# Patient Record
Sex: FEMALE | Race: OTHER | Employment: OTHER | URBAN - METROPOLITAN AREA
[De-identification: names, ages, dates, MRNs, and addresses within clinical notes are randomized per-mention and may not be internally consistent; named-entity substitution may affect disease eponyms.]

---

## 2017-02-17 DIAGNOSIS — I10 ESSENTIAL HYPERTENSION: ICD-10-CM

## 2017-02-17 RX ORDER — METOPROLOL SUCCINATE 25 MG/1
TABLET, EXTENDED RELEASE ORAL
Qty: 90 TABLET | Refills: 0 | Status: SHIPPED | OUTPATIENT
Start: 2017-02-17 | End: 2017-06-06 | Stop reason: SDUPTHER

## 2017-03-20 ENCOUNTER — OFFICE VISIT (OUTPATIENT)
Dept: FAMILY MEDICINE CLINIC | Age: 67
End: 2017-03-20
Payer: COMMERCIAL

## 2017-03-20 VITALS
SYSTOLIC BLOOD PRESSURE: 130 MMHG | HEART RATE: 63 BPM | DIASTOLIC BLOOD PRESSURE: 70 MMHG | HEIGHT: 60 IN | TEMPERATURE: 98.1 F | WEIGHT: 125.2 LBS | BODY MASS INDEX: 24.58 KG/M2

## 2017-03-20 DIAGNOSIS — M77.11 RIGHT LATERAL EPICONDYLITIS: ICD-10-CM

## 2017-03-20 DIAGNOSIS — K21.9 GASTROESOPHAGEAL REFLUX DISEASE WITHOUT ESOPHAGITIS: ICD-10-CM

## 2017-03-20 DIAGNOSIS — G25.81 RLS (RESTLESS LEGS SYNDROME): ICD-10-CM

## 2017-03-20 DIAGNOSIS — I10 ESSENTIAL HYPERTENSION: Primary | ICD-10-CM

## 2017-03-20 PROCEDURE — G8427 DOCREV CUR MEDS BY ELIG CLIN: HCPCS | Performed by: FAMILY MEDICINE

## 2017-03-20 PROCEDURE — 1123F ACP DISCUSS/DSCN MKR DOCD: CPT | Performed by: FAMILY MEDICINE

## 2017-03-20 PROCEDURE — 3014F SCREEN MAMMO DOC REV: CPT | Performed by: FAMILY MEDICINE

## 2017-03-20 PROCEDURE — 3017F COLORECTAL CA SCREEN DOC REV: CPT | Performed by: FAMILY MEDICINE

## 2017-03-20 PROCEDURE — 1090F PRES/ABSN URINE INCON ASSESS: CPT | Performed by: FAMILY MEDICINE

## 2017-03-20 PROCEDURE — G8484 FLU IMMUNIZE NO ADMIN: HCPCS | Performed by: FAMILY MEDICINE

## 2017-03-20 PROCEDURE — 1036F TOBACCO NON-USER: CPT | Performed by: FAMILY MEDICINE

## 2017-03-20 PROCEDURE — 99213 OFFICE O/P EST LOW 20 MIN: CPT | Performed by: FAMILY MEDICINE

## 2017-03-20 PROCEDURE — G8400 PT W/DXA NO RESULTS DOC: HCPCS | Performed by: FAMILY MEDICINE

## 2017-03-20 PROCEDURE — 4040F PNEUMOC VAC/ADMIN/RCVD: CPT | Performed by: FAMILY MEDICINE

## 2017-03-20 PROCEDURE — G8420 CALC BMI NORM PARAMETERS: HCPCS | Performed by: FAMILY MEDICINE

## 2017-03-20 RX ORDER — COVID-19 ANTIGEN TEST
1 KIT MISCELLANEOUS DAILY
Qty: 18 CAPSULE | Refills: 0 | COMMUNITY
Start: 2017-03-20 | End: 2017-11-20 | Stop reason: ALTCHOICE

## 2017-03-20 ASSESSMENT — ENCOUNTER SYMPTOMS
ABDOMINAL PAIN: 0
CONSTIPATION: 0
SHORTNESS OF BREATH: 0
SORE THROAT: 0
BACK PAIN: 0
NAUSEA: 0

## 2017-03-20 ASSESSMENT — PATIENT HEALTH QUESTIONNAIRE - PHQ9
2. FEELING DOWN, DEPRESSED OR HOPELESS: 0
SUM OF ALL RESPONSES TO PHQ QUESTIONS 1-9: 0
SUM OF ALL RESPONSES TO PHQ9 QUESTIONS 1 & 2: 0
1. LITTLE INTEREST OR PLEASURE IN DOING THINGS: 0

## 2017-06-06 DIAGNOSIS — I10 ESSENTIAL HYPERTENSION: ICD-10-CM

## 2017-06-06 RX ORDER — METOPROLOL SUCCINATE 25 MG/1
TABLET, EXTENDED RELEASE ORAL
Qty: 90 TABLET | Refills: 1 | Status: SHIPPED | OUTPATIENT
Start: 2017-06-06 | End: 2017-09-21 | Stop reason: DRUGHIGH

## 2017-07-20 ENCOUNTER — OFFICE VISIT (OUTPATIENT)
Dept: FAMILY MEDICINE CLINIC | Age: 67
End: 2017-07-20
Payer: COMMERCIAL

## 2017-07-20 VITALS
HEIGHT: 64 IN | TEMPERATURE: 98.4 F | BODY MASS INDEX: 21.78 KG/M2 | SYSTOLIC BLOOD PRESSURE: 148 MMHG | DIASTOLIC BLOOD PRESSURE: 86 MMHG | WEIGHT: 127.6 LBS | HEART RATE: 67 BPM

## 2017-07-20 DIAGNOSIS — I10 ESSENTIAL HYPERTENSION: Primary | ICD-10-CM

## 2017-07-20 DIAGNOSIS — G25.81 RLS (RESTLESS LEGS SYNDROME): ICD-10-CM

## 2017-07-20 DIAGNOSIS — I83.93 VARICOSE VEINS OF LEGS: ICD-10-CM

## 2017-07-20 DIAGNOSIS — K21.9 GASTROESOPHAGEAL REFLUX DISEASE WITHOUT ESOPHAGITIS: ICD-10-CM

## 2017-07-20 PROCEDURE — G8420 CALC BMI NORM PARAMETERS: HCPCS | Performed by: FAMILY MEDICINE

## 2017-07-20 PROCEDURE — G8400 PT W/DXA NO RESULTS DOC: HCPCS | Performed by: FAMILY MEDICINE

## 2017-07-20 PROCEDURE — 1123F ACP DISCUSS/DSCN MKR DOCD: CPT | Performed by: FAMILY MEDICINE

## 2017-07-20 PROCEDURE — 1090F PRES/ABSN URINE INCON ASSESS: CPT | Performed by: FAMILY MEDICINE

## 2017-07-20 PROCEDURE — G8427 DOCREV CUR MEDS BY ELIG CLIN: HCPCS | Performed by: FAMILY MEDICINE

## 2017-07-20 PROCEDURE — 4040F PNEUMOC VAC/ADMIN/RCVD: CPT | Performed by: FAMILY MEDICINE

## 2017-07-20 PROCEDURE — 99213 OFFICE O/P EST LOW 20 MIN: CPT | Performed by: FAMILY MEDICINE

## 2017-07-20 PROCEDURE — 3017F COLORECTAL CA SCREEN DOC REV: CPT | Performed by: FAMILY MEDICINE

## 2017-07-20 PROCEDURE — 1036F TOBACCO NON-USER: CPT | Performed by: FAMILY MEDICINE

## 2017-07-20 PROCEDURE — 3014F SCREEN MAMMO DOC REV: CPT | Performed by: FAMILY MEDICINE

## 2017-07-20 ASSESSMENT — ENCOUNTER SYMPTOMS
NAUSEA: 0
SORE THROAT: 0
ABDOMINAL PAIN: 0
CONSTIPATION: 0
SHORTNESS OF BREATH: 0

## 2017-07-21 ENCOUNTER — HOSPITAL ENCOUNTER (OUTPATIENT)
Age: 67
Discharge: HOME | End: 2017-07-21
Payer: COMMERCIAL

## 2017-07-21 DIAGNOSIS — I10 ESSENTIAL HYPERTENSION: ICD-10-CM

## 2017-07-21 LAB
ANION GAP SERPL CALCULATED.3IONS-SCNC: 11 MMOL/L (ref 9–17)
BUN BLDV-MCNC: 17 MG/DL (ref 8–23)
BUN/CREAT BLD: NORMAL (ref 9–20)
CALCIUM SERPL-MCNC: 9.5 MG/DL (ref 8.6–10.4)
CHLORIDE BLD-SCNC: 102 MMOL/L (ref 98–107)
CHOLESTEROL/HDL RATIO: 6
CHOLESTEROL: 197 MG/DL
CO2: 26 MMOL/L (ref 20–31)
CREAT SERPL-MCNC: 0.89 MG/DL (ref 0.5–0.9)
GFR AFRICAN AMERICAN: >60 ML/MIN
GFR NON-AFRICAN AMERICAN: >60 ML/MIN
GFR SERPL CREATININE-BSD FRML MDRD: NORMAL ML/MIN/{1.73_M2}
GFR SERPL CREATININE-BSD FRML MDRD: NORMAL ML/MIN/{1.73_M2}
GLUCOSE BLD-MCNC: 87 MG/DL (ref 70–99)
HDLC SERPL-MCNC: 33 MG/DL
LDL CHOLESTEROL: 131 MG/DL (ref 0–130)
POTASSIUM SERPL-SCNC: 4.9 MMOL/L (ref 3.7–5.3)
SODIUM BLD-SCNC: 139 MMOL/L (ref 135–144)
TRIGL SERPL-MCNC: 163 MG/DL
VITAMIN D 25-HYDROXY: 26.2 NG/ML (ref 30–100)
VLDLC SERPL CALC-MCNC: ABNORMAL MG/DL (ref 1–30)

## 2017-07-21 PROCEDURE — 36415 COLL VENOUS BLD VENIPUNCTURE: CPT

## 2017-07-21 PROCEDURE — 82306 VITAMIN D 25 HYDROXY: CPT

## 2017-07-21 PROCEDURE — 80048 BASIC METABOLIC PNL TOTAL CA: CPT

## 2017-07-21 PROCEDURE — 80061 LIPID PANEL: CPT

## 2017-09-14 ENCOUNTER — OFFICE VISIT (OUTPATIENT)
Dept: FAMILY MEDICINE CLINIC | Age: 67
End: 2017-09-14
Payer: COMMERCIAL

## 2017-09-14 VITALS
DIASTOLIC BLOOD PRESSURE: 96 MMHG | WEIGHT: 128.8 LBS | TEMPERATURE: 98.4 F | HEART RATE: 62 BPM | HEIGHT: 64 IN | SYSTOLIC BLOOD PRESSURE: 168 MMHG | OXYGEN SATURATION: 86 % | BODY MASS INDEX: 21.99 KG/M2

## 2017-09-14 DIAGNOSIS — M79.652 LEFT THIGH PAIN: Primary | ICD-10-CM

## 2017-09-14 DIAGNOSIS — I10 ESSENTIAL HYPERTENSION: ICD-10-CM

## 2017-09-14 PROCEDURE — G8420 CALC BMI NORM PARAMETERS: HCPCS | Performed by: FAMILY MEDICINE

## 2017-09-14 PROCEDURE — 4040F PNEUMOC VAC/ADMIN/RCVD: CPT | Performed by: FAMILY MEDICINE

## 2017-09-14 PROCEDURE — 1123F ACP DISCUSS/DSCN MKR DOCD: CPT | Performed by: FAMILY MEDICINE

## 2017-09-14 PROCEDURE — G8400 PT W/DXA NO RESULTS DOC: HCPCS | Performed by: FAMILY MEDICINE

## 2017-09-14 PROCEDURE — G8427 DOCREV CUR MEDS BY ELIG CLIN: HCPCS | Performed by: FAMILY MEDICINE

## 2017-09-14 PROCEDURE — 1036F TOBACCO NON-USER: CPT | Performed by: FAMILY MEDICINE

## 2017-09-14 PROCEDURE — 3017F COLORECTAL CA SCREEN DOC REV: CPT | Performed by: FAMILY MEDICINE

## 2017-09-14 PROCEDURE — 3014F SCREEN MAMMO DOC REV: CPT | Performed by: FAMILY MEDICINE

## 2017-09-14 PROCEDURE — 1090F PRES/ABSN URINE INCON ASSESS: CPT | Performed by: FAMILY MEDICINE

## 2017-09-14 PROCEDURE — 99213 OFFICE O/P EST LOW 20 MIN: CPT | Performed by: FAMILY MEDICINE

## 2017-09-14 RX ORDER — IBUPROFEN 800 MG/1
800 TABLET ORAL 3 TIMES DAILY PRN
Qty: 21 TABLET | Refills: 0 | Status: SHIPPED | OUTPATIENT
Start: 2017-09-14 | End: 2017-12-11 | Stop reason: ALTCHOICE

## 2017-09-14 RX ORDER — M-VIT,TX,IRON,MINS/CALC/FOLIC 27MG-0.4MG
1 TABLET ORAL DAILY
COMMUNITY

## 2017-09-14 ASSESSMENT — ENCOUNTER SYMPTOMS
SHORTNESS OF BREATH: 0
CONSTIPATION: 0
SORE THROAT: 0
NAUSEA: 0
ABDOMINAL PAIN: 0

## 2017-09-21 ENCOUNTER — NURSE ONLY (OUTPATIENT)
Dept: FAMILY MEDICINE CLINIC | Age: 67
End: 2017-09-21

## 2017-09-21 VITALS — SYSTOLIC BLOOD PRESSURE: 162 MMHG | DIASTOLIC BLOOD PRESSURE: 92 MMHG

## 2017-09-21 DIAGNOSIS — Z01.30 BLOOD PRESSURE CHECK: Primary | ICD-10-CM

## 2017-09-21 RX ORDER — METOPROLOL SUCCINATE 50 MG/1
50 TABLET, EXTENDED RELEASE ORAL DAILY
Qty: 30 TABLET | Refills: 3 | Status: SHIPPED | OUTPATIENT
Start: 2017-09-21 | End: 2017-11-24 | Stop reason: ALTCHOICE

## 2017-09-28 ENCOUNTER — NURSE ONLY (OUTPATIENT)
Dept: FAMILY MEDICINE CLINIC | Age: 67
End: 2017-09-28

## 2017-09-28 VITALS — SYSTOLIC BLOOD PRESSURE: 144 MMHG | DIASTOLIC BLOOD PRESSURE: 80 MMHG

## 2017-09-28 DIAGNOSIS — Z01.30 BLOOD PRESSURE CHECK: Primary | ICD-10-CM

## 2017-10-19 ENCOUNTER — HOSPITAL ENCOUNTER (OUTPATIENT)
Age: 67
Setting detail: SPECIMEN
Discharge: HOME | End: 2017-10-19
Payer: COMMERCIAL

## 2017-10-19 ENCOUNTER — OFFICE VISIT (OUTPATIENT)
Dept: OBGYN CLINIC | Age: 67
End: 2017-10-19
Payer: COMMERCIAL

## 2017-10-19 VITALS
RESPIRATION RATE: 18 BRPM | SYSTOLIC BLOOD PRESSURE: 142 MMHG | HEIGHT: 64 IN | DIASTOLIC BLOOD PRESSURE: 78 MMHG | BODY MASS INDEX: 21.85 KG/M2 | WEIGHT: 128 LBS | HEART RATE: 78 BPM

## 2017-10-19 DIAGNOSIS — Z12.31 SCREENING MAMMOGRAM, ENCOUNTER FOR: ICD-10-CM

## 2017-10-19 DIAGNOSIS — Z13.820 OSTEOPOROSIS SCREENING: ICD-10-CM

## 2017-10-19 DIAGNOSIS — Z01.419 WELL FEMALE EXAM WITH ROUTINE GYNECOLOGICAL EXAM: Primary | ICD-10-CM

## 2017-10-19 DIAGNOSIS — Z11.51 SPECIAL SCREENING EXAMINATION FOR HUMAN PAPILLOMAVIRUS (HPV): ICD-10-CM

## 2017-10-19 PROCEDURE — G0145 SCR C/V CYTO,THINLAYER,RESCR: HCPCS

## 2017-10-19 PROCEDURE — 99397 PER PM REEVAL EST PAT 65+ YR: CPT | Performed by: ADVANCED PRACTICE MIDWIFE

## 2017-10-19 PROCEDURE — 87624 HPV HI-RISK TYP POOLED RSLT: CPT

## 2017-10-19 ASSESSMENT — PATIENT HEALTH QUESTIONNAIRE - PHQ9
SUM OF ALL RESPONSES TO PHQ9 QUESTIONS 1 & 2: 0
2. FEELING DOWN, DEPRESSED OR HOPELESS: 0
1. LITTLE INTEREST OR PLEASURE IN DOING THINGS: 0
SUM OF ALL RESPONSES TO PHQ QUESTIONS 1-9: 0

## 2017-10-19 NOTE — PROGRESS NOTES
History and Physical  [address], [address] [facility] [address], [address] [facility] [phone]   Fax [phone]  Faustina Cheema  10/19/2017              78 y.o. Chief Complaint   Patient presents with    Annual Exam       No LMP recorded. Patient is postmenopausal.             Primary Care Physician: Nahun Bill MD    The patient was seen and examined. She has no chief complaint today and is here for her annual exam.  Her bowels are regular. There are no voiding complaints. She denies any bloating. She denies vaginal discharge and was counseled on STD's and the need for barrier contraception.      HPI : Faustina Cheema is a 78 y.o. female [id number]    Gyn exam, NO complaints  ________________________________________________________________________  Obstetric History       T6      L6     SAB0   TAB0   Ectopic0   Molar0   Multiple0   Live Births6       # Outcome Date GA Lbr Leonardo/2nd Weight Sex Delivery Anes PTL Lv   6 Term     F Vag-Spont   FAHAD   5 Term     F Vag-Spont   FAHAD   4 Term     F Vag-Spont   FAHAD   3 Term     F Vag-Spont   FAHAD   2 Term     F Vag-Spont   FAHAD   1 Term     F Vag-Spont   FAHAD        Past Medical History:   Diagnosis Date    Acid reflux     Arthritis     Cancer ([facility] [address]     cervical     Family history of breast cancer in first degree relative     sister    History of cervical cancer     Hypertension     Wears dentures     TOP    Wears glasses     Wears partial dentures     BOTTOM                                                                   Past Surgical History:   Procedure Laterality Date    ABDOMEN SURGERY      GB    BREAST BIOPSY      LEFT(BENIGN)    CHOLECYSTECTOMY      COLONOSCOPY      Negative    CYST REMOVAL      Left Wrist    FOOT SURGERY Bilateral     FOR CALLOSIS    INNER EAR SURGERY      Right    KNEE ARTHROSCOPY  ,     LEFT    OTHER SURGICAL HISTORY Right 14    BAHA- bone anchored Comments) 11/26/2014       Symptoms of decreased mood absent    **If either question is answered in a  positive fashion then complete the PHQ9 Scoring Evaluation and make the appropriate referral**      Immunization status: up to date and documented, stated as current, but no records available. Gynecologic History:  Menarche: 16 yo  Menopause at  yo     No LMP recorded. Patient is postmenopausal.    Sexually Active: Yes    STD History: No     Permanent Sterilization: No   Reversible Birth Control: No        Hormone Replacement Exposure: No      Genetic Qualified Family History of Breast, Ovarian , Colon or Uterine Cancer: See family history     If YES see scanned worksheet. Preventative Health Testing:    Health Maintenance:  Health Maintenance Due   Topic Date Due    Hepatitis C screen  1950    DTaP/Tdap/Td vaccine (1 - Tdap) 04/08/1970    DEXA (modify frequency per FRAX score)  04/08/2016    Breast cancer screen  06/08/2016    Flu vaccine (1) 09/01/2017       Date of Last Pap Smear: 1/2016 neg/neg  Abnormal Pap Smear History: ? Colposcopy History:   Date of Last Mammogram: 6/2015 negative  Date of Last Colonoscopy: 12/2019 negative  Date of Last Bone Density:      ________________________________________________________________________    REVIEW OF SYSTEMS:    yes   A minimum of an eleven point review of systems was completed. Review Of Systems (11 point):  Constitutional: No fever, chills or malaise;  No weight change or fatigue  Head and Eyes: No vision, Headache, Dizziness or trauma in last 12 months  ENT ROS: No hearing, Tinnitis, sinus or taste problems  Hematological and Lymphatic ROS:No Lymphoma, Von Willebrand's, Hemophillia or Bleeding History  Psych ROS: No Depression, Homicidal thoughts,suicidal thoughts, or anxiety  Breast ROS: No prior breast abnormalities or lumps  Respiratory ROS: No SOB, Pneumoniae,Cough, or Pulmonary Embolism History  Cardiovascular ROS: No Chest Pain with Exertion, Palpitations, Syncope, Edema, Arrhythmia  Gastrointestinal ROS: No Indigestion, Heartburn, Nausea, vomiting, Diarrhea, Constipation,or Bowel Changes; No Bloody Stools or melena  Genito-Urinary ROS: No Dysuria, Hematuria or Nocturia. No Urinary Incontinence or Vaginal Discharge  Musculoskeletal ROS: No Arthralgia, Arthritis,Gout,Osteoporosis or Rheumatism  Neurological ROS: No CVA, Migraines, Epilepsy, Seizure Hx, or Limb Weakness  Dermatological ROS: No Rash, Itching, Hives, Mole Changes or Cancer                                                                                                                                                                                                                                  PHYSICAL Exam:     Constitutional:  Vitals:    10/19/17 1027   BP: (!) 142/78   Site: Left Arm   Position: Sitting   Cuff Size: Medium Adult   Pulse: 78   Resp: 18   Weight: 128 lb (58.1 kg)   Height: 5' 4\" (1.626 m)         General Appearance: This  is a well Developed, well Nourished, well groomed female. Her BMI was reviewed. Nutritional decision making was discussed. Skin:  There was a Normal Inspection of the skin without rashes or lesions. There were no rashes. (Papular, Maculopapular, Hives, Pustular, Macular)     There were no lesions (Ulcers, Erythema, Abn. Appearing Nevi)            Lymphatic:  No Lymph Nodes were Palpable in the neck , axilla or groin. # Of Lymph Nodes; Location ; Character [Normal]  [Shotty] [Tender] [Enlarged]     Neck and EENT:  The neck was supple. There were no masses   The thyroid was not enlarged and had no masses. Perrla, EOMI B/L, TMI B/L No Abnormalities. Throat inspected-No exudates or Masses, Nares Patent No Masses        Respiratory: The lungs were auscultated and found to be clear. There were no rales, rhonchi or wheezes. There was a good respiratory effort. Cardiovascular: The heart was in a regular rate and rhythm.  . No S3 or S4. There was no murmur appreciated. Location, grade, and radiation are not applicable. Extremities: The patients extremities were without calf tenderness, edema, or varicosities. There was full range of motion in all four extremities. Pulses in all four extremities were appreciated and are 2/4. Abdomen: The abdomen was soft and non-tender. There were good bowel sounds in all quadrants and there was no guarding, rebound or rigidity. On evaluation there was no evidence of hepatosplenomegaly and there was no costal vertebral srinivasa tenderness bilaterally. No hernias were appreciated. Abdominal Scars:     Psych: The patient had a normal Orientation to: Time, Place, Person, and Situation  There is no Mood / Affect changes    Breast:  (Chest)  normal appearance, no masses or tenderness, Inspection negative  Self breast exams were reviewed in detail. Literature was given. Pelvic Exam:  Vulva and vagina appear normal. Bimanual exam reveals normal uterus and adnexa. Rectal Exam:  exam declined by patient          Musculosk:  Normal Gait and station was noted. Digits were evaluated without abnormal findings. Range of motion, stability and strength were evaluated and found to be appropriate for the patients age. OMM Structural Component:  The patient did not complain of a Chief complaint requiring OMM. Chief Complaint:none    Structural Exam: No Interest                  ASSESSMENT:      78 y.o. Annual  1. Well female exam with routine gynecological exam  PAP SMEAR    [facility] DIGITAL SCREEN W CAD BILATERAL    DEXA Vertebral Fracture Assessment   2. Special screening examination for human papillomavirus (HPV)  PAP SMEAR   3. Screening mammogram, encounter for  [facility] DIGITAL SCREEN W CAD BILATERAL   4.  Osteoporosis screening  DEXA Vertebral Fracture Assessment          Chief Complaint   Patient presents with    Annual Exam          Past Medical History:   Diagnosis Date    Acid reflux     Arthritis barrier recommendations discussed. STD counseling and prevention reviewed. Gardisil counseling completed for all patients 7-33 yo. Routine health maintenance per patients PCP. Orders Placed This Encounter   Procedures    [facility] DIGITAL SCREEN W CAD BILATERAL     Standing Status:   Future     Standing Expiration Date:   12/19/2018     Order Specific Question:   Reason for exam:     Answer:   SCREENING    DEXA Vertebral Fracture Assessment     Standing Status:   Future     Standing Expiration Date:   10/19/2018     Order Specific Question:   Reason for exam:     Answer:   osteoporosis screening    PAP SMEAR     Patient History:    No LMP recorded. Patient is postmenopausal.  OBGYN Status: Postmenopausal  Past Surgical History:  No date: ABDOMEN SURGERY      Comment: GB  1980: BREAST BIOPSY      Comment: LEFT(BENIGN)  No date: CHOLECYSTECTOMY  12/09: COLONOSCOPY      Comment: Negative  1980: CYST REMOVAL      Comment: Left Wrist  No date: FOOT SURGERY Bilateral      Comment: FOR CALLOSIS  No date: INNER EAR SURGERY      Comment: Right  2000, 2005: KNEE ARTHROSCOPY      Comment: LEFT  9-4-14: OTHER SURGICAL HISTORY Right      Comment: BAHA- bone anchored hearing aid  1956: TONSILLECTOMY AND ADENOIDECTOMY    Problem List       Edg Problems Affecting Cytology    History of cervical cancer     Smoking status: Never Smoker                                                              Smokeless tobacco: Never Used                           Standing Status:   Future     Standing Expiration Date:   10/19/2018     Order Specific Question:   Collection Type     Answer: Thin Prep     Order Specific Question:   Prior Abnormal Pap Test     Answer:   No     Order Specific Question:   Screening or Diagnostic     Answer:   Screening     Order Specific Question:   HPV Requested?      Answer:   Yes     Order Specific Question:   High Risk Patient     Answer:   N/A

## 2017-10-19 NOTE — PATIENT INSTRUCTIONS
before moving on to the next spot. ¨ Check your entire breast, moving up and down as if following a strip from the collarbone to the bra line, and from the armpit to the ribs. Repeat until you have covered the entire breast.  ¨ Repeat this procedure for your left breast, using the pads of the 3 middle fingers of your right hand. · To examine your breasts while in the shower:  ¨ Place one arm over your head and lightly soap your breast on that side. ¨ Using the pads of your fingers, gently move your hand over your breast (in the strip pattern described above), feeling carefully for any lumps or changes. ¨ Repeat for the other breast.  · Have your doctor inspect anything you notice to see if you need further testing. Where can you learn more? Go to [email or web address] org and sign in to your [employer] account. Enter P148 in the [employer] box to learn more about \"Breast Self-Exam: Care Instructions. \"     If you do not have an account, please click on the \"Sign Up Now\" link. Current as of: July 26, 2016  Content Version: 11.3  © 2149-1639 [employer]. Care instructions adapted under license by [facility] ([facility]). If you have questions about a medical condition or this instruction, always ask your healthcare professional. [address]  any warranty or liability for your use of this information.

## 2017-10-23 LAB
HPV SAMPLE: NORMAL
HPV SOURCE: NORMAL
HPV, GENOTYPE 16: NOT DETECTED
HPV, GENOTYPE 18: NOT DETECTED
HPV, HIGH RISK OTHER: NOT DETECTED
HPV, INTERPRETATION: NORMAL

## 2017-10-25 LAB — CYTOLOGY REPORT: NORMAL

## 2017-10-26 ENCOUNTER — OFFICE VISIT (OUTPATIENT)
Dept: ORTHOPEDIC SURGERY | Age: 67
End: 2017-10-26
Payer: COMMERCIAL

## 2017-10-26 VITALS — BODY MASS INDEX: 21.85 KG/M2 | HEIGHT: 64 IN | WEIGHT: 128 LBS

## 2017-10-26 DIAGNOSIS — M70.62 TROCHANTERIC BURSITIS OF LEFT HIP: Primary | ICD-10-CM

## 2017-10-26 PROCEDURE — 4040F PNEUMOC VAC/ADMIN/RCVD: CPT | Performed by: ORTHOPAEDIC SURGERY

## 2017-10-26 PROCEDURE — 1123F ACP DISCUSS/DSCN MKR DOCD: CPT | Performed by: ORTHOPAEDIC SURGERY

## 2017-10-26 PROCEDURE — 1090F PRES/ABSN URINE INCON ASSESS: CPT | Performed by: ORTHOPAEDIC SURGERY

## 2017-10-26 PROCEDURE — G8484 FLU IMMUNIZE NO ADMIN: HCPCS | Performed by: ORTHOPAEDIC SURGERY

## 2017-10-26 PROCEDURE — 20610 DRAIN/INJ JOINT/BURSA W/O US: CPT | Performed by: ORTHOPAEDIC SURGERY

## 2017-10-26 PROCEDURE — 3014F SCREEN MAMMO DOC REV: CPT | Performed by: ORTHOPAEDIC SURGERY

## 2017-10-26 PROCEDURE — 99213 OFFICE O/P EST LOW 20 MIN: CPT | Performed by: ORTHOPAEDIC SURGERY

## 2017-10-26 PROCEDURE — 1036F TOBACCO NON-USER: CPT | Performed by: ORTHOPAEDIC SURGERY

## 2017-10-26 PROCEDURE — 3017F COLORECTAL CA SCREEN DOC REV: CPT | Performed by: ORTHOPAEDIC SURGERY

## 2017-10-26 PROCEDURE — G8428 CUR MEDS NOT DOCUMENT: HCPCS | Performed by: ORTHOPAEDIC SURGERY

## 2017-10-26 PROCEDURE — G8400 PT W/DXA NO RESULTS DOC: HCPCS | Performed by: ORTHOPAEDIC SURGERY

## 2017-10-26 PROCEDURE — G8420 CALC BMI NORM PARAMETERS: HCPCS | Performed by: ORTHOPAEDIC SURGERY

## 2017-10-26 RX ORDER — MELOXICAM 15 MG/1
15 TABLET ORAL DAILY
Qty: 30 TABLET | Refills: 3 | Status: SHIPPED | OUTPATIENT
Start: 2017-10-26 | End: 2018-03-15 | Stop reason: SDUPTHER

## 2017-10-26 RX ORDER — BETAMETHASONE SODIUM PHOSPHATE AND BETAMETHASONE ACETATE 3; 3 MG/ML; MG/ML
12 INJECTION, SUSPENSION INTRA-ARTICULAR; INTRALESIONAL; INTRAMUSCULAR; SOFT TISSUE ONCE
Status: COMPLETED | OUTPATIENT
Start: 2017-10-26 | End: 2017-10-26

## 2017-10-26 RX ORDER — LIDOCAINE HYDROCHLORIDE 10 MG/ML
2 INJECTION, SOLUTION EPIDURAL; INFILTRATION; INTRACAUDAL; PERINEURAL ONCE
Status: COMPLETED | OUTPATIENT
Start: 2017-10-26 | End: 2017-10-26

## 2017-10-26 RX ORDER — BUPIVACAINE HYDROCHLORIDE 5 MG/ML
2 INJECTION, SOLUTION PERINEURAL ONCE
Status: COMPLETED | OUTPATIENT
Start: 2017-10-26 | End: 2017-10-26

## 2017-10-26 RX ADMIN — LIDOCAINE HYDROCHLORIDE 2 ML: 10 INJECTION, SOLUTION EPIDURAL; INFILTRATION; INTRACAUDAL; PERINEURAL at 11:16

## 2017-10-26 RX ADMIN — BETAMETHASONE SODIUM PHOSPHATE AND BETAMETHASONE ACETATE 12 MG: 3; 3 INJECTION, SUSPENSION INTRA-ARTICULAR; INTRALESIONAL; INTRAMUSCULAR; SOFT TISSUE at 11:14

## 2017-10-26 RX ADMIN — BUPIVACAINE HYDROCHLORIDE 10 MG: 5 INJECTION, SOLUTION PERINEURAL at 11:15

## 2017-10-26 ASSESSMENT — ENCOUNTER SYMPTOMS
ABDOMINAL PAIN: 0
BACK PAIN: 0
VOMITING: 0
EYE REDNESS: 0
WHEEZING: 0
BLOOD IN STOOL: 0
NAUSEA: 0
SINUS PRESSURE: 0
EYE ITCHING: 0
SORE THROAT: 0
DIARRHEA: 0
CONSTIPATION: 0
EYE DISCHARGE: 0
COUGH: 0
SHORTNESS OF BREATH: 0
EYE PAIN: 0

## 2017-10-26 NOTE — PROGRESS NOTES
Subjective:      Patient ID: Tierney Langley is a 78 y.o. female. Hip Pain    The incident occurred more than 1 week ago. The incident occurred at home. There was no injury mechanism. The pain is present in the left hip and left thigh. The quality of the pain is described as aching. The pain is moderate. The pain has been constant since onset. Pertinent negatives include no loss of sensation, muscle weakness, numbness or tingling. She reports no foreign bodies present. The symptoms are aggravated by movement and weight bearing. She has tried rest for the symptoms. The treatment provided mild relief. Patient presents with left lateral thigh pain. Pain originates of the trochanteric bursa was some radiation slightly down the knee. This been ongoing since September. Patient reports difficulty laying on her left hip. Review of Systems   Constitutional: Negative for appetite change, chills, diaphoresis, fatigue and fever. HENT: Negative for congestion, dental problem, hearing loss, sinus pressure, sneezing, sore throat and tinnitus. Eyes: Negative for pain, discharge, redness and itching. Respiratory: Negative for cough, shortness of breath and wheezing. Cardiovascular: Negative for chest pain, palpitations and leg swelling. Gastrointestinal: Negative for abdominal pain, blood in stool, constipation, diarrhea, nausea and vomiting. Genitourinary: Negative for difficulty urinating and hematuria. Musculoskeletal: Negative for back pain, gait problem, joint swelling, neck pain and neck stiffness. Skin: Negative for pallor, rash and wound. Neurological: Negative for dizziness, tingling, tremors, seizures, weakness, light-headedness, numbness and headaches. Psychiatric/Behavioral: Negative. Objective:   Physical Exam   Constitutional: She is oriented to person, place, and time. She appears well-developed and well-nourished. HENT:   Head: Normocephalic and atraumatic.    Eyes:

## 2017-11-13 ENCOUNTER — HOSPITAL ENCOUNTER (OUTPATIENT)
Dept: WOMENS IMAGING | Age: 67
Discharge: HOME | End: 2017-11-13
Payer: COMMERCIAL

## 2017-11-13 DIAGNOSIS — Z01.419 WELL FEMALE EXAM WITH ROUTINE GYNECOLOGICAL EXAM: ICD-10-CM

## 2017-11-13 DIAGNOSIS — Z12.31 SCREENING MAMMOGRAM, ENCOUNTER FOR: ICD-10-CM

## 2017-11-13 DIAGNOSIS — Z13.820 OSTEOPOROSIS SCREENING: ICD-10-CM

## 2017-11-13 PROCEDURE — 77080 DXA BONE DENSITY AXIAL: CPT

## 2017-11-13 PROCEDURE — 77063 BREAST TOMOSYNTHESIS BI: CPT

## 2017-11-15 ENCOUNTER — TELEPHONE (OUTPATIENT)
Dept: OBGYN CLINIC | Age: 67
End: 2017-11-15

## 2017-11-20 ENCOUNTER — HOSPITAL ENCOUNTER (OUTPATIENT)
Age: 67
Discharge: HOME | End: 2017-11-20
Payer: COMMERCIAL

## 2017-11-20 ENCOUNTER — OFFICE VISIT (OUTPATIENT)
Dept: FAMILY MEDICINE CLINIC | Age: 67
End: 2017-11-20
Payer: COMMERCIAL

## 2017-11-20 VITALS
SYSTOLIC BLOOD PRESSURE: 182 MMHG | HEIGHT: 64 IN | DIASTOLIC BLOOD PRESSURE: 102 MMHG | WEIGHT: 128.2 LBS | OXYGEN SATURATION: 96 % | BODY MASS INDEX: 21.89 KG/M2 | TEMPERATURE: 98.2 F | HEART RATE: 60 BPM

## 2017-11-20 DIAGNOSIS — I10 ESSENTIAL HYPERTENSION: Primary | ICD-10-CM

## 2017-11-20 DIAGNOSIS — G25.81 RLS (RESTLESS LEGS SYNDROME): ICD-10-CM

## 2017-11-20 DIAGNOSIS — K21.9 GASTROESOPHAGEAL REFLUX DISEASE WITHOUT ESOPHAGITIS: ICD-10-CM

## 2017-11-20 LAB
EKG ATRIAL RATE: 54 BPM
EKG P AXIS: 62 DEGREES
EKG P-R INTERVAL: 134 MS
EKG Q-T INTERVAL: 476 MS
EKG QRS DURATION: 92 MS
EKG QTC CALCULATION (BAZETT): 451 MS
EKG R AXIS: 67 DEGREES
EKG T AXIS: 92 DEGREES
EKG VENTRICULAR RATE: 54 BPM

## 2017-11-20 PROCEDURE — G8484 FLU IMMUNIZE NO ADMIN: HCPCS | Performed by: FAMILY MEDICINE

## 2017-11-20 PROCEDURE — 1036F TOBACCO NON-USER: CPT | Performed by: FAMILY MEDICINE

## 2017-11-20 PROCEDURE — 1090F PRES/ABSN URINE INCON ASSESS: CPT | Performed by: FAMILY MEDICINE

## 2017-11-20 PROCEDURE — 3014F SCREEN MAMMO DOC REV: CPT | Performed by: FAMILY MEDICINE

## 2017-11-20 PROCEDURE — 93005 ELECTROCARDIOGRAM TRACING: CPT

## 2017-11-20 PROCEDURE — 1123F ACP DISCUSS/DSCN MKR DOCD: CPT | Performed by: FAMILY MEDICINE

## 2017-11-20 PROCEDURE — G8420 CALC BMI NORM PARAMETERS: HCPCS | Performed by: FAMILY MEDICINE

## 2017-11-20 PROCEDURE — 4040F PNEUMOC VAC/ADMIN/RCVD: CPT | Performed by: FAMILY MEDICINE

## 2017-11-20 PROCEDURE — G8399 PT W/DXA RESULTS DOCUMENT: HCPCS | Performed by: FAMILY MEDICINE

## 2017-11-20 PROCEDURE — 3017F COLORECTAL CA SCREEN DOC REV: CPT | Performed by: FAMILY MEDICINE

## 2017-11-20 PROCEDURE — G8427 DOCREV CUR MEDS BY ELIG CLIN: HCPCS | Performed by: FAMILY MEDICINE

## 2017-11-20 PROCEDURE — 99213 OFFICE O/P EST LOW 20 MIN: CPT | Performed by: FAMILY MEDICINE

## 2017-11-20 RX ORDER — HYDRALAZINE HYDROCHLORIDE 25 MG/1
25 TABLET, FILM COATED ORAL 2 TIMES DAILY
Qty: 60 TABLET | Refills: 1 | Status: SHIPPED | OUTPATIENT
Start: 2017-11-20 | End: 2017-11-27 | Stop reason: DRUGHIGH

## 2017-11-20 ASSESSMENT — ENCOUNTER SYMPTOMS
ABDOMINAL PAIN: 0
COUGH: 0
SORE THROAT: 0
CONSTIPATION: 0
NAUSEA: 0
SHORTNESS OF BREATH: 0

## 2017-11-20 NOTE — PROGRESS NOTES
Subjective:      Patient ID: Tami Taylor is a 78 y.o. female. Chronic Disease Visit Information    BP Readings from Last 3 Encounters:   11/20/17 (!) 172/92   10/19/17 (!) 142/78   09/28/17 (!) 144/80          LDL Cholesterol (mg/dL)   Date Value   07/21/2017 131 (H)     HDL (mg/dL)   Date Value   07/21/2017 33 (L)     BUN (mg/dL)   Date Value   07/21/2017 17     CREATININE (mg/dL)   Date Value   07/21/2017 0.89     Glucose (mg/dL)   Date Value   07/21/2017 87   12/19/2011 82            Have you changed or started any medications since your last visit including any over-the-counter medicines, vitamins, or herbal medicines? no   Are you having any side effects from any of your medications? -  no  Have you stopped taking any of your medications? Is so, why? -  no    Have you seen any other physician or provider since your last visit? Yes - Records Obtained  Have you had any other diagnostic tests since your last visit? Yes - Records Obtained  Have you been seen in the emergency room and/or had an admission to a hospital since we last saw you? No  Have you had your annual diabetic retinal (eye) exam? Yes - Records Requested  Have you had your routine dental cleaning in the past 6 months? no    Have you activated your [employer] account? If not, what are your barriers?  Yes     Patient Care Team:  Susana Hollis MD as PCP - General  Susana Hollis MD as PCP - S Attributed Provider  Scott Rodriguez DO as Consulting Physician (Obstetrics & Gynecology)         Medical History Review  Past Medical, Family, and Social History reviewed and does contribute to the patient presenting condition    Health Maintenance   Topic Date Due    Hepatitis C screen  1950    DTaP/Tdap/Td vaccine (1 - Tdap) 04/08/1970    Flu vaccine (1) 09/01/2017    Pneumococcal low/med risk (2 of 2 - PCV13) 11/11/2017    Breast cancer screen  11/13/2018    Colon cancer screen colonoscopy  05/05/2019    Lipid screen  07/21/2022

## 2017-11-24 DIAGNOSIS — I10 ESSENTIAL HYPERTENSION: Primary | ICD-10-CM

## 2017-11-27 ENCOUNTER — OFFICE VISIT (OUTPATIENT)
Dept: ORTHOPEDIC SURGERY | Age: 67
End: 2017-11-27
Payer: COMMERCIAL

## 2017-11-27 ENCOUNTER — HOSPITAL ENCOUNTER (OUTPATIENT)
Age: 67
Discharge: HOME | End: 2017-11-27
Payer: COMMERCIAL

## 2017-11-27 ENCOUNTER — OFFICE VISIT (OUTPATIENT)
Dept: FAMILY MEDICINE CLINIC | Age: 67
End: 2017-11-27
Payer: COMMERCIAL

## 2017-11-27 VITALS
HEIGHT: 64 IN | DIASTOLIC BLOOD PRESSURE: 86 MMHG | TEMPERATURE: 98.1 F | SYSTOLIC BLOOD PRESSURE: 172 MMHG | HEART RATE: 86 BPM | WEIGHT: 128.6 LBS | BODY MASS INDEX: 21.95 KG/M2 | OXYGEN SATURATION: 98 %

## 2017-11-27 DIAGNOSIS — M70.62 TROCHANTERIC BURSITIS OF LEFT HIP: ICD-10-CM

## 2017-11-27 DIAGNOSIS — I10 ESSENTIAL HYPERTENSION: Primary | ICD-10-CM

## 2017-11-27 DIAGNOSIS — R00.2 PALPITATION: ICD-10-CM

## 2017-11-27 DIAGNOSIS — R06.02 SOB (SHORTNESS OF BREATH): ICD-10-CM

## 2017-11-27 DIAGNOSIS — M25.552 LEFT HIP PAIN: Primary | ICD-10-CM

## 2017-11-27 LAB
THYROXINE, FREE: 0.95 NG/DL (ref 0.93–1.7)
TSH SERPL DL<=0.05 MIU/L-ACNC: 4.03 MIU/L (ref 0.3–5)

## 2017-11-27 PROCEDURE — 3017F COLORECTAL CA SCREEN DOC REV: CPT | Performed by: ORTHOPAEDIC SURGERY

## 2017-11-27 PROCEDURE — 3014F SCREEN MAMMO DOC REV: CPT | Performed by: ORTHOPAEDIC SURGERY

## 2017-11-27 PROCEDURE — G8484 FLU IMMUNIZE NO ADMIN: HCPCS | Performed by: FAMILY MEDICINE

## 2017-11-27 PROCEDURE — G8399 PT W/DXA RESULTS DOCUMENT: HCPCS | Performed by: FAMILY MEDICINE

## 2017-11-27 PROCEDURE — 99214 OFFICE O/P EST MOD 30 MIN: CPT | Performed by: FAMILY MEDICINE

## 2017-11-27 PROCEDURE — 84439 ASSAY OF FREE THYROXINE: CPT

## 2017-11-27 PROCEDURE — G8420 CALC BMI NORM PARAMETERS: HCPCS | Performed by: FAMILY MEDICINE

## 2017-11-27 PROCEDURE — 3017F COLORECTAL CA SCREEN DOC REV: CPT | Performed by: FAMILY MEDICINE

## 2017-11-27 PROCEDURE — 3014F SCREEN MAMMO DOC REV: CPT | Performed by: FAMILY MEDICINE

## 2017-11-27 PROCEDURE — 1090F PRES/ABSN URINE INCON ASSESS: CPT | Performed by: ORTHOPAEDIC SURGERY

## 2017-11-27 PROCEDURE — 36415 COLL VENOUS BLD VENIPUNCTURE: CPT

## 2017-11-27 PROCEDURE — 1036F TOBACCO NON-USER: CPT | Performed by: ORTHOPAEDIC SURGERY

## 2017-11-27 PROCEDURE — G8484 FLU IMMUNIZE NO ADMIN: HCPCS | Performed by: ORTHOPAEDIC SURGERY

## 2017-11-27 PROCEDURE — G8427 DOCREV CUR MEDS BY ELIG CLIN: HCPCS | Performed by: FAMILY MEDICINE

## 2017-11-27 PROCEDURE — 1090F PRES/ABSN URINE INCON ASSESS: CPT | Performed by: FAMILY MEDICINE

## 2017-11-27 PROCEDURE — G8427 DOCREV CUR MEDS BY ELIG CLIN: HCPCS | Performed by: ORTHOPAEDIC SURGERY

## 2017-11-27 PROCEDURE — G8399 PT W/DXA RESULTS DOCUMENT: HCPCS | Performed by: ORTHOPAEDIC SURGERY

## 2017-11-27 PROCEDURE — 1036F TOBACCO NON-USER: CPT | Performed by: FAMILY MEDICINE

## 2017-11-27 PROCEDURE — 1123F ACP DISCUSS/DSCN MKR DOCD: CPT | Performed by: FAMILY MEDICINE

## 2017-11-27 PROCEDURE — 4040F PNEUMOC VAC/ADMIN/RCVD: CPT | Performed by: ORTHOPAEDIC SURGERY

## 2017-11-27 PROCEDURE — 99213 OFFICE O/P EST LOW 20 MIN: CPT | Performed by: ORTHOPAEDIC SURGERY

## 2017-11-27 PROCEDURE — 1123F ACP DISCUSS/DSCN MKR DOCD: CPT | Performed by: ORTHOPAEDIC SURGERY

## 2017-11-27 PROCEDURE — 84443 ASSAY THYROID STIM HORMONE: CPT

## 2017-11-27 PROCEDURE — G8420 CALC BMI NORM PARAMETERS: HCPCS | Performed by: ORTHOPAEDIC SURGERY

## 2017-11-27 PROCEDURE — 4040F PNEUMOC VAC/ADMIN/RCVD: CPT | Performed by: FAMILY MEDICINE

## 2017-11-27 RX ORDER — HYDRALAZINE HYDROCHLORIDE 25 MG/1
25 TABLET, FILM COATED ORAL 3 TIMES DAILY
COMMUNITY
End: 2017-12-11 | Stop reason: DRUGHIGH

## 2017-11-27 RX ORDER — CARVEDILOL 3.12 MG/1
3.12 TABLET ORAL 2 TIMES DAILY
Qty: 60 TABLET | Refills: 0 | Status: SHIPPED | OUTPATIENT
Start: 2017-11-27 | End: 2017-12-28 | Stop reason: SDUPTHER

## 2017-11-27 ASSESSMENT — ENCOUNTER SYMPTOMS
SHORTNESS OF BREATH: 1
NAUSEA: 0
BACK PAIN: 0
ABDOMINAL PAIN: 0
SORE THROAT: 0

## 2017-11-27 NOTE — PROGRESS NOTES
Subjective:      Patient ID: Ritesh Bowman is a 78 y.o. female. Chronic Disease Visit Information    BP Readings from Last 3 Encounters:   11/27/17 (!) 172/86   11/20/17 (!) 182/102   10/19/17 (!) 142/78          LDL Cholesterol (mg/dL)   Date Value   07/21/2017 131 (H)     HDL (mg/dL)   Date Value   07/21/2017 33 (L)     BUN (mg/dL)   Date Value   07/21/2017 17     CREATININE (mg/dL)   Date Value   07/21/2017 0.89     Glucose (mg/dL)   Date Value   07/21/2017 87   12/19/2011 82            Have you changed or started any medications since your last visit including any over-the-counter medicines, vitamins, or herbal medicines? no   Are you having any side effects from any of your medications? -  no  Have you stopped taking any of your medications? Is so, why? -  no    Have you seen any other physician or provider since your last visit? Yes - Records Obtained  Have you had any other diagnostic tests since your last visit? Yes - Records Obtained  Have you been seen in the emergency room and/or had an admission to a hospital since we last saw you? No  Have you had your annual diabetic retinal (eye) exam? Yes - Records Requested  Have you had your routine dental cleaning in the past 6 months? no    Have you activated your [employer] account? If not, what are your barriers?  Yes     Patient Care Team:  Jeaneen Dubin, MD as PCP - General  Jeaneen Dubin, MD as PCP - S Attributed Provider  Dawna Ferrari DO as Consulting Physician (Obstetrics & Gynecology)         Medical History Review  Past Medical, Family, and Social History reviewed and does contribute to the patient presenting condition    Health Maintenance   Topic Date Due    Hepatitis C screen  1950    DTaP/Tdap/Td vaccine (1 - Tdap) 04/08/1970    Flu vaccine (1) 09/01/2017    Pneumococcal low/med risk (2 of 2 - PCV13) 11/11/2017    Breast cancer screen  11/13/2018    Colon cancer screen colonoscopy  05/05/2019    Lipid screen  07/21/2022  Zostavax vaccine  Addressed    DEXA (modify frequency per FRAX score)  Completed     HPI   71-year-old female is seen in the office today for follow-up for her hypertension her blood pressure is still high was started on hydralazine and she stated that seems to make her  nervous  sometimes her heart beats  fast I told her that I'll talk to the cardiologist was on Toprol and her heart rate was slow denies of any chest pain short of breath at times will get a 24-hour halter and a 2-D echo  Review of Systems   Constitutional: Negative for appetite change. HENT: Negative for ear pain and sore throat. Eyes: Positive for visual disturbance. Wears glasses   Respiratory: Positive for shortness of breath. Cardiovascular: Positive for palpitations. Negative for chest pain. Gastrointestinal: Negative for abdominal pain and nausea. Genitourinary: Negative for frequency and pelvic pain. Musculoskeletal: Negative for arthralgias and back pain. Neurological: Negative for dizziness, syncope and headaches. Objective:   Physical Exam   Constitutional: She is oriented to person, place, and time. She appears well-developed and well-nourished. BP (!) 172/86   Pulse 86   Temp 98.1 °F (36.7 °C) (Oral)   Ht 5' 4\" (1.626 m)   Wt 128 lb 9.6 oz (58.3 kg)   SpO2 98%   BMI 22.07 kg/m²    HENT:   Head: Normocephalic. Mouth/Throat: Oropharynx is clear and moist.        Cardiovascular: Normal rate and regular rhythm. Pulmonary/Chest: Breath sounds normal.   Abdominal: Soft. Bowel sounds are normal. There is no tenderness. Musculoskeletal: She exhibits no edema. Lymphadenopathy:     She has no cervical adenopathy. Neurological: She is alert and oriented to person, place, and time. Nursing note and vitals reviewed. Assessment:      1. Essential hypertension  carvedilol (COREG) 3.125 MG tablet   2. Palpitation  Holter Monitor 24 Hour    TSH without Reflex    T4, Free   3.  SOB (shortness of

## 2017-11-27 NOTE — PROGRESS NOTES
Subjective:      Patient ID: Dami Romero is a 78 y.o. female. HPI  Patient is here follow-up left trochanteric bursitis. Patient is doing much better following trochanteric injection. Patient only followed up because we had scheduled her appointment  Review of Systems    Objective:   Physical Exam   Constitutional: She is oriented to person, place, and time. She appears well-developed and well-nourished. HENT:   Head: Normocephalic and atraumatic. Eyes: Conjunctivae and EOM are normal.   Neck: Normal range of motion. Pulmonary/Chest: Effort normal. No respiratory distress. Neurological: She is alert and oriented to person, place, and time. She has normal strength. No sensory deficit. Normal gait   Skin: Skin is warm and dry. Psychiatric: Her behavior is normal. Thought content normal.   Nursing note and vitals reviewed. Assessment:      Encounter Diagnoses   Name Primary?     Left hip pain Yes    Trochanteric bursitis of left hip    Significant improvement following trochanteric bursal injection        Plan:      When necessary

## 2017-12-11 ENCOUNTER — OFFICE VISIT (OUTPATIENT)
Dept: FAMILY MEDICINE CLINIC | Age: 67
End: 2017-12-11
Payer: COMMERCIAL

## 2017-12-11 VITALS
DIASTOLIC BLOOD PRESSURE: 84 MMHG | HEIGHT: 64 IN | BODY MASS INDEX: 22.09 KG/M2 | TEMPERATURE: 98 F | WEIGHT: 129.4 LBS | HEART RATE: 60 BPM | SYSTOLIC BLOOD PRESSURE: 158 MMHG

## 2017-12-11 DIAGNOSIS — I10 ESSENTIAL HYPERTENSION: Primary | ICD-10-CM

## 2017-12-11 DIAGNOSIS — G25.81 RLS (RESTLESS LEGS SYNDROME): ICD-10-CM

## 2017-12-11 PROBLEM — M25.552 LEFT HIP PAIN: Status: RESOLVED | Noted: 2017-11-27 | Resolved: 2017-12-11

## 2017-12-11 PROCEDURE — 99213 OFFICE O/P EST LOW 20 MIN: CPT | Performed by: FAMILY MEDICINE

## 2017-12-11 PROCEDURE — 3017F COLORECTAL CA SCREEN DOC REV: CPT | Performed by: FAMILY MEDICINE

## 2017-12-11 PROCEDURE — G8420 CALC BMI NORM PARAMETERS: HCPCS | Performed by: FAMILY MEDICINE

## 2017-12-11 PROCEDURE — 1123F ACP DISCUSS/DSCN MKR DOCD: CPT | Performed by: FAMILY MEDICINE

## 2017-12-11 PROCEDURE — G8482 FLU IMMUNIZE ORDER/ADMIN: HCPCS | Performed by: FAMILY MEDICINE

## 2017-12-11 PROCEDURE — G8399 PT W/DXA RESULTS DOCUMENT: HCPCS | Performed by: FAMILY MEDICINE

## 2017-12-11 PROCEDURE — 1036F TOBACCO NON-USER: CPT | Performed by: FAMILY MEDICINE

## 2017-12-11 PROCEDURE — 1090F PRES/ABSN URINE INCON ASSESS: CPT | Performed by: FAMILY MEDICINE

## 2017-12-11 PROCEDURE — 3014F SCREEN MAMMO DOC REV: CPT | Performed by: FAMILY MEDICINE

## 2017-12-11 PROCEDURE — G8427 DOCREV CUR MEDS BY ELIG CLIN: HCPCS | Performed by: FAMILY MEDICINE

## 2017-12-11 PROCEDURE — 4040F PNEUMOC VAC/ADMIN/RCVD: CPT | Performed by: FAMILY MEDICINE

## 2017-12-11 RX ORDER — HYDRALAZINE HYDROCHLORIDE 50 MG/1
50 TABLET, FILM COATED ORAL 2 TIMES DAILY
COMMUNITY
End: 2017-12-28 | Stop reason: DRUGHIGH

## 2017-12-11 ASSESSMENT — ENCOUNTER SYMPTOMS
BACK PAIN: 0
SORE THROAT: 0
COUGH: 0
EYE ITCHING: 0
SHORTNESS OF BREATH: 0
CONSTIPATION: 0
NAUSEA: 0
ABDOMINAL PAIN: 0

## 2017-12-11 NOTE — PROGRESS NOTES
frequency per FRAX score)  Completed    Flu vaccine  Completed     HPI  59-year-old female is seen in the office today for follow-up for her hypertension was started on hydralazine and it has improved but she states  her heart beats fast when she takes it so I told her that we can change it but she wants to continue with hydralazine and was advised to have a 2-D echo and a 24-hour halter  she has not had as yet denies of any chest pain or shortness of breath. Has got restless leg syndrome on Mirapex and seems to be doing  better  Review of Systems   Constitutional: Negative for appetite change. HENT: Negative for ear pain and sore throat. Eyes: Positive for visual disturbance. Negative for itching. Wears glasses   Respiratory: Negative for cough and shortness of breath. Cardiovascular: Negative for chest pain. Gastrointestinal: Negative for abdominal pain, constipation and nausea. Genitourinary: Negative for frequency and pelvic pain. Musculoskeletal: Negative for arthralgias and back pain. Neurological: Negative for dizziness and headaches. Objective:   Physical Exam   Constitutional: She is oriented to person, place, and time. She appears well-developed and well-nourished. BP (!) 158/84   Pulse 60   Temp 98 °F (36.7 °C) (Oral)   Ht 5' 4\" (1.626 m)   Wt 129 lb 6.4 oz (58.7 kg)   BMI 22.21 kg/m²    HENT:   Head: Normocephalic. Mouth/Throat: Oropharynx is clear and moist.        Cardiovascular: Normal rate and regular rhythm. Pulmonary/Chest: Breath sounds normal. She has no rales. Abdominal: Soft. Bowel sounds are normal. There is no tenderness. Musculoskeletal: She exhibits no edema. Lymphadenopathy:     She has no cervical adenopathy. Neurological: She is alert and oriented to person, place, and time. Nursing note and vitals reviewed. Assessment:       1. Essential hypertension  Improving increase hydralazine    2.  RLS (restless legs syndrome)  Controlled Plan:        No orders of the defined types were placed in this encounter. No orders of the defined types were placed in this encounter. Return in about 1 month (around 1/11/2018) for HTN.     Continue current medications reviewed from the chart    Increase hydralazine to 50 mg twice a day   come back in a week to have her blood pressure check

## 2017-12-18 ENCOUNTER — HOSPITAL ENCOUNTER (OUTPATIENT)
Dept: NON INVASIVE DIAGNOSTICS | Age: 67
Discharge: HOME | End: 2017-12-18
Payer: COMMERCIAL

## 2017-12-18 DIAGNOSIS — R06.02 SOB (SHORTNESS OF BREATH): ICD-10-CM

## 2017-12-18 LAB
LV EF: 60 %
LVEF MODALITY: NORMAL

## 2017-12-18 PROCEDURE — 93306 TTE W/DOPPLER COMPLETE: CPT

## 2017-12-28 ENCOUNTER — HOSPITAL ENCOUNTER (OUTPATIENT)
Dept: GENERAL RADIOLOGY | Age: 67
Discharge: HOME | End: 2017-12-28
Payer: COMMERCIAL

## 2017-12-28 ENCOUNTER — OFFICE VISIT (OUTPATIENT)
Dept: FAMILY MEDICINE CLINIC | Age: 67
End: 2017-12-28
Payer: COMMERCIAL

## 2017-12-28 ENCOUNTER — HOSPITAL ENCOUNTER (OUTPATIENT)
Age: 67
Discharge: HOME | End: 2017-12-28
Payer: COMMERCIAL

## 2017-12-28 VITALS
TEMPERATURE: 97.4 F | SYSTOLIC BLOOD PRESSURE: 162 MMHG | BODY MASS INDEX: 20.93 KG/M2 | HEIGHT: 64 IN | WEIGHT: 122.6 LBS | DIASTOLIC BLOOD PRESSURE: 84 MMHG

## 2017-12-28 DIAGNOSIS — R10.84 GENERALIZED ABDOMINAL PAIN: ICD-10-CM

## 2017-12-28 DIAGNOSIS — I10 ESSENTIAL HYPERTENSION: ICD-10-CM

## 2017-12-28 DIAGNOSIS — J40 BRONCHITIS: ICD-10-CM

## 2017-12-28 DIAGNOSIS — J40 BRONCHITIS: Primary | ICD-10-CM

## 2017-12-28 LAB
ABSOLUTE BANDS #: 0.42 K/UL (ref 0–1)
ABSOLUTE EOS #: 0 K/UL (ref 0–0.4)
ABSOLUTE IMMATURE GRANULOCYTE: ABNORMAL K/UL (ref 0–0.3)
ABSOLUTE LYMPH #: 1.04 K/UL (ref 1–4.8)
ABSOLUTE MONO #: 0.78 K/UL (ref 0.1–1.3)
ALBUMIN SERPL-MCNC: 4.1 G/DL (ref 3.5–5.2)
ALBUMIN/GLOBULIN RATIO: ABNORMAL (ref 1–2.5)
ALP BLD-CCNC: 62 U/L (ref 35–104)
ALT SERPL-CCNC: 21 U/L (ref 5–33)
AST SERPL-CCNC: 34 U/L
BANDS: 8 % (ref 0–10)
BASOPHILS # BLD: 0 % (ref 0–2)
BASOPHILS ABSOLUTE: 0 K/UL (ref 0–0.2)
BILIRUB SERPL-MCNC: 0.29 MG/DL (ref 0.3–1.2)
BILIRUBIN DIRECT: 0.08 MG/DL
BILIRUBIN, INDIRECT: 0.21 MG/DL (ref 0–1)
DIFFERENTIAL TYPE: ABNORMAL
EOSINOPHILS RELATIVE PERCENT: 0 % (ref 0–4)
GLOBULIN: ABNORMAL G/DL (ref 1.5–3.8)
HCT VFR BLD CALC: 39.1 % (ref 36–46)
HEMOGLOBIN: 12.8 G/DL (ref 12–16)
IMMATURE GRANULOCYTES: ABNORMAL %
LYMPHOCYTES # BLD: 20 % (ref 24–44)
MCH RBC QN AUTO: 30.3 PG (ref 26–34)
MCHC RBC AUTO-ENTMCNC: 32.7 G/DL (ref 31–37)
MCV RBC AUTO: 92.7 FL (ref 80–100)
MONOCYTES # BLD: 15 % (ref 1–7)
MORPHOLOGY: NORMAL
PDW BLD-RTO: 13.4 % (ref 11.5–14.9)
PLATELET # BLD: 225 K/UL (ref 150–450)
PLATELET ESTIMATE: ABNORMAL
PMV BLD AUTO: 9.5 FL (ref 6–12)
RBC # BLD: 4.22 M/UL (ref 4–5.2)
RBC # BLD: ABNORMAL 10*6/UL
SEG NEUTROPHILS: 57 % (ref 36–66)
SEGMENTED NEUTROPHILS ABSOLUTE COUNT: 2.96 K/UL (ref 1.3–9.1)
TOTAL PROTEIN: 7.9 G/DL (ref 6.4–8.3)
WBC # BLD: 5.2 K/UL (ref 3.5–11)
WBC # BLD: ABNORMAL 10*3/UL

## 2017-12-28 PROCEDURE — 80076 HEPATIC FUNCTION PANEL: CPT

## 2017-12-28 PROCEDURE — 85025 COMPLETE CBC W/AUTO DIFF WBC: CPT

## 2017-12-28 PROCEDURE — 1090F PRES/ABSN URINE INCON ASSESS: CPT | Performed by: FAMILY MEDICINE

## 2017-12-28 PROCEDURE — G8482 FLU IMMUNIZE ORDER/ADMIN: HCPCS | Performed by: FAMILY MEDICINE

## 2017-12-28 PROCEDURE — 99213 OFFICE O/P EST LOW 20 MIN: CPT | Performed by: FAMILY MEDICINE

## 2017-12-28 PROCEDURE — 1123F ACP DISCUSS/DSCN MKR DOCD: CPT | Performed by: FAMILY MEDICINE

## 2017-12-28 PROCEDURE — 4040F PNEUMOC VAC/ADMIN/RCVD: CPT | Performed by: FAMILY MEDICINE

## 2017-12-28 PROCEDURE — 3014F SCREEN MAMMO DOC REV: CPT | Performed by: FAMILY MEDICINE

## 2017-12-28 PROCEDURE — G8427 DOCREV CUR MEDS BY ELIG CLIN: HCPCS | Performed by: FAMILY MEDICINE

## 2017-12-28 PROCEDURE — 3017F COLORECTAL CA SCREEN DOC REV: CPT | Performed by: FAMILY MEDICINE

## 2017-12-28 PROCEDURE — 1036F TOBACCO NON-USER: CPT | Performed by: FAMILY MEDICINE

## 2017-12-28 PROCEDURE — 71020 XR CHEST STANDARD TWO VW: CPT

## 2017-12-28 PROCEDURE — G8420 CALC BMI NORM PARAMETERS: HCPCS | Performed by: FAMILY MEDICINE

## 2017-12-28 PROCEDURE — 36415 COLL VENOUS BLD VENIPUNCTURE: CPT

## 2017-12-28 PROCEDURE — G8399 PT W/DXA RESULTS DOCUMENT: HCPCS | Performed by: FAMILY MEDICINE

## 2017-12-28 RX ORDER — CARVEDILOL 3.12 MG/1
3.12 TABLET ORAL 2 TIMES DAILY
Qty: 60 TABLET | Refills: 1 | Status: SHIPPED | OUTPATIENT
Start: 2017-12-28 | End: 2018-03-13 | Stop reason: SDUPTHER

## 2017-12-28 RX ORDER — AMOXICILLIN AND CLAVULANATE POTASSIUM 875; 125 MG/1; MG/1
1 TABLET, FILM COATED ORAL EVERY 12 HOURS
Qty: 20 TABLET | Refills: 0 | Status: SHIPPED | OUTPATIENT
Start: 2017-12-28 | End: 2018-01-07

## 2017-12-28 RX ORDER — ALBUTEROL SULFATE 90 UG/1
2 AEROSOL, METERED RESPIRATORY (INHALATION) 2 TIMES DAILY
Qty: 1 INHALER | Refills: 1 | Status: SHIPPED | OUTPATIENT
Start: 2017-12-28 | End: 2018-08-13 | Stop reason: ALTCHOICE

## 2017-12-28 RX ORDER — HYDRALAZINE HYDROCHLORIDE 50 MG/1
50 TABLET, FILM COATED ORAL 3 TIMES DAILY
COMMUNITY
End: 2018-01-02 | Stop reason: SDUPTHER

## 2017-12-28 ASSESSMENT — ENCOUNTER SYMPTOMS
SORE THROAT: 0
NAUSEA: 0
WHEEZING: 1
ABDOMINAL PAIN: 0
SHORTNESS OF BREATH: 0
VOMITING: 0
COUGH: 1
CONSTIPATION: 0

## 2017-12-28 NOTE — PROGRESS NOTES
change. HENT: Positive for congestion. Negative for ear pain and sore throat. Eyes: Positive for visual disturbance. Wears glasses   Respiratory: Positive for cough and wheezing. Negative for shortness of breath. Cardiovascular: Negative for chest pain. Gastrointestinal: Negative for abdominal pain, constipation, nausea and vomiting. Genitourinary: Negative for frequency, pelvic pain and vaginal discharge. Musculoskeletal: Positive for myalgias. Neurological: Negative for dizziness, syncope and headaches. Objective:   Physical Exam   Constitutional: She is oriented to person, place, and time. She appears well-developed and well-nourished. BP (!) 162/84   Temp 97.4 °F (36.3 °C) (Oral)   Ht 5' 4\" (1.626 m)   Wt 122 lb 9.6 oz (55.6 kg)   BMI 21.04 kg/m²    HENT:   Head: Normocephalic. Mouth/Throat: Oropharynx is clear and moist.        Cardiovascular: Normal rate and regular rhythm. Pulmonary/Chest: She has no wheezes. She has no rales. Diminished breath sounds posteriorly   Abdominal: Soft. Bowel sounds are normal. There is tenderness. Tenderness in the epigastric region present   Musculoskeletal: She exhibits no edema. Lymphadenopathy:     She has no cervical adenopathy. Neurological: She is alert and oriented to person, place, and time. Nursing note and vitals reviewed. Assessment:      1. Bronchitis  XR CHEST STANDARD (2 VW)   2. Generalized abdominal pain  Hepatic Function Panel    CBC Auto Differential   3.  Essential hypertension  carvedilol (COREG) 3.125 MG tablet           Plan:        Orders Placed This Encounter   Procedures    XR CHEST STANDARD (2 VW)     Standing Status:   Future     Number of Occurrences:   1     Standing Expiration Date:   12/28/2018     Order Specific Question:   Reason for exam:     Answer:   bronchitis    Hepatic Function Panel     Standing Status:   Future     Number of Occurrences:   1     Standing Expiration Date:   12/28/2018

## 2018-01-02 RX ORDER — HYDRALAZINE HYDROCHLORIDE 50 MG/1
50 TABLET, FILM COATED ORAL 3 TIMES DAILY
Qty: 90 TABLET | Refills: 2 | Status: SHIPPED | OUTPATIENT
Start: 2018-01-02 | End: 2018-05-04 | Stop reason: SDUPTHER

## 2018-01-08 ENCOUNTER — OFFICE VISIT (OUTPATIENT)
Dept: FAMILY MEDICINE CLINIC | Age: 68
End: 2018-01-08
Payer: COMMERCIAL

## 2018-01-08 VITALS
WEIGHT: 125 LBS | OXYGEN SATURATION: 97 % | BODY MASS INDEX: 21.46 KG/M2 | TEMPERATURE: 98.3 F | HEART RATE: 87 BPM | SYSTOLIC BLOOD PRESSURE: 122 MMHG | DIASTOLIC BLOOD PRESSURE: 78 MMHG

## 2018-01-08 DIAGNOSIS — I10 ESSENTIAL HYPERTENSION: ICD-10-CM

## 2018-01-08 DIAGNOSIS — J45.21 BRONCHITIS, ASTHMATIC, MILD INTERMITTENT, WITH ACUTE EXACERBATION: Primary | ICD-10-CM

## 2018-01-08 PROCEDURE — 3017F COLORECTAL CA SCREEN DOC REV: CPT | Performed by: FAMILY MEDICINE

## 2018-01-08 PROCEDURE — G8420 CALC BMI NORM PARAMETERS: HCPCS | Performed by: FAMILY MEDICINE

## 2018-01-08 PROCEDURE — 1036F TOBACCO NON-USER: CPT | Performed by: FAMILY MEDICINE

## 2018-01-08 PROCEDURE — G8399 PT W/DXA RESULTS DOCUMENT: HCPCS | Performed by: FAMILY MEDICINE

## 2018-01-08 PROCEDURE — 3014F SCREEN MAMMO DOC REV: CPT | Performed by: FAMILY MEDICINE

## 2018-01-08 PROCEDURE — G8482 FLU IMMUNIZE ORDER/ADMIN: HCPCS | Performed by: FAMILY MEDICINE

## 2018-01-08 PROCEDURE — 1123F ACP DISCUSS/DSCN MKR DOCD: CPT | Performed by: FAMILY MEDICINE

## 2018-01-08 PROCEDURE — G8427 DOCREV CUR MEDS BY ELIG CLIN: HCPCS | Performed by: FAMILY MEDICINE

## 2018-01-08 PROCEDURE — 99213 OFFICE O/P EST LOW 20 MIN: CPT | Performed by: FAMILY MEDICINE

## 2018-01-08 PROCEDURE — 1090F PRES/ABSN URINE INCON ASSESS: CPT | Performed by: FAMILY MEDICINE

## 2018-01-08 PROCEDURE — 4040F PNEUMOC VAC/ADMIN/RCVD: CPT | Performed by: FAMILY MEDICINE

## 2018-01-08 RX ORDER — BENZONATATE 200 MG/1
200 CAPSULE ORAL 3 TIMES DAILY
Qty: 21 CAPSULE | Refills: 0 | Status: SHIPPED | OUTPATIENT
Start: 2018-01-08 | End: 2018-01-15

## 2018-01-08 ASSESSMENT — ENCOUNTER SYMPTOMS
COUGH: 1
SORE THROAT: 0
ABDOMINAL PAIN: 0
CONSTIPATION: 0
WHEEZING: 0
SHORTNESS OF BREATH: 0
NAUSEA: 0

## 2018-01-08 NOTE — PROGRESS NOTES
Subjective:      Patient ID: Ruth Simth is a 78 y.o. female. 2 week follow up from 12/28/17- bronchitis  Visit Information    Have you changed or started any medications since your last visit including any over-the-counter medicines, vitamins, or herbal medicines? no   Have you stopped taking any of your medications? Is so, why? -  no  Are you having any side effects from any of your medications? - no    Have you seen any other physician or provider since your last visit?  no   Have you had any other diagnostic tests since your last visit?  no   Have you been seen in the emergency room and/or had an admission in a hospital since we last saw you?  no   Have you had your routine dental cleaning in the past 6 months?  yes      Do you have an active MyChart account? If no, what is the barrier? Yes    Patient Care Team:  Azam Friedman MD as PCP - General  Azam Friedman MD as PCP - [facility] Attributed Provider  Chaim Hardin DO as Consulting Physician (Obstetrics & Gynecology)    Medical History Review  Past Medical, Family, and Social History reviewed and does contribute to the patient presenting condition    Health Maintenance   Topic Date Due    Hepatitis C screen  1950    DTaP/Tdap/Td vaccine (1 - Tdap) 04/08/1970    Pneumococcal low/med risk (2 of 2 - PCV13) 11/11/2017    Breast cancer screen  11/13/2018    Colon cancer screen colonoscopy  05/05/2019    Lipid screen  07/21/2022    Zostavax vaccine  Addressed    DEXA (modify frequency per FRAX score)  Completed    Flu vaccine  Completed             HPI  This 72-year-old female is seen in the office today for follow-up for her bronchitis she is feeling much better denies of any wheezing  still has got a dry cough so I told her we will start her on Tessalon no chest pain shortness of breath.   Has history of hypertension blood pressure is controlled she is on hydralazine and Coreg, has got GERD on Prilosec was seems to have helped

## 2018-03-13 DIAGNOSIS — M70.62 TROCHANTERIC BURSITIS OF LEFT HIP: Primary | ICD-10-CM

## 2018-03-13 DIAGNOSIS — I10 ESSENTIAL HYPERTENSION: ICD-10-CM

## 2018-03-13 RX ORDER — HYDROCODONE BITARTRATE AND ACETAMINOPHEN 5; 325 MG/1; MG/1
TABLET ORAL
Qty: 40 TABLET | Refills: 0 | OUTPATIENT
Start: 2018-03-13 | End: 2018-03-20

## 2018-03-13 RX ORDER — CARVEDILOL 3.12 MG/1
3.12 TABLET ORAL 2 TIMES DAILY
Qty: 180 TABLET | Refills: 1 | Status: SHIPPED | OUTPATIENT
Start: 2018-03-13 | End: 2018-10-02 | Stop reason: SDUPTHER

## 2018-03-15 DIAGNOSIS — M70.62 TROCHANTERIC BURSITIS OF LEFT HIP: ICD-10-CM

## 2018-03-15 RX ORDER — MELOXICAM 15 MG/1
15 TABLET ORAL DAILY
Qty: 30 TABLET | Refills: 3 | Status: SHIPPED | OUTPATIENT
Start: 2018-03-15 | End: 2018-11-07 | Stop reason: SDUPTHER

## 2018-03-15 NOTE — TELEPHONE ENCOUNTER
Patient is requesting a refill on Mobic, It is working very well for her. Last script was 10/26/17 with 3 refills.

## 2018-04-09 ENCOUNTER — OFFICE VISIT (OUTPATIENT)
Dept: FAMILY MEDICINE CLINIC | Age: 68
End: 2018-04-09
Payer: COMMERCIAL

## 2018-04-09 VITALS
DIASTOLIC BLOOD PRESSURE: 80 MMHG | SYSTOLIC BLOOD PRESSURE: 110 MMHG | TEMPERATURE: 98.1 F | HEART RATE: 60 BPM | BODY MASS INDEX: 21.15 KG/M2 | WEIGHT: 123.2 LBS

## 2018-04-09 DIAGNOSIS — Z23 NEED FOR PNEUMOCOCCAL VACCINATION: ICD-10-CM

## 2018-04-09 DIAGNOSIS — G25.81 RLS (RESTLESS LEGS SYNDROME): ICD-10-CM

## 2018-04-09 DIAGNOSIS — I10 ESSENTIAL HYPERTENSION: Primary | ICD-10-CM

## 2018-04-09 PROCEDURE — 1090F PRES/ABSN URINE INCON ASSESS: CPT | Performed by: FAMILY MEDICINE

## 2018-04-09 PROCEDURE — 1123F ACP DISCUSS/DSCN MKR DOCD: CPT | Performed by: FAMILY MEDICINE

## 2018-04-09 PROCEDURE — G8420 CALC BMI NORM PARAMETERS: HCPCS | Performed by: FAMILY MEDICINE

## 2018-04-09 PROCEDURE — 99213 OFFICE O/P EST LOW 20 MIN: CPT | Performed by: FAMILY MEDICINE

## 2018-04-09 PROCEDURE — 4040F PNEUMOC VAC/ADMIN/RCVD: CPT | Performed by: FAMILY MEDICINE

## 2018-04-09 PROCEDURE — 3017F COLORECTAL CA SCREEN DOC REV: CPT | Performed by: FAMILY MEDICINE

## 2018-04-09 PROCEDURE — 3014F SCREEN MAMMO DOC REV: CPT | Performed by: FAMILY MEDICINE

## 2018-04-09 PROCEDURE — G8427 DOCREV CUR MEDS BY ELIG CLIN: HCPCS | Performed by: FAMILY MEDICINE

## 2018-04-09 PROCEDURE — G8399 PT W/DXA RESULTS DOCUMENT: HCPCS | Performed by: FAMILY MEDICINE

## 2018-04-09 PROCEDURE — 90471 IMMUNIZATION ADMIN: CPT | Performed by: FAMILY MEDICINE

## 2018-04-09 PROCEDURE — 90732 PPSV23 VACC 2 YRS+ SUBQ/IM: CPT | Performed by: FAMILY MEDICINE

## 2018-04-09 PROCEDURE — 1036F TOBACCO NON-USER: CPT | Performed by: FAMILY MEDICINE

## 2018-04-09 ASSESSMENT — ENCOUNTER SYMPTOMS
SORE THROAT: 0
ABDOMINAL PAIN: 0
NAUSEA: 0
CONSTIPATION: 0
SHORTNESS OF BREATH: 0

## 2018-04-11 ENCOUNTER — HOSPITAL ENCOUNTER (OUTPATIENT)
Age: 68
Discharge: HOME | End: 2018-04-11
Payer: COMMERCIAL

## 2018-04-11 DIAGNOSIS — I10 ESSENTIAL HYPERTENSION: ICD-10-CM

## 2018-04-11 LAB
ANION GAP SERPL CALCULATED.3IONS-SCNC: 9 MMOL/L (ref 9–17)
BUN BLDV-MCNC: 16 MG/DL (ref 8–23)
BUN/CREAT BLD: NORMAL (ref 9–20)
CALCIUM SERPL-MCNC: 9.3 MG/DL (ref 8.6–10.4)
CHLORIDE BLD-SCNC: 105 MMOL/L (ref 98–107)
CHOLESTEROL/HDL RATIO: 4.1
CHOLESTEROL: 169 MG/DL
CO2: 25 MMOL/L (ref 20–31)
CREAT SERPL-MCNC: 0.78 MG/DL (ref 0.5–0.9)
GFR AFRICAN AMERICAN: >60 ML/MIN
GFR NON-AFRICAN AMERICAN: >60 ML/MIN
GFR SERPL CREATININE-BSD FRML MDRD: NORMAL ML/MIN/{1.73_M2}
GFR SERPL CREATININE-BSD FRML MDRD: NORMAL ML/MIN/{1.73_M2}
GLUCOSE BLD-MCNC: 93 MG/DL (ref 70–99)
HDLC SERPL-MCNC: 41 MG/DL
LDL CHOLESTEROL: 113 MG/DL (ref 0–130)
POTASSIUM SERPL-SCNC: 4.4 MMOL/L (ref 3.7–5.3)
SODIUM BLD-SCNC: 139 MMOL/L (ref 135–144)
TRIGL SERPL-MCNC: 73 MG/DL
VLDLC SERPL CALC-MCNC: NORMAL MG/DL (ref 1–30)

## 2018-04-11 PROCEDURE — 80061 LIPID PANEL: CPT

## 2018-04-11 PROCEDURE — 36415 COLL VENOUS BLD VENIPUNCTURE: CPT

## 2018-04-11 PROCEDURE — 80048 BASIC METABOLIC PNL TOTAL CA: CPT

## 2018-04-12 ENCOUNTER — TELEPHONE (OUTPATIENT)
Dept: FAMILY MEDICINE CLINIC | Age: 68
End: 2018-04-12

## 2018-05-04 RX ORDER — PRAMIPEXOLE DIHYDROCHLORIDE 0.12 MG/1
0.12 TABLET ORAL NIGHTLY
Qty: 90 TABLET | Refills: 1 | Status: SHIPPED | OUTPATIENT
Start: 2018-05-04 | End: 2019-12-11 | Stop reason: SDUPTHER

## 2018-05-04 RX ORDER — HYDRALAZINE HYDROCHLORIDE 50 MG/1
50 TABLET, FILM COATED ORAL 3 TIMES DAILY
Qty: 270 TABLET | Refills: 1 | Status: SHIPPED | OUTPATIENT
Start: 2018-05-04 | End: 2018-12-13 | Stop reason: DRUGHIGH

## 2018-05-11 ENCOUNTER — EMPLOYEE WELLNESS (OUTPATIENT)
Dept: OTHER | Age: 68
End: 2018-05-11

## 2018-05-11 LAB
CHOLESTEROL/HDL RATIO: 4.5
CHOLESTEROL: 177 MG/DL
GLUCOSE BLD-MCNC: 92 MG/DL (ref 70–99)
HDLC SERPL-MCNC: 39 MG/DL
LDL CHOLESTEROL: 115 MG/DL (ref 0–130)
PATIENT FASTING?: ABNORMAL
TRIGL SERPL-MCNC: 113 MG/DL
VLDLC SERPL CALC-MCNC: ABNORMAL MG/DL (ref 1–30)

## 2018-05-21 VITALS — WEIGHT: 121 LBS | BODY MASS INDEX: 20.77 KG/M2

## 2018-08-13 ENCOUNTER — OFFICE VISIT (OUTPATIENT)
Dept: FAMILY MEDICINE CLINIC | Age: 68
End: 2018-08-13
Payer: COMMERCIAL

## 2018-08-13 VITALS
DIASTOLIC BLOOD PRESSURE: 78 MMHG | HEART RATE: 80 BPM | WEIGHT: 120.6 LBS | HEIGHT: 64 IN | BODY MASS INDEX: 20.59 KG/M2 | SYSTOLIC BLOOD PRESSURE: 142 MMHG | TEMPERATURE: 97.6 F

## 2018-08-13 DIAGNOSIS — K21.9 GASTROESOPHAGEAL REFLUX DISEASE WITHOUT ESOPHAGITIS: ICD-10-CM

## 2018-08-13 DIAGNOSIS — I10 ESSENTIAL HYPERTENSION: Primary | ICD-10-CM

## 2018-08-13 DIAGNOSIS — G25.81 RLS (RESTLESS LEGS SYNDROME): ICD-10-CM

## 2018-08-13 PROCEDURE — G8420 CALC BMI NORM PARAMETERS: HCPCS | Performed by: FAMILY MEDICINE

## 2018-08-13 PROCEDURE — 1123F ACP DISCUSS/DSCN MKR DOCD: CPT | Performed by: FAMILY MEDICINE

## 2018-08-13 PROCEDURE — 1036F TOBACCO NON-USER: CPT | Performed by: FAMILY MEDICINE

## 2018-08-13 PROCEDURE — G8427 DOCREV CUR MEDS BY ELIG CLIN: HCPCS | Performed by: FAMILY MEDICINE

## 2018-08-13 PROCEDURE — 3017F COLORECTAL CA SCREEN DOC REV: CPT | Performed by: FAMILY MEDICINE

## 2018-08-13 PROCEDURE — 4040F PNEUMOC VAC/ADMIN/RCVD: CPT | Performed by: FAMILY MEDICINE

## 2018-08-13 PROCEDURE — G8399 PT W/DXA RESULTS DOCUMENT: HCPCS | Performed by: FAMILY MEDICINE

## 2018-08-13 PROCEDURE — 1101F PT FALLS ASSESS-DOCD LE1/YR: CPT | Performed by: FAMILY MEDICINE

## 2018-08-13 PROCEDURE — 1090F PRES/ABSN URINE INCON ASSESS: CPT | Performed by: FAMILY MEDICINE

## 2018-08-13 PROCEDURE — 99213 OFFICE O/P EST LOW 20 MIN: CPT | Performed by: FAMILY MEDICINE

## 2018-08-13 ASSESSMENT — ENCOUNTER SYMPTOMS
SHORTNESS OF BREATH: 0
DIARRHEA: 0
NAUSEA: 0
SORE THROAT: 0
ABDOMINAL PAIN: 0
CONSTIPATION: 0

## 2018-09-25 ENCOUNTER — OFFICE VISIT (OUTPATIENT)
Dept: ORTHOPEDIC SURGERY | Age: 68
End: 2018-09-25
Payer: COMMERCIAL

## 2018-09-25 DIAGNOSIS — M25.562 ACUTE PAIN OF LEFT KNEE: Primary | ICD-10-CM

## 2018-09-25 PROCEDURE — 1101F PT FALLS ASSESS-DOCD LE1/YR: CPT | Performed by: ORTHOPAEDIC SURGERY

## 2018-09-25 PROCEDURE — 20610 DRAIN/INJ JOINT/BURSA W/O US: CPT | Performed by: ORTHOPAEDIC SURGERY

## 2018-09-25 PROCEDURE — 99213 OFFICE O/P EST LOW 20 MIN: CPT | Performed by: ORTHOPAEDIC SURGERY

## 2018-09-25 PROCEDURE — G8420 CALC BMI NORM PARAMETERS: HCPCS | Performed by: ORTHOPAEDIC SURGERY

## 2018-09-25 PROCEDURE — G8399 PT W/DXA RESULTS DOCUMENT: HCPCS | Performed by: ORTHOPAEDIC SURGERY

## 2018-09-25 PROCEDURE — 1123F ACP DISCUSS/DSCN MKR DOCD: CPT | Performed by: ORTHOPAEDIC SURGERY

## 2018-09-25 PROCEDURE — G8427 DOCREV CUR MEDS BY ELIG CLIN: HCPCS | Performed by: ORTHOPAEDIC SURGERY

## 2018-09-25 PROCEDURE — 3017F COLORECTAL CA SCREEN DOC REV: CPT | Performed by: ORTHOPAEDIC SURGERY

## 2018-09-25 PROCEDURE — 1036F TOBACCO NON-USER: CPT | Performed by: ORTHOPAEDIC SURGERY

## 2018-09-25 PROCEDURE — 1090F PRES/ABSN URINE INCON ASSESS: CPT | Performed by: ORTHOPAEDIC SURGERY

## 2018-09-25 PROCEDURE — 4040F PNEUMOC VAC/ADMIN/RCVD: CPT | Performed by: ORTHOPAEDIC SURGERY

## 2018-09-25 RX ORDER — LIDOCAINE HYDROCHLORIDE 10 MG/ML
2 INJECTION, SOLUTION EPIDURAL; INFILTRATION; INTRACAUDAL; PERINEURAL ONCE
Status: COMPLETED | OUTPATIENT
Start: 2018-09-25 | End: 2018-09-25

## 2018-09-25 RX ORDER — BETAMETHASONE SODIUM PHOSPHATE AND BETAMETHASONE ACETATE 3; 3 MG/ML; MG/ML
12 INJECTION, SUSPENSION INTRA-ARTICULAR; INTRALESIONAL; INTRAMUSCULAR; SOFT TISSUE ONCE
Status: COMPLETED | OUTPATIENT
Start: 2018-09-25 | End: 2018-09-25

## 2018-09-25 RX ORDER — BUPIVACAINE HYDROCHLORIDE 5 MG/ML
2 INJECTION, SOLUTION PERINEURAL ONCE
Status: COMPLETED | OUTPATIENT
Start: 2018-09-25 | End: 2018-09-25

## 2018-09-25 RX ADMIN — BETAMETHASONE SODIUM PHOSPHATE AND BETAMETHASONE ACETATE 12 MG: 3; 3 INJECTION, SUSPENSION INTRA-ARTICULAR; INTRALESIONAL; INTRAMUSCULAR; SOFT TISSUE at 13:48

## 2018-09-25 RX ADMIN — BUPIVACAINE HYDROCHLORIDE 10 MG: 5 INJECTION, SOLUTION PERINEURAL at 13:48

## 2018-09-25 RX ADMIN — LIDOCAINE HYDROCHLORIDE 2 ML: 10 INJECTION, SOLUTION EPIDURAL; INFILTRATION; INTRACAUDAL; PERINEURAL at 13:49

## 2018-10-02 DIAGNOSIS — I10 ESSENTIAL HYPERTENSION: ICD-10-CM

## 2018-10-02 RX ORDER — CARVEDILOL 3.12 MG/1
3.12 TABLET ORAL 2 TIMES DAILY
Qty: 180 TABLET | Refills: 1 | Status: SHIPPED | OUTPATIENT
Start: 2018-10-02 | End: 2019-04-15 | Stop reason: SDUPTHER

## 2018-10-03 ENCOUNTER — HOSPITAL ENCOUNTER (OUTPATIENT)
Dept: PHYSICAL THERAPY | Age: 68
Setting detail: THERAPIES SERIES
Discharge: HOME | End: 2018-10-03
Payer: COMMERCIAL

## 2018-10-03 PROCEDURE — 97161 PT EVAL LOW COMPLEX 20 MIN: CPT

## 2018-10-03 PROCEDURE — G8979 MOBILITY GOAL STATUS: HCPCS

## 2018-10-03 PROCEDURE — G8978 MOBILITY CURRENT STATUS: HCPCS

## 2018-10-03 ASSESSMENT — PAIN DESCRIPTION - ORIENTATION: ORIENTATION: LEFT;ANTERIOR

## 2018-10-03 ASSESSMENT — PAIN DESCRIPTION - FREQUENCY: FREQUENCY: CONTINUOUS

## 2018-10-03 ASSESSMENT — PAIN DESCRIPTION - LOCATION: LOCATION: KNEE

## 2018-10-03 ASSESSMENT — PAIN DESCRIPTION - ONSET: ONSET: UNABLE TO TELL

## 2018-10-03 ASSESSMENT — PAIN DESCRIPTION - DESCRIPTORS: DESCRIPTORS: SHARP;SHOOTING;CONSTANT

## 2018-10-03 ASSESSMENT — PAIN DESCRIPTION - PAIN TYPE: TYPE: ACUTE PAIN

## 2018-10-03 NOTE — PROGRESS NOTES
on this pt's plan of care. Exam: Pain/decreased ROM and strength causing difficulty sleeping - all positions/\"pain\" wakes her at night. Standing/ambulation, stair climbing, bending/stooping, squatting and lifting/carrying   Clinical Presentation: Pt's symptoms are evolving. G-Code  PT G-Codes  Functional Assessment Tool Used: LEFS  Score: 40  Functional Limitation: Mobility: Walking and moving around  Mobility: Walking and Moving Around Current Status (): At least 40 percent but less than 60 percent impaired, limited or restricted  Mobility: Walking and Moving Around Goal Status (): 0 percent impaired, limited or restricted    OutComes Score  LEFS Total Score: 40    Goals  Short term goals  Time Frame for Short term goals: 6 visits   Short term goal 1: Pt will report an average pain level of a 1-2/10 within the (L) knee at rest/ADLs. Short term goal 2: Pt will demonstrate improved ROM within the (L) hip to assist with (I) function. Short term goal 3: Pt will be able stand/ambulate 10-15 minutes without a change in symptoms. Short term goal 4: Pt will be independent with his home exercise program  Long term goals  Time Frame for Long term goals : 12 visits   Long term goal 1: No complaints of pain will be reported within the (L) knee at rest/ADLs. Long term goal 2: Pt will demonstrate full ROM within the (L) hip to assist with (I) function  Long term goal 3: Pt will demonstrate a 5/5 MMT within the (L) LE to assist with (I) function. Long term goal 4: Establish a normal gait pattern. Long term goal 5: Pt will be able to perform all of her desired ADLs without pain/limitation. Patient Goals: Hope to take the pain away    Comments/Assessment: Pt would benefit from physical therapy to assist with pain control, ROM and strength for her (L) knee to allow her to perform her ADLs without pain/limitation.      Rehab Potential:  [x] Good  [] Fair  [] Poor   Suggested Professional Referral:  [x] No

## 2018-10-12 ENCOUNTER — HOSPITAL ENCOUNTER (OUTPATIENT)
Dept: PHYSICAL THERAPY | Age: 68
Setting detail: THERAPIES SERIES
Discharge: HOME | End: 2018-10-12
Payer: COMMERCIAL

## 2018-10-12 PROCEDURE — 97110 THERAPEUTIC EXERCISES: CPT

## 2018-10-12 PROCEDURE — 97016 VASOPNEUMATIC DEVICE THERAPY: CPT

## 2018-10-12 ASSESSMENT — PAIN DESCRIPTION - PAIN TYPE: TYPE: ACUTE PAIN

## 2018-10-12 ASSESSMENT — PAIN DESCRIPTION - LOCATION: LOCATION: KNEE

## 2018-10-12 ASSESSMENT — PAIN DESCRIPTION - FREQUENCY: FREQUENCY: INTERMITTENT

## 2018-10-12 ASSESSMENT — PAIN DESCRIPTION - ORIENTATION: ORIENTATION: LEFT;ANTERIOR

## 2018-10-12 ASSESSMENT — PAIN SCALES - GENERAL: PAINLEVEL_OUTOF10: 4

## 2018-10-12 ASSESSMENT — PAIN DESCRIPTION - DESCRIPTORS: DESCRIPTORS: SHARP;SHOOTING

## 2018-10-12 NOTE — PROGRESS NOTES
Physical Therapy  Daily Treatment Note  Date: 10/12/2018  Patient Name: Santiago Adams  MRN: [id number]     :   1950    General  Chart Reviewed: Yes  Response To Previous Treatment: Not applicable  Family / Caregiver Present: No  Referring Practitioner: Brad Salomon MD  PT Visit Information  Onset Date: 18  PT Insurance Information: [address] and [employer]   Total # of Visits Approved: 12  Total # of Visits to Date: 2  No Show: 1  Canceled Appointment: 0    Subjective  Pt has noticed that the pain is only present now with WB ADLs. Relatively painful at rest/NWB ADLs. Pain Screening  Patient Currently in Pain: Yes  Pain Assessment  Pain Assessment: 0-10  Pain Level: 4  Pain Type: Acute pain  Pain Location: Knee  Pain Orientation: Left; Anterior  Pain Radiating Towards: the anterior shaft of the (L) leg  Pain Descriptors: Joneen Carmela; Shooting  Pain Frequency: Intermittent (With Weight bearing ADLs only )  Multiple Pain Sites: No     Treatment Activities  Exercises  Exercise 1: Seated Hamstring Stretch 30 sec hold x 3 reps   Exercise 2: Standing Gastroc stretch 30 sec hold x 3 reps   Exercise 3: Supine (L) hip ER and IR stretch 30 sec hold x 3 reps with each direction each leg   Exercise 4: Side lying (L) hip abduction 10 reps AAROM   Exercise 5: Total Gym Squats 20 reps (double leg)    Modalities  Cryotherapy (Minutes\Location): Vasopneumatic Knee sleeve 34 degrees Low compression x 15 minutes with wedge. Activity Tolerance  Patient tolerated treatment well    Assessment  Conditions Requiring Skilled Therapeutic Intervention  Body structures, Functions, Activity limitations: Decreased functional mobility ; Decreased ADL status; Decreased ROM; Decreased strength  Assessment: Initiated a therapeutic exercise program for the (L) LE. Attempted the leg press with weight/unable to perform @ 5 lbs with the (L) LE only. In addition, attempted (L) single leg squat on total gym/unsuccessful as well.    Treatment Diagnosis: (L) LE dysfunction  Prognosis: Good     Plan  Times per week: 3  Plan weeks: 4  Current Treatment Recommendations: Strengthening, Manual Therapy - Joint Manipulation, Home Exercise Program, Modalities, ROM, Pain Management, Patient/Caregiver Education & Training    Therapy Time   Individual Concurrent Group Co-treatment   Time In 1600         Time Out 1700         Minutes 60           Treatment Charges: Minutes Units   []  Ultrasound     []  Electrical-Stim     []  Iontophoresis     []  Traction     []  Massage       []  Eval     []  Gait     [x]  Ther Exercise 30  2    []  Manual Therapy       []  Ther Activities       []  Aquatics     [x]  Vasopneumatic Device 15 1   []  Neuro Re-Ed       []  Other       Total Treatment Time: 39 3      Josem Candelario, PT DPT

## 2018-10-15 ENCOUNTER — HOSPITAL ENCOUNTER (OUTPATIENT)
Dept: PHYSICAL THERAPY | Age: 68
Setting detail: THERAPIES SERIES
Discharge: HOME | End: 2018-10-15
Payer: COMMERCIAL

## 2018-10-15 PROCEDURE — 97110 THERAPEUTIC EXERCISES: CPT

## 2018-10-15 NOTE — PROGRESS NOTES
Physical Therapy  Daily Treatment Note  Date: 10/15/2018  Patient Name: Aviva Ruiz  MRN: [id number]     :   1950    General  Chart Reviewed: Yes  Response To Previous Treatment: Not applicable  Family / Caregiver Present: No  Referring Practitioner: Brianna Montgomery MD  PT Visit Information  Onset Date: 18  PT Insurance Information: Medical [facility] and [employer]   Total # of Visits Approved: 12  Total # of Visits to Date: 3  No Show: 1  Canceled Appointment: 0  Subjective  Pt stated that she was relatively pain free upon arrival. Her pain level on average has been a 3/10 within the anterior distal region of the (L) knee since her last treatment session. General Comment  Comments: No tenderness to the touch was noted throughout the (L) knee joint upon arrival. However, tenderness was noted @ the (L) greater trochanter region of the hip. Pain Screening  Patient Currently in Pain: No  Pain Assessment  Multiple Pain Sites: No     Treatment Activities  Exercises  Exercise 1: Seated Hamstring Stretch 30 sec hold x 3 reps   Exercise 2: Standing Gastroc stretch 30 sec hold x 3 reps   Exercise 3: Supine (L) hip ER and IR stretch 30 sec hold x 3 reps with each direction each leg   Exercise 4: Side lying (L) hip abduction 10 reps AAROM   Exercise 5: Total Gym Squats 20 reps (double leg) with posterior pelvic tilt   Exercise 6: Hamstring Curl 15 lbs 20 reps     Activity Tolerance  Patient tolerated treatment well     Assessment  Conditions Requiring Skilled Therapeutic Intervention  Body structures, Functions, Activity limitations: Decreased functional mobility ; Decreased ADL status; Decreased ROM; Decreased strength  Assessment: Pain within the (L) knee may be coming from the (L) hip/trochanter region. No length difference was noted (-) Prone Knee Flexion Test, (-) Supine Long Seat Test  Held the vasopneumatic/going to ice @ home.    Treatment Diagnosis: (L) LE dysfunction  Prognosis: Good  Patient Education: Pt was advised to ice the (L) greater trochanter of the hip x 15 minutes (Daily 2-3 sessions)      Plan  Times per week: 3  Plan weeks: 4  Current Treatment Recommendations: Strengthening, Manual Therapy - Joint Manipulation, Home Exercise Program, Modalities, ROM, Pain Management, Patient/Caregiver Education & Training    Therapy Time   Individual Concurrent Group Co-treatment   Time In 1040         Time Out 1130         Minutes 50           Treatment Charges: Minutes Units   []  Ultrasound     []  Electrical-Stim     []  Iontophoresis     []  Traction     []  Massage       []  Eval     []  Gait     [x]  Ther Exercise 40  3    []  Manual Therapy       []  Ther Activities       []  Aquatics     []  Vasopneumatic Device     []  Neuro Re-Ed       []  Other       Total Treatment Time: 36 3      Aneudy Fountain, PT DPT

## 2018-10-17 ENCOUNTER — HOSPITAL ENCOUNTER (OUTPATIENT)
Dept: PHYSICAL THERAPY | Age: 68
Setting detail: THERAPIES SERIES
Discharge: HOME | End: 2018-10-17
Payer: COMMERCIAL

## 2018-10-17 PROCEDURE — 97110 THERAPEUTIC EXERCISES: CPT

## 2018-10-17 ASSESSMENT — PAIN SCALES - GENERAL: PAINLEVEL_OUTOF10: 3

## 2018-10-17 ASSESSMENT — PAIN DESCRIPTION - LOCATION: LOCATION: KNEE

## 2018-10-17 ASSESSMENT — PAIN DESCRIPTION - FREQUENCY: FREQUENCY: INTERMITTENT

## 2018-10-17 ASSESSMENT — PAIN DESCRIPTION - ORIENTATION: ORIENTATION: LEFT;ANTERIOR;DISTAL

## 2018-10-17 ASSESSMENT — PAIN DESCRIPTION - DESCRIPTORS: DESCRIPTORS: SHARP;SHOOTING

## 2018-10-17 ASSESSMENT — PAIN DESCRIPTION - PAIN TYPE: TYPE: ACUTE PAIN

## 2018-10-19 ENCOUNTER — HOSPITAL ENCOUNTER (OUTPATIENT)
Dept: PHYSICAL THERAPY | Age: 68
Setting detail: THERAPIES SERIES
Discharge: HOME | End: 2018-10-19
Payer: COMMERCIAL

## 2018-10-19 PROCEDURE — 97110 THERAPEUTIC EXERCISES: CPT

## 2018-10-19 NOTE — PROGRESS NOTES
Physical Therapy  Daily Treatment Note  Date: 10/19/2018  Patient Name: Lobo Lombardi  MRN: [id number]     :   1950    General  Chart Reviewed: Yes  Response To Previous Treatment: Not applicable  Family / Caregiver Present: No  Referring Practitioner: Stephanie Treadwell MD  PT Visit Information  Onset Date: 18  PT Insurance Information: [address] [employer]   Total # of Visits Approved: 12  Total # of Visits to Date: 5  No Show: 1  Canceled Appointment: 0    Subjective  Pt stated that she has been relatively pain free throughout the day/worked 8 hours. Pain Screening  Patient Currently in Pain: No     Treatment Activities  Exercises  Exercise 1: Seated Hamstring Stretch 30 sec hold x 3 reps   Exercise 2: Standing Gastroc stretch 30 sec hold x 3 reps   Exercise 3: Supine (L) hip ER 30 sec hold x 3 reps  Exercise 5: Total Gym Squats 20 reps x 3 sets (double leg) with posterior pelvic tilt    Exercise 6: Hamstring Curl 15 lbs 20 reps x 3 sets   Exercise 7: 4 way hip 5 lbs 10 reps with direction each leg    Activity Tolerance  Patient tolerated treatment well     Assessment  Conditions Requiring Skilled Therapeutic Intervention  Body structures, Functions, Activity limitations: Decreased functional mobility ; Decreased ADL status; Decreased ROM; Decreased strength  Assessment: Pt demonstrated improved control with (L) hip abduction/able to perform independently. Demonstrated full PROM with (L) hip IR without pain. Treatment Diagnosis: (L) LE dysfunction  Prognosis: Good  Comment: Did not perform vasopneumatic/no swelling noted within the (L) knee.      Plan  Times per week: 3  Plan weeks: 4  Current Treatment Recommendations: Strengthening, Manual Therapy - Joint Manipulation, Home Exercise Program, Modalities, ROM, Pain Management, Patient/Caregiver Education & Training    Therapy Time   Individual Concurrent Group Co-treatment   Time In 1600         Time Out 1640         Minutes 40

## 2018-10-24 ENCOUNTER — HOSPITAL ENCOUNTER (OUTPATIENT)
Dept: PHYSICAL THERAPY | Age: 68
Setting detail: THERAPIES SERIES
Discharge: HOME | End: 2018-10-24
Payer: COMMERCIAL

## 2018-10-26 ENCOUNTER — HOSPITAL ENCOUNTER (OUTPATIENT)
Dept: PHYSICAL THERAPY | Age: 68
Setting detail: THERAPIES SERIES
Discharge: HOME | End: 2018-10-26
Payer: COMMERCIAL

## 2018-10-26 PROCEDURE — 97110 THERAPEUTIC EXERCISES: CPT

## 2018-10-26 NOTE — PROGRESS NOTES
Physical Therapy  Daily Treatment Note  Date: 10/26/2018  Patient Name: Tyra Welch  MRN: [id number]     :   1950    General  Chart Reviewed: Yes  Response To Previous Treatment: Not applicable  Family / Caregiver Present: No  Referring Practitioner: Douglas Mccann MD  PT Visit Information  Onset Date: 18  PT Insurance Information: Medical [facility] and [employer]   Total # of Visits Approved: 12  Total # of Visits to Date: 6  No Show: 1  Canceled Appointment: 1    Subjective  Pain Screening  Patient Currently in Pain: No  Pain Assessment  Multiple Pain Sites: No     Treatment Activities  Exercises  Exercise 1: Seated Hamstring Stretch 30 sec hold x 3 reps   Exercise 2: Standing Gastroc stretch 30 sec hold x 3 reps   Exercise 3: Supine (L) hip ER 30 sec hold x 3 reps   Exercise 4: Side lying (L) hip abduction 10 reps x 3 sets   Exercise 5: Total Gym Squats 20 reps x 3 sets (double leg) with posterior pelvic tilt    Exercise 6: Hamstring Curl 15 lbs 20 reps x 3 sets   Exercise 7: 4 way hip 5 lbs 10 reps with direction each leg    Assessment  Conditions Requiring Skilled Therapeutic Intervention  Body structures, Functions, Activity limitations: Decreased functional mobility ; Decreased ADL status; Decreased ROM; Decreased strength  Treatment Diagnosis: (L) LE dysfunction  Prognosis: Good  Patient Education: Reviewed home exercise program/appeared to have a good understanding of it.        Plan  Times per week: 3  Plan weeks: 4  Current Treatment Recommendations: Strengthening, Manual Therapy - Joint Manipulation, Home Exercise Program, Modalities, ROM, Pain Management, Patient/Caregiver Education & Training    Therapy Time   Individual Concurrent Group Co-treatment   Time In 9852         Time Out 1330         Minutes 45           Treatment Charges: Minutes Units   []  Ultrasound     []  Electrical-Stim     []  Iontophoresis     []  Traction     []  Massage       []  Eval     []  Gait     [x]  Ther Exercise

## 2018-10-29 ENCOUNTER — HOSPITAL ENCOUNTER (OUTPATIENT)
Dept: PHYSICAL THERAPY | Age: 68
Setting detail: THERAPIES SERIES
Discharge: HOME | End: 2018-10-29
Payer: COMMERCIAL

## 2018-10-29 PROCEDURE — 97110 THERAPEUTIC EXERCISES: CPT

## 2018-10-29 ASSESSMENT — PAIN DESCRIPTION - DURATION: DURATION_2: INTERMITTENT

## 2018-10-29 ASSESSMENT — PAIN DESCRIPTION - DESCRIPTORS
DESCRIPTORS_2: TIGHTNESS
DESCRIPTORS: SHARP;SHOOTING

## 2018-10-29 ASSESSMENT — PAIN DESCRIPTION - PAIN TYPE: TYPE: ACUTE PAIN

## 2018-10-29 ASSESSMENT — PAIN DESCRIPTION - LOCATION
LOCATION_2: HIP
LOCATION: KNEE

## 2018-10-29 ASSESSMENT — PAIN SCALES - GENERAL: PAINLEVEL_OUTOF10: 4

## 2018-10-29 ASSESSMENT — PAIN DESCRIPTION - ORIENTATION
ORIENTATION: LEFT;ANTERIOR;DISTAL
ORIENTATION_2: LEFT;OUTER

## 2018-10-29 ASSESSMENT — PAIN DESCRIPTION - INTENSITY: RATING_2: 4

## 2018-10-29 ASSESSMENT — PAIN DESCRIPTION - FREQUENCY: FREQUENCY: INTERMITTENT

## 2018-11-01 ENCOUNTER — HOSPITAL ENCOUNTER (OUTPATIENT)
Dept: PHYSICAL THERAPY | Age: 68
Setting detail: THERAPIES SERIES
Discharge: HOME | End: 2018-11-01
Payer: COMMERCIAL

## 2018-11-01 PROCEDURE — 97110 THERAPEUTIC EXERCISES: CPT

## 2018-11-01 PROCEDURE — 97033 APP MDLTY 1+IONTPHRSIS EA 15: CPT

## 2018-11-01 ASSESSMENT — PAIN DESCRIPTION - LOCATION: LOCATION: HIP

## 2018-11-01 ASSESSMENT — PAIN SCALES - GENERAL: PAINLEVEL_OUTOF10: 3

## 2018-11-01 ASSESSMENT — PAIN DESCRIPTION - FREQUENCY: FREQUENCY: CONTINUOUS

## 2018-11-01 ASSESSMENT — PAIN DESCRIPTION - PAIN TYPE: TYPE: ACUTE PAIN

## 2018-11-01 ASSESSMENT — PAIN DESCRIPTION - ORIENTATION: ORIENTATION: LEFT;OUTER

## 2018-11-01 ASSESSMENT — PAIN DESCRIPTION - DESCRIPTORS: DESCRIPTORS: SHARP;CONSTANT

## 2018-11-06 ENCOUNTER — OFFICE VISIT (OUTPATIENT)
Dept: ORTHOPEDIC SURGERY | Age: 68
End: 2018-11-06
Payer: COMMERCIAL

## 2018-11-06 DIAGNOSIS — M25.562 ACUTE PAIN OF LEFT KNEE: Primary | ICD-10-CM

## 2018-11-06 PROCEDURE — 1101F PT FALLS ASSESS-DOCD LE1/YR: CPT | Performed by: ORTHOPAEDIC SURGERY

## 2018-11-06 PROCEDURE — G8427 DOCREV CUR MEDS BY ELIG CLIN: HCPCS | Performed by: ORTHOPAEDIC SURGERY

## 2018-11-06 PROCEDURE — G8484 FLU IMMUNIZE NO ADMIN: HCPCS | Performed by: ORTHOPAEDIC SURGERY

## 2018-11-06 PROCEDURE — 3017F COLORECTAL CA SCREEN DOC REV: CPT | Performed by: ORTHOPAEDIC SURGERY

## 2018-11-06 PROCEDURE — 1090F PRES/ABSN URINE INCON ASSESS: CPT | Performed by: ORTHOPAEDIC SURGERY

## 2018-11-06 PROCEDURE — 4040F PNEUMOC VAC/ADMIN/RCVD: CPT | Performed by: ORTHOPAEDIC SURGERY

## 2018-11-06 PROCEDURE — G8420 CALC BMI NORM PARAMETERS: HCPCS | Performed by: ORTHOPAEDIC SURGERY

## 2018-11-06 PROCEDURE — 1123F ACP DISCUSS/DSCN MKR DOCD: CPT | Performed by: ORTHOPAEDIC SURGERY

## 2018-11-06 PROCEDURE — 1036F TOBACCO NON-USER: CPT | Performed by: ORTHOPAEDIC SURGERY

## 2018-11-06 PROCEDURE — 99213 OFFICE O/P EST LOW 20 MIN: CPT | Performed by: ORTHOPAEDIC SURGERY

## 2018-11-06 PROCEDURE — G8399 PT W/DXA RESULTS DOCUMENT: HCPCS | Performed by: ORTHOPAEDIC SURGERY

## 2018-11-07 DIAGNOSIS — M70.62 TROCHANTERIC BURSITIS OF LEFT HIP: ICD-10-CM

## 2018-11-07 RX ORDER — MELOXICAM 15 MG/1
15 TABLET ORAL DAILY
Qty: 30 TABLET | Refills: 3 | Status: SHIPPED | OUTPATIENT
Start: 2018-11-07 | End: 2019-01-21

## 2018-11-08 ENCOUNTER — HOSPITAL ENCOUNTER (OUTPATIENT)
Dept: PHYSICAL THERAPY | Age: 68
Setting detail: THERAPIES SERIES
Discharge: HOME | End: 2018-11-08
Payer: COMMERCIAL

## 2018-11-08 NOTE — PROGRESS NOTES
Physical Therapy  Daily Treatment Note/Cancellation  Date: 2018  Patient Name: Kathi Wang  MRN: [id number]     :   1950    General  Referring Practitioner: Ying Hernandez MD  PT Visit Information  Onset Date: 18  PT Insurance Information: [address] [place] [address]   Total # of Visits Approved: 12  Total # of Visits to Date: 8  No Show: 1  Canceled Appointment: 2  General Comment  Comments: Pt cancelled her appointment today. She stated that she followed up with her physician/told her to discontinue physical therapy at this time.      Eboni Bland, PT DPT

## 2018-11-14 ENCOUNTER — APPOINTMENT (OUTPATIENT)
Dept: PHYSICAL THERAPY | Age: 68
End: 2018-11-14
Payer: COMMERCIAL

## 2018-11-16 ENCOUNTER — APPOINTMENT (OUTPATIENT)
Dept: PHYSICAL THERAPY | Age: 68
End: 2018-11-16
Payer: COMMERCIAL

## 2018-11-19 ENCOUNTER — APPOINTMENT (OUTPATIENT)
Dept: PHYSICAL THERAPY | Age: 68
End: 2018-11-19
Payer: COMMERCIAL

## 2018-12-13 ENCOUNTER — OFFICE VISIT (OUTPATIENT)
Dept: FAMILY MEDICINE CLINIC | Age: 68
End: 2018-12-13
Payer: COMMERCIAL

## 2018-12-13 VITALS
HEIGHT: 64 IN | HEART RATE: 78 BPM | WEIGHT: 119.8 LBS | TEMPERATURE: 98.3 F | SYSTOLIC BLOOD PRESSURE: 176 MMHG | DIASTOLIC BLOOD PRESSURE: 94 MMHG | OXYGEN SATURATION: 98 % | BODY MASS INDEX: 20.45 KG/M2

## 2018-12-13 DIAGNOSIS — I10 ESSENTIAL HYPERTENSION: Primary | ICD-10-CM

## 2018-12-13 DIAGNOSIS — K21.9 GASTROESOPHAGEAL REFLUX DISEASE WITHOUT ESOPHAGITIS: ICD-10-CM

## 2018-12-13 DIAGNOSIS — G25.81 RLS (RESTLESS LEGS SYNDROME): ICD-10-CM

## 2018-12-13 PROBLEM — M25.562 ACUTE PAIN OF LEFT KNEE: Status: RESOLVED | Noted: 2018-09-25 | Resolved: 2018-12-13

## 2018-12-13 PROCEDURE — 1036F TOBACCO NON-USER: CPT | Performed by: FAMILY MEDICINE

## 2018-12-13 PROCEDURE — G8484 FLU IMMUNIZE NO ADMIN: HCPCS | Performed by: FAMILY MEDICINE

## 2018-12-13 PROCEDURE — G8420 CALC BMI NORM PARAMETERS: HCPCS | Performed by: FAMILY MEDICINE

## 2018-12-13 PROCEDURE — G8427 DOCREV CUR MEDS BY ELIG CLIN: HCPCS | Performed by: FAMILY MEDICINE

## 2018-12-13 PROCEDURE — 3017F COLORECTAL CA SCREEN DOC REV: CPT | Performed by: FAMILY MEDICINE

## 2018-12-13 PROCEDURE — G8399 PT W/DXA RESULTS DOCUMENT: HCPCS | Performed by: FAMILY MEDICINE

## 2018-12-13 PROCEDURE — 4040F PNEUMOC VAC/ADMIN/RCVD: CPT | Performed by: FAMILY MEDICINE

## 2018-12-13 PROCEDURE — 1123F ACP DISCUSS/DSCN MKR DOCD: CPT | Performed by: FAMILY MEDICINE

## 2018-12-13 PROCEDURE — 99213 OFFICE O/P EST LOW 20 MIN: CPT | Performed by: FAMILY MEDICINE

## 2018-12-13 PROCEDURE — 1101F PT FALLS ASSESS-DOCD LE1/YR: CPT | Performed by: FAMILY MEDICINE

## 2018-12-13 PROCEDURE — 1090F PRES/ABSN URINE INCON ASSESS: CPT | Performed by: FAMILY MEDICINE

## 2018-12-13 RX ORDER — HYDRALAZINE HYDROCHLORIDE 50 MG/1
50 TABLET, FILM COATED ORAL 4 TIMES DAILY
COMMUNITY
End: 2019-03-15 | Stop reason: SDUPTHER

## 2018-12-13 ASSESSMENT — PATIENT HEALTH QUESTIONNAIRE - PHQ9
SUM OF ALL RESPONSES TO PHQ9 QUESTIONS 1 & 2: 0
SUM OF ALL RESPONSES TO PHQ QUESTIONS 1-9: 0
SUM OF ALL RESPONSES TO PHQ QUESTIONS 1-9: 0
1. LITTLE INTEREST OR PLEASURE IN DOING THINGS: 0
2. FEELING DOWN, DEPRESSED OR HOPELESS: 0

## 2018-12-13 ASSESSMENT — ENCOUNTER SYMPTOMS
NAUSEA: 0
SORE THROAT: 0
SHORTNESS OF BREATH: 0
ABDOMINAL PAIN: 0

## 2018-12-17 ENCOUNTER — HOSPITAL ENCOUNTER (OUTPATIENT)
Dept: MRI IMAGING | Age: 68
Discharge: HOME | End: 2018-12-19
Payer: COMMERCIAL

## 2018-12-17 DIAGNOSIS — M25.562 ACUTE PAIN OF LEFT KNEE: ICD-10-CM

## 2018-12-17 PROCEDURE — 73721 MRI JNT OF LWR EXTRE W/O DYE: CPT

## 2019-01-08 ENCOUNTER — OFFICE VISIT (OUTPATIENT)
Dept: ORTHOPEDIC SURGERY | Age: 69
End: 2019-01-08
Payer: COMMERCIAL

## 2019-01-08 VITALS — WEIGHT: 120 LBS | BODY MASS INDEX: 20.49 KG/M2 | HEIGHT: 64 IN

## 2019-01-08 DIAGNOSIS — S83.272A COMPLEX TEAR OF LATERAL MENISCUS OF LEFT KNEE, UNSPECIFIED WHETHER OLD OR CURRENT TEAR, INITIAL ENCOUNTER: Primary | ICD-10-CM

## 2019-01-08 PROCEDURE — 3017F COLORECTAL CA SCREEN DOC REV: CPT | Performed by: ORTHOPAEDIC SURGERY

## 2019-01-08 PROCEDURE — 1036F TOBACCO NON-USER: CPT | Performed by: ORTHOPAEDIC SURGERY

## 2019-01-08 PROCEDURE — G8399 PT W/DXA RESULTS DOCUMENT: HCPCS | Performed by: ORTHOPAEDIC SURGERY

## 2019-01-08 PROCEDURE — 1090F PRES/ABSN URINE INCON ASSESS: CPT | Performed by: ORTHOPAEDIC SURGERY

## 2019-01-08 PROCEDURE — 1123F ACP DISCUSS/DSCN MKR DOCD: CPT | Performed by: ORTHOPAEDIC SURGERY

## 2019-01-08 PROCEDURE — G8420 CALC BMI NORM PARAMETERS: HCPCS | Performed by: ORTHOPAEDIC SURGERY

## 2019-01-08 PROCEDURE — 99213 OFFICE O/P EST LOW 20 MIN: CPT | Performed by: ORTHOPAEDIC SURGERY

## 2019-01-08 PROCEDURE — 4040F PNEUMOC VAC/ADMIN/RCVD: CPT | Performed by: ORTHOPAEDIC SURGERY

## 2019-01-08 PROCEDURE — G8427 DOCREV CUR MEDS BY ELIG CLIN: HCPCS | Performed by: ORTHOPAEDIC SURGERY

## 2019-01-08 PROCEDURE — G8484 FLU IMMUNIZE NO ADMIN: HCPCS | Performed by: ORTHOPAEDIC SURGERY

## 2019-01-08 PROCEDURE — 1101F PT FALLS ASSESS-DOCD LE1/YR: CPT | Performed by: ORTHOPAEDIC SURGERY

## 2019-01-21 ENCOUNTER — HOSPITAL ENCOUNTER (OUTPATIENT)
Dept: PREADMISSION TESTING | Age: 69
Discharge: HOME | End: 2019-01-25
Payer: MEDICARE

## 2019-01-21 VITALS
HEART RATE: 68 BPM | TEMPERATURE: 98.1 F | HEIGHT: 64 IN | RESPIRATION RATE: 16 BRPM | OXYGEN SATURATION: 99 % | WEIGHT: 114 LBS | SYSTOLIC BLOOD PRESSURE: 137 MMHG | BODY MASS INDEX: 19.46 KG/M2 | DIASTOLIC BLOOD PRESSURE: 60 MMHG

## 2019-01-21 LAB
ABSOLUTE EOS #: 0.3 K/UL (ref 0–0.4)
ABSOLUTE IMMATURE GRANULOCYTE: ABNORMAL K/UL (ref 0–0.3)
ABSOLUTE LYMPH #: 1 K/UL (ref 1–4.8)
ABSOLUTE MONO #: 0.6 K/UL (ref 0.1–1.3)
ANION GAP SERPL CALCULATED.3IONS-SCNC: 10 MMOL/L (ref 9–17)
BASOPHILS # BLD: 1 % (ref 0–2)
BASOPHILS ABSOLUTE: 0 K/UL (ref 0–0.2)
BUN BLDV-MCNC: 14 MG/DL (ref 8–23)
BUN/CREAT BLD: ABNORMAL (ref 9–20)
CALCIUM SERPL-MCNC: 9.7 MG/DL (ref 8.6–10.4)
CHLORIDE BLD-SCNC: 107 MMOL/L (ref 98–107)
CO2: 26 MMOL/L (ref 20–31)
CREAT SERPL-MCNC: 0.75 MG/DL (ref 0.5–0.9)
DIFFERENTIAL TYPE: ABNORMAL
EKG ATRIAL RATE: 64 BPM
EKG P AXIS: 51 DEGREES
EKG P-R INTERVAL: 148 MS
EKG Q-T INTERVAL: 432 MS
EKG QRS DURATION: 86 MS
EKG QTC CALCULATION (BAZETT): 445 MS
EKG R AXIS: 52 DEGREES
EKG T AXIS: 67 DEGREES
EKG VENTRICULAR RATE: 64 BPM
EOSINOPHILS RELATIVE PERCENT: 5 % (ref 0–4)
GFR AFRICAN AMERICAN: >60 ML/MIN
GFR NON-AFRICAN AMERICAN: >60 ML/MIN
GFR SERPL CREATININE-BSD FRML MDRD: ABNORMAL ML/MIN/{1.73_M2}
GFR SERPL CREATININE-BSD FRML MDRD: ABNORMAL ML/MIN/{1.73_M2}
GLUCOSE BLD-MCNC: 126 MG/DL (ref 70–99)
HCT VFR BLD CALC: 36.5 % (ref 36–46)
HEMOGLOBIN: 11.9 G/DL (ref 12–16)
IMMATURE GRANULOCYTES: ABNORMAL %
LYMPHOCYTES # BLD: 19 % (ref 24–44)
MCH RBC QN AUTO: 29.7 PG (ref 26–34)
MCHC RBC AUTO-ENTMCNC: 32.7 G/DL (ref 31–37)
MCV RBC AUTO: 91 FL (ref 80–100)
MONOCYTES # BLD: 13 % (ref 1–7)
NRBC AUTOMATED: ABNORMAL PER 100 WBC
PDW BLD-RTO: 13.3 % (ref 11.5–14.9)
PLATELET # BLD: 250 K/UL (ref 150–450)
PLATELET ESTIMATE: ABNORMAL
PMV BLD AUTO: 8.5 FL (ref 6–12)
POTASSIUM SERPL-SCNC: 4.6 MMOL/L (ref 3.7–5.3)
RBC # BLD: 4.01 M/UL (ref 4–5.2)
RBC # BLD: ABNORMAL 10*6/UL
SEG NEUTROPHILS: 62 % (ref 36–66)
SEGMENTED NEUTROPHILS ABSOLUTE COUNT: 3.1 K/UL (ref 1.3–9.1)
SODIUM BLD-SCNC: 143 MMOL/L (ref 135–144)
WBC # BLD: 5 K/UL (ref 3.5–11)
WBC # BLD: ABNORMAL 10*3/UL

## 2019-01-21 PROCEDURE — 80048 BASIC METABOLIC PNL TOTAL CA: CPT

## 2019-01-21 PROCEDURE — 93005 ELECTROCARDIOGRAM TRACING: CPT

## 2019-01-21 PROCEDURE — 36415 COLL VENOUS BLD VENIPUNCTURE: CPT

## 2019-01-21 PROCEDURE — 85025 COMPLETE CBC W/AUTO DIFF WBC: CPT

## 2019-01-22 RX ORDER — SODIUM CHLORIDE 0.9 % (FLUSH) 0.9 %
10 SYRINGE (ML) INJECTION PRN
Status: CANCELLED | OUTPATIENT
Start: 2019-01-22

## 2019-01-22 RX ORDER — LIDOCAINE HYDROCHLORIDE 10 MG/ML
1 INJECTION, SOLUTION EPIDURAL; INFILTRATION; INTRACAUDAL; PERINEURAL
Status: CANCELLED | OUTPATIENT
Start: 2019-01-22 | End: 2019-01-22

## 2019-01-22 RX ORDER — SODIUM CHLORIDE 0.9 % (FLUSH) 0.9 %
10 SYRINGE (ML) INJECTION EVERY 12 HOURS SCHEDULED
Status: CANCELLED | OUTPATIENT
Start: 2019-01-22

## 2019-01-22 RX ORDER — SODIUM CHLORIDE, SODIUM LACTATE, POTASSIUM CHLORIDE, CALCIUM CHLORIDE 600; 310; 30; 20 MG/100ML; MG/100ML; MG/100ML; MG/100ML
INJECTION, SOLUTION INTRAVENOUS CONTINUOUS
Status: CANCELLED | OUTPATIENT
Start: 2019-01-22

## 2019-01-31 PROBLEM — S83.272A COMPLEX TEAR OF LATERAL MENISCUS OF LEFT KNEE: Status: ACTIVE | Noted: 2019-01-31

## 2019-02-01 ENCOUNTER — OFFICE VISIT (OUTPATIENT)
Dept: ORTHOPEDIC SURGERY | Age: 69
End: 2019-02-01
Payer: COMMERCIAL

## 2019-02-01 VITALS — WEIGHT: 118 LBS | BODY MASS INDEX: 20.14 KG/M2 | HEIGHT: 64 IN

## 2019-02-01 DIAGNOSIS — S83.272A COMPLEX TEAR OF LATERAL MENISCUS OF LEFT KNEE, UNSPECIFIED WHETHER OLD OR CURRENT TEAR, INITIAL ENCOUNTER: Primary | ICD-10-CM

## 2019-02-01 PROCEDURE — 1123F ACP DISCUSS/DSCN MKR DOCD: CPT | Performed by: ORTHOPAEDIC SURGERY

## 2019-02-01 PROCEDURE — 99212 OFFICE O/P EST SF 10 MIN: CPT | Performed by: ORTHOPAEDIC SURGERY

## 2019-02-01 PROCEDURE — G8420 CALC BMI NORM PARAMETERS: HCPCS | Performed by: ORTHOPAEDIC SURGERY

## 2019-02-01 PROCEDURE — 1036F TOBACCO NON-USER: CPT | Performed by: ORTHOPAEDIC SURGERY

## 2019-02-01 PROCEDURE — 4040F PNEUMOC VAC/ADMIN/RCVD: CPT | Performed by: ORTHOPAEDIC SURGERY

## 2019-02-01 PROCEDURE — G8484 FLU IMMUNIZE NO ADMIN: HCPCS | Performed by: ORTHOPAEDIC SURGERY

## 2019-02-01 PROCEDURE — G8399 PT W/DXA RESULTS DOCUMENT: HCPCS | Performed by: ORTHOPAEDIC SURGERY

## 2019-02-01 PROCEDURE — 1101F PT FALLS ASSESS-DOCD LE1/YR: CPT | Performed by: ORTHOPAEDIC SURGERY

## 2019-02-01 PROCEDURE — 3017F COLORECTAL CA SCREEN DOC REV: CPT | Performed by: ORTHOPAEDIC SURGERY

## 2019-02-01 PROCEDURE — 1090F PRES/ABSN URINE INCON ASSESS: CPT | Performed by: ORTHOPAEDIC SURGERY

## 2019-02-01 PROCEDURE — G8427 DOCREV CUR MEDS BY ELIG CLIN: HCPCS | Performed by: ORTHOPAEDIC SURGERY

## 2019-02-01 RX ORDER — HYDROCODONE BITARTRATE AND ACETAMINOPHEN 5; 325 MG/1; MG/1
1 TABLET ORAL EVERY 4 HOURS
Qty: 42 TABLET | Refills: 0 | Status: SHIPPED | OUTPATIENT
Start: 2019-02-01 | End: 2019-02-08

## 2019-02-01 RX ORDER — ONDANSETRON 4 MG/1
4 TABLET, FILM COATED ORAL DAILY PRN
Qty: 20 TABLET | Refills: 0 | Status: SHIPPED | OUTPATIENT
Start: 2019-02-01 | End: 2019-05-07 | Stop reason: ALTCHOICE

## 2019-02-06 ENCOUNTER — OFFICE VISIT (OUTPATIENT)
Dept: FAMILY MEDICINE CLINIC | Age: 69
End: 2019-02-06
Payer: COMMERCIAL

## 2019-02-06 ENCOUNTER — TELEPHONE (OUTPATIENT)
Dept: FAMILY MEDICINE CLINIC | Age: 69
End: 2019-02-06

## 2019-02-06 ENCOUNTER — HOSPITAL ENCOUNTER (OUTPATIENT)
Age: 69
Discharge: HOME | End: 2019-02-06
Payer: COMMERCIAL

## 2019-02-06 VITALS
OXYGEN SATURATION: 98 % | DIASTOLIC BLOOD PRESSURE: 85 MMHG | SYSTOLIC BLOOD PRESSURE: 130 MMHG | HEART RATE: 67 BPM | BODY MASS INDEX: 19.63 KG/M2 | WEIGHT: 115 LBS | HEIGHT: 64 IN

## 2019-02-06 DIAGNOSIS — Z23 NEED FOR PROPHYLACTIC VACCINATION AND INOCULATION AGAINST VARICELLA: ICD-10-CM

## 2019-02-06 DIAGNOSIS — Z12.31 ENCOUNTER FOR SCREENING MAMMOGRAM FOR BREAST CANCER: ICD-10-CM

## 2019-02-06 DIAGNOSIS — B02.9 HERPES ZOSTER WITHOUT COMPLICATION: Primary | ICD-10-CM

## 2019-02-06 LAB — HEPATITIS C ANTIBODY: REACTIVE

## 2019-02-06 PROCEDURE — G8484 FLU IMMUNIZE NO ADMIN: HCPCS | Performed by: FAMILY MEDICINE

## 2019-02-06 PROCEDURE — 36415 COLL VENOUS BLD VENIPUNCTURE: CPT

## 2019-02-06 PROCEDURE — 1123F ACP DISCUSS/DSCN MKR DOCD: CPT | Performed by: FAMILY MEDICINE

## 2019-02-06 PROCEDURE — G8420 CALC BMI NORM PARAMETERS: HCPCS | Performed by: FAMILY MEDICINE

## 2019-02-06 PROCEDURE — 1090F PRES/ABSN URINE INCON ASSESS: CPT | Performed by: FAMILY MEDICINE

## 2019-02-06 PROCEDURE — 86803 HEPATITIS C AB TEST: CPT

## 2019-02-06 PROCEDURE — 1101F PT FALLS ASSESS-DOCD LE1/YR: CPT | Performed by: FAMILY MEDICINE

## 2019-02-06 PROCEDURE — 1036F TOBACCO NON-USER: CPT | Performed by: FAMILY MEDICINE

## 2019-02-06 PROCEDURE — 99213 OFFICE O/P EST LOW 20 MIN: CPT | Performed by: FAMILY MEDICINE

## 2019-02-06 PROCEDURE — G8399 PT W/DXA RESULTS DOCUMENT: HCPCS | Performed by: FAMILY MEDICINE

## 2019-02-06 PROCEDURE — G8427 DOCREV CUR MEDS BY ELIG CLIN: HCPCS | Performed by: FAMILY MEDICINE

## 2019-02-06 PROCEDURE — 4040F PNEUMOC VAC/ADMIN/RCVD: CPT | Performed by: FAMILY MEDICINE

## 2019-02-06 PROCEDURE — 3017F COLORECTAL CA SCREEN DOC REV: CPT | Performed by: FAMILY MEDICINE

## 2019-02-06 RX ORDER — ACYCLOVIR 400 MG/1
400 TABLET ORAL EVERY 8 HOURS
Qty: 30 TABLET | Refills: 0 | Status: SHIPPED | OUTPATIENT
Start: 2019-02-06 | End: 2019-02-16

## 2019-02-06 RX ORDER — ACYCLOVIR 50 MG/G
OINTMENT TOPICAL
Qty: 1 TUBE | Refills: 1 | Status: SHIPPED | OUTPATIENT
Start: 2019-02-06 | End: 2019-02-13

## 2019-02-06 ASSESSMENT — ENCOUNTER SYMPTOMS
ROS SKIN COMMENTS: PAIN AND REDNESS
COLOR CHANGE: 1
SHORTNESS OF BREATH: 0
COUGH: 0

## 2019-02-06 ASSESSMENT — PATIENT HEALTH QUESTIONNAIRE - PHQ9
SUM OF ALL RESPONSES TO PHQ QUESTIONS 1-9: 0
2. FEELING DOWN, DEPRESSED OR HOPELESS: 0
SUM OF ALL RESPONSES TO PHQ QUESTIONS 1-9: 0
1. LITTLE INTEREST OR PLEASURE IN DOING THINGS: 0
SUM OF ALL RESPONSES TO PHQ9 QUESTIONS 1 & 2: 0

## 2019-02-11 ENCOUNTER — OFFICE VISIT (OUTPATIENT)
Dept: FAMILY MEDICINE CLINIC | Age: 69
End: 2019-02-11
Payer: COMMERCIAL

## 2019-02-11 VITALS
SYSTOLIC BLOOD PRESSURE: 170 MMHG | HEART RATE: 78 BPM | BODY MASS INDEX: 19.81 KG/M2 | DIASTOLIC BLOOD PRESSURE: 80 MMHG | WEIGHT: 116 LBS | HEIGHT: 64 IN | OXYGEN SATURATION: 97 % | TEMPERATURE: 98.7 F

## 2019-02-11 DIAGNOSIS — I10 ESSENTIAL HYPERTENSION: Primary | ICD-10-CM

## 2019-02-11 DIAGNOSIS — R63.4 WEIGHT LOSS: ICD-10-CM

## 2019-02-11 DIAGNOSIS — B02.9 HERPES ZOSTER WITHOUT COMPLICATION: ICD-10-CM

## 2019-02-11 PROCEDURE — 1101F PT FALLS ASSESS-DOCD LE1/YR: CPT | Performed by: FAMILY MEDICINE

## 2019-02-11 PROCEDURE — G8427 DOCREV CUR MEDS BY ELIG CLIN: HCPCS | Performed by: FAMILY MEDICINE

## 2019-02-11 PROCEDURE — 1123F ACP DISCUSS/DSCN MKR DOCD: CPT | Performed by: FAMILY MEDICINE

## 2019-02-11 PROCEDURE — G8399 PT W/DXA RESULTS DOCUMENT: HCPCS | Performed by: FAMILY MEDICINE

## 2019-02-11 PROCEDURE — G8420 CALC BMI NORM PARAMETERS: HCPCS | Performed by: FAMILY MEDICINE

## 2019-02-11 PROCEDURE — 3017F COLORECTAL CA SCREEN DOC REV: CPT | Performed by: FAMILY MEDICINE

## 2019-02-11 PROCEDURE — 4040F PNEUMOC VAC/ADMIN/RCVD: CPT | Performed by: FAMILY MEDICINE

## 2019-02-11 PROCEDURE — 1036F TOBACCO NON-USER: CPT | Performed by: FAMILY MEDICINE

## 2019-02-11 PROCEDURE — 99213 OFFICE O/P EST LOW 20 MIN: CPT | Performed by: FAMILY MEDICINE

## 2019-02-11 PROCEDURE — G8484 FLU IMMUNIZE NO ADMIN: HCPCS | Performed by: FAMILY MEDICINE

## 2019-02-11 PROCEDURE — 1090F PRES/ABSN URINE INCON ASSESS: CPT | Performed by: FAMILY MEDICINE

## 2019-02-11 ASSESSMENT — ENCOUNTER SYMPTOMS
NAUSEA: 0
SHORTNESS OF BREATH: 0
SORE THROAT: 0
ABDOMINAL PAIN: 0

## 2019-02-15 ENCOUNTER — HOSPITAL ENCOUNTER (OUTPATIENT)
Age: 69
Discharge: HOME | End: 2019-02-15
Payer: COMMERCIAL

## 2019-02-15 ENCOUNTER — OFFICE VISIT (OUTPATIENT)
Dept: GASTROENTEROLOGY | Age: 69
End: 2019-02-15
Payer: COMMERCIAL

## 2019-02-15 VITALS
HEART RATE: 91 BPM | WEIGHT: 116 LBS | BODY MASS INDEX: 19.81 KG/M2 | HEIGHT: 64 IN | DIASTOLIC BLOOD PRESSURE: 70 MMHG | SYSTOLIC BLOOD PRESSURE: 132 MMHG

## 2019-02-15 DIAGNOSIS — R63.4 WEIGHT LOSS: ICD-10-CM

## 2019-02-15 DIAGNOSIS — K76.9 LIVER DISEASE, CHRONIC: Primary | ICD-10-CM

## 2019-02-15 DIAGNOSIS — K76.9 LIVER DISEASE, CHRONIC: ICD-10-CM

## 2019-02-15 LAB
ABSOLUTE RETIC #: 0.03 M/UL (ref 0.02–0.1)
CERULOPLASMIN: 29 MG/DL (ref 16–45)
FOLATE: 19 NG/ML
HAV AB SERPL IA-ACNC: REACTIVE
HAV IGM SER IA-ACNC: NONREACTIVE
HBV SURFACE AB TITR SER: 16.93 MIU/ML
HEPATITIS B CORE IGM ANTIBODY: NONREACTIVE
HEPATITIS B SURFACE ANTIGEN: NONREACTIVE
HEPATITIS C ANTIBODY: REACTIVE
IMMATURE RETIC FRACT: NORMAL %
IRON SATURATION: 18 % (ref 20–55)
IRON: 64 UG/DL (ref 37–145)
RETIC %: 0.7 % (ref 0.5–2)
RETIC HEMOGLOBIN: NORMAL PG (ref 28.2–35.7)
TOTAL IRON BINDING CAPACITY: 354 UG/DL (ref 250–450)
TRANSFERRIN: 300 MG/DL (ref 200–360)
UNSATURATED IRON BINDING CAPACITY: 290 UG/DL (ref 112–347)
VITAMIN B-12: 594 PG/ML (ref 232–1245)

## 2019-02-15 PROCEDURE — 82746 ASSAY OF FOLIC ACID SERUM: CPT

## 2019-02-15 PROCEDURE — 86256 FLUORESCENT ANTIBODY TITER: CPT

## 2019-02-15 PROCEDURE — G8484 FLU IMMUNIZE NO ADMIN: HCPCS | Performed by: INTERNAL MEDICINE

## 2019-02-15 PROCEDURE — G8420 CALC BMI NORM PARAMETERS: HCPCS | Performed by: INTERNAL MEDICINE

## 2019-02-15 PROCEDURE — 3017F COLORECTAL CA SCREEN DOC REV: CPT | Performed by: INTERNAL MEDICINE

## 2019-02-15 PROCEDURE — 82104 ALPHA-1-ANTITRYPSIN PHENO: CPT

## 2019-02-15 PROCEDURE — 1090F PRES/ABSN URINE INCON ASSESS: CPT | Performed by: INTERNAL MEDICINE

## 2019-02-15 PROCEDURE — 86038 ANTINUCLEAR ANTIBODIES: CPT

## 2019-02-15 PROCEDURE — 83550 IRON BINDING TEST: CPT

## 2019-02-15 PROCEDURE — 84460 ALANINE AMINO (ALT) (SGPT): CPT

## 2019-02-15 PROCEDURE — 1101F PT FALLS ASSESS-DOCD LE1/YR: CPT | Performed by: INTERNAL MEDICINE

## 2019-02-15 PROCEDURE — 82977 ASSAY OF GGT: CPT

## 2019-02-15 PROCEDURE — 99244 OFF/OP CNSLTJ NEW/EST MOD 40: CPT | Performed by: INTERNAL MEDICINE

## 2019-02-15 PROCEDURE — 87902 NFCT AGT GNTYP ALYS HEP C: CPT

## 2019-02-15 PROCEDURE — 84450 TRANSFERASE (AST) (SGOT): CPT

## 2019-02-15 PROCEDURE — 82390 ASSAY OF CERULOPLASMIN: CPT

## 2019-02-15 PROCEDURE — 80074 ACUTE HEPATITIS PANEL: CPT

## 2019-02-15 PROCEDURE — 85045 AUTOMATED RETICULOCYTE COUNT: CPT

## 2019-02-15 PROCEDURE — 86708 HEPATITIS A ANTIBODY: CPT

## 2019-02-15 PROCEDURE — 83516 IMMUNOASSAY NONANTIBODY: CPT

## 2019-02-15 PROCEDURE — 83883 ASSAY NEPHELOMETRY NOT SPEC: CPT

## 2019-02-15 PROCEDURE — 87522 HEPATITIS C REVRS TRNSCRPJ: CPT

## 2019-02-15 PROCEDURE — G8427 DOCREV CUR MEDS BY ELIG CLIN: HCPCS | Performed by: INTERNAL MEDICINE

## 2019-02-15 PROCEDURE — 83540 ASSAY OF IRON: CPT

## 2019-02-15 PROCEDURE — 84466 ASSAY OF TRANSFERRIN: CPT

## 2019-02-15 PROCEDURE — 82103 ALPHA-1-ANTITRYPSIN TOTAL: CPT

## 2019-02-15 PROCEDURE — 82607 VITAMIN B-12: CPT

## 2019-02-15 PROCEDURE — 86317 IMMUNOASSAY INFECTIOUS AGENT: CPT

## 2019-02-15 PROCEDURE — 36415 COLL VENOUS BLD VENIPUNCTURE: CPT

## 2019-02-15 PROCEDURE — 84520 ASSAY OF UREA NITROGEN: CPT

## 2019-02-15 ASSESSMENT — ENCOUNTER SYMPTOMS
BACK PAIN: 1
ABDOMINAL DISTENTION: 0
SHORTNESS OF BREATH: 0
DIARRHEA: 0
SINUS PRESSURE: 0
VOMITING: 0
TROUBLE SWALLOWING: 0
CHEST TIGHTNESS: 0
RECTAL PAIN: 0
WHEEZING: 0
SINUS PAIN: 0
NAUSEA: 0
COLOR CHANGE: 0
CONSTIPATION: 0
EYE PAIN: 0
BLOOD IN STOOL: 0
ANAL BLEEDING: 0
EYE REDNESS: 0
ABDOMINAL PAIN: 0

## 2019-02-18 LAB
ALPHA-1 ANTITRYPSIN PHENOTYPE: NORMAL
ALPHA-1 ANTITRYPSIN: 162 MG/DL (ref 90–200)
ANTI-NUCLEAR ANTIBODY (ANA): POSITIVE
MITOCHONDRIAL ANTIBODY: 6.2 UNITS (ref 0–20)
SMOOTH MUSCLE ANTIBODY: 61 UNITS (ref 0–19)
TISSUE TRANSGLUTAMINASE IGA: 0.9 U/ML

## 2019-02-19 LAB
ALANINE AMINOTRANSFERASE, FIBROMETER: 33 U/L (ref 5–40)
ALPHA-2-MACROGLOBULIN, FIBROMETER: 240 MG/DL (ref 131–293)
ASPARTATE AMINOTRANSFERASE, FIBROMETER: 32 U/L (ref 9–40)
CIRRHOMETER PATIENT SCORE: 0.02
DIRECT EXAM: NORMAL
EER FIBROMETER REPORT: ABNORMAL
FIBROMETER INTERPRETATION: ABNORMAL
FIBROMETER PATIENT SCORE: 0.41
FIBROMETER PLATELET COUNT: 278
FIBROMETER PROTHROMBIN INDEX: 91 % (ref 90–120)
FIBROSIS METAVIR CLASSIFICATION: ABNORMAL
GAMMA GLUTAMYL TRANSFERASE, FIBROMETER: 39 U/L (ref 7–33)
INFLAMETER METAVIR CLASSIFICATION: ABNORMAL
INFLAMETER PATIENT SCORE: 0.3
Lab: NORMAL
SMOOTH MUSCLE AB IGG TITER: NORMAL
SPECIMEN DESCRIPTION: NORMAL
UREA NITROGEN, FIBROMETER: 20 MG/DL (ref 7–20)

## 2019-02-20 LAB
HCV QUANTITATIVE: NORMAL
HEPATITIS C GENOTYPE: NORMAL

## 2019-02-22 ENCOUNTER — HOSPITAL ENCOUNTER (OUTPATIENT)
Dept: ULTRASOUND IMAGING | Age: 69
Discharge: HOME | End: 2019-02-24
Payer: COMMERCIAL

## 2019-02-22 DIAGNOSIS — K76.9 LIVER DISEASE, CHRONIC: ICD-10-CM

## 2019-02-22 PROCEDURE — 76705 ECHO EXAM OF ABDOMEN: CPT

## 2019-02-25 ENCOUNTER — OFFICE VISIT (OUTPATIENT)
Dept: FAMILY MEDICINE CLINIC | Age: 69
End: 2019-02-25
Payer: COMMERCIAL

## 2019-02-25 VITALS
WEIGHT: 114 LBS | HEART RATE: 72 BPM | BODY MASS INDEX: 19.46 KG/M2 | HEIGHT: 64 IN | SYSTOLIC BLOOD PRESSURE: 118 MMHG | DIASTOLIC BLOOD PRESSURE: 84 MMHG | TEMPERATURE: 98.7 F

## 2019-02-25 DIAGNOSIS — B02.9 HERPES ZOSTER WITHOUT COMPLICATION: Primary | ICD-10-CM

## 2019-02-25 DIAGNOSIS — I10 ESSENTIAL HYPERTENSION: ICD-10-CM

## 2019-02-25 DIAGNOSIS — R63.4 WEIGHT LOSS: ICD-10-CM

## 2019-02-25 DIAGNOSIS — G25.81 RLS (RESTLESS LEGS SYNDROME): ICD-10-CM

## 2019-02-25 PROCEDURE — G8420 CALC BMI NORM PARAMETERS: HCPCS | Performed by: FAMILY MEDICINE

## 2019-02-25 PROCEDURE — G8399 PT W/DXA RESULTS DOCUMENT: HCPCS | Performed by: FAMILY MEDICINE

## 2019-02-25 PROCEDURE — G8484 FLU IMMUNIZE NO ADMIN: HCPCS | Performed by: FAMILY MEDICINE

## 2019-02-25 PROCEDURE — 1123F ACP DISCUSS/DSCN MKR DOCD: CPT | Performed by: FAMILY MEDICINE

## 2019-02-25 PROCEDURE — G8427 DOCREV CUR MEDS BY ELIG CLIN: HCPCS | Performed by: FAMILY MEDICINE

## 2019-02-25 PROCEDURE — 1036F TOBACCO NON-USER: CPT | Performed by: FAMILY MEDICINE

## 2019-02-25 PROCEDURE — 1101F PT FALLS ASSESS-DOCD LE1/YR: CPT | Performed by: FAMILY MEDICINE

## 2019-02-25 PROCEDURE — 3017F COLORECTAL CA SCREEN DOC REV: CPT | Performed by: FAMILY MEDICINE

## 2019-02-25 PROCEDURE — 4040F PNEUMOC VAC/ADMIN/RCVD: CPT | Performed by: FAMILY MEDICINE

## 2019-02-25 PROCEDURE — 99213 OFFICE O/P EST LOW 20 MIN: CPT | Performed by: FAMILY MEDICINE

## 2019-02-25 PROCEDURE — 1090F PRES/ABSN URINE INCON ASSESS: CPT | Performed by: FAMILY MEDICINE

## 2019-02-25 ASSESSMENT — ENCOUNTER SYMPTOMS
ABDOMINAL PAIN: 0
NAUSEA: 0
SORE THROAT: 0
SHORTNESS OF BREATH: 0

## 2019-03-05 ENCOUNTER — OFFICE VISIT (OUTPATIENT)
Dept: GASTROENTEROLOGY | Age: 69
End: 2019-03-05
Payer: COMMERCIAL

## 2019-03-05 ENCOUNTER — HOSPITAL ENCOUNTER (OUTPATIENT)
Age: 69
Discharge: HOME | End: 2019-03-05
Payer: COMMERCIAL

## 2019-03-05 VITALS
SYSTOLIC BLOOD PRESSURE: 131 MMHG | WEIGHT: 115.2 LBS | HEART RATE: 73 BPM | DIASTOLIC BLOOD PRESSURE: 69 MMHG | HEIGHT: 62 IN | BODY MASS INDEX: 21.2 KG/M2

## 2019-03-05 DIAGNOSIS — R63.4 WEIGHT LOSS: ICD-10-CM

## 2019-03-05 DIAGNOSIS — R63.4 WEIGHT LOSS: Primary | ICD-10-CM

## 2019-03-05 LAB
ALBUMIN SERPL-MCNC: 3.9 G/DL (ref 3.5–5.2)
ALBUMIN/GLOBULIN RATIO: ABNORMAL (ref 1–2.5)
ALP BLD-CCNC: 67 U/L (ref 35–104)
ALT SERPL-CCNC: 17 U/L (ref 5–33)
AST SERPL-CCNC: 22 U/L
BILIRUB SERPL-MCNC: <0.15 MG/DL (ref 0.3–1.2)
BILIRUBIN DIRECT: <0.08 MG/DL
BILIRUBIN, INDIRECT: ABNORMAL MG/DL (ref 0–1)
GLOBULIN: ABNORMAL G/DL (ref 1.5–3.8)
TOTAL PROTEIN: 7.8 G/DL (ref 6.4–8.3)

## 2019-03-05 PROCEDURE — 1036F TOBACCO NON-USER: CPT | Performed by: INTERNAL MEDICINE

## 2019-03-05 PROCEDURE — G8420 CALC BMI NORM PARAMETERS: HCPCS | Performed by: INTERNAL MEDICINE

## 2019-03-05 PROCEDURE — G8427 DOCREV CUR MEDS BY ELIG CLIN: HCPCS | Performed by: INTERNAL MEDICINE

## 2019-03-05 PROCEDURE — 4040F PNEUMOC VAC/ADMIN/RCVD: CPT | Performed by: INTERNAL MEDICINE

## 2019-03-05 PROCEDURE — G8399 PT W/DXA RESULTS DOCUMENT: HCPCS | Performed by: INTERNAL MEDICINE

## 2019-03-05 PROCEDURE — 99214 OFFICE O/P EST MOD 30 MIN: CPT | Performed by: INTERNAL MEDICINE

## 2019-03-05 PROCEDURE — 86225 DNA ANTIBODY NATIVE: CPT

## 2019-03-05 PROCEDURE — 1123F ACP DISCUSS/DSCN MKR DOCD: CPT | Performed by: INTERNAL MEDICINE

## 2019-03-05 PROCEDURE — G8484 FLU IMMUNIZE NO ADMIN: HCPCS | Performed by: INTERNAL MEDICINE

## 2019-03-05 PROCEDURE — 1101F PT FALLS ASSESS-DOCD LE1/YR: CPT | Performed by: INTERNAL MEDICINE

## 2019-03-05 PROCEDURE — 36415 COLL VENOUS BLD VENIPUNCTURE: CPT

## 2019-03-05 PROCEDURE — 80076 HEPATIC FUNCTION PANEL: CPT

## 2019-03-05 PROCEDURE — 3017F COLORECTAL CA SCREEN DOC REV: CPT | Performed by: INTERNAL MEDICINE

## 2019-03-05 PROCEDURE — 1090F PRES/ABSN URINE INCON ASSESS: CPT | Performed by: INTERNAL MEDICINE

## 2019-03-05 ASSESSMENT — ENCOUNTER SYMPTOMS
NAUSEA: 0
CHEST TIGHTNESS: 1
WHEEZING: 0
COLOR CHANGE: 0
EYE PAIN: 0
TROUBLE SWALLOWING: 0
ANAL BLEEDING: 0
ABDOMINAL PAIN: 0
SHORTNESS OF BREATH: 1
CONSTIPATION: 0
SINUS PRESSURE: 0
DIARRHEA: 0
EYE REDNESS: 0
BACK PAIN: 0
RECTAL PAIN: 0
VOMITING: 0
ABDOMINAL DISTENTION: 0
BLOOD IN STOOL: 0
SINUS PAIN: 0

## 2019-03-06 LAB — ANTI DNA DOUBLE STRANDED: 20 IU/ML

## 2019-03-07 ENCOUNTER — TELEPHONE (OUTPATIENT)
Dept: FAMILY MEDICINE CLINIC | Age: 69
End: 2019-03-07

## 2019-03-15 RX ORDER — HYDRALAZINE HYDROCHLORIDE 50 MG/1
50 TABLET, FILM COATED ORAL 4 TIMES DAILY
Qty: 360 TABLET | Refills: 1 | Status: SHIPPED | OUTPATIENT
Start: 2019-03-15 | End: 2019-12-26

## 2019-03-20 ENCOUNTER — HOSPITAL ENCOUNTER (OUTPATIENT)
Dept: PREADMISSION TESTING | Age: 69
Discharge: HOME | End: 2019-03-24
Payer: COMMERCIAL

## 2019-03-20 VITALS
TEMPERATURE: 97.9 F | WEIGHT: 116 LBS | BODY MASS INDEX: 19.81 KG/M2 | RESPIRATION RATE: 16 BRPM | OXYGEN SATURATION: 100 % | DIASTOLIC BLOOD PRESSURE: 55 MMHG | HEART RATE: 68 BPM | SYSTOLIC BLOOD PRESSURE: 140 MMHG | HEIGHT: 64 IN

## 2019-03-20 LAB
ABSOLUTE EOS #: 0.3 K/UL (ref 0–0.4)
ABSOLUTE IMMATURE GRANULOCYTE: ABNORMAL K/UL (ref 0–0.3)
ABSOLUTE LYMPH #: 1.2 K/UL (ref 1–4.8)
ABSOLUTE MONO #: 0.6 K/UL (ref 0.1–1.3)
ANION GAP SERPL CALCULATED.3IONS-SCNC: 9 MMOL/L (ref 9–17)
BASOPHILS # BLD: 0 % (ref 0–2)
BASOPHILS ABSOLUTE: 0 K/UL (ref 0–0.2)
BUN BLDV-MCNC: 14 MG/DL (ref 8–23)
BUN/CREAT BLD: ABNORMAL (ref 9–20)
CALCIUM SERPL-MCNC: 9.7 MG/DL (ref 8.6–10.4)
CHLORIDE BLD-SCNC: 104 MMOL/L (ref 98–107)
CO2: 28 MMOL/L (ref 20–31)
CREAT SERPL-MCNC: 0.81 MG/DL (ref 0.5–0.9)
DIFFERENTIAL TYPE: ABNORMAL
EOSINOPHILS RELATIVE PERCENT: 5 % (ref 0–4)
GFR AFRICAN AMERICAN: >60 ML/MIN
GFR NON-AFRICAN AMERICAN: >60 ML/MIN
GFR SERPL CREATININE-BSD FRML MDRD: ABNORMAL ML/MIN/{1.73_M2}
GFR SERPL CREATININE-BSD FRML MDRD: ABNORMAL ML/MIN/{1.73_M2}
GLUCOSE BLD-MCNC: 106 MG/DL (ref 70–99)
HCT VFR BLD CALC: 35.2 % (ref 36–46)
HEMOGLOBIN: 11.6 G/DL (ref 12–16)
IMMATURE GRANULOCYTES: ABNORMAL %
LYMPHOCYTES # BLD: 19 % (ref 24–44)
MCH RBC QN AUTO: 29.9 PG (ref 26–34)
MCHC RBC AUTO-ENTMCNC: 32.9 G/DL (ref 31–37)
MCV RBC AUTO: 90.7 FL (ref 80–100)
MONOCYTES # BLD: 10 % (ref 1–7)
NRBC AUTOMATED: ABNORMAL PER 100 WBC
PDW BLD-RTO: 14 % (ref 11.5–14.9)
PLATELET # BLD: 276 K/UL (ref 150–450)
PLATELET ESTIMATE: ABNORMAL
PMV BLD AUTO: 8.7 FL (ref 6–12)
POTASSIUM SERPL-SCNC: 5 MMOL/L (ref 3.7–5.3)
RBC # BLD: 3.88 M/UL (ref 4–5.2)
RBC # BLD: ABNORMAL 10*6/UL
SEG NEUTROPHILS: 66 % (ref 36–66)
SEGMENTED NEUTROPHILS ABSOLUTE COUNT: 3.9 K/UL (ref 1.3–9.1)
SODIUM BLD-SCNC: 141 MMOL/L (ref 135–144)
WBC # BLD: 6 K/UL (ref 3.5–11)
WBC # BLD: ABNORMAL 10*3/UL

## 2019-03-20 PROCEDURE — 80048 BASIC METABOLIC PNL TOTAL CA: CPT

## 2019-03-20 PROCEDURE — 85025 COMPLETE CBC W/AUTO DIFF WBC: CPT

## 2019-03-20 PROCEDURE — 36415 COLL VENOUS BLD VENIPUNCTURE: CPT

## 2019-03-21 ENCOUNTER — ANESTHESIA EVENT (OUTPATIENT)
Dept: OPERATING ROOM | Age: 69
End: 2019-03-21
Payer: COMMERCIAL

## 2019-03-21 RX ORDER — SODIUM CHLORIDE 0.9 % (FLUSH) 0.9 %
10 SYRINGE (ML) INJECTION PRN
Status: CANCELLED | OUTPATIENT
Start: 2019-03-21

## 2019-03-21 RX ORDER — SODIUM CHLORIDE 0.9 % (FLUSH) 0.9 %
10 SYRINGE (ML) INJECTION EVERY 12 HOURS SCHEDULED
Status: CANCELLED | OUTPATIENT
Start: 2019-03-21

## 2019-03-21 RX ORDER — LIDOCAINE HYDROCHLORIDE 10 MG/ML
1 INJECTION, SOLUTION EPIDURAL; INFILTRATION; INTRACAUDAL; PERINEURAL
Status: CANCELLED | OUTPATIENT
Start: 2019-03-21 | End: 2019-03-21

## 2019-03-21 RX ORDER — SODIUM CHLORIDE, SODIUM LACTATE, POTASSIUM CHLORIDE, CALCIUM CHLORIDE 600; 310; 30; 20 MG/100ML; MG/100ML; MG/100ML; MG/100ML
INJECTION, SOLUTION INTRAVENOUS CONTINUOUS
Status: CANCELLED | OUTPATIENT
Start: 2019-03-21

## 2019-03-22 ENCOUNTER — HOSPITAL ENCOUNTER (OUTPATIENT)
Dept: WOMENS IMAGING | Age: 69
Discharge: HOME | End: 2019-03-24
Payer: COMMERCIAL

## 2019-03-22 ENCOUNTER — OFFICE VISIT (OUTPATIENT)
Dept: ORTHOPEDIC SURGERY | Age: 69
End: 2019-03-22
Payer: COMMERCIAL

## 2019-03-22 VITALS — BODY MASS INDEX: 19.81 KG/M2 | HEIGHT: 64 IN | WEIGHT: 116 LBS

## 2019-03-22 DIAGNOSIS — S83.272A COMPLEX TEAR OF LATERAL MENISCUS OF LEFT KNEE, UNSPECIFIED WHETHER OLD OR CURRENT TEAR, INITIAL ENCOUNTER: Primary | ICD-10-CM

## 2019-03-22 DIAGNOSIS — Z12.31 ENCOUNTER FOR SCREENING MAMMOGRAM FOR BREAST CANCER: ICD-10-CM

## 2019-03-22 PROCEDURE — G8427 DOCREV CUR MEDS BY ELIG CLIN: HCPCS | Performed by: ORTHOPAEDIC SURGERY

## 2019-03-22 PROCEDURE — 1123F ACP DISCUSS/DSCN MKR DOCD: CPT | Performed by: ORTHOPAEDIC SURGERY

## 2019-03-22 PROCEDURE — G8399 PT W/DXA RESULTS DOCUMENT: HCPCS | Performed by: ORTHOPAEDIC SURGERY

## 2019-03-22 PROCEDURE — 99213 OFFICE O/P EST LOW 20 MIN: CPT | Performed by: ORTHOPAEDIC SURGERY

## 2019-03-22 PROCEDURE — 77063 BREAST TOMOSYNTHESIS BI: CPT

## 2019-03-22 PROCEDURE — 1036F TOBACCO NON-USER: CPT | Performed by: ORTHOPAEDIC SURGERY

## 2019-03-22 PROCEDURE — 1090F PRES/ABSN URINE INCON ASSESS: CPT | Performed by: ORTHOPAEDIC SURGERY

## 2019-03-22 PROCEDURE — G8420 CALC BMI NORM PARAMETERS: HCPCS | Performed by: ORTHOPAEDIC SURGERY

## 2019-03-22 PROCEDURE — 3017F COLORECTAL CA SCREEN DOC REV: CPT | Performed by: ORTHOPAEDIC SURGERY

## 2019-03-22 PROCEDURE — 4040F PNEUMOC VAC/ADMIN/RCVD: CPT | Performed by: ORTHOPAEDIC SURGERY

## 2019-03-22 PROCEDURE — G8484 FLU IMMUNIZE NO ADMIN: HCPCS | Performed by: ORTHOPAEDIC SURGERY

## 2019-03-22 RX ORDER — ONDANSETRON 4 MG/1
4 TABLET, FILM COATED ORAL DAILY PRN
Qty: 20 TABLET | Refills: 0 | Status: ON HOLD | OUTPATIENT
Start: 2019-03-22 | End: 2019-05-31 | Stop reason: ALTCHOICE

## 2019-03-22 RX ORDER — HYDROCODONE BITARTRATE AND ACETAMINOPHEN 5; 325 MG/1; MG/1
1 TABLET ORAL EVERY 4 HOURS
Qty: 42 TABLET | Refills: 0 | Status: SHIPPED | OUTPATIENT
Start: 2019-03-22 | End: 2019-03-29

## 2019-04-02 ENCOUNTER — PREP FOR PROCEDURE (OUTPATIENT)
Dept: ORTHOPEDIC SURGERY | Age: 69
End: 2019-04-02

## 2019-04-02 RX ORDER — SODIUM CHLORIDE 0.9 % (FLUSH) 0.9 %
10 SYRINGE (ML) INJECTION EVERY 12 HOURS SCHEDULED
Status: CANCELLED | OUTPATIENT
Start: 2019-04-02

## 2019-04-02 RX ORDER — SODIUM CHLORIDE 0.9 % (FLUSH) 0.9 %
10 SYRINGE (ML) INJECTION PRN
Status: CANCELLED | OUTPATIENT
Start: 2019-04-02

## 2019-04-02 RX ORDER — DEXAMETHASONE SODIUM PHOSPHATE 10 MG/ML
10 INJECTION, SOLUTION INTRAMUSCULAR; INTRAVENOUS ONCE
Status: CANCELLED | OUTPATIENT
Start: 2019-04-03

## 2019-04-03 ENCOUNTER — ANESTHESIA (OUTPATIENT)
Dept: OPERATING ROOM | Age: 69
End: 2019-04-03
Payer: COMMERCIAL

## 2019-04-03 ENCOUNTER — HOSPITAL ENCOUNTER (OUTPATIENT)
Age: 69
Setting detail: OUTPATIENT SURGERY
Discharge: HOME | End: 2019-04-03
Attending: ORTHOPAEDIC SURGERY | Admitting: ORTHOPAEDIC SURGERY
Payer: COMMERCIAL

## 2019-04-03 VITALS — SYSTOLIC BLOOD PRESSURE: 114 MMHG | DIASTOLIC BLOOD PRESSURE: 56 MMHG | OXYGEN SATURATION: 97 % | TEMPERATURE: 96.6 F

## 2019-04-03 VITALS
RESPIRATION RATE: 12 BRPM | OXYGEN SATURATION: 97 % | HEIGHT: 64 IN | HEART RATE: 68 BPM | WEIGHT: 116 LBS | TEMPERATURE: 97.1 F | DIASTOLIC BLOOD PRESSURE: 68 MMHG | SYSTOLIC BLOOD PRESSURE: 155 MMHG | BODY MASS INDEX: 19.81 KG/M2

## 2019-04-03 PROCEDURE — 7100000030 HC ASPR PHASE II RECOVERY - FIRST 15 MIN: Performed by: ORTHOPAEDIC SURGERY

## 2019-04-03 PROCEDURE — 7100000010 HC PHASE II RECOVERY - FIRST 15 MIN: Performed by: ORTHOPAEDIC SURGERY

## 2019-04-03 PROCEDURE — 2580000003 HC RX 258: Performed by: ANESTHESIOLOGY

## 2019-04-03 PROCEDURE — 7100000011 HC PHASE II RECOVERY - ADDTL 15 MIN: Performed by: ORTHOPAEDIC SURGERY

## 2019-04-03 PROCEDURE — 7100000031 HC ASPR PHASE II RECOVERY - ADDTL 15 MIN: Performed by: ORTHOPAEDIC SURGERY

## 2019-04-03 PROCEDURE — 7100000001 HC PACU RECOVERY - ADDTL 15 MIN: Performed by: ORTHOPAEDIC SURGERY

## 2019-04-03 PROCEDURE — 2709999900 HC NON-CHARGEABLE SUPPLY: Performed by: ORTHOPAEDIC SURGERY

## 2019-04-03 PROCEDURE — 7100000000 HC PACU RECOVERY - FIRST 15 MIN: Performed by: ORTHOPAEDIC SURGERY

## 2019-04-03 PROCEDURE — 3700000000 HC ANESTHESIA ATTENDED CARE: Performed by: ORTHOPAEDIC SURGERY

## 2019-04-03 PROCEDURE — 6360000002 HC RX W HCPCS: Performed by: ANESTHESIOLOGY

## 2019-04-03 PROCEDURE — 2720000010 HC SURG SUPPLY STERILE: Performed by: ORTHOPAEDIC SURGERY

## 2019-04-03 PROCEDURE — 6360000002 HC RX W HCPCS: Performed by: NURSE ANESTHETIST, CERTIFIED REGISTERED

## 2019-04-03 PROCEDURE — 3600000013 HC SURGERY LEVEL 3 ADDTL 15MIN: Performed by: ORTHOPAEDIC SURGERY

## 2019-04-03 PROCEDURE — 2500000003 HC RX 250 WO HCPCS: Performed by: NURSE ANESTHETIST, CERTIFIED REGISTERED

## 2019-04-03 PROCEDURE — 3600000003 HC SURGERY LEVEL 3 BASE: Performed by: ORTHOPAEDIC SURGERY

## 2019-04-03 PROCEDURE — 6360000002 HC RX W HCPCS: Performed by: ORTHOPAEDIC SURGERY

## 2019-04-03 PROCEDURE — 3700000001 HC ADD 15 MINUTES (ANESTHESIA): Performed by: ORTHOPAEDIC SURGERY

## 2019-04-03 PROCEDURE — 29870 ARTHRS KNEE DX W/WO SYN BX: CPT | Performed by: ORTHOPAEDIC SURGERY

## 2019-04-03 RX ORDER — PROPOFOL 10 MG/ML
INJECTION, EMULSION INTRAVENOUS PRN
Status: DISCONTINUED | OUTPATIENT
Start: 2019-04-03 | End: 2019-04-03 | Stop reason: SDUPTHER

## 2019-04-03 RX ORDER — FENTANYL CITRATE 50 UG/ML
25 INJECTION, SOLUTION INTRAMUSCULAR; INTRAVENOUS EVERY 5 MIN PRN
Status: COMPLETED | OUTPATIENT
Start: 2019-04-03 | End: 2019-04-03

## 2019-04-03 RX ORDER — LIDOCAINE HYDROCHLORIDE 10 MG/ML
1 INJECTION, SOLUTION EPIDURAL; INFILTRATION; INTRACAUDAL; PERINEURAL
Status: DISCONTINUED | OUTPATIENT
Start: 2019-04-03 | End: 2019-04-03 | Stop reason: HOSPADM

## 2019-04-03 RX ORDER — MORPHINE SULFATE 2 MG/ML
2 INJECTION, SOLUTION INTRAMUSCULAR; INTRAVENOUS EVERY 5 MIN PRN
Status: DISCONTINUED | OUTPATIENT
Start: 2019-04-03 | End: 2019-04-03 | Stop reason: HOSPADM

## 2019-04-03 RX ORDER — DEXAMETHASONE SODIUM PHOSPHATE 10 MG/ML
10 INJECTION, SOLUTION INTRAMUSCULAR; INTRAVENOUS ONCE
Status: DISCONTINUED | OUTPATIENT
Start: 2019-04-03 | End: 2019-04-03

## 2019-04-03 RX ORDER — DIPHENHYDRAMINE HYDROCHLORIDE 50 MG/ML
12.5 INJECTION INTRAMUSCULAR; INTRAVENOUS
Status: DISCONTINUED | OUTPATIENT
Start: 2019-04-03 | End: 2019-04-03 | Stop reason: HOSPADM

## 2019-04-03 RX ORDER — 0.9 % SODIUM CHLORIDE 0.9 %
500 INTRAVENOUS SOLUTION INTRAVENOUS
Status: DISCONTINUED | OUTPATIENT
Start: 2019-04-03 | End: 2019-04-03 | Stop reason: HOSPADM

## 2019-04-03 RX ORDER — HYDROCODONE BITARTRATE AND ACETAMINOPHEN 5; 325 MG/1; MG/1
2 TABLET ORAL PRN
Status: DISCONTINUED | OUTPATIENT
Start: 2019-04-03 | End: 2019-04-03 | Stop reason: HOSPADM

## 2019-04-03 RX ORDER — FENTANYL CITRATE 50 UG/ML
INJECTION, SOLUTION INTRAMUSCULAR; INTRAVENOUS PRN
Status: DISCONTINUED | OUTPATIENT
Start: 2019-04-03 | End: 2019-04-03 | Stop reason: SDUPTHER

## 2019-04-03 RX ORDER — EPHEDRINE SULFATE 50 MG/ML
INJECTION, SOLUTION INTRAVENOUS PRN
Status: DISCONTINUED | OUTPATIENT
Start: 2019-04-03 | End: 2019-04-03 | Stop reason: SDUPTHER

## 2019-04-03 RX ORDER — MIDAZOLAM HYDROCHLORIDE 1 MG/ML
INJECTION INTRAMUSCULAR; INTRAVENOUS PRN
Status: DISCONTINUED | OUTPATIENT
Start: 2019-04-03 | End: 2019-04-03 | Stop reason: SDUPTHER

## 2019-04-03 RX ORDER — HYDROCODONE BITARTRATE AND ACETAMINOPHEN 5; 325 MG/1; MG/1
1 TABLET ORAL PRN
Status: DISCONTINUED | OUTPATIENT
Start: 2019-04-03 | End: 2019-04-03 | Stop reason: HOSPADM

## 2019-04-03 RX ORDER — FENTANYL CITRATE 50 UG/ML
25 INJECTION, SOLUTION INTRAMUSCULAR; INTRAVENOUS EVERY 5 MIN PRN
Status: DISCONTINUED | OUTPATIENT
Start: 2019-04-03 | End: 2019-04-03 | Stop reason: HOSPADM

## 2019-04-03 RX ORDER — ONDANSETRON 2 MG/ML
INJECTION INTRAMUSCULAR; INTRAVENOUS PRN
Status: DISCONTINUED | OUTPATIENT
Start: 2019-04-03 | End: 2019-04-03 | Stop reason: SDUPTHER

## 2019-04-03 RX ORDER — SODIUM CHLORIDE 0.9 % (FLUSH) 0.9 %
10 SYRINGE (ML) INJECTION EVERY 12 HOURS SCHEDULED
Status: DISCONTINUED | OUTPATIENT
Start: 2019-04-03 | End: 2019-04-03 | Stop reason: HOSPADM

## 2019-04-03 RX ORDER — SODIUM CHLORIDE 0.9 % (FLUSH) 0.9 %
10 SYRINGE (ML) INJECTION PRN
Status: DISCONTINUED | OUTPATIENT
Start: 2019-04-03 | End: 2019-04-03 | Stop reason: HOSPADM

## 2019-04-03 RX ORDER — DEXAMETHASONE SODIUM PHOSPHATE 10 MG/ML
10 INJECTION INTRAMUSCULAR; INTRAVENOUS ONCE
Status: DISCONTINUED | OUTPATIENT
Start: 2019-04-03 | End: 2019-04-03 | Stop reason: HOSPADM

## 2019-04-03 RX ORDER — PROMETHAZINE HYDROCHLORIDE 25 MG/ML
6.25 INJECTION, SOLUTION INTRAMUSCULAR; INTRAVENOUS
Status: DISCONTINUED | OUTPATIENT
Start: 2019-04-03 | End: 2019-04-03 | Stop reason: HOSPADM

## 2019-04-03 RX ORDER — HYDRALAZINE HYDROCHLORIDE 20 MG/ML
5 INJECTION INTRAMUSCULAR; INTRAVENOUS EVERY 10 MIN PRN
Status: DISCONTINUED | OUTPATIENT
Start: 2019-04-03 | End: 2019-04-03 | Stop reason: HOSPADM

## 2019-04-03 RX ORDER — DEXAMETHASONE SODIUM PHOSPHATE 4 MG/ML
INJECTION, SOLUTION INTRA-ARTICULAR; INTRALESIONAL; INTRAMUSCULAR; INTRAVENOUS; SOFT TISSUE PRN
Status: DISCONTINUED | OUTPATIENT
Start: 2019-04-03 | End: 2019-04-03 | Stop reason: SDUPTHER

## 2019-04-03 RX ORDER — SODIUM CHLORIDE, SODIUM LACTATE, POTASSIUM CHLORIDE, CALCIUM CHLORIDE 600; 310; 30; 20 MG/100ML; MG/100ML; MG/100ML; MG/100ML
INJECTION, SOLUTION INTRAVENOUS CONTINUOUS
Status: DISCONTINUED | OUTPATIENT
Start: 2019-04-03 | End: 2019-04-03 | Stop reason: HOSPADM

## 2019-04-03 RX ORDER — LIDOCAINE HYDROCHLORIDE 10 MG/ML
INJECTION, SOLUTION EPIDURAL; INFILTRATION; INTRACAUDAL; PERINEURAL PRN
Status: DISCONTINUED | OUTPATIENT
Start: 2019-04-03 | End: 2019-04-03 | Stop reason: SDUPTHER

## 2019-04-03 RX ADMIN — MIDAZOLAM 1 MG: 1 INJECTION INTRAMUSCULAR; INTRAVENOUS at 12:57

## 2019-04-03 RX ADMIN — ONDANSETRON 4 MG: 2 INJECTION INTRAMUSCULAR; INTRAVENOUS at 13:41

## 2019-04-03 RX ADMIN — Medication 2 G: at 13:09

## 2019-04-03 RX ADMIN — EPHEDRINE SULFATE 10 MG: 50 INJECTION INTRAMUSCULAR; INTRAVENOUS; SUBCUTANEOUS at 13:22

## 2019-04-03 RX ADMIN — DEXAMETHASONE SODIUM PHOSPHATE 8 MG: 4 INJECTION, SOLUTION INTRAMUSCULAR; INTRAVENOUS at 13:05

## 2019-04-03 RX ADMIN — FENTANYL CITRATE 100 MCG: 50 INJECTION, SOLUTION INTRAMUSCULAR; INTRAVENOUS at 13:05

## 2019-04-03 RX ADMIN — FENTANYL CITRATE 25 MCG: 50 INJECTION INTRAMUSCULAR; INTRAVENOUS at 14:41

## 2019-04-03 RX ADMIN — FENTANYL CITRATE 25 MCG: 50 INJECTION INTRAMUSCULAR; INTRAVENOUS at 15:04

## 2019-04-03 RX ADMIN — MIDAZOLAM 1 MG: 1 INJECTION INTRAMUSCULAR; INTRAVENOUS at 13:00

## 2019-04-03 RX ADMIN — PROPOFOL 150 MG: 10 INJECTION, EMULSION INTRAVENOUS at 13:05

## 2019-04-03 RX ADMIN — LIDOCAINE HYDROCHLORIDE 50 MG: 10 INJECTION, SOLUTION EPIDURAL; INFILTRATION; INTRACAUDAL; PERINEURAL at 13:05

## 2019-04-03 RX ADMIN — SODIUM CHLORIDE, POTASSIUM CHLORIDE, SODIUM LACTATE AND CALCIUM CHLORIDE: 600; 310; 30; 20 INJECTION, SOLUTION INTRAVENOUS at 10:54

## 2019-04-03 RX ADMIN — FENTANYL CITRATE: 50 INJECTION INTRAMUSCULAR; INTRAVENOUS at 14:27

## 2019-04-03 RX ADMIN — FENTANYL CITRATE 25 MCG: 50 INJECTION INTRAMUSCULAR; INTRAVENOUS at 14:50

## 2019-04-03 ASSESSMENT — PULMONARY FUNCTION TESTS
PIF_VALUE: 14
PIF_VALUE: 1
PIF_VALUE: 14
PIF_VALUE: 1
PIF_VALUE: 14
PIF_VALUE: 1
PIF_VALUE: 1
PIF_VALUE: 10
PIF_VALUE: 14
PIF_VALUE: 10
PIF_VALUE: 14
PIF_VALUE: 1
PIF_VALUE: 14
PIF_VALUE: 1
PIF_VALUE: 0
PIF_VALUE: 14
PIF_VALUE: 22
PIF_VALUE: 14
PIF_VALUE: 12
PIF_VALUE: 14
PIF_VALUE: 0
PIF_VALUE: 14
PIF_VALUE: 1
PIF_VALUE: 14
PIF_VALUE: 0

## 2019-04-03 ASSESSMENT — PAIN DESCRIPTION - DESCRIPTORS: DESCRIPTORS: ACHING

## 2019-04-03 ASSESSMENT — PAIN SCALES - GENERAL
PAINLEVEL_OUTOF10: 0
PAINLEVEL_OUTOF10: 5
PAINLEVEL_OUTOF10: 4
PAINLEVEL_OUTOF10: 0
PAINLEVEL_OUTOF10: 4
PAINLEVEL_OUTOF10: 0
PAINLEVEL_OUTOF10: 5

## 2019-04-03 ASSESSMENT — PAIN DESCRIPTION - FREQUENCY: FREQUENCY: CONTINUOUS

## 2019-04-03 ASSESSMENT — PAIN DESCRIPTION - ONSET: ONSET: AWAKENED FROM SLEEP

## 2019-04-03 ASSESSMENT — PAIN - FUNCTIONAL ASSESSMENT: PAIN_FUNCTIONAL_ASSESSMENT: 0-10

## 2019-04-03 ASSESSMENT — PAIN DESCRIPTION - ORIENTATION: ORIENTATION: LEFT

## 2019-04-03 ASSESSMENT — PAIN DESCRIPTION - LOCATION: LOCATION: KNEE

## 2019-04-03 ASSESSMENT — PAIN DESCRIPTION - PAIN TYPE: TYPE: SURGICAL PAIN

## 2019-04-03 NOTE — OP NOTE
OPERATIVE REPORT    Date of Procedure: 4/3/2019     Pre-operative Diagnosis: Left knee lateral meniscus tear    Post-operative Diagnosis: Left knee lateral meniscus tear    Procedure: Left knee diagnostic arthroscopy    Surgeon(s): Leisa Banks MD    Assistant(s): Ramy Martin DO (PGY 3)    Anesthesia: Gen. Fluids: See anesthesia record    Urine output: See anesthesia record    Estimated blood loss: Minimal    Findings: Mild fraying of the anterior root of the lateral meniscus    Specimen: None    Tourniquet time: 19 minutes    Surgical Indications:  Rima Higgins is a 80 y.o. old female who presented with left knee pain. An MRI demonstrated the presence of a para labral cyst adjacent to the anterior horn of the lateral meniscus which had some irregularity. Having failed attempts at conservative management and following a discussion with the patient regarding both non-operative and operative treatment options, she elected and consented to proceed with left knee arthroscopy with possible partial lateral meniscectomy. she came to this decision after demonstrating an understanding of our discussion regarding details of the procedure, risks and benefits, expected outcome, and postoperative course. Operative technique: Following appropriate identification of the patient and her operative extremity, consent was reviewed with the patient and her operative extremity was signed. she was wheeled to the operating room where she finished a course of pre-operative antibiotic prophylaxis by way of 2 g of IV Ancef. The anesthesia service administered a general anesthetic and secured her airway using an LMA. All bony prominences were appropriately padded and the patient was secured to the operative table in a supine position. A well padded pneumatic tourniquet was applied to the proximal aspect of the patient's left thigh.  The patient's operative extremity was prepped and draped in a standard sterile fashion and a time out was performed during which the correct patient, operative extremity, and procedure were verified. Standard anteromedial and anterolateral arthroscopic portals were established and an arthroscopic evaluation of the patient's knee was performed with findings as follows:    1. Patellofemoral compartment: Normal chondral surfaces  2. Lateral and medial gutters: Normal  3. Medial compartment: Normal chondral surfaces with a normal medial meniscus  4. The notch: Intact ACL and PCL  5. Lateral compartment: Normal chondral surfaces. Intact lateral meniscus except for some mild fraying involving the anterior third of the meniscus just adjacent to the root. The root itself was intact and well anchored. Following appropriate arthroscopic evaluation of the patient's knee as outlined above I focused additional tension on the lateral compartment degenerative fact that she was having pain primarily along the lateral joint line. As noted above there was fraying involving the anterior third of the lateral meniscus adjacent to the root. After probing this area it was stable. I did not feel it warranted any debridement. Consequently the patient's knee was evacuated of  fluid. The patient's portal incisions were then infiltrated with a total of 50 cc of a half-and-half mixture of 0.5% Marcaine with epinephrine and saline. The portal incisions were closed using 3-0 Prolene sutures. Sterile dressings were applied using Xeroform, 4 x 4 gauze, and Tegaderm. An Ace bandage was then applied. The patient was awoken, transferred to her bed, and wheeled to recovery in stable condition.      Complications: None    Post-operative Condition: Stable

## 2019-04-03 NOTE — BRIEF OP NOTE
Brief Postoperative Note  ______________________________________________________________    Patient: Pooja Guidry  YOB: 1950  MRN: [id number]  Date of Procedure: 4/3/2019    Pre-Op Diagnosis: MENISCUS TEAR LEFT KNEE    Post-Op Diagnosis: Same       Procedure(s):  KNEE ARTHROSCOPY LEFT, DIAGNOSTIC    Anesthesia: General    Surgeon(s):  Brooke Zarate MD    Assistant: Claudine Harley DO (PGY 3)    Estimated Blood Loss (mL): Minimal    Complications: None    Specimens:   * No specimens in log *    Implants:  * No implants in log *      Drains: * No LDAs found *    Findings: See operative report    Brooke Zarate MD  Date: 4/3/2019  Time: 1:53 PM

## 2019-04-03 NOTE — H&P
Financial resource strain: None    Food insecurity:       Worry: None       Inability: None    Transportation needs:       Medical: None       Non-medical: None   Tobacco Use    Smoking status: Never Smoker    Smokeless tobacco: Never Used   Substance and Sexual Activity    Alcohol use:  Yes       Comment: Occassional    Drug use: No    Sexual activity: Never       Birth control/protection: Post-menopausal   Lifestyle    Physical activity:       Days per week: None       Minutes per session: None    Stress: None   Relationships    Social connections:       Talks on phone: None       Gets together: None       Attends [employer] service: None       Active member of club or organization: None       Attends meetings of clubs or organizations: None       Relationship status: None    Intimate partner violence:       Fear of current or ex partner: None       Emotionally abused: None       Physically abused: None       Forced sexual activity: None   Other Topics Concern    None   Social History Narrative    None               REVIEW OF SYSTEMS             Allergies   Allergen Reactions    Lisinopril Other (See Comments)    Losartan         Fatigue    Procardia [Nifedipine] Other (See Comments)                Current Outpatient Medications on File Prior to Encounter   Medication Sig Dispense Refill    hydrALAZINE (APRESOLINE) 50 MG tablet Take 1 tablet by mouth 4 times daily 360 tablet 1    ondansetron (ZOFRAN) 4 MG tablet Take 1 tablet by mouth daily as needed for Nausea or Vomiting 20 tablet 0    carvedilol (COREG) 3.125 MG tablet Take 1 tablet by mouth 2 times daily 180 tablet 1    pramipexole (MIRAPEX) 0.125 MG tablet Take 1 tablet by mouth nightly 90 tablet 1    Multiple Vitamins-Minerals (THERAPEUTIC MULTIVITAMIN-MINERALS) tablet Take 1 tablet by mouth daily        acetaminophen (TYLENOL) 325 MG tablet Take 650 mg by mouth every 6 hours as needed for Pain.        zoster recombinant adjuvanted palpation. No localized tenderness. No guarding or rigidity. No palpable hepatosplenomegaly. LYMPHATICS:  No palpable cervical lymphadenopathy.      LOCOMOTOR, BACK AND SPINE:  No tenderness or deformities. EXTREMITIES:  Symmetrical, no pretibial edema. Tenderness on palpation of the Lt Knee joint space worse on the lateral aspect. Paulas sign negative. No discoloration or ulcerations.     NEUROLOGIC:  The patient is conscious, alert, oriented,Cranial nerve II-XII intact, taste was not examined. No apparent focal sensory or motor deficits.                                                                         PROVISIONAL DIAGNOSES / SURGERY:       Lt Knee Arthroscopy  Lt Knee Meniscus Tear          Patient Active Problem List     Diagnosis Date Noted    Weight loss 02/15/2019    Herpes zoster without complication 56/81/0395    Complex tear of lateral meniscus of left knee 01/31/2019    Trochanteric bursitis of left hip 11/27/2017    Chronic midline low back pain without sciatica 10/10/2016    Gastroesophageal reflux disease without esophagitis 10/16/2015    Essential hypertension      RLS (restless legs syndrome)      History of cervical cancer      Family history of breast cancer in first degree relative              ART MOYER PA-C on 3/20/2019 at 1:06 PM                   Cosigned by: Arlette Carranza MD at 3/22/2019  7:31 PM   Revision History                  Routing History

## 2019-04-03 NOTE — ANESTHESIA POSTPROCEDURE EVALUATION
Department of Anesthesiology  Postprocedure Note    Patient: Rima Higgins  MRN: [id number]  YOB: 1950  Date of evaluation: 4/3/2019  Time:  3:24 PM     Procedure Summary     Date:  04/03/19 Room / Location:  [address] / [address]    Anesthesia Start:  1257 Anesthesia Stop:  1401    Procedure:  KNEE ARTHROSCOPY LEFT, DIAGNOSTIC (Left Knee) Diagnosis:  (MENISCUS TEAR LEFT KNEE)    Surgeon:  Kit Esposito MD Responsible Provider:  Dennis Tatum MD    Anesthesia Type:  general ASA Status:  3          Anesthesia Type: general    Babar Phase I: Babar Score: 7    Babar Phase II:      Last vitals: Reviewed and per EMR flowsheets.        Anesthesia Post Evaluation    Comments: POST- ANESTHESIA EVALUATION       Pt Name: Rima Higgins  MRN: [id number]  YOB: 1950  Date of evaluation: 4/3/2019  Time:  3:24 PM      BP (!) 146/60   Pulse 77   Temp 97.3 °F (36.3 °C)   Resp 12   Ht 5' 4\" (1.626 m)   Wt 116 lb (52.6 kg)   SpO2 96%   BMI 19.91 kg/m²      Consciousness Level  Awake  Cardiopulmonary Status  Stable  Pain Adequately Treated YES  Nausea / Vomiting  NO  Adequate Hydration  YES  Anesthesia Related Complications NONE      Electronically signed by Irma Lowery MD on 4/3/2019 at 3:24 PM

## 2019-04-03 NOTE — ANESTHESIA PRE PROCEDURE
Department of Anesthesiology  Preprocedure Note       Name:  Katia Rivera   Age:  80 y.o.  :  1950                                          MRN:  [id number]         Date:  4/3/2019      Surgeon: Jessica Mitchell):  Hussain Avelar MD    Procedure: KNEE ARTHROSCOPY LEFT PARTIAL LATERAL MENISCECTOMY POSSIBLE ANTERIOR HORN REPAIR (Left Knee)    Medications prior to admission:   Prior to Admission medications    Medication Sig Start Date End Date Taking? Authorizing Provider   hydrALAZINE (APRESOLINE) 50 MG tablet Take 1 tablet by mouth 4 times daily 3/15/19  Yes Jessica Alonso MD   carvedilol (COREG) 3.125 MG tablet Take 1 tablet by mouth 2 times daily 10/2/18  Yes Jessica Alonso MD   pramipexole (MIRAPEX) 0.125 MG tablet Take 1 tablet by mouth nightly 18  Yes Jessica Alonso MD   Multiple Vitamins-Minerals (THERAPEUTIC MULTIVITAMIN-MINERALS) tablet Take 1 tablet by mouth daily   Yes Historical Provider, MD   acetaminophen (TYLENOL) 325 MG tablet Take 650 mg by mouth every 6 hours as needed for Pain.    Yes Historical Provider, MD   ondansetron (ZOFRAN) 4 MG tablet Take 1 tablet by mouth daily as needed for Nausea or Vomiting 3/22/19   Hussain Avelar MD   ondansetron (ZOFRAN) 4 MG tablet Take 1 tablet by mouth daily as needed for Nausea or Vomiting 19   Hussain Avelar MD       Current medications:    Current Facility-Administered Medications   Medication Dose Route Frequency Provider Last Rate Last Dose    lactated ringers infusion   Intravenous Continuous Laura Esquivel  mL/hr at 19 1054      lidocaine PF 1 % injection 1 mL  1 mL Intradermal Once PRN Laura Esquivel MD        sodium chloride flush 0.9 % injection 10 mL  10 mL Intravenous 2 times per day Laura Esquivel MD        sodium chloride flush 0.9 % injection 10 mL  10 mL Intravenous PRN Laura Esquivel MD        ceFAZolin (ANCEF) 2 g in dextrose 5 % 50 mL IVPB  2 g Intravenous On Call to 231 MD Terrence Escalante sodium chloride flush 0.9 % injection 10 mL  10 mL Intravenous 2 times per day Oz Nix MD        sodium chloride flush 0.9 % injection 10 mL  10 mL Intravenous PRN Oz Nix MD        dexamethasone (DECADRON) injection 10 mg  10 mg Intravenous Once Oz Nix MD           Allergies:     Allergies   Allergen Reactions    Lisinopril Other (See Comments)    Losartan      Fatigue    Procardia [Nifedipine] Other (See Comments)       Problem List:    Patient Active Problem List   Diagnosis Code    Essential hypertension I10    RLS (restless legs syndrome) G25.81    History of cervical cancer Z85.41    Family history of breast cancer in first degree relative Z80.3    Gastroesophageal reflux disease without esophagitis K21.9    Chronic midline low back pain without sciatica M54.5, G89.29    Trochanteric bursitis of left hip M70.62    Complex tear of lateral meniscus of left knee S83.272A    Herpes zoster without complication V42.9    Weight loss R63.4       Past Medical History:        Diagnosis Date    Acid reflux     Arthritis     Cancer ([facility] [address]     cervical     Family history of breast cancer in first degree relative     sister    Gall stones     hx of    Hard of hearing     bilateral hearing aids    History of cervical cancer     Hypertension     Lateral meniscus tear     left knee    Wears dentures     Wears glasses        Past Surgical History:        Procedure Laterality Date    BREAST BIOPSY  1980    LEFT(BENIGN)    CHOLECYSTECTOMY      COLONOSCOPY  12/09    Negative    CYST REMOVAL  1980    Left Wrist    FOOT SURGERY Bilateral     FOR CALLOSIS    HERNIA REPAIR      UMBILICAL     INNER EAR SURGERY      Right     KNEE ARTHROSCOPY  2000, 2005    LEFT    OTHER SURGICAL HISTORY Right 9-4-14    BAHA- bone anchored hearing aid    TONSILLECTOMY AND ADENOIDECTOMY  9123    UMBILICAL HERNIA REPAIR      VARICOSE VEIN SURGERY Bilateral        Social History:    Social History Tobacco Use    Smoking status: Never Smoker    Smokeless tobacco: Never Used   Substance Use Topics    Alcohol use: Yes     Comment: Occassional                                Counseling given: Not Answered      Vital Signs (Current):   Vitals:    04/03/19 1032   BP: (!) 168/84   Pulse: 75   Resp: 14   Temp: 98.2 °F (36.8 °C)   TempSrc: Oral   SpO2: 99%   Weight: 116 lb (52.6 kg)   Height: 5' 4\" (1.626 m)                                              BP Readings from Last 3 Encounters:   04/03/19 (!) 168/84   03/20/19 (!) 140/55   03/05/19 131/69       NPO Status: Time of last liquid consumption: 2350                        Time of last solid consumption: 2350                        Date of last liquid consumption: 04/02/19                        Date of last solid food consumption: 04/02/19    BMI:   Wt Readings from Last 3 Encounters:   04/03/19 116 lb (52.6 kg)   03/20/19 116 lb (52.6 kg)   03/22/19 116 lb (52.6 kg)     Body mass index is 19.91 kg/m². CBC:   Lab Results   Component Value Date    WBC 6.0 03/20/2019    RBC 3.88 03/20/2019    RBC 3.95 12/19/2011    HGB 11.6 03/20/2019    HCT 35.2 03/20/2019    MCV 90.7 03/20/2019    RDW 14.0 03/20/2019     03/20/2019     12/19/2011       CMP:   Lab Results   Component Value Date     03/20/2019    K 5.0 03/20/2019     03/20/2019    CO2 28 03/20/2019    BUN 14 03/20/2019    CREATININE 0.81 03/20/2019    GFRAA >60 03/20/2019    LABGLOM >60 03/20/2019    GLUCOSE 106 03/20/2019    GLUCOSE 82 12/19/2011    PROT 7.8 03/05/2019    CALCIUM 9.7 03/20/2019    BILITOT <0.15 03/05/2019    ALKPHOS 67 03/05/2019    AST 22 03/05/2019    ALT 17 03/05/2019       POC Tests: No results for input(s): POCGLU, POCNA, POCK, POCCL, POCBUN, POCHEMO, POCHCT in the last 72 hours.     Coags:   Lab Results   Component Value Date    APTT 25.7 08/21/2014       HCG (If Applicable): No results found for: PREGTESTUR, PREGSERUM, HCG, HCGQUANT     ABGs: No results

## 2019-04-06 NOTE — PROGRESS NOTES
HPI: Ms. Christiano Haas is a 67-year-old who presents once again for preoperative visit. Her last scheduled surgery for left knee arthroscopic assessment with possible lateral meniscus repair versus partial meniscectomy was canceled due to the fact that she developed shingles leading up to surgery. At this point this has resolved and she continues to have pain localized to the lateral aspect of the knee. Ht 5' 4\" (1.626 m)   Wt 116 lb (52.6 kg)   BMI 19.91 kg/m²   On exam she has 125° of left knee flexion with full knee extension. She is tender to palpation along the anterolateral joint line of the knee. She has no gross instability. She has negative Apley's test.  Sensation is grossly intact light touch in all dermatomes and she has 2+ pedal pulses. MRI of the left knee from 12/17/18 was again reviewed demonstrating irregularity involving the anterior root of the lateral meniscus suggestive of a tear. No obvious chondral or ligamentous injury noted. Impression and plan: Ms. Christiano Haas is a 67-year-old female with persistent left knee pain in spite of conservative management. MRI as noted above demonstrates a possible anterior root lateral meniscus tear. I had a discussion once again with the patient with regards to her treatment options and she is electing to proceed with surgical intervention by way of a left knee arthroscopic evaluation and possible partial lateral meniscectomy versus repair. We discussed details of the surgery including postoperative recovery course and expected outcome. All questions were appropriately answered. We will proceed with surgery as currently scheduled. I have spent 15 minutes face-to-face with the patient more than 50% of this time was spent counseling and coordinating care as outlined above.

## 2019-04-15 RX ORDER — CARVEDILOL 3.12 MG/1
3.12 TABLET ORAL 2 TIMES DAILY
Qty: 180 TABLET | Refills: 0 | Status: SHIPPED | OUTPATIENT
Start: 2019-04-15 | End: 2019-07-25 | Stop reason: SDUPTHER

## 2019-04-15 NOTE — TELEPHONE ENCOUNTER
Pt called for rf    Please Approve or Refuse.   Send to Pharmacy per Pt's Request:      Next Visit Date:  6/18/2019   Last Visit Date: 2/25/2019    No results found for: LABA1C          ( goal A1C is < 7)   BP Readings from Last 3 Encounters:   04/03/19 (!) 155/68   04/03/19 (!) 114/56   03/20/19 (!) 140/55          (goal 120/80)  BUN   Date Value Ref Range Status   03/20/2019 14 8 - 23 mg/dL Final     CREATININE   Date Value Ref Range Status   03/20/2019 0.81 0.50 - 0.90 mg/dL Final     Potassium   Date Value Ref Range Status   03/20/2019 5.0 3.7 - 5.3 mmol/L Final

## 2019-04-16 ENCOUNTER — OFFICE VISIT (OUTPATIENT)
Dept: ORTHOPEDIC SURGERY | Age: 69
End: 2019-04-16

## 2019-04-16 VITALS — HEIGHT: 64 IN | WEIGHT: 115.96 LBS | BODY MASS INDEX: 19.8 KG/M2

## 2019-04-16 DIAGNOSIS — Z98.890 S/P ARTHROSCOPY OF KNEE: Primary | ICD-10-CM

## 2019-04-16 PROCEDURE — 99024 POSTOP FOLLOW-UP VISIT: CPT | Performed by: ORTHOPAEDIC SURGERY

## 2019-04-16 NOTE — PROGRESS NOTES
Procedure: Left knee diagnostic arthroscopy  Date of Procedure: 4/3/2019     HPI: Ms. Elisa Andrade is a 78-year-old who is here 2 weeks status post a left knee diagnostic arthroscopy. She is doing well at this point. She reports having minimal to no pain. She denies having any fevers, chills, sweats or any constitutional symptoms. Physical Examination:  Evaluation of patient's left knee and lower extremity demonstrates appropriately healing portal incisions. There is no wound dehiscence or drainage. No warmth or erythema present but she does have mild to moderate swelling about the knee. Sensation is grossly intact light touch in all dermatomes and she has 2+ pedal pulses. Impression and plan: Ms. Elisa Andrade is a 78-year-old who is 2 weeks status post left knee diagnostic arthroscopy. I did review her intraoperative images for her. She was noted to have some mild fraying of the anterior root of the lateral meniscus. As noted disruption of the insertion. The rest of her knee looked pretty good. At this time I will have her continue on with her home exercise program.  She may continue weightbearing as tolerated. She may ice and elevate the knee to help with pain and swelling. Her sutures were taken out and Steri-Strips applied. She may get her incisions wet in the shower at this point. I'll see her back for reevaluation in 4 weeks but she was encouraged to return or call earlier with questions and/or concerns.

## 2019-05-07 ENCOUNTER — OFFICE VISIT (OUTPATIENT)
Dept: GASTROENTEROLOGY | Age: 69
End: 2019-05-07

## 2019-05-07 VITALS
HEIGHT: 64 IN | DIASTOLIC BLOOD PRESSURE: 67 MMHG | HEART RATE: 80 BPM | BODY MASS INDEX: 19.46 KG/M2 | SYSTOLIC BLOOD PRESSURE: 124 MMHG | WEIGHT: 114 LBS

## 2019-05-07 DIAGNOSIS — K76.9 LIVER DISEASE, CHRONIC: ICD-10-CM

## 2019-05-07 DIAGNOSIS — Z12.11 SCREEN FOR COLON CANCER: Primary | ICD-10-CM

## 2019-05-07 DIAGNOSIS — R63.4 WEIGHT LOSS: ICD-10-CM

## 2019-05-07 PROCEDURE — 4040F PNEUMOC VAC/ADMIN/RCVD: CPT | Performed by: INTERNAL MEDICINE

## 2019-05-07 PROCEDURE — G8420 CALC BMI NORM PARAMETERS: HCPCS | Performed by: INTERNAL MEDICINE

## 2019-05-07 PROCEDURE — 1123F ACP DISCUSS/DSCN MKR DOCD: CPT | Performed by: INTERNAL MEDICINE

## 2019-05-07 PROCEDURE — 3017F COLORECTAL CA SCREEN DOC REV: CPT | Performed by: INTERNAL MEDICINE

## 2019-05-07 PROCEDURE — 1090F PRES/ABSN URINE INCON ASSESS: CPT | Performed by: INTERNAL MEDICINE

## 2019-05-07 PROCEDURE — G8399 PT W/DXA RESULTS DOCUMENT: HCPCS | Performed by: INTERNAL MEDICINE

## 2019-05-07 PROCEDURE — 1036F TOBACCO NON-USER: CPT | Performed by: INTERNAL MEDICINE

## 2019-05-07 PROCEDURE — G8427 DOCREV CUR MEDS BY ELIG CLIN: HCPCS | Performed by: INTERNAL MEDICINE

## 2019-05-07 RX ORDER — POLYETHYLENE GLYCOL 3350 17 G/17G
POWDER, FOR SOLUTION ORAL
Qty: 255 G | Refills: 0 | Status: ON HOLD | OUTPATIENT
Start: 2019-05-07 | End: 2019-06-03 | Stop reason: HOSPADM

## 2019-05-07 ASSESSMENT — ENCOUNTER SYMPTOMS
ABDOMINAL PAIN: 0
DIARRHEA: 0
BACK PAIN: 0
WHEEZING: 0
VOMITING: 0
CHEST TIGHTNESS: 0
CONSTIPATION: 0
SINUS PAIN: 0
SINUS PRESSURE: 0
ANAL BLEEDING: 0
SHORTNESS OF BREATH: 0
BLOOD IN STOOL: 0
ABDOMINAL DISTENTION: 0
RECTAL PAIN: 0
TROUBLE SWALLOWING: 0
EYE REDNESS: 0
EYE PAIN: 0
NAUSEA: 0

## 2019-05-07 NOTE — PROGRESS NOTES
Subjective:      Patient ID: Shanell Blackburn is a 77 y.o. female. HPI    Dr. Romario Miranda MD our mutual patient Shanell Blackburn was seen  for   1. Screen for colon cancer    2. Liver disease, chronic    3. Weight loss     . Patient had recent knee surgery done, and she recovered well. She does not have GI symptoms. Anti-DNA levels negative. I do not think that she has SLE. She does not have symptoms suggestive of SLE. Her weight has been stable. Appetite is good. Liver battery revealed normal values. Do not think that she has hepatitis C related liver disease. Are swallowing is normal.  No heartburns. No dyspepsia. Denies dysphagia. Denies abdominal pain, bloating etc.  Bowel movements satisfactory. Previous records reviewed and she had a colonoscopy done in 2019. May need screening colonoscopy this year. Past Medical, Family, and Social History reviewed and does contribute to the patient presenting condition. patient\"s PMH/PSH,SH,PSYCH hx, MEDs, ALLERGIES, and ROS was all reviewed and updated ion the appropriate sections        Review of Systems   Constitutional: Negative for appetite change, fatigue and unexpected weight change. HENT: Negative for sinus pressure, sinus pain and trouble swallowing. Eyes: Positive for visual disturbance (glasses). Negative for pain and redness. Respiratory: Negative for chest tightness, shortness of breath and wheezing. Cardiovascular: Negative for chest pain, palpitations and leg swelling. Gastrointestinal: Negative for abdominal distention, abdominal pain, anal bleeding, blood in stool, constipation, diarrhea, nausea, rectal pain and vomiting. Endocrine: Negative for cold intolerance and heat intolerance. Genitourinary: Negative for difficulty urinating, frequency and urgency. Musculoskeletal: Positive for arthralgias. Negative for back pain and neck pain. Skin: Negative.     Allergic/Immunologic: Negative for environmental allergies and food allergies. Neurological: Negative for dizziness, weakness and headaches. Hematological: Bruises/bleeds easily. Psychiatric/Behavioral: Positive for sleep disturbance. Negative for agitation. The patient is not nervous/anxious. Objective:   Physical Exam   Constitutional: She is oriented to person, place, and time. She appears well-developed and well-nourished. HENT:   Head: Normocephalic and atraumatic. No oral lesions   Eyes: Pupils are equal, round, and reactive to light. Conjunctivae are normal. No scleral icterus. Neck: Normal range of motion. Neck supple. No hepatojugular reflux and no JVD present. No tracheal deviation present. No thyromegaly present. Cardiovascular: Normal rate, regular rhythm, normal heart sounds and intact distal pulses. Pulmonary/Chest: Effort normal and breath sounds normal. No respiratory distress. She has no wheezes. She has no rales. Abdominal: Soft. Bowel sounds are normal. She exhibits no distension, no ascites and no mass. There is no hepatomegaly. There is no tenderness. There is no rebound. No hernia. Musculoskeletal: She exhibits no edema or tenderness. No joint swelling   Lymphadenopathy:     She has no cervical adenopathy. Neurological: She is alert and oriented to person, place, and time. No cranial nerve deficit. Skin: Skin is warm. No bruising, no ecchymosis and no rash noted. No erythema. Psychiatric: Thought content normal.   Nursing note and vitals reviewed. Assessment:       Diagnosis Orders   1. Screen for colon cancer  COLONOSCOPY W/ OR W/O BIOPSY   2. Liver disease, chronic     3. Weight loss             Plan: At present patient is stable. No symptoms of lupus. Her GI status appears to be stable. She may need colonoscopy and this is advised and arranged. Discussed with the patient regarding colonoscopy procedure, preparation, risks and benefits.   She understood and verbalized

## 2019-05-08 ENCOUNTER — TELEPHONE (OUTPATIENT)
Dept: GASTROENTEROLOGY | Age: 69
End: 2019-05-08

## 2019-05-08 NOTE — TELEPHONE ENCOUNTER
Received a call from the pharmacy stating that patient's prep for procedure was not covered by her insurance.  Patient was notified, and verbalized understanding that she would go to pharmacy and get OTC as advised by pharmacist.

## 2019-05-13 NOTE — TELEPHONE ENCOUNTER
Patient called stating she left a message last Thursday to reschedule her 05/24/19 procedure. No telephone encounter seen regarding this. Rescheduled patient for 05/31/19 at 11:30AM at [facility]. New bowel prep instructions mailed to patient home. Verified with patient she will need to get her prep OTC.

## 2019-05-16 ENCOUNTER — OFFICE VISIT (OUTPATIENT)
Dept: ORTHOPEDIC SURGERY | Age: 69
End: 2019-05-16

## 2019-05-16 VITALS — WEIGHT: 114 LBS | HEIGHT: 64 IN | BODY MASS INDEX: 19.46 KG/M2

## 2019-05-16 DIAGNOSIS — Z98.890 S/P ARTHROSCOPY OF KNEE: Primary | ICD-10-CM

## 2019-05-16 PROCEDURE — 99024 POSTOP FOLLOW-UP VISIT: CPT | Performed by: ORTHOPAEDIC SURGERY

## 2019-05-16 NOTE — PROGRESS NOTES
Procedure: Left knee diagnostic arthroscopy  Date of Procedure: 4/3/2019     HPI: Ms. Randolph Reyes is a 60-year-old who is here 6 weeks status post a left knee diagnostic arthroscopy. She indicates that over the past couple of days she's noticed some \"sharp little pains\" over the anterior aspect of the knee with prolonged standing. Otherwise she's been doing well. Physical Examination:  Evaluation of patient's left knee and lower extremity demonstrates appropriately healed portal incisions. No warmth or erythema or swelling. Sensation is grossly intact light touch in all dermatomes and she has 2+ pedal pulses. She is mildly tender to palpation at the portal sites    Impression and plan: Ms. Randolph Reyes is a 60-year-old who is 6 weeks status post left knee diagnostic arthroscopy. At this point she is doing relatively well. Of note I have her continue on with her home exercise program.  She can continue to gradually increase activities as tolerated using an over-the-counter feds as needed. Abdomen have her follow up in my clinic in 6 weeks for reevaluation but she was encouraged to return or call earlier with questions and/or concerns.

## 2019-05-24 ENCOUNTER — TELEPHONE (OUTPATIENT)
Dept: GASTROENTEROLOGY | Age: 69
End: 2019-05-24

## 2019-05-24 NOTE — TELEPHONE ENCOUNTER
Spoke with pt to confirm procedure 2 [facility] Fri 5/31/19@ 11:00am w arrival time 8:00am, pt has a  and has prep all set to start on Thursday.

## 2019-05-31 ENCOUNTER — ANESTHESIA EVENT (OUTPATIENT)
Dept: OPERATING ROOM | Age: 69
End: 2019-05-31
Payer: COMMERCIAL

## 2019-05-31 ENCOUNTER — ANESTHESIA (OUTPATIENT)
Dept: OPERATING ROOM | Age: 69
End: 2019-05-31
Payer: COMMERCIAL

## 2019-05-31 ENCOUNTER — HOSPITAL ENCOUNTER (OUTPATIENT)
Age: 69
Setting detail: OUTPATIENT SURGERY
Discharge: HOME | End: 2019-05-31
Attending: INTERNAL MEDICINE | Admitting: INTERNAL MEDICINE
Payer: COMMERCIAL

## 2019-05-31 VITALS
SYSTOLIC BLOOD PRESSURE: 162 MMHG | OXYGEN SATURATION: 100 % | HEIGHT: 64 IN | HEART RATE: 61 BPM | BODY MASS INDEX: 19.46 KG/M2 | TEMPERATURE: 98.2 F | DIASTOLIC BLOOD PRESSURE: 63 MMHG | RESPIRATION RATE: 25 BRPM | WEIGHT: 114 LBS

## 2019-05-31 VITALS — OXYGEN SATURATION: 99 % | DIASTOLIC BLOOD PRESSURE: 59 MMHG | SYSTOLIC BLOOD PRESSURE: 109 MMHG

## 2019-05-31 PROCEDURE — 3609010400 HC COLONOSCOPY POLYPECTOMY HOT BIOPSY: Performed by: INTERNAL MEDICINE

## 2019-05-31 PROCEDURE — 88305 TISSUE EXAM BY PATHOLOGIST: CPT

## 2019-05-31 PROCEDURE — 3700000001 HC ADD 15 MINUTES (ANESTHESIA): Performed by: INTERNAL MEDICINE

## 2019-05-31 PROCEDURE — 3700000000 HC ANESTHESIA ATTENDED CARE: Performed by: INTERNAL MEDICINE

## 2019-05-31 PROCEDURE — 2580000003 HC RX 258: Performed by: ANESTHESIOLOGY

## 2019-05-31 PROCEDURE — 7100000001 HC PACU RECOVERY - ADDTL 15 MIN: Performed by: INTERNAL MEDICINE

## 2019-05-31 PROCEDURE — 7100000000 HC PACU RECOVERY - FIRST 15 MIN: Performed by: INTERNAL MEDICINE

## 2019-05-31 PROCEDURE — 6360000002 HC RX W HCPCS: Performed by: NURSE ANESTHETIST, CERTIFIED REGISTERED

## 2019-05-31 PROCEDURE — 2709999900 HC NON-CHARGEABLE SUPPLY: Performed by: INTERNAL MEDICINE

## 2019-05-31 RX ORDER — SODIUM CHLORIDE, SODIUM LACTATE, POTASSIUM CHLORIDE, CALCIUM CHLORIDE 600; 310; 30; 20 MG/100ML; MG/100ML; MG/100ML; MG/100ML
INJECTION, SOLUTION INTRAVENOUS CONTINUOUS
Status: DISCONTINUED | OUTPATIENT
Start: 2019-05-31 | End: 2019-05-31 | Stop reason: HOSPADM

## 2019-05-31 RX ORDER — PROPOFOL 10 MG/ML
INJECTION, EMULSION INTRAVENOUS CONTINUOUS PRN
Status: DISCONTINUED | OUTPATIENT
Start: 2019-05-31 | End: 2019-05-31 | Stop reason: SDUPTHER

## 2019-05-31 RX ORDER — PROPOFOL 10 MG/ML
INJECTION, EMULSION INTRAVENOUS PRN
Status: DISCONTINUED | OUTPATIENT
Start: 2019-05-31 | End: 2019-05-31 | Stop reason: SDUPTHER

## 2019-05-31 RX ADMIN — PROPOFOL 20 MG: 10 INJECTION, EMULSION INTRAVENOUS at 10:20

## 2019-05-31 RX ADMIN — PROPOFOL 40 MG: 10 INJECTION, EMULSION INTRAVENOUS at 10:34

## 2019-05-31 RX ADMIN — PROPOFOL 40 MG: 10 INJECTION, EMULSION INTRAVENOUS at 10:17

## 2019-05-31 RX ADMIN — SODIUM CHLORIDE, POTASSIUM CHLORIDE, SODIUM LACTATE AND CALCIUM CHLORIDE: 600; 310; 30; 20 INJECTION, SOLUTION INTRAVENOUS at 10:05

## 2019-05-31 RX ADMIN — PROPOFOL 20 MG: 10 INJECTION, EMULSION INTRAVENOUS at 10:21

## 2019-05-31 RX ADMIN — PROPOFOL 125 MCG/KG/MIN: 10 INJECTION, EMULSION INTRAVENOUS at 10:17

## 2019-05-31 ASSESSMENT — PULMONARY FUNCTION TESTS
PIF_VALUE: 1

## 2019-05-31 ASSESSMENT — PAIN SCALES - GENERAL: PAINLEVEL_OUTOF10: 0

## 2019-05-31 ASSESSMENT — PAIN - FUNCTIONAL ASSESSMENT: PAIN_FUNCTIONAL_ASSESSMENT: 0-10

## 2019-05-31 NOTE — ANESTHESIA PRE PROCEDURE
Trochanteric bursitis of left hip M70.62    Complex tear of lateral meniscus of left knee S83.272A    Herpes zoster without complication E16.6    Weight loss R63.4       Past Medical History:        Diagnosis Date    Acid reflux     Arthritis     Cancer ([address]     cervical     Family history of breast cancer in first degree relative     sister    Gall stones     hx of    Hard of hearing     bilateral hearing aids    History of cervical cancer     Hypertension     Lateral meniscus tear     left knee    Wears dentures     Wears glasses        Past Surgical History:        Procedure Laterality Date    BREAST BIOPSY  1980    LEFT(BENIGN)    CHOLECYSTECTOMY      COLONOSCOPY  12/09    Negative    CYST REMOVAL  1980    Left Wrist    FOOT SURGERY Bilateral     FOR CALLOSIS    HERNIA REPAIR      UMBILICAL     INNER EAR SURGERY      Right     KNEE ARTHROSCOPY  2000, 2005    LEFT    KNEE ARTHROSCOPY Left 4/3/2019    KNEE ARTHROSCOPY DIAGNOSTIC performed by Diane Fernando MD at [place] [address] Right 9-4-14    BAHA- bone anchored hearing aid    TONSILLECTOMY AND ADENOIDECTOMY  4692    UMBILICAL HERNIA REPAIR      VARICOSE VEIN SURGERY Bilateral        Social History:    Social History     Tobacco Use    Smoking status: Never Smoker    Smokeless tobacco: Never Used   Substance Use Topics    Alcohol use: Yes     Comment: Occassional                                Counseling given: Not Answered      Vital Signs (Current):   Vitals:    05/31/19 0943   BP: (!) 144/74   Pulse: 75   Resp: 18   Temp: 98.1 °F (36.7 °C)   TempSrc: Oral   SpO2: 99%   Weight: 114 lb (51.7 kg)   Height: 5' 4\" (1.626 m)                                              BP Readings from Last 3 Encounters:   05/31/19 (!) 144/74   05/07/19 124/67   04/03/19 (!) 155/68       NPO Status:                                                                                 BMI:   Wt Readings from Last 3 Encounters:   05/31/19 114 lb (51.7 kg)   05/16/19 114 lb (51.7 kg)   05/07/19 114 lb (51.7 kg)     Body mass index is 19.57 kg/m². CBC:   Lab Results   Component Value Date    WBC 6.0 03/20/2019    RBC 3.88 03/20/2019    RBC 3.95 12/19/2011    HGB 11.6 03/20/2019    HCT 35.2 03/20/2019    MCV 90.7 03/20/2019    RDW 14.0 03/20/2019     03/20/2019     12/19/2011       CMP:   Lab Results   Component Value Date     03/20/2019    K 5.0 03/20/2019     03/20/2019    CO2 28 03/20/2019    BUN 14 03/20/2019    CREATININE 0.81 03/20/2019    GFRAA >60 03/20/2019    LABGLOM >60 03/20/2019    GLUCOSE 106 03/20/2019    GLUCOSE 82 12/19/2011    PROT 7.8 03/05/2019    CALCIUM 9.7 03/20/2019    BILITOT <0.15 03/05/2019    ALKPHOS 67 03/05/2019    AST 22 03/05/2019    ALT 17 03/05/2019       POC Tests: No results for input(s): POCGLU, POCNA, POCK, POCCL, POCBUN, POCHEMO, POCHCT in the last 72 hours. Coags:   Lab Results   Component Value Date    APTT 25.7 08/21/2014       HCG (If Applicable): No results found for: PREGTESTUR, PREGSERUM, HCG, HCGQUANT     ABGs: No results found for: PHART, PO2ART, LXY2FEE, GST4KAG, BEART, E6CATANT     Type & Screen (If Applicable):  No results found for: LABABO, [address]    Anesthesia Evaluation  Patient summary reviewed and Nursing notes reviewed no history of anesthetic complications:   Airway: Mallampati: II  TM distance: >3 FB   Neck ROM: full  Mouth opening: > = 3 FB Dental:    (+) upper dentures and partials  Comment: Upper Denture and lower partials     Pulmonary:normal exam  breath sounds clear to auscultation                             Cardiovascular:    (+) hypertension:,         Rhythm: regular  Rate: normal                    Neuro/Psych:                ROS comment: Hearing Loss - Bilateral  hearing aids    Low Back Pain GI/Hepatic/Renal:   (+) GERD:,           Endo/Other:    (+) : arthritis:., malignancy/cancer (Cervical Cancer).                  Abdominal:           Vascular:

## 2019-05-31 NOTE — ANESTHESIA POSTPROCEDURE EVALUATION
Department of Anesthesiology  Postprocedure Note    Patient: Sade Avilez  MRN: [id number]  YOB: 1950  Date of evaluation: 5/31/2019  Time:  12:41 PM     Procedure Summary     Date:  05/31/19 Room / Location:  [address] [place] [address] 01 / [address]    Anesthesia Start:  0446 Anesthesia Stop:  6215    Procedure:  COLONOSCOPY POLYPECTOMY HOT SNARE, COLD POLYPECTOMY (N/A ) Diagnosis:  (SCREENING   (PAT PER ANES ON ADMIT))    Surgeon:  Shira Garay MD Responsible Provider:  Claire Marsh MD    Anesthesia Type:  MAC ASA Status:  3          Anesthesia Type: MAC    Babar Phase I: Babar Score: 10    Babar Phase II:      Last vitals: Reviewed and per EMR flowsheets.        Anesthesia Post Evaluation    Comments: POST- ANESTHESIA EVALUATION       Pt Name: Sade Avilez  MRN: [id number]  YOB: 1950  Date of evaluation: 5/31/2019  Time:  12:41 PM      BP (!) 162/63   Pulse 61   Temp 98.2 °F (36.8 °C) (Infrared)   Resp 25   Ht 5' 4\" (1.626 m)   Wt 114 lb (51.7 kg)   SpO2 100%   BMI 19.57 kg/m²      Consciousness Level  Awake  Cardiopulmonary Status  Stable  Pain Adequately Treated YES  Nausea / Vomiting  NO  Adequate Hydration  YES  Anesthesia Related Complications NONE      Electronically signed by Claire Marsh MD on 5/31/2019 at 12:41 PM

## 2019-05-31 NOTE — OP NOTE
unusual events. During the procedure, the patient's blood pressure, pulse and oxygen saturation remained stable and documented. No unusual events occurred during the procedure. Patient was transferred to recovery room and will be discharged when criteria is met. Recommendations/Plan:   1. F/U Biopsies  2. F/U In Office as instructed  3. Discussed with the family  4. High fiber diet   5. Precautions to avoid constipation     Next colonoscopy: 5 years.   If Colonoscopy is less than 10 years the recommended reason is due:polyps    Electronically signed by Akil Pollack MD  on 5/31/2019 at 10:43 AM

## 2019-05-31 NOTE — H&P
HISTORY and [address] Gonzalez [id number]       NAME:  Jessenia Wall  MRN: [id number]   YOB: 1950   Date: 5/31/2019   Age: 77 y.o. Gender: female       COMPLAINT AND PRESENT HISTORY:     Jessenia Wall is 77 y.o.,   female, having a screening Colonoscopy. Prior colonoscopy was done in 2009. No reported polyps. Pt has a hx of weight loss of 35 lbs and has been ongoing. Pt   Patient denies any other GI symptoms. No nausea / vomiting, No diarrhea or constipation. No abdominal pains or cramping, No heartburn. Pt has hx of GERD. no changes in the color of the stools. No fever or chills.     PAST MEDICAL HISTORY     Past Medical History:   Diagnosis Date    Acid reflux     Arthritis     Cancer ([facility] [address]     cervical     Family history of breast cancer in first degree relative     sister    Gall stones     hx of    Hard of hearing     bilateral hearing aids    History of cervical cancer     Hypertension     Lateral meniscus tear     left knee    Wears dentures     Wears glasses        SURGICAL HISTORY       Past Surgical History:   Procedure Laterality Date    BREAST BIOPSY  1980    LEFT(BENIGN)    CHOLECYSTECTOMY      COLONOSCOPY  12/09    Negative    CYST REMOVAL  1980    Left Wrist    FOOT SURGERY Bilateral     FOR CALLOSIS    HERNIA REPAIR      UMBILICAL     INNER EAR SURGERY      Right     KNEE ARTHROSCOPY  2000, 2005    LEFT    KNEE ARTHROSCOPY Left 4/3/2019    KNEE ARTHROSCOPY DIAGNOSTIC performed by Arlette Carranza MD at [facility] Right 9-4-14    BAHA- bone anchored hearing aid    TONSILLECTOMY AND ADENOIDECTOMY  8773    UMBILICAL HERNIA REPAIR      VARICOSE VEIN SURGERY Bilateral        FAMILY HISTORY       Family History   Problem Relation Age of Onset    Heart Attack Father     Cancer Mother         stomach cancer    Breast Cancer Sister         one sister with breast cancer    Colon Cancer Neg Hx     Diabetes Neg Hx     Eclampsia Neg Hx     Hypertension Neg Hx     Ovarian Cancer Neg Hx      Labor Neg Hx     Spont Abortions Neg Hx     Stroke Neg Hx     Liver Cancer Neg Hx        SOCIAL HISTORY       Social History     Socioeconomic History    Marital status:      Spouse name: None    Number of children: None    Years of education: None    Highest education level: None   Occupational History    None   Social Needs    Financial resource strain: None    Food insecurity:     Worry: None     Inability: None    Transportation needs:     Medical: None     Non-medical: None   Tobacco Use    Smoking status: Never Smoker    Smokeless tobacco: Never Used   Substance and Sexual Activity    Alcohol use: Yes     Comment: Occassional    Drug use: No    Sexual activity: Never     Birth control/protection: Post-menopausal   Lifestyle    Physical activity:     Days per week: None     Minutes per session: None    Stress: None   Relationships    Social connections:     Talks on phone: None     Gets together: None     Attends Sikh service: None     Active member of club or organization: None     Attends meetings of clubs or organizations: None     Relationship status: None    Intimate partner violence:     Fear of current or ex partner: None     Emotionally abused: None     Physically abused: None     Forced sexual activity: None   Other Topics Concern    None   Social History Narrative    None           REVIEW OF SYSTEMS      Allergies   Allergen Reactions    Lisinopril Other (See Comments)    Losartan      Fatigue    Procardia [Nifedipine] Other (See Comments)       No current facility-administered medications on file prior to encounter.       Current Outpatient Medications on File Prior to Encounter   Medication Sig Dispense Refill    carvedilol (COREG) 3.125 MG tablet Take 1 tablet by mouth 2 times daily 180 tablet 0    hydrALAZINE (APRESOLINE) 50 MG tablet Take 1 tablet by mouth 4 times daily 360 tablet 1    pramipexole (MIRAPEX) 0.125 MG tablet Take 1 tablet by mouth nightly 90 tablet 1    polyethylene glycol (MIRALAX) powder Use as directed by following your bowel prep instructions given by physician office 255 g 0    bisacodyl (BISACODYL) 5 MG EC tablet TAKE 4 TABS THE DAY BEFORE COLONOSCOPY AS DIRECTED ON BOWEL PREP INSTRUCTIONS GIVEN BY PHYSICIAN OFFICE 4 tablet 0    Multiple Vitamins-Minerals (THERAPEUTIC MULTIVITAMIN-MINERALS) tablet Take 1 tablet by mouth daily         Negative except for what is mentioned in the HPI. GENERAL PHYSICAL EXAM     Vitals: BP (!) 144/74   Pulse 75   Temp 98.1 °F (36.7 °C) (Oral)   Resp 18   Ht 5' 4\" (1.626 m)   Wt 114 lb (51.7 kg)   SpO2 99%   BMI 19.57 kg/m²  Body mass index is 19.57 kg/m². GENERAL APPEARANCE:   Sonia Gutierrez is 77 y.o.,  female, appears underweight, nourished, conscious, alert. Does not appear to be distress or pain at this time. SKIN:  Warm, dry, no cyanosis or jaundice. HEAD:  Normocephalic, atraumatic, no swelling or tenderness. EYES:  Pupils equal, reactive to light. EARS:  No discharge, no marked hearing loss. NOSE:  No rhinorrhea, epistaxis or septal deformity. THROAT:  Not congested. No ulceration bleeding or discharge. NECK:  No stiffness, trachea central.  No palpable masses or L.N.                 CHEST:  Symmetrical and equal on expansion. HEART:  RRR S1 > S2. No audible murmurs or gallops. LUNGS:  Equal on expansion, normal breath sounds. No adventitious sounds. ABDOMEN:    Soft on palpation. No localized tenderness. No guarding or rigidity. No palpable hepatosplenomegaly. LYMPHATICS:  No palpable cervical lymphadenopathy. LOCOMOTOR, BACK AND SPINE:  No tenderness or deformities.                  EXTREMITIES:  Symmetrical, no pretibial edema. Paulas sign negative. No discoloration or ulcerations. NEUROLOGIC:  The patient is conscious, alert, oriented,Cranial nerve II-XII intact, taste was not examined. No apparent focal sensory or motor deficits.              PROVISIONAL DIAGNOSES / SURGERY:      SCREENING    COLONOSCOPY DIAGNOSTIC    Patient Active Problem List    Diagnosis Date Noted    Weight loss 02/15/2019    Herpes zoster without complication 15/24/5665    Complex tear of lateral meniscus of left knee 01/31/2019    Trochanteric bursitis of left hip 11/27/2017    Chronic midline low back pain without sciatica 10/10/2016    Gastroesophageal reflux disease without esophagitis 10/16/2015    Essential hypertension     RLS (restless legs syndrome)     History of cervical cancer     Family history of breast cancer in first degree relative            RAEGAN LARES - CNP on 5/31/2019 at 10:12 AM

## 2019-06-01 ENCOUNTER — APPOINTMENT (OUTPATIENT)
Dept: CT IMAGING | Age: 69
DRG: 069 | End: 2019-06-01
Payer: COMMERCIAL

## 2019-06-01 ENCOUNTER — APPOINTMENT (OUTPATIENT)
Dept: GENERAL RADIOLOGY | Age: 69
DRG: 069 | End: 2019-06-01
Payer: COMMERCIAL

## 2019-06-01 ENCOUNTER — HOSPITAL ENCOUNTER (INPATIENT)
Age: 69
LOS: 1 days | Discharge: HOME | DRG: 069 | End: 2019-06-03
Attending: EMERGENCY MEDICINE | Admitting: INTERNAL MEDICINE
Payer: COMMERCIAL

## 2019-06-01 DIAGNOSIS — I63.9 CEREBROVASCULAR ACCIDENT (CVA), UNSPECIFIED MECHANISM (HCC): Primary | ICD-10-CM

## 2019-06-01 PROBLEM — R20.2 PARESTHESIA: Status: ACTIVE | Noted: 2019-06-01

## 2019-06-01 LAB
ABSOLUTE EOS #: 0.1 K/UL (ref 0–0.4)
ABSOLUTE IMMATURE GRANULOCYTE: ABNORMAL K/UL (ref 0–0.3)
ABSOLUTE LYMPH #: 0.9 K/UL (ref 1–4.8)
ABSOLUTE MONO #: 0.7 K/UL (ref 0.1–1.3)
ANION GAP SERPL CALCULATED.3IONS-SCNC: 12 MMOL/L (ref 9–17)
BASOPHILS # BLD: 0 % (ref 0–2)
BASOPHILS ABSOLUTE: 0 K/UL (ref 0–0.2)
BILIRUBIN URINE: NEGATIVE
BUN BLDV-MCNC: 14 MG/DL (ref 8–23)
BUN/CREAT BLD: ABNORMAL (ref 9–20)
CALCIUM SERPL-MCNC: 10 MG/DL (ref 8.6–10.4)
CHLORIDE BLD-SCNC: 102 MMOL/L (ref 98–107)
CO2: 24 MMOL/L (ref 20–31)
COLOR: YELLOW
COMMENT UA: NORMAL
CREAT SERPL-MCNC: 0.92 MG/DL (ref 0.5–0.9)
DIFFERENTIAL TYPE: ABNORMAL
EOSINOPHILS RELATIVE PERCENT: 1 % (ref 0–4)
GFR AFRICAN AMERICAN: >60 ML/MIN
GFR NON-AFRICAN AMERICAN: >60 ML/MIN
GFR SERPL CREATININE-BSD FRML MDRD: ABNORMAL ML/MIN/{1.73_M2}
GFR SERPL CREATININE-BSD FRML MDRD: ABNORMAL ML/MIN/{1.73_M2}
GLUCOSE BLD-MCNC: 110 MG/DL (ref 70–99)
GLUCOSE BLD-MCNC: 81 MG/DL (ref 65–105)
GLUCOSE URINE: NEGATIVE
HCT VFR BLD CALC: 38.1 % (ref 36–46)
HEMOGLOBIN: 12.6 G/DL (ref 12–16)
IMMATURE GRANULOCYTES: ABNORMAL %
INR BLD: 1
KETONES, URINE: NEGATIVE
LEUKOCYTE ESTERASE, URINE: NEGATIVE
LYMPHOCYTES # BLD: 17 % (ref 24–44)
MCH RBC QN AUTO: 29.9 PG (ref 26–34)
MCHC RBC AUTO-ENTMCNC: 33.2 G/DL (ref 31–37)
MCV RBC AUTO: 89.9 FL (ref 80–100)
MONOCYTES # BLD: 13 % (ref 1–7)
NITRITE, URINE: NEGATIVE
NRBC AUTOMATED: ABNORMAL PER 100 WBC
PARTIAL THROMBOPLASTIN TIME: 50.2 SEC (ref 24–36)
PDW BLD-RTO: 13.2 % (ref 11.5–14.9)
PH UA: 6.5 (ref 5–8)
PLATELET # BLD: 256 K/UL (ref 150–450)
PLATELET ESTIMATE: ABNORMAL
PMV BLD AUTO: 7.9 FL (ref 6–12)
POTASSIUM SERPL-SCNC: 4.2 MMOL/L (ref 3.7–5.3)
PROTEIN UA: NEGATIVE
PROTHROMBIN TIME: 12.9 SEC (ref 11.8–14.6)
RBC # BLD: 4.23 M/UL (ref 4–5.2)
RBC # BLD: ABNORMAL 10*6/UL
SEG NEUTROPHILS: 69 % (ref 36–66)
SEGMENTED NEUTROPHILS ABSOLUTE COUNT: 3.8 K/UL (ref 1.3–9.1)
SODIUM BLD-SCNC: 138 MMOL/L (ref 135–144)
SPECIFIC GRAVITY UA: 1.02 (ref 1–1.03)
TSH SERPL DL<=0.05 MIU/L-ACNC: 3.58 MIU/L (ref 0.3–5)
TURBIDITY: CLEAR
URINE HGB: NEGATIVE
UROBILINOGEN, URINE: NORMAL
WBC # BLD: 5.6 K/UL (ref 3.5–11)
WBC # BLD: ABNORMAL 10*3/UL

## 2019-06-01 PROCEDURE — 2580000003 HC RX 258: Performed by: EMERGENCY MEDICINE

## 2019-06-01 PROCEDURE — G0378 HOSPITAL OBSERVATION PER HR: HCPCS

## 2019-06-01 PROCEDURE — 84443 ASSAY THYROID STIM HORMONE: CPT

## 2019-06-01 PROCEDURE — 36415 COLL VENOUS BLD VENIPUNCTURE: CPT

## 2019-06-01 PROCEDURE — 82947 ASSAY GLUCOSE BLOOD QUANT: CPT

## 2019-06-01 PROCEDURE — 80048 BASIC METABOLIC PNL TOTAL CA: CPT

## 2019-06-01 PROCEDURE — 6360000004 HC RX CONTRAST MEDICATION: Performed by: EMERGENCY MEDICINE

## 2019-06-01 PROCEDURE — 71045 X-RAY EXAM CHEST 1 VIEW: CPT

## 2019-06-01 PROCEDURE — 70498 CT ANGIOGRAPHY NECK: CPT

## 2019-06-01 PROCEDURE — 85730 THROMBOPLASTIN TIME PARTIAL: CPT

## 2019-06-01 PROCEDURE — 2580000003 HC RX 258: Performed by: INTERNAL MEDICINE

## 2019-06-01 PROCEDURE — 81003 URINALYSIS AUTO W/O SCOPE: CPT

## 2019-06-01 PROCEDURE — 6370000000 HC RX 637 (ALT 250 FOR IP): Performed by: INTERNAL MEDICINE

## 2019-06-01 PROCEDURE — 99285 EMERGENCY DEPT VISIT HI MDM: CPT

## 2019-06-01 PROCEDURE — 93005 ELECTROCARDIOGRAM TRACING: CPT | Performed by: EMERGENCY MEDICINE

## 2019-06-01 PROCEDURE — 85610 PROTHROMBIN TIME: CPT

## 2019-06-01 PROCEDURE — 70496 CT ANGIOGRAPHY HEAD: CPT

## 2019-06-01 PROCEDURE — 85025 COMPLETE CBC W/AUTO DIFF WBC: CPT

## 2019-06-01 PROCEDURE — 70450 CT HEAD/BRAIN W/O DYE: CPT

## 2019-06-01 RX ORDER — SODIUM CHLORIDE 0.9 % (FLUSH) 0.9 %
10 SYRINGE (ML) INJECTION EVERY 12 HOURS SCHEDULED
Status: DISCONTINUED | OUTPATIENT
Start: 2019-06-01 | End: 2019-06-03 | Stop reason: HOSPADM

## 2019-06-01 RX ORDER — ACETAMINOPHEN 325 MG/1
650 TABLET ORAL EVERY 4 HOURS PRN
Status: DISCONTINUED | OUTPATIENT
Start: 2019-06-01 | End: 2019-06-03 | Stop reason: HOSPADM

## 2019-06-01 RX ORDER — SODIUM CHLORIDE 0.9 % (FLUSH) 0.9 %
10 SYRINGE (ML) INJECTION PRN
Status: DISCONTINUED | OUTPATIENT
Start: 2019-06-01 | End: 2019-06-03 | Stop reason: HOSPADM

## 2019-06-01 RX ORDER — HYDRALAZINE HYDROCHLORIDE 50 MG/1
50 TABLET, FILM COATED ORAL 4 TIMES DAILY
Status: DISCONTINUED | OUTPATIENT
Start: 2019-06-01 | End: 2019-06-03 | Stop reason: HOSPADM

## 2019-06-01 RX ORDER — 0.9 % SODIUM CHLORIDE 0.9 %
80 INTRAVENOUS SOLUTION INTRAVENOUS ONCE
Status: COMPLETED | OUTPATIENT
Start: 2019-06-01 | End: 2019-06-01

## 2019-06-01 RX ORDER — CARVEDILOL 3.12 MG/1
3.12 TABLET ORAL 2 TIMES DAILY
Status: DISCONTINUED | OUTPATIENT
Start: 2019-06-01 | End: 2019-06-03 | Stop reason: HOSPADM

## 2019-06-01 RX ADMIN — Medication 10 ML: at 18:39

## 2019-06-01 RX ADMIN — IOVERSOL 75 ML: 741 INJECTION INTRA-ARTERIAL; INTRAVENOUS at 18:39

## 2019-06-01 RX ADMIN — Medication 10 ML: at 19:59

## 2019-06-01 RX ADMIN — CARVEDILOL 3.12 MG: 3.12 TABLET, FILM COATED ORAL at 21:25

## 2019-06-01 RX ADMIN — SODIUM CHLORIDE 80 ML: 9 INJECTION, SOLUTION INTRAVENOUS at 18:39

## 2019-06-01 RX ADMIN — HYDRALAZINE HYDROCHLORIDE 50 MG: 50 TABLET, FILM COATED ORAL at 21:25

## 2019-06-01 ASSESSMENT — ENCOUNTER SYMPTOMS
NAUSEA: 0
VOMITING: 0
RHINORRHEA: 0
SHORTNESS OF BREATH: 0
FACIAL SWELLING: 0
CONSTIPATION: 0
EYE PAIN: 0
EYE REDNESS: 0
DIARRHEA: 0
WHEEZING: 0
BACK PAIN: 0
SINUS PRESSURE: 0
ABDOMINAL PAIN: 0
COUGH: 0
CHEST TIGHTNESS: 0
BLOOD IN STOOL: 0
SORE THROAT: 0
TROUBLE SWALLOWING: 0
COLOR CHANGE: 0
EYE DISCHARGE: 0

## 2019-06-01 ASSESSMENT — PAIN SCALES - GENERAL: PAINLEVEL_OUTOF10: 6

## 2019-06-01 NOTE — ED PROVIDER NOTES
tremors, seizures, syncope, speech difficulty and headaches. Psychiatric/Behavioral: Negative for confusion, decreased concentration, hallucinations, self-injury, sleep disturbance and suicidal ideas.        PAST MEDICAL HISTORY     Past Medical History:   Diagnosis Date    Acid reflux     Arthritis     Cancer ([address]     cervical     Family history of breast cancer in first degree relative     sister    Gall stones     hx of    Hard of hearing     bilateral hearing aids    History of cervical cancer     Hypertension     Lateral meniscus tear     left knee    Wears dentures     Wears glasses        SURGICAL HISTORY       Past Surgical History:   Procedure Laterality Date    BREAST BIOPSY  1980    LEFT(BENIGN)    CHOLECYSTECTOMY      COLONOSCOPY  12/09    Negative    COLONOSCOPY N/A 5/31/2019    COLONOSCOPY POLYPECTOMY HOT SNARE, COLD POLYPECTOMY performed by Patrice Travis MD at [address] [facility]    Left Wrist    FOOT SURGERY Bilateral     FOR CALLOSIS    HERNIA REPAIR      UMBILICAL     INNER EAR SURGERY      Right     KNEE ARTHROSCOPY  2000, 2005    LEFT    KNEE ARTHROSCOPY Left 4/3/2019    KNEE ARTHROSCOPY DIAGNOSTIC performed by Tiffany Cowan MD at [place] [address] Right 9-4-14    BAHA- bone anchored hearing aid    TONSILLECTOMY AND ADENOIDECTOMY  2729    UMBILICAL HERNIA REPAIR      VARICOSE VEIN SURGERY Bilateral        CURRENT MEDICATIONS       Previous Medications    BISACODYL (BISACODYL) 5 MG EC TABLET    TAKE 4 TABS THE DAY BEFORE COLONOSCOPY AS DIRECTED ON BOWEL PREP INSTRUCTIONS GIVEN BY PHYSICIAN OFFICE    CARVEDILOL (COREG) 3.125 MG TABLET    Take 1 tablet by mouth 2 times daily    HYDRALAZINE (APRESOLINE) 50 MG TABLET    Take 1 tablet by mouth 4 times daily    MULTIPLE VITAMINS-MINERALS (THERAPEUTIC MULTIVITAMIN-MINERALS) TABLET    Take 1 tablet by mouth daily    POLYETHYLENE GLYCOL (MIRALAX) POWDER    Use as directed by following your bowel prep instructions given by physician office    PRAMIPEXOLE (MIRAPEX) 0.125 MG TABLET    Take 1 tablet by mouth nightly       ALLERGIES     is allergic to lisinopril; losartan; and procardia [nifedipine]. FAMILY HISTORY     [unfilled]     SOCIAL HISTORY      reports that she has never smoked. She has never used smokeless tobacco. She reports that she drinks alcohol. She reports that she does not use drugs. PHYSICAL EXAM     INITIAL VITALS: BP (!) 146/67   Pulse 81   Temp 97.7 °F (36.5 °C) (Oral)   Resp 16   Ht 5' 4\" (1.626 m)   Wt 114 lb (51.7 kg)   SpO2 100%   BMI 19.57 kg/m²      Physical Exam   Constitutional: She appears well-developed and well-nourished. No distress. HENT:   Head: Normocephalic and atraumatic. Eyes: Pupils are equal, round, and reactive to light. Conjunctivae and EOM are normal. Right eye exhibits no discharge. Left eye exhibits no discharge. No scleral icterus. Cardiovascular: Normal rate, regular rhythm and normal heart sounds. Exam reveals no gallop and no friction rub. No murmur heard. Pulmonary/Chest: Effort normal and breath sounds normal. No respiratory distress. She has no wheezes. She has no rales. She exhibits no tenderness. Abdominal: Soft. Bowel sounds are normal. She exhibits no distension and no mass. There is no tenderness. There is no rebound and no guarding. Musculoskeletal: Normal range of motion. She exhibits no edema or tenderness. Neurological: She is alert. She is disoriented. She displays normal reflexes. No cranial nerve deficit or sensory deficit. She exhibits normal muscle tone. Coordination normal.   Patient technically passes the NIH scale without any deficits but patient definitely looks like she struggling little bit more to keep her left leg compared to the right no drift. Skin: Skin is warm and dry. No rash noted. She is not diaphoretic. No erythema. No pallor. Psychiatric: She has a normal mood and affect.  Her behavior is normal. Judgment and thought content normal.   Nursing note and vitals reviewed. MEDICAL DECISION MAKING:     This point in time I will call a code stroke on this patient because of these symptoms but her NIH is probably only 01. Patient is at right at the [facility] half hour quinton therefore she is not a candidate for TPA. DIAGNOSTIC RESULTS     EKG: All EKG's are interpreted by the Emergency Department Physician who either signs or Co-signs this chart in the absence of a cardiologist.    EKG Interpretation    Interpreted by me    Rhythm: normal sinus   Rate: normal  Axis: normal  Ectopy: none  Conduction: normal  ST Segments: no acute change  T Waves: no acute change  Q Waves: none    Clinical Impression: no acute changes and normal EKG    RADIOLOGY:All plain film, CT, MRI, and formal ultrasound images (except ED bedside ultrasound)are read by the radiologist and interpretations are directly viewed by the emergency physician. Ct Head Wo Contrast    Result Date: 6/1/2019  EXAMINATION: CT OF THE HEAD WITHOUT CONTRAST  6/1/2019 5:27 pm TECHNIQUE: CT of the head was performed without the administration of intravenous contrast. Dose modulation, iterative reconstruction, and/or weight based adjustment of the mA/kV was utilized to reduce the radiation dose to as low as reasonably achievable. COMPARISON: None. HISTORY: ORDERING SYSTEM PROVIDED HISTORY: Left sided numbness TECHNOLOGIST PROVIDED HISTORY: Stroke Ordering Physician Provided Reason for Exam: Left sided numbness Acuity: Acute Type of Exam: Initial FINDINGS: BRAIN/VENTRICLES: There is no acute intracranial hemorrhage, mass effect or midline shift. No abnormal extra-axial fluid collection. The gray-white differentiation is maintained without evidence of an acute infarct. There is no evidence of hydrocephalus. Areas of white matter hypodensity are likely related to chronic microvascular ischemia.  ORBITS: The visualized portion of the orbits demonstrate no acute Acuity: Acute Type of Exam: Initial FINDINGS: CTA NECK: AORTIC ARCH/ARCH VESSELS: There is a normal branch pattern of the aortic arch. No significant stenosis is seen of the innominate artery or subclavian arteries. CAROTID ARTERIES: The common carotid arteries are normal in appearance without evidence of a flow limiting stenosis. The internal carotid arteries are normal in appearance without evidence of a flow limiting stenosis by NASCET criteria. No dissection or arterial injury is seen. VERTEBRAL ARTERIES: The vertebral arteries both arise from the subclavian arteries and are normal in caliber without evidence of flow limiting stenosis or dissection. SOFT TISSUES: The lung apices are clear. No cervical or superior mediastinal lymphadenopathy. The visualized portion of the larynx and pharynx appear unremarkable. The parotid, submandibular and thyroid glands demonstrate no acute abnormality. BONES: There is a right bone anchored hearing aid. There has been a right canal wall up mastoidectomy. There is opacification of the mastoidectomy cavity. The ossicles have an altered appearance and morphology. There is opacification of left mastoid air cells. CTA HEAD: ANTERIOR CIRCULATION: The internal carotid arteries are normal in course and caliber without focal stenosis. The anterior cerebral and middle cerebral arteries demonstrate no focal stenosis. POSTERIOR CIRCULATION: The posterior cerebral arteries demonstrate no focal stenosis. The vertebral and basilar arteries appear unremarkable. BRAIN: No mass effect or midline shift. No abnormal extra-axial fluid collection. The gray-white differentiation appears grossly maintained. Unremarkable CTA of the head and neck.      Cta Head W Contrast    Result Date: 6/1/2019  EXAMINATION: CTA OF THE HEAD WITH CONTRAST; CTA OF THE NECK 6/1/2019 6:21 pm: TECHNIQUE: CTA of the head/brain was performed with the administration of intravenous contrast. Multiplanar reformatted images are provided for review. MIP images are provided for review. Dose modulation, iterative reconstruction, and/or weight based adjustment of the mA/kV was utilized to reduce the radiation dose to as low as reasonably achievable.; CTA of the neck was performed with the administration of intravenous contrast. Multiplanar reformatted images are provided for review. MIP images are provided for review. Stenosis of the internal carotid arteries measured using NASCET criteria. Dose modulation, iterative reconstruction, and/or weight based adjustment of the mA/kV was utilized to reduce the radiation dose to as low as reasonably achievable. COMPARISON: None. HISTORY: ORDERING SYSTEM PROVIDED HISTORY: Left sided numbness TECHNOLOGIST PROVIDED HISTORY: Ordering Physician Provided Reason for Exam: pt states left sided numbness Acuity: Acute Type of Exam: Initial FINDINGS: CTA NECK: AORTIC ARCH/ARCH VESSELS: There is a normal branch pattern of the aortic arch. No significant stenosis is seen of the innominate artery or subclavian arteries. CAROTID ARTERIES: The common carotid arteries are normal in appearance without evidence of a flow limiting stenosis. The internal carotid arteries are normal in appearance without evidence of a flow limiting stenosis by NASCET criteria. No dissection or arterial injury is seen. VERTEBRAL ARTERIES: The vertebral arteries both arise from the subclavian arteries and are normal in caliber without evidence of flow limiting stenosis or dissection. SOFT TISSUES: The lung apices are clear. No cervical or superior mediastinal lymphadenopathy. The visualized portion of the larynx and pharynx appear unremarkable. The parotid, submandibular and thyroid glands demonstrate no acute abnormality. BONES: There is a right bone anchored hearing aid. There has been a right canal wall up mastoidectomy. There is opacification of the mastoidectomy cavity.   The ossicles have an altered appearance and morphology. There is opacification of left mastoid air cells. CTA HEAD: ANTERIOR CIRCULATION: The internal carotid arteries are normal in course and caliber without focal stenosis. The anterior cerebral and middle cerebral arteries demonstrate no focal stenosis. POSTERIOR CIRCULATION: The posterior cerebral arteries demonstrate no focal stenosis. The vertebral and basilar arteries appear unremarkable. BRAIN: No mass effect or midline shift. No abnormal extra-axial fluid collection. The gray-white differentiation appears grossly maintained. Unremarkable CTA of the head and neck. LABS: All lab results were reviewed bymyself, and all abnormals are listed below.   Labs Reviewed   CBC WITH AUTO DIFFERENTIAL - Abnormal; Notable for the following components:       Result Value    Seg Neutrophils 69 (*)     Lymphocytes 17 (*)     Monocytes 13 (*)     Absolute Lymph # 0.90 (*)     All other components within normal limits   BASIC METABOLIC PANEL W/ REFLEX TO MG FOR LOW K - Abnormal; Notable for the following components:    Glucose 110 (*)     CREATININE 0.92 (*)     All other components within normal limits   APTT - Abnormal; Notable for the following components:    PTT 50.2 (*)     All other components within normal limits   PROTIME-INR   TSH WITHOUT REFLEX   URINE RT REFLEX TO CULTURE   POCT GLUCOSE   POC GLUCOSE FINGERSTICK         EMERGENCY DEPARTMENT COURSE:   Vitals:    Vitals:    06/01/19 1722 06/01/19 1732 06/01/19 1827 06/01/19 1842   BP: (!) 198/80 (!) 180/73 (!) 158/54 (!) 146/67   Pulse: 94 85 68 81   Resp: 16 16 16 16   Temp: 97.7 °F (36.5 °C)      TempSrc: Oral      SpO2: 99% 100% 100% 100%   Weight: 114 lb (51.7 kg)      Height: 5' 4\" (1.626 m)          The patient was given the following medications while in the emergency department:     Orders Placed This Encounter   Medications    ioversol (OPTIRAY) 74 % injection 75 mL    0.9 % sodium chloride bolus    sodium chloride flush 0.9 % injection 10 mL       -------------------------  INITIAL NIH STROKE SCALE    Last known well: 1 PM    Time Performed:  0417 PM    Administer stroke scale items in the order listed. Record performance in each category after each subscale exam. Do not go back and change scores. Follow directions provided for each exam technique. Scores should reflect what the patient does, not what the clinician thinks the patient can do. The clinician should record answers while administering the exam and work quickly. Except where indicated, the patient should not be coached (i.e., repeated requests to patient to make a special effort). 1a.  Level of consciousness:  0 - alert; keenly responsive  1b. Level of consciousness questions:  1 - answers one question correctly  1c. Level of consciousness questions:  0 - performs both tasks correctly  2. Best Gaze:  0 - normal  3. Visual:  0 - no visual loss  4. Facial Palsy:  0 - normal symmetric movement  5a. Motor left arm:  0 - no drift, limb holds 90 (or 45) degrees for full 10 seconds  5b. Motor right arm:  0 - no drift, limb holds 90 (or 45) degrees for full 10 seconds  6a. Motor left le - no drift; leg holds 30 degree position for full 5 seconds  6b. Motor right le - no drift; leg holds 30 degree position for full 5 seconds  7. Limb Ataxia:  0 - absent  8. Sensory:  0 - normal; no sensory loss  9. Best Language:  0 - no aphasia, normal  10. Dysarthria:  0 - normal  11. Extinction and Inattention:  0 - no abnormality    TOTAL:  1    5:46 PM  I did discuss the case with Dr. Jasson Concepcion who agrees that this is a pretty minimal cough and does recommend a CT CTA and then MRI in the morning.    7:21 PM  Patient was updated on results plan for admission. Discussed the case with Dr. Jennifer Holliday who agrees to the admission. CRITICAL CARE:   None    CONSULTS:  None    PROCEDURES:  None    FINAL IMPRESSION      1.  Cerebrovascular accident (CVA), unspecified mechanism [facility])          [address] Admitted 06/01/2019 07:20:53 PM      PATIENT REFERRED TO:  Shruthi Cummins MD  [facility] [id number]  [address] [facility] [address]  [address] [facility] AT THE [facility]            DISCHARGE MEDICATIONS:  New Prescriptions    No medications on file       (Please note that portions of this note were completed with a voice recognition program.  Efforts were made to edit the dictations but occasionally words are mis-transcribed.)    Libby Stein MD  Attending Vinicio Sosa MD  06/01/19 0613

## 2019-06-01 NOTE — ED NOTES
Return from [address] with this RN. Pt remains A+O x 4, GCS = 15, PMS x 4 intact, eupneic, and PWD.        Micaela Carcamo RN  06/01/19 2371

## 2019-06-02 PROCEDURE — 6370000000 HC RX 637 (ALT 250 FOR IP): Performed by: INTERNAL MEDICINE

## 2019-06-02 PROCEDURE — 36415 COLL VENOUS BLD VENIPUNCTURE: CPT

## 2019-06-02 PROCEDURE — 99223 1ST HOSP IP/OBS HIGH 75: CPT | Performed by: INTERNAL MEDICINE

## 2019-06-02 PROCEDURE — 2580000003 HC RX 258: Performed by: INTERNAL MEDICINE

## 2019-06-02 PROCEDURE — 99223 1ST HOSP IP/OBS HIGH 75: CPT | Performed by: PSYCHIATRY & NEUROLOGY

## 2019-06-02 PROCEDURE — 83036 HEMOGLOBIN GLYCOSYLATED A1C: CPT

## 2019-06-02 PROCEDURE — G0378 HOSPITAL OBSERVATION PER HR: HCPCS

## 2019-06-02 RX ORDER — PRAMIPEXOLE DIHYDROCHLORIDE 0.12 MG/1
0.12 TABLET ORAL NIGHTLY
Status: DISCONTINUED | OUTPATIENT
Start: 2019-06-02 | End: 2019-06-02

## 2019-06-02 RX ORDER — ASPIRIN 81 MG/1
81 TABLET ORAL DAILY
Status: DISCONTINUED | OUTPATIENT
Start: 2019-06-02 | End: 2019-06-03 | Stop reason: HOSPADM

## 2019-06-02 RX ORDER — CLOPIDOGREL BISULFATE 75 MG/1
75 TABLET ORAL DAILY
Status: DISCONTINUED | OUTPATIENT
Start: 2019-06-02 | End: 2019-06-02

## 2019-06-02 RX ORDER — ATORVASTATIN CALCIUM 40 MG/1
40 TABLET, FILM COATED ORAL NIGHTLY
Status: DISCONTINUED | OUTPATIENT
Start: 2019-06-02 | End: 2019-06-03 | Stop reason: HOSPADM

## 2019-06-02 RX ORDER — CLOPIDOGREL BISULFATE 75 MG/1
75 TABLET ORAL DAILY
Status: DISCONTINUED | OUTPATIENT
Start: 2019-06-03 | End: 2019-06-03 | Stop reason: HOSPADM

## 2019-06-02 RX ORDER — PRAMIPEXOLE DIHYDROCHLORIDE 0.25 MG/1
0.12 TABLET ORAL NIGHTLY
Status: DISCONTINUED | OUTPATIENT
Start: 2019-06-02 | End: 2019-06-03 | Stop reason: HOSPADM

## 2019-06-02 RX ADMIN — HYDRALAZINE HYDROCHLORIDE 50 MG: 50 TABLET, FILM COATED ORAL at 12:07

## 2019-06-02 RX ADMIN — Medication 10 ML: at 21:42

## 2019-06-02 RX ADMIN — ATORVASTATIN CALCIUM 40 MG: 40 TABLET, FILM COATED ORAL at 19:54

## 2019-06-02 RX ADMIN — HYDRALAZINE HYDROCHLORIDE 50 MG: 50 TABLET, FILM COATED ORAL at 18:01

## 2019-06-02 RX ADMIN — Medication 10 ML: at 08:21

## 2019-06-02 RX ADMIN — PRAMIPEXOLE DIHYDROCHLORIDE 0.12 MG: 0.25 TABLET ORAL at 23:25

## 2019-06-02 RX ADMIN — HYDRALAZINE HYDROCHLORIDE 50 MG: 50 TABLET, FILM COATED ORAL at 19:54

## 2019-06-02 RX ADMIN — ASPIRIN 81 MG: 81 TABLET, COATED ORAL at 12:06

## 2019-06-02 RX ADMIN — CARVEDILOL 3.12 MG: 3.12 TABLET, FILM COATED ORAL at 08:20

## 2019-06-02 RX ADMIN — CARVEDILOL 3.12 MG: 3.12 TABLET, FILM COATED ORAL at 18:20

## 2019-06-02 RX ADMIN — HYDRALAZINE HYDROCHLORIDE 50 MG: 50 TABLET, FILM COATED ORAL at 08:20

## 2019-06-02 RX ADMIN — CLOPIDOGREL BISULFATE 75 MG: 75 TABLET ORAL at 12:06

## 2019-06-02 ASSESSMENT — PAIN SCALES - GENERAL: PAINLEVEL_OUTOF10: 0

## 2019-06-02 NOTE — CONSULTS
Hypertension     Lateral meniscus tear     left knee    Wears dentures     Wears glasses         Past Surgical History:     Past Surgical History:   Procedure Laterality Date    BREAST BIOPSY  1980    LEFT(BENIGN)    CHOLECYSTECTOMY      COLONOSCOPY  12/09    Negative    COLONOSCOPY N/A 5/31/2019    COLONOSCOPY POLYPECTOMY HOT SNARE, COLD POLYPECTOMY performed by Terry Tapia MD at [id number]    Left Wrist    FOOT SURGERY Bilateral     FOR [address] EAR SURGERY      Right     KNEE ARTHROSCOPY  2000, 2005    LEFT    KNEE ARTHROSCOPY Left 4/3/2019    KNEE ARTHROSCOPY DIAGNOSTIC performed by Mark Loving MD at [address] Right 9-4-14    BAHA- bone anchored hearing aid    TONSILLECTOMY AND ADENOIDECTOMY  1627    UMBILICAL HERNIA REPAIR      VARICOSE VEIN SURGERY Bilateral         Medications Prior to Admission:     Prior to Admission medications    Medication Sig Start Date End Date Taking? Authorizing Provider   polyethylene glycol (MIRALAX) powder Use as directed by following your bowel prep instructions given by physician office 5/7/19  Yes Terry Tapia MD   bisacodyl (BISACODYL) 5 MG EC tablet TAKE 4 TABS THE DAY BEFORE COLONOSCOPY AS DIRECTED ON BOWEL PREP INSTRUCTIONS GIVEN BY PHYSICIAN OFFICE 5/7/19  Yes Terry Tapia MD   carvedilol (COREG) 3.125 MG tablet Take 1 tablet by mouth 2 times daily 4/15/19  Yes Isidoro Beltrán MD   hydrALAZINE (APRESOLINE) 50 MG tablet Take 1 tablet by mouth 4 times daily 3/15/19  Yes Isidoro Beltrán MD   pramipexole (MIRAPEX) 0.125 MG tablet Take 1 tablet by mouth nightly 5/4/18  Yes Isidoro Beltrán MD   Multiple Vitamins-Minerals (THERAPEUTIC MULTIVITAMIN-MINERALS) tablet Take 1 tablet by mouth daily   Yes Historical Provider, MD        Allergies:     Lisinopril;  Losartan; and Procardia [nifedipine]    Social History:     Tobacco:    reports that she has never smoked. She has never used smokeless tobacco.  Alcohol:      reports that she drinks alcohol. Drug Use:  reports that she does not use drugs.     Family History:     Family History   Problem Relation Age of Onset    Heart Attack Father     Cancer Mother         stomach cancer    Breast Cancer Sister         one sister with breast cancer    Colon Cancer Neg Hx     Diabetes Neg Hx     Eclampsia Neg Hx     Hypertension Neg Hx     Ovarian Cancer Neg Hx      Labor Neg Hx     Spont Abortions Neg Hx     Stroke Neg Hx     Liver Cancer Neg Hx        Review of Systems:       Constitutional Negative for fever and chills   HEENT Negative for ear discharge, ear pain, nosebleed   Eyes Negative for photophobia, pain and discharge   Respiratory Negative for hemoptysis and sputum   Cardiovascular Negative for orthopnea, claudication and PND   Gastrointestinal Negative for abdominal pain, diarrhea, blood in stool   Musculoskeletal Negative for joint pain, negative for myalgia   Skin Negative for rash or itching   hematology Negative for ecchymosis, anemia   Psychiatric Negative for suicidal ideation, anxiety, depression, hallucinations       Physical Exam:   /61   Pulse 62   Temp 98.4 °F (36.9 °C) (Oral)   Resp 16   Ht 5' 4\" (1.626 m)   Wt 122 lb 5.7 oz (55.5 kg)   SpO2 100%   BMI 21.00 kg/m²   Temp (24hrs), Av °F (36.7 °C), Min:97.7 °F (36.5 °C), Max:98.4 °F (36.9 °C)        General examination:      General Appearance:  alert, well appearing, and in no acute distress  HEENT: Normocephalic, atraumatic, moist mucus membranes  Neck: supple, no carotid bruits, (-) nuchal rigidity  Lungs:  Respirations unlabored, chest wall no deformity, BS normal  Cardiovascular: normal rate, regular rhythm  Abdomen: Soft, nontender, nondistended, normal bowel sounds  Skin: No gross lesions, rashes, bruising or bleeding on exposed skin area  Extremities:  peripheral pulses palpable, no cyanosis, clubbing or edema  Psych: normal affect      Neurological examination:    Mental status   Alert and oriented x 3; following all commands; speech is fluent, no dysarthria, aphasia. Cranial nerves   II - visual fields intact to confrontation; pupils reactive  III, IV, VI - extraocular muscles intact; no AB; no nystagmus; no ptosis   V - normal facial sensation                                                               VII - normal facial symmetry                                                             VIII - intact hearing                                                                             IX, X - symmetrical palate elevation                                               XI - symmetrical shoulder shrug                                                       XII - midline tongue without atrophy or fasciculation     Motor function  Strength: 5/5 RUE, 5/5 RLE, 5/5 LUE, 5/5  LLE  Normal bulk and tone. No tremors                      Sensory function Intact to touch, pin, vibration, proprioception throughout     Cerebellar Intact finger-nose-finger testing. Intact heel-shin testing. No dysdiadochokinesia present. Reflex function 2/4 symmetric throughout . Downgoing plantar response bilaterally. (-)King's sign bilaterally    Gait                  Wider based with left-sided limp.              Diagnostics:      Laboratory Testing:  CBC:   Recent Labs     06/01/19  1730   WBC 5.6   HGB 12.6        BMP:    Recent Labs     06/01/19  1730      K 4.2      CO2 24   BUN 14   CREATININE 0.92*   GLUCOSE 110*         Lab Results   Component Value Date    CHOL 177 05/11/2018    LDLCHOLESTEROL 115 05/11/2018    HDL 39 (L) 05/11/2018    TRIG 113 05/11/2018    ALT 17 03/05/2019    AST 22 03/05/2019    TSH 3.58 06/01/2019    INR 1.0 06/01/2019    [id number] 594 02/15/2019         Imaging/Diagnostics:      CT head - no acute process     CTA head and neck - no significant stenosis or occlusion     MRI brain - pending    2 D echo - pending      All of the above medications, clinical laboratory, imaging and other diagnostic tests were reviewed by myself. Impression:      1. Acute onset left sided numbness, weakness lasting several hours with some improvement in symptoms. Possible right hemispheric acute infarct, suspect small vessel in nature. 2. Hypertensive urgency     Plan:   - Aspirin 81 mg, Plavix 75 mg ×21 days. Then aspirin 81 mg thereafter.  - Atorvastatin 40 mg   - MRI brain Monday  - Check 2-D echo, lipid panel, hemoglobin A1c   - Permissive hypertension first 24 hours   - PT/OT  - Will follow        Thank you for this very interesting consultation.       Electronically signed by Charles Guevara DO on 6/2/2019 at 11:42 AM      Charles Guevara, [address] [place] [address]  Neurology

## 2019-06-02 NOTE — FLOWSHEET NOTE
Patient visited by Fr. Ellie Wang     06/02/19 1112   Encounter Summary   Services provided to: Patient   Referral/Consult From: Yulisa   Continue Visiting   (6/2/19)   Complexity of Encounter Low   Length of Encounter 15 minutes   Routine   Intervention [place]   Spiritual/Islam   Type Spiritual support   Sacraments   Sacrament of Sick-Anointing Patient declined anointing  (6/2/19)

## 2019-06-02 NOTE — H&P
History and Physical Service  [facility]    HISTORY AND PHYSICAL EXAMINATION            Date:   6/2/2019  Patient name:  Chetan Arango  MRN:   [id number]  Account:  [de-identified]  YOB: 1950  PCP:    Maya Rowan MD  Code Status:    Full Code    Chief Complaint:     Left-sided numbness    History Obtained From:     patient    History of Present Illness: The patient is a 77 y.o.   / female who presents 70-year-old  lady nonsmoker no previous history of CVA or TIA recently had a screening colonoscopy and mitigated with the less than to allow a history of left-sided face arm and leg numbness and some weakness in the left lower extremity the numbness has resolved at this time but mild weakness persisted   Denies any headache any vision disturbances no speech deficit no aggravating or relieving factor         Past Medical History:     Past Medical History:   Diagnosis Date    Acid reflux     Arthritis     Cancer ([facility] [address]     cervical     Family history of breast cancer in first degree relative     sister    Gall stones     hx of    Hard of hearing     bilateral hearing aids    History of cervical cancer     Hypertension     Lateral meniscus tear     left knee    Wears dentures     Wears glasses         Past Surgical History:     Past Surgical History:   Procedure Laterality Date    BREAST BIOPSY  1980    LEFT(BENIGN)    CHOLECYSTECTOMY      COLONOSCOPY  12/09    Negative    COLONOSCOPY N/A 5/31/2019    COLONOSCOPY POLYPECTOMY HOT SNARE, COLD POLYPECTOMY performed by Hemal Almaraz MD at [address]    Left Wrist    FOOT SURGERY [address]ilateral      [address]     INNER EAR SURGERY      Right     KNEE ARTHROSCOPY  2000, 2005    LEFT    KNEE ARTHROSCOPY Left 4/3/2019    KNEE ARTHROSCOPY DIAGNOSTIC performed by Oz Nix MD at [address] OTHER SURGICAL HISTORY Right 9-4-14 BAHA- bone anchored hearing aid    TONSILLECTOMY AND ADENOIDECTOMY  0151    UMBILICAL HERNIA REPAIR      VARICOSE VEIN SURGERY Bilateral         Medications Prior to Admission:     Prior to Admission medications    Medication Sig Start Date End Date Taking? Authorizing Provider   polyethylene glycol (MIRALAX) powder Use as directed by following your bowel prep instructions given by physician office 19  Yes Hemal Almaraz MD   bisacodyl (BISACODYL) 5 MG EC tablet TAKE 4 TABS THE DAY BEFORE COLONOSCOPY AS DIRECTED ON BOWEL PREP INSTRUCTIONS GIVEN BY PHYSICIAN OFFICE 19  Yes Hemal Almaraz MD   carvedilol (COREG) 3.125 MG tablet Take 1 tablet by mouth 2 times daily 4/15/19  Yes Maya Rowan MD   hydrALAZINE (APRESOLINE) 50 MG tablet Take 1 tablet by mouth 4 times daily 3/15/19  Yes Maya Rowan MD   pramipexole (MIRAPEX) 0.125 MG tablet Take 1 tablet by mouth nightly 18  Yes Maya Rowan MD   Multiple Vitamins-Minerals (THERAPEUTIC MULTIVITAMIN-MINERALS) tablet Take 1 tablet by mouth daily   Yes Historical Provider, MD        Allergies:     Lisinopril; Losartan; and Procardia [nifedipine]    Social History:     Tobacco:    reports that she has never smoked. She has never used smokeless tobacco.  Alcohol:      reports that she drinks alcohol. Drug Use:  reports that she does not use drugs. Family History:     Family History   Problem Relation Age of Onset    Heart Attack Father     Cancer Mother         stomach cancer    Breast Cancer Sister         one sister with breast cancer    Colon Cancer Neg Hx     Diabetes Neg Hx     Eclampsia Neg Hx     Hypertension Neg Hx     Ovarian Cancer Neg Hx      Labor Neg Hx     Spont Abortions Neg Hx     Stroke Neg Hx     Liver Cancer Neg Hx        Review of Systems:     Positive and Negative as described in HPI.     CONSTITUTIONAL:  negative for fevers, chills, sweats, fatigue, weight loss  HEENT:  negative for vision, 06/01/19  5:30 PM   Result Value Ref Range    PTT 50.2 (H) 24.0 - 36.0 sec   TSH without Reflex    Collection Time: 06/01/19  5:30 PM   Result Value Ref Range    TSH 3.58 0.30 - 5.00 mIU/L   POC Glucose Fingerstick    Collection Time: 06/01/19  5:32 PM   Result Value Ref Range    POC Glucose 81 65 - 105 mg/dL   EKG 12 Lead    Collection Time: 06/01/19  5:49 PM   Result Value Ref Range    Ventricular Rate 75 BPM    Atrial Rate 75 BPM    P-R Interval 152 ms    QRS Duration 82 ms    Q-T Interval 412 ms    QTc Calculation (Bazett) 460 ms    P Axis 53 degrees    R Axis 57 degrees    T Axis 78 degrees   Urinalysis Reflex to Culture    Collection Time: 06/01/19  7:42 PM   Result Value Ref Range    Color, UA YELLOW YELLOW    Turbidity UA CLEAR CLEAR    Glucose, Ur NEGATIVE NEGATIVE    Bilirubin Urine NEGATIVE NEGATIVE    Ketones, Urine NEGATIVE NEGATIVE    Specific [place], UA 1.016 1.000 - 1.030    Urine Hgb NEGATIVE NEGATIVE    pH, UA 6.5 5.0 - 8.0    Protein, UA NEGATIVE NEGATIVE    Urobilinogen, Urine Normal Normal    Nitrite, Urine NEGATIVE NEGATIVE    Leukocyte Esterase, Urine NEGATIVE NEGATIVE    Urinalysis Comments       Microscopic exam not performed based on chemical results unless requested in original order.        Recent Labs     06/01/19  1730   HGB 12.6   HCT 38.1   WBC 5.6   MCV 89.9      K 4.2      CO2 24   BUN 14   CREATININE 0.92*   GLUCOSE 110*   INR 1.0   PROTIME 12.9   APTT 50.2*       Hematology:  Recent Labs     06/01/19  1730   WBC 5.6   RBC 4.23   HGB 12.6   HCT 38.1   MCV 89.9   MCH 29.9   MCHC 33.2   RDW 13.2      MPV 7.9   INR 1.0     Chemistry:  Recent Labs     06/01/19  1730      K 4.2      CO2 24   GLUCOSE 110*   BUN 14   CREATININE 0.92*   ANIONGAP 12   LABGLOM >60   GFRAA >60   CALCIUM 10.0     Recent Labs     06/01/19  1730 06/01/19  1732   TSH 3.58  --    POCGLU  --  81       Imaging/Diagnostics:       Ct Head Wo Contrast    Result Date: 6/1/2019  EXAMINATION: CT OF THE HEAD WITHOUT CONTRAST  6/1/2019 5:27 pm TECHNIQUE: CT of the head was performed without the administration of intravenous contrast. Dose modulation, iterative reconstruction, and/or weight based adjustment of the mA/kV was utilized to reduce the radiation dose to as low as reasonably achievable. COMPARISON: None. HISTORY: ORDERING SYSTEM PROVIDED HISTORY: Left sided numbness TECHNOLOGIST PROVIDED HISTORY: Stroke Ordering Physician Provided Reason for Exam: Left sided numbness Acuity: Acute Type of Exam: Initial FINDINGS: BRAIN/VENTRICLES: There is no acute intracranial hemorrhage, mass effect or midline shift. No abnormal extra-axial fluid collection. The gray-white differentiation is maintained without evidence of an acute infarct. There is no evidence of hydrocephalus. Areas of white matter hypodensity are likely related to chronic microvascular ischemia. ORBITS: The visualized portion of the orbits demonstrate no acute abnormality. SINUSES: There has been a right canal wall-up mastoidectomy. There is soft tissue filling the mastoidectomy bowl. There is fluid throughout the left mastoid air cells. SOFT TISSUES/SKULL:  No acute abnormality of the visualized skull or soft tissues. There is a bone anchored hearing aid. No acute intracranial abnormality. Critical results were called by Dr. Audra Espinoza MD to [address] on 6/1/2019 at 17:51. Xr Chest Portable    Result Date: 6/1/2019  EXAMINATION: ONE XRAY VIEW OF THE CHEST 6/1/2019 5:51 pm COMPARISON: Chest December 28, 2017. HISTORY: ORDERING SYSTEM PROVIDED HISTORY: Stroke TECHNOLOGIST PROVIDED HISTORY: Stroke Ordering Physician Provided Reason for Exam: stroke Acuity: Acute Type of Exam: Initial FINDINGS: Heart is at the upper limits normal in size. Lungs are clear. No free air. No acute process.      Cta Neck W Contrast    Result Date: 6/1/2019  EXAMINATION: CTA OF THE HEAD WITH CONTRAST; CTA OF THE NECK 6/1/2019 6:21 pm: TECHNIQUE: CTA of the head/brain was performed with the administration of intravenous contrast. Multiplanar reformatted images are provided for review. MIP images are provided for review. Dose modulation, iterative reconstruction, and/or weight based adjustment of the mA/kV was utilized to reduce the radiation dose to as low as reasonably achievable.; CTA of the neck was performed with the administration of intravenous contrast. Multiplanar reformatted images are provided for review. MIP images are provided for review. Stenosis of the internal carotid arteries measured using NASCET criteria. Dose modulation, iterative reconstruction, and/or weight based adjustment of the mA/kV was utilized to reduce the radiation dose to as low as reasonably achievable. COMPARISON: None. HISTORY: ORDERING SYSTEM PROVIDED HISTORY: Left sided numbness TECHNOLOGIST PROVIDED HISTORY: Ordering Physician Provided Reason for Exam: pt states left sided numbness Acuity: Acute Type of Exam: Initial FINDINGS: CTA NECK: AORTIC ARCH/ARCH VESSELS: There is a normal branch pattern of the aortic arch. No significant stenosis is seen of the innominate artery or subclavian arteries. CAROTID ARTERIES: The common carotid arteries are normal in appearance without evidence of a flow limiting stenosis. The internal carotid arteries are normal in appearance without evidence of a flow limiting stenosis by NASCET criteria. No dissection or arterial injury is seen. VERTEBRAL ARTERIES: The vertebral arteries both arise from the subclavian arteries and are normal in caliber without evidence of flow limiting stenosis or dissection. SOFT TISSUES: The lung apices are clear. No cervical or superior mediastinal lymphadenopathy. The visualized portion of the larynx and pharynx appear unremarkable. The parotid, submandibular and thyroid glands demonstrate no acute abnormality. BONES: There is a right bone anchored hearing aid.   There has been a right canal wall up mastoidectomy. There is opacification of the mastoidectomy cavity. The ossicles have an altered appearance and morphology. There is opacification of left mastoid air cells. CTA HEAD: ANTERIOR CIRCULATION: The internal carotid arteries are normal in course and caliber without focal stenosis. The anterior cerebral and middle cerebral arteries demonstrate no focal stenosis. POSTERIOR CIRCULATION: The posterior cerebral arteries demonstrate no focal stenosis. The vertebral and basilar arteries appear unremarkable. BRAIN: No mass effect or midline shift. No abnormal extra-axial fluid collection. The gray-white differentiation appears grossly maintained. Unremarkable CTA of the head and neck. Cta Head W Contrast    Result Date: 6/1/2019  EXAMINATION: CTA OF THE HEAD WITH CONTRAST; CTA OF THE NECK 6/1/2019 6:21 pm: TECHNIQUE: CTA of the head/brain was performed with the administration of intravenous contrast. Multiplanar reformatted images are provided for review. MIP images are provided for review. Dose modulation, iterative reconstruction, and/or weight based adjustment of the mA/kV was utilized to reduce the radiation dose to as low as reasonably achievable.; CTA of the neck was performed with the administration of intravenous contrast. Multiplanar reformatted images are provided for review. MIP images are provided for review. Stenosis of the internal carotid arteries measured using NASCET criteria. Dose modulation, iterative reconstruction, and/or weight based adjustment of the mA/kV was utilized to reduce the radiation dose to as low as reasonably achievable. COMPARISON: None. HISTORY: ORDERING SYSTEM PROVIDED HISTORY: Left sided numbness TECHNOLOGIST PROVIDED HISTORY: Ordering Physician Provided Reason for Exam: pt states left sided numbness Acuity: Acute Type of Exam: Initial FINDINGS: CTA NECK: AORTIC ARCH/ARCH VESSELS: There is a normal branch pattern of the aortic arch.  No

## 2019-06-02 NOTE — CARE COORDINATION
CASE MANAGEMENT NOTE:    Admission Date:  6/1/2019 Mary Arevalo is a 77 y.o.  female    Admitted for : Paresthesia [R20.2]    Met with:  Patient and daughter, April    PCP:  Dr. Arianne Oseguera:  MMO and Medicare      Current Residence/ Living Arrangements:  independently at home with her daughter. Works here in housekeeping            Current Services PTA:  No    Is patient agreeable to VNS: No    Freedom of choice provided: Yes    List of [address] provided: No    VNS chosen:  NA    DME:  none    Home Oxygen: No    Nebulizer: No    CPAP/BIPAP: No    Supplier: N/A    Potential Assistance Needed: No    SNF needed: No    [place]  [facility] [place]       Is the Patient an [employer] with Readmission Risk Score greater than 14%? No  If yes, pt needs a follow up appointment made within 7 days. Does Patient want to use MEDS to BEDS? Yes    Family Members/Caregivers that pt would like involved in their care:    Yes    If yes, list name here:  April    Transportation Provider:  Patient and Family             Is patient in Isolation/One on One/Altered Mental Status? No  If yes, skip next question. If no, would they like an I-Pad to  use? No  If yes, call [phone]. Discharge Plan:  Home without needs.                  Electronically signed by: Max Torres RN on 6/2/2019 at 10:48 AM

## 2019-06-02 NOTE — PLAN OF CARE
Problem: Falls - Risk of:  Goal: Will remain free from falls  Outcome: Ongoing  Note:   No falls noted this shift. Patient ambulates up per self without difficulty. Bed kept in low position. Safe environment maintained. Bedside table & call light in reach. Uses call light appropriately when needing assistance.     Goal: Absence of physical injury  Outcome: Ongoing  Note:   No injury noted

## 2019-06-02 NOTE — FLOWSHEET NOTE
Patient known to writer; Iveth Giordano 15 visit with patient, her daughters and great grandson;     06/02/19 1505   Encounter Summary   Services provided to: Patient and family together   Referral/Consult From: [address] [place] [address] Children;Family members   Continue Visiting   (6/2/19)   Complexity of Encounter Moderate   Length of Encounter 15 minutes   Spiritual Assessment Completed Yes   Spiritual/Amish   Type Spiritual support   Assessment Approachable; Anxious; Hopeful;Coping   Intervention Active listening;Prayer;Sustaining presence/ Ministry of presence; Discussed illness/injury and it's impact   Outcome Comfort;Expressed gratitude;Conflict resolved;Expressed feelings/needs/concerns;Coping; Hopeful;Receptive

## 2019-06-03 ENCOUNTER — APPOINTMENT (OUTPATIENT)
Dept: MRI IMAGING | Age: 69
DRG: 069 | End: 2019-06-03
Payer: COMMERCIAL

## 2019-06-03 VITALS
HEART RATE: 75 BPM | HEIGHT: 64 IN | BODY MASS INDEX: 20.78 KG/M2 | OXYGEN SATURATION: 98 % | TEMPERATURE: 98.6 F | SYSTOLIC BLOOD PRESSURE: 118 MMHG | RESPIRATION RATE: 16 BRPM | DIASTOLIC BLOOD PRESSURE: 63 MMHG | WEIGHT: 121.69 LBS

## 2019-06-03 LAB
CHOLESTEROL/HDL RATIO: 5.5
CHOLESTEROL: 180 MG/DL
EKG ATRIAL RATE: 75 BPM
EKG P AXIS: 53 DEGREES
EKG P-R INTERVAL: 152 MS
EKG Q-T INTERVAL: 412 MS
EKG QRS DURATION: 82 MS
EKG QTC CALCULATION (BAZETT): 460 MS
EKG R AXIS: 57 DEGREES
EKG T AXIS: 78 DEGREES
EKG VENTRICULAR RATE: 75 BPM
ESTIMATED AVERAGE GLUCOSE: 103 MG/DL
HBA1C MFR BLD: 5.2 % (ref 4–6)
HDLC SERPL-MCNC: 33 MG/DL
LDL CHOLESTEROL: 123 MG/DL (ref 0–130)
LV EF: 55 %
LVEF MODALITY: NORMAL
SURGICAL PATHOLOGY REPORT: NORMAL
TRIGL SERPL-MCNC: 119 MG/DL
VLDLC SERPL CALC-MCNC: ABNORMAL MG/DL (ref 1–30)

## 2019-06-03 PROCEDURE — 86225 DNA ANTIBODY NATIVE: CPT

## 2019-06-03 PROCEDURE — 93306 TTE W/DOPPLER COMPLETE: CPT

## 2019-06-03 PROCEDURE — 2060000000 HC ICU INTERMEDIATE R&B

## 2019-06-03 PROCEDURE — 93010 ELECTROCARDIOGRAM REPORT: CPT | Performed by: INTERNAL MEDICINE

## 2019-06-03 PROCEDURE — 80061 LIPID PANEL: CPT

## 2019-06-03 PROCEDURE — 86038 ANTINUCLEAR ANTIBODIES: CPT

## 2019-06-03 PROCEDURE — 2580000003 HC RX 258: Performed by: INTERNAL MEDICINE

## 2019-06-03 PROCEDURE — 86235 NUCLEAR ANTIGEN ANTIBODY: CPT

## 2019-06-03 PROCEDURE — 97162 PT EVAL MOD COMPLEX 30 MIN: CPT

## 2019-06-03 PROCEDURE — 97165 OT EVAL LOW COMPLEX 30 MIN: CPT

## 2019-06-03 PROCEDURE — 70551 MRI BRAIN STEM W/O DYE: CPT

## 2019-06-03 PROCEDURE — 36415 COLL VENOUS BLD VENIPUNCTURE: CPT

## 2019-06-03 PROCEDURE — 6370000000 HC RX 637 (ALT 250 FOR IP): Performed by: PSYCHIATRY & NEUROLOGY

## 2019-06-03 PROCEDURE — 6370000000 HC RX 637 (ALT 250 FOR IP): Performed by: INTERNAL MEDICINE

## 2019-06-03 PROCEDURE — 99232 SBSQ HOSP IP/OBS MODERATE 35: CPT | Performed by: PSYCHIATRY & NEUROLOGY

## 2019-06-03 PROCEDURE — 99233 SBSQ HOSP IP/OBS HIGH 50: CPT | Performed by: INTERNAL MEDICINE

## 2019-06-03 RX ORDER — ASPIRIN 81 MG/1
81 TABLET ORAL DAILY
Qty: 30 TABLET | Refills: 3 | Status: ON HOLD | OUTPATIENT
Start: 2019-06-04 | End: 2020-03-03 | Stop reason: HOSPADM

## 2019-06-03 RX ORDER — ATORVASTATIN CALCIUM 40 MG/1
40 TABLET, FILM COATED ORAL NIGHTLY
Qty: 30 TABLET | Refills: 3 | Status: SHIPPED | OUTPATIENT
Start: 2019-06-03 | End: 2019-10-15 | Stop reason: SDUPTHER

## 2019-06-03 RX ORDER — CLOPIDOGREL BISULFATE 75 MG/1
75 TABLET ORAL DAILY
Qty: 21 TABLET | Refills: 0 | Status: SHIPPED | OUTPATIENT
Start: 2019-06-04 | End: 2019-07-08 | Stop reason: ALTCHOICE

## 2019-06-03 RX ADMIN — HYDRALAZINE HYDROCHLORIDE 50 MG: 50 TABLET, FILM COATED ORAL at 13:37

## 2019-06-03 RX ADMIN — CARVEDILOL 3.12 MG: 3.12 TABLET, FILM COATED ORAL at 08:57

## 2019-06-03 RX ADMIN — Medication 10 ML: at 08:57

## 2019-06-03 RX ADMIN — HYDRALAZINE HYDROCHLORIDE 50 MG: 50 TABLET, FILM COATED ORAL at 08:57

## 2019-06-03 RX ADMIN — CLOPIDOGREL BISULFATE 75 MG: 75 TABLET ORAL at 08:57

## 2019-06-03 RX ADMIN — ASPIRIN 81 MG: 81 TABLET, COATED ORAL at 08:57

## 2019-06-03 RX ADMIN — HYDRALAZINE HYDROCHLORIDE 50 MG: 50 TABLET, FILM COATED ORAL at 17:25

## 2019-06-03 ASSESSMENT — PAIN SCALES - GENERAL
PAINLEVEL_OUTOF10: 0

## 2019-06-03 NOTE — PROGRESS NOTES
[facility] Neurology   IN-PATIENT SERVICE      NEUROLOGY PROGRESS  NOTE            Date:   6/3/2019  Patient name:  Pooja Guidry  Date of admission:  6/1/2019  YOB: 1950      Interval History:   Pooja Guidry is a  77 y.o. female admitted on 6/1/2019 with Paresthesia [R20.2]  Paresthesia [R20.2]. This is a follow-up neurology progress note. The patient was seen and examined and the chart was reviewed. Left sided numbness is resolved. Still having some difficulty with ambulation. MRI brain with no acute process. Lipid panel - total 180,   Hgb A1c 5.2  2-D echo - EF 55%. Mild to moderate LVH. Grade 1 LV diastolic dysfunction. Negative bubble study. -140 range  On Aspirin 81 mg, Plavix 75 mg, atorvastatin 40 mg    History of Present Illness: The patient is a 77 y.o. female who presents with Numbness   and she is admitted to the hospital for the management of  strokelike symptoms. The patient was seen and examined and the chart was reviewed. Patient developed onset of left facial numbness yesterday around 11 AM. She states a couple hours later she started to notice her left leg also became numb. Shortly after that she noticed her left arm was numb and also had some weakness in her left arm. Upon arrival patient has CT head which showed no acute process. CTA head and neck was unremarkable. Blood pressure was elevated with systolic 392 initially. NIHSS 1, so patient was not a TPA candidate. She was admitted for further stroke workup. This morning she states the numbness has resolved. She states as though she is able to move her left arm normally. Although she does state that her gait is still off with her left leg feeling weak. She does admit to having a knee scope recently which causes pain in her knee when she ambulates. She does have a history of hypertension. She takes hydralazine and Coreg for this. She denies missing any of her medications recently.  Blood pressure today is 975 systolic. She has been started on aspirin 81 mg and Plavix 75 mg. MRI brain will be done on Monday. Lipid panel is pending.     Past Medical History:     Past Medical History:   Diagnosis Date    Acid reflux     Arthritis     Cancer ([address]     cervical     Family history of breast cancer in first degree relative     sister    Gall stones     hx of    Hard of hearing     bilateral hearing aids    History of cervical cancer     Hypertension     Lateral meniscus tear     left knee    Wears dentures     Wears glasses         Past Surgical History:     Past Surgical History:   Procedure Laterality Date    BREAST BIOPSY      LEFT(BENIGN)    CHOLECYSTECTOMY      COLONOSCOPY      Negative    COLONOSCOPY N/A 2019    COLONOSCOPY POLYPECTOMY HOT SNARE, COLD POLYPECTOMY performed by Raul Ball MD at [address]    Left Wrist    FOOT SURGERY Bilateral     FOR CALLOSIS    HERNIA REPAIR      UMBILICAL     INNER EAR SURGERY      Right     KNEE ARTHROSCOPY  ,     LEFT    KNEE ARTHROSCOPY Left 4/3/2019    KNEE ARTHROSCOPY DIAGNOSTIC performed by Annemarie Brito MD at [facility] [address]    BAHA- bone anchored hearing aid    TONSILLECTOMY AND ADENOIDECTOMY      UMBILICAL HERNIA REPAIR      VARICOSE VEIN SURGERY Bilateral         Medications during admission:      atorvastatin  40 mg Oral Nightly    aspirin  81 mg Oral Daily    clopidogrel  75 mg Oral Daily    pramipexole  0.125 mg Oral Nightly    sodium chloride flush  10 mL Intravenous 2 times per day    carvedilol  3.125 mg Oral BID    hydrALAZINE  50 mg Oral 4x Daily         Physical Exam:   BP (!) 141/56   Pulse 67   Temp 98.4 °F (36.9 °C) (Oral)   Resp 16   Ht 5' 4\" (1.626 m)   Wt 121 lb 11.1 oz (55.2 kg)   SpO2 98%   BMI 20.89 kg/m²   Temp (24hrs), Av.2 °F (36.8 °C), Min:97.6 °F (36.4 °C), Max:98.5 °F (36.9 °C)          Neurological examination:    Mental status   Alert and oriented x 3; following all commands; speech is fluent, no dysarthria, aphasia. Cranial nerves   II - visual fields intact to confrontation; pupils reactive  III, IV, VI - extraocular muscles intact; no AB; no nystagmus; no ptosis   V - normal facial sensation                                                               VII - normal facial symmetry                                                             VIII - intact hearing                                                                             IX, X - symmetrical palate elevation                                               XI - symmetrical shoulder shrug                                                       XII - midline tongue without atrophy or fasciculation     Motor function  Strength: 5/5 RUE, 5/5 RLE, 5/5 LUE, 5/5  LLE  Normal bulk and tone. No tremors                      Sensory function Intact to touch, pin, vibration, proprioception throughout     Cerebellar Intact finger-nose-finger testing. Intact heel-shin testing. No dysdiadochokinesia present. Reflex function 2/4 symmetric throughout . Downgoing plantar response bilaterally. (-)King's sign bilaterally    Gait                  Not assessed             Diagnostics:      Laboratory Testing:  CBC:   Recent Labs     06/01/19  1730   WBC 5.6   HGB 12.6        BMP:    Recent Labs     06/01/19  1730      K 4.2      CO2 24   BUN 14   CREATININE 0.92*   GLUCOSE 110*         Lab Results   Component Value Date    CHOL 180 06/03/2019    LDLCHOLESTEROL 123 06/03/2019    HDL 33 (L) 06/03/2019    TRIG 119 06/03/2019    ALT 17 03/05/2019    AST 22 03/05/2019    TSH 3.58 06/01/2019    INR 1.0 06/01/2019    LABA1C 5.2 06/02/2019    [id number] 594 02/15/2019         Imaging/Diagnostics:        CT head - no acute process     CTA head and neck - no significant stenosis or occlusion     MRI brain - no acute process.  Scattered chronic ischemic white matter changes. 2 D echo - EF 55%. Mild to moderate LVH. Grade 1 LV diastolic dysfunction. Negative bubble study. All of the above medications, clinical laboratory, imaging and other diagnostic tests were reviewed by myself. Impression:      1. Acute onset left sided numbness, weakness lasting several hours with resolution of symptoms. Right hemispheric small vessel TIA. No evidence of acute infarct. 2. Hypertensive urgency; resolved    Plan:   - Aspirin 81 mg, Plavix 75 mg ×21 days.  Then aspirin 81 mg thereafter.  - Atorvastatin 40 mg   - BP control as per primary   - PT/OT   - follow up with neurology in office in 3-4 weeks   - stable neurologically for discharge      Electronically signed by Sidra Peres DO on 6/3/2019 at 12:31 PM      Sidra Peres, [address] [place] [address]  Neurology

## 2019-06-03 NOTE — PROGRESS NOTES
Physical Therapy    Facility/Department: [facility] PROGRESSIVE CARE  Initial Assessment    NAME: Promise Shetty  : 1950  MRN: [id number]    Date of Service: 6/3/2019    Discharge Recommendations:  Home with assist PRN   PT Equipment Recommendations  Equipment Needed: No    Assessment   Body structures, Functions, Activity limitations: Decreased functional mobility ; Decreased balance  Assessment: continue per POC to maxmize potential for safe D/C home w/ family  Treatment Diagnosis: impaired coordination  Specific instructions for Next Treatment: 6-3-19 HOME W/ ASSIST; gait w/o ' x 2 w/ supervision, completed coordination patterns in the hallway for heel to toe forward and back, side stepping, front and back crossovers and braiding both directions- completed w/ CGA x 1- pt often reached to the rail to stabilize w/ heel to toe and braiding; advance to steps  Prognosis: Excellent  Decision Making: Medium Complexity  History: admitted due to C/O left sided numbness  Exam: ROM, MMT, balance and mobility assessments  Clinical Presentation: gait w/o ' x 2 w/ supervision, completed coordination patterns in the hallway for heel to toe forward and back, side stepping, front and back crossovers and braiding both directions- completed w/ CGA x 1- pt often reached to the rail to stabilize w/ heel to toe and braiding; advance to steps  Patient Education: POC  Barriers to Learning: [place]  REQUIRES PT FOLLOW UP: Yes  Activity Tolerance  Activity Tolerance: Patient Tolerated treatment well       Patient Diagnosis(es): The encounter diagnosis was Cerebrovascular accident (CVA), unspecified mechanism ([address] has a past medical history of Acid reflux, Arthritis, Cancer ([address] Family history of breast cancer in first degree relative, Gall stones, Hard of hearing, History of cervical cancer, Hypertension, Lateral meniscus tear, Wears dentures, and Wears glasses.    has a past surgical history that includes Tonsillectomy and adenoidectomy (1956); Cholecystectomy; Inner ear surgery; Knee arthroscopy (2000, 2005); cyst removal (1980); Colonoscopy (12/09); Breast biopsy (1980); Foot surgery (Bilateral); other surgical history (Right, 9-4-14); Umbilical hernia repair; hernia repair; Varicose vein surgery (Bilateral); Knee arthroscopy (Left, 4/3/2019); and Colonoscopy (N/A, 5/31/2019). Restrictions  Restrictions/Precautions  Restrictions/Precautions: General Precautions  Required Braces or Orthoses?: No  Vision/Hearing  Vision: Impaired  Vision Exceptions: Wears glasses at all times  Hearing: Exceptions to [facility]  Hearing Exceptions: Hard of hearing/hearing concerns;Bilateral hearing aid     Subjective  General  Patient assessed for rehabilitation services?: Yes  Response To Previous Treatment: Not applicable  Family / Caregiver Present: Yes(dtr, son and 2 granddaughters)  Referring Practitioner: Dr. Maggie Steward  Referral Date : 06/02/19  Diagnosis: paresthesia  Follows Commands: Within Functional Limits  General Comment  Comments: MRI of the brain showed Scattered chronic microvascular disease without acute intracranial abnormality. HX recent left knee scope  Subjective  Subjective: pt reports that she developed left sided numbness beginning in the left leg and progressing to include left arm and face. Pt reports that these symptoms have resolved.    Pain Screening  Patient Currently in Pain: Denies  Vital Signs  Patient Currently in Pain: Denies       Orientation  Orientation  Overall Orientation Status: Within Normal Limits  Social/Functional History  Social/Functional History  Lives With: Daughter  Type of Home: House  Home Layout: One level  Home Access: Stairs to enter without rails  Entrance Stairs - Number of Steps: 1 w/o rail, 12 steps to basement w/right  rail  Bathroom Shower/Tub: Walk-in shower, Shower chair with back  Bathroom Toilet: Standard  Bathroom Equipment: Grab bars in shower, Shower chair  Home Equipment: (no device)  ADL Assistance: Independent  Homemaking Assistance: Independent  Ambulation Assistance: Independent(no device)  Transfer Assistance: Independent  Active : Yes  Mode of Transportation: SUV  Occupation: Part time employment(housekeeping at [employer])  Additional Comments: dtr does not work outside the home and can assist at D/C  Cognition        Objective     Observation/Palpation  Observation: just returning from MRI in W/C    AROM RLE (degrees)  RLE AROM: WFL  AROM LLE (degrees)  LLE AROM : WFL  AROM RUE (degrees)  RUE General AROM: see OT for UE assessment  AROM LUE (degrees)  LUE General AROM: see OT for UE assessment  Strength RLE  Comment: grossly 4+/5  Strength LLE  Comment: grossly 4+/5  Strength RUE  Comment: see OT for UE assessment  Strength LUE  Comment: see OT for UE assessment     Sensation  Overall Sensation Status: WFL(denies- states numbness has resolved)  Bed mobility  Supine to Sit: Modified independent  Sit to Supine: Modified independent  Scooting: Independent  Comment: dangles independently at the EOB  Transfers  Sit to Stand: Independent  Stand to sit:  Independent  Ambulation  Ambulation?: Yes  Ambulation 1  Surface: level tile  Device: No Device  Assistance: Supervision  Distance: side stepped to left twice self correcting balance in the hallway, supervision in the hallway; MOD I in room  Comments: 100' x 2  Stairs/Curb  Stairs?: No     Balance  Sitting - Static: Good  Sitting - Dynamic: Good  Standing - Static: Good(no device)  Standing - Dynamic: Good;-(no device)  Exercises  Comments: completed coordination patterns in the hallway for heel to toe forward and back, side stepping, front and back crossovers and braiding both directions- completed w/ CGA x 1- pt often reached to the rail to stabilize w/ heel to toe and braiding     Plan   Plan  Times per week: daily x 2 days  Times per day: Daily  Specific instructions for Next Treatment: 6-3-19 HOME W/ ASSIST; gait w/o ' x 2 w/ supervision, completed coordination patterns in the hallway for heel to toe forward and back, side stepping, front and back crossovers and braiding both directions- completed w/ CGA x 1- pt often reached to the rail to stabilize w/ heel to toe and braiding; advance to steps  Current Treatment Recommendations: Strengthening, Gait Training, Patient/Caregiver Education & Training, Balance Training, Functional Mobility Training, Home Exercise Program, Safety Education & Training, Transfer Training, Stair training  Safety Devices  Type of devices: Call light within reach, Gait belt, Patient at risk for falls, Nurse notified, Left in bed    G-Code       OutComes Score                                                  AM-PAC Score  AM-PAC Inpatient Mobility Raw Score : 21  AM-PAC Inpatient T-Scale Score : 50.25  Mobility Inpatient CMS 0-100% Score: 28.97  Mobility Inpatient CMS G-Code Modifier : CJ          Goals  Short term goals  Time Frame for Short term goals: daily x 2 days  Short term goal 1: pt to ambulate independently in the hallway 200' w/o LOB  Short term goal 2: pt to demonstrate good ambulatory balance  Short term goal 3: pt to demonstrate good technique for coordination/ walking patterns  Short term goal 4: pt to demonstrate correct technique for HEP  Short term goal 5: pt to ascend/ descend 1 step w/o rail independently- advance to flight 12 steps w/ right rail w/ supervision  Patient Goals   Patient goals : return home       Therapy Time   Individual Concurrent Group Co-treatment   Time In 1016         Time Out 1037         Minutes [address] [place], PT

## 2019-06-03 NOTE — PROGRESS NOTES
@[facility]@                Medications: Allergies: Allergies   Allergen Reactions    Lisinopril Other (See Comments)    Losartan      Fatigue    Procardia [Nifedipine] Other (See Comments)      Current Meds:       ScheduledMeds:    atorvastatin  40 mg Oral Nightly    aspirin  81 mg Oral Daily    clopidogrel  75 mg Oral Daily    pramipexole  0.125 mg Oral Nightly    sodium chloride flush  10 mL Intravenous 2 times per day    carvedilol  3.125 mg Oral BID    hydrALAZINE  50 mg Oral 4x Daily         Continuous Infusions:       PRN Meds: perflutren lipid microspheres, sodium chloride flush, sodium chloride flush, acetaminophen     Objective ;     Physical Examination:        Physical Exam   Vitals:  /67   Pulse 71   Temp 98.4 °F (36.9 °C) (Oral)   Resp 16   Ht 5' 4\" (1.626 m)   Wt 121 lb 11.1 oz (55.2 kg)   SpO2 98%   BMI 20.89 kg/m²                 Body mass index is 20.89 kg/m². General Appearance:   Alert ,OPERATIVE ,                 Skin:                             No rash or erythema      Neck:                            No mass , no thyroid enlargement                                           Pulmonary/Chest:        Clear to auscultation bilaterally . No wheezes, rales or rhonchi . Noabnormality on percussion                                                        Cardiovascular:            Normal rate, regular rhythm,murmur or  Gallop . Abdomen:                       Soft, non-tenderbowels sounds,                                             Extremities:                    No  Edema .                                            Neurological ;                 No focal motor deficit ,                 No focal sensory deficit ,    Musculo-skeletal;                  No  gait abnormality                  No significant joint abnormality,                    Physical Exam Admitted with weakness paresthesias involving left face arm and leg which has significantly resolved                 Data:     Vitals:  /67   Pulse 71   Temp 98.4 °F (36.9 °C) (Oral)   Resp 16   Ht 5' 4\" (1.626 m)   Wt 121 lb 11.1 oz (55.2 kg)   SpO2 98%   BMI 20.89 kg/m²   Temp (24hrs), Av.2 °F (36.8 °C), Min:97.6 °F (36.4 °C), Max:98.5 °F (36.9 °C)       Labs:  [unfilled]       Radiology:       Vitals:    19 2327 19 0524 19 0750 19 1337   BP: (!) 111/52  (!) 141/56 124/67   Pulse: 69  67 71   Resp: 12  16    Temp: 97.6 °F (36.4 °C)  98.4 °F (36.9 °C)    TempSrc: Oral  Oral    SpO2: 98%  98%    Weight:  121 lb 11.1 oz (55.2 kg)     Height:           Recent Labs     19  1732   POCGLU 81       Recent Results (from the past 24 hour(s))   Lipid Panel    Collection Time: 19  5:04 AM   Result Value Ref Range    Cholesterol 180 <200 mg/dL    HDL 33 (L) >40 mg/dL    LDL Cholesterol 123 0 - 130 mg/dL    Chol/HDL Ratio 5.5 (H) <5    Triglycerides 119 <150 mg/dL    VLDL NOT REPORTED 1 - 30 mg/dL                Assessment:        Primary Problem  <principal problem not specified>    Active Hospital Problems    Diagnosis Date Noted    Stroke determined by clinical assessment ([facility] [I63.9]     Left sided numbness [R20.0]     Acute left-sided weakness [M62.89]     Paresthesia [R20.2] 2019       Plan:        1. A sense started on aspirin and Plavix atorvastatin  MRI BRAIN SHOWS:        Impression   Scattered chronic microvascular disease without acute intracranial   abnormality.             Impression   Unremarkable CTA of the head and neck.                 Impression   No acute intracranial abnormality.       Critical results were called by Dr. Mohan Avery MD to [address] on   2019 at 17:51.                     Medications: Allergies:     Allergies   Allergen Reactions    Lisinopril Other (See Comments)    Losartan      Fatigue    Procardia

## 2019-06-03 NOTE — PROGRESS NOTES
Layout: One level  Home Access: Stairs to enter without rails  Entrance Stairs - Number of Steps: 1 w/o rail, 12 steps to basement w/right  rail  Bathroom Shower/Tub: Walk-in shower, Shower chair with back  Bathroom Toilet: Standard  Bathroom Equipment: Grab bars in shower, Shower chair  Home Equipment: (no device)  ADL Assistance: Independent  Homemaking Assistance: Independent  Ambulation Assistance: Independent(no device)  Transfer Assistance: Independent  Active : Yes  Mode of Transportation: SUV  Occupation: Part time employment(housekeeping at [employer])  Additional Comments: dtr does not work outside the home and can assist at D/C    Objective  Vision - Basic Assessment  Patient Visual Report: No visual complaint reported. Cognition  Overall Cognitive Status: WFL   Perception  Overall Perceptual Status: WFL  Sensation  Overall Sensation Status: WFL(denies- states numbness has resolved)   ADL  Feeding: Independent  Grooming: Independent  UE Bathing: Independent  LE Bathing: Independent  UE Dressing: Independent  LE Dressing: Independent  Toileting: Modified independent   Additional Comments: Pt reports up to BR per self with no difficulty and no LOB/safety concerns. Pt demo'd don/doffing B socks at EOB with no difficulty. Pt ambulating in room IND with slight unsteadiness but no LOB. Other areas based on pt report, observation, and clinical reasoning.     UE Function  LUE Strength  Gross LUE Strength: WFL  L Hand Grasp: 4+/5     LUE Tone: Normotonic     LUE AROM (degrees)  LUE AROM : WFL     Left Hand AROM (degrees)  Left Hand AROM: WFL  RUE Strength  Gross RUE Strength: WFL  R Hand Grasp: 4+/5      RUE Tone: Normotonic     RUE AROM (degrees)  RUE AROM : WFL     Right Hand AROM (degrees)  Right Hand AROM: WFL    Fine Motor Skills  Coordination  Movements Are Fluid And Coordinated: Yes     Mobility  Supine to Sit: Modified independent  Sit to Supine: Modified independent       Balance  Sitting Balance: Independent  Standing Balance: Independent     Functional Mobility  Functional Mobility Comments: Pt IND in room, Supervision in hallway, and CGA for higher level balance testing with PT  Bed mobility  Supine to Sit: Modified independent  Sit to Supine: Modified independent  Scooting: Independent     Transfers  Stand Step Transfers: Independent  Stand Pivot Transfers: Independent  Sit to stand: Independent  Stand to sit: Independent  Transfer Comments: No device  Functional Activity Tolerance  Functional Activity Tolerance: Tolerates 10 - 20 min exercise with multiple rests   Assessment  Assessment: Pt completing self-care and mobility in room at I/Mod I level. No need for further OT intervention at this time. Prognosis: Good  Decision Making: Low Complexity  REQUIRES OT FOLLOW UP: No  No Skilled OT: Independent with ADL's, Independent with functional mobility  Discharge Recommendations: Home with assist PRN  Activity Tolerance: Patient Tolerated treatment well    Goals  Patient Goals   Patient goals : Return home with A from family as needed.     Plan  Safety Devices  Safety Devices in place: Yes  Type of devices: Gait belt, Left in bed, Call light within reach    Equipment Recommendations  Equipment Needed: No  OT Individual Minutes  Time In: 8283  Time Out: Jose  Minutes: 20    Electronically signed by Martín Rodriguez on 6/3/19 at 12:11 PM

## 2019-06-03 NOTE — PROGRESS NOTES
CLINICAL PHARMACY NOTE: MEDS TO [address] Select Patient?: Yes  Total # of Prescriptions Filled: 2   The following medications were delivered to the patient:  · Lipitor  · Low dose asprin  Total # of Interventions Completed: 0  Time Spent (min): 30    Additional Documentation:

## 2019-06-03 NOTE — PROGRESS NOTES
Talked with Mortimer Khan, pharmacy tech. Will be bringing up patients discharge medications shortly- pt taking part in meds to beds.

## 2019-06-03 NOTE — CARE COORDINATION
ONGOING DISCHARGE PLAN:    Spoke with patient regarding discharge plan and patient confirms that plan is still home without needs. To have MRI brain and 2d echo today. Will continue to follow for additional discharge needs.     Electronically signed by Lexy Laird RN on 6/3/2019 at 9:27 AM

## 2019-06-03 NOTE — DISCHARGE SUMMARY
[] Nephrology            []. Hemo onco               [] GI    [] ID              [] Pulmonary      [x] Neuro                                  [] ---         Following input noted ;      Impression:      1. Acute onset left sided numbness, weakness lasting several hours with resolution of symptoms. Right hemispheric small vessel TIA. No evidence of acute infarct.     2. Hypertensive urgency; resolved     Plan:   - Aspirin 81 mg, Plavix 75 mg ×21 days.  Then aspirin 81 mg thereafter.  - Atorvastatin 40 mg   - BP control as per primary   - PT/OT                       Significant Diagnostic Studies:   Labs / Micro:       Results for orders placed or performed during the hospital encounter of 06/01/19   CBC Auto Differential   Result Value Ref Range    WBC 5.6 3.5 - 11.0 k/uL    RBC 4.23 4.0 - 5.2 m/uL    Hemoglobin 12.6 12.0 - 16.0 g/dL    Hematocrit 38.1 36 - 46 %    MCV 89.9 80 - 100 fL    MCH 29.9 26 - 34 pg    MCHC 33.2 31 - 37 g/dL    RDW 13.2 11.5 - 14.9 %    Platelets 337 338 - 093 k/uL    MPV 7.9 6.0 - 12.0 fL    NRBC Automated NOT REPORTED per 100 WBC    Differential Type NOT REPORTED     Seg Neutrophils 69 (H) 36 - 66 %    Lymphocytes 17 (L) 24 - 44 %    Monocytes 13 (H) 1 - 7 %    Eosinophils % 1 0 - 4 %    Basophils 0 0 - 2 %    Immature Granulocytes NOT REPORTED 0 %    Segs Absolute 3.80 1.3 - 9.1 k/uL    Absolute Lymph # 0.90 (L) 1.0 - 4.8 k/uL    Absolute Mono # 0.70 0.1 - 1.3 k/uL    Absolute Eos # 0.10 0.0 - 0.4 k/uL    Basophils # 0.00 0.0 - 0.2 k/uL    Absolute Immature Granulocyte NOT REPORTED 0.00 - 0.30 k/uL    WBC Morphology NOT REPORTED     RBC Morphology NOT REPORTED     Platelet Estimate NOT REPORTED    Basic Metabolic Panel w/ Reflex to MG   Result Value Ref Range    Glucose 110 (H) 70 - 99 mg/dL    BUN 14 8 - 23 mg/dL    CREATININE 0.92 (H) 0.50 - 0.90 mg/dL    Bun/Cre Ratio NOT REPORTED 9 - 20    Calcium 10.0 8.6 - 10.4 mg/dL    Sodium 138 135 - 144 mmol/L    Potassium 4.2 3.7 - 5.3 mmol/L    Chloride 102 98 - 107 mmol/L    CO2 24 20 - 31 mmol/L    Anion Gap 12 9 - 17 mmol/L    GFR Non-African American >60 >60 mL/min    GFR African American >60 >60 mL/min    GFR Comment          GFR Staging NOT REPORTED    Protime-INR   Result Value Ref Range    Protime 12.9 11.8 - 14.6 sec    INR 1.0    APTT   Result Value Ref Range    PTT 50.2 (H) 24.0 - 36.0 sec   TSH without Reflex   Result Value Ref Range    TSH 3.58 0.30 - 5.00 mIU/L   Urinalysis Reflex to Culture   Result Value Ref Range    Color, UA YELLOW YELLOW    Turbidity UA CLEAR CLEAR    Glucose, Ur NEGATIVE NEGATIVE    Bilirubin Urine NEGATIVE NEGATIVE    Ketones, Urine NEGATIVE NEGATIVE    Specific [place] [address], UA 1.016 1.000 - 1.030    Urine Hgb NEGATIVE NEGATIVE    pH, UA 6.5 5.0 - 8.0    Protein, UA NEGATIVE NEGATIVE    Urobilinogen, Urine Normal Normal    Nitrite, Urine NEGATIVE NEGATIVE    Leukocyte Esterase, Urine NEGATIVE NEGATIVE    Urinalysis Comments       Microscopic exam not performed based on chemical results unless requested in original order.    Lipid Panel   Result Value Ref Range    Cholesterol 180 <200 mg/dL    HDL 33 (L) >40 mg/dL    LDL Cholesterol 123 0 - 130 mg/dL    Chol/HDL Ratio 5.5 (H) <5    Triglycerides 119 <150 mg/dL    VLDL NOT REPORTED 1 - 30 mg/dL   HEMOGLOBIN A1C   Result Value Ref Range    Hemoglobin A1C 5.2 4.0 - 6.0 %    Estimated Avg Glucose 103 mg/dL   POC Glucose Fingerstick   Result Value Ref Range    POC Glucose 81 65 - 105 mg/dL   EKG 12 Lead   Result Value Ref Range    Ventricular Rate 75 BPM    Atrial Rate 75 BPM    P-R Interval 152 ms    QRS Duration 82 ms    Q-T Interval 412 ms    QTc Calculation (Bazett) 460 ms    P Axis 53 degrees    R Axis 57 degrees    T Axis 78 degrees        {Radiology:    Ct Head Wo Contrast    Result Date: 6/1/2019  EXAMINATION: CT OF THE HEAD WITHOUT CONTRAST  6/1/2019 5:27 pm TECHNIQUE: CT of the head was performed without the administration of intravenous contrast. Dose modulation, iterative reconstruction, and/or weight based adjustment of the mA/kV was utilized to reduce the radiation dose to as low as reasonably achievable. COMPARISON: None. HISTORY: ORDERING SYSTEM PROVIDED HISTORY: Left sided numbness TECHNOLOGIST PROVIDED HISTORY: Stroke Ordering Physician Provided Reason for Exam: Left sided numbness Acuity: Acute Type of Exam: Initial FINDINGS: BRAIN/VENTRICLES: There is no acute intracranial hemorrhage, mass effect or midline shift. No abnormal extra-axial fluid collection. The gray-white differentiation is maintained without evidence of an acute infarct. There is no evidence of hydrocephalus. Areas of white matter hypodensity are likely related to chronic microvascular ischemia. ORBITS: The visualized portion of the orbits demonstrate no acute abnormality. SINUSES: There has been a right canal wall-up mastoidectomy. There is soft tissue filling the mastoidectomy bowl. There is fluid throughout the left mastoid air cells. SOFT TISSUES/SKULL:  No acute abnormality of the visualized skull or soft tissues. There is a bone anchored hearing aid. No acute intracranial abnormality. Critical results were called by Dr. Brody Rowell MD to [address] on 6/1/2019 at 17:51. Xr Chest Portable    Result Date: 6/1/2019  EXAMINATION: ONE XRAY VIEW OF THE CHEST 6/1/2019 5:51 pm COMPARISON: Chest December 28, 2017. HISTORY: ORDERING SYSTEM PROVIDED HISTORY: Stroke TECHNOLOGIST PROVIDED HISTORY: Stroke Ordering Physician Provided Reason for Exam: stroke Acuity: Acute Type of Exam: Initial FINDINGS: Heart is at the upper limits normal in size. Lungs are clear. No free air. No acute process. Cta Neck W Contrast    Result Date: 6/1/2019  EXAMINATION: CTA OF THE HEAD WITH CONTRAST; CTA OF THE NECK 6/1/2019 6:21 pm: TECHNIQUE: CTA of the head/brain was performed with the administration of intravenous contrast. Multiplanar reformatted images are provided for review. MIP images are provided for review. Dose modulation, iterative reconstruction, and/or weight based adjustment of the mA/kV was utilized to reduce the radiation dose to as low as reasonably achievable.; CTA of the neck was performed with the administration of intravenous contrast. Multiplanar reformatted images are provided for review. MIP images are provided for review. Stenosis of the internal carotid arteries measured using NASCET criteria. Dose modulation, iterative reconstruction, and/or weight based adjustment of the mA/kV was utilized to reduce the radiation dose to as low as reasonably achievable. COMPARISON: None. HISTORY: ORDERING SYSTEM PROVIDED HISTORY: Left sided numbness TECHNOLOGIST PROVIDED HISTORY: Ordering Physician Provided Reason for Exam: pt states left sided numbness Acuity: Acute Type of Exam: Initial FINDINGS: CTA NECK: AORTIC ARCH/ARCH VESSELS: There is a normal branch pattern of the aortic arch. No significant stenosis is seen of the innominate artery or subclavian arteries. CAROTID ARTERIES: The common carotid arteries are normal in appearance without evidence of a flow limiting stenosis. The internal carotid arteries are normal in appearance without evidence of a flow limiting stenosis by NASCET criteria. No dissection or arterial injury is seen. VERTEBRAL ARTERIES: The vertebral arteries both arise from the subclavian arteries and are normal in caliber without evidence of flow limiting stenosis or dissection. SOFT TISSUES: The lung apices are clear. No cervical or superior mediastinal lymphadenopathy. The visualized portion of the larynx and pharynx appear unremarkable. The parotid, submandibular and thyroid glands demonstrate no acute abnormality. BONES: There is a right bone anchored hearing aid. There has been a right canal wall up mastoidectomy. There is opacification of the mastoidectomy cavity. The ossicles have an altered appearance and morphology.   There is opacification of left mastoid air cells. CTA HEAD: ANTERIOR CIRCULATION: The internal carotid arteries are normal in course and caliber without focal stenosis. The anterior cerebral and middle cerebral arteries demonstrate no focal stenosis. POSTERIOR CIRCULATION: The posterior cerebral arteries demonstrate no focal stenosis. The vertebral and basilar arteries appear unremarkable. BRAIN: No mass effect or midline shift. No abnormal extra-axial fluid collection. The gray-white differentiation appears grossly maintained. Unremarkable CTA of the head and neck. Cta Head W Contrast    Result Date: 6/1/2019  EXAMINATION: CTA OF THE HEAD WITH CONTRAST; CTA OF THE NECK 6/1/2019 6:21 pm: TECHNIQUE: CTA of the head/brain was performed with the administration of intravenous contrast. Multiplanar reformatted images are provided for review. MIP images are provided for review. Dose modulation, iterative reconstruction, and/or weight based adjustment of the mA/kV was utilized to reduce the radiation dose to as low as reasonably achievable.; CTA of the neck was performed with the administration of intravenous contrast. Multiplanar reformatted images are provided for review. MIP images are provided for review. Stenosis of the internal carotid arteries measured using NASCET criteria. Dose modulation, iterative reconstruction, and/or weight based adjustment of the mA/kV was utilized to reduce the radiation dose to as low as reasonably achievable. COMPARISON: None. HISTORY: ORDERING SYSTEM PROVIDED HISTORY: Left sided numbness TECHNOLOGIST PROVIDED HISTORY: Ordering Physician Provided Reason for Exam: pt states left sided numbness Acuity: Acute Type of Exam: Initial FINDINGS: CTA NECK: AORTIC ARCH/ARCH VESSELS: There is a normal branch pattern of the aortic arch. No significant stenosis is seen of the innominate artery or subclavian arteries.  CAROTID ARTERIES: The common carotid arteries are normal in appearance without evidence of a flow limiting stenosis. The internal carotid arteries are normal in appearance without evidence of a flow limiting stenosis by NASCET criteria. No dissection or arterial injury is seen. VERTEBRAL ARTERIES: The vertebral arteries both arise from the subclavian arteries and are normal in caliber without evidence of flow limiting stenosis or dissection. SOFT TISSUES: The lung apices are clear. No cervical or superior mediastinal lymphadenopathy. The visualized portion of the larynx and pharynx appear unremarkable. The parotid, submandibular and thyroid glands demonstrate no acute abnormality. BONES: There is a right bone anchored hearing aid. There has been a right canal wall up mastoidectomy. There is opacification of the mastoidectomy cavity. The ossicles have an altered appearance and morphology. There is opacification of left mastoid air cells. CTA HEAD: ANTERIOR CIRCULATION: The internal carotid arteries are normal in course and caliber without focal stenosis. The anterior cerebral and middle cerebral arteries demonstrate no focal stenosis. POSTERIOR CIRCULATION: The posterior cerebral arteries demonstrate no focal stenosis. The vertebral and basilar arteries appear unremarkable. BRAIN: No mass effect or midline shift. No abnormal extra-axial fluid collection. The gray-white differentiation appears grossly maintained. Unremarkable CTA of the head and neck. Mri Brain Wo Contrast    Result Date: 6/3/2019  EXAMINATION: MRI OF THE BRAIN WITHOUT CONTRAST  6/3/2019 10:17 am TECHNIQUE: Multiplanar multisequence MRI of the brain was performed without the administration of intravenous contrast. COMPARISON: CT brain performed 06/01/2019. HISTORY: ORDERING SYSTEM PROVIDED HISTORY: STROKE TECHNOLOGIST PROVIDED HISTORY: Ordering Physician Provided Reason for Exam: Pt is complaining of blurred vision on the left side. No history of previous strokes.  FINDINGS: INTRACRANIAL STRUCTURES/VENTRICLES: The sellar and mouth 4 times daily     polyethylene glycol powder  Commonly known as:  MIRALAX  Use as directed by following your bowel prep instructions given by physician office     pramipexole 0.125 MG tablet  Commonly known as:  MIRAPEX  Take 1 tablet by mouth nightly     therapeutic multivitamin-minerals tablet              Time spent on discharge planning ;          [] less than 30 minutes . [x]   more  than 30 minutes . Ellectronically signed by   Jayla Barker MD      Thank you Dr. Dee Oseguera MD for the opportunity to be involved in this patient's care. Please note that this chart was generated using voice recognition Dragon dictation software. Although every effort was made to ensure the accuracy of this automated transcription, some errors in transcription may have occurred.

## 2019-06-03 NOTE — CARE COORDINATION
[address] Transitions Interview     6/3/2019    Patient: Manisha Balloon Patient : 1950   MRN: [id number]  Reason for Admission:   RARS: Readmission Risk Score: 8         Spoke with: Kali Melgoza met with patient, explained role of CTC, provided contact information, will continue to follow//JU      Readmission Risk  Patient Active Problem List   Diagnosis    Essential hypertension    RLS (restless legs syndrome)    History of cervical cancer    Family history of breast cancer in first degree relative    Gastroesophageal reflux disease without esophagitis    Chronic midline low back pain without sciatica    Trochanteric bursitis of left hip    Complex tear of lateral meniscus of left knee    Herpes zoster without complication    Weight loss    Paresthesia    Cerebrovascular accident (CVA) ([address]    Left sided numbness    Acute left-sided weakness    Lacunar stroke       Inpatient Assessment  Care Transitions Summary    Care Transitions Inpatient Review  Medication Review  Housing Review  Social Support  Durable Medical Equipment  Functional Review  Hearing and Vision  Care Transitions Interventions         Follow Up  Future Appointments   Date Time Provider Cammie Barnes   2019 10:30 AM Rosaline Khan MD [facility]   2019 11:15 [facility]       Health Maintenance  There are no preventive care reminders to display for this patient.     Mandi Swift RN

## 2019-06-04 ENCOUNTER — CARE COORDINATION (OUTPATIENT)
Dept: CASE MANAGEMENT | Age: 69
End: 2019-06-04

## 2019-06-04 ENCOUNTER — TELEPHONE (OUTPATIENT)
Dept: FAMILY MEDICINE CLINIC | Age: 69
End: 2019-06-04

## 2019-06-04 LAB
ANA REFERENCE RANGE:: ABNORMAL
ANTI DNA DOUBLE STRANDED: 30 IU/ML
ANTI JO-1 IGG: 56 U/ML
ANTI RNP AB: 72 U/ML
ANTI SSA: 217 U/ML
ANTI SSB: 29 U/ML
ANTI-CENTROMERE: 217 U/ML
ANTI-NUCLEAR ANTIBODY (ANA): POSITIVE
ANTI-SCLERODERMA: 73 U/ML
ANTI-SMITH: 37 U/ML
HISTONE ANTIBODY: 65 U/ML

## 2019-06-04 NOTE — TELEPHONE ENCOUNTER
[facility] Transitions Initial Follow Up Call    Outreach made within 2 business days of discharge: Yes    Patient: Manisha Balloon Patient : 1950   MRN: [id number]  Reason for Admission: CVA   Discharge Date: 6/3/19       Spoke with:     Discharge department/facility:     [facility] Interactive Patient Contact:  Was patient able to fill all prescriptions:   Was patient instructed to bring all medications to the follow-up visit:   Is patient taking all medications as directed in the discharge summary?    Does patient understand their discharge instructions:   Does patient have questions or concerns that need addressed prior to 7-14 day follow up office visit:     Scheduled appointment with PCP within 7-14 days    Follow Up  Future Appointments   Date Time Provider Cammie Barnes   2019 10:30 AM [facility]   2019 11:15 AM [facility]       Arlin Bower MA

## 2019-06-04 NOTE — CARE COORDINATION
[facility] Transitions Initial Follow Up Call    Call within 2 business days of discharge: Yes    Patient: Marylen Flight Patient : 1950   MRN: [id number]  Reason for Admission: paresthesia  Discharge Date: 6/3/19 RARS: Readmission Risk Score: 8      Last Discharge [address]       Complaint Diagnosis Description Type Department Provider    19 Numbness Cerebrovascular accident (CVA), unspecified mechanism ([facility] [address] ED to Hosp-Admission (Discharged) (Carmen Greenwood) Cong Salazar MD; Carmen Kim. ..            # 1 attempt- unable to reach patient, left vm message with name and call back number//JU    Facility: [facility]    Non-face-to-face services provided:      Care Transitions 24 Hour Call    Care Transitions Interventions         Follow Up  Future Appointments   Date Time Provider Cammie Barnes   2019 10:30 AM Dillan Cardenas MD  sc [facility]   2019  9:20 AM Tulio Gamez MD [facility] Abril Parker   2019 11:15 AM Donte Martinez MD [facility]   7/3/2019  2:30 PM [address], MD PERI [facility] Cassy Courtney RN

## 2019-06-06 NOTE — CARE COORDINATION
[address] [address] Transitions Initial Follow Up Call    Call within 2 business days of discharge: Yes    Patient: Chetan Arango Patient : 1950   MRN: [id number]  Reason for Admission: cva  Discharge Date: 6/3/19 RARS: Readmission Risk Score: 8      Last Discharge [address] [address]       Complaint Diagnosis Description Type Department Provider    19 Numbness Cerebrovascular accident (CVA), unspecified mechanism ([facility] [address] ED to Hosp-Admission (Discharged) (Estrella Radha) Michelle Sanchez MD; Sofia Wills. ..            # 3 attempt- unable to reach patient, left vm message with name and call back number, requested rich back, care transitions completed//JU    Facility: [facility]    Non-face-to-face services provided:      Care Transitions 24 Hour Call    Care Transitions Interventions         Follow Up  Future Appointments   Date Time Provider Cammie Barnes   2019 10:30 AM Maya Rowan MD  sc [facility]   2019  9:20 AM Berhane Sears MD [facility] Sho Rodriguez   2019 11:15 AM Oz Nix MD [facility]   7/3/2019  2:30 PM Hemal Almaraz MD [facility] Amrik House, JESSICA

## 2019-06-21 ENCOUNTER — OFFICE VISIT (OUTPATIENT)
Dept: NEUROLOGY | Age: 69
End: 2019-06-21
Payer: COMMERCIAL

## 2019-06-21 VITALS
WEIGHT: 118 LBS | HEIGHT: 64 IN | HEART RATE: 90 BPM | DIASTOLIC BLOOD PRESSURE: 65 MMHG | BODY MASS INDEX: 20.14 KG/M2 | SYSTOLIC BLOOD PRESSURE: 100 MMHG

## 2019-06-21 DIAGNOSIS — R63.4 UNINTENTIONAL WEIGHT LOSS: ICD-10-CM

## 2019-06-21 DIAGNOSIS — I10 HYPERTENSION, UNSPECIFIED TYPE: ICD-10-CM

## 2019-06-21 DIAGNOSIS — R29.90 STROKE-LIKE SYMPTOMS: Primary | ICD-10-CM

## 2019-06-21 PROCEDURE — 4040F PNEUMOC VAC/ADMIN/RCVD: CPT | Performed by: PSYCHIATRY & NEUROLOGY

## 2019-06-21 PROCEDURE — 3017F COLORECTAL CA SCREEN DOC REV: CPT | Performed by: PSYCHIATRY & NEUROLOGY

## 2019-06-21 PROCEDURE — 1111F DSCHRG MED/CURRENT MED MERGE: CPT | Performed by: PSYCHIATRY & NEUROLOGY

## 2019-06-21 PROCEDURE — 1123F ACP DISCUSS/DSCN MKR DOCD: CPT | Performed by: PSYCHIATRY & NEUROLOGY

## 2019-06-21 PROCEDURE — 1036F TOBACCO NON-USER: CPT | Performed by: PSYCHIATRY & NEUROLOGY

## 2019-06-21 PROCEDURE — 99214 OFFICE O/P EST MOD 30 MIN: CPT | Performed by: PSYCHIATRY & NEUROLOGY

## 2019-06-21 PROCEDURE — G8598 ASA/ANTIPLAT THER USED: HCPCS | Performed by: PSYCHIATRY & NEUROLOGY

## 2019-06-21 PROCEDURE — G8427 DOCREV CUR MEDS BY ELIG CLIN: HCPCS | Performed by: PSYCHIATRY & NEUROLOGY

## 2019-06-21 PROCEDURE — G8399 PT W/DXA RESULTS DOCUMENT: HCPCS | Performed by: PSYCHIATRY & NEUROLOGY

## 2019-06-21 PROCEDURE — 1090F PRES/ABSN URINE INCON ASSESS: CPT | Performed by: PSYCHIATRY & NEUROLOGY

## 2019-06-21 PROCEDURE — G8420 CALC BMI NORM PARAMETERS: HCPCS | Performed by: PSYCHIATRY & NEUROLOGY

## 2019-06-21 RX ORDER — MELOXICAM 15 MG/1
15 TABLET ORAL DAILY
COMMUNITY
End: 2020-08-24 | Stop reason: ALTCHOICE

## 2019-06-21 RX ORDER — ACETAMINOPHEN 325 MG/1
325 TABLET ORAL EVERY 6 HOURS PRN
COMMUNITY

## 2019-06-21 NOTE — PATIENT INSTRUCTIONS
1. Continue following with primary doctor for good blood pressure control and weight loss workup  2. Continue aspirin 81mg daily, discontinue plavix from tomorrow.      Return in 6-8 months    Bobbi MD Antione, MS

## 2019-06-21 NOTE — PROGRESS NOTES
[facility]            [facility], [address]; [facility], [address] [facility]    [address] [facility], [facility] Bart Mcgarry, [place], [address] [place] [address]            Dept: [phone]          Dept Fax: [phone]             Oletta Burke, MD Sherlene Czar, MD Ahmed B. Phyllistine Makos, MD Geoffrey Glassman, MD Stanly Babinski, MD Nelly Sole, [facility] [address] UP NOTE                                          PATIENT NAME: Chetan Arango   PATIENT MRN: [id number]  FOLLOW UP TODAY: 6/21/2019     Dear Dr. Maya Rowan MD,     I had the pleasure of seeing your patient Chetan Arango, who comes for follow up. CHIEF COMPLAINT: Follow-Up from Hospital and Neurologic Problem     INITIAL & INTERVAL HISTORY:   Chetan Arango is a 77year old RH  female who came for post hospital followup regarding her stroke like symptom. 6/1-3/2019 [facility] for stroke  Presenting symptoms: numbness  Time: May 30th, she started to feel left mouth lower side numb, intermittent, on 6/1/2019 around 11AM, she went to [place] near [facility], started to have left facial numbness, few hours later, left leg also numb, then left arm, numbness, then left arm weak, no walking problem, no pain/HA, no difficulty in speech/confusion, In ED, her SBP was 198. CT head in ED did not show acute changes, CTA head and neck was unremarkable. No recent illness. No diarrhea/dysuria/URI  Risk factors: HTN, not checking at home, no family history of stroke  Social: remote smoker, occasionally drinking, no drugs, difficulty falling sleep sometime, works as , .     Psychiatric:no depression/anxiety  Other medical issues: HTN, left knee pain, weight loss (currently 118lbs, has lost 10 lbs over 6 months,unintentional)    NEUROLOGICAL TESTS  MRI brain 6/3/2019  Scattered chronic microvascular disease without acute intracranial  Abnormality. CTA head, neck 6/2019  Unremarkable CTA of the head and neck.       A1c 5.2  EF 55%    PMH/PSH/SH/FMH: Remain unchanged since last visit except those listed in the interval history    Admission on 06/01/2019, Discharged on 06/03/2019   Component Date Value Ref Range Status    Ventricular Rate 06/01/2019 75  BPM Final    Atrial Rate 06/01/2019 75  BPM Final    P-R Interval 06/01/2019 152  ms Final    QRS Duration 06/01/2019 82  ms Final    Q-T Interval 06/01/2019 412  ms Final    QTc Calculation (Bazett) 06/01/2019 460  ms Final    P Axis 06/01/2019 53  degrees Final    R Axis 06/01/2019 57  degrees Final    T Axis 06/01/2019 78  degrees Final    WBC 06/01/2019 5.6  3.5 - 11.0 k/uL Final    RBC 06/01/2019 4.23  4.0 - 5.2 m/uL Final    Hemoglobin 06/01/2019 12.6  12.0 - 16.0 g/dL Final    Hematocrit 06/01/2019 38.1  36 - 46 % Final    MCV 06/01/2019 89.9  80 - 100 fL Final    MCH 06/01/2019 29.9  26 - 34 pg Final    MCHC 06/01/2019 33.2  31 - 37 g/dL Final    RDW 06/01/2019 13.2  11.5 - 14.9 % Final    Platelets 86/91/3396 256  150 - 450 k/uL Final    MPV 06/01/2019 7.9  6.0 - 12.0 fL Final    NRBC Automated 06/01/2019 NOT REPORTED  per 100 WBC Final    Differential Type 06/01/2019 NOT REPORTED   Final    Seg Neutrophils 06/01/2019 69* 36 - 66 % Final    Lymphocytes 06/01/2019 17* 24 - 44 % Final    Monocytes 06/01/2019 13* 1 - 7 % Final    Eosinophils % 06/01/2019 1  0 - 4 % Final    Basophils 06/01/2019 0  0 - 2 % Final    Immature Granulocytes 06/01/2019 NOT REPORTED  0 % Final    Segs Absolute 06/01/2019 3.80  1.3 - 9.1 k/uL Final    Absolute Lymph # 06/01/2019 0.90* 1.0 - 4.8 k/uL Final    Absolute Mono # 06/01/2019 0.70  0.1 - 1.3 k/uL Final    Absolute Eos # 06/01/2019 0.10  0.0 - 0.4 k/uL Final    Basophils # 06/01/2019 0.00  0.0 - 0.2 k/uL Final    Absolute Immature Granulocyte Urine 06/01/2019 NEGATIVE  NEGATIVE Final    Specific Gravity, UA 06/01/2019 1.016  1.000 - 1.030 Final    Urine Hgb 06/01/2019 NEGATIVE  NEGATIVE Final    pH, UA 06/01/2019 6.5  5.0 - 8.0 Final    Protein, UA 06/01/2019 NEGATIVE  NEGATIVE Final    Urobilinogen, Urine 06/01/2019 Normal  Normal Final    Nitrite, Urine 06/01/2019 NEGATIVE  NEGATIVE Final    Leukocyte Esterase, Urine 06/01/2019 NEGATIVE  NEGATIVE Final    Urinalysis Comments 06/01/2019 Microscopic exam not performed based on chemical results unless requested in original order. Final    POC Glucose 06/01/2019 81  65 - 105 mg/dL Final    Cholesterol 06/03/2019 180  <200 mg/dL Final    Comment:    Cholesterol Guidelines:      <200  Desirable   200-240  Borderline      >240  Undesirable         HDL 06/03/2019 33* >40 mg/dL Final    Comment:    HDL Guidelines:    <40     Undesirable   40-59    Borderline    >59     Desirable         LDL Cholesterol 06/03/2019 123  0 - 130 mg/dL Final    Comment:    LDL Guidelines:     <100    Desirable   100-129   Near to/above Desirable   130-159   Borderline      >159   Undesirable     Direct (measured) LDL and calculated LDL are not interchangeable tests.  Chol/HDL Ratio 06/03/2019 5.5* <5 Final            Triglycerides 06/03/2019 119  <150 mg/dL Final    Comment:    Triglyceride Guidelines:     <150   Desirable   150-199  Borderline   200-499  High     >499   Very high   Based on AHA Guidelines for fasting triglyceride, October 2012.  VLDL 06/03/2019 NOT REPORTED  1 - 30 mg/dL Final    Hemoglobin A1C 06/02/2019 5.2  4.0 - 6.0 % Final    Estimated Avg Glucose 06/02/2019 103  mg/dL Final    Comment: The ADA and AACC recommend providing the estimated average glucose result to permit better   patient understanding of their HBA1c result.       Left Ventricular Ejection Fraction 06/03/2019 55   Final-Edited    LVEF MODALITY 06/03/2019 ECHO   Final-Edited    AMBREEN 06/03/2019 POSITIVE* NEGATIVE Final    Anti SSA 06/03/2019 217* <100 U/mL Final    Anti SSB 06/03/2019 29  <100 U/mL Final    Anti-Smith 06/03/2019 37  <100 U/mL Final    Anti RNP 06/03/2019 72  <100 U/mL Final    Anti-Scleroderma 06/03/2019 73  <100 U/mL Final    Anti RAHUL-1 06/03/2019 56  <100 U/mL Final    Anti ds DNA 06/03/2019 30  <100 IU/mL Final    Anti-Centromere 06/03/2019 217* <100 U/mL Final    Anti Histone 06/03/2019 65  <100 U/mL Final    AMBREEN Reference Range: 06/03/2019        Final    Comment: The following reference range is applicable for AMBREEN antibodies, including:     Anti-Smith (Sm), Anti-RNP, Anti-Scleroderma (Scl-70), Anti-Sjogren A (SSA), Anti-Sjogren   B (SSB), Anti-Rahul, Anti-Centromere, Anti-Histone     REFERENCE RANGE:  Negative         <100 U/mL  Positive         >120 U/mL  Equivocal     100-120 U/mL        The following reference range is applicable for Anti-Double Strand DNA (dsDNA):  REFERENCE RANGE:  Negative         <100 IU/mL  Positive         >120 IU/mL  Equivocal     100-120 IU/mL      except those listed in the interval history.        Diagnosis Date    Acid reflux     Arthritis     Cancer ([facility] [address]     cervical     Family history of breast cancer in first degree relative     sister    Gall stones     hx of    Hard of hearing     bilateral hearing aids    History of cervical cancer     Hypertension     Lateral meniscus tear     left knee    Wears dentures     Wears glasses         ALLERGIES:   Allergies   Allergen Reactions    Lisinopril Other (See Comments)    Losartan      Fatigue    Procardia [Nifedipine] Other (See Comments)       MEDICATIONS:   Current Outpatient Medications   Medication Sig Dispense Refill    aspirin 81 MG EC tablet Take 1 tablet by mouth daily 30 tablet 3    atorvastatin (LIPITOR) 40 MG tablet Take 1 tablet by mouth nightly 30 tablet 3    clopidogrel (PLAVIX) 75 MG tablet Take 1 tablet by mouth daily for 21 days 21 tablet 0    carvedilol (COREG) 3.125 MG tablet Take 1 tablet by mouth 2 times daily 180 tablet 0    hydrALAZINE (APRESOLINE) 50 MG tablet Take 1 tablet by mouth 4 times daily 360 tablet 1    pramipexole (MIRAPEX) 0.125 MG tablet Take 1 tablet by mouth nightly 90 tablet 1    Multiple Vitamins-Minerals (THERAPEUTIC MULTIVITAMIN-MINERALS) tablet Take 1 tablet by mouth daily       No current facility-administered medications for this visit. LABS & TESTS:      Lab Results   Component Value Date    WBC 5.6 06/01/2019    HGB 12.6 06/01/2019    HCT 38.1 06/01/2019    MCV 89.9 06/01/2019     06/01/2019       REVIEW OF SYSTEMS:      CONSTITUTIONAL Weight change: present, Appetite change: absent, Fatigue: absent    HEENT Ears: diminished, Visual disturbance: absent    RESPIRATORY Shortness of breath: absent, Cough: absent    CARDIOVASCULAR Chest pain: absent, Leg swelling :absent    GI Constipation: absent, Diarrhea: absent, Swallowing change: absent     Urinary frequency: absent, Urinary urgency: absent, Urinary incontinence: absent    MUSCULOSKELETAL Neck pain: absent, Back pain: absent, Stiffness: absent, Muscle pain: absent, Joint pain: absent Restless legs: present    DERMATOLOGIC Hair loss: absent, Skin changes: absent    NEUROLOGIC Memory loss: absent, Confusion: absent, Seizures: absent Trouble walking or imbalance: absent, Dizziness: absent, Weakness: absent, Numbness: absent Tremor: absent, Spasm: absent, Speech difficulty: absent, Headache: absent, Light sensitivity: absent    PSYCHIATRIC Anxiety: absent, Hallucination: absent, Mood disorder: absent    HEMATOLOGIC Abnormal bleeding: absent, Anemia: absent, Clotting disorder: absent, Lymph gland changes: absent     VITALS  /65 (Site: Right Lower Arm, Position: Sitting)   Pulse 90   Ht 5' 4\" (1.626 m)   Wt 118 lb (53.5 kg)   BMI 20.25 kg/m²        PHYSICAL EXAMINATIONS:     General appearance: cooperative  Skin: no rash or skin lesions.   HEENT: normocephalic  Optic Fundi: deferred  Neck: supple, no cervcical adenopathy or carotid bruit  Lungs: clear to auscultation  Heart: Regular rate and rhythm, normal S1, S2. No murmurs, clicks or gallops. Peripheral pulses: radial pulses palpable  Abdominal: BS present, soft, NT, ND  Extremities: no edema    NEUROLOGICAL EXAMINATION:     MS: awake, alert and oriented. No aphasia, dysarthria, or neglect  CNs: PERRLA, EOMI, VF full, sensation intact, face symmetric, hearing intact, soft palate rises on phonation, sternocleidomastoid and trapezius intact. Tongue midline, no fasciculations. Motor: no abnormal movements, tone and bulk okay. RUE: delta 5/5, biceps 5/5, triceps 5/5,  5/5  LUE: delta 5/5, biceps 5/5, triceps 5/5,  5/5  RLE: hf 5/5, ke 5/5, df 5/5, pf 5/5  LLE: hf 5/5, ke 5/5, df 5/5, pf 5/5  Reflexes: 2+ throughout, symmetric, babinski not present. Coordination: FNF no dysmetria, heel to shin okay, ESTELLA okay, negative Rhomberg. Gait: Normal straight, able to do Tandem. Sensory: Normal to light touch/temp/pp/vibration, intact joint position sense, no extinction. ASSRSSMENT/PLANS:      // Stroke like symptoms  - TIA vs hypertensive urgency, more likely to be the latter in setting of   - MRI brain: no stroke, CTA head, neck normal  - continue ASA 81mg daily, tomorrow stop plavix  - continue lipitor     // Unintentional weight loss  - follow with PCP for workup    // Hypertension  - follow with PCP  -encouraged pt pt to check frequently at home    >50% of 25 minute face to face time spent counseling patient, multiple issues discussed, all questions answered.      RTC in 6-8 months      Laury Taylor MD, MS

## 2019-06-27 ENCOUNTER — OFFICE VISIT (OUTPATIENT)
Dept: ORTHOPEDIC SURGERY | Age: 69
End: 2019-06-27

## 2019-06-27 VITALS — BODY MASS INDEX: 19.46 KG/M2 | HEIGHT: 64 IN | WEIGHT: 114 LBS

## 2019-06-27 DIAGNOSIS — Z98.890 S/P ARTHROSCOPY OF KNEE: Primary | ICD-10-CM

## 2019-06-27 PROCEDURE — 99024 POSTOP FOLLOW-UP VISIT: CPT | Performed by: ORTHOPAEDIC SURGERY

## 2019-07-02 ENCOUNTER — TELEPHONE (OUTPATIENT)
Dept: GASTROENTEROLOGY | Age: 69
End: 2019-07-02

## 2019-07-03 ENCOUNTER — OFFICE VISIT (OUTPATIENT)
Dept: GASTROENTEROLOGY | Age: 69
End: 2019-07-03
Payer: COMMERCIAL

## 2019-07-03 VITALS
SYSTOLIC BLOOD PRESSURE: 110 MMHG | BODY MASS INDEX: 20.43 KG/M2 | WEIGHT: 119 LBS | HEART RATE: 82 BPM | DIASTOLIC BLOOD PRESSURE: 61 MMHG

## 2019-07-03 DIAGNOSIS — K76.9 LIVER DISEASE, CHRONIC: Primary | ICD-10-CM

## 2019-07-03 DIAGNOSIS — R63.4 WEIGHT LOSS: ICD-10-CM

## 2019-07-03 PROCEDURE — 4040F PNEUMOC VAC/ADMIN/RCVD: CPT | Performed by: INTERNAL MEDICINE

## 2019-07-03 PROCEDURE — 99214 OFFICE O/P EST MOD 30 MIN: CPT | Performed by: INTERNAL MEDICINE

## 2019-07-03 PROCEDURE — 1036F TOBACCO NON-USER: CPT | Performed by: INTERNAL MEDICINE

## 2019-07-03 PROCEDURE — 1111F DSCHRG MED/CURRENT MED MERGE: CPT | Performed by: INTERNAL MEDICINE

## 2019-07-03 PROCEDURE — G8420 CALC BMI NORM PARAMETERS: HCPCS | Performed by: INTERNAL MEDICINE

## 2019-07-03 PROCEDURE — G8598 ASA/ANTIPLAT THER USED: HCPCS | Performed by: INTERNAL MEDICINE

## 2019-07-03 PROCEDURE — G8399 PT W/DXA RESULTS DOCUMENT: HCPCS | Performed by: INTERNAL MEDICINE

## 2019-07-03 PROCEDURE — 3017F COLORECTAL CA SCREEN DOC REV: CPT | Performed by: INTERNAL MEDICINE

## 2019-07-03 PROCEDURE — G8427 DOCREV CUR MEDS BY ELIG CLIN: HCPCS | Performed by: INTERNAL MEDICINE

## 2019-07-03 PROCEDURE — 1090F PRES/ABSN URINE INCON ASSESS: CPT | Performed by: INTERNAL MEDICINE

## 2019-07-03 PROCEDURE — 1123F ACP DISCUSS/DSCN MKR DOCD: CPT | Performed by: INTERNAL MEDICINE

## 2019-07-03 ASSESSMENT — ENCOUNTER SYMPTOMS
VOICE CHANGE: 0
CHOKING: 0
BLOOD IN STOOL: 0
VOMITING: 0
WHEEZING: 0
CONSTIPATION: 0
ALLERGIC/IMMUNOLOGIC NEGATIVE: 1
RECTAL PAIN: 0
ABDOMINAL DISTENTION: 0
NAUSEA: 0
TROUBLE SWALLOWING: 0
ABDOMINAL PAIN: 0
RESPIRATORY NEGATIVE: 1
BACK PAIN: 0
ANAL BLEEDING: 0
DIARRHEA: 0
SORE THROAT: 0
COUGH: 0
GASTROINTESTINAL NEGATIVE: 1
SINUS PRESSURE: 0

## 2019-07-03 NOTE — PROGRESS NOTES
Subjective:      Patient ID: Brooklynn Tapia is a 77 y.o. female. Chief Complaint   Patient presents with    Follow Up After Procedure     Patient is here today to follow up from her colonoscopy done on 5/31/19. Dr. Ramez Guzman MD our mutual patient Brooklynn Tapia was seen  for   1. Liver disease, chronic    2. Weight loss     . Patient looks stable. She had a colonoscopy done recently and was found to have 2 polyps. Polyp from the hepatic flexure histology revealed tubular adenoma. Rectal polyp was a hyperplastic polyp. At present patient denies symptoms. She is known to have chronic liver disease. Hepatitis C antibody was positive but RNA was negative. In the past AMBREEN levels were positive. She was advised to have rheumatology work-up. Past Medical, Family, and Social History reviewed and does contribute to the patient presenting condition. patient\"s PMH/PSH,SH,PSYCH hx, MEDs, ALLERGIES, and ROS was all reviewed and updated ion the appropriate sections      HPI    Review of Systems   Constitutional: Negative. Negative for appetite change, fatigue and unexpected weight change. HENT: Negative. Negative for dental problem, postnasal drip, sinus pressure, sore throat, trouble swallowing and voice change. Eyes: Positive for visual disturbance (glasses). Respiratory: Negative. Negative for cough, choking and wheezing. Cardiovascular: Negative. Negative for chest pain, palpitations and leg swelling. Gastrointestinal: Negative. Negative for abdominal distention, abdominal pain, anal bleeding, blood in stool, constipation, diarrhea, nausea, rectal pain and vomiting. Genitourinary: Negative. Negative for difficulty urinating. Musculoskeletal: Positive for arthralgias. Negative for back pain, gait problem and myalgias. Allergic/Immunologic: Negative. Negative for environmental allergies and food allergies. Neurological: Negative.   Negative for dizziness, weakness, light-headedness, numbness and headaches. Hematological: Bruises/bleeds easily. Psychiatric/Behavioral: Positive for sleep disturbance. The patient is not nervous/anxious. Objective:   Physical Exam   Constitutional: She is oriented to person, place, and time. She appears well-developed and well-nourished. HENT:   Head: Normocephalic and atraumatic. No oral lesions   Eyes: Pupils are equal, round, and reactive to light. Conjunctivae are normal. No scleral icterus. Neck: Normal range of motion. Neck supple. No hepatojugular reflux and no JVD present. No tracheal deviation present. No thyromegaly present. Cardiovascular: Normal rate, regular rhythm, normal heart sounds and intact distal pulses. Pulmonary/Chest: Effort normal and breath sounds normal. No respiratory distress. She has no wheezes. She has no rales. Abdominal: Soft. Bowel sounds are normal. She exhibits no distension, no ascites and no mass. There is no hepatomegaly. There is no tenderness. There is no rebound. No hernia. Musculoskeletal: She exhibits no edema or tenderness. No joint swelling   Lymphadenopathy:     She has no cervical adenopathy. Neurological: She is alert and oriented to person, place, and time. No cranial nerve deficit. Skin: Skin is warm. No bruising, no ecchymosis and no rash noted. No erythema. Psychiatric: Thought content normal.   Nursing note and vitals reviewed. Assessment:       Diagnosis Orders   1. Liver disease, chronic  Hepatic Function Panel    Lipase    CBC Auto Differential   2. Weight loss             Plan:      Patient is explained regarding colonoscopy findings and histology results. Her liver battery in March 2019 are unremarkable. Patient is advised to have labs as outlined above in the next 3 to 4 months. Also advised to see a rheumatologist regarding positive AMBREEN levels. She stated that she will discuss this with PCP.     I advised to see her in the next 4 to 5

## 2019-07-06 NOTE — PROGRESS NOTES
Procedure: Left knee diagnostic arthroscopy  Date of Procedure: 4/3/2019     HPI: Ms. Meghan Sanches is a 60-year-old who is approximately 3 months status post a left knee diagnostic arthroscopy. She is doing well. She reports having mild pain every once in a while. She is back to all her activities. Physical Examination:  Evaluation of patient's left knee and lower extremity demonstrates appropriately healed portal incisions. No warmth or erythema or swelling. Sensation is grossly intact light touch in all dermatomes and she has 2+ pedal pulses. She is non-tender to palpation diffusely. She has full knee extension and flexion to 145 degrees. Impression and plan: Ms. Meghan Sanches is a 60-year-old who is approximately 3 months status post left knee diagnostic arthroscopy. She continues to do well and is pleased with her results. At this time she was encouraged to continue on with her home exercise program and can continue with activities as tolerated.

## 2019-07-08 ENCOUNTER — OFFICE VISIT (OUTPATIENT)
Dept: FAMILY MEDICINE CLINIC | Age: 69
End: 2019-07-08
Payer: COMMERCIAL

## 2019-07-08 VITALS
HEART RATE: 62 BPM | OXYGEN SATURATION: 98 % | SYSTOLIC BLOOD PRESSURE: 130 MMHG | TEMPERATURE: 98.8 F | RESPIRATION RATE: 12 BRPM | DIASTOLIC BLOOD PRESSURE: 70 MMHG | WEIGHT: 119.2 LBS | BODY MASS INDEX: 20.46 KG/M2

## 2019-07-08 DIAGNOSIS — G25.81 RLS (RESTLESS LEGS SYNDROME): ICD-10-CM

## 2019-07-08 DIAGNOSIS — Z23 NEED FOR PNEUMOCOCCAL VACCINATION: ICD-10-CM

## 2019-07-08 DIAGNOSIS — E78.5 HYPERLIPIDEMIA, UNSPECIFIED HYPERLIPIDEMIA TYPE: ICD-10-CM

## 2019-07-08 DIAGNOSIS — I10 ESSENTIAL HYPERTENSION: Primary | ICD-10-CM

## 2019-07-08 PROCEDURE — 99214 OFFICE O/P EST MOD 30 MIN: CPT | Performed by: FAMILY MEDICINE

## 2019-07-08 PROCEDURE — 90670 PCV13 VACCINE IM: CPT | Performed by: FAMILY MEDICINE

## 2019-07-08 PROCEDURE — G8399 PT W/DXA RESULTS DOCUMENT: HCPCS | Performed by: FAMILY MEDICINE

## 2019-07-08 PROCEDURE — 90471 IMMUNIZATION ADMIN: CPT | Performed by: FAMILY MEDICINE

## 2019-07-08 PROCEDURE — G8598 ASA/ANTIPLAT THER USED: HCPCS | Performed by: FAMILY MEDICINE

## 2019-07-08 PROCEDURE — 1123F ACP DISCUSS/DSCN MKR DOCD: CPT | Performed by: FAMILY MEDICINE

## 2019-07-08 PROCEDURE — 1090F PRES/ABSN URINE INCON ASSESS: CPT | Performed by: FAMILY MEDICINE

## 2019-07-08 PROCEDURE — 3017F COLORECTAL CA SCREEN DOC REV: CPT | Performed by: FAMILY MEDICINE

## 2019-07-08 PROCEDURE — 4040F PNEUMOC VAC/ADMIN/RCVD: CPT | Performed by: FAMILY MEDICINE

## 2019-07-08 PROCEDURE — G8427 DOCREV CUR MEDS BY ELIG CLIN: HCPCS | Performed by: FAMILY MEDICINE

## 2019-07-08 PROCEDURE — G8420 CALC BMI NORM PARAMETERS: HCPCS | Performed by: FAMILY MEDICINE

## 2019-07-08 PROCEDURE — 1036F TOBACCO NON-USER: CPT | Performed by: FAMILY MEDICINE

## 2019-07-08 ASSESSMENT — ENCOUNTER SYMPTOMS
NAUSEA: 0
SHORTNESS OF BREATH: 0
ABDOMINAL PAIN: 0
SORE THROAT: 0

## 2019-07-08 NOTE — PROGRESS NOTES
for her hypertension and she states she had a mini stroke and she was on Plavix they took her off because she was having bruising and she is on aspirin now denies of any chest pain or shortness of breath she also has restless leg and is on Mirapex which seems to have helped her  Review of Systems   Constitutional: Negative for appetite change. HENT: Negative for ear pain and sore throat. Eyes: Positive for visual disturbance. Wears glasses   Respiratory: Negative for shortness of breath. Cardiovascular: Negative for chest pain and palpitations. Gastrointestinal: Negative for abdominal pain and nausea. Genitourinary: Negative for frequency, pelvic pain and vaginal discharge. Musculoskeletal: Negative for arthralgias. Neurological: Negative for dizziness and headaches. Objective:   Physical Exam   Constitutional: She is oriented to person, place, and time. She appears well-developed and well-nourished. /70   Pulse 62   Temp 98.8 °F (37.1 °C)   Resp 12   Wt 119 lb 3.2 oz (54.1 kg)   SpO2 98%   BMI 20.46 kg/m²    HENT:   Head: Normocephalic. Mouth/Throat: Oropharynx is clear and moist.        Eyes: Conjunctivae are normal.   Neck: No thyromegaly present. Cardiovascular: Normal rate and regular rhythm. Pulmonary/Chest: Breath sounds normal. She has no rales. Abdominal: Soft. Bowel sounds are normal. There is no tenderness. Musculoskeletal: She exhibits no edema. Lymphadenopathy:     She has no cervical adenopathy. Neurological: She is alert and oriented to person, place, and time. Nursing note and vitals reviewed. Assessment:        Diagnosis Orders   1. Essential hypertension   controlled   2. Need for pneumococcal vaccination  Pneumococcal conjugate vaccine 13-valent   3. RLS (restless legs syndrome)   controlled   4.  Hyperlipidemia, unspecified hyperlipidemia type               Plan:         Orders Placed This Encounter   Procedures    Pneumococcal

## 2019-07-11 ENCOUNTER — HOSPITAL ENCOUNTER (OUTPATIENT)
Age: 69
Discharge: HOME | End: 2019-07-11
Payer: COMMERCIAL

## 2019-07-11 DIAGNOSIS — K76.9 LIVER DISEASE, CHRONIC: ICD-10-CM

## 2019-07-11 LAB
ABSOLUTE EOS #: 0.1 K/UL (ref 0–0.4)
ABSOLUTE IMMATURE GRANULOCYTE: ABNORMAL K/UL (ref 0–0.3)
ABSOLUTE LYMPH #: 0.9 K/UL (ref 1–4.8)
ABSOLUTE MONO #: 0.7 K/UL (ref 0.1–1.3)
ALBUMIN SERPL-MCNC: 3.6 G/DL (ref 3.5–5.2)
ALBUMIN/GLOBULIN RATIO: ABNORMAL (ref 1–2.5)
ALP BLD-CCNC: 79 U/L (ref 35–104)
ALT SERPL-CCNC: 20 U/L (ref 5–33)
AST SERPL-CCNC: 30 U/L
BASOPHILS # BLD: 0 % (ref 0–2)
BASOPHILS ABSOLUTE: 0 K/UL (ref 0–0.2)
BILIRUB SERPL-MCNC: 0.28 MG/DL (ref 0.3–1.2)
BILIRUBIN DIRECT: 0.09 MG/DL
BILIRUBIN, INDIRECT: 0.19 MG/DL (ref 0–1)
DIFFERENTIAL TYPE: ABNORMAL
EOSINOPHILS RELATIVE PERCENT: 3 % (ref 0–4)
GLOBULIN: ABNORMAL G/DL (ref 1.5–3.8)
HCT VFR BLD CALC: 32.9 % (ref 36–46)
HEMOGLOBIN: 10.8 G/DL (ref 12–16)
IMMATURE GRANULOCYTES: ABNORMAL %
LIPASE: 90 U/L (ref 13–60)
LYMPHOCYTES # BLD: 18 % (ref 24–44)
MCH RBC QN AUTO: 29.7 PG (ref 26–34)
MCHC RBC AUTO-ENTMCNC: 32.8 G/DL (ref 31–37)
MCV RBC AUTO: 90.6 FL (ref 80–100)
MONOCYTES # BLD: 15 % (ref 1–7)
NRBC AUTOMATED: ABNORMAL PER 100 WBC
PDW BLD-RTO: 14 % (ref 11.5–14.9)
PLATELET # BLD: 248 K/UL (ref 150–450)
PLATELET ESTIMATE: ABNORMAL
PMV BLD AUTO: 7.9 FL (ref 6–12)
RBC # BLD: 3.63 M/UL (ref 4–5.2)
RBC # BLD: ABNORMAL 10*6/UL
SEG NEUTROPHILS: 64 % (ref 36–66)
SEGMENTED NEUTROPHILS ABSOLUTE COUNT: 3.2 K/UL (ref 1.3–9.1)
TOTAL PROTEIN: 6.9 G/DL (ref 6.4–8.3)
WBC # BLD: 5 K/UL (ref 3.5–11)
WBC # BLD: ABNORMAL 10*3/UL

## 2019-07-11 PROCEDURE — 85025 COMPLETE CBC W/AUTO DIFF WBC: CPT

## 2019-07-11 PROCEDURE — 83690 ASSAY OF LIPASE: CPT

## 2019-07-11 PROCEDURE — 80076 HEPATIC FUNCTION PANEL: CPT

## 2019-07-11 PROCEDURE — 36415 COLL VENOUS BLD VENIPUNCTURE: CPT

## 2019-07-25 RX ORDER — CARVEDILOL 3.12 MG/1
3.12 TABLET ORAL 2 TIMES DAILY
Qty: 180 TABLET | Refills: 0 | Status: SHIPPED | OUTPATIENT
Start: 2019-07-25 | End: 2019-11-05 | Stop reason: SDUPTHER

## 2019-07-26 ENCOUNTER — TELEPHONE (OUTPATIENT)
Dept: GASTROENTEROLOGY | Age: 69
End: 2019-07-26

## 2019-07-26 DIAGNOSIS — K76.9 LIVER DISEASE, CHRONIC: Primary | ICD-10-CM

## 2019-10-15 RX ORDER — ATORVASTATIN CALCIUM 40 MG/1
40 TABLET, FILM COATED ORAL NIGHTLY
Qty: 90 TABLET | Refills: 0 | Status: ON HOLD | OUTPATIENT
Start: 2019-10-15 | End: 2020-03-03 | Stop reason: HOSPADM

## 2019-10-28 ENCOUNTER — TELEPHONE (OUTPATIENT)
Dept: GASTROENTEROLOGY | Age: 69
End: 2019-10-28

## 2019-10-30 ENCOUNTER — HOSPITAL ENCOUNTER (OUTPATIENT)
Age: 69
Discharge: HOME | End: 2019-10-30
Payer: COMMERCIAL

## 2019-10-30 DIAGNOSIS — K76.9 LIVER DISEASE, CHRONIC: ICD-10-CM

## 2019-10-30 LAB — LIPASE: 81 U/L (ref 13–60)

## 2019-10-30 PROCEDURE — 83690 ASSAY OF LIPASE: CPT

## 2019-10-30 PROCEDURE — 36415 COLL VENOUS BLD VENIPUNCTURE: CPT

## 2019-11-04 ENCOUNTER — OFFICE VISIT (OUTPATIENT)
Dept: GASTROENTEROLOGY | Age: 69
End: 2019-11-04
Payer: COMMERCIAL

## 2019-11-04 VITALS
SYSTOLIC BLOOD PRESSURE: 119 MMHG | BODY MASS INDEX: 19.38 KG/M2 | DIASTOLIC BLOOD PRESSURE: 62 MMHG | HEART RATE: 88 BPM | WEIGHT: 112.9 LBS

## 2019-11-04 DIAGNOSIS — K76.9 LIVER DISEASE, CHRONIC: ICD-10-CM

## 2019-11-04 DIAGNOSIS — K86.1 IDIOPATHIC CHRONIC PANCREATITIS (HCC): Primary | ICD-10-CM

## 2019-11-04 DIAGNOSIS — K21.9 GASTROESOPHAGEAL REFLUX DISEASE WITHOUT ESOPHAGITIS: ICD-10-CM

## 2019-11-04 PROCEDURE — 1123F ACP DISCUSS/DSCN MKR DOCD: CPT | Performed by: INTERNAL MEDICINE

## 2019-11-04 PROCEDURE — APPSS15 APP SPLIT SHARED TIME 0-15 MINUTES: Performed by: NURSE PRACTITIONER

## 2019-11-04 PROCEDURE — 99214 OFFICE O/P EST MOD 30 MIN: CPT | Performed by: INTERNAL MEDICINE

## 2019-11-04 PROCEDURE — 1036F TOBACCO NON-USER: CPT | Performed by: INTERNAL MEDICINE

## 2019-11-04 PROCEDURE — G8420 CALC BMI NORM PARAMETERS: HCPCS | Performed by: INTERNAL MEDICINE

## 2019-11-04 PROCEDURE — G8484 FLU IMMUNIZE NO ADMIN: HCPCS | Performed by: INTERNAL MEDICINE

## 2019-11-04 PROCEDURE — G8427 DOCREV CUR MEDS BY ELIG CLIN: HCPCS | Performed by: INTERNAL MEDICINE

## 2019-11-04 PROCEDURE — 3017F COLORECTAL CA SCREEN DOC REV: CPT | Performed by: INTERNAL MEDICINE

## 2019-11-04 PROCEDURE — 4040F PNEUMOC VAC/ADMIN/RCVD: CPT | Performed by: INTERNAL MEDICINE

## 2019-11-04 PROCEDURE — 1090F PRES/ABSN URINE INCON ASSESS: CPT | Performed by: INTERNAL MEDICINE

## 2019-11-04 PROCEDURE — G8399 PT W/DXA RESULTS DOCUMENT: HCPCS | Performed by: INTERNAL MEDICINE

## 2019-11-04 PROCEDURE — G8598 ASA/ANTIPLAT THER USED: HCPCS | Performed by: INTERNAL MEDICINE

## 2019-11-04 ASSESSMENT — ENCOUNTER SYMPTOMS
RESPIRATORY NEGATIVE: 1
COUGH: 0
SINUS PRESSURE: 0
WHEEZING: 0
ABDOMINAL DISTENTION: 0
CONSTIPATION: 0
BACK PAIN: 0
GASTROINTESTINAL NEGATIVE: 1
ALLERGIC/IMMUNOLOGIC NEGATIVE: 1
DIARRHEA: 0
SORE THROAT: 1
VOMITING: 0
BLOOD IN STOOL: 0
ABDOMINAL PAIN: 0
NAUSEA: 0
ANAL BLEEDING: 0
RECTAL PAIN: 0
CHOKING: 0
TROUBLE SWALLOWING: 0
VOICE CHANGE: 0

## 2019-11-05 RX ORDER — CARVEDILOL 3.12 MG/1
3.12 TABLET ORAL 2 TIMES DAILY
Qty: 180 TABLET | Refills: 0 | Status: SHIPPED | OUTPATIENT
Start: 2019-11-05 | End: 2020-03-27

## 2019-11-12 ENCOUNTER — OFFICE VISIT (OUTPATIENT)
Dept: FAMILY MEDICINE CLINIC | Age: 69
End: 2019-11-12
Payer: COMMERCIAL

## 2019-11-12 VITALS
OXYGEN SATURATION: 98 % | DIASTOLIC BLOOD PRESSURE: 86 MMHG | HEART RATE: 83 BPM | SYSTOLIC BLOOD PRESSURE: 130 MMHG | WEIGHT: 114.6 LBS | HEIGHT: 64 IN | BODY MASS INDEX: 19.56 KG/M2

## 2019-11-12 DIAGNOSIS — I10 ESSENTIAL HYPERTENSION: Primary | ICD-10-CM

## 2019-11-12 DIAGNOSIS — E78.5 HYPERLIPIDEMIA, UNSPECIFIED HYPERLIPIDEMIA TYPE: ICD-10-CM

## 2019-11-12 DIAGNOSIS — G25.81 RLS (RESTLESS LEGS SYNDROME): ICD-10-CM

## 2019-11-12 PROBLEM — R20.2 PARESTHESIA: Status: RESOLVED | Noted: 2019-06-01 | Resolved: 2019-11-12

## 2019-11-12 PROBLEM — B02.9 HERPES ZOSTER WITHOUT COMPLICATION: Status: RESOLVED | Noted: 2019-02-06 | Resolved: 2019-11-12

## 2019-11-12 PROCEDURE — 1090F PRES/ABSN URINE INCON ASSESS: CPT | Performed by: FAMILY MEDICINE

## 2019-11-12 PROCEDURE — G8427 DOCREV CUR MEDS BY ELIG CLIN: HCPCS | Performed by: FAMILY MEDICINE

## 2019-11-12 PROCEDURE — G8399 PT W/DXA RESULTS DOCUMENT: HCPCS | Performed by: FAMILY MEDICINE

## 2019-11-12 PROCEDURE — 4040F PNEUMOC VAC/ADMIN/RCVD: CPT | Performed by: FAMILY MEDICINE

## 2019-11-12 PROCEDURE — 1123F ACP DISCUSS/DSCN MKR DOCD: CPT | Performed by: FAMILY MEDICINE

## 2019-11-12 PROCEDURE — 99213 OFFICE O/P EST LOW 20 MIN: CPT | Performed by: FAMILY MEDICINE

## 2019-11-12 PROCEDURE — G8598 ASA/ANTIPLAT THER USED: HCPCS | Performed by: FAMILY MEDICINE

## 2019-11-12 PROCEDURE — G8420 CALC BMI NORM PARAMETERS: HCPCS | Performed by: FAMILY MEDICINE

## 2019-11-12 PROCEDURE — 1036F TOBACCO NON-USER: CPT | Performed by: FAMILY MEDICINE

## 2019-11-12 PROCEDURE — 3017F COLORECTAL CA SCREEN DOC REV: CPT | Performed by: FAMILY MEDICINE

## 2019-11-12 PROCEDURE — G8484 FLU IMMUNIZE NO ADMIN: HCPCS | Performed by: FAMILY MEDICINE

## 2019-11-12 ASSESSMENT — ENCOUNTER SYMPTOMS
SORE THROAT: 0
ABDOMINAL PAIN: 0
SHORTNESS OF BREATH: 0
NAUSEA: 0

## 2019-11-18 ENCOUNTER — HOSPITAL ENCOUNTER (OUTPATIENT)
Age: 69
Discharge: HOME | End: 2019-11-18
Payer: COMMERCIAL

## 2019-11-18 DIAGNOSIS — K76.9 LIVER DISEASE, CHRONIC: ICD-10-CM

## 2019-11-18 LAB
ALBUMIN SERPL-MCNC: 3.3 G/DL (ref 3.5–5.2)
ALBUMIN/GLOBULIN RATIO: ABNORMAL (ref 1–2.5)
ALP BLD-CCNC: 199 U/L (ref 35–104)
ALT SERPL-CCNC: 57 U/L (ref 5–33)
AST SERPL-CCNC: 76 U/L
BILIRUB SERPL-MCNC: 0.33 MG/DL (ref 0.3–1.2)
BILIRUBIN DIRECT: 0.11 MG/DL
BILIRUBIN, INDIRECT: 0.22 MG/DL (ref 0–1)
BUN BLDV-MCNC: 15 MG/DL (ref 8–23)
CREAT SERPL-MCNC: 0.92 MG/DL (ref 0.5–0.9)
GFR AFRICAN AMERICAN: >60 ML/MIN
GFR NON-AFRICAN AMERICAN: >60 ML/MIN
GFR SERPL CREATININE-BSD FRML MDRD: ABNORMAL ML/MIN/{1.73_M2}
GFR SERPL CREATININE-BSD FRML MDRD: ABNORMAL ML/MIN/{1.73_M2}
GLOBULIN: ABNORMAL G/DL (ref 1.5–3.8)
LIPASE: 99 U/L (ref 13–60)
TOTAL PROTEIN: 6.9 G/DL (ref 6.4–8.3)

## 2019-11-18 PROCEDURE — 36415 COLL VENOUS BLD VENIPUNCTURE: CPT

## 2019-11-18 PROCEDURE — 84520 ASSAY OF UREA NITROGEN: CPT

## 2019-11-18 PROCEDURE — 83690 ASSAY OF LIPASE: CPT

## 2019-11-18 PROCEDURE — 80076 HEPATIC FUNCTION PANEL: CPT

## 2019-11-18 PROCEDURE — 82565 ASSAY OF CREATININE: CPT

## 2019-12-11 DIAGNOSIS — M70.62 TROCHANTERIC BURSITIS OF LEFT HIP: ICD-10-CM

## 2019-12-11 RX ORDER — PRAMIPEXOLE DIHYDROCHLORIDE 0.12 MG/1
TABLET ORAL
Qty: 90 TABLET | Refills: 1 | Status: ON HOLD | OUTPATIENT
Start: 2019-12-11 | End: 2020-02-19 | Stop reason: SDUPTHER

## 2019-12-12 RX ORDER — MELOXICAM 15 MG/1
TABLET ORAL
Qty: 30 TABLET | Refills: 3 | Status: ON HOLD | OUTPATIENT
Start: 2019-12-12 | End: 2020-03-03 | Stop reason: HOSPADM

## 2019-12-16 ENCOUNTER — OFFICE VISIT (OUTPATIENT)
Dept: NEUROLOGY | Age: 69
End: 2019-12-16
Payer: COMMERCIAL

## 2019-12-16 VITALS
BODY MASS INDEX: 19.57 KG/M2 | HEART RATE: 82 BPM | WEIGHT: 114.64 LBS | HEIGHT: 64 IN | DIASTOLIC BLOOD PRESSURE: 63 MMHG | TEMPERATURE: 97.7 F | SYSTOLIC BLOOD PRESSURE: 123 MMHG

## 2019-12-16 DIAGNOSIS — R63.4 WEIGHT LOSS: ICD-10-CM

## 2019-12-16 DIAGNOSIS — R74.8 ABNORMAL LIVER ENZYMES: ICD-10-CM

## 2019-12-16 DIAGNOSIS — M35.00 SICCA, UNSPECIFIED TYPE (HCC): ICD-10-CM

## 2019-12-16 DIAGNOSIS — R76.8 SS-A ANTIBODY POSITIVE: ICD-10-CM

## 2019-12-16 DIAGNOSIS — R76.8 POSITIVE ANA (ANTINUCLEAR ANTIBODY): Primary | ICD-10-CM

## 2019-12-16 PROCEDURE — G8598 ASA/ANTIPLAT THER USED: HCPCS | Performed by: PSYCHIATRY & NEUROLOGY

## 2019-12-16 PROCEDURE — 1090F PRES/ABSN URINE INCON ASSESS: CPT | Performed by: PSYCHIATRY & NEUROLOGY

## 2019-12-16 PROCEDURE — G8484 FLU IMMUNIZE NO ADMIN: HCPCS | Performed by: PSYCHIATRY & NEUROLOGY

## 2019-12-16 PROCEDURE — 4040F PNEUMOC VAC/ADMIN/RCVD: CPT | Performed by: PSYCHIATRY & NEUROLOGY

## 2019-12-16 PROCEDURE — 1123F ACP DISCUSS/DSCN MKR DOCD: CPT | Performed by: PSYCHIATRY & NEUROLOGY

## 2019-12-16 PROCEDURE — G8399 PT W/DXA RESULTS DOCUMENT: HCPCS | Performed by: PSYCHIATRY & NEUROLOGY

## 2019-12-16 PROCEDURE — 3017F COLORECTAL CA SCREEN DOC REV: CPT | Performed by: PSYCHIATRY & NEUROLOGY

## 2019-12-16 PROCEDURE — G8427 DOCREV CUR MEDS BY ELIG CLIN: HCPCS | Performed by: PSYCHIATRY & NEUROLOGY

## 2019-12-16 PROCEDURE — 1036F TOBACCO NON-USER: CPT | Performed by: PSYCHIATRY & NEUROLOGY

## 2019-12-16 PROCEDURE — G8420 CALC BMI NORM PARAMETERS: HCPCS | Performed by: PSYCHIATRY & NEUROLOGY

## 2019-12-16 PROCEDURE — 99214 OFFICE O/P EST MOD 30 MIN: CPT | Performed by: PSYCHIATRY & NEUROLOGY

## 2019-12-26 RX ORDER — HYDRALAZINE HYDROCHLORIDE 50 MG/1
TABLET, FILM COATED ORAL
Qty: 360 TABLET | Refills: 1 | Status: SHIPPED | OUTPATIENT
Start: 2019-12-26 | End: 2020-06-15 | Stop reason: ALTCHOICE

## 2020-01-21 ENCOUNTER — OFFICE VISIT (OUTPATIENT)
Dept: ORTHOPEDIC SURGERY | Age: 70
End: 2020-01-21
Payer: MEDICARE

## 2020-01-21 VITALS — HEIGHT: 64 IN | BODY MASS INDEX: 19.46 KG/M2 | WEIGHT: 114 LBS

## 2020-01-21 PROCEDURE — G8420 CALC BMI NORM PARAMETERS: HCPCS | Performed by: ORTHOPAEDIC SURGERY

## 2020-01-21 PROCEDURE — G8427 DOCREV CUR MEDS BY ELIG CLIN: HCPCS | Performed by: ORTHOPAEDIC SURGERY

## 2020-01-21 PROCEDURE — 99213 OFFICE O/P EST LOW 20 MIN: CPT | Performed by: ORTHOPAEDIC SURGERY

## 2020-01-21 PROCEDURE — 3017F COLORECTAL CA SCREEN DOC REV: CPT | Performed by: ORTHOPAEDIC SURGERY

## 2020-01-21 PROCEDURE — 1036F TOBACCO NON-USER: CPT | Performed by: ORTHOPAEDIC SURGERY

## 2020-01-21 PROCEDURE — G8484 FLU IMMUNIZE NO ADMIN: HCPCS | Performed by: ORTHOPAEDIC SURGERY

## 2020-01-21 PROCEDURE — 1123F ACP DISCUSS/DSCN MKR DOCD: CPT | Performed by: ORTHOPAEDIC SURGERY

## 2020-01-21 PROCEDURE — 4040F PNEUMOC VAC/ADMIN/RCVD: CPT | Performed by: ORTHOPAEDIC SURGERY

## 2020-01-21 PROCEDURE — G8399 PT W/DXA RESULTS DOCUMENT: HCPCS | Performed by: ORTHOPAEDIC SURGERY

## 2020-01-21 PROCEDURE — 1090F PRES/ABSN URINE INCON ASSESS: CPT | Performed by: ORTHOPAEDIC SURGERY

## 2020-01-21 RX ORDER — DICLOFENAC SODIUM 75 MG/1
75 TABLET, DELAYED RELEASE ORAL 2 TIMES DAILY WITH MEALS
Qty: 28 TABLET | Refills: 0 | Status: ON HOLD | OUTPATIENT
Start: 2020-01-21 | End: 2020-03-03 | Stop reason: HOSPADM

## 2020-01-21 NOTE — PROGRESS NOTES
bilaterally  Neuro: Sensation grossly intact to light touch diffusely  Alignment: Normal  Tenderness: Tender to palpation over the anterior and medial joint line of the left knee    ROM: (Degrees)    Right   A P   Left   A P    Extension  0    Extension  0   Flexion   120    Flexion   120      Crepitation  No    Crepitation  No      Muscle strength:    Right       Left    Flexion   5    Flexion   5  Extension  5    Extension  5  SLR   5    SLR   5    Extensor lag   n    Extensor lag  n      Special testing:    Right          Left    n    Pain with deep knee flexion   y  n    Patellar grind     y  n    Patellar apprehension    n  n    Patellar glide     n    n    Lachman     n  n    Anterior drawer    n  n    Pivot shift     n  n    Posterior drawer    n  n    Dial test     n  n    Posterolateral drawer    n  n    Posterior Sag     n  n    MCL      n  n    LCL      n    n    Medial joint line tenderness   y  n    Lateral joint line tenderness   n  n    Appley's     y      Imaging:  Xrays: 4 views of the left knee obtained on 1/21/20 were independently reviewed   Indications: Left knee pain  Findings: Well-preserved joint spaces tricompartmentally. No obvious fracture, dislocation, or subluxation. Osteopenic appearing bone. Impression: Left knee radiograph with osteopenic appearing bone but otherwise no obvious acute osseous abnormality. Impression/Plan:     Marisol Dawn is a 77 y.o. old female with left knee pain likely due to a contusion versus medial meniscus tear. I had a discussion with the patient today with regards to this. We did discuss treatment options available to her. I recommend proceeding conservatively at this time. Following a discussion about cortisone injections versus anti-inflammatory she has elected to proceed with the latter. Consequently a prescription for Voltaren was electronically sent to her pharmacy. I also provided her with a prescription for physical therapy.   I anticipate gradual improvement with this treatment however if she has persistent or worsening pain she was encouraged to return for reevaluation. She may return or call at anytime with questions and or concerns.       NA = Not assessed  n = No  y = Yes  SLR = Straight leg raise  MCL = Medial collateral ligament  LCL = Lateral collateral ligament

## 2020-02-05 ENCOUNTER — HOSPITAL ENCOUNTER (OUTPATIENT)
Dept: CT IMAGING | Age: 70
Discharge: HOME | End: 2020-02-07
Payer: MEDICARE

## 2020-02-05 LAB
BUN BLDV-MCNC: 10 MG/DL (ref 8–23)
CREAT SERPL-MCNC: 0.84 MG/DL (ref 0.5–0.9)
GFR AFRICAN AMERICAN: >60 ML/MIN
GFR NON-AFRICAN AMERICAN: >60 ML/MIN
GFR SERPL CREATININE-BSD FRML MDRD: NORMAL ML/MIN/{1.73_M2}
GFR SERPL CREATININE-BSD FRML MDRD: NORMAL ML/MIN/{1.73_M2}

## 2020-02-05 PROCEDURE — 74177 CT ABD & PELVIS W/CONTRAST: CPT

## 2020-02-05 PROCEDURE — 36415 COLL VENOUS BLD VENIPUNCTURE: CPT

## 2020-02-05 PROCEDURE — 84520 ASSAY OF UREA NITROGEN: CPT

## 2020-02-05 PROCEDURE — 2580000003 HC RX 258: Performed by: INTERNAL MEDICINE

## 2020-02-05 PROCEDURE — 82565 ASSAY OF CREATININE: CPT

## 2020-02-05 PROCEDURE — 6360000004 HC RX CONTRAST MEDICATION: Performed by: INTERNAL MEDICINE

## 2020-02-05 RX ORDER — 0.9 % SODIUM CHLORIDE 0.9 %
80 INTRAVENOUS SOLUTION INTRAVENOUS ONCE
Status: COMPLETED | OUTPATIENT
Start: 2020-02-05 | End: 2020-02-05

## 2020-02-05 RX ORDER — SODIUM CHLORIDE 0.9 % (FLUSH) 0.9 %
10 SYRINGE (ML) INJECTION PRN
Status: DISCONTINUED | OUTPATIENT
Start: 2020-02-05 | End: 2020-02-08 | Stop reason: HOSPADM

## 2020-02-05 RX ADMIN — IOPAMIDOL 75 ML: 755 INJECTION, SOLUTION INTRAVENOUS at 11:09

## 2020-02-05 RX ADMIN — IOHEXOL 50 ML: 240 INJECTION, SOLUTION INTRATHECAL; INTRAVASCULAR; INTRAVENOUS; ORAL at 11:09

## 2020-02-05 RX ADMIN — Medication 10 ML: at 11:10

## 2020-02-05 RX ADMIN — SODIUM CHLORIDE 80 ML: 9 INJECTION, SOLUTION INTRAVENOUS at 11:10

## 2020-02-12 ENCOUNTER — APPOINTMENT (OUTPATIENT)
Dept: INTERVENTIONAL RADIOLOGY/VASCULAR | Age: 70
DRG: 020 | End: 2020-02-12
Payer: MEDICARE

## 2020-02-12 ENCOUNTER — APPOINTMENT (OUTPATIENT)
Dept: CT IMAGING | Age: 70
End: 2020-02-12
Payer: MEDICARE

## 2020-02-12 ENCOUNTER — ANESTHESIA EVENT (OUTPATIENT)
Dept: INTERVENTIONAL RADIOLOGY/VASCULAR | Age: 70
DRG: 020 | End: 2020-02-12
Payer: MEDICARE

## 2020-02-12 ENCOUNTER — ANESTHESIA (OUTPATIENT)
Dept: INTERVENTIONAL RADIOLOGY/VASCULAR | Age: 70
DRG: 020 | End: 2020-02-12
Payer: MEDICARE

## 2020-02-12 ENCOUNTER — HOSPITAL ENCOUNTER (INPATIENT)
Age: 70
LOS: 20 days | Discharge: HOME | DRG: 020 | End: 2020-03-03
Attending: EMERGENCY MEDICINE | Admitting: PSYCHIATRY & NEUROLOGY
Payer: MEDICARE

## 2020-02-12 ENCOUNTER — HOSPITAL ENCOUNTER (EMERGENCY)
Age: 70
Discharge: ANOTHER HOSPITAL | End: 2020-02-12
Attending: EMERGENCY MEDICINE
Payer: MEDICARE

## 2020-02-12 ENCOUNTER — APPOINTMENT (OUTPATIENT)
Dept: CT IMAGING | Age: 70
DRG: 020 | End: 2020-02-12
Payer: MEDICARE

## 2020-02-12 VITALS
WEIGHT: 108 LBS | OXYGEN SATURATION: 99 % | DIASTOLIC BLOOD PRESSURE: 62 MMHG | SYSTOLIC BLOOD PRESSURE: 157 MMHG | BODY MASS INDEX: 19.14 KG/M2 | HEIGHT: 63 IN | TEMPERATURE: 98.4 F | HEART RATE: 73 BPM | RESPIRATION RATE: 18 BRPM

## 2020-02-12 VITALS — DIASTOLIC BLOOD PRESSURE: 55 MMHG | OXYGEN SATURATION: 99 % | SYSTOLIC BLOOD PRESSURE: 108 MMHG | TEMPERATURE: 94.5 F

## 2020-02-12 PROBLEM — I60.6 SUBARACHNOID HEMORRHAGE FROM ANTERIOR CEREBRAL ARTERY ANEURYSM (HCC): Status: ACTIVE | Noted: 2020-02-12

## 2020-02-12 PROBLEM — I67.1 CEREBRAL ANEURYSM: Status: ACTIVE | Noted: 2020-02-12

## 2020-02-12 PROBLEM — Z98.890 STATUS POST COIL EMBOLIZATION OF CEREBRAL ANEURYSM: Status: ACTIVE | Noted: 2020-02-12

## 2020-02-12 PROBLEM — I60.9 SAH (SUBARACHNOID HEMORRHAGE) (HCC): Status: ACTIVE | Noted: 2020-02-12

## 2020-02-12 PROBLEM — I60.7 CEREBRAL ANEURYSM RUPTURE (HCC): Status: ACTIVE | Noted: 2020-02-12

## 2020-02-12 LAB
ABO/RH: NORMAL
ABSOLUTE EOS #: 0.05 K/UL (ref 0–0.44)
ABSOLUTE IMMATURE GRANULOCYTE: 0.01 K/UL (ref 0–0.3)
ABSOLUTE LYMPH #: 0.72 K/UL (ref 1.1–3.7)
ABSOLUTE MONO #: 0.61 K/UL (ref 0.1–1.2)
ALBUMIN SERPL-MCNC: 3.1 G/DL (ref 3.5–5.2)
ALBUMIN/GLOBULIN RATIO: ABNORMAL (ref 1–2.5)
ALP BLD-CCNC: 169 U/L (ref 35–104)
ALT SERPL-CCNC: 41 U/L (ref 5–33)
ANION GAP SERPL CALCULATED.3IONS-SCNC: 8 MMOL/L (ref 9–17)
ANTIBODY SCREEN: NEGATIVE
ARM BAND NUMBER: NORMAL
AST SERPL-CCNC: 57 U/L
BASOPHILS # BLD: 0 % (ref 0–2)
BASOPHILS ABSOLUTE: <0.03 K/UL (ref 0–0.2)
BILIRUB SERPL-MCNC: 0.37 MG/DL (ref 0.3–1.2)
BUN BLDV-MCNC: 12 MG/DL (ref 8–23)
BUN/CREAT BLD: 14 (ref 9–20)
CALCIUM SERPL-MCNC: 9 MG/DL (ref 8.6–10.4)
CHLORIDE BLD-SCNC: 104 MMOL/L (ref 98–107)
CO2: 24 MMOL/L (ref 20–31)
CREAT SERPL-MCNC: 0.86 MG/DL (ref 0.5–0.9)
DIFFERENTIAL TYPE: ABNORMAL
EOSINOPHILS RELATIVE PERCENT: 1 % (ref 1–4)
EXPIRATION DATE: NORMAL
GFR AFRICAN AMERICAN: >60 ML/MIN
GFR NON-AFRICAN AMERICAN: >60 ML/MIN
GFR SERPL CREATININE-BSD FRML MDRD: ABNORMAL ML/MIN/{1.73_M2}
GFR SERPL CREATININE-BSD FRML MDRD: ABNORMAL ML/MIN/{1.73_M2}
GLUCOSE BLD-MCNC: 111 MG/DL (ref 70–99)
HCT VFR BLD CALC: 30.2 % (ref 36.3–47.1)
HEMOGLOBIN: 9 G/DL (ref 11.9–15.1)
IMMATURE GRANULOCYTES: 0 %
LYMPHOCYTES # BLD: 16 % (ref 24–43)
MCH RBC QN AUTO: 25.6 PG (ref 25.2–33.5)
MCHC RBC AUTO-ENTMCNC: 29.8 G/DL (ref 28.4–34.8)
MCV RBC AUTO: 86 FL (ref 82.6–102.9)
MONOCYTES # BLD: 14 % (ref 3–12)
NRBC AUTOMATED: 0 PER 100 WBC
PDW BLD-RTO: 15.2 % (ref 11.8–14.4)
PLATELET # BLD: 278 K/UL (ref 138–453)
PLATELET ESTIMATE: ABNORMAL
PMV BLD AUTO: 10 FL (ref 8.1–13.5)
POTASSIUM SERPL-SCNC: 3.9 MMOL/L (ref 3.7–5.3)
RBC # BLD: 3.51 M/UL (ref 3.95–5.11)
RBC # BLD: ABNORMAL 10*6/UL
SEG NEUTROPHILS: 69 % (ref 36–65)
SEGMENTED NEUTROPHILS ABSOLUTE COUNT: 3.11 K/UL (ref 1.5–8.1)
SODIUM BLD-SCNC: 136 MMOL/L (ref 135–144)
TOTAL PROTEIN: 7.2 G/DL (ref 6.4–8.3)
WBC # BLD: 4.5 K/UL (ref 3.5–11.3)
WBC # BLD: ABNORMAL 10*3/UL

## 2020-02-12 PROCEDURE — 96375 TX/PRO/DX INJ NEW DRUG ADDON: CPT

## 2020-02-12 PROCEDURE — 86900 BLOOD TYPING SEROLOGIC ABO: CPT

## 2020-02-12 PROCEDURE — 99223 1ST HOSP IP/OBS HIGH 75: CPT | Performed by: PSYCHIATRY & NEUROLOGY

## 2020-02-12 PROCEDURE — C1887 CATHETER, GUIDING: HCPCS

## 2020-02-12 PROCEDURE — 2500000003 HC RX 250 WO HCPCS: Performed by: NURSE PRACTITIONER

## 2020-02-12 PROCEDURE — 2000000003 HC NEURO ICU R&B

## 2020-02-12 PROCEDURE — 2580000003 HC RX 258: Performed by: NURSE PRACTITIONER

## 2020-02-12 PROCEDURE — 2500000003 HC RX 250 WO HCPCS

## 2020-02-12 PROCEDURE — 2500000003 HC RX 250 WO HCPCS: Performed by: PSYCHIATRY & NEUROLOGY

## 2020-02-12 PROCEDURE — C1769 GUIDE WIRE: HCPCS

## 2020-02-12 PROCEDURE — 96365 THER/PROPH/DIAG IV INF INIT: CPT

## 2020-02-12 PROCEDURE — 6360000002 HC RX W HCPCS: Performed by: GENERAL PRACTICE

## 2020-02-12 PROCEDURE — 3700000000 HC ANESTHESIA ATTENDED CARE

## 2020-02-12 PROCEDURE — 2709999900 HC NON-CHARGEABLE SUPPLY

## 2020-02-12 PROCEDURE — 93005 ELECTROCARDIOGRAM TRACING: CPT | Performed by: PSYCHIATRY & NEUROLOGY

## 2020-02-12 PROCEDURE — 75898 FOLLOW-UP ANGIOGRAPHY: CPT | Performed by: PSYCHIATRY & NEUROLOGY

## 2020-02-12 PROCEDURE — 86850 RBC ANTIBODY SCREEN: CPT

## 2020-02-12 PROCEDURE — 03VG3DZ RESTRICTION OF INTRACRANIAL ARTERY WITH INTRALUMINAL DEVICE, PERCUTANEOUS APPROACH: ICD-10-PCS | Performed by: PSYCHIATRY & NEUROLOGY

## 2020-02-12 PROCEDURE — 36224 PLACE CATH CAROTD ART: CPT | Performed by: PSYCHIATRY & NEUROLOGY

## 2020-02-12 PROCEDURE — 7100000000 HC PACU RECOVERY - FIRST 15 MIN

## 2020-02-12 PROCEDURE — 61624 TCAT PERM OCCLS/EMBOLJ CNS: CPT | Performed by: PSYCHIATRY & NEUROLOGY

## 2020-02-12 PROCEDURE — 2580000003 HC RX 258: Performed by: EMERGENCY MEDICINE

## 2020-02-12 PROCEDURE — 2720000010 IR ANGIOGRAM CAROTID CEREBRAL BILATERAL

## 2020-02-12 PROCEDURE — B31N1ZZ FLUOROSCOPY OF OTHER UPPER ARTERIES USING LOW OSMOLAR CONTRAST: ICD-10-PCS | Performed by: PSYCHIATRY & NEUROLOGY

## 2020-02-12 PROCEDURE — 6360000004 HC RX CONTRAST MEDICATION: Performed by: GENERAL PRACTICE

## 2020-02-12 PROCEDURE — 6360000004 HC RX CONTRAST MEDICATION: Performed by: PSYCHIATRY & NEUROLOGY

## 2020-02-12 PROCEDURE — 2580000003 HC RX 258: Performed by: NURSE ANESTHETIST, CERTIFIED REGISTERED

## 2020-02-12 PROCEDURE — 6360000002 HC RX W HCPCS: Performed by: EMERGENCY MEDICINE

## 2020-02-12 PROCEDURE — 99285 EMERGENCY DEPT VISIT HI MDM: CPT

## 2020-02-12 PROCEDURE — 6360000002 HC RX W HCPCS

## 2020-02-12 PROCEDURE — 85025 COMPLETE CBC W/AUTO DIFF WBC: CPT

## 2020-02-12 PROCEDURE — APPNB45 APP NON BILLABLE 31-45 MINUTES: Performed by: NURSE PRACTITIONER

## 2020-02-12 PROCEDURE — 75894 X-RAYS TRANSCATH THERAPY: CPT | Performed by: PSYCHIATRY & NEUROLOGY

## 2020-02-12 PROCEDURE — C1760 CLOSURE DEV, VASC: HCPCS

## 2020-02-12 PROCEDURE — 70450 CT HEAD/BRAIN W/O DYE: CPT

## 2020-02-12 PROCEDURE — 2780000010 HC IMPLANT OTHER

## 2020-02-12 PROCEDURE — 6370000000 HC RX 637 (ALT 250 FOR IP): Performed by: NURSE PRACTITIONER

## 2020-02-12 PROCEDURE — 80053 COMPREHEN METABOLIC PANEL: CPT

## 2020-02-12 PROCEDURE — 2580000003 HC RX 258: Performed by: PSYCHIATRY & NEUROLOGY

## 2020-02-12 PROCEDURE — 2500000003 HC RX 250 WO HCPCS: Performed by: EMERGENCY MEDICINE

## 2020-02-12 PROCEDURE — 6360000002 HC RX W HCPCS: Performed by: NURSE PRACTITIONER

## 2020-02-12 PROCEDURE — 7100000001 HC PACU RECOVERY - ADDTL 15 MIN

## 2020-02-12 PROCEDURE — C2628 CATHETER, OCCLUSION: HCPCS

## 2020-02-12 PROCEDURE — 86901 BLOOD TYPING SEROLOGIC RH(D): CPT

## 2020-02-12 PROCEDURE — C1894 INTRO/SHEATH, NON-LASER: HCPCS

## 2020-02-12 PROCEDURE — 87641 MR-STAPH DNA AMP PROBE: CPT

## 2020-02-12 PROCEDURE — 70496 CT ANGIOGRAPHY HEAD: CPT

## 2020-02-12 PROCEDURE — 6360000002 HC RX W HCPCS: Performed by: NURSE ANESTHETIST, CERTIFIED REGISTERED

## 2020-02-12 PROCEDURE — 3700000001 HC ADD 15 MINUTES (ANESTHESIA)

## 2020-02-12 PROCEDURE — 2500000003 HC RX 250 WO HCPCS: Performed by: NURSE ANESTHETIST, CERTIFIED REGISTERED

## 2020-02-12 RX ORDER — SODIUM CHLORIDE 9 MG/ML
INJECTION, SOLUTION INTRAVENOUS CONTINUOUS
Status: DISCONTINUED | OUTPATIENT
Start: 2020-02-12 | End: 2020-02-13

## 2020-02-12 RX ORDER — ROCURONIUM BROMIDE 10 MG/ML
INJECTION, SOLUTION INTRAVENOUS PRN
Status: DISCONTINUED | OUTPATIENT
Start: 2020-02-12 | End: 2020-02-12 | Stop reason: SDUPTHER

## 2020-02-12 RX ORDER — MAGNESIUM SULFATE HEPTAHYDRATE 500 MG/ML
INJECTION, SOLUTION INTRAMUSCULAR; INTRAVENOUS PRN
Status: DISCONTINUED | OUTPATIENT
Start: 2020-02-12 | End: 2020-02-12 | Stop reason: SDUPTHER

## 2020-02-12 RX ORDER — ONDANSETRON 2 MG/ML
INJECTION INTRAMUSCULAR; INTRAVENOUS PRN
Status: DISCONTINUED | OUTPATIENT
Start: 2020-02-12 | End: 2020-02-12 | Stop reason: SDUPTHER

## 2020-02-12 RX ORDER — SODIUM CHLORIDE 9 MG/ML
INJECTION, SOLUTION INTRAVENOUS CONTINUOUS PRN
Status: DISCONTINUED | OUTPATIENT
Start: 2020-02-12 | End: 2020-02-12 | Stop reason: SDUPTHER

## 2020-02-12 RX ORDER — ATORVASTATIN CALCIUM 80 MG/1
40 TABLET, FILM COATED ORAL NIGHTLY
Status: DISCONTINUED | OUTPATIENT
Start: 2020-02-12 | End: 2020-02-17

## 2020-02-12 RX ORDER — ONDANSETRON 2 MG/ML
4 INJECTION INTRAMUSCULAR; INTRAVENOUS ONCE
Status: COMPLETED | OUTPATIENT
Start: 2020-02-12 | End: 2020-02-12

## 2020-02-12 RX ORDER — ACETAMINOPHEN 325 MG/1
650 TABLET ORAL EVERY 4 HOURS PRN
Status: DISCONTINUED | OUTPATIENT
Start: 2020-02-12 | End: 2020-02-13

## 2020-02-12 RX ORDER — MANNITOL 250 MG/ML
INJECTION, SOLUTION INTRAVENOUS PRN
Status: DISCONTINUED | OUTPATIENT
Start: 2020-02-12 | End: 2020-02-12 | Stop reason: SDUPTHER

## 2020-02-12 RX ORDER — CEFAZOLIN SODIUM 2 G/50ML
SOLUTION INTRAVENOUS PRN
Status: DISCONTINUED | OUTPATIENT
Start: 2020-02-12 | End: 2020-02-12 | Stop reason: SDUPTHER

## 2020-02-12 RX ORDER — NIMODIPINE 30 MG/1
60 CAPSULE, LIQUID FILLED ORAL
Status: DISCONTINUED | OUTPATIENT
Start: 2020-02-12 | End: 2020-02-16

## 2020-02-12 RX ORDER — SODIUM CHLORIDE 0.9 % (FLUSH) 0.9 %
10 SYRINGE (ML) INJECTION EVERY 12 HOURS SCHEDULED
Status: DISCONTINUED | OUTPATIENT
Start: 2020-02-12 | End: 2020-03-03 | Stop reason: HOSPADM

## 2020-02-12 RX ORDER — FENTANYL CITRATE 50 UG/ML
INJECTION, SOLUTION INTRAMUSCULAR; INTRAVENOUS PRN
Status: DISCONTINUED | OUTPATIENT
Start: 2020-02-12 | End: 2020-02-12 | Stop reason: SDUPTHER

## 2020-02-12 RX ORDER — IODIXANOL 270 MG/ML
100 INJECTION, SOLUTION INTRAVASCULAR
Status: COMPLETED | OUTPATIENT
Start: 2020-02-12 | End: 2020-02-12

## 2020-02-12 RX ORDER — SODIUM CHLORIDE, SODIUM LACTATE, POTASSIUM CHLORIDE, CALCIUM CHLORIDE 600; 310; 30; 20 MG/100ML; MG/100ML; MG/100ML; MG/100ML
INJECTION, SOLUTION INTRAVENOUS CONTINUOUS PRN
Status: DISCONTINUED | OUTPATIENT
Start: 2020-02-12 | End: 2020-02-12 | Stop reason: SDUPTHER

## 2020-02-12 RX ORDER — SODIUM CHLORIDE 0.9 % (FLUSH) 0.9 %
10 SYRINGE (ML) INJECTION PRN
Status: DISCONTINUED | OUTPATIENT
Start: 2020-02-12 | End: 2020-03-03 | Stop reason: HOSPADM

## 2020-02-12 RX ORDER — MORPHINE SULFATE 2 MG/ML
2 INJECTION, SOLUTION INTRAMUSCULAR; INTRAVENOUS ONCE
Status: COMPLETED | OUTPATIENT
Start: 2020-02-12 | End: 2020-02-12

## 2020-02-12 RX ORDER — PROPOFOL 10 MG/ML
INJECTION, EMULSION INTRAVENOUS PRN
Status: DISCONTINUED | OUTPATIENT
Start: 2020-02-12 | End: 2020-02-12 | Stop reason: SDUPTHER

## 2020-02-12 RX ORDER — ACETAMINOPHEN 325 MG/1
650 TABLET ORAL EVERY 4 HOURS PRN
Status: DISCONTINUED | OUTPATIENT
Start: 2020-02-12 | End: 2020-03-03 | Stop reason: HOSPADM

## 2020-02-12 RX ORDER — MAGNESIUM SULFATE 1 G/100ML
1 INJECTION INTRAVENOUS PRN
Status: DISCONTINUED | OUTPATIENT
Start: 2020-02-12 | End: 2020-02-20

## 2020-02-12 RX ORDER — LEVETIRACETAM 5 MG/ML
500 INJECTION INTRAVASCULAR EVERY 12 HOURS
Status: DISCONTINUED | OUTPATIENT
Start: 2020-02-12 | End: 2020-02-15

## 2020-02-12 RX ORDER — ONDANSETRON 2 MG/ML
4 INJECTION INTRAMUSCULAR; INTRAVENOUS EVERY 6 HOURS PRN
Status: DISCONTINUED | OUTPATIENT
Start: 2020-02-12 | End: 2020-03-03 | Stop reason: HOSPADM

## 2020-02-12 RX ORDER — 0.9 % SODIUM CHLORIDE 0.9 %
1000 INTRAVENOUS SOLUTION INTRAVENOUS ONCE
Status: COMPLETED | OUTPATIENT
Start: 2020-02-12 | End: 2020-02-12

## 2020-02-12 RX ORDER — MORPHINE SULFATE 4 MG/ML
4 INJECTION, SOLUTION INTRAMUSCULAR; INTRAVENOUS ONCE
Status: COMPLETED | OUTPATIENT
Start: 2020-02-12 | End: 2020-02-12

## 2020-02-12 RX ORDER — EPHEDRINE SULFATE/0.9% NACL/PF 50 MG/5 ML
SYRINGE (ML) INTRAVENOUS PRN
Status: DISCONTINUED | OUTPATIENT
Start: 2020-02-12 | End: 2020-02-12 | Stop reason: SDUPTHER

## 2020-02-12 RX ORDER — LIDOCAINE HYDROCHLORIDE 10 MG/ML
INJECTION, SOLUTION EPIDURAL; INFILTRATION; INTRACAUDAL; PERINEURAL PRN
Status: DISCONTINUED | OUTPATIENT
Start: 2020-02-12 | End: 2020-02-12 | Stop reason: SDUPTHER

## 2020-02-12 RX ORDER — GLYCOPYRROLATE 1 MG/5 ML
SYRINGE (ML) INTRAVENOUS PRN
Status: DISCONTINUED | OUTPATIENT
Start: 2020-02-12 | End: 2020-02-12 | Stop reason: SDUPTHER

## 2020-02-12 RX ADMIN — MORPHINE SULFATE 2 MG: 2 INJECTION, SOLUTION INTRAMUSCULAR; INTRAVENOUS at 11:48

## 2020-02-12 RX ADMIN — CEFAZOLIN SODIUM 2 G: 2 SOLUTION INTRAVENOUS at 16:16

## 2020-02-12 RX ADMIN — ONDANSETRON 4 MG: 2 INJECTION INTRAMUSCULAR; INTRAVENOUS at 11:48

## 2020-02-12 RX ADMIN — NIMODIPINE 60 MG: 30 CAPSULE, LIQUID FILLED ORAL at 22:59

## 2020-02-12 RX ADMIN — Medication 10 MG: at 16:25

## 2020-02-12 RX ADMIN — SODIUM CHLORIDE 1000 ML: 9 INJECTION, SOLUTION INTRAVENOUS at 11:49

## 2020-02-12 RX ADMIN — IOVERSOL 90 ML: 741 INJECTION INTRA-ARTERIAL; INTRAVENOUS at 14:21

## 2020-02-12 RX ADMIN — LABETALOL HYDROCHLORIDE 1 MG/MIN: 5 INJECTION INTRAVENOUS at 12:48

## 2020-02-12 RX ADMIN — MANNITOL 37.5 G: 12.5 INJECTION, SOLUTION INTRAVENOUS at 17:43

## 2020-02-12 RX ADMIN — MAGNESIUM SULFATE HEPTAHYDRATE 2 G: 500 INJECTION, SOLUTION INTRAMUSCULAR; INTRAVENOUS at 17:56

## 2020-02-12 RX ADMIN — PROPOFOL 20 MG: 10 INJECTION, EMULSION INTRAVENOUS at 17:34

## 2020-02-12 RX ADMIN — FENTANYL CITRATE 50 MCG: 50 INJECTION INTRAMUSCULAR; INTRAVENOUS at 16:03

## 2020-02-12 RX ADMIN — Medication 5 MG: at 16:38

## 2020-02-12 RX ADMIN — SODIUM CHLORIDE 2.5 MG/HR: 9 INJECTION, SOLUTION INTRAVENOUS at 21:58

## 2020-02-12 RX ADMIN — DEXTROSE 2.5 MG/HR: 5 SOLUTION INTRAVENOUS at 14:40

## 2020-02-12 RX ADMIN — SODIUM CHLORIDE, POTASSIUM CHLORIDE, SODIUM LACTATE AND CALCIUM CHLORIDE: 600; 310; 30; 20 INJECTION, SOLUTION INTRAVENOUS at 15:55

## 2020-02-12 RX ADMIN — Medication 5 MG: at 16:50

## 2020-02-12 RX ADMIN — LEVETIRACETAM 500 MG: 5 INJECTION INTRAVENOUS at 21:58

## 2020-02-12 RX ADMIN — ROCURONIUM BROMIDE 20 MG: 10 INJECTION INTRAVENOUS at 18:07

## 2020-02-12 RX ADMIN — SODIUM CHLORIDE, POTASSIUM CHLORIDE, SODIUM LACTATE AND CALCIUM CHLORIDE: 600; 310; 30; 20 INJECTION, SOLUTION INTRAVENOUS at 17:28

## 2020-02-12 RX ADMIN — PHENYLEPHRINE HYDROCHLORIDE 50 MCG: 10 INJECTION INTRAVENOUS at 18:08

## 2020-02-12 RX ADMIN — PHENYLEPHRINE HYDROCHLORIDE 100 MCG: 10 INJECTION INTRAVENOUS at 17:58

## 2020-02-12 RX ADMIN — LIDOCAINE HYDROCHLORIDE 50 MG: 10 INJECTION, SOLUTION EPIDURAL; INFILTRATION; INTRACAUDAL at 16:03

## 2020-02-12 RX ADMIN — ONDANSETRON 4 MG: 2 INJECTION, SOLUTION INTRAMUSCULAR; INTRAVENOUS at 18:10

## 2020-02-12 RX ADMIN — Medication 5 MG: at 16:31

## 2020-02-12 RX ADMIN — IODIXANOL 85 ML: 270 INJECTION, SOLUTION INTRAVASCULAR at 18:33

## 2020-02-12 RX ADMIN — LEVETIRACETAM 1000 MG: 100 INJECTION, SOLUTION INTRAVENOUS at 12:44

## 2020-02-12 RX ADMIN — PROPOFOL 100 MG: 10 INJECTION, EMULSION INTRAVENOUS at 16:03

## 2020-02-12 RX ADMIN — SODIUM CHLORIDE: 9 INJECTION, SOLUTION INTRAVENOUS at 15:55

## 2020-02-12 RX ADMIN — Medication 0.2 MG: at 16:51

## 2020-02-12 RX ADMIN — Medication 15 MG: at 17:46

## 2020-02-12 RX ADMIN — Medication 5 MG: at 17:28

## 2020-02-12 RX ADMIN — PROPOFOL 30 MG: 10 INJECTION, EMULSION INTRAVENOUS at 17:36

## 2020-02-12 RX ADMIN — SUGAMMADEX 500 MG: 100 INJECTION, SOLUTION INTRAVENOUS at 18:17

## 2020-02-12 RX ADMIN — MORPHINE SULFATE 4 MG: 4 INJECTION INTRAVENOUS at 15:15

## 2020-02-12 RX ADMIN — SODIUM CHLORIDE: 9 INJECTION, SOLUTION INTRAVENOUS at 20:38

## 2020-02-12 RX ADMIN — Medication 5 MG: at 16:43

## 2020-02-12 RX ADMIN — ATORVASTATIN CALCIUM 40 MG: 80 TABLET, FILM COATED ORAL at 22:58

## 2020-02-12 RX ADMIN — ROCURONIUM BROMIDE 50 MG: 10 INJECTION INTRAVENOUS at 16:03

## 2020-02-12 ASSESSMENT — PULMONARY FUNCTION TESTS
PIF_VALUE: 0
PIF_VALUE: 16
PIF_VALUE: 16
PIF_VALUE: 3
PIF_VALUE: 16
PIF_VALUE: 17
PIF_VALUE: 16
PIF_VALUE: 14
PIF_VALUE: 16
PIF_VALUE: 14
PIF_VALUE: 14
PIF_VALUE: 16
PIF_VALUE: 16
PIF_VALUE: 1
PIF_VALUE: 16
PIF_VALUE: 17
PIF_VALUE: 15
PIF_VALUE: 4
PIF_VALUE: 1
PIF_VALUE: 16
PIF_VALUE: 17
PIF_VALUE: 17
PIF_VALUE: 16
PIF_VALUE: 17
PIF_VALUE: 16
PIF_VALUE: 16
PIF_VALUE: 1
PIF_VALUE: 17
PIF_VALUE: 17
PIF_VALUE: 0
PIF_VALUE: 12
PIF_VALUE: 16
PIF_VALUE: 17
PIF_VALUE: 16
PIF_VALUE: 1
PIF_VALUE: 16
PIF_VALUE: 18
PIF_VALUE: 18
PIF_VALUE: 14
PIF_VALUE: 16
PIF_VALUE: 16
PIF_VALUE: 1
PIF_VALUE: 16
PIF_VALUE: 16
PIF_VALUE: 17
PIF_VALUE: 16
PIF_VALUE: 16
PIF_VALUE: 1
PIF_VALUE: 17
PIF_VALUE: 16
PIF_VALUE: 1
PIF_VALUE: 16
PIF_VALUE: 9
PIF_VALUE: 16
PIF_VALUE: 0
PIF_VALUE: 0
PIF_VALUE: 16
PIF_VALUE: 1
PIF_VALUE: 1
PIF_VALUE: 16
PIF_VALUE: 0
PIF_VALUE: 17
PIF_VALUE: 16
PIF_VALUE: 16
PIF_VALUE: 1
PIF_VALUE: 16
PIF_VALUE: 17
PIF_VALUE: 16
PIF_VALUE: 14
PIF_VALUE: 15
PIF_VALUE: 1
PIF_VALUE: 16
PIF_VALUE: 14
PIF_VALUE: 16
PIF_VALUE: 17
PIF_VALUE: 16
PIF_VALUE: 7
PIF_VALUE: 16
PIF_VALUE: 16
PIF_VALUE: 1
PIF_VALUE: 16
PIF_VALUE: 1
PIF_VALUE: 17
PIF_VALUE: 16
PIF_VALUE: 17
PIF_VALUE: 16
PIF_VALUE: 15
PIF_VALUE: 14
PIF_VALUE: 16
PIF_VALUE: 3
PIF_VALUE: 16
PIF_VALUE: 17
PIF_VALUE: 16
PIF_VALUE: 0
PIF_VALUE: 16
PIF_VALUE: 0
PIF_VALUE: 16
PIF_VALUE: 15
PIF_VALUE: 17
PIF_VALUE: 22
PIF_VALUE: 16
PIF_VALUE: 0
PIF_VALUE: 16
PIF_VALUE: 1
PIF_VALUE: 1
PIF_VALUE: 17
PIF_VALUE: 16
PIF_VALUE: 1
PIF_VALUE: 17
PIF_VALUE: 17
PIF_VALUE: 16
PIF_VALUE: 17
PIF_VALUE: 16
PIF_VALUE: 3
PIF_VALUE: 16
PIF_VALUE: 16
PIF_VALUE: 14
PIF_VALUE: 0
PIF_VALUE: 16
PIF_VALUE: 0
PIF_VALUE: 16
PIF_VALUE: 1
PIF_VALUE: 16
PIF_VALUE: 15
PIF_VALUE: 16
PIF_VALUE: 16
PIF_VALUE: 17
PIF_VALUE: 16
PIF_VALUE: 16

## 2020-02-12 ASSESSMENT — PAIN DESCRIPTION - LOCATION
LOCATION: HEAD
LOCATION: HEAD

## 2020-02-12 ASSESSMENT — PAIN DESCRIPTION - ORIENTATION: ORIENTATION: UPPER;ANTERIOR

## 2020-02-12 ASSESSMENT — PAIN SCALES - GENERAL
PAINLEVEL_OUTOF10: 9
PAINLEVEL_OUTOF10: 6
PAINLEVEL_OUTOF10: 6
PAINLEVEL_OUTOF10: 10
PAINLEVEL_OUTOF10: 10

## 2020-02-12 ASSESSMENT — PAIN DESCRIPTION - DESCRIPTORS: DESCRIPTORS: HEADACHE

## 2020-02-12 ASSESSMENT — PAIN DESCRIPTION - FREQUENCY: FREQUENCY: CONTINUOUS

## 2020-02-12 ASSESSMENT — PAIN DESCRIPTION - PAIN TYPE: TYPE: ACUTE PAIN

## 2020-02-12 NOTE — ED NOTES
Temp report received from [facility], RN     Audra Javier, JESSICA  02/12/20 [id number]

## 2020-02-12 NOTE — CONSULTS
CHOLECYSTECTOMY      COLONOSCOPY  12/09    Negative    COLONOSCOPY N/A 5/31/2019    COLONOSCOPY POLYPECTOMY HOT SNARE, COLD POLYPECTOMY performed by Bob Messer MD at [address]    Left Wrist    FOOT SURGERY Bilateral     FOR CALLOSIS    HERNIA REPAIR      UMBILICAL     INNER EAR SURGERY      Right     KNEE ARTHROSCOPY  2000, 2005    LEFT    KNEE ARTHROSCOPY Left 4/3/2019    KNEE ARTHROSCOPY DIAGNOSTIC performed by Teja Lomas MD at [facility] Right 9-4-14    BAHA- bone anchored hearing aid    TONSILLECTOMY AND ADENOIDECTOMY  7702    UMBILICAL HERNIA REPAIR      VARICOSE VEIN SURGERY Bilateral        Social History:   Social History     Socioeconomic History    Marital status:       Spouse name: Not on file    Number of children: Not on file    Years of education: Not on file    Highest education level: Not on file   Occupational History    Not on file   Social Needs    Financial resource strain: Not on file    Food insecurity:     Worry: Not on file     Inability: Not on file    Transportation needs:     Medical: Not on file     Non-medical: Not on file   Tobacco Use    Smoking status: Never Smoker    Smokeless tobacco: Never Used   Substance and Sexual Activity    Alcohol use: Yes     Comment: Occassional    Drug use: No    Sexual activity: Never     Birth control/protection: Post-menopausal   Lifestyle    Physical activity:     Days per week: Not on file     Minutes per session: Not on file    Stress: Not on file   Relationships    Social connections:     Talks on phone: Not on file     Gets together: Not on file     Attends [employer] service: Not on file     Active member of club or organization: Not on file     Attends meetings of clubs or organizations: Not on file     Relationship status: Not on file    Intimate partner violence:     Fear of current or ex partner: Not on file     Emotionally abused: Not on file Medications:   Current Facility-Administered Medications: niCARdipine (CARDENE) 25 mg in dextrose 5 % 250 mL infusion, 5 mg/hr, Intravenous, Continuous    REVIEW OF SYSTEMS:     CONSTITUTIONAL:   +diaphoresis  EYES:   No vision loss, blurred vision, double vision or photophobia. No eye pain  ENT:   No tinnitus. +chronic difficulty hearing  RESPIRATORY:    No SOB  CARDIOVASCULAR:  No chest pain, palpitations or syncope  GASTROINTESTINAL:  No abdominal pain. +nausea, -vomiting   NEUROLOGICAL:  +Headache, +tremors  MUSCULOSKELETAL:  No Neck pain. No Back pain      PHYSICAL EXAM:     BP (!) 126/59   Pulse 79   Temp 97.5 °F (36.4 °C) (Oral)   Resp 19   Ht 5' 6\" (1.676 m)   Wt 108 lb (49 kg)   SpO2 97%   BMI 17.43 kg/m²     Constitutional : somnolent, resting in bed, arouses to voice    Head: normocephalic, atraumatic, facial features unremarkable   Eyes:  PERRLA +3, EOM intact. No nystagmus or ptosis. ENT: Unremarkable. Tongue midline   Lungs: normal respiratory effort, no distress  Cardiovascular: intact distal pulses  Abdomen: soft, non-distended, non-tender  Skin: intact  Neuro-Muscular exam:  Somnolent but arouses to voice and AOX4. Mild dysarthria. GCS 14/15. Comprehension normal. Follows simple commands. PERRLA +3, EOM intact. Muscle strength bilateral lower extremities 3/5. Upper extremities 4/5. +drift bilateral lower extremities. + drift right upper extremity. No clonus. There are no abnormal movements. Intention tremor bilateral upper extremities. Abnormal finger to nose bilaterally. Plantar reflexes were down going bilaterally. Sensations to light touch and pinprick Intact    Cranial Nerves:    II: Visual acuity: normal . Vsual fields:  normal  III: Pupils:  equal, round, reactive to light .  No ptosis   III,IV,VI: Extra Ocular Movements: intact  V: Facial sensation: intact   VII: Facial strength: R sided facial droop  VIII: Hearing: decreased  XI: Shrug shoulders and turn head  against demonstrate no acute abnormality. SINUSES: There is hypoplastic left mastoid air cells. There is partial right-sided mastoidectomy. The paranasal sinuses are normally aerated. SOFT TISSUES/SKULL:  No acute abnormality of the visualized skull or soft tissues. Redemonstration of subarachnoid hemorrhage that is not significantly changed compared to prior exam. Findings were discussed with Dr. Agnes Ivy At 2:30 pm on 2/12/2020. Ct Head Wo Contrast    Result Date: 2/12/2020  EXAMINATION: CT OF THE HEAD WITHOUT CONTRAST  2/12/2020 11:48 am TECHNIQUE: CT of the head was performed without the administration of intravenous contrast. Dose modulation, iterative reconstruction, and/or weight based adjustment of the mA/kV was utilized to reduce the radiation dose to as low as reasonably achievable. COMPARISON: June 1, 2019 HISTORY: ORDERING SYSTEM PROVIDED HISTORY: headache TECHNOLOGIST PROVIDED HISTORY: headache FINDINGS: BRAIN/VENTRICLES: Subarachnoid hemorrhage is present, which predominates in the anterior interhemispheric fissure and extending to the basal cisterns and sylvian fissures, right greater than left. No clear evidence for intraparenchymal hemorrhage. No midline shift or inferior herniation. ORBITS: The visualized portion of the orbits demonstrate no acute abnormality. SINUSES: The visualized paranasal sinuses and mastoid air cells demonstrate no acute abnormality. SOFT TISSUES/SKULL:  No acute abnormality of the visualized skull or soft tissues. Subarachnoid hemorrhage predominates in the anterior interhemispheric fissure, as described. No definite intraparenchymal component identified. Findings were discussed with Isabel Verdugo at 12:20 pm on 2/12/2020.      Cta Head W Contrast    Result Date: 2/12/2020  EXAMINATION: CTA OF THE HEAD WITH CONTRAST 2/12/2020 2:14 pm: TECHNIQUE: CTA of the head/brain was performed with the administration of intravenous contrast. Multiplanar reformatted images are Please contact neurosurgery with any changes in patient's neurological status, any questions or concerns. Eufemia Sharif  Emergency Medicine Resident Physician, PGY-2  Neurosurgery Service  02/12/20 2:51 PM

## 2020-02-12 NOTE — LETTER
[facility]  [address]  [address] [place] [id number]  Phone: [phone]      February 12, 2020     Patient: Julius Sandhoff   YOB: 1950   Date of Visit: 2/12/2020       To Whom It May Concern:    Please excuse patient's family member from work today as they were at the hospital with her during her admission. If you have any questions or concerns, please don't hesitate to call.     Sincerely,          Dr. Loria Essex, D.O.

## 2020-02-12 NOTE — ED NOTES
Sophie Torres given bedside report, no questions at this time, pt to IR via stretcher with Dana Murry, RN Abbott Nageotte, RN  02/12/20 [id number]

## 2020-02-12 NOTE — ED NOTES
Cardene changed to 2.5mg/hour per Dr. Carmen Beltran, RN  02/12/20 1197 KULWINDER Khan RN  02/12/20 3235

## 2020-02-12 NOTE — ANESTHESIA PRE PROCEDURE
Department of Anesthesiology  Preprocedure Note       Name:  Magali Alvarez   Age:  77 y.o.  :  1950                                          MRN:  [id number]         Date:  2020      Surgeon: * No surgeons listed *    Procedure: IR ANGIOGRAM CAROTID CEREBRAL BILATERAL    Medications prior to admission:   Prior to Admission medications    Medication Sig Start Date End Date Taking? Authorizing Provider   diclofenac (VOLTAREN) 75 MG EC tablet Take 1 tablet by mouth 2 times daily (with meals) for 14 days 20  Mando Galvez MD   hydrALAZINE (APRESOLINE) 50 MG tablet TAKE 1 TABLET BY MOUTH 4 TIMES A DAY 19   Julia Qureshi MD   meloxicam (MOBIC) 15 MG tablet TAKE 1 TABLET BY MOUTH ONE TIME A DAY 19   Johanna Garner MD   pramipexole (MIRAPEX) 0.125 MG tablet TAKE ONE TABLET BY MOUTH NIGHTLY 19   Julia Qureshi MD   carvedilol (COREG) 3.125 MG tablet Take 1 tablet by mouth 2 times daily 19   Julia Qureshi MD   atorvastatin (LIPITOR) 40 MG tablet Take 1 tablet by mouth nightly 10/15/19   Julia Qureshi MD   acetaminophen (TYLENOL) 325 MG tablet Take 325 mg by mouth every 6 hours as needed for Pain    Historical Provider, MD   vitamin D (CHOLECALCIFEROL) 1000 UNIT TABS tablet Take 1,000 Units by mouth 3 times daily    Historical Provider, MD   meloxicam (MOBIC) 15 MG tablet Take 15 mg by mouth daily    Historical Provider, MD   aspirin 81 MG EC tablet Take 1 tablet by mouth daily 19   Yi Meier MD   Multiple Vitamins-Minerals (THERAPEUTIC MULTIVITAMIN-MINERALS) tablet Take 1 tablet by mouth daily    Historical Provider, MD       Current medications:    No current facility-administered medications for this visit.       Current Outpatient Medications   Medication Sig Dispense Refill    diclofenac (VOLTAREN) 75 MG EC tablet Take 1 tablet by mouth 2 times daily (with meals) for 14 days 28 tablet 0    hydrALAZINE (APRESOLINE) 50 MG tablet TAKE 1 TABLET BY MOUTH 4 TIMES A  tablet 1    meloxicam (MOBIC) 15 MG tablet TAKE 1 TABLET BY MOUTH ONE TIME A DAY 30 tablet 3    pramipexole (MIRAPEX) 0.125 MG tablet TAKE ONE TABLET BY MOUTH NIGHTLY 90 tablet 1    carvedilol (COREG) 3.125 MG tablet Take 1 tablet by mouth 2 times daily 180 tablet 0    atorvastatin (LIPITOR) 40 MG tablet Take 1 tablet by mouth nightly 90 tablet 0    acetaminophen (TYLENOL) 325 MG tablet Take 325 mg by mouth every 6 hours as needed for Pain      vitamin D (CHOLECALCIFEROL) 1000 UNIT TABS tablet Take 1,000 Units by mouth 3 times daily      meloxicam (MOBIC) 15 MG tablet Take 15 mg by mouth daily      aspirin 81 MG EC tablet Take 1 tablet by mouth daily 30 tablet 3    Multiple Vitamins-Minerals (THERAPEUTIC MULTIVITAMIN-MINERALS) tablet Take 1 tablet by mouth daily       Facility-Administered Medications Ordered in Other Visits   Medication Dose Route Frequency Provider Last Rate Last Dose    niCARdipine (CARDENE) 25 mg in dextrose 5 % 250 mL infusion  5 mg/hr Intravenous Continuous Rolando Plane, APRN - CNP 25 mL/hr at 02/12/20 1440 2.5 mg/hr at 02/12/20 1440    atorvastatin (LIPITOR) tablet 40 mg  40 mg Oral Nightly Rolando Plane, APRN - CNP        niMODipine (NIMOTOP) capsule 60 mg  60 mg Oral 6 times per day Rolando Plane, APRN - CNP        morphine injection 4 mg  4 mg Intravenous Once Luetta Marine, DO           Allergies:     Allergies   Allergen Reactions    Lisinopril Other (See Comments)    Losartan      Fatigue    Procardia [Nifedipine] Other (See Comments)       Problem List:    Patient Active Problem List   Diagnosis Code    Essential hypertension I10    RLS (restless legs syndrome) G25.81    History of cervical cancer Z85.41    Gastroesophageal reflux disease without esophagitis K21.9    Trochanteric bursitis of left hip M70.62    Complex tear of lateral meniscus of left knee S83.272A    Weight loss R63.4    Cerebrovascular accident (CVA) ([address] I63.9    from Last 3 Encounters:   02/12/20 108 lb (49 kg)   02/12/20 108 lb (49 kg)   01/21/20 114 lb (51.7 kg)     There is no height or weight on file to calculate BMI.    CBC:   Lab Results   Component Value Date    WBC 4.5 02/12/2020    RBC 3.51 02/12/2020    RBC 3.95 12/19/2011    HGB 9.0 02/12/2020    HCT 30.2 02/12/2020    MCV 86.0 02/12/2020    RDW 15.2 02/12/2020     02/12/2020     12/19/2011       CMP:   Lab Results   Component Value Date     02/12/2020    K 3.9 02/12/2020     02/12/2020    CO2 24 02/12/2020    BUN 12 02/12/2020    CREATININE 0.86 02/12/2020    GFRAA >60 02/12/2020    LABGLOM >60 02/12/2020    GLUCOSE 111 02/12/2020    GLUCOSE 82 12/19/2011    PROT 7.2 02/12/2020    CALCIUM 9.0 02/12/2020    BILITOT 0.37 02/12/2020    ALKPHOS 169 02/12/2020    AST 57 02/12/2020    ALT 41 02/12/2020       POC Tests: No results for input(s): POCGLU, POCNA, POCK, POCCL, POCBUN, POCHEMO, POCHCT in the last 72 hours.     Coags:   Lab Results   Component Value Date    PROTIME 12.9 06/01/2019    INR 1.0 06/01/2019    APTT 50.2 06/01/2019       HCG (If Applicable): No results found for: PREGTESTUR, PREGSERUM, HCG, HCGQUANT     ABGs: No results found for: PHART, PO2ART, LGN8QAS, SVU6AFC, BEART, X0ESLOZI     Type & Screen (If Applicable):  No results found for: [facility]    Anesthesia Evaluation  Patient summary reviewed and Nursing notes reviewed no history of anesthetic complications:   Airway: Mallampati: II  TM distance: >3 FB   Neck ROM: full  Mouth opening: > = 3 FB Dental:    (+) upper dentures and partials  Comment: Upper Denture and lower partials     Pulmonary:Negative Pulmonary ROS and normal exam  breath sounds clear to auscultation                             Cardiovascular:    (+) hypertension:,         Rhythm: regular  Rate: normal                    Neuro/Psych:   (+) CVA:,              ROS comment: Hearing Loss - Bilateral  hearing aids  Low Back Pain  Left sided numbness  Acute

## 2020-02-12 NOTE — ED NOTES
Report given to Pedro Olivera RN.  All questions answered at this time     Wes Ricks RN  02/12/20 [id number]

## 2020-02-12 NOTE — ED PROVIDER NOTES
[address] [facility]     Emergency Department     Faculty Attestation    I performed a history and physical examination of the patient and discussed management with the resident. I reviewed the residents note and agree with the documented findings and plan of care. Any areas of disagreement are noted on the chart. I was personally present for the key portions of any procedures. I have documented in the chart those procedures where I was not present during the key portions. I have reviewed the emergency nurses triage note. I agree with the chief complaint, past medical history, past surgical history, allergies, medications, social and family history as documented unless otherwise noted below. For Physician Assistant/ Nurse Practitioner cases/documentation I have personally evaluated this patient and have completed at least one if not all key elements of the E/M (history, physical exam, and MDM). Additional findings are as noted. Transferred from [facility] as a stroke alert after she was found to have a spontaneous subarachnoid hemorrhage. Patient was reportedly at the dentist office this morning when she developed sudden onset of a headache. Patient was hypertensive at [facility] and was started on labetalol. On arrival here, patient does complain of headache. Will switch labetalol to Cardene. Will get CTA of the head and await further neurology recommendations.       Elzbieta Diamond MD  Attending Emergency  Physician              Yuliya Beavers MD  02/12/20 0739

## 2020-02-12 NOTE — SEDATION DOCUMENTATION
Patient presents with right sided facial droop, soft voice, colindres spont et purp with normal strength, pedal pulses palpable and rinaldi placed

## 2020-02-12 NOTE — H&P
cough, shortness of breath   CARDIOVASCULAR: negative for chest pain, palpitations   GASTROINTESTINAL: negative for nausea, vomiting   GENITOURINARY: negative for incontinence   MUSCULOSKELETAL: negative for neck or back pain   NEUROLOGICAL: Positive headache.  negative for seizures   PSYCHIATRIC: negative for agitated     PHYSICAL EXAM:     BP (!) 110/52   Pulse 72   Temp 97.5 °F (36.4 °C) (Oral)   Resp 23   Ht 5' 6\" (1.676 m)   Wt 108 lb (49 kg)   SpO2 95%   BMI 17.43 kg/m²     PHYSICAL EXAM:  CONSTITUTIONAL:  Well developed, well nourished. Lethargic. Opens eyes to voice but dozes off intermittently. Nontoxic. No dysarthria. No aphasia. GCS 14. HEAD:  normocephalic, atraumatic    EYES:  PERRL, EOMI   ENT:  moist mucous membranes   NECK:  supple, symmetric   LUNGS:  Equal air entry bilaterally, clear   CARDIOVASCULAR:  normal s1 / s2   ABDOMEN:  Soft, no rigidity   NEUROLOGIC:  Mental Status:  Lethargic. Opens eyes to voice but dozes off intermittently. Oriented x3. Cranial Nerves:    III: Pupils:  equal, round, reactive to light  III,IV,VI: Extra Ocular Movements: intact  V: Facial sensation:  intact  VII: Facial strength: abnormal - right facial droop  VIII: Hearing:  abnormal - very [place], wears hearing aides     Motor Exam:    Drift:  absent  Tone:  normal     Motor exam is symmetrical 5 out of 5 all extremities bilaterally     Sensory:    Touch:    Right Upper Extremity:  normal  Left Upper Extremity:  normal  Right Lower Extremity:  normal  Left Lower Extremity:  normal     Coordination/Dysmetria:  Finger to Nose:   Right:  normal  Left:  normal       SKIN:  no rash      NIH Stroke Scale Total (if not done complete detailed one below):    1a.  Level of consciousness:  2 - not alert, requires repeated stimulation to attend, or is obtunded and requires strong or painful stimulation to make movements (not stereotyped)   1b.   Level of consciousness questions:  0 - answers both questions Ct Head Wo Contrast  Result Date: 2/12/2020  Subarachnoid hemorrhage predominates in the anterior interhemispheric fissure, as described. No definite intraparenchymal component identified. Findings were discussed with Marizol Hernandez at 12:20 pm on 2/12/2020. Ct Abdomen Pelvis W Iv Contrast  Result Date: 2/5/2020  1. No acute intra-abdominal or intrapelvic process. Specifically, no CT scan evidence of acute pancreatitis or complications related to pancreatitis. 2. Status post cholecystectomy. 3. Subtle ground-glass opacity in the right lower lobe appears slightly more conspicuous than on the 2007 exam.  This may simply be related to a minor scar. Minor infectious or inflammatory process could not be entirely excluded. Consider follow-up chest CT in approximately 3 months to ensure stability/resolution. 4. Normal appendix. Cta Head W Contrast  Result Date: 2/12/2020  Demonstration of a 2 mm aneurysm arising from the right anterior cerebral artery within the anterior interhemispheric fissure region. Findings were discussed with Dr. Eusebio Burnett At 2:36 pm on 2/12/2020. Labs and Images reviewed with:    [x] Cam Burnett MD    [] Jonathan Calzada MD  [] Caryle Barrette, MD  --[] there are no new interval images to review. ASSESSMENT AND PLAN:       The patient is a 77 y.o. female presented with a history of HTN who was admitted to the Neuro ICU for management of acute SAH in setting of right SENTHIL aneurysm. Patient initially presented to [facility] ED with thunderclap headache, diaphoresis, and nausea/vomiting.     NEUROLOGIC:  - Acute SAH in setting of presumably ruptured right SENTHIL aneurysm  - CT Head revealed SAH predominates in the anterior interhemispheric fissure  - CTA Head/Neck revealed 2mm aneurysm arising from right anterior cerebral artery  - Neuro Endovascular following; patient taken to angio suite for intervention  - Neurosurgery consulted; no significant hydrocephalus requiring EVD at this

## 2020-02-12 NOTE — CONSULTS
Endovascular Neurosurgery Consult    Pt Name: Aura Prater  MRN: [id number]  [place]: 1950  Date of evaluation: 2/12/2020  Primary Care Physician: Lupillo Bowman MD  Reason for evaluation: Subarachnoid hemorrhage    SUBJECTIVE:   History of Chief Complaint:    Aura Prater is a 77 y.o. female 3 of hypertension who was at the dentistry office at 10 AM this morning when she outside and she started to have headache and decreased level of consciousness. CT head with diffuse subarachnoid hemorrhage and anterior lobe small ICH, IVH or hydrocephalus. CTA head and neck with right A2 SENTHIL to 2 mm aneurysm. Patient does not have history of smoking, no family history of brain aneurysm      Allergies  is allergic to lisinopril; losartan; and procardia [nifedipine]. Medications  Prior to Admission medications    Medication Sig Start Date End Date Taking?  Authorizing Provider   diclofenac (VOLTAREN) 75 MG EC tablet Take 1 tablet by mouth 2 times daily (with meals) for 14 days 1/21/20 2/4/20  Amisha Galvez MD   hydrALAZINE (APRESOLINE) 50 MG tablet TAKE 1 TABLET BY MOUTH 4 TIMES A DAY 12/26/19   Lupillo Bowman MD   meloxicam (MOBIC) 15 MG tablet TAKE 1 TABLET BY MOUTH ONE TIME A DAY 12/12/19   Eli Arias MD   pramipexole (MIRAPEX) 0.125 MG tablet TAKE ONE TABLET BY MOUTH NIGHTLY 12/11/19   Lupillo Bowman MD   carvedilol (COREG) 3.125 MG tablet Take 1 tablet by mouth 2 times daily 11/5/19   Lupillo Bowman MD   atorvastatin (LIPITOR) 40 MG tablet Take 1 tablet by mouth nightly 10/15/19   Lupillo Bowman MD   acetaminophen (TYLENOL) 325 MG tablet Take 325 mg by mouth every 6 hours as needed for Pain    Historical Provider, MD   vitamin D (CHOLECALCIFEROL) 1000 UNIT TABS tablet Take 1,000 Units by mouth 3 times daily    Historical Provider, MD   meloxicam (MOBIC) 15 MG tablet Take 15 mg by mouth daily    Historical Provider, MD   aspirin 81 MG EC tablet Take 1 tablet by mouth daily 6/4/19   Scott MEI

## 2020-02-12 NOTE — CONSULTS
daily (with meals) for 14 days 1/21/20 2/4/20  Dawson Galvez MD   hydrALAZINE (APRESOLINE) 50 MG tablet TAKE 1 TABLET BY MOUTH 4 TIMES A DAY 12/26/19   Libby Mccarty MD   meloxicam (MOBIC) 15 MG tablet TAKE 1 TABLET BY MOUTH ONE TIME A DAY 12/12/19   Perla Sotelo MD   pramipexole (MIRAPEX) 0.125 MG tablet TAKE ONE TABLET BY MOUTH NIGHTLY 12/11/19   Libby Mccarty MD   carvedilol (COREG) 3.125 MG tablet Take 1 tablet by mouth 2 times daily 11/5/19   Libby Mccarty MD   atorvastatin (LIPITOR) 40 MG tablet Take 1 tablet by mouth nightly 10/15/19   Libby Mccarty MD   acetaminophen (TYLENOL) 325 MG tablet Take 325 mg by mouth every 6 hours as needed for Pain    Historical Provider, MD   vitamin D (CHOLECALCIFEROL) 1000 UNIT TABS tablet Take 1,000 Units by mouth 3 times daily    Historical Provider, MD   meloxicam (MOBIC) 15 MG tablet Take 15 mg by mouth daily    Historical Provider, MD   aspirin 81 MG EC tablet Take 1 tablet by mouth daily 6/4/19   Juancarlos Garnett MD   Multiple Vitamins-Minerals (THERAPEUTIC MULTIVITAMIN-MINERALS) tablet Take 1 tablet by mouth daily    Historical Provider, MD    Scheduled Meds:  Continuous Infusions:  PRN Meds:.    Past Medical History   has a past medical history of Acid reflux, Arthritis, Cancer ([address] Family history of breast cancer in first degree relative, Gall stones, Hard of hearing, History of cervical cancer, Hypertension, Lateral meniscus tear, Wears dentures, and Wears glasses.     OBJECTIVE  BP (!) 143/8   Pulse 73   Resp 15   SpO2 100%     ROS  CONSTITUTIONAL: negative for fatigue and malaise   EYES: negative for double vision and photophobia    HEENT: negative for tinnitus and sore throat   RESPIRATORY: negative for cough, shortness of breath   CARDIOVASCULAR: negative for chest pain, palpitations   GASTROINTESTINAL: negative for nausea, vomiting   GENITOURINARY: negative for incontinence   MUSCULOSKELETAL: negative for neck or back pain   NEUROLOGICAL:

## 2020-02-13 ENCOUNTER — APPOINTMENT (OUTPATIENT)
Dept: CT IMAGING | Age: 70
DRG: 020 | End: 2020-02-13
Payer: MEDICARE

## 2020-02-13 ENCOUNTER — APPOINTMENT (OUTPATIENT)
Dept: GENERAL RADIOLOGY | Age: 70
DRG: 020 | End: 2020-02-13
Payer: MEDICARE

## 2020-02-13 LAB
ANION GAP SERPL CALCULATED.3IONS-SCNC: 12 MMOL/L (ref 9–17)
BUN BLDV-MCNC: 8 MG/DL (ref 8–23)
BUN/CREAT BLD: ABNORMAL (ref 9–20)
CALCIUM SERPL-MCNC: 8.4 MG/DL (ref 8.6–10.4)
CHLORIDE BLD-SCNC: 109 MMOL/L (ref 98–107)
CHOLESTEROL/HDL RATIO: 4.9
CHOLESTEROL: 102 MG/DL
CO2: 20 MMOL/L (ref 20–31)
CREAT SERPL-MCNC: 0.58 MG/DL (ref 0.5–0.9)
EKG ATRIAL RATE: 66 BPM
EKG ATRIAL RATE: 79 BPM
EKG P AXIS: 38 DEGREES
EKG P AXIS: 56 DEGREES
EKG P-R INTERVAL: 158 MS
EKG P-R INTERVAL: 160 MS
EKG Q-T INTERVAL: 412 MS
EKG Q-T INTERVAL: 422 MS
EKG QRS DURATION: 84 MS
EKG QRS DURATION: 86 MS
EKG QTC CALCULATION (BAZETT): 442 MS
EKG QTC CALCULATION (BAZETT): 472 MS
EKG R AXIS: 44 DEGREES
EKG R AXIS: 58 DEGREES
EKG T AXIS: 80 DEGREES
EKG T AXIS: 81 DEGREES
EKG VENTRICULAR RATE: 66 BPM
EKG VENTRICULAR RATE: 79 BPM
ESTIMATED AVERAGE GLUCOSE: 114 MG/DL
FERRITIN: 44 UG/L (ref 13–150)
FOLATE: 7.9 NG/ML
GFR AFRICAN AMERICAN: >60 ML/MIN
GFR NON-AFRICAN AMERICAN: >60 ML/MIN
GFR SERPL CREATININE-BSD FRML MDRD: ABNORMAL ML/MIN/{1.73_M2}
GFR SERPL CREATININE-BSD FRML MDRD: ABNORMAL ML/MIN/{1.73_M2}
GLUCOSE BLD-MCNC: 113 MG/DL (ref 70–99)
HBA1C MFR BLD: 5.6 % (ref 4–6)
HCT VFR BLD CALC: 25.8 % (ref 36.3–47.1)
HDLC SERPL-MCNC: 21 MG/DL
HEMOGLOBIN: 7.8 G/DL (ref 11.9–15.1)
IRON SATURATION: 9 % (ref 20–55)
IRON: 26 UG/DL (ref 37–145)
LDL CHOLESTEROL: 61 MG/DL (ref 0–130)
MAGNESIUM: 2.6 MG/DL (ref 1.6–2.6)
MCH RBC QN AUTO: 26 PG (ref 25.2–33.5)
MCHC RBC AUTO-ENTMCNC: 30.2 G/DL (ref 28.4–34.8)
MCV RBC AUTO: 86 FL (ref 82.6–102.9)
MRSA, DNA, NASAL: NORMAL
NRBC AUTOMATED: 0 PER 100 WBC
PDW BLD-RTO: 15.3 % (ref 11.8–14.4)
PLATELET # BLD: 249 K/UL (ref 138–453)
PMV BLD AUTO: 9.6 FL (ref 8.1–13.5)
POTASSIUM SERPL-SCNC: 3.8 MMOL/L (ref 3.7–5.3)
RBC # BLD: 3 M/UL (ref 3.95–5.11)
SODIUM BLD-SCNC: 138 MMOL/L (ref 135–144)
SODIUM BLD-SCNC: 141 MMOL/L (ref 135–144)
SODIUM BLD-SCNC: 142 MMOL/L (ref 135–144)
SPECIMEN DESCRIPTION: NORMAL
TOTAL IRON BINDING CAPACITY: 293 UG/DL (ref 250–450)
TRIGL SERPL-MCNC: 100 MG/DL
TROPONIN INTERP: NORMAL
TROPONIN T: NORMAL NG/ML
TROPONIN, HIGH SENSITIVITY: 10 NG/L (ref 0–14)
TROPONIN, HIGH SENSITIVITY: 10 NG/L (ref 0–14)
TROPONIN, HIGH SENSITIVITY: 9 NG/L (ref 0–14)
UNSATURATED IRON BINDING CAPACITY: 267 UG/DL (ref 112–347)
VITAMIN B-12: 670 PG/ML (ref 232–1245)
VLDLC SERPL CALC-MCNC: ABNORMAL MG/DL (ref 1–30)
WBC # BLD: 4.9 K/UL (ref 3.5–11.3)

## 2020-02-13 PROCEDURE — 93010 ELECTROCARDIOGRAM REPORT: CPT | Performed by: INTERNAL MEDICINE

## 2020-02-13 PROCEDURE — 2580000003 HC RX 258: Performed by: NURSE PRACTITIONER

## 2020-02-13 PROCEDURE — 2500000003 HC RX 250 WO HCPCS: Performed by: NURSE PRACTITIONER

## 2020-02-13 PROCEDURE — 97535 SELF CARE MNGMENT TRAINING: CPT

## 2020-02-13 PROCEDURE — 80061 LIPID PANEL: CPT

## 2020-02-13 PROCEDURE — 85027 COMPLETE CBC AUTOMATED: CPT

## 2020-02-13 PROCEDURE — 6360000002 HC RX W HCPCS: Performed by: NURSE PRACTITIONER

## 2020-02-13 PROCEDURE — 82746 ASSAY OF FOLIC ACID SERUM: CPT

## 2020-02-13 PROCEDURE — 93005 ELECTROCARDIOGRAM TRACING: CPT | Performed by: STUDENT IN AN ORGANIZED HEALTH CARE EDUCATION/TRAINING PROGRAM

## 2020-02-13 PROCEDURE — 93886 INTRACRANIAL COMPLETE STUDY: CPT

## 2020-02-13 PROCEDURE — 36415 COLL VENOUS BLD VENIPUNCTURE: CPT

## 2020-02-13 PROCEDURE — 71045 X-RAY EXAM CHEST 1 VIEW: CPT

## 2020-02-13 PROCEDURE — 82728 ASSAY OF FERRITIN: CPT

## 2020-02-13 PROCEDURE — 6370000000 HC RX 637 (ALT 250 FOR IP): Performed by: NURSE PRACTITIONER

## 2020-02-13 PROCEDURE — 2000000003 HC NEURO ICU R&B

## 2020-02-13 PROCEDURE — 84484 ASSAY OF TROPONIN QUANT: CPT

## 2020-02-13 PROCEDURE — 83036 HEMOGLOBIN GLYCOSYLATED A1C: CPT

## 2020-02-13 PROCEDURE — 82607 VITAMIN B-12: CPT

## 2020-02-13 PROCEDURE — APPNB15 APP NON BILLABLE TIME 0-15 MINS: Performed by: NURSE PRACTITIONER

## 2020-02-13 PROCEDURE — 92523 SPEECH SOUND LANG COMPREHEN: CPT

## 2020-02-13 PROCEDURE — 97530 THERAPEUTIC ACTIVITIES: CPT

## 2020-02-13 PROCEDURE — 80048 BASIC METABOLIC PNL TOTAL CA: CPT

## 2020-02-13 PROCEDURE — 70450 CT HEAD/BRAIN W/O DYE: CPT

## 2020-02-13 PROCEDURE — 93005 ELECTROCARDIOGRAM TRACING: CPT | Performed by: NURSE PRACTITIONER

## 2020-02-13 PROCEDURE — 97166 OT EVAL MOD COMPLEX 45 MIN: CPT

## 2020-02-13 PROCEDURE — 97162 PT EVAL MOD COMPLEX 30 MIN: CPT

## 2020-02-13 PROCEDURE — 99291 CRITICAL CARE FIRST HOUR: CPT | Performed by: PSYCHIATRY & NEUROLOGY

## 2020-02-13 PROCEDURE — 84295 ASSAY OF SERUM SODIUM: CPT

## 2020-02-13 PROCEDURE — 83735 ASSAY OF MAGNESIUM: CPT

## 2020-02-13 PROCEDURE — 83540 ASSAY OF IRON: CPT

## 2020-02-13 PROCEDURE — 83550 IRON BINDING TEST: CPT

## 2020-02-13 RX ORDER — DOCUSATE SODIUM 100 MG/1
100 CAPSULE, LIQUID FILLED ORAL DAILY PRN
Status: DISCONTINUED | OUTPATIENT
Start: 2020-02-13 | End: 2020-03-03 | Stop reason: HOSPADM

## 2020-02-13 RX ADMIN — NIMODIPINE 60 MG: 30 CAPSULE, LIQUID FILLED ORAL at 19:54

## 2020-02-13 RX ADMIN — ACETAMINOPHEN 650 MG: 325 TABLET ORAL at 11:11

## 2020-02-13 RX ADMIN — LEVETIRACETAM 500 MG: 5 INJECTION INTRAVENOUS at 08:12

## 2020-02-13 RX ADMIN — SODIUM CHLORIDE 75 ML/HR: 234 INJECTION INTRAMUSCULAR; INTRAVENOUS; SUBCUTANEOUS at 21:09

## 2020-02-13 RX ADMIN — SODIUM CHLORIDE 75 ML/HR: 234 INJECTION INTRAMUSCULAR; INTRAVENOUS; SUBCUTANEOUS at 14:03

## 2020-02-13 RX ADMIN — LEVETIRACETAM 500 MG: 5 INJECTION INTRAVENOUS at 21:00

## 2020-02-13 RX ADMIN — NIMODIPINE 60 MG: 30 CAPSULE, LIQUID FILLED ORAL at 16:12

## 2020-02-13 RX ADMIN — ATORVASTATIN CALCIUM 40 MG: 80 TABLET, FILM COATED ORAL at 19:54

## 2020-02-13 RX ADMIN — SODIUM CHLORIDE, PRESERVATIVE FREE 10 ML: 5 INJECTION INTRAVENOUS at 08:16

## 2020-02-13 RX ADMIN — ONDANSETRON 4 MG: 2 INJECTION INTRAMUSCULAR; INTRAVENOUS at 18:55

## 2020-02-13 RX ADMIN — NIMODIPINE 60 MG: 30 CAPSULE, LIQUID FILLED ORAL at 08:12

## 2020-02-13 RX ADMIN — NIMODIPINE 60 MG: 30 CAPSULE, LIQUID FILLED ORAL at 12:19

## 2020-02-13 RX ADMIN — ACETAMINOPHEN 650 MG: 325 TABLET ORAL at 19:28

## 2020-02-13 RX ADMIN — ONDANSETRON 4 MG: 2 INJECTION INTRAMUSCULAR; INTRAVENOUS at 11:48

## 2020-02-13 RX ADMIN — SODIUM CHLORIDE 500 ML/HR: 234 INJECTION INTRAMUSCULAR; INTRAVENOUS; SUBCUTANEOUS at 12:49

## 2020-02-13 RX ADMIN — NIMODIPINE 60 MG: 30 CAPSULE, LIQUID FILLED ORAL at 04:15

## 2020-02-13 ASSESSMENT — PAIN DESCRIPTION - LOCATION: LOCATION: HEAD

## 2020-02-13 ASSESSMENT — PAIN SCALES - GENERAL
PAINLEVEL_OUTOF10: 3
PAINLEVEL_OUTOF10: 10

## 2020-02-13 ASSESSMENT — PAIN DESCRIPTION - FREQUENCY: FREQUENCY: INTERMITTENT

## 2020-02-13 ASSESSMENT — PAIN DESCRIPTION - DESCRIPTORS: DESCRIPTORS: HEADACHE

## 2020-02-13 ASSESSMENT — PAIN SCALES - WONG BAKER: WONGBAKER_NUMERICALRESPONSE: 4

## 2020-02-13 NOTE — PROGRESS NOTES
Department of Neurosurgery  Attending Progress Note      SUBJECTIVE:  Patient denies headache this morning. OBJECTIVE    Physical  VITALS:  BP (!) 83/34   Pulse 64   Temp 97.7 °F (36.5 °C) (Core)   Resp 16   Ht 5' 6\" (1.676 m)   Wt 108 lb (49 kg)   SpO2 93%   BMI 17.43 kg/m²     Awake, alert, NAD  Seems more awake and alert than yesterday evening  Oriented to person, place, month and year (one day off)  EOMI, FS  No pronator drift  MAEW without focal deficit    Data  I personally reviewed and updated CT as well as the radiologist's report. There is more subarachnoid blood than there yesterday with increased intraventricular hemorrhage as well, but there does not seem to be any hydrocephalus. There is no midline shift. There is a coil mass in the aneurysm. Current Inpatient Medications  Current Facility-Administered Medications: sodium chloride 2 % infusion, 75 mL/hr, Intravenous, Continuous  docusate sodium (COLACE) capsule 100 mg, 100 mg, Oral, Daily PRN  atorvastatin (LIPITOR) tablet 40 mg, 40 mg, Oral, Nightly  niMODipine (NIMOTOP) capsule 60 mg, 60 mg, Oral, 6 times per day  levetiracetam (KEPPRA) 500 mg/100 mL IVPB, 500 mg, Intravenous, Q12H  sodium chloride flush 0.9 % injection 10 mL, 10 mL, Intravenous, 2 times per day  sodium chloride flush 0.9 % injection 10 mL, 10 mL, Intravenous, PRN  magnesium sulfate 1 g in dextrose 5% 100 mL IVPB, 1 g, Intravenous, PRN  acetaminophen (TYLENOL) tablet 650 mg, 650 mg, Oral, Q4H PRN  ondansetron (ZOFRAN) injection 4 mg, 4 mg, Intravenous, Q6H PRN  ASSESSMENT AND PLAN    SAH day 2, s/p coiling of SENTHIL aneurysm  -Despite the fact that her CT looks a bit worse today, the patient looks a bit better to me. She is certainly at risk of developing hydrocephalus due to the presence of intraventricular hemorrhage, but she does not seem to be suffering from hydrocephalus at this point.   She will need continued observation in the ICU for both vasospasm and hydrocephalus.

## 2020-02-13 NOTE — PROGRESS NOTES
Physical Therapy  DATE: 2020    NAME: Kristie Heredia  MRN: [id number]   : 1950    Patient not seen this date for Physical Therapy due to:  [] Blood transfusion in progress  [] Hemodialysis  []  Patient Declined  [] Spine Precautions   [] Strict Bedrest  [] Surgery/ Procedure  [] Testing      [x] Other: pt is in a deep sleep and increasing lethargic, unwilling to open eyes and participate despite attempts made by PT, OT and pt's daughter; ck back in pm as able or 20        [] PT being discontinued at this time. Patient independent. No further needs. [] PT being discontinued at this time as the patient has been transferred to palliative care. No further needs. Caden Espino  This treatment/evaluation completed by signing SPT. Signing PT agrees with treatment and documentation.

## 2020-02-13 NOTE — PLAN OF CARE
Problem: Falls - Risk of:  Goal: Will remain free from falls  Description  Will remain free from falls  Outcome: Ongoing  Note:   No falls entire shift. Fall precautions in place. Continue to monitor.

## 2020-02-13 NOTE — PLAN OF CARE
Problem: Skin Integrity:  Goal: Will show no infection signs and symptoms  Description  Will show no infection signs and symptoms  Outcome: Ongoing  Note:   Skin assessment complete. No breakdown noted. Interventions in place. See doc flowsheet for interventions initiated. Pt encouraged to turn and turned every two hours.  Will continue to monitor for additional needs and skin breakdown

## 2020-02-13 NOTE — PROGRESS NOTES
Functional Limits         Expression  Primary Mode of Expression: Verbal    Verbal Expression  Verbal Expression: Within functional limits         Motor Speech  Motor Speech: Within Functional Limits         Cognition:      Orientation  Overall Orientation Status: Within Functional Limits  Attention  Attention: Within Functional Limits  Memory  Memory: Exceptions to [facility]  Short-term Memory: Mild(0/3, 2/3)  Immediate Memory: Mild(3/3, 3/5, 2/3)  Problem Solving  Problem Solving: Exceptions to [facility]  Verbal Reasoning Skills: Moderate(Antonyms: 1/3, Inductive Reasoning: 3/4, Similarities, Differences: 2/4, )  Safety/Judgement  Safety/Judgement: Exceptions to [facility]  Flexibility of Thought: Moderate(2/6)  Word Generation: Mild (+7)    Prognosis:  Speech Therapy Prognosis  Prognosis: Fair  Individuals consulted  Consulted and agree with results and recommendations: Patient; Family member  Family member consulted: daughter    Education:  Patient Education: yes  Patient Education Response: Verbalizes understanding          Therapy Time:   Individual Concurrent Group Co-treatment   Time In 0830         Time Out 0848         Minutes 18               Completed by: Lexy Walker,  Clinician    Cosigned By: Severiano Sayre. A. CCC/SLP    2/13/2020 1:12 PM

## 2020-02-13 NOTE — PROGRESS NOTES
10 cc air removed from right groin safeguard. Pulses palpable. No bruising, bleeding, hematoma to site. 0 air remaining. Patient alert and oriented and following commands.  Continue to monitor.

## 2020-02-13 NOTE — PROGRESS NOTES
10 cc air removed from right groin safeguard. Pulses palpable. No bruising, bleeding, hematoma to site. Patient alert and oriented and following commands. Continue to monitor.

## 2020-02-13 NOTE — CARE COORDINATION
[address] Transitions Interview     2020    Patient: Jennifer Whitman Patient : 1950   MRN: [id number]  Reason for Admission: SAH (subarachnoid hemorrhage) ([address] [I60.9]  Status post coil embolization of cerebral aneurysm [Z98.890]  RARS: Readmission Risk Score: 8         Spoke with: Jennifer Whitman    Met with [place] and friend at bedside. Discharge plan is pending. Explained CTN role if she were to be discharged home. She seemed receptive to further outreach. Contact information provided. Readmission Risk  Patient Active Problem List   Diagnosis    Essential hypertension    RLS (restless legs syndrome)    History of cervical cancer    Gastroesophageal reflux disease without esophagitis    Trochanteric bursitis of left hip    Complex tear of lateral meniscus of left knee    Weight loss    Cerebrovascular accident (CVA) ([address]    Left sided numbness    Acute left-sided weakness    Lacunar stroke ([address]    Hyperlipidemia    Chronic pancreatitis ([address]    SAH (subarachnoid hemorrhage) (HCC)    Cerebral aneurysm rupture ([address]    Cerebral aneurysm    Subarachnoid hemorrhage from anterior cerebral artery aneurysm ([address]    Status post coil embolization of cerebral aneurysm       Inpatient Assessment  Care Transitions Summary    Care Transitions Inpatient Review  Medication Review  Housing Review  Social Support  Durable Medical Equipment  Functional Review  Hearing and Vision  Care Transitions Interventions                                 Follow Up  Future Appointments   Date Time Provider Cammie Molly   3/17/2020 10:30 AM Eddie Sow MD  jacek Felix   2020 12:45 PM Nito Keen MD [facility] [address] Maintenance  There are no preventive care reminders to display for this patient.     Kris Tavares RN

## 2020-02-13 NOTE — PROGRESS NOTES
Occupational Therapy    Occupational Therapy Not Seen Note    DATE: 2020  Name: Sloane Palmer  : 1950  MRN: [id number]    Patient not available for Occupational Therapy due to: Other: Unable to wake pt this date, lethargic after breakfast, chk later. Next Scheduled Treatment: Attempt in pm as time allows.     Electronically signed by Ashley Narvaez OT on 2020 at 10:08 AM

## 2020-02-13 NOTE — PROGRESS NOTES
Occupational Therapy   Occupational Therapy Initial Assessment  Date: 2020   Patient Name: Velvet Guzman  MRN: [id number]     : 1950    Date of Service: 2020    Discharge Recommendations: Further therapy recommended at discharge. OT Equipment Recommendations  Equipment Needed: Yes  Mobility Devices: Ozella Moreno; ADL Assistive Devices  Walker: Rolling  ADL Assistive Devices: Shower Chair with back    Assessment   Performance deficits / Impairments: Decreased functional mobility ; Decreased ADL status; Decreased safe awareness;Decreased endurance;Decreased high-level IADLs;Decreased balance  Prognosis: Good  Decision Making: Medium Complexity  OT Education: OT Role;Plan of Care;ADL Adaptive Strategies  REQUIRES OT FOLLOW UP: Yes  Activity Tolerance  Activity Tolerance: Patient Tolerated treatment well;Patient limited by fatigue  Safety Devices  Safety Devices in place: Yes  Type of devices: All fall risk precautions in place;Gait belt;Patient at risk for falls; Left in bed;Nurse notified  Restraints  Initially in place: No           Patient Diagnosis(es): The encounter diagnosis was SAH (subarachnoid hemorrhage) ([facility] has a past medical history of Acid reflux, Arthritis, Cancer ([facility] Family history of breast cancer in first degree relative, Gall stones, Hard of hearing, History of cervical cancer, Hypertension, Lateral meniscus tear, Wears dentures, and Wears glasses. has a past surgical history that includes Tonsillectomy and adenoidectomy (); Cholecystectomy; Inner ear surgery; Knee arthroscopy (, ); cyst removal (); Colonoscopy (); Breast biopsy (); Foot surgery (Bilateral); other surgical history (Right, 9-4-14); Umbilical hernia repair; hernia repair; Varicose vein surgery (Bilateral); Knee arthroscopy (Left, 4/3/2019); Colonoscopy (N/A, 2019); and other surgical history (2020).          Restrictions  Restrictions/Precautions  Restrictions/Precautions: Fall Risk, General Precautions, Seizure  Required Braces or Orthoses?: No  Position Activity Restriction  Other position/activity restrictions: up with assist; SBP goal , 7.8 Hgb    Subjective   General  Patient assessed for rehabilitation services?: Yes  Family / Caregiver Present: Yes(2 dtrs)  Diagnosis: SENTHIL aneurysm coiling, B/L SAH  Patient Currently in Pain: Yes  Pain Assessment  Pain Assessment: Faces  Youngblood-Baker Pain Rating: Hurts little more  Pain Location: Head  Pain Descriptors: Headache  Pain Frequency: Intermittent  Non-Pharmaceutical Pain Intervention(s): Ambulation/Increased Activity; Distraction; Emotional support; Therapeutic presence  Response to Pain Intervention: Patient Satisfied  Vital Signs  Pulse: 64  Resp: 16  BP: (!) 83/34  MAP (mmHg): (!) 51  Level of Consciousness: Responds to Voice or New Confusion or Agitation  Patient Currently in Pain: Yes  Oxygen Therapy  SpO2: 93 %  Social/Functional History  Social/Functional History  Lives With: Daughter, Family(daughter + 3 grandchildren: 5, 6, 12 y. o)  Type of Home: House  Home Layout: One level  Home Access: Stairs to enter without rails  Entrance Stairs - Number of Steps: 2 YAS into garage  Bathroom Shower/Tub: Walk-in shower  Bathroom Toilet: Standard  Home Equipment: (no DME at baseline)  ADL Assistance: [facility] [address]: Independent  Homemaking Responsibilities: Yes  Ambulation Assistance: Independent  Transfer Assistance: Independent  Active : Yes  Occupation: Retired  Type of occupation: house keeping  Additional Comments: pt's daughter is a student and is home a majority of the day, able to help if needed       Objective   Vision: Within Functional Limits  Hearing: Exceptions to [facility]  Hearing Exceptions: Hard of hearing/hearing concerns;Right hearing aid    Orientation  Overall Orientation Status: Within Functional Limits     Balance  Sitting Balance: Supervision  Standing Balance: Contact guard assistance  Standing Balance  Time: 8 min  Activity: Pt stood bedside, func mob for household distances  Functional Mobility  Functional - Mobility Device: Rolling Walker  Activity: Other  Assist Level: Minimal assistance  Functional Mobility Comments: Slow pace, no major LOB noted, fall risk  ADL  Feeding: Modified independent (Eating lunch at end of session, HOB elevated)  Grooming: Supervision  UE Bathing: Stand by assistance  LE Bathing: Contact guard assistance  UE Dressing: Contact guard assistance  LE Dressing: Minimal assistance  Toileting: Minimal assistance  Additional Comments: Pt able to doff/don R sock this date sitting EOB  Tone RUE  RUE Tone: Normotonic  Tone LUE  LUE Tone: Normotonic  Coordination  Movements Are Fluid And Coordinated: No  Coordination and Movement description: Decreased speed     Bed mobility  Supine to Sit: Minimal assistance  Sit to Supine: Minimal assistance  Scooting: Stand by assistance  Transfers  Sit to stand: Minimal assistance  Stand to sit: Minimal assistance     Cognition  Overall Cognitive Status: WFL  Cognition Comment: However, pt very quiet, [place] making communication difficult        Sensation  Overall Sensation Status: WFL      LUE AROM (degrees)  LUE AROM : WFL  RUE AROM (degrees)  RUE AROM : WFL  LUE Strength  Gross LUE Strength: WFL  L Hand General: 4+/5  RUE Strength  Gross RUE Strength: WFL  R Hand General: 4+/5      Plan   Plan  Times per week: 4x        AM-[facility] Inpatient Daily Activity Raw Score: 18 (02/13/20 1556)  AM-[facility] Inpatient ADL T-Scale Score : 38.66 (02/13/20 1556)  ADL Inpatient CMS 0-100% Score: 46.65 (02/13/20 1556)  ADL Inpatient CMS G-Code Modifier : CK (02/13/20 1556)    Goals  Short term goals  Time Frame for Short term goals: Pt will by discharge   Short term goal 1: demo ADL UB/LB dressing/bathing activity with mod I and increased time  Short term goal 2: demo standing during func activity for 12 min+ using LRD and mod I  Short term goal 3: demo bending/reaching func activity while standing using LRD and SUP  Short term goal 4: demo (I) with all bed mob/transfers     Therapy Time   Individual Concurrent Group Co-treatment   Time In 1314         Time Out 1354         Minutes 40         Timed Code Treatment Minutes: 31 Minutes       Abdullahi Masterson OTR/L

## 2020-02-13 NOTE — ED PROVIDER NOTES
given Keppra 1 g loading dose, also labetalol drip to control blood pressure to 150 systolic. [JOSE]      ED Course User Index  [JOSE] Willa Kessler MD       CRITICAL CARE:       PROCEDURES:    Procedures    DIAGNOSTIC RESULTS   EKG:All EKG's are interpreted by the Emergency Department Physician who either signs or Co-signs this chart in the absence of a cardiologist.        RADIOLOGY:All plain film, CT, MRI, and formal ultrasound images (except ED bedside ultrasound) are read by the radiologist, see reports below, unless otherwisenoted in MDM or here. CT Head WO Contrast   Final Result   Subarachnoid hemorrhage predominates in the anterior interhemispheric   fissure, as described. No definite intraparenchymal component identified. Findings were discussed with Alexandr Castellano at 12:20 pm on 2/12/2020. LABS: All lab results were reviewed by myself, and all abnormals are listed below.   Labs Reviewed   CBC WITH AUTO DIFFERENTIAL - Abnormal; Notable for the following components:       Result Value    RBC 3.51 (*)     Hemoglobin 9.0 (*)     Hematocrit 30.2 (*)     RDW 15.2 (*)     Seg Neutrophils 69 (*)     Lymphocytes 16 (*)     Monocytes 14 (*)     Absolute Lymph # 0.72 (*)     All other components within normal limits   COMPREHENSIVE METABOLIC PANEL W/ REFLEX TO MG FOR LOW K - Abnormal; Notable for the following components:    Glucose 111 (*)     Anion Gap 8 (*)     Alkaline Phosphatase 169 (*)     ALT 41 (*)     AST 57 (*)     Alb 3.1 (*)     All other components within normal limits       EMERGENCY DEPARTMENTCOURSE:         Vitals:    Vitals:    02/12/20 1132 02/12/20 1235 02/12/20 1257 02/12/20 1333   BP: (!) 157/67 (!) 148/59 (!) 153/60 (!) 157/62   Pulse: 65 71 70 73   Resp: 17 18 17 18   Temp: 98.4 °F (36.9 °C)      TempSrc: Oral      SpO2: 96% 94% 99% 99%   Weight: 108 lb (49 kg)      Height: 5' 3\" (1.6 m)          The patient was given the following medications while in the emergency

## 2020-02-13 NOTE — CARE COORDINATION
Case Management Initial Discharge Plan  Thor Camara             Met with:patient or family member patient and daughter to discuss discharge plans. Information verified: address, contacts, phone number, , insurance Yes  PCP: Elaina Schwab, MD  Date of last visit: 1-2 weeks    Insurance Provider: [facility]    Discharge Planning    Living Arrangements: with daughter     Support Systems: family       Home has 1 stories  2 stairs to climb to get into front door, 0 stairs to climb to reach second floor  Location of bedroom/bathroom in home main floor    Patient able to perform ADL's:Independent    Current Services (outpatient & in home) none  DME equipment: none  DME provider:       Potential Assistance Needed:       Patient agreeable to home care: Yes  Freedom of choice provided:  yes    Prior SNF/Rehab Placement and Facility: none  Agreeable to SNF/Rehab: No  Tracy of choice provided: n/a   Evaluation: n/a    Expected Discharge date:     Patient expects to be discharged to: Follow Up Appointment: Best Day/ Time:      Transportation provider: self/family  Transportation arrangements needed for discharge: No    Readmission Risk              Risk of Unplanned Readmission:        8             Does patient have a readmission risk score greater than 14?: No  If yes, follow-up appointment must be made within 7 days of discharge. Goals of Care: To be able to walk and be independent      Discharge Plan: Return home with daughter, agreeable to home care. Declines ARU or SNF.  Home care list provided           Electronically signed by Valeria Bumpers, RN on 20 at 6:28 PM

## 2020-02-13 NOTE — PROGRESS NOTES
administration of intravenous contrast. Dose modulation, iterative reconstruction, and/or weight based adjustment of the mA/kV was utilized to reduce the radiation dose to as low as reasonably achievable. COMPARISON: 02/12/2020. HISTORY: ORDERING SYSTEM PROVIDED HISTORY: SAH with aneurysm re-evaluation TECHNOLOGIST PROVIDED HISTORY: SAH with aneurysm re-evaluation Reason for Exam: SAH with aneurysm re-evaluation FINDINGS: BRAIN/VENTRICLES: Interval postsurgical changes of anterior cerebral artery aneurysm coil embolization with associated streak artifact. Small amount pneumocephalus adjacent to the embolization coils. Compared to pre embolization CT head there is increased hyperattenuation in the bilateral medial frontal regions which could represent increased hemorrhage and/or angiographic contrast staining. Acute bilateral subarachnoid hemorrhage is centered in the basal cisterns. New intraventricular extension of hemorrhage with acute hyperdense blood products throughout the ventricular system but most pronounced in the right lateral ventricle. Interval progression of diffuse cerebral edema and sulcal effacement. No significant midline shift. Mild diffuse ventricular prominence suggest developing hydrocephalus. No large acute territorial infarct. ORBITS: The visualized portion of the orbits demonstrate no acute abnormality. SINUSES: The paranasal sinuses are grossly clear. Postsurgical changes of right mastoidectomy. Fluid/soft tissue in the left mastoid air cells. SOFT TISSUES/SKULL:  No acute abnormality of the visualized skull or soft tissues. 1.  Interval postsurgical changes of anterior cerebral artery aneurysm coil embolization. Small amount of pneumocephalus adjacent to the embolization coils.  2.  Significantly increased hyperattenuation in the bilateral medial frontal regions centered around the embolization coils could represent increased hemorrhage and/or post angiographic contrast stroke Reason for Exam: stroke alert FINDINGS: ANTERIOR CIRCULATION: No significant stenosis of the intracranial internal carotid, anterior cerebral, or middle cerebral arteries. There is a 2 mm aneurysm arising from the right anterior cerebral artery in the anterior interhemispheric fissure. POSTERIOR CIRCULATION: No significant stenosis of the vertebral, basilar, or posterior cerebral arteries. No aneurysm. OTHER: No dural venous sinus thrombosis on this non-dedicated study. BRAIN: No mass effect or midline shift. No extra-axial fluid collection. The gray-white differentiation is maintained. Demonstration of a 2 mm aneurysm arising from the right anterior cerebral artery within the anterior interhemispheric fissure region. Findings were discussed with Dr. Mariann Romano At 2:36 pm on 2/12/2020. A/P  77 y.o. female who presents with sudden onset of headache associated with nausea and diaphoresis. Patient found to have a [address] and was transferred from [address] [place] [address],[address] to our facility. CTA showing 2mm aneurysm       - No neurosurgical interventions planned for now, will continue to monitor for hydrocephalus  for EVD placement   - Hold all antiplatelets and anticoagulants  -  Neuro checks per ICU Protocol  - SCD/EPC cuffs for DVT prophylaxis   - Will continue to follow       Please contact neurosurgery with any changes in patients neurologic status.        Judit Winters CNP  2/13/20  11:05 AM

## 2020-02-13 NOTE — PROGRESS NOTES
day    levetiracetam  500 mg Intravenous Q12H    sodium chloride flush  10 mL Intravenous 2 times per day     CONTINUOUS INFUSIONS:   sodium chloride 75 mL/hr at 20    niCARdipene (CARDENE) infusion Stopped (20)     PRN MEDICATIONS:   sodium chloride flush, magnesium sulfate, acetaminophen, ondansetron    VITALS:  Temperature Range: Temp: 98.4 °F (36.9 °C) Temp  Av.2 °F (35.1 °C)  Min: 90.6 °F (32.6 °C)  Max: 98.4 °F (36.9 °C)  BP Range: Systolic (43SCC), QTL:346 , Min:85 , OBN:939     Diastolic (64LGN), CRF:59, Min:8, Max:115    Pulse Range: Pulse  Av.7  Min: 62  Max: 79  Respiration Range: Resp  Av.7  Min: 0  Max: 33  Current Pulse Ox: SpO2: 100 %  24HR Pulse Ox Range: SpO2  Av.6 %  Min: 93 %  Max: 100 %  Patient Vitals for the past 12 hrs:   BP Temp Temp src Pulse Resp SpO2 Height   20 0733 (!) 124/51 -- -- 65 12 100 % --   20 0717 (!) 127/35 -- -- 63 20 100 % --   20 0702 (!) 130/37 -- -- 63 11 100 % --   20 0647 (!) 129/40 -- -- 63 17 100 % --   20 0632 (!) 128/41 -- -- 63 12 100 % --   20 0617 (!) 127/37 -- -- 62 16 100 % --   20 0602 (!) 113/41 -- -- 63 13 100 % --   20 0547 (!) 117/42 -- -- 65 17 100 % --   20 0432 (!) 125/43 -- -- 66 14 100 % --   20 0428 -- 98.4 °F (36.9 °C) Oral 64 14 100 % --   20 0417 (!) 124/37 -- -- 69 13 100 % --   20 0402 (!) 131/115 -- -- 67 14 100 % --   20 0347 122/62 -- -- 65 13 100 % --   20 0332 (!) 104/36 -- -- 68 14 100 % --   20 0317 104/62 -- -- 71 15 100 % --   20 0302 (!) 120/47 -- -- 69 13 100 % --   20 0247 111/87 -- -- 68 14 100 % --   20 0232 (!) 107/34 -- -- 65 16 99 % --   20 0217 (!) 135/34 -- -- 68 14 100 % --   20 0202 (!) 123/40 -- -- 70 13 100 % --   20 0147 (!) 119/39 -- -- 71 14 100 % --   20 0132 (!) 130/36 -- -- 72 15 100 % --   20 0117 (!) 111/47 -- -- 67 14 100 % -- 02/13/20 0102 (!) 132/41 -- -- 67 13 100 % --   02/13/20 0047 (!) 115/41 -- -- 66 14 99 % --   02/13/20 0032 (!) 121/46 -- -- 65 13 100 % --   02/13/20 0017 (!) 131/45 -- -- 66 14 99 % --   02/13/20 0002 (!) 120/46 97.5 °F (36.4 °C) Oral 65 12 99 % --   02/12/20 2350 -- 97.5 °F (36.4 °C) Oral 64 13 99 % --   02/12/20 2347 (!) 120/45 -- -- 65 13 99 % --   02/12/20 2332 (!) 121/44 -- -- 65 13 98 % --   02/12/20 2317 (!) 126/46 -- -- 64 14 98 % --   02/12/20 2302 (!) 124/52 -- -- 65 13 99 % --   02/12/20 2247 (!) 130/41 -- -- 67 13 99 % --   02/12/20 2232 (!) 123/43 -- -- 69 13 99 % --   02/12/20 2217 (!) 130/47 -- -- 69 14 100 % --   02/12/20 2202 (!) 142/46 -- -- 68 13 100 % --   02/12/20 2147 (!) 147/47 -- -- 68 13 100 % --   02/12/20 2132 (!) 139/42 -- -- 66 12 100 % --   02/12/20 2117 (!) 131/50 -- -- 64 11 100 % --   02/12/20 2102 (!) 87/37 -- -- 64 12 99 % --   02/12/20 2047 (!) 92/38 -- -- 67 12 98 % --   02/12/20 2043 -- -- -- -- -- 98 % 5' 6\" (1.676 m)   02/12/20 2032 (!) 95/38 -- -- 69 15 -- --     Estimated body mass index is 17.43 kg/m² as calculated from the following:    Height as of this encounter: 5' 6\" (1.676 m).     Weight as of this encounter: 108 lb (49 kg).  []<16 Severe malnutrition  []16-16.99 Moderate malnutrition  [x]17-18.49 Mild malnutrition  []18.5-24.9 Normal  []25-29.9 Overweight (not obese)  []30-34.9 Obese class 1 (Low Risk)  []35-39.9 Obese class 2 (Moderate Risk)  []?40 Obese class 3 (High Risk)    RECENT LABS:   Lab Results   Component Value Date    WBC 4.9 02/13/2020    HGB 7.8 (L) 02/13/2020    HCT 25.8 (L) 02/13/2020     02/13/2020    CHOL 102 02/13/2020    TRIG 100 02/13/2020    HDL 21 (L) 02/13/2020    ALT 41 (H) 02/12/2020    AST 57 (H) 02/12/2020     02/13/2020    K 3.8 02/13/2020     (H) 02/13/2020    CREATININE 0.58 02/13/2020    BUN 8 02/13/2020    CO2 20 02/13/2020    TSH 3.58 06/01/2019    INR 1.0 06/01/2019    LABA1C 5.2 06/02/2019     24 HOUR INTAKE/OUTPUT:    Intake/Output Summary (Last 24 hours) at 2/13/2020 0824  Last data filed at 2/13/2020 0617  Gross per 24 hour   Intake 2157 ml   Output 3055 ml   Net -898 ml       IMAGING:    Labs and Images reviewed with:  [] Dr. Dank Ortega. Lauro    [x] Dr. Bhavin Nicholas  [] Dr. Waqar Squires  [] There are no new interval images to review. PHYSICAL EXAM       CONSTITUTIONAL:  Well developed, well nourished, alert and oriented x 3, in no acute distress. GCS 15. Nontoxic. No dysarthria. No aphasia. HEAD:  normocephalic, atraumatic    EYES:  PERRLA, EOMI.   ENT:  moist mucous membranes   NECK:  supple, symmetric   LUNGS:  Equal air entry bilaterally   CARDIOVASCULAR:  normal s1 / s2, RRR, distal pulses intact   ABDOMEN:  Soft, no rigidity   NEUROLOGIC:  Mental Status:  Not oriented to person and Not oriented to place, drowsy but arousable              Cranial Nerves:  Right sided facial droop   Motor Exam:    Drift:  absent  Tone:  normal    Motor exam is 5 out of 5 right upper and right lower extremities, 3 out of 5 left lower extremity            DRAINS:  [x] There are no drains for Neuro Critical Care to monitor at this time. ASSESSMENT AND PLAN:       NEUROLOGIC:  -Repeat CTH showed diffuse cerebral edema, frontal areas with hyperattenuation (hemorrhage versus contrast) and hydrocephalus   - continue Keppra IV 500mg and nimodipine 60 mg (Q4)   - Goal SBP <140   -NS consulted, no surgical intervention at this time  -transcranial dopplers   -starting 2% hypertonic saline, goal Na 150   - Neuro checks per protocol    CARDIOVASCULAR:  - Goal SBP <140   -ECHO pending   -EKG showing no acute abnormalities   -cardene discontinued,   - Continue telemetry    PULMONARY:  -no acute issues   -CXR showing atelectasis     RENAL/FLUID/ELECTROLYTE:  - BUN 8/ Creatinine . 58  - Urine output  - IVF:2% hypertonic saline   -Na Q6hrs   - Replace electrolytes PRN  - Daily BMP    GI/NUTRITION:  NUTRITION:  DIET GENERAL;  -

## 2020-02-13 NOTE — PROGRESS NOTES
Physical Therapy    Facility/Department: [address]  Initial Assessment    NAME: Julius Sandhoff  : 1950  MRN: [id number]    Date of Service: 2020  Chief Complaint   Patient presents with    Cerebrovascular Accident       Discharge Recommendations:    Further therapy recommended at discharge for higher level balance and endurance in order to return to prior level. PT Equipment Recommendations  Equipment Needed: Yes  Mobility Devices: Ledon Coaster: Rolling    Assessment   Body structures, Functions, Activity limitations: Decreased functional mobility ; Decreased balance;Decreased strength;Decreased endurance;Decreased safe awareness  Assessment: Pt is grossly Maximino for both bed mobility and transfers at this time; assist for LE's when performing bed mobility. Amb x75ft with RW CGA, increased time but no LOB noted. Pt has slight dizziness upon immediate standing with some intermittent dizziness throughout session but does not inhibit participation. Pt would benefit from acute PT services at this time to address deficits. Prognosis: Good  Decision Making: Medium Complexity  PT Education: Family Education;Transfer Training;General Safety;PT Role;Plan of Care;Gait Training;Functional Mobility Training;Equipment  Patient Education: pt's daughters encouraged to let pt do as much as she can for herself to hasten the recovery process and allow her to make functional gains outside of therapy  REQUIRES PT FOLLOW UP: Yes  Activity Tolerance  Activity Tolerance: Patient Tolerated treatment well       Patient Diagnosis(es): The encounter diagnosis was SAH (subarachnoid hemorrhage) ([address] has a past medical history of Acid reflux, Arthritis, Cancer ([address] Family history of breast cancer in first degree relative, Gall stones, Hard of hearing, History of cervical cancer, Hypertension, Lateral meniscus tear, Wears dentures, and Wears glasses.    has a past surgical history that includes Tonsillectomy and baseline)  ADL Assistance: Independent  Homemaking Assistance: Independent  Homemaking Responsibilities: Yes  Ambulation Assistance: Independent  Transfer Assistance: Independent  Active : Yes  Occupation: Retired  Type of occupation: house keeping  Additional Comments: pt's daughter is a student and is home a majority of the day, able to help if needed  Cognition   Cognition  Overall Cognitive Status: WFL    Objective   AROM RLE (degrees)  RLE AROM: WFL  AROM LLE (degrees)  LLE AROM : WFL  AROM RUE (degrees)  RUE AROM : WFL  AROM LUE (degrees)  LUE AROM : WFL  Strength RLE  Comment: grossly 4/5  Strength LLE  Comment: grossly 4/5  Strength RUE  Strength RUE: WFL  Strength LUE  Strength LUE: WFL  Tone RLE  RLE Tone: Normotonic  Tone LLE  LLE Tone: Normotonic  Motor Control  Gross Motor?: WFL  Sensation  Overall Sensation Status: WFL(denies numbness/tingling)  Bed mobility  Supine to Sit: Minimal assistance  Sit to Supine: Minimal assistance  Comment: completed with HOB elevated and use of bedrails; required assist for LE's getting into and out of bed at this time  Transfers  Sit to Stand: Minimal Assistance  Stand to sit: Minimal Assistance  Comment: completed with RW, verbal cues for hand placement with transfers  Ambulation  Ambulation?: Yes  More Ambulation?: Yes  Ambulation 1  Surface: level tile  Device: Rolling Walker  Assistance: Contact guard assistance  Quality of Gait: narrow DIANE, slowed gait speed and mildly unsteady, equal step length one foot passes other consistently   Distance: 75ft  Stairs/Curb  Stairs?: No     Balance  Sitting - Static: Good  Sitting - Dynamic: Good  Standing - Static: Fair  Standing - Dynamic: Fair  Comments: standing balance assessed with RW        Plan   Plan  Times per week: 5-6x/wk  Current Treatment Recommendations: Strengthening, Gait Training, Patient/Caregiver Education & Training, Equipment Evaluation, Education, & procurement, Stair training, Balance Training,

## 2020-02-14 ENCOUNTER — APPOINTMENT (OUTPATIENT)
Dept: CT IMAGING | Age: 70
DRG: 020 | End: 2020-02-14
Payer: MEDICARE

## 2020-02-14 LAB
ANION GAP SERPL CALCULATED.3IONS-SCNC: 12 MMOL/L (ref 9–17)
BUN BLDV-MCNC: 10 MG/DL (ref 8–23)
BUN/CREAT BLD: ABNORMAL (ref 9–20)
CALCIUM SERPL-MCNC: 8 MG/DL (ref 8.6–10.4)
CHLORIDE BLD-SCNC: 124 MMOL/L (ref 98–107)
CO2: 18 MMOL/L (ref 20–31)
CREAT SERPL-MCNC: 0.62 MG/DL (ref 0.5–0.9)
EKG ATRIAL RATE: 68 BPM
EKG P AXIS: 43 DEGREES
EKG P-R INTERVAL: 152 MS
EKG Q-T INTERVAL: 438 MS
EKG QRS DURATION: 90 MS
EKG QTC CALCULATION (BAZETT): 465 MS
EKG R AXIS: 47 DEGREES
EKG T AXIS: 85 DEGREES
EKG VENTRICULAR RATE: 68 BPM
GFR AFRICAN AMERICAN: >60 ML/MIN
GFR NON-AFRICAN AMERICAN: >60 ML/MIN
GFR SERPL CREATININE-BSD FRML MDRD: ABNORMAL ML/MIN/{1.73_M2}
GFR SERPL CREATININE-BSD FRML MDRD: ABNORMAL ML/MIN/{1.73_M2}
GLUCOSE BLD-MCNC: 108 MG/DL (ref 70–99)
HCT VFR BLD CALC: 24.3 % (ref 36.3–47.1)
HEMOGLOBIN: 7.3 G/DL (ref 11.9–15.1)
LV EF: 58 %
LVEF MODALITY: NORMAL
MAGNESIUM: 2.1 MG/DL (ref 1.6–2.6)
MCH RBC QN AUTO: 26.3 PG (ref 25.2–33.5)
MCHC RBC AUTO-ENTMCNC: 30 G/DL (ref 28.4–34.8)
MCV RBC AUTO: 87.4 FL (ref 82.6–102.9)
NRBC AUTOMATED: 0 PER 100 WBC
PDW BLD-RTO: 15.6 % (ref 11.8–14.4)
PLATELET # BLD: 235 K/UL (ref 138–453)
PMV BLD AUTO: 9.7 FL (ref 8.1–13.5)
POTASSIUM SERPL-SCNC: 3.8 MMOL/L (ref 3.7–5.3)
RBC # BLD: 2.78 M/UL (ref 3.95–5.11)
SODIUM BLD-SCNC: 143 MMOL/L (ref 135–144)
SODIUM BLD-SCNC: 146 MMOL/L (ref 135–144)
SODIUM BLD-SCNC: 146 MMOL/L (ref 135–144)
SODIUM BLD-SCNC: 154 MMOL/L (ref 135–144)
TROPONIN INTERP: NORMAL
TROPONIN T: NORMAL NG/ML
TROPONIN, HIGH SENSITIVITY: 11 NG/L (ref 0–14)
WBC # BLD: 4.3 K/UL (ref 3.5–11.3)

## 2020-02-14 PROCEDURE — 2500000003 HC RX 250 WO HCPCS: Performed by: NURSE PRACTITIONER

## 2020-02-14 PROCEDURE — 95708 EEG WO VID EA 12-26HR UNMNTR: CPT

## 2020-02-14 PROCEDURE — 2580000003 HC RX 258: Performed by: STUDENT IN AN ORGANIZED HEALTH CARE EDUCATION/TRAINING PROGRAM

## 2020-02-14 PROCEDURE — 2000000003 HC NEURO ICU R&B

## 2020-02-14 PROCEDURE — 70450 CT HEAD/BRAIN W/O DYE: CPT

## 2020-02-14 PROCEDURE — 6360000002 HC RX W HCPCS: Performed by: NURSE PRACTITIONER

## 2020-02-14 PROCEDURE — 99233 SBSQ HOSP IP/OBS HIGH 50: CPT | Performed by: PSYCHIATRY & NEUROLOGY

## 2020-02-14 PROCEDURE — 85027 COMPLETE CBC AUTOMATED: CPT

## 2020-02-14 PROCEDURE — 2580000003 HC RX 258: Performed by: NURSE PRACTITIONER

## 2020-02-14 PROCEDURE — 93010 ELECTROCARDIOGRAM REPORT: CPT | Performed by: INTERNAL MEDICINE

## 2020-02-14 PROCEDURE — 6360000002 HC RX W HCPCS: Performed by: PSYCHIATRY & NEUROLOGY

## 2020-02-14 PROCEDURE — 83735 ASSAY OF MAGNESIUM: CPT

## 2020-02-14 PROCEDURE — 2500000003 HC RX 250 WO HCPCS: Performed by: STUDENT IN AN ORGANIZED HEALTH CARE EDUCATION/TRAINING PROGRAM

## 2020-02-14 PROCEDURE — 93306 TTE W/DOPPLER COMPLETE: CPT

## 2020-02-14 PROCEDURE — 6370000000 HC RX 637 (ALT 250 FOR IP): Performed by: PSYCHIATRY & NEUROLOGY

## 2020-02-14 PROCEDURE — 80048 BASIC METABOLIC PNL TOTAL CA: CPT

## 2020-02-14 PROCEDURE — APPNB15 APP NON BILLABLE TIME 0-15 MINS: Performed by: NURSE PRACTITIONER

## 2020-02-14 PROCEDURE — 84295 ASSAY OF SERUM SODIUM: CPT

## 2020-02-14 PROCEDURE — 84484 ASSAY OF TROPONIN QUANT: CPT

## 2020-02-14 PROCEDURE — 6370000000 HC RX 637 (ALT 250 FOR IP): Performed by: NURSE PRACTITIONER

## 2020-02-14 PROCEDURE — 93886 INTRACRANIAL COMPLETE STUDY: CPT

## 2020-02-14 RX ORDER — MAGNESIUM SULFATE 1 G/100ML
1 INJECTION INTRAVENOUS
Status: COMPLETED | OUTPATIENT
Start: 2020-02-14 | End: 2020-02-14

## 2020-02-14 RX ORDER — HYDRALAZINE HYDROCHLORIDE 25 MG/1
25 TABLET, FILM COATED ORAL EVERY 8 HOURS SCHEDULED
Status: DISCONTINUED | OUTPATIENT
Start: 2020-02-14 | End: 2020-02-15

## 2020-02-14 RX ORDER — MEGESTROL ACETATE 40 MG/ML
800 SUSPENSION ORAL DAILY
Status: DISCONTINUED | OUTPATIENT
Start: 2020-02-14 | End: 2020-02-21

## 2020-02-14 RX ORDER — LANOLIN ALCOHOL/MO/W.PET/CERES
325 CREAM (GRAM) TOPICAL
Status: DISCONTINUED | OUTPATIENT
Start: 2020-02-15 | End: 2020-03-03 | Stop reason: HOSPADM

## 2020-02-14 RX ADMIN — ATORVASTATIN CALCIUM 40 MG: 80 TABLET, FILM COATED ORAL at 20:35

## 2020-02-14 RX ADMIN — LEVETIRACETAM 500 MG: 5 INJECTION INTRAVENOUS at 09:45

## 2020-02-14 RX ADMIN — NIMODIPINE 60 MG: 30 CAPSULE, LIQUID FILLED ORAL at 15:45

## 2020-02-14 RX ADMIN — NIMODIPINE 60 MG: 30 CAPSULE, LIQUID FILLED ORAL at 00:30

## 2020-02-14 RX ADMIN — MEGESTROL ACETATE 800 MG: 40 SUSPENSION ORAL at 12:13

## 2020-02-14 RX ADMIN — MAGNESIUM SULFATE HEPTAHYDRATE 1 G: 1 INJECTION, SOLUTION INTRAVENOUS at 10:56

## 2020-02-14 RX ADMIN — ENOXAPARIN SODIUM 40 MG: 40 INJECTION SUBCUTANEOUS at 11:21

## 2020-02-14 RX ADMIN — SODIUM CHLORIDE 75 ML/HR: 234 INJECTION INTRAMUSCULAR; INTRAVENOUS; SUBCUTANEOUS at 03:52

## 2020-02-14 RX ADMIN — NIMODIPINE 60 MG: 30 CAPSULE, LIQUID FILLED ORAL at 11:22

## 2020-02-14 RX ADMIN — NIMODIPINE 60 MG: 30 CAPSULE, LIQUID FILLED ORAL at 03:51

## 2020-02-14 RX ADMIN — MAGNESIUM SULFATE HEPTAHYDRATE 1 G: 1 INJECTION, SOLUTION INTRAVENOUS at 12:03

## 2020-02-14 RX ADMIN — HYDRALAZINE HYDROCHLORIDE 25 MG: 25 TABLET, FILM COATED ORAL at 20:35

## 2020-02-14 RX ADMIN — ONDANSETRON 4 MG: 2 INJECTION INTRAMUSCULAR; INTRAVENOUS at 11:21

## 2020-02-14 RX ADMIN — SODIUM CHLORIDE 75 ML/HR: 234 INJECTION INTRAMUSCULAR; INTRAVENOUS; SUBCUTANEOUS at 23:11

## 2020-02-14 RX ADMIN — ACETAMINOPHEN 650 MG: 325 TABLET ORAL at 13:11

## 2020-02-14 RX ADMIN — HYDRALAZINE HYDROCHLORIDE 25 MG: 25 TABLET, FILM COATED ORAL at 13:12

## 2020-02-14 RX ADMIN — SODIUM CHLORIDE 50 ML/HR: 234 INJECTION INTRAMUSCULAR; INTRAVENOUS; SUBCUTANEOUS at 15:48

## 2020-02-14 RX ADMIN — NIMODIPINE 60 MG: 30 CAPSULE, LIQUID FILLED ORAL at 20:22

## 2020-02-14 RX ADMIN — SODIUM CHLORIDE, PRESERVATIVE FREE 10 ML: 5 INJECTION INTRAVENOUS at 09:45

## 2020-02-14 RX ADMIN — LEVETIRACETAM 500 MG: 5 INJECTION INTRAVENOUS at 20:38

## 2020-02-14 ASSESSMENT — PAIN DESCRIPTION - FREQUENCY: FREQUENCY: INTERMITTENT

## 2020-02-14 ASSESSMENT — PAIN SCALES - WONG BAKER
WONGBAKER_NUMERICALRESPONSE: 0

## 2020-02-14 ASSESSMENT — PAIN DESCRIPTION - PROGRESSION: CLINICAL_PROGRESSION: GRADUALLY WORSENING

## 2020-02-14 ASSESSMENT — PAIN - FUNCTIONAL ASSESSMENT: PAIN_FUNCTIONAL_ASSESSMENT: ACTIVITIES ARE NOT PREVENTED

## 2020-02-14 ASSESSMENT — PAIN DESCRIPTION - LOCATION
LOCATION: HEAD
LOCATION: HEAD

## 2020-02-14 ASSESSMENT — PAIN DESCRIPTION - PAIN TYPE
TYPE: ACUTE PAIN
TYPE: ACUTE PAIN

## 2020-02-14 ASSESSMENT — PAIN DESCRIPTION - ORIENTATION
ORIENTATION: ANTERIOR
ORIENTATION: ANTERIOR

## 2020-02-14 ASSESSMENT — PAIN SCALES - GENERAL
PAINLEVEL_OUTOF10: 9
PAINLEVEL_OUTOF10: 0

## 2020-02-14 ASSESSMENT — PAIN DESCRIPTION - DESCRIPTORS: DESCRIPTORS: ACHING;HEADACHE

## 2020-02-14 ASSESSMENT — PAIN DESCRIPTION - ONSET: ONSET: GRADUAL

## 2020-02-14 NOTE — PROGRESS NOTES
Daily Progress Note  Neuro Critical Care    Patient Name: Thor Camara  Patient : 1950  Room/Bed: 2931/9037-26  Code Status: Full  Allergies: Allergies   Allergen Reactions    Lisinopril Other (See Comments)    Losartan      Fatigue    Procardia [Nifedipine] Other (See Comments)       CHIEF COMPLAINT:      Headache, nausea/vomiting     INTERVAL HISTORY    Initial Presentation (Admitted ):    77 y. o. female presented with a history of HTN who presents as a a transfer from [address] [place] [address] for SAH.  She was at the dentist around 1030AM when she had a sudden thunderclap headache, diaphoresis, and nausea/vomiting. Daughter received a call from the dentist around 1030AM stating the patient was drowsy and out of it. Arlet Edmonds was taken to [address] [place] [address] [address] ED where CT Head showed subarachnoid hemorrhage predominately in the anterior interhemispheric fissure.  Patient was complaining of severe headache and vomiting at outlying ED.  Loaded with 1g Keppra, given 2mg Morphine and Zofran, started on Labetalol infusion for blood pressure control and transferred to [address] [place] [address] for further management.  On arrival to [address] [place] [address] ED, patient is lethargic. Arlet Edmonds is able to state her name and that she's at the hospital.  No dysarthria or aphasia. PERRL 3mm.  EOMI.  Mild right facial droop. Moving all extremities equally. Repeat CT Head stable SAH and ventricular system.  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 Neuro endovascular performed coiling.            Last 24h: The patient was less drowsy this morning. She was participatory with exam. The patient now has her right sided hearing aid.      CURRENT MEDICATIONS:  SCHEDULED MEDICATIONS:   [START ON 2/15/2020] ferrous sulfate  325 mg Oral Daily with breakfast    magnesium sulfate  1 g Intravenous Q1H    enoxaparin  40 mg Subcutaneous Daily    hydrALAZINE  25 mg Oral 3 times per day    megestrol  800 mg Oral Daily    atorvastatin  40 mg Oral Nightly    niMODipine 60 mg Oral 6 times per day    levetiracetam  500 mg Intravenous Q12H    sodium chloride flush  10 mL Intravenous 2 times per day     CONTINUOUS INFUSIONS:   sodium chloride Stopped (20 0840)     PRN MEDICATIONS:   docusate sodium, sodium chloride flush, magnesium sulfate, acetaminophen, ondansetron    VITALS:  Temperature Range: Temp: 98.2 °F (36.8 °C) Temp  Av °F (36.7 °C)  Min: 97.3 °F (36.3 °C)  Max: 98.4 °F (36.9 °C)  BP Range: Systolic (39VGH), DNJ:78 , Min:78 , FCH:910     Diastolic (50AUC), WYW:12, Min:34, Max:62    Pulse Range: Pulse  Av.4  Min: 62  Max: 76  Respiration Range: Resp  Av.2  Min: 13  Max: 22  Current Pulse Ox: SpO2: 94 %  24HR Pulse Ox Range: SpO2  Av.3 %  Min: 91 %  Max: 99 %  Patient Vitals for the past 12 hrs:   BP Temp Temp src Pulse Resp SpO2   20 0945 -- -- -- 72 16 94 %   20 0935 (!) 107/52 -- -- 75 17 91 %   20 0850 -- -- -- 72 18 93 %   20 0845 -- -- -- 71 18 95 %   20 0830 -- -- -- 71 21 97 %   20 0805 (!) 113/51 -- -- 76 19 --   20 0800 -- -- -- -- -- 92 %   20 0704 (!) 116/52 -- -- 72 16 97 %   20 0604 (!) 116/50 -- -- 76 20 96 %   20 0504 (!) 103/43 -- -- 71 16 95 %   20 0404 (!) 87/40 98.2 °F (36.8 °C) Oral 71 18 98 %   20 0304 (!) 93/50 -- -- 64 15 99 %   20 0204 (!) 94/42 -- -- 69 17 96 %   20 0104 (!) 85/42 -- -- 71 19 98 %   20 0004 (!) 94/44 98.4 °F (36.9 °C) Oral 69 15 99 %     Estimated body mass index is 17.43 kg/m² as calculated from the following:    Height as of this encounter: 5' 6\" (1.676 m).     Weight as of this encounter: 108 lb (49 kg).  []<16 Severe malnutrition  []16-16.99 Moderate malnutrition  [x]17-18.49 Mild malnutrition  []18.5-24.9 Normal  []25-29.9 Overweight (not obese)  []30-34.9 Obese class 1 (Low Risk)  []35-39.9 Obese class 2 (Moderate Risk)  []?40 Obese class 3 (High Risk)    RECENT LABS:   Lab Results   Component Value Date    WBC

## 2020-02-14 NOTE — PROGRESS NOTES
ENDOVASCULAR NEUROSURGERY PROGRESS NOTE  2020 11:25 AM  Subjective:   Admit Date: 2020  PCP: Gurvinder Alvarez MD    No events overnight, patient exam stable and she is more awake this morning    Objective:   Vitals: BP (!) 107/52   Pulse 72   Temp 98.2 °F (36.8 °C) (Oral)   Resp 16   Ht 5' 6\" (1.676 m)   Wt 108 lb (49 kg)   SpO2 94%   BMI 17.43 kg/m²     General appearance: Lying in bed, NAD,  Lungs: CTAB  Heart: RRR  Abdomen: soft, NTND, bowel sounds normal    Neuro exam: Follows simple commands. CN II-XII: Mild right facial droop, pupils 2 mm reactive to light bilaterally, EOMI, VFF. Dari spontaneously against gravity.      Deboraha Belt of consciousness:  0 - alert; keenly responsive  1b.  Level of consciousness questions:  0 - answers both questions correctly  1c.  Level of consciousness questions:  0 - performs both tasks correctly  2.    Best Gaze:  0 - normal  3.    Visual:  0 - no visual loss  4.    Facial Palsy:  1 - minor paralysis (flattened nasolabial fold, asymmetric on smiling)  5a.  Motor left arm:  0 - no drift, limb holds 90 (or 45) degrees for full 10 seconds  5b.  Motor right arm:  0 - no drift, limb holds 90 (or 45) degrees for full 10 seconds  6a.  Motor left le - no drift; leg holds 30 degree position for full 5 seconds  6b.  Motor right le - no drift; leg holds 30 degree position for full 5 seconds  7.    Limb Ataxia:  0 - absent  8.    Sensory:  0 - normal; no sensory loss  9.    Best Language:  0 - no aphasia, normal  10.  Dysarthria:  0 - normal  11.  Extinction and Inattention:  0 - no abnormality     TOTAL:  1        MRS 1    Medications and labs:   Scheduled Meds:   [START ON 2/15/2020] ferrous sulfate  325 mg Oral Daily with breakfast    magnesium sulfate  1 g Intravenous Q1H    enoxaparin  40 mg Subcutaneous Daily    hydrALAZINE  25 mg Oral 3 times per day    megestrol  800 mg Oral Daily    atorvastatin  40 mg Oral Nightly    niMODipine  60 mg Oral 6 times per day    levetiracetam  500 mg Intravenous Q12H    sodium chloride flush  10 mL Intravenous 2 times per day     Continuous Infusions:   sodium chloride 50 mL/hr (02/14/20 1120)     CBC:   Recent Labs     02/12/20  1130 02/13/20  0608 02/14/20  0546   WBC 4.5 4.9 4.3   HGB 9.0* 7.8* 7.3*    249 235     BMP:    Recent Labs     02/12/20  1130 02/13/20  0608  02/13/20  1716 02/14/20  0053 02/14/20  0546    141   < > 142 146* 154*   K 3.9 3.8  --   --   --  3.8    109*  --   --   --  124*   CO2 24 20  --   --   --  18*   BUN 12 8  --   --   --  10   CREATININE 0.86 0.58  --   --   --  0.62   GLUCOSE 111* 113*  --   --   --  108*    < > = values in this interval not displayed. Hepatic:   Recent Labs     02/12/20  1130   AST 57*   ALT 41*   BILITOT 0.37   ALKPHOS 169*     Troponin: No results for input(s): TROPONINI in the last 72 hours. BNP: No results for input(s): BNP in the last 72 hours. Lipids:   Recent Labs     02/13/20  0608   CHOL 102   HDL 21*     INR: No results for input(s): INR in the last 72 hours. Assessment and Recommendations:   77years old female with ruptured right SENTHIL 2 mm aneurysm, Em and Jeff 3 modified Jane scale 3. Patient underwent primary coil embolization 2/12/2020. Postprocedure CTA head shows some increase in the hemorrhage around the aneurysm region, follow-up CT head stable with mild mass-effect and mild hydrocephalus with third ventricle IVH 2/14/2020. Patient exam is stable    Blood pressure systolic goal per NICU to assess for vasospasm, permissive up to 160 SBP. Neurosurgery on board  Nimodipine therapy. Na checks per NICU. Start TCD on day 4.   Chemical DVT ppx    Isaac Adhikari MD  Stroke, [facility]  [id number] [place]  Electronically signed 2/14/2020 at 11:25 AM

## 2020-02-14 NOTE — PROGRESS NOTES
Physical Therapy  DATE: 2020    NAME: Ross Richards  MRN: [id number]   : 1950    Patient not seen this date for Physical Therapy due to:  [] Blood transfusion in progress  [] Hemodialysis  []  Patient Declined  [] Spine Precautions   [] Strict Bedrest  [] Surgery/ Procedure  [x] Testing: in room, ck later pm as able or 2/15      [] Other:         [] PT being discontinued at this time. Patient independent. No further needs. [] PT being discontinued at this time as the patient has been transferred to palliative care. No further needs.     Annalee Schmitz, PT

## 2020-02-14 NOTE — PROGRESS NOTES
are provided for review. MIP images are provided for review. Dose modulation, iterative reconstruction, and/or weight based adjustment of the mA/kV was utilized to reduce the radiation dose to as low as reasonably achievable. COMPARISON: CTA of the head performed 06/01/2019. HISTORY: ORDERING SYSTEM PROVIDED HISTORY: stroke TECHNOLOGIST PROVIDED HISTORY: stroke Reason for Exam: stroke alert FINDINGS: ANTERIOR CIRCULATION: No significant stenosis of the intracranial internal carotid, anterior cerebral, or middle cerebral arteries. There is a 2 mm aneurysm arising from the right anterior cerebral artery in the anterior interhemispheric fissure. POSTERIOR CIRCULATION: No significant stenosis of the vertebral, basilar, or posterior cerebral arteries. No aneurysm. OTHER: No dural venous sinus thrombosis on this non-dedicated study. BRAIN: No mass effect or midline shift. No extra-axial fluid collection. The gray-white differentiation is maintained. Demonstration of a 2 mm aneurysm arising from the right anterior cerebral artery within the anterior interhemispheric fissure region. Findings were discussed with Dr. Rafaela Hua At 2:36 pm on 2/12/2020. VA/P  77 y.o. female who presents with subarachnoid hemorrhage s/p day 2 for emergent aneurysm coil embolization       - Obtain CT Head in AM: 2/14 showing decrease in amount of blood in the procedural site  and decrease in [facility]   - No neurosurgical interventions planned   - Patient on Lovenox for DVT prophylaxis   - Neuro checks per ICU protocol  - No evidence of hydrocephalus on 2/14 CT head       Please contact neurosurgery with any changes in patients neurologic status.        Gabe Mendez CNP  2/14/20  10:33 AM

## 2020-02-15 ENCOUNTER — APPOINTMENT (OUTPATIENT)
Dept: CT IMAGING | Age: 70
DRG: 020 | End: 2020-02-15
Payer: MEDICARE

## 2020-02-15 ENCOUNTER — APPOINTMENT (OUTPATIENT)
Dept: GENERAL RADIOLOGY | Age: 70
DRG: 020 | End: 2020-02-15
Payer: MEDICARE

## 2020-02-15 LAB
-: ABNORMAL
ABSOLUTE EOS #: <0.03 K/UL (ref 0–0.44)
ABSOLUTE IMMATURE GRANULOCYTE: 0.04 K/UL (ref 0–0.3)
ABSOLUTE LYMPH #: 0.81 K/UL (ref 1.1–3.7)
ABSOLUTE MONO #: 0.81 K/UL (ref 0.1–1.2)
ADENOVIRUS PCR: NOT DETECTED
ALBUMIN SERPL-MCNC: 2.6 G/DL (ref 3.5–5.2)
ALBUMIN/GLOBULIN RATIO: 0.7 (ref 1–2.5)
ALLEN TEST: ABNORMAL
ALP BLD-CCNC: 131 U/L (ref 35–104)
ALT SERPL-CCNC: 29 U/L (ref 5–33)
AMORPHOUS: ABNORMAL
ANION GAP SERPL CALCULATED.3IONS-SCNC: 10 MMOL/L (ref 9–17)
ANION GAP SERPL CALCULATED.3IONS-SCNC: 11 MMOL/L (ref 9–17)
ANION GAP: 13 MMOL/L (ref 7–16)
AST SERPL-CCNC: 47 U/L
BACTERIA: ABNORMAL
BASOPHILS # BLD: 0 % (ref 0–2)
BASOPHILS ABSOLUTE: <0.03 K/UL (ref 0–0.2)
BILIRUB SERPL-MCNC: 0.33 MG/DL (ref 0.3–1.2)
BILIRUBIN DIRECT: 0.16 MG/DL
BILIRUBIN URINE: NEGATIVE
BILIRUBIN, INDIRECT: 0.17 MG/DL (ref 0–1)
BORDETELLA PARAPERTUSSIS: NOT DETECTED
BORDETELLA PERTUSSIS PCR: NOT DETECTED
BUN BLDV-MCNC: 8 MG/DL (ref 8–23)
BUN BLDV-MCNC: 8 MG/DL (ref 8–23)
BUN/CREAT BLD: ABNORMAL (ref 9–20)
BUN/CREAT BLD: ABNORMAL (ref 9–20)
CALCIUM SERPL-MCNC: 7.9 MG/DL (ref 8.6–10.4)
CALCIUM SERPL-MCNC: 8.1 MG/DL (ref 8.6–10.4)
CASTS UA: ABNORMAL /LPF (ref 0–8)
CHLAMYDIA PNEUMONIAE BY PCR: NOT DETECTED
CHLORIDE BLD-SCNC: 116 MMOL/L (ref 98–107)
CHLORIDE BLD-SCNC: 118 MMOL/L (ref 98–107)
CO2: 17 MMOL/L (ref 20–31)
CO2: 18 MMOL/L (ref 20–31)
COLOR: YELLOW
CORONAVIRUS 229E PCR: NOT DETECTED
CORONAVIRUS HKU1 PCR: NOT DETECTED
CORONAVIRUS NL63 PCR: NOT DETECTED
CORONAVIRUS OC43 PCR: NOT DETECTED
CREAT SERPL-MCNC: 0.56 MG/DL (ref 0.5–0.9)
CREAT SERPL-MCNC: 0.63 MG/DL (ref 0.5–0.9)
CRYSTALS, UA: ABNORMAL /HPF
DIFFERENTIAL TYPE: ABNORMAL
DIRECT EXAM: NORMAL
EOSINOPHILS RELATIVE PERCENT: 0 % (ref 1–4)
EPITHELIAL CELLS UA: ABNORMAL /HPF (ref 0–5)
FIO2: 100
GFR AFRICAN AMERICAN: >60 ML/MIN
GFR AFRICAN AMERICAN: >60 ML/MIN
GFR NON-AFRICAN AMERICAN: >60 ML/MIN
GFR SERPL CREATININE-BSD FRML MDRD: >60 ML/MIN
GFR SERPL CREATININE-BSD FRML MDRD: ABNORMAL ML/MIN/{1.73_M2}
GFR SERPL CREATININE-BSD FRML MDRD: NORMAL ML/MIN/{1.73_M2}
GLOBULIN: ABNORMAL G/DL (ref 1.5–3.8)
GLUCOSE BLD-MCNC: 108 MG/DL (ref 74–100)
GLUCOSE BLD-MCNC: 116 MG/DL (ref 70–99)
GLUCOSE BLD-MCNC: 132 MG/DL (ref 70–99)
GLUCOSE URINE: NEGATIVE
HCT VFR BLD CALC: 24.9 % (ref 36.3–47.1)
HCT VFR BLD CALC: 26.2 % (ref 36.3–47.1)
HEMOGLOBIN: 7.6 G/DL (ref 11.9–15.1)
HEMOGLOBIN: 7.7 G/DL (ref 11.9–15.1)
HUMAN METAPNEUMOVIRUS PCR: NOT DETECTED
IMMATURE GRANULOCYTES: 1 %
INFLUENZA A BY PCR: NOT DETECTED
INFLUENZA A H1 (2009) PCR: NORMAL
INFLUENZA A H1 PCR: NORMAL
INFLUENZA A H3 PCR: NORMAL
INFLUENZA B BY PCR: NOT DETECTED
INR BLD: 1.1
KETONES, URINE: NEGATIVE
LACTIC ACID, SEPSIS WHOLE BLOOD: 1.2 MMOL/L (ref 0.5–1.9)
LACTIC ACID, SEPSIS WHOLE BLOOD: 1.3 MMOL/L (ref 0.5–1.9)
LACTIC ACID, SEPSIS: NORMAL MMOL/L (ref 0.5–1.9)
LACTIC ACID, SEPSIS: NORMAL MMOL/L (ref 0.5–1.9)
LEUKOCYTE ESTERASE, URINE: ABNORMAL
LYMPHOCYTES # BLD: 10 % (ref 24–43)
Lab: NORMAL
MAGNESIUM: 1.9 MG/DL (ref 1.6–2.6)
MAGNESIUM: 2.4 MG/DL (ref 1.6–2.6)
MCH RBC QN AUTO: 25.8 PG (ref 25.2–33.5)
MCH RBC QN AUTO: 26.3 PG (ref 25.2–33.5)
MCHC RBC AUTO-ENTMCNC: 29.4 G/DL (ref 28.4–34.8)
MCHC RBC AUTO-ENTMCNC: 30.5 G/DL (ref 28.4–34.8)
MCV RBC AUTO: 86.2 FL (ref 82.6–102.9)
MCV RBC AUTO: 87.9 FL (ref 82.6–102.9)
MODE: ABNORMAL
MONOCYTES # BLD: 10 % (ref 3–12)
MUCUS: ABNORMAL
MYCOPLASMA PNEUMONIAE PCR: NOT DETECTED
NEGATIVE BASE EXCESS, ART: 4 (ref 0–2)
NITRITE, URINE: POSITIVE
NRBC AUTOMATED: 0 PER 100 WBC
NRBC AUTOMATED: 0 PER 100 WBC
O2 DEVICE/FLOW/%: ABNORMAL
OTHER OBSERVATIONS UA: ABNORMAL
PARAINFLUENZA 1 PCR: NOT DETECTED
PARAINFLUENZA 2 PCR: NOT DETECTED
PARAINFLUENZA 3 PCR: NOT DETECTED
PARAINFLUENZA 4 PCR: NOT DETECTED
PARTIAL THROMBOPLASTIN TIME: 37.2 SEC (ref 20.5–30.5)
PATIENT TEMP: ABNORMAL
PDW BLD-RTO: 15.6 % (ref 11.8–14.4)
PDW BLD-RTO: 15.7 % (ref 11.8–14.4)
PH UA: 7.5 (ref 5–8)
PLATELET # BLD: 252 K/UL (ref 138–453)
PLATELET # BLD: 272 K/UL (ref 138–453)
PLATELET ESTIMATE: ABNORMAL
PMV BLD AUTO: 9.5 FL (ref 8.1–13.5)
PMV BLD AUTO: 9.8 FL (ref 8.1–13.5)
POC CHLORIDE: 118 MMOL/L (ref 98–107)
POC CREATININE: 0.84 MG/DL (ref 0.51–1.19)
POC HCO3: 19.6 MMOL/L (ref 21–28)
POC HEMATOCRIT: 22 % (ref 36–46)
POC HEMOGLOBIN: 7.6 G/DL (ref 12–16)
POC IONIZED CALCIUM: 1.2 MMOL/L (ref 1.15–1.33)
POC LACTIC ACID: 0.94 MMOL/L (ref 0.56–1.39)
POC O2 SATURATION: 94 % (ref 94–98)
POC PCO2 TEMP: ABNORMAL MM HG
POC PCO2: 27.4 MM HG (ref 35–48)
POC PH TEMP: ABNORMAL
POC PH: 7.46 (ref 7.35–7.45)
POC PO2 TEMP: ABNORMAL MM HG
POC PO2: 64 MM HG (ref 83–108)
POC POTASSIUM: 3.3 MMOL/L (ref 3.5–4.5)
POC SODIUM: 151 MMOL/L (ref 138–146)
POSITIVE BASE EXCESS, ART: ABNORMAL (ref 0–3)
POTASSIUM SERPL-SCNC: 3.2 MMOL/L (ref 3.7–5.3)
POTASSIUM SERPL-SCNC: 3.4 MMOL/L (ref 3.7–5.3)
PROCALCITONIN: 0.05 NG/ML
PROTEIN UA: NEGATIVE
PROTHROMBIN TIME: 11.5 SEC (ref 9–12)
RBC # BLD: 2.89 M/UL (ref 3.95–5.11)
RBC # BLD: 2.98 M/UL (ref 3.95–5.11)
RBC # BLD: ABNORMAL 10*6/UL
RBC UA: ABNORMAL /HPF (ref 0–4)
RENAL EPITHELIAL, UA: ABNORMAL /HPF
RESP SYNCYTIAL VIRUS PCR: NOT DETECTED
RHINO/ENTEROVIRUS PCR: NOT DETECTED
SAMPLE SITE: ABNORMAL
SEG NEUTROPHILS: 80 % (ref 36–65)
SEGMENTED NEUTROPHILS ABSOLUTE COUNT: 6.87 K/UL (ref 1.5–8.1)
SODIUM BLD-SCNC: 138 MMOL/L (ref 135–144)
SODIUM BLD-SCNC: 143 MMOL/L (ref 135–144)
SODIUM BLD-SCNC: 143 MMOL/L (ref 135–144)
SODIUM BLD-SCNC: 144 MMOL/L (ref 135–144)
SODIUM BLD-SCNC: 146 MMOL/L (ref 135–144)
SPECIFIC GRAVITY UA: 1.02 (ref 1–1.03)
SPECIMEN DESCRIPTION: NORMAL
SPECIMEN DESCRIPTION: NORMAL
TCO2 (CALC), ART: 20 MMOL/L (ref 22–29)
TOTAL PROTEIN: 6.1 G/DL (ref 6.4–8.3)
TRICHOMONAS: ABNORMAL
TROPONIN INTERP: NORMAL
TROPONIN T: NORMAL NG/ML
TROPONIN, HIGH SENSITIVITY: 14 NG/L (ref 0–14)
TSH SERPL DL<=0.05 MIU/L-ACNC: 5.94 MIU/L (ref 0.3–5)
TURBIDITY: CLEAR
URINE HGB: ABNORMAL
UROBILINOGEN, URINE: NORMAL
WBC # BLD: 8.6 K/UL (ref 3.5–11.3)
WBC # BLD: 9.9 K/UL (ref 3.5–11.3)
WBC # BLD: ABNORMAL 10*3/UL
WBC UA: ABNORMAL /HPF (ref 0–5)
YEAST: ABNORMAL

## 2020-02-15 PROCEDURE — 71045 X-RAY EXAM CHEST 1 VIEW: CPT

## 2020-02-15 PROCEDURE — 94660 CPAP INITIATION&MGMT: CPT

## 2020-02-15 PROCEDURE — 83605 ASSAY OF LACTIC ACID: CPT

## 2020-02-15 PROCEDURE — 94669 MECHANICAL CHEST WALL OSCILL: CPT

## 2020-02-15 PROCEDURE — 83735 ASSAY OF MAGNESIUM: CPT

## 2020-02-15 PROCEDURE — 82330 ASSAY OF CALCIUM: CPT

## 2020-02-15 PROCEDURE — 84484 ASSAY OF TROPONIN QUANT: CPT

## 2020-02-15 PROCEDURE — 94761 N-INVAS EAR/PLS OXIMETRY MLT: CPT

## 2020-02-15 PROCEDURE — 6360000002 HC RX W HCPCS: Performed by: NURSE PRACTITIONER

## 2020-02-15 PROCEDURE — 84295 ASSAY OF SERUM SODIUM: CPT

## 2020-02-15 PROCEDURE — 99233 SBSQ HOSP IP/OBS HIGH 50: CPT | Performed by: PSYCHIATRY & NEUROLOGY

## 2020-02-15 PROCEDURE — 2500000003 HC RX 250 WO HCPCS: Performed by: NURSE PRACTITIONER

## 2020-02-15 PROCEDURE — 87040 BLOOD CULTURE FOR BACTERIA: CPT

## 2020-02-15 PROCEDURE — 82435 ASSAY OF BLOOD CHLORIDE: CPT

## 2020-02-15 PROCEDURE — 80076 HEPATIC FUNCTION PANEL: CPT

## 2020-02-15 PROCEDURE — 6370000000 HC RX 637 (ALT 250 FOR IP): Performed by: STUDENT IN AN ORGANIZED HEALTH CARE EDUCATION/TRAINING PROGRAM

## 2020-02-15 PROCEDURE — 85014 HEMATOCRIT: CPT

## 2020-02-15 PROCEDURE — 36415 COLL VENOUS BLD VENIPUNCTURE: CPT

## 2020-02-15 PROCEDURE — 84145 PROCALCITONIN (PCT): CPT

## 2020-02-15 PROCEDURE — 82803 BLOOD GASES ANY COMBINATION: CPT

## 2020-02-15 PROCEDURE — 2700000000 HC OXYGEN THERAPY PER DAY

## 2020-02-15 PROCEDURE — 0100U HC RESPIRPTHGN MULT REV TRANS & AMP PRB TECH 21 TRGT: CPT

## 2020-02-15 PROCEDURE — 87086 URINE CULTURE/COLONY COUNT: CPT

## 2020-02-15 PROCEDURE — 87088 URINE BACTERIA CULTURE: CPT

## 2020-02-15 PROCEDURE — 6360000004 HC RX CONTRAST MEDICATION: Performed by: STUDENT IN AN ORGANIZED HEALTH CARE EDUCATION/TRAINING PROGRAM

## 2020-02-15 PROCEDURE — 2580000003 HC RX 258: Performed by: NURSE PRACTITIONER

## 2020-02-15 PROCEDURE — 6360000002 HC RX W HCPCS: Performed by: STUDENT IN AN ORGANIZED HEALTH CARE EDUCATION/TRAINING PROGRAM

## 2020-02-15 PROCEDURE — 85027 COMPLETE CBC AUTOMATED: CPT

## 2020-02-15 PROCEDURE — 84132 ASSAY OF SERUM POTASSIUM: CPT

## 2020-02-15 PROCEDURE — 85610 PROTHROMBIN TIME: CPT

## 2020-02-15 PROCEDURE — 36600 WITHDRAWAL OF ARTERIAL BLOOD: CPT

## 2020-02-15 PROCEDURE — 2000000003 HC NEURO ICU R&B

## 2020-02-15 PROCEDURE — 81001 URINALYSIS AUTO W/SCOPE: CPT

## 2020-02-15 PROCEDURE — 87804 INFLUENZA ASSAY W/OPTIC: CPT

## 2020-02-15 PROCEDURE — 6370000000 HC RX 637 (ALT 250 FOR IP): Performed by: NURSE PRACTITIONER

## 2020-02-15 PROCEDURE — 84443 ASSAY THYROID STIM HORMONE: CPT

## 2020-02-15 PROCEDURE — 82565 ASSAY OF CREATININE: CPT

## 2020-02-15 PROCEDURE — 36620 INSERTION CATHETER ARTERY: CPT

## 2020-02-15 PROCEDURE — 85730 THROMBOPLASTIN TIME PARTIAL: CPT

## 2020-02-15 PROCEDURE — 80048 BASIC METABOLIC PNL TOTAL CA: CPT

## 2020-02-15 PROCEDURE — 82947 ASSAY GLUCOSE BLOOD QUANT: CPT

## 2020-02-15 PROCEDURE — 95720 EEG PHY/QHP EA INCR W/VEEG: CPT | Performed by: PSYCHIATRY & NEUROLOGY

## 2020-02-15 PROCEDURE — 85025 COMPLETE CBC W/AUTO DIFF WBC: CPT

## 2020-02-15 PROCEDURE — 2580000003 HC RX 258: Performed by: STUDENT IN AN ORGANIZED HEALTH CARE EDUCATION/TRAINING PROGRAM

## 2020-02-15 PROCEDURE — 87186 SC STD MICRODIL/AGAR DIL: CPT

## 2020-02-15 PROCEDURE — 2500000003 HC RX 250 WO HCPCS: Performed by: STUDENT IN AN ORGANIZED HEALTH CARE EDUCATION/TRAINING PROGRAM

## 2020-02-15 PROCEDURE — 6370000000 HC RX 637 (ALT 250 FOR IP): Performed by: PSYCHIATRY & NEUROLOGY

## 2020-02-15 PROCEDURE — 71260 CT THORAX DX C+: CPT

## 2020-02-15 RX ORDER — CALCIUM GLUCONATE 94 MG/ML
1 INJECTION, SOLUTION INTRAVENOUS ONCE
Status: DISCONTINUED | OUTPATIENT
Start: 2020-02-15 | End: 2020-02-15

## 2020-02-15 RX ORDER — HYDRALAZINE HYDROCHLORIDE 25 MG/1
25 TABLET, FILM COATED ORAL EVERY 6 HOURS SCHEDULED
Status: DISCONTINUED | OUTPATIENT
Start: 2020-02-15 | End: 2020-02-19

## 2020-02-15 RX ORDER — LEVETIRACETAM 500 MG/1
500 TABLET ORAL 2 TIMES DAILY
Status: DISCONTINUED | OUTPATIENT
Start: 2020-02-15 | End: 2020-02-15

## 2020-02-15 RX ORDER — POTASSIUM CHLORIDE 7.45 MG/ML
40 INJECTION INTRAVENOUS ONCE
Status: COMPLETED | OUTPATIENT
Start: 2020-02-15 | End: 2020-02-15

## 2020-02-15 RX ORDER — SODIUM CHLORIDE 0.9 % (FLUSH) 0.9 %
10 SYRINGE (ML) INJECTION EVERY 12 HOURS SCHEDULED
Status: DISCONTINUED | OUTPATIENT
Start: 2020-02-15 | End: 2020-03-03 | Stop reason: HOSPADM

## 2020-02-15 RX ORDER — SODIUM CHLORIDE 0.9 % (FLUSH) 0.9 %
10 SYRINGE (ML) INJECTION PRN
Status: DISCONTINUED | OUTPATIENT
Start: 2020-02-15 | End: 2020-03-03 | Stop reason: HOSPADM

## 2020-02-15 RX ADMIN — SODIUM CHLORIDE 50 ML/HR: 234 INJECTION INTRAMUSCULAR; INTRAVENOUS; SUBCUTANEOUS at 22:17

## 2020-02-15 RX ADMIN — HYDRALAZINE HYDROCHLORIDE 25 MG: 25 TABLET ORAL at 06:25

## 2020-02-15 RX ADMIN — HYDRALAZINE HYDROCHLORIDE 25 MG: 25 TABLET ORAL at 02:10

## 2020-02-15 RX ADMIN — NIMODIPINE 60 MG: 30 CAPSULE, LIQUID FILLED ORAL at 23:41

## 2020-02-15 RX ADMIN — LEVETIRACETAM 500 MG: 500 TABLET, FILM COATED ORAL at 09:21

## 2020-02-15 RX ADMIN — MAGNESIUM SULFATE HEPTAHYDRATE 1 G: 1 INJECTION, SOLUTION INTRAVENOUS at 09:16

## 2020-02-15 RX ADMIN — ENOXAPARIN SODIUM 40 MG: 40 INJECTION SUBCUTANEOUS at 09:18

## 2020-02-15 RX ADMIN — HYDRALAZINE HYDROCHLORIDE 25 MG: 25 TABLET ORAL at 23:41

## 2020-02-15 RX ADMIN — NIMODIPINE 60 MG: 30 CAPSULE, LIQUID FILLED ORAL at 04:05

## 2020-02-15 RX ADMIN — MEGESTROL ACETATE 800 MG: 40 SUSPENSION ORAL at 09:18

## 2020-02-15 RX ADMIN — CALCIUM GLUCONATE 1 G: 98 INJECTION, SOLUTION INTRAVENOUS at 06:07

## 2020-02-15 RX ADMIN — NIMODIPINE 60 MG: 30 CAPSULE, LIQUID FILLED ORAL at 00:20

## 2020-02-15 RX ADMIN — MAGNESIUM SULFATE HEPTAHYDRATE 1 G: 1 INJECTION, SOLUTION INTRAVENOUS at 07:56

## 2020-02-15 RX ADMIN — FERROUS SULFATE TAB EC 325 MG (65 MG FE EQUIVALENT) 325 MG: 325 (65 FE) TABLET DELAYED RESPONSE at 09:19

## 2020-02-15 RX ADMIN — ATORVASTATIN CALCIUM 40 MG: 80 TABLET, FILM COATED ORAL at 20:29

## 2020-02-15 RX ADMIN — SODIUM CHLORIDE 50 ML/HR: 234 INJECTION INTRAMUSCULAR; INTRAVENOUS; SUBCUTANEOUS at 11:06

## 2020-02-15 RX ADMIN — VANCOMYCIN HYDROCHLORIDE 750 MG: 10 INJECTION, POWDER, LYOPHILIZED, FOR SOLUTION INTRAVENOUS at 07:19

## 2020-02-15 RX ADMIN — NIMODIPINE 60 MG: 30 CAPSULE, LIQUID FILLED ORAL at 12:22

## 2020-02-15 RX ADMIN — HYDRALAZINE HYDROCHLORIDE 25 MG: 25 TABLET ORAL at 12:22

## 2020-02-15 RX ADMIN — POTASSIUM CHLORIDE 40 MEQ: 7.46 INJECTION, SOLUTION INTRAVENOUS at 07:52

## 2020-02-15 RX ADMIN — NIMODIPINE 60 MG: 30 CAPSULE, LIQUID FILLED ORAL at 16:32

## 2020-02-15 RX ADMIN — SODIUM CHLORIDE, PRESERVATIVE FREE 10 ML: 5 INJECTION INTRAVENOUS at 07:55

## 2020-02-15 RX ADMIN — ACETAMINOPHEN 650 MG: 325 TABLET ORAL at 13:23

## 2020-02-15 RX ADMIN — SODIUM CHLORIDE, PRESERVATIVE FREE 10 ML: 5 INJECTION INTRAVENOUS at 09:19

## 2020-02-15 RX ADMIN — IOVERSOL 75 ML: 741 INJECTION INTRA-ARTERIAL; INTRAVENOUS at 04:13

## 2020-02-15 RX ADMIN — HYDRALAZINE HYDROCHLORIDE 25 MG: 25 TABLET ORAL at 18:05

## 2020-02-15 RX ADMIN — PIPERACILLIN AND TAZOBACTAM 4.5 G: 4; .5 INJECTION, POWDER, LYOPHILIZED, FOR SOLUTION INTRAVENOUS; PARENTERAL at 14:45

## 2020-02-15 RX ADMIN — PIPERACILLIN AND TAZOBACTAM 4.5 G: 4; .5 INJECTION, POWDER, LYOPHILIZED, FOR SOLUTION INTRAVENOUS; PARENTERAL at 06:07

## 2020-02-15 RX ADMIN — NIMODIPINE 60 MG: 30 CAPSULE, LIQUID FILLED ORAL at 20:29

## 2020-02-15 RX ADMIN — NIMODIPINE 60 MG: 30 CAPSULE, LIQUID FILLED ORAL at 07:55

## 2020-02-15 RX ADMIN — PIPERACILLIN AND TAZOBACTAM 4.5 G: 4; .5 INJECTION, POWDER, LYOPHILIZED, FOR SOLUTION INTRAVENOUS; PARENTERAL at 22:17

## 2020-02-15 ASSESSMENT — PAIN SCALES - GENERAL
PAINLEVEL_OUTOF10: 0
PAINLEVEL_OUTOF10: 6

## 2020-02-15 ASSESSMENT — PULMONARY FUNCTION TESTS
PIF_VALUE: 16
PIF_VALUE: 12

## 2020-02-15 ASSESSMENT — PAIN SCALES - WONG BAKER
WONGBAKER_NUMERICALRESPONSE: 0
WONGBAKER_NUMERICALRESPONSE: 0

## 2020-02-15 NOTE — PROGRESS NOTES
data       02/15/20 1709   Oxygen Therapy/Pulse Ox   O2 Device High flow nasal cannula   O2 Flow Rate (L/min) 30 L/min   FiO2  90 %

## 2020-02-15 NOTE — PROCEDURES
LONG-TERM EEG-VIDEO 5656 [address]    Patient: Jung Leyva  Age: 77 y.o. MRN: [id number]    Referring Physician: No ref. provider found  History: The patient is a 77 y.o. female who presented breakthrough seizure/encephalopathy. This long-term video-EEG monitoring study was performed to determine the nature of the patient's clinical events. The patient is on neuroactive medications.    Jung Lisbon   Current Facility-Administered Medications   Medication Dose Route Frequency Provider Last Rate Last Dose    hydrALAZINE (APRESOLINE) tablet 25 mg  25 mg Oral 4 times per day Julieta Ly DO   25 mg at 02/15/20 4007    sodium chloride flush 0.9 % injection 10 mL  10 mL Intravenous 2 times per day Julieta Ly DO        sodium chloride flush 0.9 % injection 10 mL  10 mL Intravenous PRN Yousuf Ruiz DO        piperacillin-tazobactam (ZOSYN) 4.5 g in dextrose 5 % 100 mL IVPB (mini-bag)  4.5 g Intravenous Q8H Yousuf Ruiz DO 25 mL/hr at 02/15/20 0607 4.5 g at 02/15/20 1187    levETIRAcetam (KEPPRA) tablet 500 mg  500 mg Oral BID Caitlyn Ortega MD        ferrous sulfate EC tablet 325 mg  325 mg Oral Daily with breakfast RAEGAN Miranda CNP        enoxaparin (LOVENOX) injection 40 mg  40 mg Subcutaneous Daily RAEGAN Miranda CNP   40 mg at 02/14/20 1121    megestrol (MEGACE) 40 MG/ML suspension 800 mg  800 mg Oral Daily Tiffany Farris MD   800 mg at 02/14/20 1213    sodium chloride 2 % infusion  75 mL/hr Intravenous Continuous RAEGAN Miranda CNP 75 mL/hr at 02/14/20 2311 75 mL/hr at 02/14/20 2311    docusate sodium (COLACE) capsule 100 mg  100 mg Oral Daily PRN All Farr MD        atorvastatin (LIPITOR) tablet 40 mg  40 mg Oral Nightly RAEGAN Lugo CNP   40 mg at 02/14/20 2035    niMODipine (NIMOTOP) capsule 60 mg  60 mg Oral 6 times per day RAEGAN Lugo

## 2020-02-15 NOTE — SIGNIFICANT EVENT
-Called to bedside due to desaturation to low 80% with patient on 6 L NC. Patient was not on oxygen earlier yesterday, have been increasing need of O2. Patient stated initially that she had some shortness of breath, but on my assessment she denied having shortness of breath or chest pain. She has been afebrile.  -She was on a nonrebreather, saturations in the mid 90s. She had slightly diminished breath sounds on the right side, no rales or wheezing. I turned her O2 off and she desatted down to the 70s. Was not complained of shortness of breath at that time but was unable to answer in full sentences as she was before and had labored breathing with subcostal accessory muscle use. No history of COPD or asthma.  -Bedside ultrasound showed small pericardial effusion but no RV dilatation. Concern for possible PE, CTA chest was obtained rule out PE.  -CT chest rule out PE: Bilateral pleural effusions, bilateral lower lobe severe pneumonia with consolidations, small pericardial effusion. Labs  CBC: Stable hemoglobin, WBC 8 with previous for  BMP: Potassium 3.4, replaced, calcium low, replaced  Troponin 14    Plan:  -Concern for early hospital-acquired pneumonia for sepsis given increasing oxygen need, tachycardia in the 90s, tachypnea, WBC doubling since yesterday. -Sepsis work-up initiated  -Blood cultures, urine culture, UA, PCT  -RSV panel, rapid flu  -BiPAP due to pleural effusions and to help with recruitment, not for hypoxia as patient was doing well on nonrebreather  -Lasix 20 mg IV, continue to monitor urine output which has been adequate.   - replace electrolytes    Sepsis Times and Checklist  Vital Signs: BP: (!) 170/55  Pulse: 96  Resp: 24  Temp: 98 °F (36.7 °C) SpO2: 97 %  SIRS (>2)   Temp > 38.3C or < 36C   HR > 90   RR > 20   WBC > 12 or < 4 or >10% bands  SIRS (>2) and confirmed or suspected source of infection = Sepsis  Is Sepsis due to likely bacterial infection?: Yes  If not, then sepsis is due to likely viral etiology. Severe Sepsis Identified:  Date: 2/15/2020   Time: 0423  Sepsis Orders:  ·  CBC: Yes  ·  CMP: Yes  ·  PT/PTT: Yes  ·  Blood Cultures x2: Yes  ·  Urinalysis and Urine Culture: Yes  ·  Lactate: Yes  ·  Broad Spectrum Antibiotics Given (within 3 hours of sepsis identification,  after blood cultures):  Yes    (If unable to obtain IV access for IV antibiotics within 3 hours of  identification of sepsis, IM antibiotics is acceptable.)  ·             If lactate >2.0 MUST repeat within 6 hours    If elevated, is elevated lactate from a likely infectious source?: Yes.       Kyra Mendez,   PGY 2  Resident Physician Emergency Medicine  02/15/20 6:02 AM

## 2020-02-15 NOTE — PROGRESS NOTES
Daily Progress Note  Neuro Critical Care    Patient Name: Jose Brown  Patient : 1950  Room/Bed: 9427/2554-28  Code Status: Full   Allergies: Allergies   Allergen Reactions    Lisinopril Other (See Comments)    Losartan      Fatigue    Procardia [Nifedipine] Other (See Comments)       CHIEF COMPLAINT:     Headaches, nausea/vomiting      INTERVAL HISTORY       77 y. o. female, PMH HTN,  who presented as a a transfer from [facility] for SAH.  She was at the dentist around 1030AM when she had a sudden thunderclap headache, diaphoresis, and nausea/vomiting. Daughter received a call from the dentist around 1030AM stating the patient was drowsy and out of it. Brenda Garay was taken to [address] [place] [address],[address] ED where CT Head showed subarachnoid hemorrhage predominately in the anterior interhemispheric fissure.  Patient was complaining of severe headache and vomiting at outlying ED.  Loaded with 1g Keppra, given 2mg Morphine and Zofran, started on Labetalol infusion for blood pressure control and transferred to [facility] for further management.  On arrival to [facility] ED, patient is lethargic. Brenda Garay is able to state her name and that she's at the hospital.  No dysarthria or aphasia. PERRL 3mm.  EOMI.  Mild right facial droop. Moving all extremities equally. Repeat CT Head stable SAH and ventricular system.  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 She underwent coiling by neuroendovascular on . Her repeat CTH showed hydrocephalus and cerebral edema. She was started on hypertonic saline. Throughout her course, she was became alert and able to follow commands. CT head on  showed decrease in the subarachnoid hemorrhage and no hydrocephalus.        Last 24h:   The patient became short of breath overnight and she had increasing oxygen requirements. She was placed on non-rebreather mask with continued desaturations. Blood cultures, urinalysis and respiratory viral panel drawn.   CTPE was ordered and showed bilateral consolidations concerning for pneumonia and pleural effusion. She was placed on Bipap and given one dose of lasix. In addition, hypertonic saline infusion was increased to 100 mL/hr.   The patient was afebrile overnight.     CURRENT MEDICATIONS:  SCHEDULED MEDICATIONS:   hydrALAZINE  25 mg Oral 4 times per day    sodium chloride flush  10 mL Intravenous 2 times per day    piperacillin-tazobactam  4.5 g Intravenous Q8H    levETIRAcetam  500 mg Oral BID    ferrous sulfate  325 mg Oral Daily with breakfast    enoxaparin  40 mg Subcutaneous Daily    megestrol  800 mg Oral Daily    atorvastatin  40 mg Oral Nightly    niMODipine  60 mg Oral 6 times per day    sodium chloride flush  10 mL Intravenous 2 times per day     CONTINUOUS INFUSIONS:    PRN MEDICATIONS:   sodium chloride flush, docusate sodium, sodium chloride flush, magnesium sulfate, acetaminophen, ondansetron    VITALS:  Temperature Range: Temp: 99.2 °F (37.3 °C) Temp  Av.2 °F (31.2 °C)  Min: 37.4 °F (3 °C)  Max: 99.2 °F (37.3 °C)  BP Range: Systolic (80CVC), RVY:774 , Min:89 , GDM:392     Diastolic (33GFC), SDD:56, Min:38, Max:66    Pulse Range: Pulse  Av.6  Min: 63  Max: 98  Respiration Range: Resp  Av.2  Min: 15  Max: 28  Current Pulse Ox: SpO2: 93 %  24HR Pulse Ox Range: SpO2  Av.4 %  Min: 89 %  Max: 97 %  Patient Vitals for the past 12 hrs:   BP Temp Temp src Pulse Resp SpO2   02/15/20 1200 111/62 99.2 °F (37.3 °C) Oral 85 24 --   02/15/20 1128 -- -- -- -- 24 93 %   02/15/20 1125 -- -- -- -- 24 93 %   02/15/20 1015 -- -- -- -- 23 (!) 89 %   02/15/20 0920 -- -- -- -- 17 96 %   02/15/20 0900 (!) 114/43 -- -- 89 27 --   02/15/20 0829 -- -- -- 86 25 93 %   02/15/20 0800 (!) 122/57 98.5 °F (36.9 °C) Axillary 92 26 --   02/15/20 0703 (!) 133/53 -- -- 94 25 96 %   02/15/20 0603 104/64 -- -- 87 23 95 %   02/15/20 0525 -- -- -- 96 24 97 %   02/15/20 0503 -- -- -- 98 28 95 %   02/15/20 0424 (!) 170/55 98 °F (36.7 °C) Oral 95 25 95 %   02/15/20 0311 -- -- -- -- 20 92 %     Estimated body mass index is 17.43 kg/m² as calculated from the following:    Height as of this encounter: 5' 6\" (1.676 m). Weight as of this encounter: 108 lb (49 kg).  []<16 Severe malnutrition  []16-16.99 Moderate malnutrition  [x]17-18.49 Mild malnutrition  []18.5-24.9 Normal  []25-29.9 Overweight (not obese)  []30-34.9 Obese class 1 (Low Risk)  []35-39.9 Obese class 2 (Moderate Risk)  []?40 Obese class 3 (High Risk)    RECENT LABS:   Lab Results   Component Value Date    WBC 9.9 02/15/2020    HGB 7.7 (L) 02/15/2020    HCT 26.2 (L) 02/15/2020     02/15/2020    CHOL 102 02/13/2020    TRIG 100 02/13/2020    HDL 21 (L) 02/13/2020    ALT 29 02/15/2020    AST 47 (H) 02/15/2020     02/15/2020    K 3.2 (L) 02/15/2020     (H) 02/15/2020    CREATININE 0.56 02/15/2020    BUN 8 02/15/2020    CO2 18 (L) 02/15/2020    TSH 5.94 (H) 02/15/2020    INR 1.1 02/15/2020    LABA1C 5.6 02/13/2020     24 HOUR INTAKE/OUTPUT:    Intake/Output Summary (Last 24 hours) at 2/15/2020 1231  Last data filed at 2/15/2020 6946  Gross per 24 hour   Intake 2216.74 ml   Output 1100 ml   Net 1116.74 ml       IMAGING:   Place summary of recent imaging here. Labs and Images reviewed with:  [] Dr. Jenna Restrepo    [x] Dr. Brandin Brooks  [] Dr. Mittal   [] There are no new interval images to review. PHYSICAL EXAM       CONSTITUTIONAL:  Well developed, well nourished, alert and oriented x 3, in no acute distress. GCS 15. Nontoxic. No dysarthria\. No aphasia\.    HEAD:  normocephalic, atraumatic    EYES:  PERRLA, EOMI.   ENT:  moist mucous membranes   NECK:  supple, symmetric   LUNGS:  Equal air entry bilaterally   CARDIOVASCULAR:  normal s1 / s2, RRR, distal pulses intact   ABDOMEN:  Soft, no rigidity   NEUROLOGIC:  Mental Status:  A & O x3,awake             Cranial Nerves:    cranial nerves II-XII are grossly intact    Motor Exam:    Drift:  absent  Tone:  normal    Motor exam is symmetrical 5 out of 5 all extremities bilaterally    Sensory:    Touch:    Right Upper Extremity:  normal  Left Upper Extremity:  normal  Right Lower Extremity:  normal  Left Lower Extremity:  normal             DRAINS:  [x] There are no drains for Neuro Critical Care to monitor at this time. ASSESSMENT AND PLAN:     NEURO  -POD right SENTHIL aneurysm coiling  -Repeat [facility] 2/14 showed no hydrocephalus and decreased SAH  - continue nimodipine 60 mg Q4   -continue Keppra 500 mg BID for 3 days total   - Goal SBP <160  -TCD no evidence of vasospasm   -LTME showed no seizure like activity, will discontinue   - Neuro checks per protocol     CARDIOVASCULAR:  -Goal SBP <160  -BP 110s overnight  -ECHO pending, CT PE showed small pericardial effusion  -troponins negative, EKG unremarkable  -Continue telemetry     PULMONARY:  -CT PE showed bilateral consolidations consistent with pneumonia and bilateral pleural effusions  -BiPAP discontinue, patient tolerating high flow nasal cannula  -Incentive spirometer and Acapella     RENAL/FLUID/ELECTROLYTE:  - BUN 10/ Creatinine . 62   - Urine output 1.9 ml/kg/hr  - IVF: 2% hypertonic saline 50 ml/hr   -plan to continue 2% hypertonic saline, hold for Na 146   -Mg>2.5   - Na Q6   - Replace electrolytes PRN  - Daily BMP     GI/NUTRITION:  NUTRITION:  DIET GENERAL;  - Bowel regimen: colace  - GI prophylaxis: not required   -continue megace      ID:  - Afebrile   - WBC 10.8  -Respiratory panel, blood cultures and urine culture pending`  - Continue to monitor for fevers  - Daily CBC     HEME:   - H&H 7.7, will continue to monitor   -iron supplements   - Platelets 285  - Daily CBC     ENDOCRINE:  - Continue to monitor blood glucose, goal <180        OTHER:  - PT/OT/ST following  - Code Status: Full     PROPHYLAXIS:  Stress ulcer: not required     DVT PROPHYLAXIS:  -Lovenox     DISPOSITION:  [x]? To remain ICU:   []?  OK for out of ICU from Neuro Critical Care standpoint     We will

## 2020-02-15 NOTE — PROGRESS NOTES
Patient discussed with Dr. Arnoldo Paige. Neurosurgery will sign off at this time. Please re consult if any changes. Maday Fish, [address] [place], [facility]

## 2020-02-15 NOTE — PROGRESS NOTES
02/15/20 0311   Oxygen Therapy/Pulse Ox   O2 Therapy Oxygen   $Oxygen $Daily Charge   O2 Device Non-rebreather mask   O2 Flow Rate (L/min) 15 L/min   Resp 20  (non labored)   SpO2 92 %   Skin Assessment Clean, dry, & intact   Pulse Oximeter Device Mode Continuous   Pulse Oximeter Device Location Finger       Dr Norma Soler called to bedside after being informed of high O2 requirements for SaO2 > 92%.

## 2020-02-16 ENCOUNTER — APPOINTMENT (OUTPATIENT)
Dept: GENERAL RADIOLOGY | Age: 70
DRG: 020 | End: 2020-02-16
Payer: MEDICARE

## 2020-02-16 ENCOUNTER — APPOINTMENT (OUTPATIENT)
Dept: CT IMAGING | Age: 70
DRG: 020 | End: 2020-02-16
Payer: MEDICARE

## 2020-02-16 LAB
ABSOLUTE EOS #: <0.03 K/UL (ref 0–0.44)
ABSOLUTE IMMATURE GRANULOCYTE: 0.07 K/UL (ref 0–0.3)
ABSOLUTE LYMPH #: 1.15 K/UL (ref 1.1–3.7)
ABSOLUTE MONO #: 0.76 K/UL (ref 0.1–1.2)
ALLEN TEST: ABNORMAL
ANION GAP SERPL CALCULATED.3IONS-SCNC: 10 MMOL/L (ref 9–17)
BASOPHILS # BLD: 0 % (ref 0–2)
BASOPHILS ABSOLUTE: <0.03 K/UL (ref 0–0.2)
BUN BLDV-MCNC: 12 MG/DL (ref 8–23)
BUN/CREAT BLD: ABNORMAL (ref 9–20)
CALCIUM SERPL-MCNC: 7.8 MG/DL (ref 8.6–10.4)
CHLORIDE BLD-SCNC: 116 MMOL/L (ref 98–107)
CO2: 17 MMOL/L (ref 20–31)
CREAT SERPL-MCNC: 0.61 MG/DL (ref 0.5–0.9)
CULTURE: ABNORMAL
DIFFERENTIAL TYPE: ABNORMAL
EOSINOPHILS RELATIVE PERCENT: 0 % (ref 1–4)
FIBRINOGEN: 484 MG/DL (ref 140–420)
FIO2: 50
GFR AFRICAN AMERICAN: >60 ML/MIN
GFR NON-AFRICAN AMERICAN: >60 ML/MIN
GFR SERPL CREATININE-BSD FRML MDRD: ABNORMAL ML/MIN/{1.73_M2}
GFR SERPL CREATININE-BSD FRML MDRD: ABNORMAL ML/MIN/{1.73_M2}
GLUCOSE BLD-MCNC: 99 MG/DL (ref 70–99)
HCT VFR BLD CALC: 23.5 % (ref 36.3–47.1)
HCT VFR BLD CALC: 24.1 % (ref 36.3–47.1)
HEMOGLOBIN: 7.3 G/DL (ref 11.9–15.1)
HEMOGLOBIN: 7.5 G/DL (ref 11.9–15.1)
IMMATURE GRANULOCYTES: 1 %
INR BLD: 1.2
LYMPHOCYTES # BLD: 9 % (ref 24–43)
Lab: ABNORMAL
MAGNESIUM: 1.9 MG/DL (ref 1.6–2.6)
MCH RBC QN AUTO: 26.1 PG (ref 25.2–33.5)
MCH RBC QN AUTO: 26.2 PG (ref 25.2–33.5)
MCHC RBC AUTO-ENTMCNC: 31.1 G/DL (ref 28.4–34.8)
MCHC RBC AUTO-ENTMCNC: 31.1 G/DL (ref 28.4–34.8)
MCV RBC AUTO: 83.9 FL (ref 82.6–102.9)
MCV RBC AUTO: 84.3 FL (ref 82.6–102.9)
MODE: ABNORMAL
MONOCYTES # BLD: 6 % (ref 3–12)
NEGATIVE BASE EXCESS, ART: 3 (ref 0–2)
NRBC AUTOMATED: 0 PER 100 WBC
NRBC AUTOMATED: 0 PER 100 WBC
O2 DEVICE/FLOW/%: ABNORMAL
PATIENT TEMP: ABNORMAL
PDW BLD-RTO: 15.6 % (ref 11.8–14.4)
PDW BLD-RTO: 15.7 % (ref 11.8–14.4)
PLATELET # BLD: 234 K/UL (ref 138–453)
PLATELET # BLD: 263 K/UL (ref 138–453)
PLATELET ESTIMATE: ABNORMAL
PMV BLD AUTO: 10.1 FL (ref 8.1–13.5)
PMV BLD AUTO: 9.9 FL (ref 8.1–13.5)
POC HCO3: 20.3 MMOL/L (ref 21–28)
POC O2 SATURATION: 92 % (ref 94–98)
POC PCO2 TEMP: ABNORMAL MM HG
POC PCO2: 28.1 MM HG (ref 35–48)
POC PH TEMP: ABNORMAL
POC PH: 7.47 (ref 7.35–7.45)
POC PO2 TEMP: ABNORMAL MM HG
POC PO2: 57.5 MM HG (ref 83–108)
POSITIVE BASE EXCESS, ART: ABNORMAL (ref 0–3)
POTASSIUM SERPL-SCNC: 3.2 MMOL/L (ref 3.7–5.3)
PROCALCITONIN: 0.18 NG/ML
PROTHROMBIN TIME: 12.2 SEC (ref 9–12)
RBC # BLD: 2.8 M/UL (ref 3.95–5.11)
RBC # BLD: 2.86 M/UL (ref 3.95–5.11)
RBC # BLD: ABNORMAL 10*6/UL
SAMPLE SITE: ABNORMAL
SEG NEUTROPHILS: 85 % (ref 36–65)
SEGMENTED NEUTROPHILS ABSOLUTE COUNT: 11.38 K/UL (ref 1.5–8.1)
SODIUM BLD-SCNC: 139 MMOL/L (ref 135–144)
SODIUM BLD-SCNC: 143 MMOL/L (ref 135–144)
SODIUM BLD-SCNC: 145 MMOL/L (ref 135–144)
SPECIMEN DESCRIPTION: ABNORMAL
TCO2 (CALC), ART: 21 MMOL/L (ref 22–29)
WBC # BLD: 11.7 K/UL (ref 3.5–11.3)
WBC # BLD: 13.4 K/UL (ref 3.5–11.3)
WBC # BLD: ABNORMAL 10*3/UL

## 2020-02-16 PROCEDURE — 82803 BLOOD GASES ANY COMBINATION: CPT

## 2020-02-16 PROCEDURE — 2700000000 HC OXYGEN THERAPY PER DAY

## 2020-02-16 PROCEDURE — 6370000000 HC RX 637 (ALT 250 FOR IP): Performed by: STUDENT IN AN ORGANIZED HEALTH CARE EDUCATION/TRAINING PROGRAM

## 2020-02-16 PROCEDURE — 70496 CT ANGIOGRAPHY HEAD: CPT

## 2020-02-16 PROCEDURE — 6370000000 HC RX 637 (ALT 250 FOR IP): Performed by: NURSE PRACTITIONER

## 2020-02-16 PROCEDURE — 94761 N-INVAS EAR/PLS OXIMETRY MLT: CPT

## 2020-02-16 PROCEDURE — 84145 PROCALCITONIN (PCT): CPT

## 2020-02-16 PROCEDURE — 6360000002 HC RX W HCPCS: Performed by: PSYCHIATRY & NEUROLOGY

## 2020-02-16 PROCEDURE — 83735 ASSAY OF MAGNESIUM: CPT

## 2020-02-16 PROCEDURE — 99233 SBSQ HOSP IP/OBS HIGH 50: CPT | Performed by: PSYCHIATRY & NEUROLOGY

## 2020-02-16 PROCEDURE — 85384 FIBRINOGEN ACTIVITY: CPT

## 2020-02-16 PROCEDURE — 6360000002 HC RX W HCPCS: Performed by: STUDENT IN AN ORGANIZED HEALTH CARE EDUCATION/TRAINING PROGRAM

## 2020-02-16 PROCEDURE — 2580000003 HC RX 258: Performed by: STUDENT IN AN ORGANIZED HEALTH CARE EDUCATION/TRAINING PROGRAM

## 2020-02-16 PROCEDURE — 85025 COMPLETE CBC W/AUTO DIFF WBC: CPT

## 2020-02-16 PROCEDURE — 94668 MNPJ CHEST WALL SBSQ: CPT

## 2020-02-16 PROCEDURE — 6360000002 HC RX W HCPCS: Performed by: NURSE PRACTITIONER

## 2020-02-16 PROCEDURE — 2500000003 HC RX 250 WO HCPCS: Performed by: STUDENT IN AN ORGANIZED HEALTH CARE EDUCATION/TRAINING PROGRAM

## 2020-02-16 PROCEDURE — 93886 INTRACRANIAL COMPLETE STUDY: CPT

## 2020-02-16 PROCEDURE — 94667 MNPJ CHEST WALL 1ST: CPT

## 2020-02-16 PROCEDURE — 2580000003 HC RX 258: Performed by: NURSE PRACTITIONER

## 2020-02-16 PROCEDURE — APPNB45 APP NON BILLABLE 31-45 MINUTES: Performed by: NURSE PRACTITIONER

## 2020-02-16 PROCEDURE — 6370000000 HC RX 637 (ALT 250 FOR IP): Performed by: PSYCHIATRY & NEUROLOGY

## 2020-02-16 PROCEDURE — 2580000003 HC RX 258: Performed by: PSYCHIATRY & NEUROLOGY

## 2020-02-16 PROCEDURE — 37799 UNLISTED PX VASCULAR SURGERY: CPT

## 2020-02-16 PROCEDURE — 2000000003 HC NEURO ICU R&B

## 2020-02-16 PROCEDURE — 84295 ASSAY OF SERUM SODIUM: CPT

## 2020-02-16 PROCEDURE — 85610 PROTHROMBIN TIME: CPT

## 2020-02-16 PROCEDURE — 71045 X-RAY EXAM CHEST 1 VIEW: CPT

## 2020-02-16 PROCEDURE — 6360000004 HC RX CONTRAST MEDICATION: Performed by: EMERGENCY MEDICINE

## 2020-02-16 PROCEDURE — 85027 COMPLETE CBC AUTOMATED: CPT

## 2020-02-16 PROCEDURE — 36415 COLL VENOUS BLD VENIPUNCTURE: CPT

## 2020-02-16 PROCEDURE — 80048 BASIC METABOLIC PNL TOTAL CA: CPT

## 2020-02-16 PROCEDURE — 94660 CPAP INITIATION&MGMT: CPT

## 2020-02-16 RX ORDER — MAGNESIUM SULFATE 1 G/100ML
1 INJECTION INTRAVENOUS
Status: ACTIVE | OUTPATIENT
Start: 2020-02-16 | End: 2020-02-16

## 2020-02-16 RX ORDER — MORPHINE SULFATE 2 MG/ML
2 INJECTION, SOLUTION INTRAMUSCULAR; INTRAVENOUS ONCE
Status: DISCONTINUED | OUTPATIENT
Start: 2020-02-16 | End: 2020-02-16

## 2020-02-16 RX ORDER — SODIUM CHLORIDE 1000 MG
1 TABLET, SOLUBLE MISCELLANEOUS 2 TIMES DAILY WITH MEALS
Status: DISCONTINUED | OUTPATIENT
Start: 2020-02-16 | End: 2020-02-19

## 2020-02-16 RX ORDER — POTASSIUM CHLORIDE 7.45 MG/ML
10 INJECTION INTRAVENOUS
Status: DISCONTINUED | OUTPATIENT
Start: 2020-02-16 | End: 2020-02-16

## 2020-02-16 RX ORDER — POTASSIUM CHLORIDE 20 MEQ/1
40 TABLET, EXTENDED RELEASE ORAL 2 TIMES DAILY WITH MEALS
Status: COMPLETED | OUTPATIENT
Start: 2020-02-16 | End: 2020-02-17

## 2020-02-16 RX ORDER — FUROSEMIDE 10 MG/ML
20 INJECTION INTRAMUSCULAR; INTRAVENOUS ONCE
Status: COMPLETED | OUTPATIENT
Start: 2020-02-16 | End: 2020-02-16

## 2020-02-16 RX ORDER — SENNA AND DOCUSATE SODIUM 50; 8.6 MG/1; MG/1
2 TABLET, FILM COATED ORAL DAILY
Status: DISCONTINUED | OUTPATIENT
Start: 2020-02-16 | End: 2020-03-03 | Stop reason: HOSPADM

## 2020-02-16 RX ADMIN — FUROSEMIDE 20 MG: 10 INJECTION, SOLUTION INTRAMUSCULAR; INTRAVENOUS at 09:48

## 2020-02-16 RX ADMIN — MAGNESIUM SULFATE HEPTAHYDRATE 1 G: 1 INJECTION, SOLUTION INTRAVENOUS at 16:42

## 2020-02-16 RX ADMIN — ENOXAPARIN SODIUM 40 MG: 40 INJECTION SUBCUTANEOUS at 10:05

## 2020-02-16 RX ADMIN — FERROUS SULFATE TAB EC 325 MG (65 MG FE EQUIVALENT) 325 MG: 325 (65 FE) TABLET DELAYED RESPONSE at 09:50

## 2020-02-16 RX ADMIN — NIMODIPINE 60 MG: 30 CAPSULE, LIQUID FILLED ORAL at 09:51

## 2020-02-16 RX ADMIN — Medication 60 MG: at 17:15

## 2020-02-16 RX ADMIN — IOVERSOL 90 ML: 741 INJECTION INTRA-ARTERIAL; INTRAVENOUS at 15:33

## 2020-02-16 RX ADMIN — SODIUM CHLORIDE TAB 1 GM 1 G: 1 TAB at 10:04

## 2020-02-16 RX ADMIN — PIPERACILLIN AND TAZOBACTAM 4.5 G: 4; .5 INJECTION, POWDER, LYOPHILIZED, FOR SOLUTION INTRAVENOUS; PARENTERAL at 22:57

## 2020-02-16 RX ADMIN — POTASSIUM CHLORIDE 40 MEQ: 1500 TABLET, EXTENDED RELEASE ORAL at 17:09

## 2020-02-16 RX ADMIN — SENNOSIDES AND DOCUSATE SODIUM 2 TABLET: 8.6; 5 TABLET ORAL at 10:04

## 2020-02-16 RX ADMIN — SODIUM CHLORIDE TAB 1 GM 1 G: 1 TAB at 17:15

## 2020-02-16 RX ADMIN — MEGESTROL ACETATE 800 MG: 40 SUSPENSION ORAL at 09:51

## 2020-02-16 RX ADMIN — ATORVASTATIN CALCIUM 40 MG: 80 TABLET, FILM COATED ORAL at 20:47

## 2020-02-16 RX ADMIN — CALCIUM GLUCONATE 2 G: 98 INJECTION, SOLUTION INTRAVENOUS at 16:44

## 2020-02-16 RX ADMIN — PIPERACILLIN AND TAZOBACTAM 4.5 G: 4; .5 INJECTION, POWDER, LYOPHILIZED, FOR SOLUTION INTRAVENOUS; PARENTERAL at 16:45

## 2020-02-16 RX ADMIN — HYDRALAZINE HYDROCHLORIDE 25 MG: 25 TABLET ORAL at 09:49

## 2020-02-16 RX ADMIN — NIMODIPINE 60 MG: 30 CAPSULE, LIQUID FILLED ORAL at 12:00

## 2020-02-16 RX ADMIN — SODIUM CHLORIDE, PRESERVATIVE FREE 10 ML: 5 INJECTION INTRAVENOUS at 10:07

## 2020-02-16 RX ADMIN — Medication 60 MG: at 20:47

## 2020-02-16 RX ADMIN — HYDRALAZINE HYDROCHLORIDE 25 MG: 25 TABLET ORAL at 17:09

## 2020-02-16 RX ADMIN — SODIUM CHLORIDE 75 ML/HR: 234 INJECTION INTRAMUSCULAR; INTRAVENOUS; SUBCUTANEOUS at 06:35

## 2020-02-16 RX ADMIN — VANCOMYCIN HYDROCHLORIDE 750 MG: 10 INJECTION, POWDER, LYOPHILIZED, FOR SOLUTION INTRAVENOUS at 20:46

## 2020-02-16 RX ADMIN — PIPERACILLIN AND TAZOBACTAM 4.5 G: 4; .5 INJECTION, POWDER, LYOPHILIZED, FOR SOLUTION INTRAVENOUS; PARENTERAL at 05:41

## 2020-02-16 ASSESSMENT — PULMONARY FUNCTION TESTS
PIF_VALUE: 19
PIF_VALUE: 20
PIF_VALUE: 21

## 2020-02-16 ASSESSMENT — PAIN SCALES - GENERAL: PAINLEVEL_OUTOF10: 3

## 2020-02-16 NOTE — PROGRESS NOTES
Pharmacy Note  Vancomycin Consult    Yanick Baker is a 77 y.o. female started on Vancomycin for pneumonia; consult received from Dr. Jayden Napier APRN - CNP with Dr. Pawel Avila MD to manage therapy. Also receiving the following antibiotics: Zosyn. Patient Active Problem List   Diagnosis    Essential hypertension    RLS (restless legs syndrome)    History of cervical cancer    Gastroesophageal reflux disease without esophagitis    Trochanteric bursitis of left hip    Complex tear of lateral meniscus of left knee    Weight loss    Cerebrovascular accident (CVA) ([address]    Left sided numbness    Acute left-sided weakness    Lacunar stroke ([address]    Hyperlipidemia    Chronic pancreatitis ([address]    SA[facility] ([facility]arachnoid hemorrhage) [facility]    Cerebral aneurysm rupture ([facility]    Cerebral aneurysm    Subarachnoid hemorrhage from anterior cerebral artery aneurysm (HCC)    Status post coil embolization of cerebral aneurysm    Pneumonia of both lower lobes due to infectious organism ([facility]       Allergies:  Lisinopril; Losartan; and Procardia [nifedipine]     Temp max: 98.6    Recent Labs     02/15/20  0703 02/16/20  0545   BUN 8 12       Recent Labs     02/15/20  0703 02/16/20  0545   CREATININE 0.56 0.61       Recent Labs     02/16/20  0545 02/16/20  1814   WBC 11.7* 13.4*         Intake/Output Summary (Last 24 hours) at 2/16/2020 1847  Last data filed at 2/16/2020 0600  Gross per 24 hour   Intake 824 ml   Output --   Net 824 ml       Culture Date      Source                       Results  2/15/20                Blood x2                     NG  2/15/20                Urine                          E. coli    Ht Readings from Last 1 Encounters:   02/16/20 5' 6\" (1.676 m)        Wt Readings from Last 1 Encounters:   02/12/20 108 lb (49 kg)         Body mass index is 17.43 kg/m². Estimated Creatinine Clearance: 68 mL/min (based on SCr of 0.61 mg/dL).     Goal Trough Level: 15-20 mcg/mL    Assessment/Plan:  Will initiate vancomycin 750 mg IV every 12 hours. Timing of trough level will be determined based on culture results, renal function, and clinical response. Thank you for the consult. Will continue to follow.     Leilani RosenbaumD, 2/16/2020 6:49 PM

## 2020-02-16 NOTE — PROGRESS NOTES
magnesium above 2.5  Nimodipine therapy. Na checks per NICU. TCD daily.   Chemical DVT ppx  Possible acquired pneumonia management    Janie Moreno MD  Stroke, [facility]  [address] [place]  Electronically signed 2/16/2020 at 6:53 PM

## 2020-02-16 NOTE — PROGRESS NOTES
Daily Progress Note  Neuro Critical Care    Patient Name: Kayla Dee  Patient : 1950  Room/Bed: 7524/5501-91  Code Status: FULL  Allergies: Allergies   Allergen Reactions    Lisinopril Other (See Comments)    Losartan      Fatigue    Procardia [Nifedipine] Other (See Comments)       CHIEF COMPLAINT:      Shortness of breath     INTERVAL HISTORY    Initial Presentation (Admitted 20): The patient is a 77 y.o. female presented with a history of HTN who presents as a a transfer from [facility] [address] for [facility].   Patient was at the dentist around 1030AM when she had a sudden thunderclap headache, diaphoresis, and nausea/vomiting.  Patient's daughter received a call from the dentist around 1030AM stating the patient was drowsy and out of it. Beverly Zarate was taken to [address] [place], [place] [address],[address] ED where CT Head showed subarachnoid hemorrhage predominately in the anterior interhemispheric fissure.  Patient was complaining of severe headache and vomiting at outlying ED.  Loaded with 1g Keppra, given 2mg Morphine and Zofran, started on Labetalol infusion for blood pressure control and transferred to [facility] for further management.  On arrival to [facility] ED, patient is lethargic. She is able to state her name and that she's at the hospital.  No dysarthria or aphasia. PERRL 3mm.  EOMI.  Mild right facial droop. Moving all extremities equally. Repeat CT Head stable SAH and ventricular system.  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 Neuro endovascular planning on taking to lab.  Family reports patient's father passed of an abdominal aortic aneurysm.  Of note,  patient's daughter reports she is POA and family confirms that this paperwork has been completed.     Em&Jeff: 3, Modified Britton: 3    Hospital Course:   : To angio where she underwent coil embolization of ruptured right A2 SENTHIL aneurysm resulting in complete obliteration.   : CT Head showed bilateral SAH with new intraventricular extension, developing hydrocephalus, interval worsening of diffuse cerebral edema, significantly increased hyperattenuation in bilateral medial frontal regions; increased hemorrhage vs contrast. Started on 2% hypertonic saline with goal sodium 150. Neurosurgery following for EVD watch. 2/14: More alert. TCD normal. Continues of 2%. CT Head showed decreased SAH, subtle rightward shift. 2/15: Hypoxic overnight with increasing O2 requirements. Placed on non-rebreather with continued desaturations. CT chest PE protocol showed severe dense consolidations in the bilateral lower lobes suggestive of severe pneumonia, moderate bilateral pleural effusions. Given 20mg IVP Lasix x1. Started on Vanc and Zosyn. Placed on biPAP with improvements. Transitioned to high flow O2 in the AM.  Urine culture +E. Coli. Blood culture sent. Mobility, IS, and acapella encouraged. Last 24h:   Patient became hypoxic again overnight despite being on high flow nasal canula 90% FiO2, 30LPM.  She was placed on biPAP. Repeat CXR revealed multifocal airspace disease with worsening right lower lobe consolidation and pulmonary edema. Given 20mg IVP Lasix x1. Manual chest PT performed. Continue on Vancomycin and Zosyn. Mobilization, incentive spirometry, and acapella encouraged. Sodium 143, goal changed to 145. Continue 2% at 50cc/hr, will not increase rate in setting of possible pulmonary edema. Add salt tabs 1g BID as tolerated.       CURRENT MEDICATIONS:  SCHEDULED MEDICATIONS:   sennosides-docusate sodium  2 tablet Oral Daily    hydrALAZINE  25 mg Oral 4 times per day    sodium chloride flush  10 mL Intravenous 2 times per day    piperacillin-tazobactam  4.5 g Intravenous Q8H    ferrous sulfate  325 mg Oral Daily with breakfast    enoxaparin  40 mg Subcutaneous Daily    megestrol  800 mg Oral Daily    atorvastatin  40 mg Oral Nightly    niMODipine  60 mg Oral 6 times per day    sodium chloride flush  10 mL Intravenous 2 times ruptured right A2 SENTHIL aneurysm  - POD #4 s/p coil embolization resulting in complete obliteration  - Nimodipine 60mg Q4h  - Follow daily TCDs to monitor for vasospasm  - Neuro Endovascular and Neurosurgery following; appreciate recs  - Goal SBP<160  - Neuro checks per protocol     CARDIOVASCULAR:  - Goal SBP<160  - History of HTN  - OK to continue Hydralazine 25mg Q6h (half home dosing), hold home Coreg  - Troponin 14, EKG NSR  - F/U Echocardiogram  - History HLD; continue home Lipitor 40mg QHS  - Continue telemetry     PULMONARY:  - Acute hypoxia  - CXR 2/16 showed revealed multifocal airspace disease with worsening right lower lobe consolidation and pulmonary edema  - Mild pulmonary edema, possibly secondary to 2%  - Give Lasix 20mg IVP x1  - Bilateral pneumonia, likely secondary to aspiration (patient vomited multiple times)  - Continue Vancomycin and Zosyn  - Continue high flow nasal canula  - Encourage mobilization, incentive spirometry, Acapella  - Chest PT  - F/U CXR in AM     RENAL/FLUID/ELECTROLYTE:  - Normal renal functioning   - BUN 12/ Creatinine 0.61  - Monitor I&O; 2434/1350  - Hypertonic saline for cerebral edema, goal sodium 145  - Keep 2% @50cc/hr  - Add salt tabs 1g BID to prevent further pulmonary edema with 2%  - Goal Mag >2.5, give 2g magnesium sulfate  - Hypokalemia, K 3.2, replace with total 60meq KCl  - Hypocalcemia, 7.8, give 2g calcium gluconate  - Replace electrolytes PRN  - Daily BMP     GI/NUTRITION:  NUTRITION:  NPO while on high flow or biPAP  - Previously tolerating general diet  - Two bowel movements overnight  - Bowel regimen: Senokot-S daily  - GI prophylaxis: Not indicated     ID:  - Afebrile, Tmax 37.3  - Acute mild leukocytosis noted, WBC 11.7  - Repeat CBC in afternoon to note trend  - Bilateral lobe pneumonia  - UTI, culture + E.coli  - Continue Vancomycin and Zosyn for now  - Continue to monitor for fevers  - Daily CBC     HEME:   - Anemia likely multifactorial, iron

## 2020-02-16 NOTE — PLAN OF CARE
mg by mouth every 6 hours as needed for Pain    Historical Provider, MD   vitamin D (CHOLECALCIFEROL) 1000 UNIT TABS tablet Take 1,000 Units by mouth 3 times daily    Historical Provider, MD   meloxicam (MOBIC) 15 MG tablet Take 15 mg by mouth daily    Historical Provider, MD   aspirin 81 MG EC tablet Take 1 tablet by mouth daily 6/4/19   Tod Cai MD   Multiple Vitamins-Minerals (THERAPEUTIC MULTIVITAMIN-MINERALS) tablet Take 1 tablet by mouth daily    Historical Provider, MD   .  Assessment     Admit for [facility] with current issue of hypoxemia requiring High Oxygen. Poor Volumes with Incentive Spirometer. No congestion. No wheezing. No distress. Has been in bed. Will get up to chair today. No significant pulmonary history. Given Dieretics today for slide pulm edema on cxr. No issue with CHF  Will place on Hyperinflation Protocol and give PAP as well as Flutter Device utilized as chest physiotherapy. Encourage up in chair, cough and deep breath. Incentive Spirometer. Last CXR on 02/16  Impression   Multifocal airspace opacities most confluent within the right infrahilar   region/right lower lobe suggestive of pneumonia. Suspect mild interstitial pulmonary edema. Pleural effusions appear less conspicuous on today's exam.       Follow-up to assure resolution of the airspace opacities recommended   following medical treatment.        IS volume 500  IBW     RR 26  Breath Sounds: clear, diminished      ·   · Hyperinflation assessment at level 2  ·   ·   · []    Bronchodilator Assessment  BRONCHODILATOR ASSESSMENT SCORE  Score 0 1 2 3 4 5   Breath Sounds   []  Patient Baseline []  No Wheeze good aeration []  Faint, scattered wheezing, good aeration []  Expiratory Wheezing and or moderately diminished []  Insp/Exp wheeze and/or very diminished []  Insp/Exp and/ or marked distress   Respiratory Rate   []  Patient Baseline []  Less than 20 []  Less than 20 []  20-25 []  Greater than 25 []  Greater than 25   Peak flow % of Pred or PB []  NA   []  Greater than 90%  []  81-90% []  71-80% []  Less than or equal to 70%  or unable to perform []  Unable due to Respiratory Distress   Dyspnea re []  Patient Baseline []  No SOB []  No SOB []  SOB on exertion []  SOB min activity []  At rest/acute   e FEV% Predicted       []  NA []  Above 69%  []  Unable []  Above 60-69%  []  Unable []  Above 50-59%  []  Unable []  Above 35-49%  []  Unable []  Less than 35%  []  Unable                 [x]  Hyperinflation Assessment  Score 1 2 3   CXR and Breath Sounds   []  Clear [x]  No atelectasis  Basilar aeration []  Atelectasis or absent basilar breath sounds   Incentive Spirometry Volume  (Per IBW)   []  Greater than or equal to 15ml/Kg [x]  less than 15ml/Kg [x]  less than 15ml/Kg   Surgery within last 2 weeks [x]  None or general   []  Abdominal or thoracic surgery  []  Abdominal or thoracic   Chronic Pulmonary Historyre [x]  No []  Yes []  Yes     []  Secretion Management Assessment  Score 1 2 3   Bilateral Breath Sounds   []  Occasional Rhonchi []  Scattered Rhonchi []  Course Rhonchi and/or poor aeration   Sputum    []  Small amount of thin secretions []  Moderate amount of viscous secretions []  Copius, Viscious Yellow/ Secretions   CXR as reported by physician []  clear  []  Unavailable []  Infiltrates and/or consolidation  []  Unavailable []  Mucus Plugging and or lobar consolidation  []  Unavailable   Cough []  Strong, productive cough []  Weak productive cough []  No cough or weak non-productive cough   ANNALISA PATTON  3:29 PM                            FEMALE                                  MALE                            FEV1 Predicted Normal Values                        FEV1 Predicted Normal Values          Age                                     Height in Feet and Inches       Age                                     Height in Feet and Inches       4' 11\" 5' 1\" 5' 3\" 5' 5\" 5' 7\" 5' 9\" 5' 11\" 6' 1\"  4' 11\" [phone]

## 2020-02-17 ENCOUNTER — APPOINTMENT (OUTPATIENT)
Dept: GENERAL RADIOLOGY | Age: 70
DRG: 020 | End: 2020-02-17
Payer: MEDICARE

## 2020-02-17 LAB
ABSOLUTE EOS #: 0.03 K/UL (ref 0–0.44)
ABSOLUTE IMMATURE GRANULOCYTE: 0.07 K/UL (ref 0–0.3)
ABSOLUTE LYMPH #: 1.21 K/UL (ref 1.1–3.7)
ABSOLUTE MONO #: 0.81 K/UL (ref 0.1–1.2)
ABSOLUTE RETIC #: 0.04 M/UL (ref 0.03–0.08)
ALLEN TEST: ABNORMAL
ANION GAP SERPL CALCULATED.3IONS-SCNC: 8 MMOL/L (ref 9–17)
BASOPHILS # BLD: 0 % (ref 0–2)
BASOPHILS ABSOLUTE: <0.03 K/UL (ref 0–0.2)
BUN BLDV-MCNC: 12 MG/DL (ref 8–23)
BUN/CREAT BLD: ABNORMAL (ref 9–20)
CALCIUM IONIZED: 1.13 MMOL/L (ref 1.13–1.33)
CALCIUM SERPL-MCNC: 7.6 MG/DL (ref 8.6–10.4)
CHLORIDE BLD-SCNC: 120 MMOL/L (ref 98–107)
CO2: 17 MMOL/L (ref 20–31)
CREAT SERPL-MCNC: 0.75 MG/DL (ref 0.5–0.9)
DATE, STOOL #1: ABNORMAL
DATE, STOOL #2: ABNORMAL
DATE, STOOL #3: ABNORMAL
DIFFERENTIAL TYPE: ABNORMAL
EOSINOPHILS RELATIVE PERCENT: 0 % (ref 1–4)
FIO2: 50
GFR AFRICAN AMERICAN: >60 ML/MIN
GFR NON-AFRICAN AMERICAN: >60 ML/MIN
GFR SERPL CREATININE-BSD FRML MDRD: ABNORMAL ML/MIN/{1.73_M2}
GFR SERPL CREATININE-BSD FRML MDRD: ABNORMAL ML/MIN/{1.73_M2}
GLUCOSE BLD-MCNC: 114 MG/DL (ref 70–99)
HAPTOGLOBIN: 167 MG/DL (ref 30–200)
HCT VFR BLD CALC: 19.7 % (ref 36.3–47.1)
HCT VFR BLD CALC: 20.3 % (ref 36.3–47.1)
HCT VFR BLD CALC: 20.8 % (ref 36.3–47.1)
HEMOCCULT SP1 STL QL: POSITIVE
HEMOCCULT SP2 STL QL: ABNORMAL
HEMOCCULT SP3 STL QL: ABNORMAL
HEMOGLOBIN: 6 G/DL (ref 11.9–15.1)
HEMOGLOBIN: 6.1 G/DL (ref 11.9–15.1)
HEMOGLOBIN: 6.2 G/DL (ref 11.9–15.1)
IMMATURE GRANULOCYTES: 1 %
IMMATURE RETIC FRACT: 30.1 % (ref 2.7–18.3)
LACTATE DEHYDROGENASE: 232 U/L (ref 135–214)
LYMPHOCYTES # BLD: 11 % (ref 24–43)
MAGNESIUM: 2.2 MG/DL (ref 1.6–2.6)
MCH RBC QN AUTO: 25.4 PG (ref 25.2–33.5)
MCH RBC QN AUTO: 25.8 PG (ref 25.2–33.5)
MCHC RBC AUTO-ENTMCNC: 30 G/DL (ref 28.4–34.8)
MCHC RBC AUTO-ENTMCNC: 30.5 G/DL (ref 28.4–34.8)
MCV RBC AUTO: 84.5 FL (ref 82.6–102.9)
MCV RBC AUTO: 84.6 FL (ref 82.6–102.9)
MODE: ABNORMAL
MONOCYTES # BLD: 7 % (ref 3–12)
NEGATIVE BASE EXCESS, ART: 4 (ref 0–2)
NRBC AUTOMATED: 0 PER 100 WBC
NRBC AUTOMATED: 0 PER 100 WBC
O2 DEVICE/FLOW/%: ABNORMAL
PATIENT TEMP: ABNORMAL
PDW BLD-RTO: 15.9 % (ref 11.8–14.4)
PDW BLD-RTO: 16 % (ref 11.8–14.4)
PLATELET # BLD: 194 K/UL (ref 138–453)
PLATELET # BLD: 240 K/UL (ref 138–453)
PLATELET ESTIMATE: ABNORMAL
PMV BLD AUTO: 10.6 FL (ref 8.1–13.5)
PMV BLD AUTO: 10.9 FL (ref 8.1–13.5)
POC HCO3: 19.1 MMOL/L (ref 21–28)
POC LACTIC ACID: 1.02 MMOL/L (ref 0.56–1.39)
POC O2 SATURATION: 87 % (ref 94–98)
POC PCO2 TEMP: ABNORMAL MM HG
POC PCO2: 25 MM HG (ref 35–48)
POC PH TEMP: ABNORMAL
POC PH: 7.49 (ref 7.35–7.45)
POC PO2 TEMP: ABNORMAL MM HG
POC PO2: 47.8 MM HG (ref 83–108)
POSITIVE BASE EXCESS, ART: ABNORMAL (ref 0–3)
POTASSIUM SERPL-SCNC: 2.9 MMOL/L (ref 3.7–5.3)
RBC # BLD: 2.33 M/UL (ref 3.95–5.11)
RBC # BLD: 2.4 M/UL (ref 3.95–5.11)
RBC # BLD: ABNORMAL 10*6/UL
RETIC %: 1.9 % (ref 0.5–1.9)
RETIC HEMOGLOBIN: 24 PG (ref 28.2–35.7)
SAMPLE SITE: ABNORMAL
SEG NEUTROPHILS: 82 % (ref 36–65)
SEGMENTED NEUTROPHILS ABSOLUTE COUNT: 9.4 K/UL (ref 1.5–8.1)
SODIUM BLD-SCNC: 143 MMOL/L (ref 135–144)
SODIUM BLD-SCNC: 145 MMOL/L (ref 135–144)
TCO2 (CALC), ART: 20 MMOL/L (ref 22–29)
TIME, STOOL #1: ABNORMAL
TIME, STOOL #2: ABNORMAL
TIME, STOOL #3: ABNORMAL
WBC # BLD: 11.5 K/UL (ref 3.5–11.3)
WBC # BLD: 8 K/UL (ref 3.5–11.3)
WBC # BLD: ABNORMAL 10*3/UL

## 2020-02-17 PROCEDURE — 83010 ASSAY OF HAPTOGLOBIN QUANT: CPT

## 2020-02-17 PROCEDURE — 85027 COMPLETE CBC AUTOMATED: CPT

## 2020-02-17 PROCEDURE — 2500000003 HC RX 250 WO HCPCS: Performed by: STUDENT IN AN ORGANIZED HEALTH CARE EDUCATION/TRAINING PROGRAM

## 2020-02-17 PROCEDURE — 6370000000 HC RX 637 (ALT 250 FOR IP): Performed by: STUDENT IN AN ORGANIZED HEALTH CARE EDUCATION/TRAINING PROGRAM

## 2020-02-17 PROCEDURE — 93886 INTRACRANIAL COMPLETE STUDY: CPT

## 2020-02-17 PROCEDURE — 99233 SBSQ HOSP IP/OBS HIGH 50: CPT | Performed by: PSYCHIATRY & NEUROLOGY

## 2020-02-17 PROCEDURE — 2580000003 HC RX 258: Performed by: STUDENT IN AN ORGANIZED HEALTH CARE EDUCATION/TRAINING PROGRAM

## 2020-02-17 PROCEDURE — 6370000000 HC RX 637 (ALT 250 FOR IP): Performed by: NURSE PRACTITIONER

## 2020-02-17 PROCEDURE — 6360000002 HC RX W HCPCS: Performed by: NURSE PRACTITIONER

## 2020-02-17 PROCEDURE — 86920 COMPATIBILITY TEST SPIN: CPT

## 2020-02-17 PROCEDURE — G0328 FECAL BLOOD SCRN IMMUNOASSAY: HCPCS

## 2020-02-17 PROCEDURE — 86900 BLOOD TYPING SEROLOGIC ABO: CPT

## 2020-02-17 PROCEDURE — 71045 X-RAY EXAM CHEST 1 VIEW: CPT

## 2020-02-17 PROCEDURE — 36415 COLL VENOUS BLD VENIPUNCTURE: CPT

## 2020-02-17 PROCEDURE — 97129 THER IVNTJ 1ST 15 MIN: CPT

## 2020-02-17 PROCEDURE — 97130 THER IVNTJ EA ADDL 15 MIN: CPT

## 2020-02-17 PROCEDURE — 6360000002 HC RX W HCPCS: Performed by: INTERNAL MEDICINE

## 2020-02-17 PROCEDURE — 84295 ASSAY OF SERUM SODIUM: CPT

## 2020-02-17 PROCEDURE — 94761 N-INVAS EAR/PLS OXIMETRY MLT: CPT

## 2020-02-17 PROCEDURE — 6360000002 HC RX W HCPCS: Performed by: STUDENT IN AN ORGANIZED HEALTH CARE EDUCATION/TRAINING PROGRAM

## 2020-02-17 PROCEDURE — 5A1935Z RESPIRATORY VENTILATION, LESS THAN 24 CONSECUTIVE HOURS: ICD-10-PCS | Performed by: INTERNAL MEDICINE

## 2020-02-17 PROCEDURE — 76937 US GUIDE VASCULAR ACCESS: CPT

## 2020-02-17 PROCEDURE — 6360000002 HC RX W HCPCS: Performed by: PSYCHIATRY & NEUROLOGY

## 2020-02-17 PROCEDURE — 2580000003 HC RX 258: Performed by: PSYCHIATRY & NEUROLOGY

## 2020-02-17 PROCEDURE — 94660 CPAP INITIATION&MGMT: CPT

## 2020-02-17 PROCEDURE — 6370000000 HC RX 637 (ALT 250 FOR IP): Performed by: PSYCHIATRY & NEUROLOGY

## 2020-02-17 PROCEDURE — 85018 HEMOGLOBIN: CPT

## 2020-02-17 PROCEDURE — 86850 RBC ANTIBODY SCREEN: CPT

## 2020-02-17 PROCEDURE — 2580000003 HC RX 258: Performed by: INTERNAL MEDICINE

## 2020-02-17 PROCEDURE — 80048 BASIC METABOLIC PNL TOTAL CA: CPT

## 2020-02-17 PROCEDURE — 83605 ASSAY OF LACTIC ACID: CPT

## 2020-02-17 PROCEDURE — 2000000003 HC NEURO ICU R&B

## 2020-02-17 PROCEDURE — 2580000003 HC RX 258: Performed by: NURSE PRACTITIONER

## 2020-02-17 PROCEDURE — 85045 AUTOMATED RETICULOCYTE COUNT: CPT

## 2020-02-17 PROCEDURE — 85014 HEMATOCRIT: CPT

## 2020-02-17 PROCEDURE — 82803 BLOOD GASES ANY COMBINATION: CPT

## 2020-02-17 PROCEDURE — 0BH18EZ INSERTION OF ENDOTRACHEAL AIRWAY INTO TRACHEA, VIA NATURAL OR ARTIFICIAL OPENING ENDOSCOPIC: ICD-10-PCS | Performed by: INTERNAL MEDICINE

## 2020-02-17 PROCEDURE — 2700000000 HC OXYGEN THERAPY PER DAY

## 2020-02-17 PROCEDURE — 83735 ASSAY OF MAGNESIUM: CPT

## 2020-02-17 PROCEDURE — 82330 ASSAY OF CALCIUM: CPT

## 2020-02-17 PROCEDURE — 86901 BLOOD TYPING SEROLOGIC RH(D): CPT

## 2020-02-17 PROCEDURE — 94667 MNPJ CHEST WALL 1ST: CPT

## 2020-02-17 PROCEDURE — 85025 COMPLETE CBC W/AUTO DIFF WBC: CPT

## 2020-02-17 PROCEDURE — 83615 LACTATE (LD) (LDH) ENZYME: CPT

## 2020-02-17 PROCEDURE — 37799 UNLISTED PX VASCULAR SURGERY: CPT

## 2020-02-17 PROCEDURE — 94668 MNPJ CHEST WALL SBSQ: CPT

## 2020-02-17 PROCEDURE — 99222 1ST HOSP IP/OBS MODERATE 55: CPT | Performed by: INTERNAL MEDICINE

## 2020-02-17 RX ORDER — ATORVASTATIN CALCIUM 80 MG/1
80 TABLET, FILM COATED ORAL NIGHTLY
Status: DISCONTINUED | OUTPATIENT
Start: 2020-02-17 | End: 2020-02-23

## 2020-02-17 RX ORDER — POTASSIUM CHLORIDE 7.45 MG/ML
60 INJECTION INTRAVENOUS ONCE
Status: DISCONTINUED | OUTPATIENT
Start: 2020-02-17 | End: 2020-02-17 | Stop reason: CLARIF

## 2020-02-17 RX ORDER — CALCIUM GLUCONATE 94 MG/ML
1 INJECTION, SOLUTION INTRAVENOUS ONCE
Status: DISCONTINUED | OUTPATIENT
Start: 2020-02-17 | End: 2020-02-17 | Stop reason: CLARIF

## 2020-02-17 RX ORDER — MAGNESIUM SULFATE 1 G/100ML
1 INJECTION INTRAVENOUS ONCE
Status: COMPLETED | OUTPATIENT
Start: 2020-02-17 | End: 2020-02-17

## 2020-02-17 RX ORDER — POTASSIUM CHLORIDE 7.45 MG/ML
10 INJECTION INTRAVENOUS
Status: COMPLETED | OUTPATIENT
Start: 2020-02-17 | End: 2020-02-17

## 2020-02-17 RX ADMIN — Medication 60 MG: at 09:09

## 2020-02-17 RX ADMIN — POTASSIUM CHLORIDE 10 MEQ: 7.46 INJECTION, SOLUTION INTRAVENOUS at 10:40

## 2020-02-17 RX ADMIN — SODIUM CHLORIDE 50 ML/HR: 234 INJECTION INTRAMUSCULAR; INTRAVENOUS; SUBCUTANEOUS at 04:46

## 2020-02-17 RX ADMIN — FERROUS SULFATE TAB EC 325 MG (65 MG FE EQUIVALENT) 325 MG: 325 (65 FE) TABLET DELAYED RESPONSE at 09:09

## 2020-02-17 RX ADMIN — SODIUM CHLORIDE TAB 1 GM 1 G: 1 TAB at 09:09

## 2020-02-17 RX ADMIN — Medication 60 MG: at 16:19

## 2020-02-17 RX ADMIN — PIPERACILLIN AND TAZOBACTAM 4.5 G: 4; .5 INJECTION, POWDER, LYOPHILIZED, FOR SOLUTION INTRAVENOUS; PARENTERAL at 05:32

## 2020-02-17 RX ADMIN — Medication 60 MG: at 20:20

## 2020-02-17 RX ADMIN — ACETAMINOPHEN 650 MG: 325 TABLET ORAL at 01:05

## 2020-02-17 RX ADMIN — POTASSIUM CHLORIDE 40 MEQ: 1500 TABLET, EXTENDED RELEASE ORAL at 09:09

## 2020-02-17 RX ADMIN — POTASSIUM CHLORIDE 10 MEQ: 7.46 INJECTION, SOLUTION INTRAVENOUS at 14:39

## 2020-02-17 RX ADMIN — POTASSIUM CHLORIDE 10 MEQ: 7.46 INJECTION, SOLUTION INTRAVENOUS at 13:22

## 2020-02-17 RX ADMIN — SODIUM CHLORIDE, PRESERVATIVE FREE 10 ML: 5 INJECTION INTRAVENOUS at 20:20

## 2020-02-17 RX ADMIN — Medication 60 MG: at 00:01

## 2020-02-17 RX ADMIN — SODIUM CHLORIDE TAB 1 GM 1 G: 1 TAB at 16:19

## 2020-02-17 RX ADMIN — MEGESTROL ACETATE 800 MG: 40 SUSPENSION ORAL at 09:09

## 2020-02-17 RX ADMIN — HYDRALAZINE HYDROCHLORIDE 25 MG: 25 TABLET ORAL at 00:01

## 2020-02-17 RX ADMIN — PIPERACILLIN AND TAZOBACTAM 4.5 G: 4; .5 INJECTION, POWDER, LYOPHILIZED, FOR SOLUTION INTRAVENOUS; PARENTERAL at 22:02

## 2020-02-17 RX ADMIN — VANCOMYCIN HYDROCHLORIDE 750 MG: 10 INJECTION, POWDER, LYOPHILIZED, FOR SOLUTION INTRAVENOUS at 06:14

## 2020-02-17 RX ADMIN — PIPERACILLIN AND TAZOBACTAM 4.5 G: 4; .5 INJECTION, POWDER, LYOPHILIZED, FOR SOLUTION INTRAVENOUS; PARENTERAL at 14:31

## 2020-02-17 RX ADMIN — IRON SUCROSE 300 MG: 20 INJECTION, SOLUTION INTRAVENOUS at 19:04

## 2020-02-17 RX ADMIN — HYDRALAZINE HYDROCHLORIDE 25 MG: 25 TABLET ORAL at 17:40

## 2020-02-17 RX ADMIN — SODIUM CHLORIDE, PRESERVATIVE FREE 10 ML: 5 INJECTION INTRAVENOUS at 09:10

## 2020-02-17 RX ADMIN — Medication 60 MG: at 12:04

## 2020-02-17 RX ADMIN — Medication 60 MG: at 23:47

## 2020-02-17 RX ADMIN — ENOXAPARIN SODIUM 40 MG: 40 INJECTION SUBCUTANEOUS at 09:09

## 2020-02-17 RX ADMIN — MAGNESIUM SULFATE HEPTAHYDRATE 1 G: 1 INJECTION, SOLUTION INTRAVENOUS at 17:40

## 2020-02-17 RX ADMIN — ACETAMINOPHEN 650 MG: 325 TABLET ORAL at 12:04

## 2020-02-17 RX ADMIN — VANCOMYCIN HYDROCHLORIDE 750 MG: 10 INJECTION, POWDER, LYOPHILIZED, FOR SOLUTION INTRAVENOUS at 20:20

## 2020-02-17 RX ADMIN — POTASSIUM CHLORIDE 10 MEQ: 7.46 INJECTION, SOLUTION INTRAVENOUS at 16:18

## 2020-02-17 RX ADMIN — POTASSIUM CHLORIDE 10 MEQ: 7.46 INJECTION, SOLUTION INTRAVENOUS at 12:04

## 2020-02-17 RX ADMIN — CALCIUM GLUCONATE 1 G: 98 INJECTION, SOLUTION INTRAVENOUS at 19:06

## 2020-02-17 RX ADMIN — ATORVASTATIN CALCIUM 80 MG: 80 TABLET, FILM COATED ORAL at 20:20

## 2020-02-17 RX ADMIN — POTASSIUM CHLORIDE 10 MEQ: 7.46 INJECTION, SOLUTION INTRAVENOUS at 09:26

## 2020-02-17 RX ADMIN — Medication 60 MG: at 03:54

## 2020-02-17 RX ADMIN — SODIUM CHLORIDE 50 ML/HR: 234 INJECTION INTRAMUSCULAR; INTRAVENOUS; SUBCUTANEOUS at 01:06

## 2020-02-17 RX ADMIN — HYDRALAZINE HYDROCHLORIDE 25 MG: 25 TABLET ORAL at 12:04

## 2020-02-17 RX ADMIN — HYDRALAZINE HYDROCHLORIDE 25 MG: 25 TABLET ORAL at 23:47

## 2020-02-17 ASSESSMENT — PAIN SCALES - GENERAL
PAINLEVEL_OUTOF10: 3
PAINLEVEL_OUTOF10: 0
PAINLEVEL_OUTOF10: 4
PAINLEVEL_OUTOF10: 1

## 2020-02-17 ASSESSMENT — PAIN DESCRIPTION - ORIENTATION: ORIENTATION: POSTERIOR

## 2020-02-17 ASSESSMENT — PAIN DESCRIPTION - ONSET: ONSET: ON-GOING

## 2020-02-17 ASSESSMENT — PAIN DESCRIPTION - DESCRIPTORS: DESCRIPTORS: HEADACHE

## 2020-02-17 ASSESSMENT — PAIN DESCRIPTION - PAIN TYPE: TYPE: ACUTE PAIN

## 2020-02-17 ASSESSMENT — PAIN DESCRIPTION - FREQUENCY: FREQUENCY: INTERMITTENT

## 2020-02-17 ASSESSMENT — PAIN DESCRIPTION - LOCATION: LOCATION: HEAD

## 2020-02-17 ASSESSMENT — PULMONARY FUNCTION TESTS: PIF_VALUE: 19

## 2020-02-17 ASSESSMENT — PAIN DESCRIPTION - PROGRESSION: CLINICAL_PROGRESSION: NOT CHANGED

## 2020-02-17 ASSESSMENT — PAIN - FUNCTIONAL ASSESSMENT: PAIN_FUNCTIONAL_ASSESSMENT: ACTIVITIES ARE NOT PREVENTED

## 2020-02-17 NOTE — CONSULTS
Today's Date: 2/17/2020  Patient Name: Aura Prater  Date of admission: 2/12/2020  1:58 PM  Patient's age: 77 y.o., 1950  Admission Dx: SAH (subarachnoid hemorrhage) ([facility] [address] [I60.9]  Status post coil embolization of cerebral aneurysm [Z98.890]    Reason for Consult: anemia, Hb 6.1 - [employer] refusing PRBC, chronic liverdz/HepC, recs for management    Requesting Physician: Jayda Hanson MD    CHIEF COMPLAINT:    Chief Complaint   Patient presents with    Cerebrovascular Accident     History Obtained From:  Pt and chart  HISTORY OF PRESENT ILLNESS:    This is a 29-year-old female with past medical history of hypertension, was transferred from [place] for subarachnoid hemorrhage. History provided by patient's daughter who reported that patient had a recent dental procedure and was given anesthetics and subsequently her blood pressure went significantly high. Patient complains of mild facial droop and her strokelike symptoms and her CT scan showed subarachnoid hemorrhage predominantly in the anterior interhemispheric fissure. Patient is Yarsani and she was noted to have low hemoglobin. Patient and family refusing further blood transfusion and therefore hematology was consulted. Past Medical History:   has a past medical history of Acid reflux, Arthritis, Cancer ([facility] [address] Family history of breast cancer in first degree relative, Gall stones, Hard of hearing, History of cervical cancer, Hypertension, Lateral meniscus tear, Wears dentures, and Wears glasses. Past Surgical History:   has a past surgical history that includes Tonsillectomy and adenoidectomy (1956); Cholecystectomy; Inner ear surgery; Knee arthroscopy (2000, 2005); cyst removal (1980); Colonoscopy (12/09); Breast biopsy (1980); Foot surgery (Bilateral); other surgical history (Right, 9-4-14); Umbilical hernia repair; hernia repair; Varicose vein surgery (Bilateral);  Knee arthroscopy (Left, 4/3/2019); Manjit Venkatesh, APRN - CNP   650 mg at 20 1204    ondansetron (ZOFRAN) injection 4 mg  4 mg Intravenous Q6H PRN Albino Holland APRN - CNP   4 mg at 20 1121       Allergies:  Lisinopril; Losartan; and Procardia [nifedipine]    Social History:   reports that she has never smoked. She has never used smokeless tobacco. She reports current alcohol use. She reports that she does not use drugs. Family History: family history includes Breast Cancer in her sister; Cancer in her mother; Heart Attack in her father. REVIEW OF SYSTEMS:    Constitutional: No fever or chills. No night sweats, no weight loss   Eyes: No eye discharge, double vision, or eye pain   HEENT: negative for sore mouth, sore throat, hoarseness and voice change   Respiratory: negative for cough , sputum, dyspnea, wheezing, hemoptysis, chest pain   Cardiovascular: negative for chest pain, dyspnea, palpitations, orthopnea, PND   Gastrointestinal: negative for nausea, vomiting, diarrhea, constipation, abdominal pain, Dysphagia, hematemesis and hematochezia   Genitourinary: negative for frequency, dysuria, nocturia, urinary incontinence, and hematuria   Integument: negative for rash, skin lesions, bruises.    Hematologic/Lymphatic: negative for easy bruising, bleeding, lymphadenopathy, or petechiae   Endocrine: negative for heat or cold intolerance,weight changes, change in bowel habits and hair loss   Musculoskeletal: negative for myalgias, arthralgias, pain, joint swelling,and bone pain   Neurological: negative for headaches, dizziness, seizures, weakness, numbness    PHYSICAL EXAM:      BP (!) 95/45   Pulse 92   Temp 98.1 °F (36.7 °C) (Oral)   Resp 30   Ht 5' 6\" (1.676 m)   Wt 108 lb (49 kg)   SpO2 95%   BMI 17.43 kg/m²    Temp (24hrs), Av °F (36.7 °C), Min:97.5 °F (36.4 °C), Max:98.2 °F (36.8 °C)  General appearance - well appearing, no in pain or distress   Mental status - alert and cooperative   Eyes - pupils equal and reactive, substitutions which may escape proof reading. It such instances, actual meaning can be extrapolated by contextual diversion.

## 2020-02-17 NOTE — PROGRESS NOTES
ENDOVASCULAR NEUROSURGERY PROGRESS NOTE  2020 9:50 AM  Subjective:   Admit Date: 2020  PCP: Johanna Powell MD    Patient examined stable, no acute event overnight    Objective:   Vitals: BP (!) 79/31   Pulse 80   Temp 97.9 °F (36.6 °C) (Oral)   Resp 26   Ht 5' 6\" (1.676 m)   Wt 108 lb (49 kg)   SpO2 90%   BMI 17.43 kg/m²     General appearance: Lying in bed, NAD,  Lungs: CTAB  Heart: RRR  Abdomen: soft, NTND, bowel sounds normal    Neuro exam: Follows simple commands. CN II-XII: Mild right facial droop, pupils 2 mm reactive to light bilaterally, EOMI, VFF. Dari spontaneously against gravity.      Alonso Coppersmith of consciousness:  0 - alert; keenly responsive  1b.  Level of consciousness questions:  0 - answers both questions correctly  1c.  Level of consciousness questions:  0 - performs both tasks correctly  2.    Best Gaze:  0 - normal  3.    Visual:  0 - no visual loss  4.    Facial Palsy:  1 - minor paralysis (flattened nasolabial fold, asymmetric on smiling)  5a.  Motor left arm:  0 - no drift, limb holds 90 (or 45) degrees for full 10 seconds  5b.  Motor right arm:  0 - no drift, limb holds 90 (or 45) degrees for full 10 seconds  6a.  Motor left le - no drift; leg holds 30 degree position for full 5 seconds  6b.  Motor right le - no drift; leg holds 30 degree position for full 5 seconds  7.    Limb Ataxia:  0 - absent  8.    Sensory:  0 - normal; no sensory loss  9.    Best Language:  0 - no aphasia, normal  10.  Dysarthria:  0 - normal  11.  Extinction and Inattention:  0 - no abnormality     TOTAL:  1        MRS 1    Medications and labs:   Scheduled Meds:   magnesium sulfate  1 g Intravenous Once    potassium chloride  10 mEq Intravenous Q1H    calcium gluconate  1 g Intravenous Once    [Held by provider] sennosides-docusate sodium  2 tablet Oral Daily    sodium chloride  1 g Oral BID WC    niMODipine  60 mg Oral 6 times per day    vancomycin (VANCOCIN) intermittent dosing

## 2020-02-17 NOTE — PLAN OF CARE
Problem: Gas Exchange - Impaired:  Goal: Levels of oxygenation will improve  Description  Levels of oxygenation will improve  2/16/2020 2306 by Floresita Smith RCP  Outcome: Ongoing     Problem: Respiratory  Intervention: Respiratory assessment  2/16/2020 2306 by Floresita Smith RCP  Note:   BRONCHOSPASM/BRONCHOCONSTRICTION     [x]         IMPROVE AERATION/BREATH SOUNDS  [x]   ADMINISTER BRONCHODILATOR THERAPY AS APPROPRIATE  [x]   ASSESS BREATH SOUNDS  []   IMPLEMENT AEROSOL/MDI PROTOCOL  [x]   PATIENT EDUCATION AS NEEDED   PROVIDE ADEQUATE OXYGENATION WITH ACCEPTABLE SP02/ABG'S    [x]  IDENTIFY APPROPRIATE OXYGEN THERAPY  [x]   MONITOR SP02/ABG'S AS NEEDED   [x]   PATIENT EDUCATION AS NEEDED                Problem: Respiratory  Intervention: Education, Medication and treatments  Note:   Acapella, EZPAP and IS used, good effort.

## 2020-02-17 NOTE — PROGRESS NOTES
Reasoning: 3/5 increased to 5/5 with minimal verbal and visual cues    Other: New Goal: Pt will complete abstract and deductive reasoning tasks with 90% accuracy    Plan:  [x] Continue ST services    [] Discharge from ST:      Discharge recommendations: [] Inpatient Rehab   [] [place]   [] Outpatient Therapy  [] Follow up at trauma clinic   [] Other: Further therapy recommended at discharge. Treatment completed by: Completed by: Arlette Love,  Clinician    Cosigned By: Jacob Bee S.CCC/SLP

## 2020-02-17 NOTE — PROGRESS NOTES
Daily Progress Note  Neuro Critical Care    Patient Name: Anson Llamas  Patient : 1950  Room/Bed: 9219/3655-37  Code Status: FULL  Allergies: Allergies   Allergen Reactions    Lisinopril Other (See Comments)    Losartan      Fatigue    Procardia [Nifedipine] Other (See Comments)       CHIEF COMPLAINT:      Shortness of breath     [address] Course:   : To angio where she underwent coil embolization of ruptured right A2 SENTHIL aneurysm resulting in complete obliteration. : CT Head showed bilateral SAH with new intraventricular extension, developing hydrocephalus, interval worsening of diffuse cerebral edema, significantly increased hyperattenuation in bilateral medial frontal regions; increased hemorrhage vs contrast. Started on 2% hypertonic saline with goal sodium 150. Neurosurgery following for EVD watch. : More alert. TCD normal. Continues of 2%. CT Head showed decreased SAH, subtle rightward shift. 2/15: Hypoxic overnight with increasing O2 requirements. Placed on non-rebreather with continued desaturations. CT chest PE protocol showed severe dense consolidations in the bilateral lower lobes suggestive of severe pneumonia, moderate bilateral pleural effusions. Given 20mg IVP Lasix x1. Started on Vanc and Zosyn. Placed on biPAP with improvements. Transitioned to high flow O2 in the AM.  Urine culture +E. Coli. Blood culture sent. Mobility, IS, and acapella encouraged. : hypoxic again overnight, put on high flow, cont on vanc and zosyn. On 2% hypertonic with near goal Na, goal 145. Suzanne Hazard Echo showed EF of 58%. Last 24h:   Patient hypoxic again overnight, requiring high flow. Has had increased bowel movements on bowel regimen.   Potassium 2.9, hemoglobin 6.1    CURRENT MEDICATIONS:  SCHEDULED MEDICATIONS:   magnesium sulfate  1 g Intravenous Once    calcium gluconate  1 g Intravenous Once    atorvastatin  80 mg Oral Nightly    [Held by provider] sennosides-docusate sodium  2 tablet Oral Daily    sodium chloride  1 g Oral BID WC    niMODipine  60 mg Oral 6 times per day    vancomycin (VANCOCIN) intermittent dosing (placeholder)   Other RX Placeholder    vancomycin  750 mg Intravenous Q12H    hydrALAZINE  25 mg Oral 4 times per day    sodium chloride flush  10 mL Intravenous 2 times per day    piperacillin-tazobactam  4.5 g Intravenous Q8H    ferrous sulfate  325 mg Oral Daily with breakfast    enoxaparin  40 mg Subcutaneous Daily    megestrol  800 mg Oral Daily    sodium chloride flush  10 mL Intravenous 2 times per day     CONTINUOUS INFUSIONS:    PRN MEDICATIONS:   [Held by provider] magnesium hydroxide, sodium chloride flush, [Held by provider] docusate sodium, sodium chloride flush, magnesium sulfate, acetaminophen, ondansetron    VITALS:  Temperature Range: Temp: 98.1 °F (36.7 °C) Temp  Av °F (36.7 °C)  Min: 97.5 °F (36.4 °C)  Max: 98.2 °F (36.8 °C)  BP Range: Systolic (76ISV), XSM:15 , Min:79 , PAN:734     Diastolic (72DPM), MIJ:35, Min:31, Max:78    Pulse Range: Pulse  Av.6  Min: 71  Max: 106  Respiration Range: Resp  Av.6  Min: 21  Max: 34  Current Pulse Ox: SpO2: 95 %  24HR Pulse Ox Range: SpO2  Av.1 %  Min: 77 %  Max: 99 %  Patient Vitals for the past 12 hrs:   BP Temp Temp src Pulse Resp SpO2   20 1303 (!) 95/45 -- -- 92 30 95 %   20 1204 -- -- -- -- (!) 32 98 %   20 1203 (!) 102/52 98.1 °F (36.7 °C) Oral 88 (!) 31 99 %   20 1201 -- -- -- -- 30 98 %   20 1123 -- -- -- -- (!) 34 95 %   20 1103 (!) 109/40 -- -- 97 29 94 %   20 1003 (!) 98/41 -- -- 91 28 (!) 89 %   20 0903 (!) 120/45 -- -- 92 23 92 %   20 0858 -- -- -- -- 26 90 %   20 0852 (!) 108/41 -- -- 79 23 91 %   20 0803 -- 98.2 °F (36.8 °C) Oral 71 21 92 %   20 0603 (!) 79/31 -- -- 80 22 (!) 89 %   20 0503 (!) 88/42 -- -- 82 25 96 %   20 0403 (!) 93/41 97.9 °F (36.6 °C) Oral 82 22 92 %   02/17/20 0303 (!) 79/39 -- -- 84 21 92 %     Estimated body mass index is 17.43 kg/m² as calculated from the following:    Height as of this encounter: 5' 6\" (1.676 m). Weight as of this encounter: 108 lb (49 kg).  []<16 Severe malnutrition  []16-16.99 Moderate malnutrition  [x]17-18.49 Mild malnutrition  []18.5-24.9 Normal  []25-29.9 Overweight (not obese)  []30-34.9 Obese class 1 (Low Risk)  []35-39.9 Obese class 2 (Moderate Risk)  []?40 Obese class 3 (High Risk)    RECENT LABS:   Lab Results   Component Value Date    WBC 8.0 02/17/2020    HGB 6.2 (LL) 02/17/2020    HCT 20.8 (L) 02/17/2020     02/17/2020    CHOL 102 02/13/2020    TRIG 100 02/13/2020    HDL 21 (L) 02/13/2020    ALT 29 02/15/2020    AST 47 (H) 02/15/2020     (H) 02/17/2020    K 2.9 (LL) 02/17/2020     (H) 02/17/2020    CREATININE 0.75 02/17/2020    BUN 12 02/17/2020    CO2 17 (L) 02/17/2020    TSH 5.94 (H) 02/15/2020    INR 1.2 02/16/2020    LABA1C 5.6 02/13/2020     24 HOUR INTAKE/OUTPUT:    Intake/Output Summary (Last 24 hours) at 2/17/2020 1449  Last data filed at 2/17/2020 0600  Gross per 24 hour   Intake 1198 ml   Output --   Net 1198 ml       IMAGING:   Ct Head Wo Contrast  Result Date: 2/14/2020  1. Findings consistent with prior anterior cerebral artery aneurysm coiling. 2.  No change in tiny dot of pneumocephalus near the aneurysm site. 3.  Decrease in amount of blood in the procedural site. 4.  Decrease in amount of subarachnoid hemorrhage. 5.  Subtle rightward shift is described above at the level of the hemorrhage. Ct Head Wo Contrast  Result Date: 2/13/2020  1. Interval postsurgical changes of anterior cerebral artery aneurysm coil embolization. Small amount of pneumocephalus adjacent to the embolization coils.  2.  Significantly increased hyperattenuation in the bilateral medial frontal regions centered around the embolization coils could represent increased hemorrhage and/or post angiographic contrast staining. Continued attention on follow-up recommended. 3.  Bilateral acute subarachnoid hemorrhage. New intraventricular extension of hemorrhage with hyperdense blood products throughout the ventricular system, most pronounced in the right lateral ventricle. Mild diffuse ventricular prominence suggest developing hydrocephalus. 4. Interval worsening of diffuse cerebral edema and sulcal effacement which raises concern for increasing intracranial pressure. No significant midline shift or large territorial infarct. Critical results were called by Dr. Hatch Sessions to [address] [place] Eduardo on 2/13/2020 at 04:58. Ct Head Wo Contrast  Result Date: 2/12/2020  Redemonstration of subarachnoid hemorrhage that is not significantly changed compared to prior exam. Findings were discussed with Dr. Toño Boes At 2:30 pm on 2/12/2020. Ct Head Wo Contrast  Result Date: 2/12/2020  Subarachnoid hemorrhage predominates in the anterior interhemispheric fissure, as described. No definite intraparenchymal component identified. Findings were discussed with Kenneth Kc at 12:20 pm on 2/12/2020. Ct Abdomen Pelvis W Iv Contrast  Result Date: 2/5/2020  1. No acute intra-abdominal or intrapelvic process. Specifically, no CT scan evidence of acute pancreatitis or complications related to pancreatitis. 2. Status post cholecystectomy. 3. Subtle ground-glass opacity in the right lower lobe appears slightly more conspicuous than on the 2007 exam.  This may simply be related to a minor scar. Minor infectious or inflammatory process could not be entirely excluded. Consider follow-up chest CT in approximately 3 months to ensure stability/resolution. 4. Normal appendix. Xr Chest Portable  Result Date: 2/16/2020  Multifocal airspace opacities most confluent within the right infrahilar region/right lower lobe suggestive of pneumonia. Suspect mild interstitial pulmonary edema.  Pleural effusions appear less conspicuous on today's exam. Follow-up to assure resolution of the airspace opacities recommended following medical treatment. Ct Chest Pulmonary Embolism W Contrast  Result Date: 2/15/2020  1. Note: Study significantly limited by patient breathing motion related artifact. 2. No definitive evidence of acute pulmonary embolism. 3. Bilateral lower lung lobe severe dense consolidation consistent with severe pneumonia. 4. Adjacent moderate bilateral layering posterior pleural effusions, as discussed above. 5. Moderate cardiomegaly. 6. Mild pericardial effusion. Recommend clinical correlation. 7. Cholecystectomy. Cta Head W Contrast  Result Date: 2/12/2020  Demonstration of a 2 mm aneurysm arising from the right anterior cerebral artery within the anterior interhemispheric fissure region. Findings were discussed with Dr. Christ Shane At 2:36 pm on 2/12/2020. Vl Transcranial Doppler Complete  Result Date: 2/13/2020  Conclusions   Summary   Normal bilateral transcranial Doppler. Labs and Images reviewed with:  [] Dr. Estella Stark. Lauro    [x] Dr. Grisel Mcmahon  [] Dr. Jeana Field  [] There are no new interval images to review. PHYSICAL EXAM       CONSTITUTIONAL:  Alert and oriented x 3, in no acute distress. GCS 15. Nontoxic. No dysarthria. No aphasia.    HEAD:  normocephalic, atraumatic    EYES:  PERRL, EOMI   ENT:  moist mucous membranes   NECK:  supple, symmetric   LUNGS:  Equal air entry bilaterally, diminished bilateral lower lobes   CARDIOVASCULAR:  normal s1 / s2, RRR, distal pulses intact   ABDOMEN:  Soft, no rigidity, no tenderness, groin site appears well, no bleeding   NEUROLOGIC:  Mental Status:  A & O x3, Awake             Cranial Nerves:    II: Visual fields:  normal  III: Pupils:  equal, round, reactive to light  III,IV,VI: Extra Ocular Movements: intact  V: Facial sensation:  intact  VII: Facial strength: intact    Motor Exam:    Drift:  present - left lower extremity  Tone:  normal    5/5 strength bilateral upper extremities with no drift. 4+/5 left lower extremity  4-/5 right lower extremity,  Drift. Normal and equal dorsi/plantarflexion    Sensory:    Touch:    Right Upper Extremity:  normal  Left Upper Extremity:  normal  Right Lower Extremity:  normal  Left Lower Extremity:  normal       DRAINS:  [x] There are no drains for Neuro Critical Care to monitor at this time. ASSESSMENT AND PLAN:       The patient is a 77 y.o. female with a history of HTN who was admitted to the Neuro ICU for management of acute SAH in setting of right SENTHIL aneurysm. Patient initially presented to [facility] ED with thunderclap headache, diaphoresis, and nausea/vomiting. Underwent coil embolization on 2/12/20.     NEUROLOGIC:  - Acute SAH in setting of ruptured right A2 SENTHIL aneurysm  - POD #5 s/p coil embolization resulting in complete obliteration  - Nimodipine 60mg Q4h  - Follow daily TCDs to monitor for vasospasm  - Neuro Endovascular and Neurosurgery following; appreciate recs  - Goal SBP<160  - Neuro checks per protocol     CARDIOVASCULAR:  - Goal SBP<180  - History of HTN  - OK to continue Hydralazine 25mg Q6h (half home dosing), hold home Coreg  - Troponin 14, EKG NSR  - Echo: EF 58%.   - History HLD; continue home Lipitor 40mg QHS  - Continue telemetry     PULMONARY:  - Acute hypoxia  - Mild pulmonary edema, possibly secondary to 2%  - Bilateral pneumonia, likely secondary to aspiration (patient vomited multiple times)  - Continue Vancomycin and Zosyn  - Continue high flow nasal canula, BiPAP if needed  - Encourage mobilization, incentive spirometry, Acapella  - Chest PT  -CXR: Worsening pneumonia     RENAL/FLUID/ELECTROLYTE:  - Normal renal functioning   - BUN 12/ Creatinine 0.61  - Monitor I&O; voiding  -DC hypertonic  -Salt tabs 1g BID to prevent further pulmonary edema with 2%  -Sodium checks to twice daily  - Goal Mag >2.5, give 1 g magnesium sulfate  - Hypokalemia, K 2.9, replace with total 60meq KCl  - Hypocalcemia, give 1 g calcium gluconate  - Replace electrolytes PRN  - Daily BMP     GI/NUTRITION:  NUTRITION:  NPO while on high flow or biPAP  - Previously tolerating general diet  -Increased bowel movements yesterday and overnight  -Hold bowel regimen, low concern for C. difficile at this time  -Monitor for increasing bowel movements  - GI prophylaxis: Not indicated     ID:  - Afebrile,  - Bilateral lobe pneumonia  - UTI, culture + E.coli  - Continue Vancomycin and Zosyn for now  - Continue to monitor for fevers  - Daily CBC     HEME:   - Anemia likely multifactorial, iron deficiency  - H&H  6.1, patient refusing blood transfusion due to being Congregation  -Consider erythropoietin  -f/u Consult hematology oncology for recommendations for anemia  -B12 and folate normal  -Decreased lab draw frequency  - Continue Iron 325mg QD  - Daily CBC     ENDOCRINE:  - Continue to monitor blood glucose, goal <180  - No history of DM 2, hemoglobin A1C 5.6     OTHER:  - PT/OT/ST     PROPHYLAXIS:  Stress ulcer: N/A     DVT PROPHYLAXIS:  - SCD sleeves - Thigh High   - Lovenox    DISPOSITION:  I had a lengthy discussion with patient and her daughters and they are refusing blood product due to being Congregation. I discussed with him the risks and need for blood at this time and potential complications if she does not do so. [x] To remain ICU:   [] OK for out of ICU from Neuro Critical Care standpoint    We will continue to follow along. For any changes in exam or patient status please contact Neuro Critical Care.       Jennifer Hanley DO  Neuro Critical Care  Pager [phone]  2/17/2020     2:49 PM

## 2020-02-17 NOTE — PROGRESS NOTES
Physical Therapy  DATE: 2020    NAME: Grisel Ta  MRN: [id number]   : 1950    Patient not seen this date for Physical Therapy due to:  [] Blood transfusion in progress  [] Hemodialysis  []  Patient Declined  [] Spine Precautions   [] Strict Bedrest  [] Surgery/ Procedure  [] Testing      [x] Other Hemoglobin at 6.2, will check back in PM as time allows. [] PT being discontinued at this time. Patient independent. No further needs. [] PT being discontinued at this time as the patient has been transferred to palliative care. No further needs. Treatment performed by Student PTA under the supervision of co-signing PTA who agrees with all treatment and documentation.    DENISE Alberto  [place], [place]

## 2020-02-17 NOTE — PROGRESS NOTES
I spoke with patient and her daughter in the presence of her nurses inquiring as to whether patient would continue to not want blood in the event that she coded. I informed her that this is certainly a possibility as her blood count keeps decreasing, and that she would remain full code and we would do all other measures, and keep with her wishes that she would not want blood at any point even if she did code.     Scott Florez, DO  PGY 2  Resident Physician Emergency Medicine  02/17/20 5:56 PM

## 2020-02-18 LAB
ABO/RH: NORMAL
ANION GAP SERPL CALCULATED.3IONS-SCNC: 11 MMOL/L (ref 9–17)
ANTIBODY SCREEN: NEGATIVE
ARM BAND NUMBER: NORMAL
BLD PROD TYP BPU: NORMAL
BUN BLDV-MCNC: 11 MG/DL (ref 8–23)
BUN/CREAT BLD: ABNORMAL (ref 9–20)
CALCIUM IONIZED: 1.2 MMOL/L (ref 1.13–1.33)
CALCIUM SERPL-MCNC: 8.1 MG/DL (ref 8.6–10.4)
CHLORIDE BLD-SCNC: 119 MMOL/L (ref 98–107)
CO2: 16 MMOL/L (ref 20–31)
CREAT SERPL-MCNC: 0.72 MG/DL (ref 0.5–0.9)
CROSSMATCH RESULT: NORMAL
DISPENSE STATUS BLOOD BANK: NORMAL
EXPIRATION DATE: NORMAL
GFR AFRICAN AMERICAN: >60 ML/MIN
GFR NON-AFRICAN AMERICAN: >60 ML/MIN
GFR SERPL CREATININE-BSD FRML MDRD: ABNORMAL ML/MIN/{1.73_M2}
GFR SERPL CREATININE-BSD FRML MDRD: ABNORMAL ML/MIN/{1.73_M2}
GLUCOSE BLD-MCNC: 97 MG/DL (ref 70–99)
HCT VFR BLD CALC: 19.6 % (ref 36.3–47.1)
HCT VFR BLD CALC: 20.5 % (ref 36.3–47.1)
HEMOGLOBIN: 6 G/DL (ref 11.9–15.1)
HEMOGLOBIN: 6.2 G/DL (ref 11.9–15.1)
MAGNESIUM: 2.1 MG/DL (ref 1.6–2.6)
MCH RBC QN AUTO: 25.5 PG (ref 25.2–33.5)
MCHC RBC AUTO-ENTMCNC: 30.6 G/DL (ref 28.4–34.8)
MCV RBC AUTO: 83.4 FL (ref 82.6–102.9)
NRBC AUTOMATED: 0.2 PER 100 WBC
PDW BLD-RTO: 16.2 % (ref 11.8–14.4)
PLATELET # BLD: 260 K/UL (ref 138–453)
PMV BLD AUTO: 10.5 FL (ref 8.1–13.5)
POTASSIUM SERPL-SCNC: 4 MMOL/L (ref 3.7–5.3)
RBC # BLD: 2.35 M/UL (ref 3.95–5.11)
SODIUM BLD-SCNC: 140 MMOL/L (ref 135–144)
SODIUM BLD-SCNC: 146 MMOL/L (ref 135–144)
SURGICAL PATHOLOGY REPORT: NORMAL
TRANSFUSION STATUS: NORMAL
UNIT DIVISION: 0
UNIT NUMBER: NORMAL
VANCOMYCIN TROUGH DATE LAST DOSE: NORMAL
VANCOMYCIN TROUGH DOSE AMOUNT: NORMAL
VANCOMYCIN TROUGH TIME LAST DOSE: NORMAL
VANCOMYCIN TROUGH: 14.3 UG/ML (ref 10–20)
WBC # BLD: 15.2 K/UL (ref 3.5–11.3)

## 2020-02-18 PROCEDURE — 94668 MNPJ CHEST WALL SBSQ: CPT

## 2020-02-18 PROCEDURE — 2000000003 HC NEURO ICU R&B

## 2020-02-18 PROCEDURE — 6360000002 HC RX W HCPCS: Performed by: NURSE PRACTITIONER

## 2020-02-18 PROCEDURE — 85018 HEMOGLOBIN: CPT

## 2020-02-18 PROCEDURE — 6360000002 HC RX W HCPCS: Performed by: INTERNAL MEDICINE

## 2020-02-18 PROCEDURE — 87324 CLOSTRIDIUM AG IA: CPT

## 2020-02-18 PROCEDURE — C9113 INJ PANTOPRAZOLE SODIUM, VIA: HCPCS | Performed by: NURSE PRACTITIONER

## 2020-02-18 PROCEDURE — 2580000003 HC RX 258: Performed by: NURSE PRACTITIONER

## 2020-02-18 PROCEDURE — 82330 ASSAY OF CALCIUM: CPT

## 2020-02-18 PROCEDURE — 80202 ASSAY OF VANCOMYCIN: CPT

## 2020-02-18 PROCEDURE — 6360000002 HC RX W HCPCS: Performed by: PSYCHIATRY & NEUROLOGY

## 2020-02-18 PROCEDURE — 84295 ASSAY OF SERUM SODIUM: CPT

## 2020-02-18 PROCEDURE — 2700000000 HC OXYGEN THERAPY PER DAY

## 2020-02-18 PROCEDURE — 6360000002 HC RX W HCPCS: Performed by: STUDENT IN AN ORGANIZED HEALTH CARE EDUCATION/TRAINING PROGRAM

## 2020-02-18 PROCEDURE — 6370000000 HC RX 637 (ALT 250 FOR IP): Performed by: NURSE PRACTITIONER

## 2020-02-18 PROCEDURE — 99233 SBSQ HOSP IP/OBS HIGH 50: CPT | Performed by: PSYCHIATRY & NEUROLOGY

## 2020-02-18 PROCEDURE — 94761 N-INVAS EAR/PLS OXIMETRY MLT: CPT

## 2020-02-18 PROCEDURE — 93886 INTRACRANIAL COMPLETE STUDY: CPT

## 2020-02-18 PROCEDURE — 80048 BASIC METABOLIC PNL TOTAL CA: CPT

## 2020-02-18 PROCEDURE — 6370000000 HC RX 637 (ALT 250 FOR IP): Performed by: STUDENT IN AN ORGANIZED HEALTH CARE EDUCATION/TRAINING PROGRAM

## 2020-02-18 PROCEDURE — 87449 NOS EACH ORGANISM AG IA: CPT

## 2020-02-18 PROCEDURE — 6370000000 HC RX 637 (ALT 250 FOR IP): Performed by: PSYCHIATRY & NEUROLOGY

## 2020-02-18 PROCEDURE — 85027 COMPLETE CBC AUTOMATED: CPT

## 2020-02-18 PROCEDURE — 83735 ASSAY OF MAGNESIUM: CPT

## 2020-02-18 PROCEDURE — 2580000003 HC RX 258: Performed by: INTERNAL MEDICINE

## 2020-02-18 PROCEDURE — 99221 1ST HOSP IP/OBS SF/LOW 40: CPT | Performed by: NURSE PRACTITIONER

## 2020-02-18 PROCEDURE — 2580000003 HC RX 258: Performed by: PSYCHIATRY & NEUROLOGY

## 2020-02-18 PROCEDURE — 85014 HEMATOCRIT: CPT

## 2020-02-18 PROCEDURE — 36415 COLL VENOUS BLD VENIPUNCTURE: CPT

## 2020-02-18 PROCEDURE — 2580000003 HC RX 258: Performed by: STUDENT IN AN ORGANIZED HEALTH CARE EDUCATION/TRAINING PROGRAM

## 2020-02-18 PROCEDURE — 99232 SBSQ HOSP IP/OBS MODERATE 35: CPT | Performed by: INTERNAL MEDICINE

## 2020-02-18 RX ORDER — CALCIUM GLUCONATE 94 MG/ML
1 INJECTION, SOLUTION INTRAVENOUS ONCE
Status: DISCONTINUED | OUTPATIENT
Start: 2020-02-18 | End: 2020-02-18

## 2020-02-18 RX ORDER — MAGNESIUM SULFATE 1 G/100ML
1 INJECTION INTRAVENOUS ONCE
Status: COMPLETED | OUTPATIENT
Start: 2020-02-18 | End: 2020-02-18

## 2020-02-18 RX ADMIN — PIPERACILLIN AND TAZOBACTAM 4.5 G: 4; .5 INJECTION, POWDER, LYOPHILIZED, FOR SOLUTION INTRAVENOUS; PARENTERAL at 06:02

## 2020-02-18 RX ADMIN — PIPERACILLIN AND TAZOBACTAM 4.5 G: 4; .5 INJECTION, POWDER, LYOPHILIZED, FOR SOLUTION INTRAVENOUS; PARENTERAL at 13:55

## 2020-02-18 RX ADMIN — ATORVASTATIN CALCIUM 80 MG: 80 TABLET, FILM COATED ORAL at 20:19

## 2020-02-18 RX ADMIN — SODIUM CHLORIDE 8 MG/HR: 9 INJECTION, SOLUTION INTRAVENOUS at 13:37

## 2020-02-18 RX ADMIN — Medication 60 MG: at 08:54

## 2020-02-18 RX ADMIN — HYDRALAZINE HYDROCHLORIDE 25 MG: 25 TABLET ORAL at 06:02

## 2020-02-18 RX ADMIN — Medication 60 MG: at 20:17

## 2020-02-18 RX ADMIN — FERROUS SULFATE TAB EC 325 MG (65 MG FE EQUIVALENT) 325 MG: 325 (65 FE) TABLET DELAYED RESPONSE at 08:53

## 2020-02-18 RX ADMIN — PIPERACILLIN AND TAZOBACTAM 4.5 G: 4; .5 INJECTION, POWDER, LYOPHILIZED, FOR SOLUTION INTRAVENOUS; PARENTERAL at 22:12

## 2020-02-18 RX ADMIN — MAGNESIUM SULFATE HEPTAHYDRATE 1 G: 1 INJECTION, SOLUTION INTRAVENOUS at 10:23

## 2020-02-18 RX ADMIN — VANCOMYCIN HYDROCHLORIDE 750 MG: 10 INJECTION, POWDER, LYOPHILIZED, FOR SOLUTION INTRAVENOUS at 20:16

## 2020-02-18 RX ADMIN — SODIUM CHLORIDE TAB 1 GM 1 G: 1 TAB at 16:18

## 2020-02-18 RX ADMIN — MEGESTROL ACETATE 800 MG: 40 SUSPENSION ORAL at 08:54

## 2020-02-18 RX ADMIN — ENOXAPARIN SODIUM 40 MG: 40 INJECTION SUBCUTANEOUS at 08:53

## 2020-02-18 RX ADMIN — ACETAMINOPHEN 650 MG: 325 TABLET ORAL at 13:32

## 2020-02-18 RX ADMIN — Medication 60 MG: at 03:57

## 2020-02-18 RX ADMIN — Medication 60 MG: at 16:18

## 2020-02-18 RX ADMIN — SODIUM CHLORIDE, PRESERVATIVE FREE 10 ML: 5 INJECTION INTRAVENOUS at 08:54

## 2020-02-18 RX ADMIN — VANCOMYCIN HYDROCHLORIDE 750 MG: 10 INJECTION, POWDER, LYOPHILIZED, FOR SOLUTION INTRAVENOUS at 08:54

## 2020-02-18 RX ADMIN — Medication 60 MG: at 13:21

## 2020-02-18 RX ADMIN — DARBEPOETIN ALFA 200 MCG: 200 INJECTION, SOLUTION INTRAVENOUS; SUBCUTANEOUS at 17:42

## 2020-02-18 RX ADMIN — IRON SUCROSE 200 MG: 20 INJECTION, SOLUTION INTRAVENOUS at 18:26

## 2020-02-18 RX ADMIN — CALCIUM GLUCONATE 1 G: 98 INJECTION, SOLUTION INTRAVENOUS at 10:19

## 2020-02-18 RX ADMIN — SODIUM CHLORIDE 80 MG: 9 INJECTION, SOLUTION INTRAVENOUS at 13:23

## 2020-02-18 ASSESSMENT — PAIN SCALES - GENERAL
PAINLEVEL_OUTOF10: 1
PAINLEVEL_OUTOF10: 3

## 2020-02-18 ASSESSMENT — PAIN DESCRIPTION - PAIN TYPE: TYPE: ACUTE PAIN

## 2020-02-18 ASSESSMENT — PAIN DESCRIPTION - PROGRESSION: CLINICAL_PROGRESSION: NOT CHANGED

## 2020-02-18 ASSESSMENT — PULMONARY FUNCTION TESTS
PIF_VALUE: 17
PIF_VALUE: 19

## 2020-02-18 ASSESSMENT — PAIN DESCRIPTION - FREQUENCY: FREQUENCY: CONTINUOUS

## 2020-02-18 ASSESSMENT — PAIN DESCRIPTION - DESCRIPTORS: DESCRIPTORS: ACHING;DISCOMFORT

## 2020-02-18 ASSESSMENT — PAIN - FUNCTIONAL ASSESSMENT: PAIN_FUNCTIONAL_ASSESSMENT: ACTIVITIES ARE NOT PREVENTED

## 2020-02-18 ASSESSMENT — PAIN DESCRIPTION - ORIENTATION: ORIENTATION: LEFT

## 2020-02-18 ASSESSMENT — PAIN DESCRIPTION - LOCATION: LOCATION: MOUTH

## 2020-02-18 ASSESSMENT — PAIN DESCRIPTION - ONSET: ONSET: ON-GOING

## 2020-02-18 NOTE — PROGRESS NOTES
ENDOVASCULAR NEUROSURGERY PROGRESS NOTE  2020 5:47 PM  Subjective:   Admit Date: 2020  PCP: Kayla Joy MD    Patient examined stable, no acute event overnight    Objective:   Vitals: BP (!) 113/47   Pulse 100   Temp 98.6 °F (37 °C)   Resp 30   Ht 5' 6\" (1.676 m)   Wt 108 lb (49 kg)   SpO2 96%   BMI 17.43 kg/m²     General appearance: Lying in bed, NAD,  Lungs: CTAB  Heart: RRR  Abdomen: soft, NTND, bowel sounds normal    Neuro exam: Follows simple commands. CN II-XII: Mild right facial droop, pupils 2 mm reactive to light bilaterally, EOMI, VFF. Dari spontaneously against gravity.      Satnam Lopez of consciousness:  0 - alert; keenly responsive  1b.  Level of consciousness questions:  0 - answers both questions correctly  1c.  Level of consciousness questions:  0 - performs both tasks correctly  2.    Best Gaze:  0 - normal  3.    Visual:  0 - no visual loss  4.    Facial Palsy:  1 - minor paralysis (flattened nasolabial fold, asymmetric on smiling)  5a.  Motor left arm:  0 - no drift, limb holds 90 (or 45) degrees for full 10 seconds  5b.  Motor right arm:  0 - no drift, limb holds 90 (or 45) degrees for full 10 seconds  6a.  Motor left le - no drift; leg holds 30 degree position for full 5 seconds  6b.  Motor right le - no drift; leg holds 30 degree position for full 5 seconds  7.    Limb Ataxia:  0 - absent  8.    Sensory:  0 - normal; no sensory loss  9.    Best Language:  0 - no aphasia, normal  10.  Dysarthria:  0 - normal  11.  Extinction and Inattention:  0 - no abnormality     TOTAL:  1        MRS 1    Medications and labs:   Scheduled Meds:   iron sucrose  200 mg Intravenous Daily    atorvastatin  80 mg Oral Nightly    [Held by provider] sennosides-docusate sodium  2 tablet Oral Daily    sodium chloride  1 g Oral BID WC    niMODipine  60 mg Oral 6 times per day    vancomycin (VANCOCIN) intermittent dosing (placeholder)   Other RX Placeholder    vancomycin  750 mg Intravenous Q12H    hydrALAZINE  25 mg Oral 4 times per day    sodium chloride flush  10 mL Intravenous 2 times per day    piperacillin-tazobactam  4.5 g Intravenous Q8H    ferrous sulfate  325 mg Oral Daily with breakfast    enoxaparin  40 mg Subcutaneous Daily    megestrol  800 mg Oral Daily    sodium chloride flush  10 mL Intravenous 2 times per day     Continuous Infusions:   pantoprozole (PROTONIX) infusion 8 mg/hr (02/18/20 1337)     CBC:   Recent Labs     02/17/20  0544 02/17/20  0808 02/17/20  1542 02/18/20  0600   WBC 8.0  --  11.5* 15.2*   HGB 6.1* 6.2* 6.0* 6.0*     --  240 260     BMP:    Recent Labs     02/16/20  0545  02/17/20  0544 02/18/20  0600 02/18/20  1023      < > 145* 146* 140   K 3.2*  --  2.9* 4.0  --    *  --  120* 119*  --    CO2 17*  --  17* 16*  --    BUN 12  --  12 11  --    CREATININE 0.61  --  0.75 0.72  --    GLUCOSE 99  --  114* 97  --     < > = values in this interval not displayed. Hepatic:   No results for input(s): AST, ALT, ALB, BILITOT, ALKPHOS in the last 72 hours. Troponin: No results for input(s): TROPONINI in the last 72 hours. BNP: No results for input(s): BNP in the last 72 hours. Lipids:   No results for input(s): CHOL, HDL in the last 72 hours. Invalid input(s): LDLCALCU  INR:   Recent Labs     02/16/20  1814   INR 1.2       Assessment and Recommendations:   77years old female with ruptured right SENTHIL 2 mm aneurysm, Em and Jeff 3 modified Jane scale 3. Patient underwent primary coil embolization 2/12/2020. Postprocedure CTA head shows some increase in the hemorrhage around the aneurysm region, follow-up CT head stable with mild mass-effect and mild hydrocephalus with third ventricle IVH 2/14/2020.   Patient exam is stable    Repeated CTA head and neck 2/15/2020 with vasospasm however patient exam is improving    Blood pressure systolic goal per NICU to assess for vasospasm, permissive BP  Keep magnesium above

## 2020-02-18 NOTE — PROGRESS NOTES
Pharmacy Vancomycin Consult     Vancomycin Day: 3  Current Dosin mg q 12 hrs    Temp max:  afebrile    Recent Labs     20  0544 20  0600   BUN 12 11       Recent Labs     20  0544 20  0600   CREATININE 0.75 0.72       Recent Labs     20  1542 20  0600   WBC 11.5* 15.2*         Intake/Output Summary (Last 24 hours) at 2020 7881  Last data filed at 2020 0524  Gross per 24 hour   Intake 2669 ml   Output --   Net 2669 ml       Culture Date      Source                       Results  02/15/2020         Blood x 2                     Pending  02/15/2020         Urine                           Pending    Ht Readings from Last 1 Encounters:   20 5' 6\" (1.676 m)        Wt Readings from Last 1 Encounters:   20 108 lb (49 kg)         Body mass index is 17.43 kg/m². Estimated Creatinine Clearance: 58 mL/min (based on SCr of 0.72 mg/dL). Trough: 14.3  (drawn today 20 at 06:00)    Assessment/Plan: Vancomycin trough is therapeutic. Keep same dose and frequency. Will continue to monitor and follow.   Yvette Hernandez [facility]  2020  8:28 AM

## 2020-02-18 NOTE — PLAN OF CARE
Problem: Skin Integrity:  Goal: Will show no infection signs and symptoms  Description  Will show no infection signs and symptoms  2/18/2020 0526 by Wes Nuñez RN  Outcome: Ongoing  Note:   Skin assessment complete. No breakdown noted. Interventions in place. See doc flowsheet for interventions initiated. Pt turned every two hours to prevent skin breakdown. Patient changed regularly due to incontinence. Will continue to monitor for additional needs and skin breakdown      Problem: Falls - Risk of:  Goal: Will remain free from falls  Description  Will remain free from falls  2/18/2020 0526 by Wes Nuñez RN  Outcome: Ongoing  Note:   No falls entire shift. Fall precautions in place. Continue to monitor.

## 2020-02-18 NOTE — PROGRESS NOTES
showing R>L PNA. Encouraged IS/acapella/CPT. Tolerating general diet. Neuro exam unchanged. Afebrile.      CURRENT MEDICATIONS:  SCHEDULED MEDICATIONS:   magnesium sulfate  1 g Intravenous Once    calcium gluconate IVPB  1 g Intravenous Once    atorvastatin  80 mg Oral Nightly    [Held by provider] sennosides-docusate sodium  2 tablet Oral Daily    sodium chloride  1 g Oral BID WC    niMODipine  60 mg Oral 6 times per day    vancomycin (VANCOCIN) intermittent dosing (placeholder)   Other RX Placeholder    vancomycin  750 mg Intravenous Q12H    hydrALAZINE  25 mg Oral 4 times per day    sodium chloride flush  10 mL Intravenous 2 times per day    piperacillin-tazobactam  4.5 g Intravenous Q8H    ferrous sulfate  325 mg Oral Daily with breakfast    enoxaparin  40 mg Subcutaneous Daily    megestrol  800 mg Oral Daily    sodium chloride flush  10 mL Intravenous 2 times per day     CONTINUOUS INFUSIONS:    PRN MEDICATIONS:   [Held by provider] magnesium hydroxide, sodium chloride flush, [Held by provider] docusate sodium, sodium chloride flush, magnesium sulfate, acetaminophen, ondansetron    VITALS:  Temperature Range: Temp: 98.2 °F (36.8 °C) Temp  Av.4 °F (36.9 °C)  Min: 98.1 °F (36.7 °C)  Max: 98.8 °F (37.1 °C)  BP Range: Systolic (23LGR), MWF:891 , Min:76 , EPB:109     Diastolic (25EYE), TRA:55, Min:43, Max:78    Pulse Range: Pulse  Av.7  Min: 86  Max: 117  Respiration Range: Resp  Av.3  Min: 20  Max: 38  Current Pulse Ox: SpO2: 98 %  24HR Pulse Ox Range: SpO2  Av.9 %  Min: 88 %  Max: 100 %  Patient Vitals for the past 12 hrs:   BP Temp Temp src Pulse Resp SpO2   20 0702 (!) 108/48 -- -- 86 30 98 %   20 0602 (!) 98/44 -- -- 93 27 93 %   20 0502 (!) 102/48 -- -- 101 (!) 34 93 %   20 0405 (!) 117/55 98.2 °F (36.8 °C) Oral 101 (!) 33 96 %   20 0340 -- -- -- 95 (!) 32 96 %   20 0302 (!) 112/51 -- -- 95 (!) 36 96 %   20 0202 (!) 106/50 -- -- 100 (!) 35 97 %   02/18/20 0102 (!) 115/54 -- -- 107 (!) 38 97 %   02/18/20 0002 (!) 120/48 98.8 °F (37.1 °C) Oral 104 28 94 %     Estimated body mass index is 17.43 kg/m² as calculated from the following:    Height as of this encounter: 5' 6\" (1.676 m). Weight as of this encounter: 108 lb (49 kg).  []<16 Severe malnutrition  []16-16.99 Moderate malnutrition  [x]17-18.49 Mild malnutrition  []18.5-24.9 Normal  []25-29.9 Overweight (not obese)  []30-34.9 Obese class 1 (Low Risk)  []35-39.9 Obese class 2 (Moderate Risk)  []?40 Obese class 3 (High Risk)    RECENT LABS:   Lab Results   Component Value Date    WBC 15.2 (H) 02/18/2020    HGB 6.0 (LL) 02/18/2020    HCT 19.6 (L) 02/18/2020     02/18/2020    CHOL 102 02/13/2020    TRIG 100 02/13/2020    HDL 21 (L) 02/13/2020    ALT 29 02/15/2020    AST 47 (H) 02/15/2020     02/18/2020    K 4.0 02/18/2020     (H) 02/18/2020    CREATININE 0.72 02/18/2020    BUN 11 02/18/2020    CO2 16 (L) 02/18/2020    TSH 5.94 (H) 02/15/2020    INR 1.2 02/16/2020    LABA1C 5.6 02/13/2020     24 HOUR INTAKE/OUTPUT:    Intake/Output Summary (Last 24 hours) at 2/18/2020 1104  Last data filed at 2/18/2020 0524  Gross per 24 hour   Intake 2669 ml   Output --   Net 2669 ml       IMAGING:   Ct Head Wo Contrast  Result Date: 2/14/2020  1. Findings consistent with prior anterior cerebral artery aneurysm coiling. 2.  No change in tiny dot of pneumocephalus near the aneurysm site. 3.  Decrease in amount of blood in the procedural site. 4.  Decrease in amount of subarachnoid hemorrhage. 5.  Subtle rightward shift is described above at the level of the hemorrhage. Ct Head Wo Contrast  Result Date: 2/13/2020  1. Interval postsurgical changes of anterior cerebral artery aneurysm coil embolization. Small amount of pneumocephalus adjacent to the embolization coils.  2.  Significantly increased hyperattenuation in the bilateral medial frontal regions centered around the embolization coils could represent increased hemorrhage and/or post angiographic contrast staining. Continued attention on follow-up recommended. 3.  Bilateral acute subarachnoid hemorrhage. New intraventricular extension of hemorrhage with hyperdense blood products throughout the ventricular system, most pronounced in the right lateral ventricle. Mild diffuse ventricular prominence suggest developing hydrocephalus. 4. Interval worsening of diffuse cerebral edema and sulcal effacement which raises concern for increasing intracranial pressure. No significant midline shift or large territorial infarct. Critical results were called by Dr. Gross Casey Sessions to [address] [facility] on 2/13/2020 at 04:58. Ct Head Wo Contrast  Result Date: 2/12/2020  Redemonstration of subarachnoid hemorrhage that is not significantly changed compared to prior exam. Findings were discussed with Dr. Holden Anne At 2:30 pm on 2/12/2020. Ct Head Wo Contrast  Result Date: 2/12/2020  Subarachnoid hemorrhage predominates in the anterior interhemispheric fissure, as described. No definite intraparenchymal component identified. Findings were discussed with Elsa Ta at 12:20 pm on 2/12/2020. Ct Abdomen Pelvis W Iv Contrast  Result Date: 2/5/2020  1. No acute intra-abdominal or intrapelvic process. Specifically, no CT scan evidence of acute pancreatitis or complications related to pancreatitis. 2. Status post cholecystectomy. 3. Subtle ground-glass opacity in the right lower lobe appears slightly more conspicuous than on the 2007 exam.  This may simply be related to a minor scar. Minor infectious or inflammatory process could not be entirely excluded. Consider follow-up chest CT in approximately 3 months to ensure stability/resolution. 4. Normal appendix. Xr Chest Portable  Result Date: 2/16/2020  Multifocal airspace opacities most confluent within the right infrahilar region/right lower lobe suggestive of pneumonia.  Suspect mild interstitial pulmonary edema. Pleural effusions appear less conspicuous on today's exam. Follow-up to assure resolution of the airspace opacities recommended following medical treatment. Ct Chest Pulmonary Embolism W Contrast  Result Date: 2/15/2020  1. Note: Study significantly limited by patient breathing motion related artifact. 2. No definitive evidence of acute pulmonary embolism. 3. Bilateral lower lung lobe severe dense consolidation consistent with severe pneumonia. 4. Adjacent moderate bilateral layering posterior pleural effusions, as discussed above. 5. Moderate cardiomegaly. 6. Mild pericardial effusion. Recommend clinical correlation. 7. Cholecystectomy. Cta Head W Contrast  Result Date: 2/12/2020  Demonstration of a 2 mm aneurysm arising from the right anterior cerebral artery within the anterior interhemispheric fissure region. Findings were discussed with Dr. Yarely Jacinto At 2:36 pm on 2/12/2020. Vl Transcranial Doppler Complete  Result Date: 2/13/2020  Conclusions   Summary   Normal bilateral transcranial Doppler. Labs and Images reviewed with:  [] Dr. Alon Grewal. Lauro    [x] Dr. David Charles  [] Dr. Thee Guevara  [] There are no new interval images to review. PHYSICAL EXAM       CONSTITUTIONAL:  Alert and oriented x 3, somnolent but arousable, in no acute distress. GCS 15. Nontoxic. No dysarthria. No aphasia. HEAD:  normocephalic, atraumatic    EYES:  PERRL, EOMI   ENT:  moist mucous membranes   NECK:  supple, symmetric   LUNGS:  Equal air entry bilaterally, mild crackles b/l bases.     CARDIOVASCULAR:  normal s1 / s2, RRR, distal pulses intact   ABDOMEN:  Soft, no rigidity, no tenderness, groin site appears well, no bleeding   NEUROLOGIC:  Mental Status: somnolent but arousable and following commands             Cranial Nerves:    II: Visual fields:  normal  III: Pupils:  equal, round, reactive to light  III,IV,VI: Extra Ocular Movements: intact  V: Facial sensation:  intact  VII: Facial strength: intact    Motor Exam:    Drift:  present - left lower extremity  Tone:  normal    5/5 strength bilateral upper extremities with no drift. 4+/5 left lower extremity  4-/5 right lower extremity,  Drift. Slightly weaker plantar and dorsiflexion on R compared with L    Sensory:    Touch:    Right Upper Extremity:  normal  Left Upper Extremity:  normal  Right Lower Extremity:  normal  Left Lower Extremity:  normal       DRAINS:  [x] There are no drains for Neuro Critical Care to monitor at this time. ASSESSMENT AND PLAN:       The patient is a 77 y.o. female with a history of HTN who was admitted to the Neuro ICU for management of acute SAH in setting of right SENTHIL aneurysm. Patient initially presented to [facility] ED with thunderclap headache, diaphoresis, and nausea/vomiting. Underwent coil embolization on 2/12/20.     NEUROLOGIC:  - Acute SAH in setting of ruptured right A2 SENTHIL aneurysm  - POD #6 s/p coil embolization resulting in complete obliteration  - Nimodipine 60mg Q4h  - Follow daily TCDs to monitor for vasospasm  - Neuro Endovascular and Neurosurgery following; appreciate recs  - Goal SBP<180  - Neuro checks per protocol     CARDIOVASCULAR:  - Goal SBP<180  - History of HTN  - OK to continue Hydralazine 25mg Q6h (half home dosing), hold home Coreg  - Troponin 14, EKG NSR  - Echo: EF 58%.   - DC Art line  - History HLD; continue home Lipitor 40mg QHS  - Continue telemetry     PULMONARY:  - Acute hypoxia, on Bipap  - Mild pulmonary edema, possibly secondary to 2%  - Bilateral pneumonia, likely secondary to aspiration (patient vomited multiple times)  - Continue Vancomycin and Zosyn  - Continue high flow nasal canula, BiPAP if needed  - Encourage mobilization, incentive spirometry, Acapella  - Chest PT  -CXR: stable R>L pneumonia     RENAL/FLUID/ELECTROLYTE:  - Normal renal functioning   - BUN 11/ Creatinine 0.72  - Monitor I&O; voiding  -DC hypertonic  -Salt tabs 1g BID to prevent further pulmonary edema with 2%  -Sodium checks to QD  - Na goal > 140  - Goal Mag >2.5, given 1 g today  - Hypokalemia, K 4.0 today   - Hypocalcemia 1.20, replace  - Daily BMP     GI/NUTRITION:  NUTRITION:  - tolerating general diet  -Increased bowel movements yesterday and overnight  -Hold bowel regimen, low concern for C. difficile at this time  - GI prophylaxis: Not indicated     ID:  - Afebrile  - WBC increased to 15 (11)  - f/u Stool culture  - Bilateral lobe pneumonia  - UTI, culture + E.coli  - Continue Vancomycin and Zosyn for now  - Continue to monitor for fevers  - Daily CBC     HEME:   - Anemia likely multifactorial, iron deficiency  - H&H  6.0, patient refusing blood transfusion due to being [place]  -Consider erythropoietin  -Heme/Onc:    -Start IV Iron   -Aranesp x1 on 2/18   -r/o hemolysis/GIB   -Peripheral blood smear  - Stool Occult Positive. - f/u GI consult. -B12 and folate normal  -Decreased lab draw frequency  - Continue Iron 325mg QD  - Daily CBC     ENDOCRINE:  - Continue to monitor blood glucose, goal <180  - No history of DM 2, hemoglobin A1C 5.6     OTHER:  - PT/OT/ST     PROPHYLAXIS:  Stress ulcer: N/A     DVT PROPHYLAXIS:  - SCD sleeves - Thigh High   - Lovenox    DISPOSITION:  I had a lengthy discussion with patient and her daughters and they are refusing blood product due to being [place]. I discussed with him the risks and need for blood at this time and potential complications if she does not do so. [x] To remain ICU:   [] OK for out of ICU from Neuro Critical Care standpoint    We will continue to follow along. For any changes in exam or patient status please contact Neuro Critical Care.       Jacky Pabon, DO  Neuro Critical Care  Pager [phone]  2/18/2020     11:04 AM

## 2020-02-18 NOTE — PLAN OF CARE
Problem: Pain:  Goal: Pain level will decrease  Description  Pain level will decrease  Outcome: Ongoing  Note:   Pain level assessment complete. Pt educated on pain scale and control interventions. PRN pain medication given per pt request. Pt instructed to call out with new onset of pain or unrelieved pain. Will continue to monitor. Problem: Falls - Risk of:  Goal: Will remain free from falls  Description  Will remain free from falls  2/18/2020 1804 by Jn Arcos RN  Outcome: Ongoing  Note:   Patient remains free from falls this shift. Problem: Skin Integrity:  Goal: Will show no infection signs and symptoms  Description  Will show no infection signs and symptoms  2/18/2020 1804 by Jn Arcos RN  Outcome: Ongoing  Note:   No new skin breakdown noted. Patient repositioned q2h. Problem: HEMODYNAMIC STATUS  Goal: Patient has stable vital signs and fluid balance  Outcome: Ongoing  Note:   Patient with stable vital signs.

## 2020-02-18 NOTE — PLAN OF CARE
BRONCHOSPASM/BRONCHOCONSTRICTION     [x]         IMPROVE AERATION/BREATH SOUNDS  [x]   ADMINISTER BRONCHODILATOR THERAPY AS APPROPRIATE  [x]   ASSESS BREATH SOUNDS  []   IMPLEMENT AEROSOL/MDI PROTOCOL  [x]   PATIENT EDUCATION AS NEEDED    PROVIDE ADEQUATE OXYGENATION WITH ACCEPTABLE SP02/ABG'S    [x]  IDENTIFY APPROPRIATE OXYGEN THERAPY  [x]   MONITOR SP02/ABG'S AS NEEDED   [x]   PATIENT EDUCATION AS NEEDED    NONINVASIVE VENTILATION    PROVIDE OPTIMAL VENTILATION/ACCEPTABLE SPO2   IMPLEMENT NONINVASIVE VENTILATION PROTOCOL   MAINTAIN ACCEPTABLE SPO2   ASSESS SKIN INTEGRITY/BREAKDOWN SCORE   PATIENT EDUCATION AS NEEDED   BIPAP AS NEEDED

## 2020-02-18 NOTE — CONSULTS
1. F/U Biopsies  2. F/U In Office as instructed  3. Discussed with the family  4. High fiber diet   5. Precautions to avoid constipation      Next colonoscopy: 5 years. If Colonoscopy is less than 10 years the recommended reason is due:polyps     Electronically signed by Lyric Reyes MD  on 5/31/2019 at 10:43 AM   PAST MEDICAL HISTORY:  Past Medical History:   Diagnosis Date    Acid reflux     Arthritis     Cancer ([address]     cervical     Family history of breast cancer in first degree relative     sister    Gall stones     hx of    Hard of hearing     bilateral hearing aids    History of cervical cancer     Hypertension     Lateral meniscus tear     left knee    Wears dentures     Wears glasses      Past Surgical History:   Procedure Laterality Date    BREAST BIOPSY  1980    LEFT(BENIGN)    CHOLECYSTECTOMY      COLONOSCOPY  12/09    Negative    COLONOSCOPY N/A 5/31/2019    COLONOSCOPY POLYPECTOMY HOT SNARE, COLD POLYPECTOMY performed by Lyric Reyes MD at [address] [facility]    Left Wrist    FOOT SURGERY Bilateral     FOR [place]      Right     KNEE ARTHROSCOPY  2000, 2005    LEFT    KNEE ARTHROSCOPY Left 4/3/2019    KNEE ARTHROSCOPY DIAGNOSTIC performed by Marion Butler MD at [address] Right 9-4-14    BAHA- bone anchored hearing aid    OTHER SURGICAL HISTORY  02/12/2020    aneurysm rt SENTHIL, with 6 coils placed    TONSILLECTOMY AND ADENOIDECTOMY  9223    UMBILICAL HERNIA REPAIR      VARICOSE VEIN SURGERY Bilateral        ALLERGIES:  Allergies   Allergen Reactions    Lisinopril Other (See Comments)    Losartan      Fatigue    Procardia [Nifedipine] Other (See Comments)       HOME MEDICATIONS:  Prior to Admission medications    Medication Sig Start Date End Date Taking?  Authorizing Provider   diclofenac (VOLTAREN) 75 MG EC tablet Take 1 tablet by mouth 2 times daily (with meals) for chloride flush, [Held by provider] docusate sodium, sodium chloride flush, magnesium sulfate, acetaminophen, ondansetron  . SOCIAL HISTORY:     Social History     Socioeconomic History    Marital status:       Spouse name: Not on file    Number of children: Not on file    Years of education: Not on file    Highest education level: Not on file   Occupational History    Not on file   Social Needs    Financial resource strain: Not on file    Food insecurity:     Worry: Not on file     Inability: Not on file    Transportation needs:     Medical: Not on file     Non-medical: Not on file   Tobacco Use    Smoking status: Never Smoker    Smokeless tobacco: Never Used   Substance and Sexual Activity    Alcohol use: Yes     Comment: Occassional    Drug use: No    Sexual activity: Never     Birth control/protection: Post-menopausal   Lifestyle    Physical activity:     Days per week: Not on file     Minutes per session: Not on file    Stress: Not on file   Relationships    Social connections:     Talks on phone: Not on file     Gets together: Not on file     Attends Jain service: Not on file     Active member of club or organization: Not on file     Attends meetings of clubs or organizations: Not on file     Relationship status: Not on file    Intimate partner violence:     Fear of current or ex partner: Not on file     Emotionally abused: Not on file     Physically abused: Not on file     Forced sexual activity: Not on file   Other Topics Concern    Not on file   Social History Narrative    Not on file       FAMILY HISTORY:   Family History   Problem Relation Age of Onset    Heart Attack Father     Cancer Mother         stomach cancer    Breast Cancer Sister         one sister with breast cancer    Colon Cancer Neg Hx     Diabetes Neg Hx     Eclampsia Neg Hx     Hypertension Neg Hx     Ovarian Cancer Neg Hx      Labor Neg Hx     Spont Abortions Neg Hx     Stroke Neg Hx     Liver Cancer Neg Hx        REVIEW OF SYSTEMS:     Constitutional: No fever, no chills, no lethargy, no weakness, no weight loss  HEENT:  No headache, otalgia, itchy eyes, nasal discharge or sore throat. Cardiac:  No chest pain, dyspnea, orthopnea or PND. Chest:   No cough, phlegm or wheezing. Abdomen:  Black stools x 2 days  Neuro:  No focal weakness, abnormal movements or seizure like activity. Skin:   No rashes, no itching. :   No hematuria, no pyuria, no dysuria, no flank pain. Extremities:  No swelling or joint pains. ROS was otherwise negative except as mentioned in the [address] PHYSICAL EXAM:    BP (!) 113/47   Pulse 100   Temp 98.6 °F (37 °C)   Resp 30   Ht 5' 6\" (1.676 m)   Wt 108 lb (49 kg)   SpO2 96%   BMI 17.43 kg/m²   . TMAX[24]    General: elderly appearing, sleepy  Head:  Normocephalic, Atraumatic  EENT: EOMI, Sclera not icteric, Oropharynx moist  Neck:  Supple, Trachea midline  Lungs:CTA Bilaterally  Heart: RRR, No murmur, No rub, No gallop, PMI nondisplaced. Abdomen:Soft, Non tender, Not distended, BS WNL,  No masses. No hepatomegalia   Ext:No clubbing. No cyanosis. No edema. Skin: No rashes. No jaundice. No stigmata of liver disease. Neuro:  A&O x 2    LABS and IMAGING:     Hemotological labs:   ANEMIA STUDIES  No results for input(s): LABIRON, TIBC, FERRITIN, [id number], FOLATE, OCCULTBLD in the last 72 hours.     CBC  Recent Labs     02/16/20  0545 02/16/20  1814 02/17/20  0544 02/17/20  0808 02/17/20  1542 02/18/20  0600   WBC 11.7* 13.4* 8.0  --  11.5* 15.2*   HGB 7.5* 7.3* 6.1* 6.2* 6.0* 6.0*   MCV 84.3 83.9 84.6  --  84.5 83.4   RDW 15.6* 15.7* 15.9*  --  16.0* 16.2*    263 194  --  240 260       PT/INR  Recent Labs     02/16/20  1814   PROTIME 12.2*   INR 1.2       BMP  Recent Labs     02/16/20  0545  02/17/20  0544 02/18/20  0600 02/18/20  1023      < > 145* 146* 140   K 3.2*  --  2.9* 4.0  --    *  --  120* 119*  --    CO2 17*  --  17* 16*  -- and bolus stat  5. Rec clear/full liquid diet in case condition deteriorates and pt needs emergent endoscopy  6.  Will follow closely    This plan was formulated in collaboration with Dr. Torrey Hancock MD    Electronically signed by:  Reece Live CNP, [facility]  [phone]  2/18/2020    3:41 PM

## 2020-02-18 NOTE — CARE COORDINATION
Transition plan- Met with pt and daughter, plan/ goal is to return home with daughter and home care. Discussed possible need for LTACH as pt is on high flow O2, LTACH list given to patient and daughter.  Pt has [facility]. list.

## 2020-02-19 ENCOUNTER — APPOINTMENT (OUTPATIENT)
Dept: NUCLEAR MEDICINE | Age: 70
DRG: 020 | End: 2020-02-19
Payer: MEDICARE

## 2020-02-19 ENCOUNTER — APPOINTMENT (OUTPATIENT)
Dept: GENERAL RADIOLOGY | Age: 70
DRG: 020 | End: 2020-02-19
Payer: MEDICARE

## 2020-02-19 LAB
ANION GAP SERPL CALCULATED.3IONS-SCNC: 11 MMOL/L (ref 9–17)
BUN BLDV-MCNC: 11 MG/DL (ref 8–23)
BUN/CREAT BLD: ABNORMAL (ref 9–20)
C DIFF AG + TOXIN: NEGATIVE
CALCIUM IONIZED: 1.26 MMOL/L (ref 1.13–1.33)
CALCIUM SERPL-MCNC: 8.3 MG/DL (ref 8.6–10.4)
CHLORIDE BLD-SCNC: 115 MMOL/L (ref 98–107)
CO2: 15 MMOL/L (ref 20–31)
CREAT SERPL-MCNC: 0.67 MG/DL (ref 0.5–0.9)
GFR AFRICAN AMERICAN: >60 ML/MIN
GFR NON-AFRICAN AMERICAN: >60 ML/MIN
GFR SERPL CREATININE-BSD FRML MDRD: ABNORMAL ML/MIN/{1.73_M2}
GFR SERPL CREATININE-BSD FRML MDRD: ABNORMAL ML/MIN/{1.73_M2}
GLUCOSE BLD-MCNC: 95 MG/DL (ref 70–99)
HCT VFR BLD CALC: 18.6 % (ref 36.3–47.1)
HEMOGLOBIN: 5.6 G/DL (ref 11.9–15.1)
MAGNESIUM: 1.9 MG/DL (ref 1.6–2.6)
MCH RBC QN AUTO: 25.9 PG (ref 25.2–33.5)
MCHC RBC AUTO-ENTMCNC: 30.1 G/DL (ref 28.4–34.8)
MCV RBC AUTO: 86.1 FL (ref 82.6–102.9)
NRBC AUTOMATED: 0.5 PER 100 WBC
PATHOLOGIST REVIEW: NORMAL
PDW BLD-RTO: 16.4 % (ref 11.8–14.4)
PLATELET # BLD: 242 K/UL (ref 138–453)
PMV BLD AUTO: 10.7 FL (ref 8.1–13.5)
POTASSIUM SERPL-SCNC: 3.3 MMOL/L (ref 3.7–5.3)
RBC # BLD: 2.16 M/UL (ref 3.95–5.11)
SODIUM BLD-SCNC: 141 MMOL/L (ref 135–144)
SPECIMEN DESCRIPTION: NORMAL
WBC # BLD: 14.7 K/UL (ref 3.5–11.3)

## 2020-02-19 PROCEDURE — 2000000003 HC NEURO ICU R&B

## 2020-02-19 PROCEDURE — 94761 N-INVAS EAR/PLS OXIMETRY MLT: CPT

## 2020-02-19 PROCEDURE — 2700000000 HC OXYGEN THERAPY PER DAY

## 2020-02-19 PROCEDURE — 99233 SBSQ HOSP IP/OBS HIGH 50: CPT | Performed by: PSYCHIATRY & NEUROLOGY

## 2020-02-19 PROCEDURE — 71045 X-RAY EXAM CHEST 1 VIEW: CPT

## 2020-02-19 PROCEDURE — 94669 MECHANICAL CHEST WALL OSCILL: CPT

## 2020-02-19 PROCEDURE — A9560 TC99M LABELED RBC: HCPCS | Performed by: NURSE PRACTITIONER

## 2020-02-19 PROCEDURE — 2580000003 HC RX 258: Performed by: STUDENT IN AN ORGANIZED HEALTH CARE EDUCATION/TRAINING PROGRAM

## 2020-02-19 PROCEDURE — 78278 ACUTE GI BLOOD LOSS IMAGING: CPT

## 2020-02-19 PROCEDURE — 6360000002 HC RX W HCPCS: Performed by: PSYCHIATRY & NEUROLOGY

## 2020-02-19 PROCEDURE — 3430000000 HC RX DIAGNOSTIC RADIOPHARMACEUTICAL: Performed by: NURSE PRACTITIONER

## 2020-02-19 PROCEDURE — 6360000002 HC RX W HCPCS: Performed by: NURSE PRACTITIONER

## 2020-02-19 PROCEDURE — 97129 THER IVNTJ 1ST 15 MIN: CPT

## 2020-02-19 PROCEDURE — 6360000002 HC RX W HCPCS: Performed by: STUDENT IN AN ORGANIZED HEALTH CARE EDUCATION/TRAINING PROGRAM

## 2020-02-19 PROCEDURE — 83735 ASSAY OF MAGNESIUM: CPT

## 2020-02-19 PROCEDURE — 36415 COLL VENOUS BLD VENIPUNCTURE: CPT

## 2020-02-19 PROCEDURE — 93886 INTRACRANIAL COMPLETE STUDY: CPT

## 2020-02-19 PROCEDURE — 82330 ASSAY OF CALCIUM: CPT

## 2020-02-19 PROCEDURE — 2580000003 HC RX 258: Performed by: PSYCHIATRY & NEUROLOGY

## 2020-02-19 PROCEDURE — 6370000000 HC RX 637 (ALT 250 FOR IP): Performed by: STUDENT IN AN ORGANIZED HEALTH CARE EDUCATION/TRAINING PROGRAM

## 2020-02-19 PROCEDURE — 85027 COMPLETE CBC AUTOMATED: CPT

## 2020-02-19 PROCEDURE — 6370000000 HC RX 637 (ALT 250 FOR IP): Performed by: PSYCHIATRY & NEUROLOGY

## 2020-02-19 PROCEDURE — 2580000003 HC RX 258: Performed by: INTERNAL MEDICINE

## 2020-02-19 PROCEDURE — 6370000000 HC RX 637 (ALT 250 FOR IP): Performed by: NURSE PRACTITIONER

## 2020-02-19 PROCEDURE — 6360000002 HC RX W HCPCS: Performed by: INTERNAL MEDICINE

## 2020-02-19 PROCEDURE — 99232 SBSQ HOSP IP/OBS MODERATE 35: CPT | Performed by: INTERNAL MEDICINE

## 2020-02-19 PROCEDURE — 80048 BASIC METABOLIC PNL TOTAL CA: CPT

## 2020-02-19 RX ORDER — MAGNESIUM SULFATE 1 G/100ML
1 INJECTION INTRAVENOUS
Status: ACTIVE | OUTPATIENT
Start: 2020-02-19 | End: 2020-02-19

## 2020-02-19 RX ORDER — CALCIUM GLUCONATE 94 MG/ML
1 INJECTION, SOLUTION INTRAVENOUS ONCE
Status: DISCONTINUED | OUTPATIENT
Start: 2020-02-19 | End: 2020-02-19

## 2020-02-19 RX ORDER — HEPARIN SODIUM (PORCINE) LOCK FLUSH IV SOLN 100 UNIT/ML 100 UNIT/ML
100 SOLUTION INTRAVENOUS PRN
Status: DISCONTINUED | OUTPATIENT
Start: 2020-02-19 | End: 2020-03-03 | Stop reason: HOSPADM

## 2020-02-19 RX ORDER — PRAMIPEXOLE DIHYDROCHLORIDE 0.12 MG/1
TABLET ORAL
Qty: 90 TABLET | Refills: 1 | Status: SHIPPED | OUTPATIENT
Start: 2020-02-19 | End: 2021-01-13 | Stop reason: ALTCHOICE

## 2020-02-19 RX ORDER — POTASSIUM CHLORIDE 7.45 MG/ML
10 INJECTION INTRAVENOUS
Status: COMPLETED | OUTPATIENT
Start: 2020-02-19 | End: 2020-02-20

## 2020-02-19 RX ADMIN — MEGESTROL ACETATE 800 MG: 40 SUSPENSION ORAL at 09:01

## 2020-02-19 RX ADMIN — PIPERACILLIN AND TAZOBACTAM 4.5 G: 4; .5 INJECTION, POWDER, LYOPHILIZED, FOR SOLUTION INTRAVENOUS; PARENTERAL at 14:05

## 2020-02-19 RX ADMIN — VANCOMYCIN HYDROCHLORIDE 750 MG: 10 INJECTION, POWDER, LYOPHILIZED, FOR SOLUTION INTRAVENOUS at 21:46

## 2020-02-19 RX ADMIN — VANCOMYCIN HYDROCHLORIDE 750 MG: 10 INJECTION, POWDER, LYOPHILIZED, FOR SOLUTION INTRAVENOUS at 09:00

## 2020-02-19 RX ADMIN — HYDRALAZINE HYDROCHLORIDE 25 MG: 25 TABLET ORAL at 00:29

## 2020-02-19 RX ADMIN — PIPERACILLIN AND TAZOBACTAM 4.5 G: 4; .5 INJECTION, POWDER, LYOPHILIZED, FOR SOLUTION INTRAVENOUS; PARENTERAL at 06:28

## 2020-02-19 RX ADMIN — POTASSIUM CHLORIDE 10 MEQ: 10 INJECTION, SOLUTION INTRAVENOUS at 16:50

## 2020-02-19 RX ADMIN — SODIUM CHLORIDE TAB 1 GM 1 G: 1 TAB at 09:00

## 2020-02-19 RX ADMIN — MAGNESIUM SULFATE HEPTAHYDRATE 1 G: 1 INJECTION, SOLUTION INTRAVENOUS at 12:36

## 2020-02-19 RX ADMIN — FERROUS SULFATE TAB EC 325 MG (65 MG FE EQUIVALENT) 325 MG: 325 (65 FE) TABLET DELAYED RESPONSE at 09:00

## 2020-02-19 RX ADMIN — Medication 60 MG: at 04:07

## 2020-02-19 RX ADMIN — PIPERACILLIN AND TAZOBACTAM 4.5 G: 4; .5 INJECTION, POWDER, LYOPHILIZED, FOR SOLUTION INTRAVENOUS; PARENTERAL at 22:25

## 2020-02-19 RX ADMIN — IRON SUCROSE 200 MG: 20 INJECTION, SOLUTION INTRAVENOUS at 09:00

## 2020-02-19 RX ADMIN — Medication 60 MG: at 21:12

## 2020-02-19 RX ADMIN — SODIUM CHLORIDE, PRESERVATIVE FREE 100 UNITS: 5 INJECTION INTRAVENOUS at 18:37

## 2020-02-19 RX ADMIN — Medication 60 MG: at 12:43

## 2020-02-19 RX ADMIN — Medication 15 MILLICURIE: at 18:37

## 2020-02-19 RX ADMIN — Medication 60 MG: at 17:20

## 2020-02-19 RX ADMIN — POTASSIUM CHLORIDE 10 MEQ: 10 INJECTION, SOLUTION INTRAVENOUS at 15:45

## 2020-02-19 RX ADMIN — MAGNESIUM SULFATE HEPTAHYDRATE 1 G: 1 INJECTION, SOLUTION INTRAVENOUS at 11:25

## 2020-02-19 RX ADMIN — Medication 60 MG: at 09:01

## 2020-02-19 RX ADMIN — CALCIUM GLUCONATE 1 G: 98 INJECTION, SOLUTION INTRAVENOUS at 09:14

## 2020-02-19 RX ADMIN — POTASSIUM CHLORIDE 10 MEQ: 10 INJECTION, SOLUTION INTRAVENOUS at 21:46

## 2020-02-19 RX ADMIN — Medication 60 MG: at 00:28

## 2020-02-19 ASSESSMENT — PULMONARY FUNCTION TESTS
PIF_VALUE: 18
PIF_VALUE: 16
PIF_VALUE: 18
PIF_VALUE: 20

## 2020-02-19 ASSESSMENT — PAIN SCALES - GENERAL
PAINLEVEL_OUTOF10: 0
PAINLEVEL_OUTOF10: 0

## 2020-02-19 ASSESSMENT — PAIN SCALES - WONG BAKER: WONGBAKER_NUMERICALRESPONSE: 0

## 2020-02-19 ASSESSMENT — PAIN DESCRIPTION - PAIN TYPE: TYPE: ACUTE PAIN

## 2020-02-19 NOTE — PROGRESS NOTES
Today's Date: 2/19/2020  Patient Name: Sloane Palmer  Date of admission: 2/12/2020  1:58 PM  Patient's age: 77 y.o., 1950  Admission Dx: SAH ([address]arachnoid hemorrhage) ([facility] [address] [I60.9]  Status post coil embolization of cerebral aneurysm [Z98.890]    Reason for Consult: anemia, Hb 6.1 - [facility] refusing PRBC, chronic liverdz/HepC, recs for management    Requesting Physician: Ingrid Alonso MD    CHIEF COMPLAINT:    Chief Complaint   Patient presents with    Cerebrovascular Accident     SUBJECTIVE:    Pt seen a nd examined'  Tolerated IV iron well  Hb dropped to 5.6  Stool for OB POSITIVE  Family at bedside  HISTORY OF PRESENT ILLNESS:    This is a 70-year-old female with past medical history of hypertension, was transferred from [place] for subarachnoid hemorrhage. History provided by patient's daughter who reported that patient had a recent dental procedure and was given anesthetics and subsequently her blood pressure went significantly high. Patient complains of mild facial droop and her strokelike symptoms and her CT scan showed subarachnoid hemorrhage predominantly in the anterior interhemispheric fissure. Patient is Evangel[employer] and she was noted to have low hemoglobin. Patient and family refusing further blood transfusion and therefore hematology was consulted. Past Medical History:   has a past medical history of Acid reflux, Arthritis, Cancer ([facility] [address] Family history of breast cancer in first degree relative, Gall stones, Hard of hearing, History of cervical cancer, Hypertension, Lateral meniscus tear, Wears dentures, and Wears glasses. Past Surgical History:   has a past surgical history that includes Tonsillectomy and adenoidectomy (1956); Cholecystectomy; Inner ear surgery; Knee arthroscopy (2000, 2005); cyst removal (1980); Colonoscopy (12/09); Breast biopsy (1980); Foot surgery (Bilateral); other surgical history (Right, 9-4-14);  Umbilical hernia repair; hernia repair; Varicose vein surgery (Bilateral); Knee arthroscopy (Left, 4/3/2019); Colonoscopy (N/A, 5/31/2019); and other surgical history (02/12/2020). Medications:    Prior to Admission medications    Medication Sig Start Date End Date Taking?  Authorizing Provider   diclofenac (VOLTAREN) 75 MG EC tablet Take 1 tablet by mouth 2 times daily (with meals) for 14 days 1/21/20 2/4/20  Amisha Galvez MD   hydrALAZINE (APRESOLINE) 50 MG tablet TAKE 1 TABLET BY MOUTH 4 TIMES A DAY 12/26/19   Lupillo Bowman MD   meloxicam (MOBIC) 15 MG tablet TAKE 1 TABLET BY MOUTH ONE TIME A DAY 12/12/19   Eli Arias MD   pramipexole (MIRAPEX) 0.125 MG tablet TAKE ONE TABLET BY MOUTH NIGHTLY 12/11/19   Lupillo Bowman MD   carvedilol (COREG) 3.125 MG tablet Take 1 tablet by mouth 2 times daily 11/5/19   Lupillo Bowman MD   atorvastatin (LIPITOR) 40 MG tablet Take 1 tablet by mouth nightly 10/15/19   Lupillo Bowman MD   acetaminophen (TYLENOL) 325 MG tablet Take 325 mg by mouth every 6 hours as needed for Pain    Historical Provider, MD   vitamin D (CHOLECALCIFEROL) 1000 UNIT TABS tablet Take 1,000 Units by mouth 3 times daily    Historical Provider, MD   meloxicam (MOBIC) 15 MG tablet Take 15 mg by mouth daily    Historical Provider, MD   aspirin 81 MG EC tablet Take 1 tablet by mouth daily 6/4/19   Ellen Pond MD   Multiple Vitamins-Minerals (THERAPEUTIC MULTIVITAMIN-MINERALS) tablet Take 1 tablet by mouth daily    Historical Provider, MD     Current Facility-Administered Medications   Medication Dose Route Frequency Provider Last Rate Last Dose    pantoprazole (PROTONIX) 80 mg in sodium chloride 0.9 % 100 mL infusion  8 mg/hr Intravenous Continuous RAEGAN Jones - CNP 10 mL/hr at 02/18/20 1337 8 mg/hr at 02/18/20 1337    iron sucrose (VENOFER) 200 mg in sodium chloride 0.9 % 100 mL IVPB  200 mg Intravenous Daily Cate Monique MD   Stopped at 02/19/20 1021    atorvastatin (LIPITOR) tablet 80 mg  80 mg (TYLENOL) tablet 650 mg  650 mg Oral Q4H PRN Albino Holland, APRN - CNP   650 mg at 20 1332    ondansetron (ZOFRAN) injection 4 mg  4 mg Intravenous Q6H PRN Albino Holland, APRN - CNP   4 mg at 20 1121       Allergies:  Lisinopril; Losartan; and Procardia [nifedipine]    Social History:   reports that she has never smoked. She has never used smokeless tobacco. She reports current alcohol use. She reports that she does not use drugs. Family History: family history includes Breast Cancer in her sister; Cancer in her mother; Heart Attack in her father. REVIEW OF SYSTEMS:    Constitutional: No fever or chills. No night sweats, no weight loss   Eyes: No eye discharge, double vision, or eye pain   HEENT: negative for sore mouth, sore throat, hoarseness and voice change   Respiratory: negative for cough , sputum, dyspnea, wheezing, hemoptysis, chest pain   Cardiovascular: negative for chest pain, dyspnea, palpitations, orthopnea, PND   Gastrointestinal: negative for nausea, vomiting, diarrhea, constipation, abdominal pain, Dysphagia, hematemesis and hematochezia   Genitourinary: negative for frequency, dysuria, nocturia, urinary incontinence, and hematuria   Integument: negative for rash, skin lesions, bruises.    Hematologic/Lymphatic: negative for easy bruising, bleeding, lymphadenopathy, or petechiae   Endocrine: negative for heat or cold intolerance,weight changes, change in bowel habits and hair loss   Musculoskeletal: negative for myalgias, arthralgias, pain, joint swelling,and bone pain   Neurological: negative for headaches, dizziness, seizures, weakness, numbness    PHYSICAL EXAM:      BP (!) 133/94   Pulse 85   Temp 98.4 °F (36.9 °C) (Oral)   Resp 26   Ht 5' 6\" (1.676 m)   Wt 108 lb (49 kg)   SpO2 97%   BMI 17.43 kg/m²    Temp (24hrs), Av.4 °F (36.9 °C), Min:98 °F (36.7 °C), Max:98.9 °F (37.2 °C)  General appearance - well appearing, no in pain or distress   Mental status - alert and cooperative   Eyes - pupils equal and reactive, extraocular eye movements intact   Ears - bilateral TM's and external ear canals normal   Mouth - mucous membranes moist, pharynx normal without lesions   Neck - supple, no significant adenopathy   Lymphatics - no palpable lymphadenopathy, no hepatosplenomegaly   Chest - clear to auscultation, no wheezes, rales or rhonchi, symmetric air entry   Heart - normal rate, regular rhythm, normal S1, S2, no murmurs  Abdomen - soft, nontender, nondistended, no masses or organomegaly   Neurological - alert, oriented, normal speech, no focal findings or movement disorder noted   Musculoskeletal - no joint tenderness, deformity or swelling   Extremities - peripheral pulses normal, no pedal edema, no clubbing or cyanosis   Skin - normal coloration and turgor, no rashes, no suspicious skin lesions noted ,    DATA:    Labs:   CBC:   Recent Labs     02/18/20  0600 02/18/20  1953 02/19/20  0440   WBC 15.2*  --  14.7*   HGB 6.0* 6.2* 5.6*   HCT 19.6* 20.5* 18.6*     --  242     BMP:   Recent Labs     02/18/20  0600 02/18/20  1023 02/19/20  0440   * 140 141   K 4.0  --  3.3*   CO2 16*  --  15*   BUN 11  --  11   CREATININE 0.72  --  0.67   LABGLOM >60  --  >60   GLUCOSE 97  --  95     PT/INR:   Recent Labs     02/16/20  1814   PROTIME 12.2*   INR 1.2       IMAGING DATA:  Reviewed  Ct Abdomen Pelvis W Iv Contrast 2/5/2020  1. No acute intra-abdominal or intrapelvic process. Specifically, no CT scan evidence of acute pancreatitis or complications related to pancreatitis. 2. Status post cholecystectomy. 3. Subtle ground-glass opacity in the right lower lobe appears slightly more conspicuous than on the 2007 exam.  This may simply be related to a minor scar. Minor infectious or inflammatory process could not be entirely excluded. Consider follow-up chest CT in approximately 3 months to ensure stability/resolution. 4. Normal appendix.    CT chest   Impression   1.  Note: Study significantly limited by patient breathing motion related   artifact. 2. No definitive evidence of acute pulmonary embolism. 3. Bilateral lower lung lobe severe dense consolidation consistent with   severe pneumonia. 4. Adjacent moderate bilateral layering posterior pleural effusions, as   discussed above. 5. Moderate cardiomegaly. 6. Mild pericardial effusion.  Recommend clinical correlation. 7. Cholecystectomy. Primary Problem  <principal problem not specified>    Active Hospital Problems    Diagnosis Date Noted    Pneumonia of both lower lobes due to infectious organism [facility]) [J18.1]    [facility] MRI-safe endovascular aneurysm coil present [Z95.828]     SA[address] ([address]arachnoid hemorrhage) ([facility] [address] [I60.9] 02/12/2020    Cerebral aneurysm rupture ([facility] [address] [I60.7] 02/12/2020    Cerebral aneurysm [I67.1] 02/12/2020    Subarachnoid hemorrhage from anterior cerebral artery aneurysm (HCC) [I60.6] 02/12/2020    Status post coil embolization of cerebral aneurysm [Z98.890] 02/12/2020       IMPRESSION:   1. Acute CVA: with ruptured right SENTHIL 2 mm aneurysm. Patient underwent primary coil embolization 2/12/2020.   2.  Anemia  3. Bilateral PNA:  Broad Spectrum Antibiotics     RECOMMENDATIONS:  1. I reviewed th albs, imaging studies diagnosis and treatment plan with patient  2. Start IV iron, continue for 4 more days  3. She got one dose of aranesp 2/18  4. No hemolysis, rule out GI bleed  5. Stool for OB positive. Needs GI evaluation with possible Scope to identify source of bleed and prevent further drop in Hb  6. Monitor counts closely  7.  other care asper primary team      Discussed with patient and Nurse. Thank you for asking us to see this patient. Shai Castellanos MD  Hematologist/Medical Oncologist    Cell: [phone]    This note is created with the assistance of a speech recognition program.  While intending to generate a document that actually reflects the content of the visit, the document can still

## 2020-02-19 NOTE — PROGRESS NOTES
80 mg at 02/18/20 2019    [Held by provider] sennosides-docusate sodium (SENOKOT-S) 8.6-50 MG tablet 2 tablet  2 tablet Oral Daily RAEGAN Snow CNP   2 tablet at 02/16/20 1004    [Held by provider] magnesium hydroxide (MILK OF MAGNESIA) 400 MG/5ML suspension 30 mL  30 mL Oral Daily PRN RAEGAN Snow CNP        sodium chloride tablet 1 g  1 g Oral BID WC RAEGAN Snow - CNP   1 g at 02/18/20 1618    niMODipine oral solution 60 mg  60 mg Oral 6 times per day RAEGAN Snow CNP   60 mg at 02/18/20 2017    vancomycin (VANCOCIN) intermittent dosing (placeholder)   Other RX Placeholder Brandin Brooks MD        vancomycin (VANCOCIN) 750 mg in dextrose 5 % 250 mL IVPB  750 mg Intravenous Q12H Brandin Brooks MD   Stopped at 02/18/20 2116    hydrALAZINE (APRESOLINE) tablet 25 mg  25 mg Oral 4 times per day Love Bowens, DO   25 mg at 02/18/20 0602    sodium chloride flush 0.9 % injection 10 mL  10 mL Intravenous 2 times per day Yousuf Ruiz, DO   10 mL at 02/18/20 0854    sodium chloride flush 0.9 % injection 10 mL  10 mL Intravenous PRN Yosuuf Ruiz DO        piperacillin-tazobactam (ZOSYN) 4.5 g in dextrose 5 % 100 mL IVPB (mini-bag)  4.5 g Intravenous Q8H Yousuf Ruiz DO 25 mL/hr at 02/18/20 2212 4.5 g at 02/18/20 2212    ferrous sulfate EC tablet 325 mg  325 mg Oral Daily with breakfast Jo Ann Bonner APRN - CNP   325 mg at 02/18/20 0853    enoxaparin (LOVENOX) injection 40 mg  40 mg Subcutaneous Daily Jo Ann Achilles, APRN - CNP   40 mg at 02/18/20 8060    megestrol (MEGACE) 40 MG/ML suspension 800 mg  800 mg Oral Daily Brandin Brooks MD   800 mg at 02/18/20 0854    [Held by provider] docusate sodium (COLACE) capsule 100 mg  100 mg Oral Daily PRN Deangelo Hernandez MD        sodium chloride flush 0.9 % injection 10 mL  10 mL Intravenous 2 times per day RAEGAN Snow - CNP   10 mL at 02/17/20 2020    sodium chloride flush 0.9 % injection 10 mL  10 mL Intravenous PRN 17.43 kg/m²    Temp (24hrs), Av.5 °F (36.9 °C), Min:98 °F (36.7 °C), Max:98.8 °F (37.1 °C)  General appearance - well appearing, no in pain or distress   Mental status - alert and cooperative   Eyes - pupils equal and reactive, extraocular eye movements intact   Ears - bilateral TM's and external ear canals normal   Mouth - mucous membranes moist, pharynx normal without lesions   Neck - supple, no significant adenopathy   Lymphatics - no palpable lymphadenopathy, no hepatosplenomegaly   Chest - clear to auscultation, no wheezes, rales or rhonchi, symmetric air entry   Heart - normal rate, regular rhythm, normal S1, S2, no murmurs  Abdomen - soft, nontender, nondistended, no masses or organomegaly   Neurological - alert, oriented, normal speech, no focal findings or movement disorder noted   Musculoskeletal - no joint tenderness, deformity or swelling   Extremities - peripheral pulses normal, no pedal edema, no clubbing or cyanosis   Skin - normal coloration and turgor, no rashes, no suspicious skin lesions noted ,    DATA:    Labs:   CBC:   Recent Labs     20  1542 20  0600 20  1953   WBC 11.5* 15.2*  --    HGB 6.0* 6.0* 6.2*   HCT 19.7* 19.6* 20.5*    260  --      BMP:   Recent Labs     20  0544 20  0600 20  1023   * 146* 140   K 2.9* 4.0  --    CO2 17* 16*  --    BUN 12 11  --    CREATININE 0.75 0.72  --    LABGLOM >60 >60  --    GLUCOSE 114* 97  --      PT/INR:   Recent Labs     20  1814   PROTIME 12.2*   INR 1.2       IMAGING DATA:  Reviewed  Ct Abdomen Pelvis W Iv Contrast 2020  1. No acute intra-abdominal or intrapelvic process. Specifically, no CT scan evidence of acute pancreatitis or complications related to pancreatitis. 2. Status post cholecystectomy. 3. Subtle ground-glass opacity in the right lower lobe appears slightly more conspicuous than on the 2007 exam.  This may simply be related to a minor scar.   Minor infectious or inflammatory a document that actually reflects the content of the visit, the document can still have some errors including those of syntax and sound a like substitutions which may escape proof reading. It such instances, actual meaning can be extrapolated by contextual diversion.

## 2020-02-19 NOTE — PLAN OF CARE
Problem: Gas Exchange - Impaired:  Goal: Levels of oxygenation will improve  Description  Levels of oxygenation will improve  2/19/2020 1455 by Paul Damico RCP  Outcome: Ongoing  Note:   PROVIDE ADEQUATE OXYGENATION WITH ACCEPTABLE SP02/ABG'S    [x]  IDENTIFY APPROPRIATE OXYGEN THERAPY  [x]   MONITOR SP02/ABG'S AS NEEDED   [x]   PATIENT EDUCATION AS NEEDED    Maintains on High Flow Cannula and weaned to 60% and tolerating with acceptable saturation. Remains to have low HB/HCT, on IV Iron   Will continue to monitor. Problem: Respiratory  Intervention: Support non-invasive mechanical ventilation  Note:   NON INVASIVE VENTILATION  PROVIDE OPTIMAL VENTILATION/ACCEPTABLE SP02  IMPLEMENT NON INVASIVE VENTILATION PROTOCOL  ASSESSMENT SKIN INTEGRITY  PATIENT EDUCATION AS NEEDED  BIPAP AS NEEDED   Intervention: Respiratory assessment  Note:   JULIO BOLDENatient Assessment complete. SAH (subarachnoid hemorrhage) (HCC) [I60.9]  Status post coil embolization of cerebral aneurysm [Z98.890] . Vitals:    02/19/20 1303   BP: (!) 133/94   Pulse: 85   Resp: 26   Temp:    SpO2:    . Patients home meds are   Prior to Admission medications    Medication Sig Start Date End Date Taking?  Authorizing Provider   diclofenac (VOLTAREN) 75 MG EC tablet Take 1 tablet by mouth 2 times daily (with meals) for 14 days 1/21/20 2/4/20  Komal Galvez MD   hydrALAZINE (APRESOLINE) 50 MG tablet TAKE 1 TABLET BY MOUTH 4 TIMES A DAY 12/26/19   Charly Burton MD   meloxicam (MOBIC) 15 MG tablet TAKE 1 TABLET BY MOUTH ONE TIME A DAY 12/12/19   Ash Andino MD   pramipexole (MIRAPEX) 0.125 MG tablet TAKE ONE TABLET BY MOUTH NIGHTLY 12/11/19   Charly Burton MD   carvedilol (COREG) 3.125 MG tablet Take 1 tablet by mouth 2 times daily 11/5/19   Charly Burton MD   atorvastatin (LIPITOR) 40 MG tablet Take 1 tablet by mouth nightly 10/15/19   Charly Burton MD   acetaminophen (TYLENOL) 325 MG tablet Take 325 mg by mouth every 6 hours as needed for Pain    Historical Provider, MD   vitamin D (CHOLECALCIFEROL) 1000 UNIT TABS tablet Take 1,000 Units by mouth 3 times daily    Historical Provider, MD   meloxicam (MOBIC) 15 MG tablet Take 15 mg by mouth daily    Historical Provider, MD   aspirin 81 MG EC tablet Take 1 tablet by mouth daily 6/4/19   Darryle Hawthorn, MD   Multiple Vitamins-Minerals (THERAPEUTIC MULTIVITAMIN-MINERALS) tablet Take 1 tablet by mouth daily    Historical Provider, MD       Assessment     Remains on High Flow Cannula and NIV at HS. Oxygen weaned to 60% and tolerating. Remains on tachypnea, sob and weak. On antibiotics for aspiration pneumonia for emesis. Will continue current treatment by Hyperinflation Protocol. CXR on 02/17. No repeat. FINDINGS:   Right upper and right lower lobe airspace disease. Increased interstitial   markings especially the left perihilar region. Lungs are hyperaerated. Heart and mediastinum normal.  Bony thorax intact. IS volume 500  IBW 59kg    RR 32  Breath Sounds: clear, crackles to right base.          Hyperinflation assessment at level 2      []    Bronchodilator Assessment  BRONCHODILATOR ASSESSMENT SCORE  Score 0 1 2 3 4 5   Breath Sounds   []  Patient Baseline []  No Wheeze good aeration []  Faint, scattered wheezing, good aeration []  Expiratory Wheezing and or moderately diminished []  Insp/Exp wheeze and/or very diminished []  Insp/Exp and/ or marked distress   Respiratory Rate   []  Patient Baseline []  Less than 20 []  Less than 20 []  20-25 []  Greater than 25 []  Greater than 25   Peak flow % of Pred or PB []  NA   []  Greater than 90%  []  81-90% []  71-80% []  Less than or equal to 70%  or unable to perform []  Unable due to Respiratory Distress   Dyspnea re []  Patient Baseline []  No SOB []  No SOB []  SOB on exertion []  SOB min activity []  At rest/acute   e FEV% Predicted       []  NA []  Above 69%  []  Unable []  Above 60-69%  []  Unable []  Above 2. 67 2.89 3.12 3.36 3.60 3.85 4.11   58 - 61 2.10 2.28 2.46 2.65 2.84 3.04 3.24 3.45 58 - 61 2.32 2.54 2.76 2.99 3.23 3.47 3.72 3.98   62 - 65 1.99 2.17 2.35 2.54 2.73 2.93 3.13 3.34 62 - 65 2.19 2.40 2.62 2.85 3.09 3.33 3.58 3.84   66 - 69 1.88 2.05 2.23 2.42 2.61 2.81 3.02 3.23 66 - 69 2.04 2.26 2.48 2.71 2.95 3.19 3.44 3.70   70+ 1.82 1.99 2.17 2.36 2.55 2.75 2.95 3.16 70+ 1.97 2.19 2.41 2.64 2.87 3.12 3.37 3.62             Predicted Peak Expiratory Flow Rate                                       Height (in)  Female       Height (in) Male           Age 64 62 64 63 57 71 78 74 Age            21 344 357 372 387 402 417 432 446  60 62 64 66 68 70 72 74 76   25 337 352 366 381 396 411 426 441 25 447 476 [phone] [phone]   30 329 344 359 374 389 404 419 434 30 437 466 494 523 552 [phone] 667   35 322 337 351 366 381 396 411 426 35 426 455 484 512 541 [phone]   40 314 329 344 359 374 389 404 419 40 416 445 473 [phone] 617 647   45 307 322 336 351 366 381 396 411 45 405 434 463 491 520 [phone] 636   50 299 314 329 344 359 374 389 404 50 395 424 452 481 510 [phone] 625   55 292 307 321 336 351 366 381 396 55 384 413 442 470 499 528 556 585 615   60 284 299 314 329 344 359 374 389 60 374 403 431 460 489 517 546 575 605   65 277 292 306 321 336 351 366 381 65 363 392 421 449 478 507 535 564 594   70 269 284 299 314 329 344 359 374 70 353 382 410 439 468 [phone] 583   75 261 274 289 305 319 334 348 364 75 344 372 400 429 458 487 515 544 573   80 253 266 282 296 312 327 342 356 80 335 362 390 419 448 476 [phone]              Intervention: Education, Medication and treatments  Note:     AIDET method used to introduce myself to patient and or parent in room    Patient and or Parent educated on current respiratory medication and modalities   administered. Possible side effects of medications discussed. Questions answered.     Patient and or Parent accepts Daily POC and treatment plan.

## 2020-02-19 NOTE — PLAN OF CARE
Pt assessed as a fall risk this shift. Remains free from falls and accidental injury at this time. Fall precautions in place, including falling star sign and fall risk band on pt. Floor free from obstacles, and bed is locked and in lowest position. Adequate lighting provided. Pt encouraged to call before getting Out Of Bed for any need. Bed alarm activated. Will continue to monitor needs during hourly rounding, and reinforce education on use of call light. Pain level assessed q shift and PRN. Patient able to state tolerable level of pain. PRN meds given per patient request. Pain reassessed after pain interventions. Will continue to monitor patient. Problem: Risk for Impaired Skin Integrity   Goal: Tissue integrity - skin and mucous membranes   Structural intactness and normal physiological function of skin and  mucous membranes. Outcome: Ongoing   Skin assessed for breakdown and redness, patient turned regularly, and heels elevated off of bed on pillows.

## 2020-02-19 NOTE — PLAN OF CARE
BRONCHOSPASM/BRONCHOCONSTRICTION     [x]         IMPROVE AERATION/BREATH SOUNDS  [x]   ADMINISTER BRONCHODILATOR THERAPY AS APPROPRIATE  [x]   ASSESS BREATH SOUNDS  [x]   IMPLEMENT AEROSOL/MDI PROTOCOL  [x]   PATIENT EDUCATION AS NEEDED   PROVIDE ADEQUATE OXYGENATION WITH ACCEPTABLE SP02/ABG'S    [x]  IDENTIFY APPROPRIATE OXYGEN THERAPY  [x]   MONITOR SP02/ABG'S AS NEEDED   [x]   PATIENT EDUCATION AS NEEDED

## 2020-02-19 NOTE — PROGRESS NOTES
Daily Progress Note  Neuro Critical Care    Patient Name: Phyllis Sanchez  Patient : 1950  Room/Bed: 43/0558-31  Code Status: FULL  Allergies: Allergies   Allergen Reactions    Lisinopril Other (See Comments)    Losartan      Fatigue    Procardia [Nifedipine] Other (See Comments)       CHIEF COMPLAINT:      Shortness of breath     [address] Course:   : To angio where she underwent coil embolization of ruptured right A2 SENTHIL aneurysm resulting in complete obliteration. : CT Head showed bilateral SAH with new intraventricular extension, developing hydrocephalus, interval worsening of diffuse cerebral edema, significantly increased hyperattenuation in bilateral medial frontal regions; increased hemorrhage vs contrast. Started on 2% hypertonic saline with goal sodium 150. Neurosurgery following for EVD watch. : More alert. TCD normal. Continues of 2%. CT Head showed decreased SAH, subtle rightward shift. 2/15: Hypoxic overnight with increasing O2 requirements. Placed on non-rebreather with continued desaturations. CT chest PE protocol showed severe dense consolidations in the bilateral lower lobes suggestive of severe pneumonia, moderate bilateral pleural effusions. Given 20mg IVP Lasix x1. Started on Vanc and Zosyn. Placed on biPAP with improvements. Transitioned to high flow O2 in the AM.  Urine culture +E. Coli. Blood culture sent. Mobility, IS, and acapella encouraged. : hypoxic again overnight, put on high flow, cont on vanc and zosyn. On 2% hypertonic with near goal Na, goal 145. Komal Cuevas Echo showed EF of 58%. : pt hypoxic throughout day on high flow, Inc. WOB. HH 6.0 refusing Blood d/t being Jew. H/O consulted, blood draws minimum, fluids, iron. Full Code. Stool Occult Positive. : Art line DC d/t oozing, sodium checks daily, minimize blood draws.   H&H 6.0, started on Protonix drip per GI, given first dose of Aranesp, begin iron shift is described above at the level of the hemorrhage. Ct Head Wo Contrast  Result Date: 2/13/2020  1. Interval postsurgical changes of anterior cerebral artery aneurysm coil embolization. Small amount of pneumocephalus adjacent to the embolization coils. 2.  Significantly increased hyperattenuation in the bilateral medial frontal regions centered around the embolization coils could represent increased hemorrhage and/or post angiographic contrast staining. Continued attention on follow-up recommended. 3.  Bilateral acute subarachnoid hemorrhage. New intraventricular extension of hemorrhage with hyperdense blood products throughout the ventricular system, most pronounced in the right lateral ventricle. Mild diffuse ventricular prominence suggest developing hydrocephalus. 4. Interval worsening of diffuse cerebral edema and sulcal effacement which raises concern for increasing intracranial pressure. No significant midline shift or large territorial infarct. Critical results were called by Dr. Dillan Alvarez Sessions to [address] [place] [address] on 2/13/2020 at 04:58. Ct Head Wo Contrast  Result Date: 2/12/2020  Redemonstration of subarachnoid hemorrhage that is not significantly changed compared to prior exam. Findings were discussed with Dr. Eusebio Burnett At 2:30 pm on 2/12/2020. Ct Head Wo Contrast  Result Date: 2/12/2020  Subarachnoid hemorrhage predominates in the anterior interhemispheric fissure, as described. No definite intraparenchymal component identified. Findings were discussed with Marizol Hernandez at 12:20 pm on 2/12/2020. Ct Abdomen Pelvis W Iv Contrast  Result Date: 2/5/2020  1. No acute intra-abdominal or intrapelvic process. Specifically, no CT scan evidence of acute pancreatitis or complications related to pancreatitis. 2. Status post cholecystectomy.  3. Subtle ground-glass opacity in the right lower lobe appears slightly more conspicuous than on the 2007 exam.  This may simply be related to a minor RRR, distal pulses intact   ABDOMEN:  Soft, no rigidity, no tenderness, groin site appears well, no bleeding   NEUROLOGIC:  Mental Status: somnolent but arousable and following commands             Cranial Nerves:    II: Visual fields:  normal  III: Pupils:  equal, round, reactive to light  III,IV,VI: Extra Ocular Movements: intact  V: Facial sensation:  intact  VII: Facial strength: intact    Motor Exam:    Drift:  present - left lower extremity  Tone:  normal    5/5 strength bilateral upper extremities with no drift. 4+/5 left lower extremity  4-/5 right lower extremity,  Drift. Slightly weaker plantar and dorsiflexion on R compared with L    Sensory:    Touch:    Right Upper Extremity:  normal  Left Upper Extremity:  normal  Right Lower Extremity:  normal  Left Lower Extremity:  normal       DRAINS:  [x] There are no drains for Neuro Critical Care to monitor at this time. ASSESSMENT AND PLAN:       The patient is a 77 y.o. female with a history of HTN who was admitted to the Neuro ICU for management of acute SAH in setting of right SENTHIL aneurysm. Patient initially presented to [facility] ED with thunderclap headache, diaphoresis, and nausea/vomiting. Underwent coil embolization on 2/12/20.     NEUROLOGIC:  - Acute SAH in setting of ruptured right A2 SENTHIL aneurysm  - POD #7 s/p coil embolization resulting in complete obliteration  - Nimodipine 60mg Q4h  - Follow daily TCDs to monitor for vasospasm  - Neuro Endovascular and Neurosurgery following; appreciate recs  - Goal SBP<180  - Neuro checks per protocol     CARDIOVASCULAR:  - Goal SBP<180  - History of HTN  - Hold Hydralazine 25mg Q6h (half home dosing), hold home Coreg  - Troponin 14, EKG NSR  - Echo: EF 58%.   - History HLD; continue home Lipitor 40mg QHS  - Continue telemetry     PULMONARY:  - Acute hypoxia, on Bipap  - Mild pulmonary edema, possibly secondary to 2%  - Bilateral pneumonia, likely secondary to aspiration (patient vomited multiple times)  - Continue Vancomycin and Zosyn  - Continue high flow nasal canula, BiPAP if needed  - Encourage mobilization, incentive spirometry, Acapella  - Chest PT  -CXR: stable R>L pneumonia     RENAL/FLUID/ELECTROLYTE:  - Normal renal functioning   - BUN 11/ Creatinine 0.72  - Monitor I&O; voiding  -DC hypertonic  -Salt tabs 1g BID to prevent further pulmonary edema with 2%  -Sodium checks to QD  - Na goal > 140  - Goal Mag >2.5, given 2 g today  - Hypokalemia, K 3.3, replaced PO   - Hypocalcemia 1.26, replace  - Daily BMP     GI/NUTRITION:  NUTRITION:  - tolerating general diet  -Increased bowel movements yesterday and overnight  -Hold bowel regimen, low concern for C. difficile at this time  - GI prophylaxis: Protonix gtt  - Megace for appetite increase  - Ensure shakes at meals     ID:  - Afebrile  - WBC 14 stable  - C diff negative   - Bilateral lobe pneumonia  - UTI, culture + E.coli  - Continue Vancomycin and Zosyn for now  - Continue to monitor for fevers  - Daily CBC     HEME:   - Anemia likely multifactorial, iron deficiency  - H&H  5.6, patient refusing blood transfusion due to being Mosque  -Heme/Onc:    -Start IV Iron   -Aranesp x1 on 2/18   -r/o hemolysis/GIB   -Peripheral blood smear  - Stool Occult Positive.   - GI:   -protonix bolus and gtt   -possible scope, keep diet Full Liquid  -B12 and folate normal  -Decreased lab draw frequency  - Continue Iron 325mg QD  - Daily CBC  - Lovenox PPX     ENDOCRINE:  - Continue to monitor blood glucose, goal <180  - No history of DM 2, hemoglobin A1C 5.6     OTHER:  - PT/OT/ST     PROPHYLAXIS:  Stress ulcer: Protonix gtt     DVT PROPHYLAXIS:  - SCD sleeves - Thigh High   - Lovenox PPX    DISPOSITION:  I had a lengthy discussion with patient and her daughters and they are refusing blood product due to being Mosque. I discussed with him the risks and need for blood at this time and potential complications if she does not do so.     [x] To remain ICU:

## 2020-02-19 NOTE — TELEPHONE ENCOUNTER
Please Approve or Refuse.   Send to Pharmacy per Pt's Request:      Next Visit Date:  3/17/2020   Last Visit Date: 11/12/2019    Hemoglobin A1C (%)   Date Value   02/13/2020 5.6   06/02/2019 5.2             ( goal A1C is < 7)   BP Readings from Last 3 Encounters:   02/19/20 (!) 133/94   02/12/20 (!) 108/55   02/12/20 (!) 157/62          (goal 120/80)  BUN   Date Value Ref Range Status   02/19/2020 11 8 - 23 mg/dL Final     CREATININE   Date Value Ref Range Status   02/19/2020 0.67 0.50 - 0.90 mg/dL Final     Potassium   Date Value Ref Range Status   02/19/2020 3.3 (L) 3.7 - 5.3 mmol/L Final

## 2020-02-19 NOTE — PROGRESS NOTES
ENDOVASCULAR NEUROSURGERY PROGRESS NOTE  2/19/2020 11:21 AM  Subjective:   Admit Date: 2/12/2020  PCP: Sofia Ashley MD    Patient examined stable, no acute event overnight    Objective:   Vitals: BP 98/60   Pulse 108   Temp 98.2 °F (36.8 °C) (Oral)   Resp 30   Ht 5' 6\" (1.676 m)   Wt 108 lb (49 kg)   SpO2 99%   BMI 17.43 kg/m²     General appearance: Lying in bed, NAD,  Lungs: CTAB  Heart: RRR  Abdomen: soft, NTND, bowel sounds normal    Neuro exam: Follows simple commands. CN II-XII: Mild right facial droop, pupils 2 mm reactive to light bilaterally, EOMI, VFF. Dari spontaneously against gravity.        Medications and labs:   Scheduled Meds:   potassium chloride  10 mEq Intravenous Q1H    iron sucrose  200 mg Intravenous Daily    atorvastatin  80 mg Oral Nightly    [Held by provider] sennosides-docusate sodium  2 tablet Oral Daily    niMODipine  60 mg Oral 6 times per day    vancomycin (VANCOCIN) intermittent dosing (placeholder)   Other RX Placeholder    vancomycin  750 mg Intravenous Q12H    sodium chloride flush  10 mL Intravenous 2 times per day    piperacillin-tazobactam  4.5 g Intravenous Q8H    ferrous sulfate  325 mg Oral Daily with breakfast    enoxaparin  40 mg Subcutaneous Daily    megestrol  800 mg Oral Daily    sodium chloride flush  10 mL Intravenous 2 times per day     Continuous Infusions:   pantoprozole (PROTONIX) infusion 8 mg/hr (02/18/20 1337)     CBC:   Recent Labs     02/17/20  1542 02/18/20  0600 02/18/20  1953 02/19/20  0440   WBC 11.5* 15.2*  --  14.7*   HGB 6.0* 6.0* 6.2* 5.6*    260  --  242     BMP:    Recent Labs     02/17/20  0544 02/18/20  0600 02/18/20  1023 02/19/20  0440   * 146* 140 141   K 2.9* 4.0  --  3.3*   * 119*  --  115*   CO2 17* 16*  --  15*   BUN 12 11  --  11   CREATININE 0.75 0.72  --  0.67   GLUCOSE 114* 97  --  95     Hepatic:   No results for input(s): AST, ALT, ALB, BILITOT, ALKPHOS in the last 72 hours.   Troponin: No results for input(s): TROPONINI in the last 72 hours. BNP: No results for input(s): BNP in the last 72 hours. Lipids:   No results for input(s): CHOL, HDL in the last 72 hours. Invalid input(s): LDLCALCU  INR:   Recent Labs     02/16/20  1814   INR 1.2       Assessment and Recommendations:   77years old female with ruptured right SENTHIL 2 mm aneurysm, Em and Jeff 3 modified Jane scale 3. Patient underwent primary coil embolization 2/12/2020. Postprocedure CTA head shows some increase in the hemorrhage around the aneurysm region, follow-up CT head stable with mild mass-effect and mild hydrocephalus with third ventricle IVH 2/14/2020. Patient exam is stable    Repeated CTA head and neck 2/15/2020 with vasospasm however patient exam is improving. TCD 2/17: Normal bilateral transcranial Doppler. Blood pressure systolic goal per NICU to assess for vasospasm, permissive BP  Keep magnesium above 2.5  Thiamine  Lipitor 80 mg daily  Nimodipine therapy. Na checks per NICU. TCD daily.   Chemical DVT ppx  Possible acquired pneumonia management    Sloane Monson MD  Stroke, [facility]  [id number] [place]  Electronically signed 2/19/2020 at 11:21 AM

## 2020-02-19 NOTE — PROGRESS NOTES
hours.    Acute Hepatitis Panel   Lab Results   Component Value Date    HEPBSAG NONREACTIVE 02/15/2019    HEPCAB REACTIVE 02/15/2019    HEPBIGM NONREACTIVE 02/15/2019    HEPAIGM NONREACTIVE 02/15/2019       HCV Genotype   Lab Results   Component Value Date    HEPATITISCGENOTYPE Indeterminate 02/15/2019       HCV Quantitative   Lab Results   Component Value Date    HCVQNT NOT REPORTED 02/15/2019       LIVER WORK UP:    AFP  No results found for: AFP    Alpha 1 antitrypsin   Lab Results   Component Value Date    A1A 162 02/15/2019       Anti - Liver/Kidney Ab  No results found for: [facility]    AMBREEN  Lab Results   Component Value Date    AMBREEN POSITIVE 06/03/2019       AMA  Lab Results   Component Value Date    MITOAB 6.2 02/15/2019       ASMA  Lab Results   Component Value Date    SMOOTHMUSCAB 61 02/15/2019    SMOOTHMUSCAB <1:20 02/15/2019       Ceruloplasmin  Lab Results   Component Value Date    CERULOPLSM 29 02/15/2019       Celiac panel  Lab Results   Component Value Date    TTGIGA 0.9 02/15/2019       PT/INR  Recent Labs     02/16/20  1814   PROTIME 12.2*   INR 1.2       Cancer Markers:  CEA:  No results for input(s): CEA in the last 72 hours. Ca 125:  No results for input(s):  in the last 72 hours. Ca 19-9:   Invalid input(s):   AFP: No results for input(s): AFP in the last 72 hours. Lactic acid:Invalid input(s): LACTIC ACID    Radiology Review:    No results found. Active Problems:    SAH (subarachnoid hemorrhage) (HCC)    Cerebral aneurysm rupture (HCC)    Cerebral aneurysm    Subarachnoid hemorrhage from anterior cerebral artery aneurysm (HCC)    Status post coil embolization of cerebral aneurysm    Pneumonia of both lower lobes due to infectious organism [facility])    MRI-safe endovascular aneurysm coil present  Resolved Problems:    * No resolved hospital problems. *       GI Assessment:  1.  Reported melena x 48 hrs-etiology could be related to combination of stress ulcer from

## 2020-02-19 NOTE — PROGRESS NOTES
Physical Therapy  DATE: 2020    NAME: Selene Sheppard  MRN: [id number]   : 1950    Patient not seen this date for Physical Therapy due to:  [] Blood transfusion in progress  [] Hemodialysis  []  Patient Declined  [] Spine Precautions   [] Strict Bedrest  [] Surgery/ Procedure  [] Testing      [x] Other Pt with critical HgB of 5.6. Will check back        [] PT being discontinued at this time. Patient independent. No further needs. [] PT being discontinued at this time as the patient has been transferred to palliative care. No further needs.     Iona Bullock, PTA

## 2020-02-19 NOTE — PROGRESS NOTES
Speech Language Pathology  Speech Language Pathology  [facility]    Cognitive Treatment Note    Date: 2/19/2020  Patients Name: Velvet Guzman  MRN: [id number]   Patient Active Problem List   Diagnosis Code    Essential hypertension I10    RLS (restless legs syndrome) G25.81    History of cervical cancer Z85.41    Gastroesophageal reflux disease without esophagitis K21.9    Trochanteric bursitis of left hip M70.62    Complex tear of lateral meniscus of left knee S83.272A    Weight loss R63.4    Cerebrovascular accident (CVA) ([address] I63.9    Left sided numbness R20.0    Acute left-sided weakness R53.1    Lacunar stroke (HCC) I63.81    Hyperlipidemia E78.5    Chronic pancreatitis ([address] K86.1    SAH (subarachnoid hemorrhage) ([address] I60.9    Cerebral aneurysm rupture ([address] I60.7    Cerebral aneurysm I67.1    Subarachnoid hemorrhage from anterior cerebral artery aneurysm (HCC) I60.6    Status post coil embolization of cerebral aneurysm Z98.890    Pneumonia of both lower lobes due to infectious organism ([facility] J18.1    MRI-safe endovascular aneurysm coil present Z95.828       Pain: 0/10    Cognitive Treatment    Treatment time: 11:06-11:24      Subjective: [x] Alert [x] Cooperative     [] Confused     [] Agitated    [] Lethargic      Objective/Assessment:    Recall:    Delayed Memory Recall: 5/6 increased to 6/6 with minimal verbal cue    Organization:    Category Members, Abstract: 13/15 increased to 15/15 with minimal verbal cues   Wrong Category, Gill: 3/5 increased to 5/5 with repetitions and moderate verbal cues    Problem Solving/Reasoning:    Word Deductions: 5/10 increased to 10/10 with minimal verbal cues and repetitions     Plan:  [x] Continue ST services    [] Discharge from ST:      Discharge recommendations: [] Inpatient Rehab   [] [place]   [] Outpatient Therapy  [] Follow up at trauma clinic   [x] Other: Further therapy recommended at discharge. Treatment completed by: Completed by: Naomy Moreno,  Clinician    Cosigned By: Carrillo Jeff. JACK ABEL/SLP

## 2020-02-19 NOTE — PROGRESS NOTES
Nutrition Assessment (Low Risk)    Type and Reason for Visit: Initial(Length of stay)    Nutrition Recommendations:    - Obtain current, actual weight as able. - Continue Ensure Enlive. Also ok for Ensure Clear if preferred. - Encourage PO intake as tolerated. Nutrition Assessment:  Patient assessed for nutritional risk. Deemed to be at low risk at this time. Will continue to monitor for changes in status. Pt's daughter reports pt has been eating well and consuming ~50% of her meals. Pt c/o mouth pain so family has been cutting/mashing her foods. Pt not interested in soft diet order. Diet downgraded this morning to full liquids for possible GI bleed.  Ensure ONS added by MD.    Nutrition Risk Level   Risk Level: Low    Nutrition Diagnosis:   · Problem: No nutrition diagnosis at this time    Nutrition Intervention:  Food and/or Delivery: Continue current diet, Continue current ONS  Nutrition Education/Counseling/Coordination of Care:  No recommendations at this time      Electronically signed by Liliana Morgan MS, RD, LD on 2/19/20 at 1:32 PM    Contact Number: [phone]

## 2020-02-20 ENCOUNTER — APPOINTMENT (OUTPATIENT)
Dept: GENERAL RADIOLOGY | Age: 70
DRG: 020 | End: 2020-02-20
Payer: MEDICARE

## 2020-02-20 LAB
ALLEN TEST: POSITIVE
ANION GAP SERPL CALCULATED.3IONS-SCNC: 14 MMOL/L (ref 9–17)
BUN BLDV-MCNC: 9 MG/DL (ref 8–23)
BUN/CREAT BLD: ABNORMAL (ref 9–20)
CALCIUM SERPL-MCNC: 8.3 MG/DL (ref 8.6–10.4)
CHLORIDE BLD-SCNC: 112 MMOL/L (ref 98–107)
CO2: 15 MMOL/L (ref 20–31)
CREAT SERPL-MCNC: 0.58 MG/DL (ref 0.5–0.9)
FIO2: 90
GFR AFRICAN AMERICAN: >60 ML/MIN
GFR NON-AFRICAN AMERICAN: >60 ML/MIN
GFR SERPL CREATININE-BSD FRML MDRD: ABNORMAL ML/MIN/{1.73_M2}
GFR SERPL CREATININE-BSD FRML MDRD: ABNORMAL ML/MIN/{1.73_M2}
GLUCOSE BLD-MCNC: 79 MG/DL (ref 70–99)
HCT VFR BLD CALC: 21.8 % (ref 36.3–47.1)
HEMOGLOBIN: 7 G/DL (ref 11.9–15.1)
MAGNESIUM: 1.9 MG/DL (ref 1.6–2.6)
MCH RBC QN AUTO: 25.9 PG (ref 25.2–33.5)
MCHC RBC AUTO-ENTMCNC: 32.1 G/DL (ref 28.4–34.8)
MCV RBC AUTO: 80.7 FL (ref 82.6–102.9)
MODE: ABNORMAL
NEGATIVE BASE EXCESS, ART: 4 (ref 0–2)
NRBC AUTOMATED: 1.6 PER 100 WBC
O2 DEVICE/FLOW/%: ABNORMAL
PATIENT TEMP: ABNORMAL
PDW BLD-RTO: 16.2 % (ref 11.8–14.4)
PLATELET # BLD: ABNORMAL K/UL (ref 138–453)
PLATELET, FLUORESCENCE: NORMAL K/UL (ref 138–453)
PMV BLD AUTO: ABNORMAL FL (ref 8.1–13.5)
POC HCO3: 22.5 MMOL/L (ref 21–28)
POC LACTIC ACID: 0.68 MMOL/L (ref 0.56–1.39)
POC O2 SATURATION: 90 % (ref 94–98)
POC PCO2 TEMP: ABNORMAL MM HG
POC PCO2: 48.8 MM HG (ref 35–48)
POC PH TEMP: ABNORMAL
POC PH: 7.27 (ref 7.35–7.45)
POC PO2 TEMP: ABNORMAL MM HG
POC PO2: 67.3 MM HG (ref 83–108)
POSITIVE BASE EXCESS, ART: ABNORMAL (ref 0–3)
POTASSIUM SERPL-SCNC: 3.6 MMOL/L (ref 3.7–5.3)
RBC # BLD: 2.7 M/UL (ref 3.95–5.11)
SAMPLE SITE: ABNORMAL
SODIUM BLD-SCNC: 141 MMOL/L (ref 135–144)
TCO2 (CALC), ART: 24 MMOL/L (ref 22–29)
WBC # BLD: 15.8 K/UL (ref 3.5–11.3)

## 2020-02-20 PROCEDURE — 94761 N-INVAS EAR/PLS OXIMETRY MLT: CPT

## 2020-02-20 PROCEDURE — 76937 US GUIDE VASCULAR ACCESS: CPT

## 2020-02-20 PROCEDURE — 6360000002 HC RX W HCPCS: Performed by: INTERNAL MEDICINE

## 2020-02-20 PROCEDURE — 71045 X-RAY EXAM CHEST 1 VIEW: CPT

## 2020-02-20 PROCEDURE — 6360000002 HC RX W HCPCS: Performed by: PSYCHIATRY & NEUROLOGY

## 2020-02-20 PROCEDURE — 02HV33Z INSERTION OF INFUSION DEVICE INTO SUPERIOR VENA CAVA, PERCUTANEOUS APPROACH: ICD-10-PCS | Performed by: PSYCHIATRY & NEUROLOGY

## 2020-02-20 PROCEDURE — 87070 CULTURE OTHR SPECIMN AEROBIC: CPT

## 2020-02-20 PROCEDURE — 99232 SBSQ HOSP IP/OBS MODERATE 35: CPT | Performed by: INTERNAL MEDICINE

## 2020-02-20 PROCEDURE — 85027 COMPLETE CBC AUTOMATED: CPT

## 2020-02-20 PROCEDURE — 83605 ASSAY OF LACTIC ACID: CPT

## 2020-02-20 PROCEDURE — 85055 RETICULATED PLATELET ASSAY: CPT

## 2020-02-20 PROCEDURE — 2580000003 HC RX 258: Performed by: STUDENT IN AN ORGANIZED HEALTH CARE EDUCATION/TRAINING PROGRAM

## 2020-02-20 PROCEDURE — 6360000002 HC RX W HCPCS: Performed by: NURSE PRACTITIONER

## 2020-02-20 PROCEDURE — 2580000003 HC RX 258: Performed by: NURSE PRACTITIONER

## 2020-02-20 PROCEDURE — 0DJD8ZZ INSPECTION OF LOWER INTESTINAL TRACT, VIA NATURAL OR ARTIFICIAL OPENING ENDOSCOPIC: ICD-10-PCS | Performed by: INTERNAL MEDICINE

## 2020-02-20 PROCEDURE — 3609008400 HC SIGMOIDOSCOPY DIAGNOSTIC: Performed by: INTERNAL MEDICINE

## 2020-02-20 PROCEDURE — 6370000000 HC RX 637 (ALT 250 FOR IP): Performed by: STUDENT IN AN ORGANIZED HEALTH CARE EDUCATION/TRAINING PROGRAM

## 2020-02-20 PROCEDURE — 6370000000 HC RX 637 (ALT 250 FOR IP): Performed by: NURSE PRACTITIONER

## 2020-02-20 PROCEDURE — 45330 DIAGNOSTIC SIGMOIDOSCOPY: CPT | Performed by: INTERNAL MEDICINE

## 2020-02-20 PROCEDURE — 74018 RADEX ABDOMEN 1 VIEW: CPT

## 2020-02-20 PROCEDURE — 2580000003 HC RX 258: Performed by: PSYCHIATRY & NEUROLOGY

## 2020-02-20 PROCEDURE — 2580000003 HC RX 258: Performed by: INTERNAL MEDICINE

## 2020-02-20 PROCEDURE — 80048 BASIC METABOLIC PNL TOTAL CA: CPT

## 2020-02-20 PROCEDURE — 99291 CRITICAL CARE FIRST HOUR: CPT | Performed by: PSYCHIATRY & NEUROLOGY

## 2020-02-20 PROCEDURE — 3609017100 HC EGD: Performed by: INTERNAL MEDICINE

## 2020-02-20 PROCEDURE — 6360000002 HC RX W HCPCS: Performed by: STUDENT IN AN ORGANIZED HEALTH CARE EDUCATION/TRAINING PROGRAM

## 2020-02-20 PROCEDURE — 87205 SMEAR GRAM STAIN: CPT

## 2020-02-20 PROCEDURE — 0DJ08ZZ INSPECTION OF UPPER INTESTINAL TRACT, VIA NATURAL OR ARTIFICIAL OPENING ENDOSCOPIC: ICD-10-PCS | Performed by: INTERNAL MEDICINE

## 2020-02-20 PROCEDURE — 99291 CRITICAL CARE FIRST HOUR: CPT | Performed by: INTERNAL MEDICINE

## 2020-02-20 PROCEDURE — 2500000003 HC RX 250 WO HCPCS: Performed by: STUDENT IN AN ORGANIZED HEALTH CARE EDUCATION/TRAINING PROGRAM

## 2020-02-20 PROCEDURE — 82803 BLOOD GASES ANY COMBINATION: CPT

## 2020-02-20 PROCEDURE — 94002 VENT MGMT INPAT INIT DAY: CPT

## 2020-02-20 PROCEDURE — 93886 INTRACRANIAL COMPLETE STUDY: CPT

## 2020-02-20 PROCEDURE — 31720 CLEARANCE OF AIRWAYS: CPT

## 2020-02-20 PROCEDURE — C9113 INJ PANTOPRAZOLE SODIUM, VIA: HCPCS | Performed by: NURSE PRACTITIONER

## 2020-02-20 PROCEDURE — 94770 HC ETCO2 MONITOR DAILY: CPT

## 2020-02-20 PROCEDURE — 43235 EGD DIAGNOSTIC BRUSH WASH: CPT | Performed by: INTERNAL MEDICINE

## 2020-02-20 PROCEDURE — 2000000003 HC NEURO ICU R&B

## 2020-02-20 PROCEDURE — 36415 COLL VENOUS BLD VENIPUNCTURE: CPT

## 2020-02-20 PROCEDURE — 83735 ASSAY OF MAGNESIUM: CPT

## 2020-02-20 PROCEDURE — 36600 WITHDRAWAL OF ARTERIAL BLOOD: CPT

## 2020-02-20 PROCEDURE — 36569 INSJ PICC 5 YR+ W/O IMAGING: CPT

## 2020-02-20 PROCEDURE — 6360000002 HC RX W HCPCS

## 2020-02-20 PROCEDURE — C1751 CATH, INF, PER/CENT/MIDLINE: HCPCS

## 2020-02-20 RX ORDER — MAGNESIUM SULFATE 1 G/100ML
1 INJECTION INTRAVENOUS
Status: COMPLETED | OUTPATIENT
Start: 2020-02-20 | End: 2020-02-20

## 2020-02-20 RX ORDER — POTASSIUM CHLORIDE 7.45 MG/ML
10 INJECTION INTRAVENOUS
Status: COMPLETED | OUTPATIENT
Start: 2020-02-20 | End: 2020-02-20

## 2020-02-20 RX ORDER — FENTANYL CITRATE 50 UG/ML
50 INJECTION, SOLUTION INTRAMUSCULAR; INTRAVENOUS ONCE
Status: COMPLETED | OUTPATIENT
Start: 2020-02-20 | End: 2020-02-20

## 2020-02-20 RX ORDER — FUROSEMIDE 10 MG/ML
40 INJECTION INTRAMUSCULAR; INTRAVENOUS ONCE
Status: COMPLETED | OUTPATIENT
Start: 2020-02-20 | End: 2020-02-20

## 2020-02-20 RX ORDER — MIDAZOLAM HYDROCHLORIDE 1 MG/ML
2 INJECTION INTRAMUSCULAR; INTRAVENOUS ONCE
Status: DISCONTINUED | OUTPATIENT
Start: 2020-02-20 | End: 2020-02-23

## 2020-02-20 RX ORDER — FENTANYL CITRATE 50 UG/ML
50 INJECTION, SOLUTION INTRAMUSCULAR; INTRAVENOUS
Status: DISCONTINUED | OUTPATIENT
Start: 2020-02-20 | End: 2020-02-23

## 2020-02-20 RX ORDER — SODIUM CHLORIDE 0.9 % (FLUSH) 0.9 %
10 SYRINGE (ML) INJECTION EVERY 12 HOURS SCHEDULED
Status: DISCONTINUED | OUTPATIENT
Start: 2020-02-20 | End: 2020-02-20 | Stop reason: SDUPTHER

## 2020-02-20 RX ORDER — 0.9 % SODIUM CHLORIDE 0.9 %
500 INTRAVENOUS SOLUTION INTRAVENOUS ONCE
Status: COMPLETED | OUTPATIENT
Start: 2020-02-20 | End: 2020-02-20

## 2020-02-20 RX ORDER — SODIUM CHLORIDE 0.9 % (FLUSH) 0.9 %
10 SYRINGE (ML) INJECTION PRN
Status: DISCONTINUED | OUTPATIENT
Start: 2020-02-20 | End: 2020-02-20 | Stop reason: SDUPTHER

## 2020-02-20 RX ORDER — FENTANYL CITRATE 50 UG/ML
INJECTION, SOLUTION INTRAMUSCULAR; INTRAVENOUS
Status: COMPLETED
Start: 2020-02-20 | End: 2020-02-20

## 2020-02-20 RX ORDER — LIDOCAINE HYDROCHLORIDE 10 MG/ML
5 INJECTION, SOLUTION INFILTRATION; PERINEURAL ONCE
Status: DISCONTINUED | OUTPATIENT
Start: 2020-02-20 | End: 2020-03-03 | Stop reason: HOSPADM

## 2020-02-20 RX ORDER — FENTANYL CITRATE 50 UG/ML
INJECTION, SOLUTION INTRAMUSCULAR; INTRAVENOUS
Status: DISPENSED
Start: 2020-02-20 | End: 2020-02-21

## 2020-02-20 RX ORDER — PROPOFOL 10 MG/ML
INJECTION, EMULSION INTRAVENOUS
Status: DISPENSED
Start: 2020-02-20 | End: 2020-02-20

## 2020-02-20 RX ORDER — CALCIUM GLUCONATE 94 MG/ML
1 INJECTION, SOLUTION INTRAVENOUS ONCE
Status: DISCONTINUED | OUTPATIENT
Start: 2020-02-20 | End: 2020-02-20 | Stop reason: CLARIF

## 2020-02-20 RX ADMIN — POTASSIUM CHLORIDE 10 MEQ: 10 INJECTION, SOLUTION INTRAVENOUS at 10:37

## 2020-02-20 RX ADMIN — MIDAZOLAM 1 MG/HR: 5 INJECTION INTRAMUSCULAR; INTRAVENOUS at 09:45

## 2020-02-20 RX ADMIN — PIPERACILLIN AND TAZOBACTAM 4.5 G: 4; .5 INJECTION, POWDER, LYOPHILIZED, FOR SOLUTION INTRAVENOUS; PARENTERAL at 15:11

## 2020-02-20 RX ADMIN — SODIUM CHLORIDE, PRESERVATIVE FREE 10 ML: 5 INJECTION INTRAVENOUS at 10:05

## 2020-02-20 RX ADMIN — MAGNESIUM SULFATE HEPTAHYDRATE 1 G: 1 INJECTION, SOLUTION INTRAVENOUS at 10:37

## 2020-02-20 RX ADMIN — Medication 60 MG: at 01:05

## 2020-02-20 RX ADMIN — PIPERACILLIN AND TAZOBACTAM 4.5 G: 4; .5 INJECTION, POWDER, LYOPHILIZED, FOR SOLUTION INTRAVENOUS; PARENTERAL at 22:07

## 2020-02-20 RX ADMIN — FENTANYL CITRATE 50 MCG: 50 INJECTION, SOLUTION INTRAMUSCULAR; INTRAVENOUS at 17:08

## 2020-02-20 RX ADMIN — POTASSIUM CHLORIDE 10 MEQ: 10 INJECTION, SOLUTION INTRAVENOUS at 00:08

## 2020-02-20 RX ADMIN — FUROSEMIDE 40 MG: 10 INJECTION, SOLUTION INTRAVENOUS at 15:38

## 2020-02-20 RX ADMIN — SODIUM CHLORIDE 8 MG/HR: 9 INJECTION, SOLUTION INTRAVENOUS at 00:08

## 2020-02-20 RX ADMIN — MAGNESIUM SULFATE HEPTAHYDRATE 1 G: 1 INJECTION, SOLUTION INTRAVENOUS at 11:49

## 2020-02-20 RX ADMIN — SODIUM CHLORIDE, PRESERVATIVE FREE 10 ML: 5 INJECTION INTRAVENOUS at 21:50

## 2020-02-20 RX ADMIN — VANCOMYCIN HYDROCHLORIDE 750 MG: 10 INJECTION, POWDER, LYOPHILIZED, FOR SOLUTION INTRAVENOUS at 08:21

## 2020-02-20 RX ADMIN — ATORVASTATIN CALCIUM 80 MG: 80 TABLET, FILM COATED ORAL at 21:50

## 2020-02-20 RX ADMIN — Medication 60 MG: at 05:15

## 2020-02-20 RX ADMIN — SODIUM CHLORIDE 500 ML: 0.9 INJECTION, SOLUTION INTRAVENOUS at 10:52

## 2020-02-20 RX ADMIN — POTASSIUM CHLORIDE 10 MEQ: 10 INJECTION, SOLUTION INTRAVENOUS at 11:50

## 2020-02-20 RX ADMIN — VANCOMYCIN HYDROCHLORIDE 750 MG: 10 INJECTION, POWDER, LYOPHILIZED, FOR SOLUTION INTRAVENOUS at 18:19

## 2020-02-20 RX ADMIN — Medication 60 MG: at 21:50

## 2020-02-20 RX ADMIN — PIPERACILLIN AND TAZOBACTAM 4.5 G: 4; .5 INJECTION, POWDER, LYOPHILIZED, FOR SOLUTION INTRAVENOUS; PARENTERAL at 05:44

## 2020-02-20 RX ADMIN — IRON SUCROSE 200 MG: 20 INJECTION, SOLUTION INTRAVENOUS at 13:57

## 2020-02-20 RX ADMIN — SODIUM CHLORIDE, PRESERVATIVE FREE 10 ML: 5 INJECTION INTRAVENOUS at 21:44

## 2020-02-20 RX ADMIN — FENTANYL CITRATE 50 MCG: 50 INJECTION, SOLUTION INTRAMUSCULAR; INTRAVENOUS at 13:56

## 2020-02-20 RX ADMIN — Medication 1 G: at 08:21

## 2020-02-20 ASSESSMENT — PULMONARY FUNCTION TESTS
PIF_VALUE: 28
PIF_VALUE: 23
PIF_VALUE: 22
PIF_VALUE: 15
PIF_VALUE: 18
PIF_VALUE: 18
PIF_VALUE: 21
PIF_VALUE: 23
PIF_VALUE: 14
PIF_VALUE: 27
PIF_VALUE: 20
PIF_VALUE: 14
PIF_VALUE: 19
PIF_VALUE: 14
PIF_VALUE: 19
PIF_VALUE: 18
PIF_VALUE: 13
PIF_VALUE: 17

## 2020-02-20 ASSESSMENT — PAIN SCALES - GENERAL
PAINLEVEL_OUTOF10: 0
PAINLEVEL_OUTOF10: 4

## 2020-02-20 NOTE — CONSULTS
REASON FOR THE CONSULTATION:  Acute respiratory failure  HISTORY OF PRESENT ILLNESS:    Jennifer Whitman is a 77y.o. year old female here for evaluation of an acute intracranial aneurysm which has been quiet. There is some hematoma around aneurysm. Patient has been doing fairly well however for the last few days she has developed progressive infiltrate in the right midlung zone. The infiltrates have been progressively increasing has not been walled bullocks lung primarily towards the midlung zone but extending into the upper and lower zones as well. Patient pulmonary status deteriorated this morning requiring intubation she is on 80% supplemental O2. Gas exchange is stable. She does have some CO2 retention which is acute in nature. Patient does not have any fevers no yellow sputum the secretions from the airway are not excessive. She is confused and not, not communicating. There is a normal localized motor or sensory deficit. We have been asked to see the patient because of development of these infiltrates. Talk to the family patient has history of hypertension. He also may have had myocardial infarction few years back but they are not sure about that. It was diagnosed at [facility].  I could not get old records. He had a headache denies any history of heart failure in the form of pulmonary edema or pedal edema. No thromboembolic process. He has no history of previous stroke no history of thyroid dysfunction. He is not diabetic. Never have had any neurological problem before. Patient has not received any diuretics since development of this infiltrate she been on. Vancomycin 1 week. No sputum cultures are available no blood cultures are positive. Urine cultures are not positive. Patient does not have any history of urinary tract infections. Tolerating the vent well with gas exchange has been stable.     LUNG CANCER SCREENING     1. CRITERIA MET    []     CT ORDERED  [] 2. CRITERIA NOT MET   [x]      3. REFUSED                    []    Non-smoker  REASON CRITERIA NOT MET     1. SMOKING LESS THAN 30 PY  []      2. AGE LESS THAN 55 or GREATER 77 YEARS  []      3. QUIT SMOKING 15 YEARS OR GREATER   []      4. RECENT CT WITH IN 11 MONTHS    []      5. LIFE EXPECTANCY < 5 YEARS   []      6.  SIGNS  AND SYMPTOMS OF LUNG CANCER   []         Immunization   Immunization History   Administered Date(s) Administered    Influenza Virus Vaccine 10/10/2017, 10/09/2018, 10/18/2019    Pneumococcal Conjugate 13-valent ([id number]) 2019    Pneumococcal Polysaccharide ([id number]) 2016, 2018        Pneumococcal Vaccine     [x] Up to date    [] Indicated   [] Refused  [] Contraindicated       Influenza Vaccine   [x] Up to date    [] Indicated   [] Refused  [] Contraindicated          PAST MEDICAL HISTORY:       Diagnosis Date    Acid reflux     Arthritis     Cancer ([facility] [address]     cervical     Family history of breast cancer in first degree relative     sister    Gall stones     hx of    Hard of hearing     bilateral hearing aids    History of cervical cancer     Hypertension     Lateral meniscus tear     left knee    Wears dentures     Wears glasses          Family History:       Problem Relation Age of Onset    Heart Attack Father     Cancer Mother         stomach cancer    Breast Cancer Sister         one sister with breast cancer    Colon Cancer Neg Hx     Diabetes Neg Hx     Eclampsia Neg Hx     Hypertension Neg Hx     Ovarian Cancer Neg Hx      Labor Neg Hx     Spont Abortions Neg Hx     Stroke Neg Hx     Liver Cancer Neg Hx        SURGICAL HISTORY:   Past Surgical History:   Procedure Laterality Date    BREAST BIOPSY      LEFT(BENIGN)    CHOLECYSTECTOMY      COLONOSCOPY      Negative    COLONOSCOPY N/A 2019    COLONOSCOPY POLYPECTOMY HOT SNARE, COLD POLYPECTOMY performed by Ugo Epperson MD at [address] [facility] Left Wrist    FOOT SURGERY Bilateral     FOR CALLOSIS    HERNIA REPAIR      UMBILICAL     INNER EAR SURGERY      Right     KNEE ARTHROSCOPY  2000, 2005    LEFT    KNEE ARTHROSCOPY Left 4/3/2019    KNEE ARTHROSCOPY DIAGNOSTIC performed by Lillie Hurst MD at [facility] Right 9-4-14    BAHA- bone anchored hearing aid    OTHER SURGICAL HISTORY  02/12/2020    aneurysm rt SENTHIL, with 6 coils placed    TONSILLECTOMY AND ADENOIDECTOMY  6639    UMBILICAL HERNIA REPAIR      VARICOSE VEIN SURGERY Bilateral            SOCIAL AND OCCUPATIONAL HEALTH:      There is no history of TB or TB exposure. There is no asbestos or silica dust exposure. The patient reports no coal, foundry, quarry or [employer] exposure. Travel history reveals negative  There is no history of recreational or IV drug use. There is no hot tube exposure. Pets no    Occupational history no significant occupational history. TOBACCO:   reports that she has never smoked. She has never used smokeless tobacco.  ETOH:   reports current alcohol use. ALLERGIES:      Allergies   Allergen Reactions    Lisinopril Other (See Comments)    Losartan      Fatigue    Procardia [Nifedipine] Other (See Comments)         Home Meds:   Prior to Admission medications    Medication Sig Start Date End Date Taking?  Authorizing Provider   pramipexole (MIRAPEX) 0.125 MG tablet TAKE ONE TABLET BY MOUTH NIGHTLY 2/19/20   Ofelia Heard MD   diclofenac (VOLTAREN) 75 MG EC tablet Take 1 tablet by mouth 2 times daily (with meals) for 14 days 1/21/20 2/4/20  Minerva Galvez MD   hydrALAZINE (APRESOLINE) 50 MG tablet TAKE 1 TABLET BY MOUTH 4 TIMES A DAY 12/26/19   Ofelia Heard MD   meloxicam (MOBIC) 15 MG tablet TAKE 1 TABLET BY MOUTH ONE TIME A DAY 12/12/19   Hubert Claude, MD   carvedilol (COREG) 3.125 MG tablet Take 1 tablet by mouth 2 times daily 11/5/19   Ofelia Heard MD   atorvastatin (LIPITOR) 40 MG tablet Take 1 tablet urticaria and drug reactions  ENDOCRINE:  negative for heat intolerance, cold intolerance, tremor, weight changes and change in bowel habits no history of diabetes  MUSCULOSKELETAL:  negative for  myalgias, arthralgias, pain, joint swelling, stiff joints and decreased range of motion does have degenerative arthritis have had a intracranial aneurysmal bleed no localized paralysis.   NEUROLOGICAL:  negative for headaches, dizziness, seizures, memory problems, speech problems, visual disturbance and coordination problems  BEHAVIOR/PSYCH:  negative for poor appetite, increased appetite, decreased sleep, increased sleep, decreased energy level, increased energy level and poor concentration agitated confused  Skin no rash no dermatitis completed and negative  Vitals:  BP (!) 116/53   Pulse 80   Temp 100.4 °F (38 °C) (Axillary)   Resp 23   Ht 5' 6\" (1.676 m)   Wt 108 lb (49 kg)   SpO2 100%   BMI 17.43 kg/m²     PHYSICAL EXAM:  General Appearance:    Alert, cooperative, no distress, appears stated age   Head:    Normocephalic, without obvious abnormality, atraumatic      Eyes:    PERRL, conjunctiva/corneas clear, EOM's intact no Marty's syndrome pupils are equal reactive no jaundice   Ears:    Normal  external ear canals, both ears no bleeding from the ears no nasal polyps orally intubated   Nose:   Nares normal, septum midline, mucosa normal, no drainage        or sinus tenderness   Throat:   Lips, mucosa, and tongue normal; teeth and gums normal no excessive secretions   Neck:   Supple, symmetrical, trachea midline, no adenopathy;     thyroid:  no enlargement/tenderness/nodules; no carotid    bruit ,JVD   Back:     Symmetric, no curvature, ROM normal, no CVA tenderness   Lungs:    AP diameter is not increased percussion note is normally resonant breathing vesicular expiration not prolonged no rales rhonchi are audible no pleural friction rub is audible    Not elevated hepatojugular reflux negative    Chest Wall: No tenderness or deformity      Heart:    Regular rate and rhythm, S1 and S2 normal, no murmur, rub        or gallop no rvh                           Abdomen:                                                 Pulses:                              Skin:                  Lymph nodes:                    Neurologic:                  Soft, non-tender, bowel sounds active all four quadrants,     no masses, no organomegaly         2+ and symmetric all extremities     Skin color, texture, turgor normal, no rashes or lesions       Cervical, supraclavicular not enlarged or matted or tender      CNII-XII intact, normal strength 5/5 . Sensation grossly normal  and reflexes normal 2+  throughout     Clubbing No  Lower ext edema  BC is elevated to 15.8 hemoglobin is 7 g better than yesterday. Serum electrolytes reveal no hyponatremia renal function is stable no hyperglycemia lipase is normal  Upper ext edemaabsent         Musculoskeletal no synovitis. No joint swelling or tenderness        CBC:   Recent Labs     02/18/20  0600 02/18/20  1953 02/19/20  0440 02/20/20  0526   WBC 15.2*  --  14.7* 15.8*   HGB 6.0* 6.2* 5.6* 7.0*     --  242 See Reflexed IPF Result     BMP:    Recent Labs     02/18/20  0600 02/18/20  1023 02/19/20  0440 02/20/20  0526   * 140 141 141   K 4.0  --  3.3* 3.6*   *  --  115* 112*   CO2 16*  --  15* 15*   BUN 11  --  11 9   CREATININE 0.72  --  0.67 0.58   GLUCOSE 97  --  95 79     Hepatic: No results for input(s): AST, ALT, ALB, BILITOT, ALKPHOS in the last 72 hours. Amylase: No results found for: AMYLASE  Lipase:   Lab Results   Component Value Date    LIPASE 99 11/18/2019     Troponin: No results for input(s): TROPONINI in the last 72 hours. BNP: No results for input(s): BNP in the last 72 hours. Lipids: No results for input(s): CHOL, HDL in the last 72 hours.     Invalid input(s): LDLCALCU  ABGs: No results found for: PHART, PO2ART, KBO4QHR  INR: No results for input(s): INR in the 90%.  We will keep the present P. One could increase the PEEP but because of her intracranial process we have to be cautious in raising the PEEP levels as well. However patient can easily tolerate high concentration oxygen for the first 24 hours. Well evaluated sputum cultures. Blood cultures have been negative. We will continue prophylaxis    DVT and peptic ulcer disease. Colonoscopy and upper endoscopy. Did reveal some degree of esophagitis. There is no active bleeding going on at this time. Her anemia her hemoglobin however is considerably low although is not going down anymore and will continue to follow it. No need for blood replacement at this time.     Very much for asking us to participate in her care dictated Dr. Lianet Young MD dictation number thank you      [unfilled]    Medications Discontinued During This Encounter   Medication Reason    niCARdipine (CARDENE) 25 mg in dextrose 5 % 250 mL infusion     niCARdipine (CARDENE) 25 mg in dextrose 5 % 250 mL infusion     acetaminophen (TYLENOL) tablet 650 mg     niCARdipine (CARDENE) 25 mg in sodium chloride 0.9 % 250 mL infusion     0.9 % sodium chloride infusion     sodium chloride 2 % infusion     hydrALAZINE (APRESOLINE) tablet 25 mg     calcium gluconate 10 % injection 1 g     levetiracetam (KEPPRA) 500 mg/100 mL IVPB     levETIRAcetam (KEPPRA) tablet 500 mg     morphine (PF) injection 2 mg     magnesium sulfate 2 g in dextrose 5 % 100 mL IVPB     potassium chloride 10 mEq/100 mL IVPB (Peripheral Line)     niMODipine (NIMOTOP) capsule 60 mg     potassium chloride 10 mEq/100 mL IVPB (Peripheral Line) Formulary change    calcium gluconate 10 % injection 1 g Formulary change    atorvastatin (LIPITOR) tablet 40 mg     sodium chloride 2 % infusion     folic acid 1 mg, thiamine (B-1) 100 mg in dextrose 5 % 50 mL IVPB     calcium gluconate 10 % injection 1 g     calcium gluconate 10 % injection 1 g     hydrALAZINE (APRESOLINE) tablet 25 mg     sodium chloride tablet 1 g     magnesium sulfate 1 g in dextrose 5% 100 mL IVPB     calcium gluconate 10 % injection 1 g Formulary change    midazolam (VERSED) 100 mg in dextrose 5% 100 mL infusion     sodium chloride flush 0.9 % injection 10 mL DUPLICATE    sodium chloride flush 0.9 % injection 10 mL Carmencita Pownal received counseling on the following healthy behaviors: nutrition, exercise and medication adherence    Patient given educational materials : see patient instruction       Discussed use, benefit, and side effects of prescribed medications. Barriers to medication compliance addressed. All patient questions answered. Pt voiced understanding. I hope this updates you on my evaluation and clinical thinking. Thank you for allowing me to participate in his care. Sincerely,      Electronically signed by Dorcas Choudhury MD on   2/20/20 at 3:28 PM       Please note that this chart was generated using voice recognition Dragon dictation software. Although every effort was made to ensure the accuracy of this automated transcription, some errors in transcription may have occurred.

## 2020-02-20 NOTE — PROGRESS NOTES
Physical Therapy  DATE: 2020    NAME: Yanick Baker  MRN: [id number]   : 1950    Patient not seen this date for Physical Therapy due to:  [] Blood transfusion in progress  [] Hemodialysis  []  Patient Declined  [] Spine Precautions   [] Strict Bedrest  [] Surgery/ Procedure  [] Testing      [x] Other Pt with HgB 7.0 and currently on bipap, will check back in the PM as time allows       [] PT being discontinued at this time. Patient independent. No further needs. [] PT being discontinued at this time as the patient has been transferred to palliative care. No further needs.     Luis Fernando Yeh, PTA

## 2020-02-20 NOTE — PROGRESS NOTES
Breast biopsy (1980); Foot surgery (Bilateral); other surgical history (Right, 9-4-14); Umbilical hernia repair; hernia repair; Varicose vein surgery (Bilateral); Knee arthroscopy (Left, 4/3/2019); Colonoscopy (N/A, 5/31/2019); and other surgical history (02/12/2020). Medications:    Prior to Admission medications    Medication Sig Start Date End Date Taking?  Authorizing Provider   pramipexole (MIRAPEX) 0.125 MG tablet TAKE ONE TABLET BY MOUTH NIGHTLY 2/19/20   Lupillo Bowman MD   diclofenac (VOLTAREN) 75 MG EC tablet Take 1 tablet by mouth 2 times daily (with meals) for 14 days 1/21/20 2/4/20  Amisha Galvez MD   hydrALAZINE (APRESOLINE) 50 MG tablet TAKE 1 TABLET BY MOUTH 4 TIMES A DAY 12/26/19   Lupillo Bowman MD   meloxicam (MOBIC) 15 MG tablet TAKE 1 TABLET BY MOUTH ONE TIME A DAY 12/12/19   Eli Arias MD   carvedilol (COREG) 3.125 MG tablet Take 1 tablet by mouth 2 times daily 11/5/19   Lupillo Bowman MD   atorvastatin (LIPITOR) 40 MG tablet Take 1 tablet by mouth nightly 10/15/19   Lupillo Bowman MD   acetaminophen (TYLENOL) 325 MG tablet Take 325 mg by mouth every 6 hours as needed for Pain    Historical Provider, MD   vitamin D (CHOLECALCIFEROL) 1000 UNIT TABS tablet Take 1,000 Units by mouth 3 times daily    Historical Provider, MD   meloxicam (MOBIC) 15 MG tablet Take 15 mg by mouth daily    Historical Provider, MD   aspirin 81 MG EC tablet Take 1 tablet by mouth daily 6/4/19   Ellen Pond MD   Multiple Vitamins-Minerals (THERAPEUTIC MULTIVITAMIN-MINERALS) tablet Take 1 tablet by mouth daily    Historical Provider, MD     Current Facility-Administered Medications   Medication Dose Route Frequency Provider Last Rate Last Dose    propofol 1000 MG/100ML injection             midazolam (VERSED) 100 mg in sodium chloride 0.9 % 100 mL infusion  1 mg/hr Intravenous Continuous Julio C Allen MD 2 mL/hr at 02/20/20 1150 2 mg/hr at 02/20/20 1150    fentaNYL (SUBLIMAZE) 100 MCG/2ML injection 20 0901    [Held by provider] docusate sodium (COLACE) capsule 100 mg  100 mg Oral Daily PRN Edilberto Gordon MD        sodium chloride flush 0.9 % injection 10 mL  10 mL Intravenous 2 times per day Jabier Drilling, APRN - CNP   10 mL at 20 1005    sodium chloride flush 0.9 % injection 10 mL  10 mL Intravenous PRN Jabier Drilling, APRN - CNP        acetaminophen (TYLENOL) tablet 650 mg  650 mg Oral Q4H PRN Jabier Drilling, APRN - CNP   650 mg at 20 1332    ondansetron (ZOFRAN) injection 4 mg  4 mg Intravenous Q6H PRN Jabier Drilling, APRN - CNP   4 mg at 20 1121       Allergies:  Lisinopril; Losartan; and Procardia [nifedipine]    Social History:   reports that she has never smoked. She has never used smokeless tobacco. She reports current alcohol use. She reports that she does not use drugs. Family History: family history includes Breast Cancer in her sister; Cancer in her mother; Heart Attack in her father.     REVIEW OF SYSTEMS:    Review of system could not be obtained as patient is intubated and sedated    PHYSICAL EXAM:      BP (!) 120/42   Pulse 83   Temp 100.4 °F (38 °C) (Axillary)   Resp 27   Ht 5' 6\" (1.676 m)   Wt 108 lb (49 kg)   SpO2 100%   BMI 17.43 kg/m²    Temp (24hrs), Av.9 °F (37.2 °C), Min:98.1 °F (36.7 °C), Max:100.4 °F (38 °C)  General appearance -intubated and sedated  Neck - supple, no significant adenopathy   Lymphatics - no palpable lymphadenopathy, no hepatosplenomegaly   Chest - clear to auscultation, no wheezes, rales or rhonchi, symmetric air entry   Heart - normal rate, regular rhythm, normal S1, S2, no murmurs  Abdomen - soft, nontender, nondistended, no masses or organomegaly   Neurological - sedated intubated  Musculoskeletal - no joint tenderness, deformity or swelling   Extremities - peripheral pulses normal, no pedal edema, no clubbing or cyanosis   Skin - normal coloration and turgor, no rashes, no suspicious skin lesions noted ,    DATA: aneurysm ([facility] [address] [I60.6] 02/12/2020    Status post coil embolization of cerebral aneurysm [Z98.890] 02/12/2020       IMPRESSION:   1. Acute CVA: with ruptured right SENTHIL 2 mm aneurysm. Patient underwent primary coil embolization 2/12/2020.   2.  Anemia  3. Bilateral PNA:  Broad Spectrum Antibiotics     RECOMMENDATIONS:  1. I reviewed th albs, imaging studies diagnosis and treatment plan with patient  2. Start IV iron, continue for 4 more days  3. She got one dose of aranesp 2/18  4. No hemolysis  5. Hb improved 56 to 7.0  6. She had a colonoscopy today, no active bleeding noted but external hemorrhoids were seen  7. Monitor counts closely  8.  other care asper primary team      Discussed with patient and Nurse. Thank you for asking us to see this patient. Shai Samuel MD  Hematologist/Medical Oncologist    Cell: [phone]    This note is created with the assistance of a speech recognition program.  While intending to generate a document that actually reflects the content of the visit, the document can still have some errors including those of syntax and sound a like substitutions which may escape proof reading. It such instances, actual meaning can be extrapolated by contextual diversion.

## 2020-02-20 NOTE — PROGRESS NOTES
Daily Progress Note  Neuro Critical Care    Patient Name: Phyllis Sanchez  Patient : 1950  Room/Bed: 5704/5565-14  Code Status: FULL  Allergies: Allergies   Allergen Reactions    Lisinopril Other (See Comments)    Losartan      Fatigue    Procardia [Nifedipine] Other (See Comments)       CHIEF COMPLAINT:      Shortness of breath     [address] Course:   : To angio where she underwent coil embolization of ruptured right A2 SENTHIL aneurysm resulting in complete obliteration. : CT Head showed bilateral SAH with new intraventricular extension, developing hydrocephalus, interval worsening of diffuse cerebral edema, significantly increased hyperattenuation in bilateral medial frontal regions; increased hemorrhage vs contrast. Started on 2% hypertonic saline with goal sodium 150. Neurosurgery following for EVD watch. : More alert. TCD normal. Continues of 2%. CT Head showed decreased SAH, subtle rightward shift. 2/15: Hypoxic overnight with increasing O2 requirements. Placed on non-rebreather with continued desaturations. CT chest PE protocol showed severe dense consolidations in the bilateral lower lobes suggestive of severe pneumonia, moderate bilateral pleural effusions. Given 20mg IVP Lasix x1. Started on Vanc and Zosyn. Placed on biPAP with improvements. Transitioned to high flow O2 in the AM.  Urine culture +E. Coli. Blood culture sent. Mobility, IS, and acapella encouraged. : hypoxic again overnight, put on high flow, cont on vanc and zosyn. On 2% hypertonic with near goal Na, goal 145. Komal Cuevas Echo showed EF of 58%. : pt hypoxic throughout day on high flow, Inc. WOB. HH 6.0 refusing Blood d/t being Yarsanism. H/O consulted, blood draws minimum, fluids, iron. Full Code. Stool Occult Positive. : Art line DC d/t oozing, sodium checks daily, minimize blood draws.   H&H 6.0, started on Protonix drip per GI, given first dose of Aranesp, begin iron infusion  : H&H 5.6, C. difficile negative, NM RBC scan neg.   : HH inc to 7.0, intubated. Last 24h:   tachypneic up to 40s, on Bipap. Gross GI bleeding. Tachy low 100s improved currently. HH up to 7.0. NM scan negative. GI plan for scope. Home Hydralazine held yesterday SBPs in 110-120.      CURRENT MEDICATIONS:  SCHEDULED MEDICATIONS:   propofol        sodium chloride  500 mL Intravenous Once    iron sucrose  200 mg Intravenous Daily    atorvastatin  80 mg Oral Nightly    [Held by provider] sennosides-docusate sodium  2 tablet Oral Daily    niMODipine  60 mg Oral 6 times per day    vancomycin (VANCOCIN) intermittent dosing (placeholder)   Other RX Placeholder    vancomycin  750 mg Intravenous Q12H    sodium chloride flush  10 mL Intravenous 2 times per day    piperacillin-tazobactam  4.5 g Intravenous Q8H    ferrous sulfate  325 mg Oral Daily with breakfast    [Held by provider] enoxaparin  40 mg Subcutaneous Daily    megestrol  800 mg Oral Daily    sodium chloride flush  10 mL Intravenous 2 times per day     CONTINUOUS INFUSIONS:   midazolam 5 mg/hr (20 1046)    pantoprozole (PROTONIX) infusion 8 mg/hr (20 0008)     PRN MEDICATIONS:   heparin flush, [Held by provider] magnesium hydroxide, sodium chloride flush, [Held by provider] docusate sodium, sodium chloride flush, acetaminophen, ondansetron    VITALS:  Temperature Range: Temp: 100.4 °F (38 °C) Temp  Av.9 °F (37.2 °C)  Min: 98.1 °F (36.7 °C)  Max: 100.4 °F (38 °C)  BP Range: Systolic (83UNU), YFA:762 , Min:105 , JWE:265     Diastolic (34WHV), MATI:64, Min:42, Max:94    Pulse Range: Pulse  Av.9  Min: 85  Max: 131  Respiration Range: Resp  Av.4  Min: 17  Max: 40  Current Pulse Ox: SpO2: 100 %  24HR Pulse Ox Range: SpO2  Av %  Min: 90 %  Max: 100 %  Patient Vitals for the past 12 hrs:   BP Temp Temp src Pulse Resp SpO2   20 1124 -- -- -- 91 27 100 %   20 0947 -- -- -- -- 22 96 %   20 Subtle rightward shift is described above at the level of the hemorrhage. Ct Head Wo Contrast  Result Date: 2/13/2020  1. Interval postsurgical changes of anterior cerebral artery aneurysm coil embolization. Small amount of pneumocephalus adjacent to the embolization coils. 2.  Significantly increased hyperattenuation in the bilateral medial frontal regions centered around the embolization coils could represent increased hemorrhage and/or post angiographic contrast staining. Continued attention on follow-up recommended. 3.  Bilateral acute subarachnoid hemorrhage. New intraventricular extension of hemorrhage with hyperdense blood products throughout the ventricular system, most pronounced in the right lateral ventricle. Mild diffuse ventricular prominence suggest developing hydrocephalus. 4. Interval worsening of diffuse cerebral edema and sulcal effacement which raises concern for increasing intracranial pressure. No significant midline shift or large territorial infarct. Critical results were called by Dr. Teresa Mark Sessions to [address] [place] [address] on 2/13/2020 at 04:58. Ct Head Wo Contrast  Result Date: 2/12/2020  Redemonstration of subarachnoid hemorrhage that is not significantly changed compared to prior exam. Findings were discussed with Dr. Sherry Moreira At 2:30 pm on 2/12/2020. Ct Head Wo Contrast  Result Date: 2/12/2020  Subarachnoid hemorrhage predominates in the anterior interhemispheric fissure, as described. No definite intraparenchymal component identified. Findings were discussed with Nerissa Lai at 12:20 pm on 2/12/2020. Ct Abdomen Pelvis W Iv Contrast  Result Date: 2/5/2020  1. No acute intra-abdominal or intrapelvic process. Specifically, no CT scan evidence of acute pancreatitis or complications related to pancreatitis. 2. Status post cholecystectomy.  3. Subtle ground-glass opacity in the right lower lobe appears slightly more conspicuous than on the 2007 exam.  This may simply be related to a minor scar. Minor infectious or inflammatory process could not be entirely excluded. Consider follow-up chest CT in approximately 3 months to ensure stability/resolution. 4. Normal appendix. Xr Chest Portable  Result Date: 2/16/2020  Multifocal airspace opacities most confluent within the right infrahilar region/right lower lobe suggestive of pneumonia. Suspect mild interstitial pulmonary edema. Pleural effusions appear less conspicuous on today's exam. Follow-up to assure resolution of the airspace opacities recommended following medical treatment. Ct Chest Pulmonary Embolism W Contrast  Result Date: 2/15/2020  1. Note: Study significantly limited by patient breathing motion related artifact. 2. No definitive evidence of acute pulmonary embolism. 3. Bilateral lower lung lobe severe dense consolidation consistent with severe pneumonia. 4. Adjacent moderate bilateral layering posterior pleural effusions, as discussed above. 5. Moderate cardiomegaly. 6. Mild pericardial effusion. Recommend clinical correlation. 7. Cholecystectomy. Cta Head W Contrast  Result Date: 2/12/2020  Demonstration of a 2 mm aneurysm arising from the right anterior cerebral artery within the anterior interhemispheric fissure region. Findings were discussed with Dr. Marisela Long At 2:36 pm on 2/12/2020. Vl Transcranial Doppler Complete  Result Date: 2/13/2020  Conclusions   Summary   Normal bilateral transcranial Doppler. Labs and Images reviewed with:  [] Dr. Ej Fulton. Lauro    [x] Dr. Tenzin Espinoza  [] Dr. Yana Ramos  [] There are no new interval images to review. PHYSICAL EXAM     PRIOR TO INTUBATION    CONSTITUTIONAL:  Alert and oriented x 3, somnolent but arousable, mod resp distress w/ acc. Muscle use. . GCS 15. Nontoxic. No dysarthria. No aphasia.    HEAD:  normocephalic, atraumatic    EYES:  PERRL, EOMI   ENT:  moist mucous membranes   NECK:  supple, symmetric   LUNGS:  Equal air entry bilaterally- edema, improved  - Bilateral pneumonia, likely secondary to aspiration (patient vomited multiple times)  - Continue Vancomycin and Zosyn  - Continue high flow nasal canula, BiPAP if needed  - Encourage mobilization, incentive spirometry, Acapella  - Chest PT  - CXR: stable R>L pneumonia     RENAL/FLUID/ELECTROLYTE:  - Normal renal functioning   - BUN 9/ Creatinine 0.58  - Monitor I&O; voiding  - DC hypertonic  - Salt tabs 1g BID to prevent further pulmonary edema with 2%  - Sodium checks to QD  - Na goal > 140  - Goal Mag >2.5, given 2 g today  - Hypokalemia, K 3.6, replaced PO   - Daily BMP     GI/NUTRITION:  NUTRITION:  -NPO   -Increased bowel movements yesterday and overnight  -Hold bowel regimen, low concern for C. difficile at this time  - GI prophylaxis: Protonix gtt     ID:  - Afebrile  - WBC 15.8 stable  - C diff negative   - Bilateral lobe pneumonia  - UTI, culture + E.coli  - Continue Vancomycin and Zosyn for now  - Continue to monitor for fevers  - Daily CBC     HEME:   - Anemia likely multifactorial, iron deficiency  - H&H 7.0 (5.6), patient refusing blood transfusion due to being Jain  -Heme/Onc:    -Start IV Iron   -Aranesp x1 on 2/18   -r/o hemolysis/GIB   -Peripheral blood smear  - Stool Occult Positive.   - GI:   -protonix bolus and gtt   - Scope today  -NM scan neg  - +gross blood in stool  -B12 and folate normal  -Decreased lab draw frequency  - Continue Iron 325mg QD  - Daily CBC  - Lovenox PPX     ENDOCRINE:  - Continue to monitor blood glucose, goal <180  - No history of DM 2, hemoglobin A1C 5.6     OTHER:  - PT/OT/ST     PROPHYLAXIS:  Stress ulcer: Protonix gtt     DVT PROPHYLAXIS:  - SCD sleeves - Thigh High   - Lovenox PPX    DISPOSITION:  I had a lengthy discussion with patient and her daughters and they are refusing blood product due to being Jain.   I discussed with him the risks and need for blood at this time and potential complications if she does not do so.    [x] To remain ICU:   [] OK for out of ICU from Neuro Critical Care standpoint    We will continue to follow along. For any changes in exam or patient status please contact Neuro Critical Care.     Julieta Ly DO  Neuro Critical Care  Pager [phone]  2/20/2020     11:28 AM

## 2020-02-20 NOTE — PROGRESS NOTES
ENDOVASCULAR NEUROSURGERY PROGRESS NOTE  2/20/2020 10:53 AM  Subjective:   Admit Date: 2/12/2020  PCP: Emi Fischer MD    Patient was intubated this morning due to respiratory distress. Objective:   Vitals: BP (!) 120/42   Pulse 116   Temp 100.4 °F (38 °C) (Axillary)   Resp 22   Ht 5' 6\" (1.676 m)   Wt 108 lb (49 kg)   SpO2 96%   BMI 17.43 kg/m²     Exam is limited due to intubation and sedation. General appearance: Now intubated. Lungs: Intubated. Heart: RRR      Neuro exam: He is intubated. She did not follow commands. She did not open her eyes to noxious stimuli. Withdrawing both upper extremities. No withdrawing in lower extremities.     Medications and labs:   Scheduled Meds:   propofol        sodium chloride  500 mL Intravenous Once    iron sucrose  200 mg Intravenous Daily    atorvastatin  80 mg Oral Nightly    [Held by provider] sennosides-docusate sodium  2 tablet Oral Daily    niMODipine  60 mg Oral 6 times per day    vancomycin (VANCOCIN) intermittent dosing (placeholder)   Other RX Placeholder    vancomycin  750 mg Intravenous Q12H    sodium chloride flush  10 mL Intravenous 2 times per day    piperacillin-tazobactam  4.5 g Intravenous Q8H    ferrous sulfate  325 mg Oral Daily with breakfast    [Held by provider] enoxaparin  40 mg Subcutaneous Daily    megestrol  800 mg Oral Daily    sodium chloride flush  10 mL Intravenous 2 times per day     Continuous Infusions:   midazolam 5 mg/hr (02/20/20 1046)    pantoprozole (PROTONIX) infusion 8 mg/hr (02/20/20 0008)     CBC:   Recent Labs     02/18/20  0600 02/18/20  1953 02/19/20 0440 02/20/20  0526   WBC 15.2*  --  14.7* 15.8*   HGB 6.0* 6.2* 5.6* 7.0*     --  242 See Reflexed IPF Result     BMP:    Recent Labs     02/18/20  0600 02/18/20  1023 02/19/20  0440 02/20/20  0526   * 140 141 141   K 4.0  --  3.3* 3.6*   *  --  115* 112*   CO2 16*  --  15* 15*   BUN 11  --  11 9   CREATININE 0.72  --  0.67 0. 58   GLUCOSE 97  --  95 79     Hepatic:   No results for input(s): AST, ALT, ALB, BILITOT, ALKPHOS in the last 72 hours. Troponin: No results for input(s): TROPONINI in the last 72 hours. BNP: No results for input(s): BNP in the last 72 hours. Lipids:   No results for input(s): CHOL, HDL in the last 72 hours. Invalid input(s): LDLCALCU  INR:   No results for input(s): INR in the last 72 hours. Assessment and Recommendations:   77years old female with ruptured right SENTHIL 2 mm aneurysm, Em and Jeff 3 modified Jane scale 3. Patient underwent primary coil embolization 2/12/2020. Postprocedure CTA head shows some increase in the hemorrhage around the aneurysm region, follow-up CT head stable with mild mass-effect and mild hydrocephalus with third ventricle IVH 2/14/2020. Patient exam is stable    Repeated CTA head and neck 2/15/2020 with vasospasm however patient exam is improving. TCD 2/17: Normal bilateral transcranial Doppler. Blood pressure systolic goal per NICU to assess for vasospasm, permissive BP  Keep magnesium above 2.5  Thiamine  Lipitor 80 mg daily  Nimodipine therapy. Na checks per NICU. TCD daily.   Chemical DVT ppx  Possible acquired pneumonia management    Bill Adame MD  Stroke, [facility]  [id number] [place]  Electronically signed 2/20/2020 at 10:53 AM

## 2020-02-20 NOTE — PLAN OF CARE
Pain level assessed q shift and PRN. Patient able to state tolerable level of pain. PRN meds given per patient request. Pain reassessed after pain interventions. Will continue to monitor patient. Pt assessed as a fall risk this shift. Remains free from falls and accidental injury at this time. Fall precautions in place, including falling star sign and fall risk band on pt. Floor free from obstacles, and bed is locked and in lowest position. Adequate lighting provided. Pt encouraged to call before getting Out Of Bed for any need. Bed alarm activated. Will continue to monitor needs during hourly rounding, and reinforce education on use of call light.

## 2020-02-20 NOTE — OP NOTE
subsequently initiated. FINDINGS:   Esophagus: The esophagus was inspected to the Z-line. The endoscopic exam showed moderate to severe esophagitis with erosive changes extending from the mid esophagus to the GE junction (LA grade C/D) no signs of active bleeding, no residual blood identified. No indications for therapeutics. .     Stomach: The stomach was inspected in both forward and retroflex fashion and was appropriately distensible. The cardia, fundus, incisura, antrum and pylorus were identified via direct visualization. The endoscopic exam showed residual bilious fluid, no ulcerations, no erosions, no source of hemorrhage or GI bleeding. .     Duodenum: The proximal small bowel was inspected through the bulb, sweep, and second portion of the duodenum. The endoscopic exam showed normal-appearing duodenum. IMPRESSION:    No signs of active bleeding    Moderate to severe esophagitis (LA grade C/D)- low risk from bleeding standpoint  No gastric ulcerations or erosions present   Normal-appearing duodenum      RECOMMENDATIONS:   1) recommend patient be continued on IV PPI twice daily during her duration in ICU followed by transition to p.o. PPI twice daily once out of the ICU which is to be continued for a total of 8 weeks. 2) proceed with flexible sigmoidoscopy      [facility] Gastroenter[facility]         [facility]     [facility] ENDOSCOPY     BEDSIDE FLEXIBLE SIGMOIDOSCOPY    PROCEDURE DATE: 02/20/20    REFERRING PHYSICIAN: No ref. provider found     PRIMARY CARE PROVIDER: Johanna Powell MD    ATTENDING PHYSICIAN: Juan A Schaffer MD     HISTORY: Ms. Anson Llamas is a 77 y.o. female who presents to the [facility] endoscopy unit for colonoscopy. The patient's clinical history is remarkable for history as detailed above. She is currently medically stable and appropriate for the planned procedure. PREOPERATIVE DIAGNOSIS: rectal bleeding.      PROCEDURES:     Flexible

## 2020-02-20 NOTE — PROCEDURES
History/labs/allergies reviewed  Placed by Jakub RN  Assisted by Fernanda Gonzales RN  Consent signed and obtained by physician  Time out performed using two identifiers  Catheter type double  lumen picc  Product type solo2 w 3cg  Lot # [id number]  Expiration date 2/28/2021  Catheter size 5  [place]  Trimmed at 36  Total length 37  External catheter length 1 cm  Location RBV  Number of attempts 1  Estimated blood loss 2 ml  Pre procedure cardiac Rhythm NS per bedside telemetry and/or 3CG Tracing. Placement verified by- CXR and/or 3cg Max P wave noted by amplitude changes of the P wave, positive blood return, flushes easily  Special equipment used- ultrasound, micro-introducer technique and 3cg technology if indicated  Catheter secured with statlock  Dressing applied- Tegaderm CHG  Lidocaine administered intradermally conc. 1% 1 mL  PICC line education:   x] Discussed with patient/Family or POA prior to signing Informed Procedural Consent. Risks and Benefits along with reason for procedure were discussed and teaching was reinforced with an education handout on PICC insertion. [facility] FAQ Catheter Associated Blood Stream Infections and [employer] REV. 7/13 Nursing and Bard Booklet left at bedside or in chart. Patient (Family or POA) acknowledged understanding of information taught and agreed to procedure. [  ] Was not discussed with patient/family or POA due to pts medical status at time of procedure. pts family or POA not available to discuss PICC education.  [facility] FAQ Catheter Associated Blood Stream Infections and [employer] REV. 7/13 Nursing and [employer] Booklet left at bedside or in chart

## 2020-02-20 NOTE — PROGRESS NOTES
Occupational Therapy    Occupational Therapy Not Seen Note    DATE: 2020  Name: Joanne Bravo  : 1950  MRN: [id number]    Patient not available for Occupational Therapy due to: Other: Pt being vented this am, not appropriate for OT tx, may need re-eval upon extubation in future. Next Scheduled Treatment: Attempt on  as appropriate.     Electronically signed by Liudmila Mchugh OT on 2020 at 1:15 PM

## 2020-02-21 ENCOUNTER — APPOINTMENT (OUTPATIENT)
Dept: GENERAL RADIOLOGY | Age: 70
DRG: 020 | End: 2020-02-21
Payer: MEDICARE

## 2020-02-21 ENCOUNTER — APPOINTMENT (OUTPATIENT)
Dept: CT IMAGING | Age: 70
DRG: 020 | End: 2020-02-21
Payer: MEDICARE

## 2020-02-21 LAB
ANION GAP SERPL CALCULATED.3IONS-SCNC: 11 MMOL/L (ref 9–17)
BUN BLDV-MCNC: 8 MG/DL (ref 8–23)
BUN/CREAT BLD: ABNORMAL (ref 9–20)
CALCIUM SERPL-MCNC: 8.3 MG/DL (ref 8.6–10.4)
CHLORIDE BLD-SCNC: 110 MMOL/L (ref 98–107)
CO2: 21 MMOL/L (ref 20–31)
CREAT SERPL-MCNC: 0.63 MG/DL (ref 0.5–0.9)
CULTURE: NORMAL
GFR AFRICAN AMERICAN: >60 ML/MIN
GFR NON-AFRICAN AMERICAN: >60 ML/MIN
GFR SERPL CREATININE-BSD FRML MDRD: ABNORMAL ML/MIN/{1.73_M2}
GFR SERPL CREATININE-BSD FRML MDRD: ABNORMAL ML/MIN/{1.73_M2}
GLUCOSE BLD-MCNC: 80 MG/DL (ref 70–99)
HCT VFR BLD CALC: 17.2 % (ref 36.3–47.1)
HEMOGLOBIN: 5.1 G/DL (ref 11.9–15.1)
Lab: NORMAL
MAGNESIUM: 1.8 MG/DL (ref 1.6–2.6)
MCH RBC QN AUTO: 26.2 PG (ref 25.2–33.5)
MCHC RBC AUTO-ENTMCNC: 29.7 G/DL (ref 28.4–34.8)
MCV RBC AUTO: 88.2 FL (ref 82.6–102.9)
NRBC AUTOMATED: 0.7 PER 100 WBC
PDW BLD-RTO: 16.7 % (ref 11.8–14.4)
PHOSPHORUS: 2.5 MG/DL (ref 2.6–4.5)
PLATELET # BLD: 220 K/UL (ref 138–453)
PMV BLD AUTO: 11.3 FL (ref 8.1–13.5)
POTASSIUM SERPL-SCNC: 2.7 MMOL/L (ref 3.7–5.3)
RBC # BLD: 1.95 M/UL (ref 3.95–5.11)
SODIUM BLD-SCNC: 142 MMOL/L (ref 135–144)
SPECIMEN DESCRIPTION: NORMAL
WBC # BLD: 10.1 K/UL (ref 3.5–11.3)

## 2020-02-21 PROCEDURE — 83735 ASSAY OF MAGNESIUM: CPT

## 2020-02-21 PROCEDURE — 71045 X-RAY EXAM CHEST 1 VIEW: CPT

## 2020-02-21 PROCEDURE — 94003 VENT MGMT INPAT SUBQ DAY: CPT

## 2020-02-21 PROCEDURE — 93886 INTRACRANIAL COMPLETE STUDY: CPT

## 2020-02-21 PROCEDURE — 2580000003 HC RX 258: Performed by: PSYCHIATRY & NEUROLOGY

## 2020-02-21 PROCEDURE — 2500000003 HC RX 250 WO HCPCS: Performed by: STUDENT IN AN ORGANIZED HEALTH CARE EDUCATION/TRAINING PROGRAM

## 2020-02-21 PROCEDURE — 99291 CRITICAL CARE FIRST HOUR: CPT | Performed by: PSYCHIATRY & NEUROLOGY

## 2020-02-21 PROCEDURE — 80048 BASIC METABOLIC PNL TOTAL CA: CPT

## 2020-02-21 PROCEDURE — 6370000000 HC RX 637 (ALT 250 FOR IP): Performed by: STUDENT IN AN ORGANIZED HEALTH CARE EDUCATION/TRAINING PROGRAM

## 2020-02-21 PROCEDURE — 6360000002 HC RX W HCPCS: Performed by: INTERNAL MEDICINE

## 2020-02-21 PROCEDURE — C9113 INJ PANTOPRAZOLE SODIUM, VIA: HCPCS | Performed by: STUDENT IN AN ORGANIZED HEALTH CARE EDUCATION/TRAINING PROGRAM

## 2020-02-21 PROCEDURE — 99232 SBSQ HOSP IP/OBS MODERATE 35: CPT | Performed by: INTERNAL MEDICINE

## 2020-02-21 PROCEDURE — 84100 ASSAY OF PHOSPHORUS: CPT

## 2020-02-21 PROCEDURE — 2000000003 HC NEURO ICU R&B

## 2020-02-21 PROCEDURE — 6360000002 HC RX W HCPCS: Performed by: PSYCHIATRY & NEUROLOGY

## 2020-02-21 PROCEDURE — 6370000000 HC RX 637 (ALT 250 FOR IP): Performed by: NURSE PRACTITIONER

## 2020-02-21 PROCEDURE — 70450 CT HEAD/BRAIN W/O DYE: CPT

## 2020-02-21 PROCEDURE — 6360000002 HC RX W HCPCS: Performed by: STUDENT IN AN ORGANIZED HEALTH CARE EDUCATION/TRAINING PROGRAM

## 2020-02-21 PROCEDURE — 2580000003 HC RX 258: Performed by: INTERNAL MEDICINE

## 2020-02-21 PROCEDURE — 2580000003 HC RX 258: Performed by: STUDENT IN AN ORGANIZED HEALTH CARE EDUCATION/TRAINING PROGRAM

## 2020-02-21 PROCEDURE — P9047 ALBUMIN (HUMAN), 25%, 50ML: HCPCS | Performed by: NURSE PRACTITIONER

## 2020-02-21 PROCEDURE — 85027 COMPLETE CBC AUTOMATED: CPT

## 2020-02-21 PROCEDURE — 94761 N-INVAS EAR/PLS OXIMETRY MLT: CPT

## 2020-02-21 PROCEDURE — 94770 HC ETCO2 MONITOR DAILY: CPT

## 2020-02-21 PROCEDURE — 94668 MNPJ CHEST WALL SBSQ: CPT

## 2020-02-21 PROCEDURE — 99291 CRITICAL CARE FIRST HOUR: CPT | Performed by: INTERNAL MEDICINE

## 2020-02-21 PROCEDURE — 6370000000 HC RX 637 (ALT 250 FOR IP): Performed by: PSYCHIATRY & NEUROLOGY

## 2020-02-21 PROCEDURE — 2700000000 HC OXYGEN THERAPY PER DAY

## 2020-02-21 PROCEDURE — 99233 SBSQ HOSP IP/OBS HIGH 50: CPT | Performed by: PSYCHIATRY & NEUROLOGY

## 2020-02-21 PROCEDURE — 36415 COLL VENOUS BLD VENIPUNCTURE: CPT

## 2020-02-21 PROCEDURE — 2580000003 HC RX 258: Performed by: NURSE PRACTITIONER

## 2020-02-21 PROCEDURE — 6360000002 HC RX W HCPCS: Performed by: NURSE PRACTITIONER

## 2020-02-21 RX ORDER — SODIUM CHLORIDE 9 MG/ML
10 INJECTION INTRAVENOUS 2 TIMES DAILY
Status: DISCONTINUED | OUTPATIENT
Start: 2020-02-21 | End: 2020-03-03 | Stop reason: HOSPADM

## 2020-02-21 RX ORDER — MAGNESIUM SULFATE 1 G/100ML
1 INJECTION INTRAVENOUS
Status: DISCONTINUED | OUTPATIENT
Start: 2020-02-21 | End: 2020-02-21

## 2020-02-21 RX ORDER — THIAMINE MONONITRATE (VIT B1) 100 MG
100 TABLET ORAL DAILY
Status: DISCONTINUED | OUTPATIENT
Start: 2020-02-21 | End: 2020-03-03 | Stop reason: HOSPADM

## 2020-02-21 RX ORDER — THIAMINE HYDROCHLORIDE 100 MG/ML
100 INJECTION, SOLUTION INTRAMUSCULAR; INTRAVENOUS ONCE
Status: DISCONTINUED | OUTPATIENT
Start: 2020-02-21 | End: 2020-02-21

## 2020-02-21 RX ORDER — POTASSIUM CHLORIDE 29.8 MG/ML
20 INJECTION INTRAVENOUS
Status: ACTIVE | OUTPATIENT
Start: 2020-02-21 | End: 2020-02-21

## 2020-02-21 RX ORDER — ALBUMIN (HUMAN) 12.5 G/50ML
25 SOLUTION INTRAVENOUS ONCE
Status: COMPLETED | OUTPATIENT
Start: 2020-02-21 | End: 2020-02-21

## 2020-02-21 RX ORDER — HEPARIN SODIUM 5000 [USP'U]/ML
5000 INJECTION, SOLUTION INTRAVENOUS; SUBCUTANEOUS EVERY 8 HOURS SCHEDULED
Status: DISCONTINUED | OUTPATIENT
Start: 2020-02-21 | End: 2020-02-22

## 2020-02-21 RX ORDER — PANTOPRAZOLE SODIUM 40 MG/10ML
40 INJECTION, POWDER, LYOPHILIZED, FOR SOLUTION INTRAVENOUS 2 TIMES DAILY
Status: DISCONTINUED | OUTPATIENT
Start: 2020-02-21 | End: 2020-03-03 | Stop reason: HOSPADM

## 2020-02-21 RX ORDER — CALCIUM GLUCONATE 94 MG/ML
1 INJECTION, SOLUTION INTRAVENOUS ONCE
Status: DISCONTINUED | OUTPATIENT
Start: 2020-02-21 | End: 2020-02-21 | Stop reason: CLARIF

## 2020-02-21 RX ORDER — CYANOCOBALAMIN 1000 UG/ML
1000 INJECTION INTRAMUSCULAR; SUBCUTANEOUS ONCE
Status: COMPLETED | OUTPATIENT
Start: 2020-02-21 | End: 2020-02-21

## 2020-02-21 RX ORDER — 0.9 % SODIUM CHLORIDE 0.9 %
500 INTRAVENOUS SOLUTION INTRAVENOUS ONCE
Status: COMPLETED | OUTPATIENT
Start: 2020-02-21 | End: 2020-02-21

## 2020-02-21 RX ORDER — MAGNESIUM SULFATE 1 G/100ML
1 INJECTION INTRAVENOUS
Status: COMPLETED | OUTPATIENT
Start: 2020-02-21 | End: 2020-02-21

## 2020-02-21 RX ORDER — POTASSIUM CHLORIDE 29.8 MG/ML
60 INJECTION INTRAVENOUS ONCE
Status: DISCONTINUED | OUTPATIENT
Start: 2020-02-21 | End: 2020-02-21

## 2020-02-21 RX ORDER — FUROSEMIDE 10 MG/ML
40 INJECTION INTRAMUSCULAR; INTRAVENOUS ONCE
Status: DISCONTINUED | OUTPATIENT
Start: 2020-02-21 | End: 2020-02-21

## 2020-02-21 RX ORDER — FOLIC ACID 1 MG/1
1 TABLET ORAL DAILY
Status: DISCONTINUED | OUTPATIENT
Start: 2020-02-21 | End: 2020-03-03 | Stop reason: HOSPADM

## 2020-02-21 RX ADMIN — ENOXAPARIN SODIUM 40 MG: 40 INJECTION SUBCUTANEOUS at 10:07

## 2020-02-21 RX ADMIN — Medication 60 MG: at 01:00

## 2020-02-21 RX ADMIN — POTASSIUM PHOSPHATE, MONOBASIC AND POTASSIUM PHOSPHATE, DIBASIC 40 MMOL: 224; 236 INJECTION, SOLUTION, CONCENTRATE INTRAVENOUS at 11:23

## 2020-02-21 RX ADMIN — Medication 100 MG: at 13:44

## 2020-02-21 RX ADMIN — POTASSIUM BICARBONATE 40 MEQ: 782 TABLET, EFFERVESCENT ORAL at 20:14

## 2020-02-21 RX ADMIN — ACETAMINOPHEN 650 MG: 325 TABLET ORAL at 23:57

## 2020-02-21 RX ADMIN — PANTOPRAZOLE SODIUM 40 MG: 40 INJECTION, POWDER, LYOPHILIZED, FOR SOLUTION INTRAVENOUS at 20:13

## 2020-02-21 RX ADMIN — Medication 60 MG: at 10:08

## 2020-02-21 RX ADMIN — MAGNESIUM SULFATE HEPTAHYDRATE 1 G: 1 INJECTION, SOLUTION INTRAVENOUS at 19:24

## 2020-02-21 RX ADMIN — Medication 60 MG: at 16:36

## 2020-02-21 RX ADMIN — MAGNESIUM SULFATE HEPTAHYDRATE 1 G: 1 INJECTION, SOLUTION INTRAVENOUS at 18:30

## 2020-02-21 RX ADMIN — PIPERACILLIN AND TAZOBACTAM 4.5 G: 4; .5 INJECTION, POWDER, LYOPHILIZED, FOR SOLUTION INTRAVENOUS; PARENTERAL at 15:43

## 2020-02-21 RX ADMIN — VANCOMYCIN HYDROCHLORIDE 750 MG: 10 INJECTION, POWDER, LYOPHILIZED, FOR SOLUTION INTRAVENOUS at 13:44

## 2020-02-21 RX ADMIN — HYDROCORTISONE 2.5%: 25 CREAM TOPICAL at 16:36

## 2020-02-21 RX ADMIN — VANCOMYCIN HYDROCHLORIDE 750 MG: 10 INJECTION, POWDER, LYOPHILIZED, FOR SOLUTION INTRAVENOUS at 21:35

## 2020-02-21 RX ADMIN — ALBUMIN (HUMAN) 25 G: 0.25 INJECTION, SOLUTION INTRAVENOUS at 17:24

## 2020-02-21 RX ADMIN — MIDAZOLAM 2 MG/HR: 5 INJECTION INTRAMUSCULAR; INTRAVENOUS at 04:43

## 2020-02-21 RX ADMIN — SODIUM CHLORIDE 500 ML: 9 INJECTION, SOLUTION INTRAVENOUS at 03:12

## 2020-02-21 RX ADMIN — FERROUS SULFATE TAB EC 325 MG (65 MG FE EQUIVALENT) 325 MG: 325 (65 FE) TABLET DELAYED RESPONSE at 10:07

## 2020-02-21 RX ADMIN — MEGESTROL ACETATE 800 MG: 40 SUSPENSION ORAL at 10:09

## 2020-02-21 RX ADMIN — SODIUM CHLORIDE, PRESERVATIVE FREE 10 ML: 5 INJECTION INTRAVENOUS at 20:15

## 2020-02-21 RX ADMIN — Medication 60 MG: at 13:44

## 2020-02-21 RX ADMIN — FOLIC ACID 1 MG: 1 TABLET ORAL at 13:44

## 2020-02-21 RX ADMIN — POTASSIUM BICARBONATE 40 MEQ: 782 TABLET, EFFERVESCENT ORAL at 08:55

## 2020-02-21 RX ADMIN — Medication 60 MG: at 04:30

## 2020-02-21 RX ADMIN — Medication 10 ML: at 12:11

## 2020-02-21 RX ADMIN — PIPERACILLIN AND TAZOBACTAM 4.5 G: 4; .5 INJECTION, POWDER, LYOPHILIZED, FOR SOLUTION INTRAVENOUS; PARENTERAL at 06:53

## 2020-02-21 RX ADMIN — PANTOPRAZOLE SODIUM 40 MG: 40 INJECTION, POWDER, LYOPHILIZED, FOR SOLUTION INTRAVENOUS at 12:11

## 2020-02-21 RX ADMIN — SODIUM CHLORIDE, PRESERVATIVE FREE 10 ML: 5 INJECTION INTRAVENOUS at 11:24

## 2020-02-21 RX ADMIN — ATORVASTATIN CALCIUM 80 MG: 80 TABLET, FILM COATED ORAL at 20:13

## 2020-02-21 RX ADMIN — MAGNESIUM SULFATE HEPTAHYDRATE 1 G: 1 INJECTION, SOLUTION INTRAVENOUS at 16:37

## 2020-02-21 RX ADMIN — SODIUM CHLORIDE, PRESERVATIVE FREE 10 ML: 5 INJECTION INTRAVENOUS at 21:31

## 2020-02-21 RX ADMIN — Medication 30 MG: at 23:58

## 2020-02-21 RX ADMIN — IRON SUCROSE 200 MG: 20 INJECTION, SOLUTION INTRAVENOUS at 11:27

## 2020-02-21 RX ADMIN — SODIUM CHLORIDE, PRESERVATIVE FREE 10 ML: 5 INJECTION INTRAVENOUS at 09:00

## 2020-02-21 RX ADMIN — HEPARIN SODIUM 5000 UNITS: 5000 INJECTION INTRAVENOUS; SUBCUTANEOUS at 21:35

## 2020-02-21 RX ADMIN — Medication 60 MG: at 20:13

## 2020-02-21 RX ADMIN — CYANOCOBALAMIN 1000 MCG: 1000 INJECTION, SOLUTION INTRAMUSCULAR at 20:13

## 2020-02-21 ASSESSMENT — PULMONARY FUNCTION TESTS
PIF_VALUE: 22
PIF_VALUE: 15
PIF_VALUE: 21
PIF_VALUE: 25
PIF_VALUE: 39
PIF_VALUE: 17
PIF_VALUE: 22
PIF_VALUE: 24
PIF_VALUE: 14
PIF_VALUE: 20
PIF_VALUE: 24
PIF_VALUE: 25
PIF_VALUE: 16
PIF_VALUE: 15
PIF_VALUE: 21
PIF_VALUE: 23
PIF_VALUE: 16
PIF_VALUE: 25

## 2020-02-21 ASSESSMENT — PAIN SCALES - GENERAL
PAINLEVEL_OUTOF10: 0
PAINLEVEL_OUTOF10: 2
PAINLEVEL_OUTOF10: 0

## 2020-02-21 NOTE — PROGRESS NOTES
ENDOVASCULAR NEUROSURGERY PROGRESS NOTE  2/21/2020 10:45 AM  Subjective:   Admit Date: 2/12/2020  PCP: Sharon Delgado MD    And is still intubated. Sedation. Objective:   Vitals: BP (!) 104/44   Pulse 74   Temp 98.6 °F (37 °C) (Oral)   Resp 24   Ht 5' 6\" (1.676 m)   Wt 105 lb 2.6 oz (47.7 kg)   SpO2 100%   BMI 16.97 kg/m²     Exam is limited due to intubation and sedation. General appearance: Now intubated. Lungs: Intubated. Heart: RRR  Neuro exam: He is intubated. She did not follow commands. She did not open her eyes to noxious stimuli. Withdrawing both upper extremities. No withdrawing in lower extremities.     Medications and labs:   Scheduled Meds:   potassium bicarb-citric acid  40 mEq Oral BID    magnesium sulfate  1 g Intravenous Q1H    potassium phosphate IVPB  40 mmol Intravenous Once    calcium gluconate  1 g Intravenous Once    lidocaine 1 % injection  5 mL Intradermal Once    midazolam  2 mg Intravenous Once    iron sucrose  200 mg Intravenous Daily    atorvastatin  80 mg Oral Nightly    [Held by provider] sennosides-docusate sodium  2 tablet Oral Daily    niMODipine  60 mg Oral 6 times per day    vancomycin (VANCOCIN) intermittent dosing (placeholder)   Other RX Placeholder    vancomycin  750 mg Intravenous Q12H    sodium chloride flush  10 mL Intravenous 2 times per day    piperacillin-tazobactam  4.5 g Intravenous Q8H    ferrous sulfate  325 mg Oral Daily with breakfast    enoxaparin  40 mg Subcutaneous Daily    megestrol  800 mg Oral Daily    sodium chloride flush  10 mL Intravenous 2 times per day     Continuous Infusions:   midazolam 2 mg/hr (02/21/20 0443)    pantoprozole (PROTONIX) infusion 8 mg/hr (02/20/20 0008)     CBC:   Recent Labs     02/19/20  0440 02/20/20  0526 02/21/20  0422   WBC 14.7* 15.8* 10.1   HGB 5.6* 7.0* 5.1*    See Reflexed IPF Result 220     BMP:    Recent Labs     02/19/20  0440 02/20/20  0526 02/21/20  0422    141 142 K 3.3* 3.6* 2.7*   * 112* 110*   CO2 15* 15* 21   BUN 11 9 8   CREATININE 0.67 0.58 0.63   GLUCOSE 95 79 80     Hepatic:   No results for input(s): AST, ALT, ALB, BILITOT, ALKPHOS in the last 72 hours. Troponin: No results for input(s): TROPONINI in the last 72 hours. BNP: No results for input(s): BNP in the last 72 hours. Lipids:   No results for input(s): CHOL, HDL in the last 72 hours. Invalid input(s): LDLCALCU  INR:   No results for input(s): INR in the last 72 hours. Assessment and Recommendations:   77years old female with ruptured right SENTHIL 2 mm aneurysm, Em and Jeff 3 modified Jane scale 3. Patient underwent primary coil embolization 2/12/2020. Postprocedure CTA head shows some increase in the hemorrhage around the aneurysm region, follow-up CT head stable with mild mass-effect and mild hydrocephalus with third ventricle IVH 2/14/2020. Patient exam is stable    Repeated CTA head and neck 2/15/2020 with vasospasm however patient exam is improving. TCD 2/17: Normal bilateral transcranial Doppler. CT head this morning is stable. Blood pressure systolic goal per NICU to assess for vasospasm, permissive BP  Keep magnesium above 2.5  Thiamine  Lipitor 80 mg daily  Nimodipine therapy. Na checks per NICU. TCD daily.   Chemical DVT ppx  Possible acquired pneumonia management    America Winters MD  Stroke, [facility]  [id number]  Electronically signed 2/21/2020 at 10:45 AM

## 2020-02-21 NOTE — PLAN OF CARE
Problem: Gas Exchange - Impaired:  Goal: Levels of oxygenation will improve  Description  Levels of oxygenation will improve  2/21/2020 0020 by Jose Fernando RCP  Outcome: Ongoing     Problem: OXYGENATION/RESPIRATORY FUNCTION  Goal: Patient will maintain patent airway  2/21/2020 0020 by Jose Fernando RCP  Outcome: Ongoing     Problem: OXYGENATION/RESPIRATORY FUNCTION  Goal: Patient will achieve/maintain normal respiratory rate/effort  Description  Respiratory rate and effort will be within normal limits for the patient  2/21/2020 0020 by Jose Fernando RCP  Outcome: Ongoing     Problem: MECHANICAL VENTILATION  Goal: Patient will maintain patent airway  2/21/2020 0020 by Jose Fernando RCP  Outcome: Ongoing     Problem: MECHANICAL VENTILATION  Goal: Oral health is maintained or improved  2/21/2020 0020 by Jose Fernando RCP  Outcome: Ongoing     Problem: MECHANICAL VENTILATION  Goal: ET tube will be managed safely  2/21/2020 0020 by Jose Fernando RCP  Outcome: Ongoing     Problem: Respiratory  Intervention: Respiratory assessment  Note:   Tolerates CPT with vest fair.

## 2020-02-21 NOTE — PROGRESS NOTES
Daily Progress Note  Neuro Critical Care    Patient Name: Joanne Bravo  Patient : 1950  Room/Bed: 9788/5461-86  Code Status: FULL  Allergies: Allergies   Allergen Reactions    Lisinopril Other (See Comments)    Losartan      Fatigue    Procardia [Nifedipine] Other (See Comments)       CHIEF COMPLAINT:      Shortness of breath     [address] Course:   : To angio where she underwent coil embolization of ruptured right A2 SENTHIL aneurysm resulting in complete obliteration. : CT Head showed bilateral SAH with new intraventricular extension, developing hydrocephalus, interval worsening of diffuse cerebral edema, significantly increased hyperattenuation in bilateral medial frontal regions; increased hemorrhage vs contrast. Started on 2% hypertonic saline with goal sodium 150. Neurosurgery following for EVD watch. : More alert. TCD normal. Continues of 2%. CT Head showed decreased SAH, subtle rightward shift. 2/15: Hypoxic overnight with increasing O2 requirements. Placed on non-rebreather with continued desaturations. CT chest PE protocol showed severe dense consolidations in the bilateral lower lobes suggestive of severe pneumonia, moderate bilateral pleural effusions. Given 20mg IVP Lasix x1. Started on Vanc and Zosyn. Placed on biPAP with improvements. Transitioned to high flow O2 in the AM.  Urine culture +E. Coli. Blood culture sent. Mobility, IS, and acapella encouraged. : hypoxic again overnight, put on high flow, cont on vanc and zosyn. On 2% hypertonic with near goal Na, goal 145. [facility] Echo showed EF of 58%. : pt hypoxic throughout day on high flow, Inc. WOB. HH 6.0 refusing Blood d/t being [employer]. H/O consulted, blood draws minimum, fluids, iron. Full Code. Stool Occult Positive. : Art line DC d/t oozing, sodium checks daily, minimize blood draws.   H&H 6.0, started on Protonix drip per GI, given first dose of Aranesp, begin iron infusion  : H&H 5.6, C. difficile negative, NM RBC scan neg.   : HH inc to 7.0, intubated. GI scope- mod to severe esophagitis, external hemorrhoids. : HH 5.1, family refusing ABGs. Last 24h: Intubated yesterday, family refusing ABGs, last one showed pH of 7.2. Normal vital signs. H&H down to 5.1. On Versed drip. GI scope was significant for mod-severe esophagitis and external hemorrhoids. Urine output adequate, no further rectal bleeding.     CURRENT MEDICATIONS:  SCHEDULED MEDICATIONS:   potassium bicarb-citric acid  40 mEq Oral BID    potassium phosphate IVPB  40 mmol Intravenous Once    calcium gluconate  1 g Intravenous Once    heparin (porcine)  5,000 Units Subcutaneous 3 times per day    pantoprazole  40 mg Intravenous BID    And    sodium chloride (PF)  10 mL Intravenous BID    hydrocortisone   Rectal QPM    vitamin B-1  100 mg Per NG tube Daily    folic acid  1 mg Per NG tube Daily    lidocaine 1 % injection  5 mL Intradermal Once    midazolam  2 mg Intravenous Once    atorvastatin  80 mg Oral Nightly    [Held by provider] sennosides-docusate sodium  2 tablet Oral Daily    niMODipine  60 mg Oral 6 times per day    vancomycin (VANCOCIN) intermittent dosing (placeholder)   Other RX Placeholder    vancomycin  750 mg Intravenous Q12H    sodium chloride flush  10 mL Intravenous 2 times per day    piperacillin-tazobactam  4.5 g Intravenous Q8H    ferrous sulfate  325 mg Oral Daily with breakfast    sodium chloride flush  10 mL Intravenous 2 times per day     CONTINUOUS INFUSIONS:   midazolam Stopped (20 1126)     PRN MEDICATIONS:   fentanNYL, heparin flush, [Held by provider] magnesium hydroxide, sodium chloride flush, [Held by provider] docusate sodium, sodium chloride flush, acetaminophen, ondansetron    VITALS:  Temperature Range: Temp: 98.6 °F (37 °C) Temp  Av.8 °F (37.1 °C)  Min: 98.2 °F (36.8 °C)  Max: 100.1 °F (37.8 °C)  BP Range: Systolic (56PJP), IMAGING:   Ct Head Wo Contrast  Result Date: 2/14/2020  1. Findings consistent with prior anterior cerebral artery aneurysm coiling. 2.  No change in tiny dot of pneumocephalus near the aneurysm site. 3.  Decrease in amount of blood in the procedural site. 4.  Decrease in amount of subarachnoid hemorrhage. 5.  Subtle rightward shift is described above at the level of the hemorrhage. Ct Head Wo Contrast  Result Date: 2/13/2020  1. Interval postsurgical changes of anterior cerebral artery aneurysm coil embolization. Small amount of pneumocephalus adjacent to the embolization coils. 2.  Significantly increased hyperattenuation in the bilateral medial frontal regions centered around the embolization coils could represent increased hemorrhage and/or post angiographic contrast staining. Continued attention on follow-up recommended. 3.  Bilateral acute subarachnoid hemorrhage. New intraventricular extension of hemorrhage with hyperdense blood products throughout the ventricular system, most pronounced in the right lateral ventricle. Mild diffuse ventricular prominence suggest developing hydrocephalus. 4. Interval worsening of diffuse cerebral edema and sulcal effacement which raises concern for increasing intracranial pressure. No significant midline shift or large territorial infarct. Critical results were called by Dr. Leroy Patel Sessions to [facility] on 2/13/2020 at 04:58. Ct Head Wo Contrast  Result Date: 2/12/2020  Redemonstration of subarachnoid hemorrhage that is not significantly changed compared to prior exam. Findings were discussed with Dr. Mel Cabrera At 2:30 pm on 2/12/2020. Ct Head Wo Contrast  Result Date: 2/12/2020  Subarachnoid hemorrhage predominates in the anterior interhemispheric fissure, as described. No definite intraparenchymal component identified. Findings were discussed with Cheri Ramirez at 12:20 pm on 2/12/2020.      Ct Abdomen Pelvis W Iv Contrast  Result Date: 2/5/2020  1. No acute intra-abdominal or intrapelvic process. Specifically, no CT scan evidence of acute pancreatitis or complications related to pancreatitis. 2. Status post cholecystectomy. 3. Subtle ground-glass opacity in the right lower lobe appears slightly more conspicuous than on the 2007 exam.  This may simply be related to a minor scar. Minor infectious or inflammatory process could not be entirely excluded. Consider follow-up chest CT in approximately 3 months to ensure stability/resolution. 4. Normal appendix. Xr Chest Portable  Result Date: 2/16/2020  Multifocal airspace opacities most confluent within the right infrahilar region/right lower lobe suggestive of pneumonia. Suspect mild interstitial pulmonary edema. Pleural effusions appear less conspicuous on today's exam. Follow-up to assure resolution of the airspace opacities recommended following medical treatment. Ct Chest Pulmonary Embolism W Contrast  Result Date: 2/15/2020  1. Note: Study significantly limited by patient breathing motion related artifact. 2. No definitive evidence of acute pulmonary embolism. 3. Bilateral lower lung lobe severe dense consolidation consistent with severe pneumonia. 4. Adjacent moderate bilateral layering posterior pleural effusions, as discussed above. 5. Moderate cardiomegaly. 6. Mild pericardial effusion. Recommend clinical correlation. 7. Cholecystectomy. Cta Head W Contrast  Result Date: 2/12/2020  Demonstration of a 2 mm aneurysm arising from the right anterior cerebral artery within the anterior interhemispheric fissure region. Findings were discussed with Dr. Anupam Ramírez At 2:36 pm on 2/12/2020. Vl Transcranial Doppler Complete  Result Date: 2/13/2020  Conclusions   Summary   Normal bilateral transcranial Doppler. Labs and Images reviewed with:  [] Dr. Rosa Amador. Lauro    [x] Dr. Julio C Allen  [] Dr. Philip Lu  [] There are no new interval images to review.      PHYSICAL EXAM 9/67/23  Vent settings: PRVC/16/430/10/50%  - Bilateral pneumonia, likely secondary to aspiration  - RSV and resp culture negative   - Continue Vancomycin and Zosyn until 2/24  - Pulm Recs: Lasix x1 yesterday  - Chest PT  - CXR: Worsening multifocal airspace disease, possibly pneumonia versus ARDS     RENAL/FLUID/ELECTROLYTE:  - Normal renal functioning   - Salt tabs 1g BID to prevent further pulmonary edema with 2%  - Na goal > 140  - Goal Mag >2.5, replaced  - Hypokalemia, K 2.7, replaced PO and IV  - Phos 2.5, replaced  - Daily BMP  - UOP 3.4L after Lasix x1 on 2/20   - lasix x1 today    GI/NUTRITION:  NUTRITION:  - begin TF 2/21  - Hold bowel regimen  - Thiamine and folate PO QD  - GI SCOPE 2/20: severe esophagitis, External Hemorrhoids  - Anusol 2.5% QHS  - GI prophylaxis: Protonix IV BID  - DC megace     ID:  - Afebrile  - WBC normal  - C diff negative   - Bilateral lobe pneumonia  - UTI, culture + E.coli  - Continue Vancomycin and Zosyn for now  - Continue to monitor for fevers  - Daily CBC     HEME:   - Anemia likely multifactorial, iron deficiency  - H&H 6.1>5.6>7.0>5.5, patient refusing blood transfusion due to being [employer]  -Heme/Onc:    -Start IV Iron   -Aranesp x1 on 2/18   -r/o hemolysis/GIB   -Peripheral blood smear  - Stool Occult Positive. -NM scan neg  - +gross blood in stool 2/19  -B12 and folate normal  - Continue Iron 325mg QD  - Daily CBC  - Heparin PPX  - PICC line placed 2/20  - Consider Nimbex paralytic to Dec O2 demand  - draw lab 2/22 in AM then if stable switch to EOD    ENDOCRINE:  - Continue to monitor blood glucose, goal <180  - No history of DM 2, hemoglobin A1C 5.6     OTHER:  - PT/OT/ST     PROPHYLAXIS:  Stress ulcer: Protonix gtt     DVT PROPHYLAXIS:  - SCD sleeves - Thigh High   - Heparin PPX    DISPOSITION:  I had a lengthy discussion with patient and her daughters and they are refusing blood product due to being [employer].   I discussed with him the risks and need for blood at this time and potential complications if she does not do so. [x] To remain ICU:   [] OK for out of ICU from Neuro Critical Care standpoint    We will continue to follow along. For any changes in exam or patient status please contact Neuro Critical Care.     Scott Florez DO  Neuro Critical Care  Pager [phone]  2/21/2020     1:13 PM

## 2020-02-21 NOTE — PROGRESS NOTES
Today's Date: 2/21/2020  Patient Name: Azeb Shultz  Date of admission: 2/12/2020  1:58 PM  Patient's age: 77 y.o., 1950  Admission Dx: SAH (subarachnoid hemorrhage) ([facility] [address] [I60.9]  Status post coil embolization of cerebral aneurysm [Z98.890]    Reason for Consult: anemia, Hb 6.1 - [employer] refusing PRBC, chronic liverdz/HepC, recs for management    Requesting Physician: Daniel Carrizales MD    CHIEF COMPLAINT:    Chief Complaint   Patient presents with    Cerebrovascular Accident     SUBJECTIVE:    Pt seen a nd examined'  Patient is now intubated for increased work of breathing  Hemoglobin decreased to 5.1  Family at bedside. Their questions were sought and answered to their satisfaction. No active bleeding  She had a colonoscopy yesterday, no active bleeding noted but external hemorrhoids were seen    HISTORY OF PRESENT ILLNESS:    This is a 60-year-old female with past medical history of hypertension, was transferred from [facility] for subarachnoid hemorrhage. History provided by patient's daughter who reported that patient had a recent dental procedure and was given anesthetics and subsequently her blood pressure went significantly high. Patient complains of mild facial droop and her strokelike symptoms and her CT scan showed subarachnoid hemorrhage predominantly in the anterior interhemispheric fissure. Patient is Jain and she was noted to have low hemoglobin. Patient and family refusing further blood transfusion and therefore hematology was consulted. Past Medical History:   has a past medical history of Acid reflux, Arthritis, Cancer ([facility] [address] Family history of breast cancer in first degree relative, Gall stones, Hard of hearing, History of cervical cancer, Hypertension, Lateral meniscus tear, Wears dentures, and Wears glasses. Past Surgical History:   has a past surgical history that includes Tonsillectomy and adenoidectomy (1956); Cholecystectomy;  [address] [place] tablet 40 mEq  40 mEq Oral BID Jacob Hawkins MD   40 mEq at 02/21/20 0855    calcium gluconate 1 g in dextrose 5% 100 mL IVPB  1 g Intravenous Once Yousuf Ruiz DO        heparin (porcine) injection 5,000 Units  5,000 Units Subcutaneous 3 times per day Ryan Cooper DO        pantoprazole (PROTONIX) injection 40 mg  40 mg Intravenous BID Yousuf Ruiz DO   40 mg at 02/21/20 1211    And    sodium chloride (PF) 0.9 % injection 10 mL  10 mL Intravenous BID Yousuf Ruiz DO   10 mL at 02/21/20 1211    hydrocortisone (ANUSOL-HC) 2.5 % rectal cream   Rectal QPM Yousuf Ruiz DO        vitamin B-1 (THIAMINE) tablet 100 mg  100 mg Per NG tube Daily Yousuf Ruiz DO   100 mg at 18/80/16 9114    folic acid (FOLVITE) tablet 1 mg  1 mg Per NG tube Daily Yousuf Ruiz DO   1 mg at 02/21/20 1344    midazolam (VERSED) 100 mg in sodium chloride 0.9 % 100 mL infusion  1 mg/hr Intravenous Continuous Jose Angel Rubi MD   Stopped at 02/21/20 1126    lidocaine 1 % injection 5 mL  5 mL Intradermal Once Yousuf Ruiz DO        midazolam (VERSED) injection 2 mg  2 mg Intravenous Once Yousuf Ruiz DO        fentaNYL (SUBLIMAZE) injection 50 mcg  50 mcg Intravenous Q1H PRN Rolando Plane, APRN - CNP   50 mcg at 02/20/20 1708    heparin flush 100 UNIT/ML injection 100 Units  100 Units Intravenous PRN Kevin Coronado APRN - CNP   100 Units at 02/19/20 1837    atorvastatin (LIPITOR) tablet 80 mg  80 mg Oral Nightly Fer Sarmiento MD   80 mg at 02/20/20 2150    [Held by provider] sennosides-docusate sodium (SENOKOT-S) 8.6-50 MG tablet 2 tablet  2 tablet Oral Daily Rolando Plane, APRN - CNP   2 tablet at 02/16/20 1004    [Held by provider] magnesium hydroxide (MILK OF MAGNESIA) 400 MG/5ML suspension 30 mL  30 mL Oral Daily PRN Rolando Plane, APRN - CNP        niMODipine oral solution 60 mg  60 mg Oral 6 times per day Rolando Plane, APRN - CNP   60 mg at 02/21/20 1344    vancomycin (VANCOCIN) intermittent dosing Min:98.2 °F (36.8 °C), Max:100.1 °F (37.8 °C)  General appearance -intubated and sedated  Neck - supple, no significant adenopathy   Lymphatics - no palpable lymphadenopathy, no hepatosplenomegaly   Chest - clear to auscultation, no wheezes, rales or rhonchi, symmetric air entry   Heart - normal rate, regular rhythm, normal S1, S2, no murmurs  Abdomen - soft, nontender, nondistended, no masses or organomegaly   Neurological - sedated intubated  Musculoskeletal - no joint tenderness, deformity or swelling   Extremities - peripheral pulses normal, no pedal edema, no clubbing or cyanosis   Skin - normal coloration and turgor, no rashes, no suspicious skin lesions noted ,    DATA:    Labs:   CBC:   Recent Labs     02/20/20  0526 02/21/20  0422   WBC 15.8* 10.1   HGB 7.0* 5.1*   HCT 21.8* 17.2*   PLT See Reflexed IPF Result 220     BMP:   Recent Labs     02/20/20  0526 02/21/20  0422    142   K 3.6* 2.7*   CO2 15* 21   BUN 9 8   CREATININE 0.58 0.63   LABGLOM >60 >60   GLUCOSE 79 80     PT/INR:   No results for input(s): PROTIME, INR in the last 72 hours. IMAGING DATA:  Reviewed  Ct Abdomen Pelvis W Iv Contrast 2/5/2020  1. No acute intra-abdominal or intrapelvic process. Specifically, no CT scan evidence of acute pancreatitis or complications related to pancreatitis. 2. Status post cholecystectomy. 3. Subtle ground-glass opacity in the right lower lobe appears slightly more conspicuous than on the 2007 exam.  This may simply be related to a minor scar. Minor infectious or inflammatory process could not be entirely excluded. Consider follow-up chest CT in approximately 3 months to ensure stability/resolution. 4. Normal appendix.    CT chest   Impression   1. Note: Study significantly limited by patient breathing motion related   artifact. 2. No definitive evidence of acute pulmonary embolism. 3. Bilateral lower lung lobe severe dense consolidation consistent with   severe pneumonia.    4. Adjacent moderate bilateral layering posterior pleural effusions, as   discussed above. 5. Moderate cardiomegaly. 6. Mild pericardial effusion.  Recommend clinical correlation. 7. Cholecystectomy. Primary Problem  <principal problem not specified>    Active Hospital Problems    Diagnosis Date Noted    Iron deficiency anemia due to chronic blood loss [D50.0]     Ventilator dependence (HCC) [Z99.11]     Pneumonia of both lower lobes due to infectious organism ([address] [J18.1]     MRI-safe endovascular aneurysm coil present [Z95.828]     SAH (subarachnoid hemorrhage) ([address] [I60.9] 02/12/2020    Cerebral aneurysm rupture ([address] [I60.7] 02/12/2020    Cerebral aneurysm [I67.1] 02/12/2020    Subarachnoid hemorrhage from anterior cerebral artery aneurysm (HCC) [I60.6] 02/12/2020    Status post coil embolization of cerebral aneurysm [Z98.890] 02/12/2020       IMPRESSION:   1. Acute CVA: with ruptured right SENTHIL 2 mm aneurysm. Patient underwent primary coil embolization 2/12/2020.   2.  Anemia  3. Bilateral PNA:  Broad Spectrum Antibiotics     RECOMMENDATIONS:  1. I reviewed th albs, imaging studies diagnosis and treatment plan with patient  2. Start IV iron, continue for 4 more days  3. She got one dose of aranesp 2/18. I explained family that it may take 4 to 6 days to see the effect of erythropoietin stimulating agent and adding another agent such as Mircera or Procrit is not indicated  4. No hemolysis  5. Hb  5.1  6. Hold of blood thinner Until Hb stable  7. Minimize blood draws  8. She had a colonoscopy today, no active bleeding noted but external hemorrhoids were seen  9. Monitor counts closely  10.  other care asper primary team      Discussed with patient and Nurse. Thank you for asking us to see this patient. Shai Mccall MD  Hematologist/Medical Oncologist    Cell: [phone]    This note is created with the assistance of a speech recognition program.  While intending to generate a document that

## 2020-02-21 NOTE — PROGRESS NOTES
Discussed with RN and MD on 2/20 by PICC team that a TL CVL would benefit the pt with all the medications running. It was addressed that a 5fr DL would be all I could give her. Pt had 3 running IV's at that time. It was stated that we could not provide PIV's for her. A Picc was placed and a request was asked to place a PIV today 2/21.  I called and talked to an RN that yesterday it would have been beneficial to place a TL CVL and we will not be able to place PIv's

## 2020-02-21 NOTE — CARE COORDINATION
Spoke with daughter April regarding transition planning. Daughter states she has been in contact with a physician at [facility] who would be willing to accept pt but she is concerned with transferring pt at this time. April states she is going to call the physician and discuss his care and pray about it. She will have a decision soon. Writer also discussed LTACH with April, information provided for both in [facility], she will research and discuss with CM over the weekend. At this time daughter states she is very concerned and involved in care but is unsure what path they would like to take in regards to current or future plans.  CM will follow for needs

## 2020-02-21 NOTE — PROGRESS NOTES
Occupational Therapy    Occupational Therapy Not Seen Note    DATE: 2020  Name: Elyssa Ryan  : 1950  MRN: [id number]    Patient not available for Occupational Therapy due to:    Sedation & Intubation @1400pm    Next Scheduled Treatment: 20    Electronically signed by FAN Contreras on 2020 at 2:20 PM

## 2020-02-21 NOTE — PROGRESS NOTES
Lab in to draw blood, pt family said POA said one stick per day,  Family at bedside refused drawing ABG.  Informed.

## 2020-02-22 ENCOUNTER — APPOINTMENT (OUTPATIENT)
Dept: GENERAL RADIOLOGY | Age: 70
DRG: 020 | End: 2020-02-22
Payer: MEDICARE

## 2020-02-22 LAB
ABSOLUTE EOS #: 0.29 K/UL (ref 0–0.44)
ABSOLUTE IMMATURE GRANULOCYTE: 0.19 K/UL (ref 0–0.3)
ABSOLUTE LYMPH #: 1.14 K/UL (ref 1.1–3.7)
ABSOLUTE MONO #: 0.57 K/UL (ref 0.1–1.2)
ABSOLUTE RETIC #: 0.08 M/UL (ref 0.03–0.08)
ALLEN TEST: POSITIVE
ANION GAP SERPL CALCULATED.3IONS-SCNC: 9 MMOL/L (ref 9–17)
BASOPHILS # BLD: 0 % (ref 0–2)
BASOPHILS ABSOLUTE: 0 K/UL (ref 0–0.2)
BUN BLDV-MCNC: 6 MG/DL (ref 8–23)
BUN/CREAT BLD: ABNORMAL (ref 9–20)
CALCIUM SERPL-MCNC: 8.2 MG/DL (ref 8.6–10.4)
CHLORIDE BLD-SCNC: 107 MMOL/L (ref 98–107)
CO2: 24 MMOL/L (ref 20–31)
CREAT SERPL-MCNC: 0.6 MG/DL (ref 0.5–0.9)
CULTURE: NORMAL
DIFFERENTIAL TYPE: ABNORMAL
DIRECT EXAM: NORMAL
EOSINOPHILS RELATIVE PERCENT: 3 % (ref 1–4)
FIO2: 45
GFR AFRICAN AMERICAN: >60 ML/MIN
GFR NON-AFRICAN AMERICAN: >60 ML/MIN
GFR SERPL CREATININE-BSD FRML MDRD: ABNORMAL ML/MIN/{1.73_M2}
GFR SERPL CREATININE-BSD FRML MDRD: ABNORMAL ML/MIN/{1.73_M2}
GLUCOSE BLD-MCNC: 112 MG/DL (ref 70–99)
HCT VFR BLD CALC: 16.2 % (ref 36.3–47.1)
HEMOGLOBIN: 5 G/DL (ref 11.9–15.1)
IMMATURE GRANULOCYTES: 2 %
IMMATURE RETIC FRACT: 41.7 % (ref 2.7–18.3)
LYMPHOCYTES # BLD: 12 % (ref 24–43)
Lab: NORMAL
MAGNESIUM: 2.4 MG/DL (ref 1.6–2.6)
MCH RBC QN AUTO: 26.7 PG (ref 25.2–33.5)
MCHC RBC AUTO-ENTMCNC: 30.9 G/DL (ref 28.4–34.8)
MCV RBC AUTO: 86.6 FL (ref 82.6–102.9)
MODE: ABNORMAL
MONOCYTES # BLD: 6 % (ref 3–12)
MORPHOLOGY: ABNORMAL
NEGATIVE BASE EXCESS, ART: ABNORMAL (ref 0–2)
NRBC AUTOMATED: 1.2 PER 100 WBC
O2 DEVICE/FLOW/%: ABNORMAL
PATIENT TEMP: ABNORMAL
PDW BLD-RTO: 17.8 % (ref 11.8–14.4)
PLATELET # BLD: 226 K/UL (ref 138–453)
PLATELET ESTIMATE: ABNORMAL
PMV BLD AUTO: 11.9 FL (ref 8.1–13.5)
POC HCO3: 27.8 MMOL/L (ref 21–28)
POC O2 SATURATION: 98 % (ref 94–98)
POC PCO2 TEMP: ABNORMAL MM HG
POC PCO2: 38.2 MM HG (ref 35–48)
POC PH TEMP: ABNORMAL
POC PH: 7.47 (ref 7.35–7.45)
POC PO2 TEMP: ABNORMAL MM HG
POC PO2: 101.9 MM HG (ref 83–108)
POSITIVE BASE EXCESS, ART: 4 (ref 0–3)
POTASSIUM SERPL-SCNC: 3.7 MMOL/L (ref 3.7–5.3)
RBC # BLD: 1.87 M/UL (ref 3.95–5.11)
RBC # BLD: ABNORMAL 10*6/UL
RETIC %: 5 % (ref 0.5–1.9)
RETIC HEMOGLOBIN: 33.9 PG (ref 28.2–35.7)
SAMPLE SITE: ABNORMAL
SEG NEUTROPHILS: 77 % (ref 36–65)
SEGMENTED NEUTROPHILS ABSOLUTE COUNT: 7.31 K/UL (ref 1.5–8.1)
SODIUM BLD-SCNC: 140 MMOL/L (ref 135–144)
SPECIMEN DESCRIPTION: NORMAL
TCO2 (CALC), ART: 29 MMOL/L (ref 22–29)
WBC # BLD: 9.5 K/UL (ref 3.5–11.3)
WBC # BLD: ABNORMAL 10*3/UL

## 2020-02-22 PROCEDURE — 99233 SBSQ HOSP IP/OBS HIGH 50: CPT | Performed by: INTERNAL MEDICINE

## 2020-02-22 PROCEDURE — 85025 COMPLETE CBC W/AUTO DIFF WBC: CPT

## 2020-02-22 PROCEDURE — 6360000002 HC RX W HCPCS: Performed by: NURSE PRACTITIONER

## 2020-02-22 PROCEDURE — 6370000000 HC RX 637 (ALT 250 FOR IP): Performed by: STUDENT IN AN ORGANIZED HEALTH CARE EDUCATION/TRAINING PROGRAM

## 2020-02-22 PROCEDURE — 94668 MNPJ CHEST WALL SBSQ: CPT

## 2020-02-22 PROCEDURE — 99291 CRITICAL CARE FIRST HOUR: CPT | Performed by: INTERNAL MEDICINE

## 2020-02-22 PROCEDURE — 2700000000 HC OXYGEN THERAPY PER DAY

## 2020-02-22 PROCEDURE — 6360000002 HC RX W HCPCS: Performed by: PSYCHIATRY & NEUROLOGY

## 2020-02-22 PROCEDURE — 71045 X-RAY EXAM CHEST 1 VIEW: CPT

## 2020-02-22 PROCEDURE — 83735 ASSAY OF MAGNESIUM: CPT

## 2020-02-22 PROCEDURE — 93886 INTRACRANIAL COMPLETE STUDY: CPT

## 2020-02-22 PROCEDURE — 2580000003 HC RX 258: Performed by: STUDENT IN AN ORGANIZED HEALTH CARE EDUCATION/TRAINING PROGRAM

## 2020-02-22 PROCEDURE — 6360000002 HC RX W HCPCS: Performed by: STUDENT IN AN ORGANIZED HEALTH CARE EDUCATION/TRAINING PROGRAM

## 2020-02-22 PROCEDURE — 2000000003 HC NEURO ICU R&B

## 2020-02-22 PROCEDURE — 94761 N-INVAS EAR/PLS OXIMETRY MLT: CPT

## 2020-02-22 PROCEDURE — C9113 INJ PANTOPRAZOLE SODIUM, VIA: HCPCS | Performed by: STUDENT IN AN ORGANIZED HEALTH CARE EDUCATION/TRAINING PROGRAM

## 2020-02-22 PROCEDURE — 99233 SBSQ HOSP IP/OBS HIGH 50: CPT | Performed by: PSYCHIATRY & NEUROLOGY

## 2020-02-22 PROCEDURE — 94003 VENT MGMT INPAT SUBQ DAY: CPT

## 2020-02-22 PROCEDURE — 82803 BLOOD GASES ANY COMBINATION: CPT

## 2020-02-22 PROCEDURE — 36600 WITHDRAWAL OF ARTERIAL BLOOD: CPT

## 2020-02-22 PROCEDURE — 94770 HC ETCO2 MONITOR DAILY: CPT

## 2020-02-22 PROCEDURE — 6370000000 HC RX 637 (ALT 250 FOR IP): Performed by: NURSE PRACTITIONER

## 2020-02-22 PROCEDURE — 99291 CRITICAL CARE FIRST HOUR: CPT | Performed by: PSYCHIATRY & NEUROLOGY

## 2020-02-22 PROCEDURE — 85045 AUTOMATED RETICULOCYTE COUNT: CPT

## 2020-02-22 PROCEDURE — 2580000003 HC RX 258: Performed by: PSYCHIATRY & NEUROLOGY

## 2020-02-22 PROCEDURE — 80048 BASIC METABOLIC PNL TOTAL CA: CPT

## 2020-02-22 RX ORDER — FUROSEMIDE 10 MG/ML
20 INJECTION INTRAMUSCULAR; INTRAVENOUS ONCE
Status: COMPLETED | OUTPATIENT
Start: 2020-02-22 | End: 2020-02-22

## 2020-02-22 RX ORDER — MAGNESIUM SULFATE 1 G/100ML
1 INJECTION INTRAVENOUS ONCE
Status: COMPLETED | OUTPATIENT
Start: 2020-02-22 | End: 2020-02-22

## 2020-02-22 RX ADMIN — PANTOPRAZOLE SODIUM 40 MG: 40 INJECTION, POWDER, LYOPHILIZED, FOR SOLUTION INTRAVENOUS at 08:54

## 2020-02-22 RX ADMIN — Medication 10 ML: at 08:54

## 2020-02-22 RX ADMIN — PIPERACILLIN AND TAZOBACTAM 4.5 G: 4; .5 INJECTION, POWDER, LYOPHILIZED, FOR SOLUTION INTRAVENOUS; PARENTERAL at 00:42

## 2020-02-22 RX ADMIN — FUROSEMIDE 20 MG: 10 INJECTION, SOLUTION INTRAMUSCULAR; INTRAVENOUS at 08:58

## 2020-02-22 RX ADMIN — SODIUM CHLORIDE, PRESERVATIVE FREE 10 ML: 5 INJECTION INTRAVENOUS at 19:47

## 2020-02-22 RX ADMIN — Medication 30 MG: at 06:37

## 2020-02-22 RX ADMIN — Medication 100 MG: at 08:54

## 2020-02-22 RX ADMIN — FUROSEMIDE 20 MG: 10 INJECTION, SOLUTION INTRAMUSCULAR; INTRAVENOUS at 09:24

## 2020-02-22 RX ADMIN — FOLIC ACID 1 MG: 1 TABLET ORAL at 08:54

## 2020-02-22 RX ADMIN — Medication 30 MG: at 12:53

## 2020-02-22 RX ADMIN — FERROUS SULFATE TAB EC 325 MG (65 MG FE EQUIVALENT) 325 MG: 325 (65 FE) TABLET DELAYED RESPONSE at 08:54

## 2020-02-22 RX ADMIN — PANTOPRAZOLE SODIUM 40 MG: 40 INJECTION, POWDER, LYOPHILIZED, FOR SOLUTION INTRAVENOUS at 19:46

## 2020-02-22 RX ADMIN — HYDROCORTISONE 2.5%: 25 CREAM TOPICAL at 19:47

## 2020-02-22 RX ADMIN — Medication 30 MG: at 04:32

## 2020-02-22 RX ADMIN — FENTANYL CITRATE 50 MCG: 50 INJECTION, SOLUTION INTRAMUSCULAR; INTRAVENOUS at 19:44

## 2020-02-22 RX ADMIN — PIPERACILLIN AND TAZOBACTAM 4.5 G: 4; .5 INJECTION, POWDER, LYOPHILIZED, FOR SOLUTION INTRAVENOUS; PARENTERAL at 08:46

## 2020-02-22 RX ADMIN — POTASSIUM BICARBONATE 40 MEQ: 782 TABLET, EFFERVESCENT ORAL at 19:46

## 2020-02-22 RX ADMIN — Medication 30 MG: at 08:55

## 2020-02-22 RX ADMIN — Medication 30 MG: at 17:09

## 2020-02-22 RX ADMIN — MAGNESIUM SULFATE HEPTAHYDRATE 1 G: 1 INJECTION, SOLUTION INTRAVENOUS at 12:53

## 2020-02-22 RX ADMIN — Medication 10 ML: at 19:46

## 2020-02-22 RX ADMIN — Medication 30 MG: at 09:24

## 2020-02-22 RX ADMIN — Medication 30 MG: at 14:00

## 2020-02-22 RX ADMIN — SODIUM CHLORIDE, PRESERVATIVE FREE 10 ML: 5 INJECTION INTRAVENOUS at 08:55

## 2020-02-22 RX ADMIN — VANCOMYCIN HYDROCHLORIDE 750 MG: 10 INJECTION, POWDER, LYOPHILIZED, FOR SOLUTION INTRAVENOUS at 08:37

## 2020-02-22 RX ADMIN — FENTANYL CITRATE 25 MCG: 50 INJECTION, SOLUTION INTRAMUSCULAR; INTRAVENOUS at 02:16

## 2020-02-22 RX ADMIN — Medication 30 MG: at 16:15

## 2020-02-22 RX ADMIN — Medication 60 MG: at 19:45

## 2020-02-22 RX ADMIN — HEPARIN SODIUM 5000 UNITS: 5000 INJECTION INTRAVENOUS; SUBCUTANEOUS at 06:37

## 2020-02-22 RX ADMIN — ATORVASTATIN CALCIUM 80 MG: 80 TABLET, FILM COATED ORAL at 19:45

## 2020-02-22 RX ADMIN — POTASSIUM BICARBONATE 40 MEQ: 782 TABLET, EFFERVESCENT ORAL at 08:53

## 2020-02-22 RX ADMIN — HYDROCORTISONE 2.5%: 25 CREAM TOPICAL at 08:54

## 2020-02-22 RX ADMIN — ONDANSETRON 4 MG: 2 INJECTION INTRAMUSCULAR; INTRAVENOUS at 00:42

## 2020-02-22 RX ADMIN — Medication 30 MG: at 02:09

## 2020-02-22 ASSESSMENT — PULMONARY FUNCTION TESTS
PIF_VALUE: 15
PIF_VALUE: 19
PIF_VALUE: 12
PIF_VALUE: 15
PIF_VALUE: 9
PIF_VALUE: 14
PIF_VALUE: 15
PIF_VALUE: 18
PIF_VALUE: 11
PIF_VALUE: 17
PIF_VALUE: 14
PIF_VALUE: 17
PIF_VALUE: 16
PIF_VALUE: 12
PIF_VALUE: 9
PIF_VALUE: 9
PIF_VALUE: 18
PIF_VALUE: 19
PIF_VALUE: 21
PIF_VALUE: 15
PIF_VALUE: 9
PIF_VALUE: 9
PIF_VALUE: 14
PIF_VALUE: 12
PIF_VALUE: 12
PIF_VALUE: 11

## 2020-02-22 ASSESSMENT — PAIN SCALES - WONG BAKER: WONGBAKER_NUMERICALRESPONSE: 0

## 2020-02-22 ASSESSMENT — PAIN SCALES - GENERAL
PAINLEVEL_OUTOF10: 0
PAINLEVEL_OUTOF10: 5
PAINLEVEL_OUTOF10: 0
PAINLEVEL_OUTOF10: 0

## 2020-02-22 NOTE — PROGRESS NOTES
Daily Progress Note  Neuro Critical Care    Patient Name: José Manuel Chavez  Patient : 1950  Room/Bed: 3289/2227-16  Code Status: FULL  Allergies: Allergies   Allergen Reactions    Lisinopril Other (See Comments)    Losartan      Fatigue    Procardia [Nifedipine] Other (See Comments)       CHIEF COMPLAINT:      Shortness of breath     [address] Course:   : To angio where she underwent coil embolization of ruptured right A2 SENTHIL aneurysm resulting in complete obliteration. : CT Head showed bilateral SAH with new intraventricular extension, developing hydrocephalus, interval worsening of diffuse cerebral edema, significantly increased hyperattenuation in bilateral medial frontal regions; increased hemorrhage vs contrast. Started on 2% hypertonic saline with goal sodium 150. Neurosurgery following for EVD watch. : More alert. TCD normal. Continues of 2%. CT Head showed decreased SAH, subtle rightward shift. 2/15: Hypoxic overnight with increasing O2 requirements. Placed on non-rebreather with continued desaturations. CT chest PE protocol showed severe dense consolidations in the bilateral lower lobes suggestive of severe pneumonia, moderate bilateral pleural effusions. Given 20mg IVP Lasix x1. Started on Vanc and Zosyn. Placed on biPAP with improvements. Transitioned to high flow O2 in the AM.  Urine culture +E. Coli. Blood culture sent. Mobility, IS, and acapella encouraged. : hypoxic again overnight, put on high flow, cont on vanc and zosyn. On 2% hypertonic with near goal Na, goal 145. [place] Echo showed EF of 58%. : pt hypoxic throughout day on high flow, Inc. WOB. HH 6.0 refusing Blood d/t being Jewish. H/O consulted, blood draws minimum, fluids, iron. Full Code. Stool Occult Positive. : Art line DC d/t oozing, sodium checks daily, minimize blood draws.   H&H 6.0, started on Protonix drip per GI, given first dose of Aranesp, begin iron infusion  : H&H 5.6, C. difficile negative, NM RBC scan neg.   : HH inc to 7.0, intubated. GI scope- mod to severe esophagitis, external hemorrhoids. Lasix x1  : HH 5.1, family refusing ABGs. U OP 3.4 L   : HH 5.0, switch to EOD labs. Lasix 40 mg x 1    Last 24h:   Patient did well overnight, weaning of vent settings now FiO2 40%, O2 saturation 100%. Small frequent loose stools, unable to have external catheter on. Patient off sedation, fentanyl 25 mcg x 1. Tube feeds at 30/h. Afebrile, normal WBC. Awake and alert, following commands.      CURRENT MEDICATIONS:  SCHEDULED MEDICATIONS:   hydrocortisone   Rectal BID    potassium bicarb-citric acid  40 mEq Oral BID    calcium gluconate  1 g Intravenous Once    pantoprazole  40 mg Intravenous BID    And    sodium chloride (PF)  10 mL Intravenous BID    vitamin B-1  100 mg Per NG tube Daily    folic acid  1 mg Per NG tube Daily    niMODipine  30 mg Oral Q2H    lidocaine 1 % injection  5 mL Intradermal Once    midazolam  2 mg Intravenous Once    atorvastatin  80 mg Oral Nightly    [Held by provider] sennosides-docusate sodium  2 tablet Oral Daily    sodium chloride flush  10 mL Intravenous 2 times per day    ferrous sulfate  325 mg Oral Daily with breakfast    sodium chloride flush  10 mL Intravenous 2 times per day     CONTINUOUS INFUSIONS:    PRN MEDICATIONS:   fentanNYL, heparin flush, [Held by provider] magnesium hydroxide, sodium chloride flush, [Held by provider] docusate sodium, sodium chloride flush, acetaminophen, ondansetron    VITALS:  Temperature Range: Temp: 99.2 °F (37.3 °C) Temp  Av.9 °F (37.2 °C)  Min: 98 °F (36.7 °C)  Max: 99.6 °F (37.6 °C)  BP Range: Systolic (71BEN), ZF , Min:94 , LXF:977     Diastolic (85MMM), WSZ:64, Min:30, Max:54    Pulse Range: Pulse  Av.4  Min: 77  Max: 102  Respiration Range: Resp  Av.3  Min: 21  Max: 32  Current Pulse Ox: SpO2: 100 %  24HR Pulse Ox Range: SpO2  Av.8 % coiling. 2.  No change in tiny dot of pneumocephalus near the aneurysm site. 3.  Decrease in amount of blood in the procedural site. 4.  Decrease in amount of subarachnoid hemorrhage. 5.  Subtle rightward shift is described above at the level of the hemorrhage. Ct Head Wo Contrast  Result Date: 2/13/2020  1. Interval postsurgical changes of anterior cerebral artery aneurysm coil embolization. Small amount of pneumocephalus adjacent to the embolization coils. 2.  Significantly increased hyperattenuation in the bilateral medial frontal regions centered around the embolization coils could represent increased hemorrhage and/or post angiographic contrast staining. Continued attention on follow-up recommended. 3.  Bilateral acute subarachnoid hemorrhage. New intraventricular extension of hemorrhage with hyperdense blood products throughout the ventricular system, most pronounced in the right lateral ventricle. Mild diffuse ventricular prominence suggest developing hydrocephalus. 4. Interval worsening of diffuse cerebral edema and sulcal effacement which raises concern for increasing intracranial pressure. No significant midline shift or large territorial infarct. Critical results were called by Dr. Mary Maldonado Sessions to [address] on 2/13/2020 at 04:58. Ct Head Wo Contrast  Result Date: 2/12/2020  Redemonstration of subarachnoid hemorrhage that is not significantly changed compared to prior exam. Findings were discussed with Dr. Priscila Jessica At 2:30 pm on 2/12/2020. Ct Head Wo Contrast  Result Date: 2/12/2020  Subarachnoid hemorrhage predominates in the anterior interhemispheric fissure, as described. No definite intraparenchymal component identified. Findings were discussed with Dimitri Means at 12:20 pm on 2/12/2020. Ct Abdomen Pelvis W Iv Contrast  Result Date: 2/5/2020  1. No acute intra-abdominal or intrapelvic process.   Specifically, no CT scan evidence of acute pancreatitis or complications

## 2020-02-22 NOTE — PLAN OF CARE
Problem: Gas Exchange - Impaired:  Goal: Levels of oxygenation will improve  Description  Levels of oxygenation will improve  Outcome: Ongoing     Problem: OXYGENATION/RESPIRATORY FUNCTION  Goal: Patient will maintain patent airway  Outcome: Ongoing  Goal: Patient will achieve/maintain normal respiratory rate/effort  Description  Respiratory rate and effort will be within normal limits for the patient  Outcome: Ongoing     Problem: MECHANICAL VENTILATION  Goal: Patient will maintain patent airway  Outcome: Ongoing  Goal: Oral health is maintained or improved  Outcome: Ongoing  Goal: ET tube will be managed safely  Outcome: Ongoing  Goal: Ability to express needs and understand communication  Outcome: Ongoing     Problem: Respiratory  Intervention: Education, Medication and treatments  Note:   MOBILIZE SECRETIONS    [x]   ASSESS BREATH SOUNDS  [x]   ASSESS SPUTUM PRODUCTION  [x]   COUGH AND DEEP BREATHING  []  IMPLEMENT SECRETION MANAGEMENT PROTOCOL  [x]   PATIENT EDUCATION AS NEEDED

## 2020-02-22 NOTE — PROGRESS NOTES
hours.  Troponin: No results for input(s): TROPONINI in the last 72 hours. BNP: No results for input(s): BNP in the last 72 hours. Lipids:   No results for input(s): CHOL, HDL in the last 72 hours. Invalid input(s): LDLCALCU  INR:   No results for input(s): INR in the last 72 hours. Assessment and Recommendations:   77years old female with ruptured right SENTHIL 2 mm aneurysm, Em and Jeff 3 modified Jane scale 3. Patient underwent primary coil embolization 2/12/2020. Postprocedure CTA head shows some increase in the hemorrhage around the aneurysm region, follow-up CT head stable with mild mass-effect and mild hydrocephalus with third ventricle IVH 2/14/2020. Patient exam is stable    Repeated CTA head and neck 2/15/2020 with vasospasm however patient exam is improving. TCD 2/17: Normal bilateral transcranial Doppler. TCD 2/121: Normal bilateral transcranial Doppler. Blood pressure systolic goal per NICU to assess for vasospasm, permissive BP  Keep magnesium above 2.5  Thiamine  Lipitor 80 mg daily  Nimodipine therapy. Na checks per NICU. TCD daily.   Chemical DVT ppx  Possible acquired pneumonia management    Kadie Martínez MD  Stroke, [facility]  [address] [place]  Electronically signed 2/22/2020 at 12:14 PM

## 2020-02-22 NOTE — PROGRESS NOTES
Today's Date: 2/22/2020  Patient Name: Aura Prtaer  Date of admission: 2/12/2020  1:58 PM  Patient's age: 77 y.o., 1950  Admission Dx: SAH (subarachnoid hemorrhage) ([facility] [address] [I60.9]  Status post coil embolization of cerebral aneurysm [Z98.890]    Reason for Consult: anemia, Hb 6.1 - [facility] refusing PRBC, chronic liverdz/HepC, recs for management    Requesting Physician: Jayda Hanson MD    CHIEF COMPLAINT:    Chief Complaint   Patient presents with    Cerebrovascular Accident     SUBJECTIVE:    Pt seen a nd examined'  Patient is intubated. Hemoglobin 5.0. No gross bleeding noted  Discussed with sister at bedside. She had a colonoscopy yesterday, no active bleeding noted but external hemorrhoids were seen    Life threateneing anemia     HISTORY OF PRESENT ILLNESS:    This is a 36-year-old female with past medical history of hypertension, was transferred from [place] for subarachnoid hemorrhage. History provided by patient's daughter who reported that patient had a recent dental procedure and was given anesthetics and subsequently her blood pressure went significantly high. Patient complains of mild facial droop and her strokelike symptoms and her CT scan showed subarachnoid hemorrhage predominantly in the anterior interhemispheric fissure. Patient is Buddhist and she was noted to have low hemoglobin. Patient and family refusing further blood transfusion and therefore hematology was consulted. Past Medical History:   has a past medical history of Acid reflux, Arthritis, Cancer ([facility] [address] Family history of breast cancer in first degree relative, Gall stones, Hard of hearing, History of cervical cancer, Hypertension, Lateral meniscus tear, Wears dentures, and Wears glasses. Past Surgical History:   has a past surgical history that includes Tonsillectomy and adenoidectomy (1956); Cholecystectomy;  Inner ear surgery; Knee arthroscopy (2000, 2005); cyst removal (1980); Colonoscopy (12/09); Breast biopsy (1980); Foot surgery (Bilateral); other surgical history (Right, 9-4-14); Umbilical hernia repair; hernia repair; Varicose vein surgery (Bilateral); Knee arthroscopy (Left, 4/3/2019); Colonoscopy (N/A, 5/31/2019); other surgical history (02/12/2020); hc  picc powerpicc double (2/20/2020); Upper gastrointestinal endoscopy (N/A, 2/20/2020); and sigmoidoscopy (N/A, 2/20/2020). Medications:    Prior to Admission medications    Medication Sig Start Date End Date Taking?  Authorizing Provider   pramipexole (MIRAPEX) 0.125 MG tablet TAKE ONE TABLET BY MOUTH NIGHTLY 2/19/20   Saúl Emanuel MD   diclofenac (VOLTAREN) 75 MG EC tablet Take 1 tablet by mouth 2 times daily (with meals) for 14 days 1/21/20 2/4/20  Minda Galvez MD   hydrALAZINE (APRESOLINE) 50 MG tablet TAKE 1 TABLET BY MOUTH 4 TIMES A DAY 12/26/19   Saúl Emanuel MD   meloxicam (MOBIC) 15 MG tablet TAKE 1 TABLET BY MOUTH ONE TIME A DAY 12/12/19   Efra Gonzalez MD   carvedilol (COREG) 3.125 MG tablet Take 1 tablet by mouth 2 times daily 11/5/19   Saúl Emanuel MD   atorvastatin (LIPITOR) 40 MG tablet Take 1 tablet by mouth nightly 10/15/19   Saúl Emanuel MD   acetaminophen (TYLENOL) 325 MG tablet Take 325 mg by mouth every 6 hours as needed for Pain    Historical Provider, MD   vitamin D (CHOLECALCIFEROL) 1000 UNIT TABS tablet Take 1,000 Units by mouth 3 times daily    Historical Provider, MD   meloxicam (MOBIC) 15 MG tablet Take 15 mg by mouth daily    Historical Provider, MD   aspirin 81 MG EC tablet Take 1 tablet by mouth daily 6/4/19   Jaime Britton MD   Multiple Vitamins-Minerals (THERAPEUTIC MULTIVITAMIN-MINERALS) tablet Take 1 tablet by mouth daily    Historical Provider, MD     Current Facility-Administered Medications   Medication Dose Route Frequency Provider Last Rate Last Dose    hydrocortisone (ANUSOL-HC) 2.5 % rectal cream   Rectal BID Samer Aris Ca MD        potassium bicarb-citric that actually reflects the content of the visit, the document can still have some errors including those of syntax and sound a like substitutions which may escape proof reading. It such instances, actual meaning can be extrapolated by contextual diversion.

## 2020-02-22 NOTE — PROGRESS NOTES
After a hypotensive event, Dr Rosalind Murray ordered a 250 ML saline bolus to run. He also ordered 25 of Albumin. I adjusted patients head of bed to be at 15 degrees to help with blood pressure. Patients daughter April then called patients daughter Elias Cullen who informed me that a GI doctor came in and told her that if the patients head of bed is flat for 5 minutes or more, her lung would collapse. I told patients daughters that I have never heard of anything like that and that I was unsure why a GI doctor would tell them that.  Electronically signed by Frankie Valencia RN on 2/21/2020 at 8:31 PM

## 2020-02-22 NOTE — PROGRESS NOTES
Labor Neg Hx     Spont Abortions Neg Hx     Stroke Neg Hx     Liver Cancer Neg Hx        SURGICAL HISTORY:   Past Surgical History:   Procedure Laterality Date    BREAST BIOPSY      LEFT(BENIGN)    CHOLECYSTECTOMY      COLONOSCOPY      Negative    COLONOSCOPY N/A 2019    COLONOSCOPY POLYPECTOMY HOT SNARE, COLD POLYPECTOMY performed by Georgiana Gandhi MD at [address]    Left Wrist    FOOT SURGERY Bilateral     FOR CALLOSIS    [facility]  PICC [address] DOUBLE  2020         HERNIA REPAIR      UMBILICAL     INNER EAR SURGERY      Right     KNEE ARTHROSCOPY  ,     LEFT    KNEE ARTHROSCOPY Left 4/3/2019    KNEE ARTHROSCOPY DIAGNOSTIC performed by Candi Alacntara MD at [facility] Right 14    BAHA- bone anchored hearing aid    OTHER SURGICAL HISTORY  2020    aneurysm rt SENTHIL, with 6 coils placed    SIGMOIDOSCOPY N/A 2020    SIGMOIDOSCOPY DIAGNOSTIC FLEXIBLE performed by Cheryle Lea, MD at [facility] ENDOSCOPY N/A 2020    EGD DIAGNOSTIC ONLY performed by Cheryle Lea, MD at [facility] Bilateral            [facility]:      There is no history of TB or TB exposure. There is no asbestos or silica dust exposure. The patient reports no coal, foundry, quarry or [employer] exposure. Travel history reveals negative  There is no history of recreational or IV drug use. There is no hot tube exposure. Pets no    Occupational history no significant occupational history. TOBACCO:   reports that she has never smoked. She has never used smokeless tobacco.  ETOH:   reports current alcohol use.       ALLERGIES:      Allergies   Allergen Reactions    Lisinopril Other (See Comments)    Losartan      Fatigue    Procardia [Nifedipine] Other (See Comments)         Home hypertension possible myocardial injury  GASTROINTESTINAL:  negative for nausea, vomiting, change in bowel habits, diarrhea, constipation, abdominal pain, pruritus, abdominal mass and abdominal distention history of acid reflux  GENITOURINARY:  negative for frequency, dysuria, nocturia, urinary incontinence and hesitancy history of renal stones  INTEGUMENT  negative for rash, skin lesion(s), dryness, skin color change, changes in lesion, pruritus and changes in hair completed and negative  HEMATOLOGIC/LYMPHATIC:  negative for easy bruising, bleeding, lymphadenopathy, petechiae and swelling/edema no history of anemia or bleeding no skin rash  ALLERGIC/IMMUNOLOGIC:  negative for recurrent infections, urticaria and drug reactions  ENDOCRINE:  negative for heat intolerance, cold intolerance, tremor, weight changes and change in bowel habits no history of diabetes  MUSCULOSKELETAL:  negative for  myalgias, arthralgias, pain, joint swelling, stiff joints and decreased range of motion does have degenerative arthritis have had a intracranial aneurysmal bleed no localized paralysis.   NEUROLOGICAL:  negative for headaches, dizziness, seizures, memory problems, speech problems, visual disturbance and coordination problems  BEHAVIOR/PSYCH:  negative for poor appetite, increased appetite, decreased sleep, increased sleep, decreased energy level, increased energy level and poor concentration agitated confused  Skin no rash no dermatitis completed and negative  Vitals:  BP (!) 117/47   Pulse 81   Temp 99.6 °F (37.6 °C)   Resp 24   Ht 5' 6\" (1.676 m)   Wt 105 lb 2.6 oz (47.7 kg)   SpO2 100%   BMI 16.97 kg/m²     PHYSICAL EXAM:  General Appearance:    Alert, cooperative, no distress, appears stated age   Head:    Normocephalic, without obvious abnormality, atraumatic      Eyes:    PERRL, conjunctiva/corneas clear, EOM's intact no Marty's syndrome pupils are equal reactive no jaundice   Ears:    Normal  external ear canals, both ears no bleeding from the ears no nasal polyps orally intubated   Nose:   Nares normal, septum midline, mucosa normal, no drainage        or sinus tenderness   Throat:   Lips, mucosa, and tongue normal; teeth and gums normal no excessive secretions   Neck:   Supple, symmetrical, trachea midline, no adenopathy;     thyroid:  no enlargement/tenderness/nodules; no carotid    bruit ,JVD   Back:     Symmetric, no curvature, ROM normal, no CVA tenderness   Lungs:    AP diameter is not increased percussion note is normally resonant breathing vesicular expiration not prolonged no rales rhonchi are audible no pleural friction rub is audible    Not elevated hepatojugular reflux negative    Chest Wall:    No tenderness or deformity      Heart:    Regular rate and rhythm, S1 and S2 normal, no murmur, rub        or gallop no rvh                           Abdomen:                                                 Pulses:                              Skin:                  Lymph nodes:                    Neurologic:                  Soft, non-tender, bowel sounds active all four quadrants,     no masses, no organomegaly         2+ and symmetric all extremities     Skin color, texture, turgor normal, no rashes or lesions       Cervical, supraclavicular not enlarged or matted or tender      CNII-XII intact, normal strength 5/5 . Sensation grossly normal  and reflexes normal 2+  throughout     Clubbing No  Lower ext edema  BC is elevated to 15.8 hemoglobin is 7 g better than yesterday. Serum electrolytes reveal no hyponatremia renal function is stable no hyperglycemia lipase is normal  Upper ext edemaabsent         Musculoskeletal no synovitis.   No joint swelling or tenderness        CBC:   Recent Labs     02/19/20  0440 02/20/20  0526 02/21/20  0422   WBC 14.7* 15.8* 10.1   HGB 5.6* 7.0* 5.1*    See Reflexed IPF Result 220     BMP:    Recent Labs     02/19/20  0440 02/20/20  0526 02/21/20  0422    141 142   K 3.3* 3. 6* 2.7*   * 112* 110*   CO2 15* 15* 21   BUN 11 9 8   CREATININE 0.67 0.58 0.63   GLUCOSE 95 79 80     Hepatic: No results for input(s): AST, ALT, ALB, BILITOT, ALKPHOS in the last 72 hours. Amylase: No results found for: AMYLASE  Lipase:   Lab Results   Component Value Date    LIPASE 99 11/18/2019     Troponin: No results for input(s): TROPONINI in the last 72 hours. BNP: No results for input(s): BNP in the last 72 hours. Lipids: No results for input(s): CHOL, HDL in the last 72 hours. Invalid input(s): LDLCALCU  ABGs: No results found for: PHART, PO2ART, [id number]  INR: No results for input(s): INR in the last 72 hours. Thyroid:   Lab Results   Component Value Date    TSH 5.94 02/15/2020     Urinalysis: No results for input(s): BACTERIA, BLOODU, CLARITYU, COLORU, PHUR, PROTEINU, RBCUA, SPECGRAV, BILIRUBINUR, NITRU, WBCUA, LEUKOCYTESUR, GLUCOSEU in the last 72 hours. Cultures:-  No positive cultures are available    CXR\  Xray chest contrinue to show alveolar infiltrates that could be due to pneumonia and/or pulm edema    CT Scans  No new CT cxhest    Echo  Echo reveals no diaistolic dysfunction. Good EF              IMPRESSION:    Patient has ruptured intracranial aneurysm. Patient has developed acute respiratory failure since morning    Required intubation and is on 80% supplemental O2    History of hypertension    History of intracranial aortic aneurysm which got up which ruptured and ruptured. Likely pulmonary edema next possible pulmonary aspiration causing pneumonitis. May be hospital-acquired pneumonia. :                PLAN:      Discussed with the neurology service  We will will continue supplemental oxygen  And reduce Fio2 to keep Sao2 >90%  Continue low dose diuretic  Ventilate with low TV to prevent further alveolar damage. DVT prevention  Continue present antibiotcs  Check on sputum c/s.   Peptic ulcer prevention  CC  Time 35 minutes  Manan Somers M.D.

## 2020-02-23 ENCOUNTER — APPOINTMENT (OUTPATIENT)
Dept: GENERAL RADIOLOGY | Age: 70
DRG: 020 | End: 2020-02-23
Payer: MEDICARE

## 2020-02-23 PROCEDURE — 2700000000 HC OXYGEN THERAPY PER DAY

## 2020-02-23 PROCEDURE — 94761 N-INVAS EAR/PLS OXIMETRY MLT: CPT

## 2020-02-23 PROCEDURE — 6370000000 HC RX 637 (ALT 250 FOR IP): Performed by: STUDENT IN AN ORGANIZED HEALTH CARE EDUCATION/TRAINING PROGRAM

## 2020-02-23 PROCEDURE — C9113 INJ PANTOPRAZOLE SODIUM, VIA: HCPCS | Performed by: STUDENT IN AN ORGANIZED HEALTH CARE EDUCATION/TRAINING PROGRAM

## 2020-02-23 PROCEDURE — 2580000003 HC RX 258: Performed by: STUDENT IN AN ORGANIZED HEALTH CARE EDUCATION/TRAINING PROGRAM

## 2020-02-23 PROCEDURE — 6360000002 HC RX W HCPCS: Performed by: STUDENT IN AN ORGANIZED HEALTH CARE EDUCATION/TRAINING PROGRAM

## 2020-02-23 PROCEDURE — 6370000000 HC RX 637 (ALT 250 FOR IP): Performed by: NURSE PRACTITIONER

## 2020-02-23 PROCEDURE — 93886 INTRACRANIAL COMPLETE STUDY: CPT

## 2020-02-23 PROCEDURE — 99232 SBSQ HOSP IP/OBS MODERATE 35: CPT | Performed by: INTERNAL MEDICINE

## 2020-02-23 PROCEDURE — 99291 CRITICAL CARE FIRST HOUR: CPT | Performed by: PSYCHIATRY & NEUROLOGY

## 2020-02-23 PROCEDURE — 99233 SBSQ HOSP IP/OBS HIGH 50: CPT | Performed by: PSYCHIATRY & NEUROLOGY

## 2020-02-23 PROCEDURE — 94770 HC ETCO2 MONITOR DAILY: CPT

## 2020-02-23 PROCEDURE — 99233 SBSQ HOSP IP/OBS HIGH 50: CPT | Performed by: INTERNAL MEDICINE

## 2020-02-23 PROCEDURE — 94668 MNPJ CHEST WALL SBSQ: CPT

## 2020-02-23 PROCEDURE — 6360000002 HC RX W HCPCS: Performed by: NURSE PRACTITIONER

## 2020-02-23 PROCEDURE — 6370000000 HC RX 637 (ALT 250 FOR IP): Performed by: PSYCHIATRY & NEUROLOGY

## 2020-02-23 PROCEDURE — 2000000003 HC NEURO ICU R&B

## 2020-02-23 PROCEDURE — 71045 X-RAY EXAM CHEST 1 VIEW: CPT

## 2020-02-23 RX ORDER — FUROSEMIDE 10 MG/ML
20 INJECTION INTRAMUSCULAR; INTRAVENOUS ONCE
Status: COMPLETED | OUTPATIENT
Start: 2020-02-23 | End: 2020-02-23

## 2020-02-23 RX ORDER — MAGNESIUM SULFATE 1 G/100ML
1 INJECTION INTRAVENOUS ONCE
Status: COMPLETED | OUTPATIENT
Start: 2020-02-23 | End: 2020-02-23

## 2020-02-23 RX ORDER — ATORVASTATIN CALCIUM 20 MG/1
20 TABLET, FILM COATED ORAL NIGHTLY
Status: DISCONTINUED | OUTPATIENT
Start: 2020-02-23 | End: 2020-03-03 | Stop reason: HOSPADM

## 2020-02-23 RX ORDER — FENTANYL CITRATE 50 UG/ML
50 INJECTION, SOLUTION INTRAMUSCULAR; INTRAVENOUS EVERY 4 HOURS PRN
Status: DISCONTINUED | OUTPATIENT
Start: 2020-02-23 | End: 2020-03-03 | Stop reason: HOSPADM

## 2020-02-23 RX ADMIN — MAGNESIUM SULFATE HEPTAHYDRATE 1 G: 1 INJECTION, SOLUTION INTRAVENOUS at 13:23

## 2020-02-23 RX ADMIN — Medication 60 MG: at 04:50

## 2020-02-23 RX ADMIN — FENTANYL CITRATE 50 MCG: 50 INJECTION, SOLUTION INTRAMUSCULAR; INTRAVENOUS at 05:07

## 2020-02-23 RX ADMIN — POTASSIUM BICARBONATE 40 MEQ: 782 TABLET, EFFERVESCENT ORAL at 20:56

## 2020-02-23 RX ADMIN — HYDROCORTISONE 2.5%: 25 CREAM TOPICAL at 08:15

## 2020-02-23 RX ADMIN — Medication 60 MG: at 08:16

## 2020-02-23 RX ADMIN — SODIUM CHLORIDE, PRESERVATIVE FREE 10 ML: 5 INJECTION INTRAVENOUS at 19:43

## 2020-02-23 RX ADMIN — Medication 10 ML: at 20:55

## 2020-02-23 RX ADMIN — POTASSIUM BICARBONATE 40 MEQ: 782 TABLET, EFFERVESCENT ORAL at 08:17

## 2020-02-23 RX ADMIN — Medication 60 MG: at 00:27

## 2020-02-23 RX ADMIN — PANTOPRAZOLE SODIUM 40 MG: 40 INJECTION, POWDER, LYOPHILIZED, FOR SOLUTION INTRAVENOUS at 08:18

## 2020-02-23 RX ADMIN — Medication 60 MG: at 12:55

## 2020-02-23 RX ADMIN — FERROUS SULFATE TAB EC 325 MG (65 MG FE EQUIVALENT) 325 MG: 325 (65 FE) TABLET DELAYED RESPONSE at 08:17

## 2020-02-23 RX ADMIN — FUROSEMIDE 20 MG: 10 INJECTION, SOLUTION INTRAMUSCULAR; INTRAVENOUS at 08:16

## 2020-02-23 RX ADMIN — FOLIC ACID 1 MG: 1 TABLET ORAL at 08:18

## 2020-02-23 RX ADMIN — SODIUM CHLORIDE, PRESERVATIVE FREE 10 ML: 5 INJECTION INTRAVENOUS at 08:18

## 2020-02-23 RX ADMIN — Medication 10 ML: at 08:15

## 2020-02-23 RX ADMIN — Medication 100 MG: at 08:17

## 2020-02-23 RX ADMIN — HYDROCORTISONE 2.5%: 25 CREAM TOPICAL at 21:03

## 2020-02-23 RX ADMIN — Medication 60 MG: at 15:55

## 2020-02-23 RX ADMIN — Medication 60 MG: at 20:55

## 2020-02-23 RX ADMIN — ATORVASTATIN CALCIUM 20 MG: 20 TABLET, FILM COATED ORAL at 20:56

## 2020-02-23 ASSESSMENT — PAIN SCALES - GENERAL
PAINLEVEL_OUTOF10: 0

## 2020-02-23 ASSESSMENT — PULMONARY FUNCTION TESTS
PIF_VALUE: 12
PIF_VALUE: 11
PIF_VALUE: 12
PIF_VALUE: 19
PIF_VALUE: 9

## 2020-02-23 ASSESSMENT — PAIN SCALES - WONG BAKER: WONGBAKER_NUMERICALRESPONSE: 0

## 2020-02-23 NOTE — PLAN OF CARE
Problem: Gas Exchange - Impaired:  Goal: Levels of oxygenation will improve  Description  Levels of oxygenation will improve  2/22/2020 1957 by Elise Ruiz RCP  Outcome: Ongoing     Problem: OXYGENATION/RESPIRATORY FUNCTION  Goal: Patient will maintain patent airway  2/22/2020 1957 by Elise Ruiz RCP  Outcome: Ongoing     Problem: OXYGENATION/RESPIRATORY FUNCTION  Goal: Patient will achieve/maintain normal respiratory rate/effort  Description  Respiratory rate and effort will be within normal limits for the patient  2/22/2020 1957 by Elise Ruiz RCP  Outcome: Ongoing     Problem: MECHANICAL VENTILATION  Goal: Patient will maintain patent airway  2/22/2020 1957 by Elise Ruiz RCP  Outcome: Ongoing     Problem: MECHANICAL VENTILATION  Goal: Oral health is maintained or improved  2/22/2020 1957 by Elise Ruiz RCP  Outcome: Ongoing     Problem: MECHANICAL VENTILATION  Goal: ET tube will be managed safely  2/22/2020 1957 by Elise Ruiz RCP  Outcome: Ongoing     Problem: MECHANICAL VENTILATION  Goal: Ability to express needs and understand communication  2/22/2020 1957 by Elise Ruiz RCP  Outcome: Ongoing     Problem: Respiratory  Intervention: Respiratory assessment  Note:   MOBILIZE SECRETIONS    [x]   ASSESS BREATH SOUNDS  [x]   ASSESS SPUTUM PRODUCTION  [x]   COUGH AND DEEP BREATHING  []  IMPLEMENT SECRETION MANAGEMENT PROTOCOL  [x]   PATIENT EDUCATION AS NEEDED     CPT Vest QID

## 2020-02-23 NOTE — PROGRESS NOTES
CHOL, HDL in the last 72 hours. Invalid input(s): LDLCALCU  INR:   No results for input(s): INR in the last 72 hours. Assessment and Recommendations:   77years old female with ruptured right SENTHIL 2 mm aneurysm, Em and Jeff 3 modified Jane scale 3. Patient underwent primary coil embolization 2/12/2020. Postprocedure CTA head shows some increase in the hemorrhage around the aneurysm region, follow-up CT head stable with mild mass-effect and mild hydrocephalus with third ventricle IVH 2/14/2020. Patient exam is stable    Repeated CTA head and neck 2/15/2020 with vasospasm however patient exam is improving. TCD 2/17: Normal bilateral transcranial Doppler. TCD 2/121: Normal bilateral transcranial Doppler. Hemoglobin at 5 from 5.1. Blood pressure systolic goal per NICU to assess for vasospasm, permissive BP  Keep magnesium above 2.5  Thiamine  Lipitor 80 mg daily  Nimodipine therapy. Na checks per NICU. TCD daily.   Chemical DVT ppx  Possible acquired pneumonia management    Majo Guzman MD  Stroke, [facility]  [address]  Electronically signed 2/23/2020 at 12:39 PM

## 2020-02-23 NOTE — PROGRESS NOTES
Order obtained for extubation. SpO2 of 95 on 30% FiO2. Patient extubated and placed on 2 liters/min via nasal cannula. Post extubation SpO2 is 99% with HR  82 bpm and RR 20 breaths/min. Patient had moderate cough that was productive of tan sputum. Extubation Well tolerated by patient. .   Breath Sounds: diminished but clear    Brandt Solitario   11:02 AM

## 2020-02-23 NOTE — PLAN OF CARE
Problem: Pain:  Goal: Pain level will decrease  Description  Pain level will decrease  Outcome: Ongoing  Goal: Control of acute pain  Description  Control of acute pain  Outcome: Ongoing  Goal: Control of chronic pain  Description  Control of chronic pain  Outcome: Ongoing  Goal: Patient's pain/discomfort is manageable  Description  Patient's pain/discomfort is manageable  2/23/2020 1451 by Hannah Mullins RN  Outcome: Ongoing  2/23/2020 0533 by Alee Mejia RN  Outcome: Ongoing     Problem: Infection:  Goal: Will remain free from infection  Description  Will remain free from infection  Outcome: Ongoing     Problem: Safety:  Goal: Free from accidental physical injury  Description  Free from accidental physical injury  Outcome: Ongoing  Goal: Free from intentional harm  Description  Free from intentional harm  Outcome: Ongoing     Problem: Daily Care:  Goal: Daily care needs are met  Description  Daily care needs are met  Outcome: Ongoing     Problem: Skin Integrity:  Goal: Skin integrity will stabilize  Description  Skin integrity will stabilize  2/23/2020 1451 by Hannah Mullins RN  Outcome: Ongoing  2/23/2020 0533 by Alee Mejia RN  Outcome: Ongoing     Problem: Discharge Planning:  Goal: Patients continuum of care needs are met  Description  Patients continuum of care needs are met  Outcome: Ongoing     Problem: Falls - Risk of:  Goal: Will remain free from falls  Description  Will remain free from falls  Outcome: Ongoing  Goal: Absence of physical injury  Description  Absence of physical injury  Outcome: Ongoing     Problem: Skin Integrity:  Goal: Will show no infection signs and symptoms  Description  Will show no infection signs and symptoms  Outcome: Ongoing  Goal: Absence of new skin breakdown  Description  Absence of new skin breakdown  Outcome: Ongoing     Problem: Musculor/Skeletal Functional Status  Goal: Highest potential functional level  Outcome: Ongoing     Problem: HEMODYNAMIC STATUS  Goal: Patient has stable vital signs and fluid balance  2/23/2020 1451 by Emily Barlow RN  Outcome: Ongoing  2/23/2020 0533 by Elba Tam RN  Outcome: Ongoing     Problem: Gas Exchange - Impaired:  Goal: Levels of oxygenation will improve  Description  Levels of oxygenation will improve  Outcome: Ongoing     Problem: Risk for Impaired Skin Integrity  Goal: Tissue integrity - skin and mucous membranes  Description  Structural intactness and normal physiological function of skin and  mucous membranes.   Outcome: Ongoing     Problem: OXYGENATION/RESPIRATORY FUNCTION  Goal: Patient will maintain patent airway  2/23/2020 1451 by Emily Barlow RN  Outcome: Ongoing  2/23/2020 0750 by Alek Palma RCP  Outcome: Ongoing  Goal: Patient will achieve/maintain normal respiratory rate/effort  Description  Respiratory rate and effort will be within normal limits for the patient  2/23/2020 1451 by Emily Barlow RN  Outcome: Ongoing  2/23/2020 0750 by Alek Palma RCP  Outcome: Ongoing     Problem: Nutrition  Goal: Optimal nutrition therapy  Outcome: Ongoing

## 2020-02-23 NOTE — PLAN OF CARE
Problem: OXYGENATION/RESPIRATORY FUNCTION  Goal: Patient will maintain patent airway  2/23/2020 0750 by Shasta Green RCP  Outcome: Ongoing  2/22/2020 1957 by Carmenza Sage RCP  Outcome: Ongoing  2/22/2020 1842 by Shasta Green RCP  Outcome: Ongoing  Goal: Patient will achieve/maintain normal respiratory rate/effort  Description  Respiratory rate and effort will be within normal limits for the patient  2/23/2020 0750 by Shasta Green RCP  Outcome: Ongoing  2/22/2020 1957 by Carmenza Sage RCP  Outcome: Ongoing  2/22/2020 1842 by Shasta Green RCP  Outcome: Ongoing     Problem: MECHANICAL VENTILATION  Goal: Patient will maintain patent airway  2/23/2020 0750 by Shasta Green RCP  Outcome: Ongoing  2/22/2020 1957 by Carmenza Sage RCP  Outcome: Ongoing  2/22/2020 1842 by Shasta Green RCP  Outcome: Ongoing  Goal: Oral health is maintained or improved  2/23/2020 0750 by Shasta Green RCP  Outcome: Ongoing  2/22/2020 1957 by Carmenza Sage RCP  Outcome: Ongoing  2/22/2020 1842 by Shasta Green RCP  Outcome: Ongoing  Goal: ET tube will be managed safely  2/23/2020 0750 by Shasta Green RCP  Outcome: Ongoing  2/22/2020 1957 by Carmenza Sage RCP  Outcome: Ongoing  2/22/2020 1842 by Shasta Green RCP  Outcome: Ongoing  Goal: Ability to express needs and understand communication  2/23/2020 0750 by Shasta Green RCP  Outcome: Ongoing  2/22/2020 1957 by Carmenza Sage RCP  Outcome: Ongoing  2/22/2020 1842 by Shasta Green RCP  Outcome: Ongoing     Problem: Respiratory  Intervention: Education, Medication and treatments  2/23/2020 0750 by Shasta Green RCP  Note:   MOBILIZE SECRETIONS    [x]   ASSESS BREATH SOUNDS  [x]   ASSESS SPUTUM PRODUCTION  [x]   COUGH AND DEEP BREATHING  []  IMPLEMENT SECRETION MANAGEMENT PROTOCOL  [x]   PATIENT EDUCATION AS NEEDED     2/22/2020 1842 by Shasta Green RCP  Note:   MOBILIZE SECRETIONS    [x]   ASSESS BREATH SOUNDS  [x]   ASSESS SPUTUM PRODUCTION  [x]   COUGH AND DEEP BREATHING  []  IMPLEMENT SECRETION MANAGEMENT PROTOCOL  [x]   PATIENT EDUCATION AS NEEDED

## 2020-02-23 NOTE — PROGRESS NOTES
[id number]      Diastolic (75PDT), ABRAM:00, Min:30, Max:54     PULSE Pulse  Av.6  Min: 77  Max: 102   RR Resp  Av.2  Min: 23  Max: 30   O2 SAT SpO2  Av.8 %  Min: 96 %  Max: 100 %   OXYGEN DELIVERY No data recorded     SYSTEMIC EXAMINATION:   General appearance - Mechanically ventilated  Mental status -lethargic  Eyes - pupils equal and reactive, sclera anicteric  Mouth - mucous membranes moist, pharynx normal without lesions  Neck - supple, no significant adenopathy, carotids upstroke normal bilaterally, no bruits  Chest - Breath sounds bilaterally were dimnished to auscultation at bases.   Scattered bibasilar crackles  Heart - normal rate, regular rhythm, normal S1, S2, no murmurs, rubs, clicks or gallops  Abdomen - soft, nontender, nondistended, no masses or organomegaly  Neurological - DTR's normal and symmetric, motor and sensory grossly normal bilaterally  Extremities - peripheral pulses normal, no pedal edema, no clubbing or cyanosis  Skin - normal coloration and turgor, no rashes, no suspicious skin lesions noted     DATA REVIEW     Medications: Current Inpatient  Scheduled Meds:   hydrocortisone   Rectal BID    niMODipine  60 mg Oral 6 times per day    potassium bicarb-citric acid  40 mEq Oral BID    calcium gluconate  1 g Intravenous Once    pantoprazole  40 mg Intravenous BID    And    sodium chloride (PF)  10 mL Intravenous BID    vitamin B-1  100 mg Per NG tube Daily    folic acid  1 mg Per NG tube Daily    lidocaine 1 % injection  5 mL Intradermal Once    midazolam  2 mg Intravenous Once    atorvastatin  80 mg Oral Nightly    [Held by provider] sennosides-docusate sodium  2 tablet Oral Daily    sodium chloride flush  10 mL Intravenous 2 times per day    ferrous sulfate  325 mg Oral Daily with breakfast    sodium chloride flush  10 mL Intravenous 2 times per day     Continuous Infusions:  INPUT/OUTPUT:  In: 2388 [I.V.:766; NG/GT:608]  Out: 1500 [Urine:1500]    LABS:-  ABGs:   No results found for: PH, PCO2, PO2, HCO3, O2SAT  No results for input(s): PHART, PO2ART, WEB1PCH, YYF4PZQ, BEART, G6JSUQRJ in the last 72 hours. Lab Results   Component Value Date    POCPH 7.470 (H) 02/22/2020    POCPCO2 38.2 02/22/2020    POCPO2 101.9 02/22/2020    POCHCO3 27.8 02/22/2020    IZTE4KKJ 98 02/22/2020     CBC:   Recent Labs     02/20/20  0526 02/21/20  0422 02/22/20  0505   WBC 15.8* 10.1 9.5   HGB 7.0* 5.1* 5.0*   HCT 21.8* 17.2* 16.2*   MCV 80.7* 88.2 86.6   PLT See Reflexed IPF Result 220 226   LYMPHOPCT  --   --  12*   RBC 2.70* 1.95* 1.87*   MCH 25.9 26.2 26.7   MCHC 32.1 29.7 30.9   RDW 16.2* 16.7* 17.8*     BMP:   Recent Labs     02/20/20  0526 02/21/20  0422 02/22/20  0505    142 140   K 3.6* 2.7* 3.7   * 110* 107   CO2 15* 21 24   BUN 9 8 6*   CREATININE 0.58 0.63 0.60   GLUCOSE 79 80 112*   PHOS  --  2.5*  --      Liver Function Test:   No results for input(s): PROT, LABALBU, ALT, AST, GGT, ALKPHOS, BILITOT in the last 72 hours. Amylase/Lipase:  No results for input(s): AMYLASE, LIPASE in the last 72 hours. Coagulation Profile:   No results for input(s): INR, PROTIME, APTT in the last 72 hours. Cardiac Enzymes:  No results for input(s): CKTOTAL, CKMB, CKMBINDEX, TROPONINI in the last 72 hours. Lactic Acid:  No results found for: LACTA  BNP:   No results found for: BNP  D-Dimer:  No results found for: DDIMER  Others:   Lab Results   Component Value Date    TSH 5.94 (H) 02/15/2020     Lab Results   Component Value Date    AMBREEN POSITIVE (A) 06/03/2019     No results found for: Gunnar Scruggs  Lab Results   Component Value Date    IRON 26 (L) 02/13/2020    TIBC 293 02/13/2020    FERRITIN 44 02/13/2020     No results found for: SPEP, UPEP  No results found for: PSA, CEA, , PR6459,   Microbiology:    Pathology:    Radiology Reports:  XR CHEST (SINGLE VIEW FRONTAL)   Final Result   Improving but persistent multifocal airspace disease and edema.          XR CHEST PORTABLE Final Result   Worsening multifocal airspace disease possibly pneumonia versus ARDS. VL TRANSCRANIAL DOPPLER COMPLETE   Final Result      CT HEAD WO CONTRAST   Final Result   Decrease in size of intracranial blood products associated with anterior   cerebral artery coiling. The findings were sent to the Radiology Results [address] at 8:36   am on 2/21/2020to be communicated to a licensed caregiver. XR ABDOMEN FOR NG/OG/NE TUBE PLACEMENT   Final Result   Enteric tube now in improved position in the stomach. XR CHEST PORTABLE   Final Result   Stable bilateral pulmonary infiltrates, most consistent with multifocal   pneumonia. PICC line tip at the cavoatrial junction. XR ABDOMEN FOR NG/OG/NE TUBE PLACEMENT   Final Result   No interval change in the position of the enteric catheter. Recommend   partial retraction of the catheter, hopefully allowing the catheter to   advance distally. XR ABDOMEN FOR NG/OG/NE TUBE PLACEMENT   Final Result   Enteric tube tip is looped within the stomach with tip directed cranially at   the gastroesophageal junction. Side port is not visualized. Suggest   retracting and repositioning the tube. VL TRANSCRANIAL DOPPLER COMPLETE   Final Result      XR CHEST PORTABLE   Final Result   1. Interval intubation. 2.  Unchanged bilateral airspace consolidation. XR CHEST PORTABLE   Final Result   No significant change in bilateral consolidative opacities. NM GI BLOOD LOSS   Final Result   No evidence of active GI bleeding during acquisition. RECOMMENDATIONS:   If the patient shows hemodynamic signs of an active bleed in the next 20   hours, additional images can be acquired. XR CHEST (SINGLE VIEW FRONTAL)   Final Result   Stable multifocal bilateral consolidation.          VL TRANSCRANIAL DOPPLER COMPLETE   Final Result      VASCULAR REPORT   Final Result      VL TRANSCRANIAL DOPPLER COMPLETE XR CHEST PORTABLE   Final Result   Near opacities in the left retrocardiac region favor atelectasis. CT Head WO Contrast   Final Result   1. Interval postsurgical changes of anterior cerebral artery aneurysm coil   embolization. Small amount of pneumocephalus adjacent to the embolization   coils. 2.  Significantly increased hyperattenuation in the bilateral medial frontal   regions centered around the embolization coils could represent increased   hemorrhage and/or post angiographic contrast staining. Continued attention   on follow-up recommended. 3.  Bilateral acute subarachnoid hemorrhage. New intraventricular extension   of hemorrhage with hyperdense blood products throughout the ventricular   system, most pronounced in the right lateral ventricle. Mild diffuse   ventricular prominence suggest developing hydrocephalus. 4. Interval worsening of diffuse cerebral edema and sulcal effacement which   raises concern for increasing intracranial pressure. No significant midline   shift or large territorial infarct. Critical results were called by Dr. Hatch Sessions to [address] [place] [address] on   2/13/2020 at 04:58. IR ANGIOGRAM CAROTID C EREBRAL BILATERAL         CTA HEAD W CONTRAST   Final Result   Demonstration of a 2 mm aneurysm arising from the right anterior cerebral   artery within the anterior interhemispheric fissure region. Findings were discussed with Dr. Priscila Jessica At 2:36 pm on 2/12/2020. CT HEAD WO CONTRAST   Final Result   Redemonstration of subarachnoid hemorrhage that is not significantly changed   compared to prior exam.      Findings were discussed with Dr. Priscila Jessica At 2:30 pm on 2/12/2020.          VL TRANSCRANIAL DOPPLER COMPLETE    (Results Pending)       Pulmonary Function test:    Polysomnogram:    Echocardiogram:   Results for orders placed during the hospital encounter of 02/12/20   Echocardiogram complete 2D with doppler with color    Narrative

## 2020-02-24 ENCOUNTER — APPOINTMENT (OUTPATIENT)
Dept: GENERAL RADIOLOGY | Age: 70
DRG: 020 | End: 2020-02-24
Payer: MEDICARE

## 2020-02-24 LAB
ABSOLUTE EOS #: 0.23 K/UL (ref 0–0.44)
ABSOLUTE IMMATURE GRANULOCYTE: 0.46 K/UL (ref 0–0.3)
ABSOLUTE LYMPH #: 1.27 K/UL (ref 1.1–3.7)
ABSOLUTE MONO #: 0.69 K/UL (ref 0.1–1.2)
ABSOLUTE RETIC #: 0.24 M/UL (ref 0.03–0.08)
ANION GAP SERPL CALCULATED.3IONS-SCNC: 13 MMOL/L (ref 9–17)
BASOPHILS # BLD: 0 % (ref 0–2)
BASOPHILS ABSOLUTE: 0 K/UL (ref 0–0.2)
BUN BLDV-MCNC: 11 MG/DL (ref 8–23)
BUN/CREAT BLD: NORMAL (ref 9–20)
CALCIUM SERPL-MCNC: 8.8 MG/DL (ref 8.6–10.4)
CHLORIDE BLD-SCNC: 102 MMOL/L (ref 98–107)
CO2: 21 MMOL/L (ref 20–31)
CREAT SERPL-MCNC: 0.6 MG/DL (ref 0.5–0.9)
DIFFERENTIAL TYPE: ABNORMAL
EOSINOPHILS RELATIVE PERCENT: 2 % (ref 1–4)
GFR AFRICAN AMERICAN: >60 ML/MIN
GFR NON-AFRICAN AMERICAN: >60 ML/MIN
GFR SERPL CREATININE-BSD FRML MDRD: NORMAL ML/MIN/{1.73_M2}
GFR SERPL CREATININE-BSD FRML MDRD: NORMAL ML/MIN/{1.73_M2}
GLUCOSE BLD-MCNC: 90 MG/DL (ref 70–99)
HCT VFR BLD CALC: 24.4 % (ref 36.3–47.1)
HEMOGLOBIN: 7 G/DL (ref 11.9–15.1)
IMMATURE GRANULOCYTES: 4 %
IMMATURE RETIC FRACT: 39.8 % (ref 2.7–18.3)
LYMPHOCYTES # BLD: 11 % (ref 24–43)
MAGNESIUM: 2.2 MG/DL (ref 1.6–2.6)
MCH RBC QN AUTO: 27.3 PG (ref 25.2–33.5)
MCHC RBC AUTO-ENTMCNC: 28.7 G/DL (ref 28.4–34.8)
MCV RBC AUTO: 95.3 FL (ref 82.6–102.9)
MONOCYTES # BLD: 6 % (ref 3–12)
MORPHOLOGY: ABNORMAL
NRBC AUTOMATED: 1 PER 100 WBC
PDW BLD-RTO: 23 % (ref 11.8–14.4)
PHOSPHORUS: 2.8 MG/DL (ref 2.6–4.5)
PLATELET # BLD: 320 K/UL (ref 138–453)
PLATELET ESTIMATE: ABNORMAL
PMV BLD AUTO: 11.9 FL (ref 8.1–13.5)
POTASSIUM SERPL-SCNC: 4.4 MMOL/L (ref 3.7–5.3)
RBC # BLD: 2.56 M/UL (ref 3.95–5.11)
RBC # BLD: ABNORMAL 10*6/UL
RETIC %: 9.3 % (ref 0.5–1.9)
RETIC HEMOGLOBIN: 31.4 PG (ref 28.2–35.7)
SEG NEUTROPHILS: 77 % (ref 36–65)
SEGMENTED NEUTROPHILS ABSOLUTE COUNT: 8.85 K/UL (ref 1.5–8.1)
SODIUM BLD-SCNC: 136 MMOL/L (ref 135–144)
WBC # BLD: 11.5 K/UL (ref 3.5–11.3)
WBC # BLD: ABNORMAL 10*3/UL

## 2020-02-24 PROCEDURE — 6370000000 HC RX 637 (ALT 250 FOR IP): Performed by: STUDENT IN AN ORGANIZED HEALTH CARE EDUCATION/TRAINING PROGRAM

## 2020-02-24 PROCEDURE — 85045 AUTOMATED RETICULOCYTE COUNT: CPT

## 2020-02-24 PROCEDURE — 74230 X-RAY XM SWLNG FUNCJ C+: CPT

## 2020-02-24 PROCEDURE — 2000000003 HC NEURO ICU R&B

## 2020-02-24 PROCEDURE — C9113 INJ PANTOPRAZOLE SODIUM, VIA: HCPCS | Performed by: STUDENT IN AN ORGANIZED HEALTH CARE EDUCATION/TRAINING PROGRAM

## 2020-02-24 PROCEDURE — 80048 BASIC METABOLIC PNL TOTAL CA: CPT

## 2020-02-24 PROCEDURE — 6370000000 HC RX 637 (ALT 250 FOR IP): Performed by: PSYCHIATRY & NEUROLOGY

## 2020-02-24 PROCEDURE — 97129 THER IVNTJ 1ST 15 MIN: CPT

## 2020-02-24 PROCEDURE — 99233 SBSQ HOSP IP/OBS HIGH 50: CPT | Performed by: INTERNAL MEDICINE

## 2020-02-24 PROCEDURE — 6370000000 HC RX 637 (ALT 250 FOR IP): Performed by: NURSE PRACTITIONER

## 2020-02-24 PROCEDURE — 99233 SBSQ HOSP IP/OBS HIGH 50: CPT | Performed by: PSYCHIATRY & NEUROLOGY

## 2020-02-24 PROCEDURE — 85025 COMPLETE CBC W/AUTO DIFF WBC: CPT

## 2020-02-24 PROCEDURE — 84100 ASSAY OF PHOSPHORUS: CPT

## 2020-02-24 PROCEDURE — 97535 SELF CARE MNGMENT TRAINING: CPT

## 2020-02-24 PROCEDURE — 6360000002 HC RX W HCPCS: Performed by: STUDENT IN AN ORGANIZED HEALTH CARE EDUCATION/TRAINING PROGRAM

## 2020-02-24 PROCEDURE — 92610 EVALUATE SWALLOWING FUNCTION: CPT

## 2020-02-24 PROCEDURE — 2580000003 HC RX 258: Performed by: STUDENT IN AN ORGANIZED HEALTH CARE EDUCATION/TRAINING PROGRAM

## 2020-02-24 PROCEDURE — 2580000003 HC RX 258: Performed by: NURSE PRACTITIONER

## 2020-02-24 PROCEDURE — 94761 N-INVAS EAR/PLS OXIMETRY MLT: CPT

## 2020-02-24 PROCEDURE — 36415 COLL VENOUS BLD VENIPUNCTURE: CPT

## 2020-02-24 PROCEDURE — 83735 ASSAY OF MAGNESIUM: CPT

## 2020-02-24 PROCEDURE — 97116 GAIT TRAINING THERAPY: CPT

## 2020-02-24 PROCEDURE — 2700000000 HC OXYGEN THERAPY PER DAY

## 2020-02-24 PROCEDURE — 99291 CRITICAL CARE FIRST HOUR: CPT | Performed by: PSYCHIATRY & NEUROLOGY

## 2020-02-24 PROCEDURE — 99232 SBSQ HOSP IP/OBS MODERATE 35: CPT | Performed by: INTERNAL MEDICINE

## 2020-02-24 RX ORDER — MAGNESIUM SULFATE 1 G/100ML
1 INJECTION INTRAVENOUS
Status: COMPLETED | OUTPATIENT
Start: 2020-02-24 | End: 2020-02-24

## 2020-02-24 RX ADMIN — Medication 60 MG: at 16:12

## 2020-02-24 RX ADMIN — Medication 10 ML: at 07:56

## 2020-02-24 RX ADMIN — POTASSIUM BICARBONATE 40 MEQ: 782 TABLET, EFFERVESCENT ORAL at 20:32

## 2020-02-24 RX ADMIN — Medication 60 MG: at 11:33

## 2020-02-24 RX ADMIN — FERROUS SULFATE TAB EC 325 MG (65 MG FE EQUIVALENT) 325 MG: 325 (65 FE) TABLET DELAYED RESPONSE at 07:55

## 2020-02-24 RX ADMIN — Medication 60 MG: at 04:03

## 2020-02-24 RX ADMIN — ATORVASTATIN CALCIUM 20 MG: 20 TABLET, FILM COATED ORAL at 20:32

## 2020-02-24 RX ADMIN — Medication 60 MG: at 00:23

## 2020-02-24 RX ADMIN — Medication 100 MG: at 07:55

## 2020-02-24 RX ADMIN — PANTOPRAZOLE SODIUM 40 MG: 40 INJECTION, POWDER, LYOPHILIZED, FOR SOLUTION INTRAVENOUS at 07:55

## 2020-02-24 RX ADMIN — Medication 60 MG: at 07:55

## 2020-02-24 RX ADMIN — ENOXAPARIN SODIUM 30 MG: 30 INJECTION SUBCUTANEOUS at 20:32

## 2020-02-24 RX ADMIN — POTASSIUM BICARBONATE 40 MEQ: 782 TABLET, EFFERVESCENT ORAL at 07:55

## 2020-02-24 RX ADMIN — Medication 10 ML: at 20:33

## 2020-02-24 RX ADMIN — MAGNESIUM SULFATE HEPTAHYDRATE 1 G: 1 INJECTION, SOLUTION INTRAVENOUS at 07:55

## 2020-02-24 RX ADMIN — HYDROCORTISONE 2.5%: 25 CREAM TOPICAL at 07:55

## 2020-02-24 RX ADMIN — PANTOPRAZOLE SODIUM 40 MG: 40 INJECTION, POWDER, LYOPHILIZED, FOR SOLUTION INTRAVENOUS at 00:22

## 2020-02-24 RX ADMIN — FOLIC ACID 1 MG: 1 TABLET ORAL at 07:55

## 2020-02-24 RX ADMIN — PANTOPRAZOLE SODIUM 40 MG: 40 INJECTION, POWDER, LYOPHILIZED, FOR SOLUTION INTRAVENOUS at 20:32

## 2020-02-24 RX ADMIN — HYDROCORTISONE 2.5%: 25 CREAM TOPICAL at 20:33

## 2020-02-24 RX ADMIN — MAGNESIUM SULFATE HEPTAHYDRATE 1 G: 1 INJECTION, SOLUTION INTRAVENOUS at 09:27

## 2020-02-24 RX ADMIN — SODIUM CHLORIDE, PRESERVATIVE FREE 10 ML: 5 INJECTION INTRAVENOUS at 20:35

## 2020-02-24 RX ADMIN — SODIUM CHLORIDE, PRESERVATIVE FREE 10 ML: 5 INJECTION INTRAVENOUS at 00:22

## 2020-02-24 RX ADMIN — Medication 60 MG: at 20:32

## 2020-02-24 ASSESSMENT — PAIN SCALES - GENERAL
PAINLEVEL_OUTOF10: 0

## 2020-02-24 NOTE — PROGRESS NOTES
Final Result   Improving but persistent multifocal airspace disease and edema. XR CHEST PORTABLE   Final Result   Worsening multifocal airspace disease possibly pneumonia versus ARDS. VL TRANSCRANIAL DOPPLER COMPLETE   Final Result      CT HEAD WO CONTRAST   Final Result   Decrease in size of intracranial blood products associated with anterior   cerebral artery coiling. The findings were sent to the Radiology Results [address] at 8:36   am on 2/21/2020to be communicated to a licensed caregiver. XR ABDOMEN FOR NG/OG/NE TUBE PLACEMENT   Final Result   Enteric tube now in improved position in the stomach. XR CHEST PORTABLE   Final Result   Stable bilateral pulmonary infiltrates, most consistent with multifocal   pneumonia. PICC line tip at the cavoatrial junction. XR ABDOMEN FOR NG/OG/NE TUBE PLACEMENT   Final Result   No interval change in the position of the enteric catheter. Recommend   partial retraction of the catheter, hopefully allowing the catheter to   advance distally. XR ABDOMEN FOR NG/OG/NE TUBE PLACEMENT   Final Result   Enteric tube tip is looped within the stomach with tip directed cranially at   the gastroesophageal junction. Side port is not visualized. Suggest   retracting and repositioning the tube. VL TRANSCRANIAL DOPPLER COMPLETE   Final Result      XR CHEST PORTABLE   Final Result   1. Interval intubation. 2.  Unchanged bilateral airspace consolidation. XR CHEST PORTABLE   Final Result   No significant change in bilateral consolidative opacities. NM GI BLOOD LOSS   Final Result   No evidence of active GI bleeding during acquisition. RECOMMENDATIONS:   If the patient shows hemodynamic signs of an active bleed in the next 20   hours, additional images can be acquired. XR CHEST (SINGLE VIEW FRONTAL)   Final Result   Stable multifocal bilateral consolidation.          VL TRANSCRANIAL described above at the level of the hemorrhage. VL TRANSCRANIAL DOPPLER COMPLETE   Final Result      XR CHEST PORTABLE   Final Result   Near opacities in the left retrocardiac region favor atelectasis. CT Head WO Contrast   Final Result   1. Interval postsurgical changes of anterior cerebral artery aneurysm coil   embolization. Small amount of pneumocephalus adjacent to the embolization   coils. 2.  Significantly increased hyperattenuation in the bilateral medial frontal   regions centered around the embolization coils could represent increased   hemorrhage and/or post angiographic contrast staining. Continued attention   on follow-up recommended. 3.  Bilateral acute subarachnoid hemorrhage. New intraventricular extension   of hemorrhage with hyperdense blood products throughout the ventricular   system, most pronounced in the right lateral ventricle. Mild diffuse   ventricular prominence suggest developing hydrocephalus. 4. Interval worsening of diffuse cerebral edema and sulcal effacement which   raises concern for increasing intracranial pressure. No significant midline   shift or large territorial infarct. Critical results were called by Dr. Rayna Baires Sessions to [address] [place] [address] on   2/13/2020 at 04:58. IR ANGIOGRAM CAROTID C EREBRAL BILATERAL         CTA HEAD W CONTRAST   Final Result   Demonstration of a 2 mm aneurysm arising from the right anterior cerebral   artery within the anterior interhemispheric fissure region. Findings were discussed with Dr. Natali Marcelino At 2:36 pm on 2/12/2020. CT HEAD WO CONTRAST   Final Result   Redemonstration of subarachnoid hemorrhage that is not significantly changed   compared to prior exam.      Findings were discussed with Dr. Natali Marcelino At 2:30 pm on 2/12/2020.          VL TRANSCRANIAL DOPPLER COMPLETE    (Results Pending)       Pulmonary Function test:    Polysomnogram:    Echocardiogram:   Results for orders placed during Please note that this chart was generated using voice recognition Dragon dictation software. Although every effort was made to ensure the accuracy of this automated transcription, some errors in transcription may have occurred.

## 2020-02-24 NOTE — PROGRESS NOTES
PULMONARY PROGRESS NOTE      Patient:  Azeb Shultz  YOB: 1950    MRN: [id number]     Acct: [de-identified]     Admit date: 2/12/2020    REASON FOR CONSULT:-Bilateral pneumonia with acute respiratory failure with hypoxemia in a patient with anemia who is a [employer]    Pt seen and Chart reviewed. Patient tolerated extubation  On 2 L oxygen by nasal cannula that has just been held for my rounds by the neuro ICU team to see if she would tolerate not being on oxygen  Has antibiotics stopped after completion of course  No fever or chills  Has been getting tube feeding for nutrition  Denied any shortness of breath  Has a dry cough  No hemoptysis        Subjective:   Review of Systems -   General ROS: Completed and except as mentioned above were negative   Psychological ROS:  Completed and except as mentioned above were negative  Allergy and Immunology ROS:  Completed and except as mentioned above were negative  Hematological and Lymphatic ROS:  Completed and except as mentioned above were negative  Respiratory ROS:  Completed and except as mentioned above were negative  Cardiovascular ROS:  Completed and except as mentioned above were negative  Gastrointestinal ROS: Completed and except as mentioned above were negative  Genito-Urinary ROS:  Completed and except as mentioned above were negative  Musculoskeletal ROS:  Completed and except as mentioned above were negative  Neurological ROS:  Completed and except as mentioned above were negative  Dermatological ROS:  Completed and except as mentioned above were negative      Diet:  DIET TUBE FEED CONTINUOUS/CYCLIC NPO; Semi-elemental; Nasogastric; Continuous; 10; 45; 24; Exceptions are:  Other (See Comment)  Dietary Nutrition Supplements: Standard High Calorie Oral Supplement      Medications:Current Inpatient    Scheduled Meds:   atorvastatin  20 mg Oral Nightly    hydrocortisone   Rectal BID    niMODipine  60 mg Oral 6 times per day    potassium bicarb-citric acid  40 mEq Oral BID    calcium gluconate  1 g Intravenous Once    pantoprazole  40 mg Intravenous BID    And    sodium chloride (PF)  10 mL Intravenous BID    vitamin B-1  100 mg Per NG tube Daily    folic acid  1 mg Per NG tube Daily    lidocaine 1 % injection  5 mL Intradermal Once    [Held by provider] sennosides-docusate sodium  2 tablet Oral Daily    sodium chloride flush  10 mL Intravenous 2 times per day    ferrous sulfate  325 mg Oral Daily with breakfast    sodium chloride flush  10 mL Intravenous 2 times per day     Continuous Infusions:  PRN Meds:fentanNYL, heparin flush, [Held by provider] magnesium hydroxide, sodium chloride flush, [Held by provider] docusate sodium, sodium chloride flush, acetaminophen, ondansetron    Objective:      Physical Exam:  Vitals: BP (!) 111/46   Pulse 89   Temp 99.4 °F (37.4 °C) (Oral)   Resp 25   Ht 5' 6\" (1.676 m)   Wt 105 lb 2.6 oz (47.7 kg)   SpO2 98%   BMI 16.97 kg/m²   24 hour intake/output:    Intake/Output Summary (Last 24 hours) at 2/24/2020 1120  Last data filed at 2/24/2020 0800  Gross per 24 hour   Intake 808 ml   Output 1610 ml   Net -802 ml     Last 3 weights: Wt Readings from Last 3 Encounters:   02/21/20 105 lb 2.6 oz (47.7 kg)   02/12/20 108 lb (49 kg)   01/21/20 114 lb (51.7 kg)         Physical Examination:   PHYSICAL EXAMINATION:  Vitals:    02/24/20 0602 02/24/20 0702 02/24/20 0802 02/24/20 0901   BP: (!) 127/35 (!) 132/93 (!) 143/47 (!) 111/46   Pulse: 80 87 79 89   Resp: 22 21 20 25   Temp:   99.4 °F (37.4 °C)    TempSrc:   Oral    SpO2: 97% 99% 99% 98%   Weight:       Height:         Constitutional: This is a well developed, well nourished, 16-16.9 - Moderate malnutrition / Protein energy malnutrition Grade II 77y.o. year old female who is alert, oriented, cooperative and in no apparent distress. Head:normocephalic and atraumatic. input(s): INR in the last 72 hours. Hepatic: No results for input(s): ALKPHOS, ALT, AST, PROT, BILITOT, BILIDIR, LABALBU in the last 72 hours. Amylase and Lipase:No results for input(s): LACTA, AMYLASE in the last 72 hours. Lactic Acid: No results for input(s): LACTA in the last 72 hours. CARDIAC ENZYMES:No results for input(s): CKTOTAL, CKMB, CKMBINDEX, TROPONINI in the last 72 hours. BNP: No results for input(s): BNP in the last 72 hours. Lipids: No results for input(s): CHOL, TRIG, HDL, LDLCALC in the last 72 hours. Invalid input(s): LDL  ABGs: No results found for: PH, PCO2, PO2, HCO3, O2SAT  Thyroid:   Lab Results   Component Value Date    TSH 5.94 02/15/2020      Urinalysis: No results for input(s): BACTERIA, BLOODU, CLARITYU, COLORU, PHUR, PROTEINU, RBCUA, SPECGRAV, BILIRUBINUR, NITRU, WBCUA, LEUKOCYTESUR, GLUCOSEU in the last 72 hours. CULTURES:    Urine culture showed E. coli more than 100,000 colony-forming units per mL      Assessment:    Active Problems:    SAH (subarachnoid hemorrhage) (HCC)    Cerebral aneurysm rupture (HCC)    Cerebral aneurysm    Subarachnoid hemorrhage from anterior cerebral artery aneurysm (HCC)    Status post coil embolization of cerebral aneurysm    Pneumonia of both lower lobes due to infectious organism [facility])    MRI-safe endovascular aneurysm coil present    Ventilator dependence ([address]    Iron deficiency anemia due to chronic blood loss  Resolved Problems:    * No resolved hospital problems. *  Acute hypoxemic respiratory failure secondary to bilateral pneumonia  Esophagitis, moderately severe  Anemia, better  Zoroastrian  Leukocytosis secondary to infection, slightly worse-continue to monitor    Plan:  Meds reviewed- changes made as appropriate. Patient is on tube feeding  On EPC cuffs for DVT prophylaxis  Increase activity as permitted and tolerated. Questions patient/family had were answered to their satisfaction.   Continue supplemental oxygen  Patient was informed of the need for using oxygen. Patient was educated on the importance of compliance and the benefits of oxygen use. Complications of oxygen use, including dryness of the nostrils, epistaxis and also the fire hazard were explained to the patient. Patient willingly accepts to use  the oxygen as recommended  Cxr reviewed. None today  Diet reviewed. Tube feeding    Thank you for having us involved in the care of your patient. Please call us if you have any questions or concerns.     Violetta Mathis MD      2/24/2020, 11:20 AM    Pulmonary & Critical Care

## 2020-02-24 NOTE — PROGRESS NOTES
Daily Progress Note  Neuro Critical Care    Patient Name: Selene Sheppard  Patient : 1950  Room/Bed: 8116/7434-90  Code Status: FULL  Allergies: Allergies   Allergen Reactions    Lisinopril Other (See Comments)    Losartan      Fatigue    Procardia [Nifedipine] Other (See Comments)       CHIEF COMPLAINT:      Shortness of breath     [address] Course:   : To angio where she underwent coil embolization of ruptured right A2 SENTHIL aneurysm resulting in complete obliteration. : CT Head showed bilateral SAH with new intraventricular extension, developing hydrocephalus, interval worsening of diffuse cerebral edema, significantly increased hyperattenuation in bilateral medial frontal regions; increased hemorrhage vs contrast. Started on 2% hypertonic saline with goal sodium 150. Neurosurgery following for EVD watch. : More alert. TCD normal. Continues of 2%. CT Head showed decreased SAH, subtle rightward shift. 2/15: Hypoxic overnight with increasing O2 requirements. Placed on non-rebreather with continued desaturations. CT chest PE protocol showed severe dense consolidations in the bilateral lower lobes suggestive of severe pneumonia, moderate bilateral pleural effusions. Given 20mg IVP Lasix x1. Started on Vanc and Zosyn. Placed on biPAP with improvements. Transitioned to high flow O2 in the AM.  Urine culture +E. Coli. Blood culture sent. Mobility, IS, and acapella encouraged. : hypoxic again overnight, put on high flow, cont on vanc and zosyn. On 2% hypertonic with near goal Na, goal 145. [place] Echo showed EF of 58%. : pt hypoxic throughout day on high flow, Inc. WOB. HH 6.0 refusing Blood d/t being [employer]. H/O consulted, blood draws minimum, fluids, iron. Full Code. Stool Occult Positive. : Art line DC d/t oozing, sodium checks daily, minimize blood draws.   H&H 6.0, started on Protonix drip per GI, given first dose of Aranesp, begin iron images can be acquired. XR CHEST (SINGLE VIEW FRONTAL)   Final Result   Stable multifocal bilateral consolidation.  TRANSCRANIAL DOPPLER COMPLETE   Final Result      VASCULAR REPORT   Final Result      VL TRANSCRANIAL DOPPLER COMPLETE   Final Result      XR CHEST PORTABLE   Final Result   Right greater than left perihilar airspace disease unchanged. COPD.         VL TRANSCRANIAL DOPPLER COMPLETE   Final Result      XR CHEST PORTABLE   Final Result   Worsening multifocal pneumonia. CTA HEAD W CONTRAST   Final Result   Findings suggesting mild developing peripheral vasospasm in the anterior   circulation         VL TRANSCRANIAL DOPPLER COMPLETE   Final Result      XR CHEST PORTABLE   Final Result   Multifocal airspace opacities most confluent within the right infrahilar   region/right lower lobe suggestive of pneumonia. Suspect mild interstitial pulmonary edema. Pleural effusions appear less conspicuous on today's exam.      Follow-up to assure resolution of the airspace opacities recommended   following medical treatment. CT Chest Pulmonary Embolism W Contrast   Final Result   1. Note: Study significantly limited by patient breathing motion related   artifact. 2. No definitive evidence of acute pulmonary embolism. 3. Bilateral lower lung lobe severe dense consolidation consistent with   severe pneumonia. 4. Adjacent moderate bilateral layering posterior pleural effusions, as   discussed above. 5. Moderate cardiomegaly. 6. Mild pericardial effusion. Recommend clinical correlation. 7. Cholecystectomy. XR CHEST PORTABLE   Final Result   1. Pulmonary edema. 2. Left lower lobe atelectasis versus pneumonia.  TRANSCRANIAL DOPPLER COMPLETE   Final Result      CT HEAD WO CONTRAST   Final Result   1. Findings consistent with prior anterior cerebral artery aneurysm coiling. 2.  No change in tiny dot of pneumocephalus near the aneurysm site. MODIFIED BARIUM SWALLOW W VIDEO    (Results Pending)         Labs and Images reviewed with:  [] Dr. Jenna Restrepo    [x] Dr. Brandin Brooks  [] Dr. Mittal   [] There are no new interval images to review. PHYSICAL EXAM         CONSTITUTIONAL:   Intubated, NAD, eyes open spontaneously, following commands, able to speak, answering questions appropriately   HEAD:  normocephalic, atraumatic,   EYES:  PERRL, EOMI,   ENT:  moist mucous membranes, Flexiflo in place   NECK:  supple, symmetric   LUNGS:   Equal air entry, clear bilaterally   CARDIOVASCULAR:  normal s1 / s2, RRR, distal pulses intact   ABDOMEN:  Soft, no rigidity, no tenderness, groin site appears well, no bleeding   NEUROLOGIC:  Mental Status: Alert and oriented x4, answering questions appropriately, awake    Cranial Nerves:    Cranial nerves II through XII intact bilaterally      Motor Exam:    Tone:  normal    Patient moving all extremities spontaneously. 5/5 MS in BUE, no drift  3+/5 to bilateral lower extremities, equal  5/5 bilateral dorsi and plantar flexion    Sensory:    Touch:    Sensation intact to extremities     DRAINS:  [x] There are no drains for Neuro Critical Care to monitor at this time. ASSESSMENT AND PLAN:       The patient is a 77 y.o. female with a history of HTN who was admitted to the Neuro ICU for management of acute SAH in setting of right SENTHIL aneurysm. Patient initially presented to [facility] ED with thunderclap headache, diaphoresis, and nausea/vomiting.   Underwent coil embolization on 2/12/20.      NEUROLOGIC:  - Acute SAH in setting of ruptured right A2 SENTHIL aneurysm  - POD #13 s/p coil embolization resulting in complete obliteration  - Nimodipine 60 mg every 4 hours  - Follow daily TCDs to monitor for vasospasm  - Neuro Endovascular  - Goal SBP<160  - Neuro checks per protocol  - thiamine, folate QD    CARDIOVASCULAR:  - Goal SBP<160  - History of HTN  - Hold Hydralazine 25mg Q6h (half home dosing), hold home Coreg  - Troponin 14, EKG NSR  - Echo: EF 58%  - History HLD; continue home Lipitor 40mg QHS     PULMONARY:  - RSV and resp culture negative   - DC vancomycin and Zosyn 2/22, 8 days total  - Extubated 2/23  -SPO2 99% on 2L NC     RENAL/FLUID/ELECTROLYTE:  - Salt tabs 1g BID to prevent further pulmonary edema with 2%  - Na goal > 140  - Goal Mag >2.5, replaced  - UOP: 1610  - lasix 20mg 2/21-2/24 with good response  - BMP : Na 136, Mg 2.2  Replaced mag 2g  - repeat labs 2/26    GI/NUTRITION:  NUTRITION:  - TF at goal  - Hold bowel regimen  - Thiamine and folate PO QD  - GI SCOPE 2/20: severe esophagitis, External Hemorrhoids  - Anusol 2.5% QHS  - GI prophylaxis: Protonix IV BID  - DC megace  - Benefiber 2/22  -failed SLP swallow  F/u Barium swallow     ID:  - Afebrile  - WBC 11.5, mildly elevated, no signs of infection  - C diff negative   - Bilateral lobe pneumonia  - UTI, culture + E.coli  - CXR improving  - Continue Vancomycin and Zosyn for now  - Continue to monitor for fevers     HEME:   - Anemia likely multifactorial, iron deficiency  - H&H 6.1>5.6>7.0>5.5>5.0>7.0  - patient refusing blood transfusion due to being [employer]  -Heme/Onc:    -IV Iron 200 mg x 4 doses, and 2/22   -Aranesp x1 on 2/18   -r/o hemolysis/GIB   -Peripheral blood smear   -Hold heparin until hemoglobin stable  - Stool Occult Positive. -NM scan neg  - +gross blood in stool 2/19  -B12 and folate normal  - Continue Iron 325mg QD  - PICC line placed 2/20  -labs EOD, next labs 2/26  *Retic count 9.3% (5.0% previously)    ENDOCRINE:  - Continue to monitor blood glucose, goal <180  - No history of DM 2, hemoglobin A1C 5.6     OTHER:  - PT/OT/ST     PROPHYLAXIS:  Stress ulcer: Protonix gtt     DVT PROPHYLAXIS:  - SCD sleeves - Thigh High   -Hold heparin until hemoglobin stable    DISPOSITION:  I had a lengthy discussion with patient and her daughters and they are refusing blood product due to being [employer].   I discussed with him the risks and need for blood at this time and potential complications if she does not do so.    2/22: Patient's daughter who is mPOA stated she spoke with someone from [facility], Dr. Vivek Mcgovern who accepted patient for transfer. Patient's daughter did not want to transfer her at night because she was afraid of her getting cold. I spoke with Dr. Jong Mendieta of hematology at [facility] who is covering this morning who was unaware of the transfer, and inform me that Dr. Vivek Mcgovern is currently on leave, he agreed with our current plan regarding the low hemoglobin and patient not septic blood transfusions, and stated that they would not do anything differently at their hospital.  Patient's daughter stated she wants patient transferred to [facility] due to miscommunication and references 1 particular day when patient was not placed in chair. I spoke with [facility] and Dr. Tim Phillips of neurology at [facility] who did accept patient, stated that they would not do anything different than we are currently. [facility] initiated transfer with pickup at 3 PM.  Patient's family was updated on this, however at this time they are wishing to stay at [facility]. Transfer canceled    [x] To remain ICU:   [] OK for out of ICU from Neuro Critical Care standpoint    We will continue to follow along. For any changes in exam or patient status please contact Neuro Critical Care.     Nick Marroquin, DO  Neuro Critical Care  Pager [phone]  2/24/2020     1:55 PM

## 2020-02-24 NOTE — PROGRESS NOTES
flush 0.9 % injection 10 mL  10 mL Intravenous 2 times per day Roxane Fairfield, APRN - CNP   10 mL at 20 2131    sodium chloride flush 0.9 % injection 10 mL  10 mL Intravenous PRN Mollie Fairfield, APRN - CNP        acetaminophen (TYLENOL) tablet 650 mg  650 mg Oral Q4H PRN Mollie Kendrick, APRN - CNP   650 mg at 20 2817    ondansetron (ZOFRAN) injection 4 mg  4 mg Intravenous Q6H PRN Mollie Kendrick, APRN - CNP   4 mg at 20 0042       Allergies:  Lisinopril; Losartan; and Procardia [nifedipine]    Social History:   reports that she has never smoked. She has never used smokeless tobacco. She reports current alcohol use. She reports that she does not use drugs. Family History: family history includes Breast Cancer in her sister; Cancer in her mother; Heart Attack in her father.     REVIEW OF SYSTEMS:    Review of system could not be obtained as patient is intubated and sedated    PHYSICAL EXAM:      BP (!) 152/46   Pulse 75   Temp 100.1 °F (37.8 °C) (Oral)   Resp 18   Ht 5' 6\" (1.676 m)   Wt 105 lb 2.6 oz (47.7 kg)   SpO2 100%   BMI 16.97 kg/m²    Temp (24hrs), Av.3 °F (37.4 °C), Min:98.8 °F (37.1 °C), Max:100.1 °F (37.8 °C)  General appearance -intubated and sedated  Neck - supple, no significant adenopathy   Lymphatics - no palpable lymphadenopathy, no hepatosplenomegaly   Chest - clear to auscultation, no wheezes, rales or rhonchi, symmetric air entry   Heart - normal rate, regular rhythm, normal S1, S2, no murmurs  Abdomen - soft, nontender, nondistended, no masses or organomegaly   Neurological - sedated intubated  Musculoskeletal - no joint tenderness, deformity or swelling   Extremities - peripheral pulses normal, no pedal edema, no clubbing or cyanosis   Skin - normal coloration and turgor, no rashes, no suspicious skin lesions noted ,    DATA:    Labs:   CBC:   Recent Labs     20  0422 20  0505   WBC 10.1 9.5   HGB 5.1* 5.0*   HCT 17.2* 16.2*    226     BMP: Recent Labs     02/21/20  0422 02/22/20  0505    140   K 2.7* 3.7   CO2 21 24   BUN 8 6*   CREATININE 0.63 0.60   LABGLOM >60 >60   GLUCOSE 80 112*     PT/INR:   No results for input(s): PROTIME, INR in the last 72 hours. IMAGING DATA:  Reviewed  Ct Abdomen Pelvis W Iv Contrast 2/5/2020  1. No acute intra-abdominal or intrapelvic process. Specifically, no CT scan evidence of acute pancreatitis or complications related to pancreatitis. 2. Status post cholecystectomy. 3. Subtle ground-glass opacity in the right lower lobe appears slightly more conspicuous than on the 2007 exam.  This may simply be related to a minor scar. Minor infectious or inflammatory process could not be entirely excluded. Consider follow-up chest CT in approximately 3 months to ensure stability/resolution. 4. Normal appendix.    CT chest   Impression   1. Note: Study significantly limited by patient breathing motion related   artifact. 2. No definitive evidence of acute pulmonary embolism. 3. Bilateral lower lung lobe severe dense consolidation consistent with   severe pneumonia. 4. Adjacent moderate bilateral layering posterior pleural effusions, as   discussed above. 5. Moderate cardiomegaly. 6. Mild pericardial effusion.  Recommend clinical correlation. 7. Cholecystectomy.        Primary Problem  <principal problem not specified>    Active Hospital Problems    Diagnosis Date Noted    Iron deficiency anemia due to chronic blood loss [D50.0]     Ventilator dependence (HCC) [Z99.11]     Pneumonia of both lower lobes due to infectious organism ([address] [J18.1]     MRI-safe endovascular aneurysm coil present [Z95.828]     SAH (subarachnoid hemorrhage) ([address] [I60.9] 02/12/2020    Cerebral aneurysm rupture ([address] [I60.7] 02/12/2020    Cerebral aneurysm [I67.1] 02/12/2020    Subarachnoid hemorrhage from anterior cerebral artery aneurysm (HCC) [I60.6] 02/12/2020    Status post coil embolization of cerebral aneurysm

## 2020-02-24 NOTE — PLAN OF CARE
Problem: Skin Integrity:  Goal: Skin integrity will stabilize  Description  Skin integrity will stabilize  Outcome: Ongoing  Note:   Skin assessment complete. Interventions in place. See doc flowsheet for interventions initiated. Pt turned every two hours to prevent skin breakdown. Sheets remain dry to prevent skin breakdown. Will continue to monitor for additional needs and skin breakdown. Problem: Falls - Risk of:  Goal: Will remain free from falls  Description  Will remain free from falls  Outcome: Ongoing  Note:   No falls entire shift. Fall precautions in place. Continue to monitor.

## 2020-02-24 NOTE — PROGRESS NOTES
2/20/2020). Restrictions  Restrictions/Precautions  Restrictions/Precautions: Fall Risk, Seizure  Required Braces or Orthoses?: No  Position Activity Restriction  Other position/activity restrictions: up with assist; SBP goal , 7.0 Hgb     Subjective   General  Patient assessed for rehabilitation services?: Yes  Family / Caregiver Present: Yes(2 dtrs)  Diagnosis: SENTHIL aneurysm coiling, B/L SAH  Vital Signs  Patient Currently in Pain: Denies     Objective    ADL  Grooming: Minimal assistance;Setup; Increased time to complete(pt brushed hair with Min A to brush back of head. Independently washed face with setup and increased time d/t fatigue)  UE Bathing: Minimal assistance;Setup; Increased time to complete(Pt completed UB bathing while seated in chair with Min A to wash back only. Pt requires rest breaks d/t fatigue and increased time)  LE Bathing: Moderate assistance;Setup; Increased time to complete(Pt requires Mod A for LB bathing at this time as pt fatigues quickly and requires support for sitting balance)  UE Dressing: Minimal assistance;Setup; Increased time to complete(pt doffed dirty gown and donned clean gown)  LE Dressing: Moderate assistance;Setup; Increased time to complete(pt requires Mod A to change socks)       Balance  Sitting Balance: Contact guard assistance  Standing Balance: Contact guard assistance  Standing Balance  Time: ~2 minutes  Activity: pt stood bedside and for standing rest breaks throughout functional mobility  Functional Mobility  Functional - Mobility Device: Rolling Walker  Activity: Other  Assist Level: Minimal assistance  Functional Mobility Comments: Pt completed functional mobility within hospital room with Min A X2 d/t unsteadiness and quick fatigue.  Pt demo slow functional mobility and requires rest breaks  Bed mobility  Supine to Sit: Minimal assistance;2 Person assistance  Scooting: Minimal assistance;2 Person assistance  Comment: HOB elevated throughout bed mobility; pt demo generalized weakness and fatigue requiring Min A X2 for bed mobility for BLE and trunk progression.  Pt retired to chair at end of session; RN aware  Transfers  Sit to stand: Minimal assistance;2 Person assistance  Stand to sit: Minimal assistance;2 Person assistance  Transfer Comments: with RW                      Cognition  Overall Cognitive Status: [facility]                                         Plan   Plan  Times per week: 4x    AM-PAC Score        AM-PAC Inpatient Daily Activity Raw Score: 16 (02/13/20 1556)  AM-PAC Inpatient ADL T-Scale Score : 35.96 (02/13/20 1556)  ADL Inpatient CMS 0-100% Score: 53.32 (02/13/20 1556)  ADL Inpatient CMS G-Code Modifier : CK (02/13/20 1556)    Goals  Short term goals  Time Frame for Short term goals: Pt will by discharge   Short term goal 1: demo ADL UB/LB dressing/bathing activity with mod I and increased time  Short term goal 2: demo standing during func activity for 12 min+ using LRD and mod I  Short term goal 3: demo bending/reaching func activity while standing using LRD and SUP  Short term goal 4: demo (I) with all bed mob/transfers       Therapy Time   Individual Concurrent Group Co-treatment   Time In 1443         Time Out 1526         Minutes 43         Timed Code Treatment Minutes: 25 Minutes       PERCY Mohamud/KINA

## 2020-02-24 NOTE — PROGRESS NOTES
Speech Language Pathology  [address]   CLINICAL BEDSIDE SWALLOW EVALUATION    NAME: Anson Llamas  : 1950  MRN: [id number]    ADMISSION DATE: 2020  ADMITTING DIAGNOSIS: has Essential hypertension; RLS (restless legs syndrome); History of cervical cancer; Gastroesophageal reflux disease without esophagitis; Trochanteric bursitis of left hip; Complex tear of lateral meniscus of left knee; Weight loss; Cerebrovascular accident (CVA) ([address] Left sided numbness; Acute left-sided weakness; Lacunar stroke ([address] Hyperlipidemia; Chronic pancreatitis ([address] SAH (subarachnoid hemorrhage) ([address] Cerebral aneurysm rupture ([address] Cerebral aneurysm; Subarachnoid hemorrhage from anterior cerebral artery aneurysm ([address] Status post coil embolization of cerebral aneurysm; Pneumonia of both lower lobes due to infectious organism [facility]); MRI-safe endovascular aneurysm coil present; Ventilator dependence ([address] and Iron deficiency anemia due to chronic blood loss on their problem list.      Date of Eval: 2020  Evaluating Therapist: Kevon Prasad    Current Diet level:  Current Diet : NPO  Current Liquid Diet : NPO      Primary Complaint: The patient is a 77 y.o. female presented with a history of HTN who presents as a a transfer from [facility] [address] for [facility].   Patient was at the dentist around 1030AM when she had a sudden thunderclap headache, diaphoresis, and nausea/vomiting.  Patient's daughter received a call from the dentist around 1030AM stating the patient was drowsy and out of it. Flo Kimbrough was taken to [facility] [address] ED where CT Head showed subarachnoid hemorrhage predominately in the anterior interhemispheric fissure.   now s/p extubation with PN.     Pain:  denies    Reason for Referral  Anson Llamas was referred for a bedside swallow evaluation to assess the efficiency of her swallow function, identify signs and symptoms of aspiration and make recommendations regarding safe dietary consistencies, effective compensatory strategies, and safe eating environment. Impression:  Unable to r/o aspiration at bedside. Recommend NPO with alternative means of nutrition pending MBSS results. Dysphagia Outcome Severity Scale: Level 1: Severe dysphagia- NPO. Unable to tolerate any PO safely     Treatment Plan  Requires SLP Intervention: Yes  Duration/Frequency of Treatment: 3-5X          Recommended Diet and Intervention  Diet Solids Recommendation: NPO(pending MBSS results)  Liquid Consistency Recommendation: NPO  Recommended Form of Meds: Via alternative means of nutrition  Recommendations: Modified barium swallow study       Treatment/Goals  Dysphagia Goals: The patient will tolerate instrumental swallowing procedure    General  Chart Reviewed: Yes  Behavior/Cognition: Alert; Cooperative  Communication Observation: Functional  Follows Directions: Simple(with NG tube)  Baseline Vocal Quality: Weak  Volitional Cough: Weak  Consistencies Administered: Dysphagia Pureed (Dysphagia I); Nectar - teaspoon;Nectar - straw; Thin - teaspoon; Thin - straw    Vision/Hearing  Vision  Vision: Within Functional Limits  Hearing  Hearing: Exceptions to [facility]    Oral Motor Deficits  Oral/Motor  Oral Motor: Within functional limits    Oral Phase Dysfunction  Oral Phase  Oral Phase: WFL  Oral Phase  Oral Phase - Comment: Functional for Puree and liqyuds, No oral loss noted     Indicators of Pharyngeal Phase Dysfunction   Pharyngeal Phase  Pharyngeal Phase: Exceptions  Pharyngeal Phase   Pharyngeal: Pt. with cough/throat clear noted with pudding triall X1. No overt s/s of aspiration noted with thin and thick trials, however given recent intubation and extubation with PN diagnosis ST recommends MBSS completion.     Prognosis  Prognosis  Prognosis for safe diet advancement: good  Individuals consulted  Consulted and agree with results and recommendations: Patient    Education  Patient Education: yes  Patient Education Response: Verbalizes understanding             Therapy Time  SLP Individual Minutes  Time In: 0930  Time Out: 0940  Minutes: [address] [place]  2/24/2020 11:28 AM

## 2020-02-24 NOTE — PROGRESS NOTES
ENDOVASCULAR NEUROSURGERY PROGRESS NOTE  2/24/2020 4:20 PM  Subjective:   Admit Date: 2/12/2020  PCP: Francisca Holland MD    No new acute events over the night. Objective:   Vitals: BP (!) 97/36   Pulse 92   Temp 98.4 °F (36.9 °C) (Oral)   Resp 24   Ht 5' 6\" (1.676 m)   Wt 105 lb 2.6 oz (47.7 kg)   SpO2 91%   BMI 16.97 kg/m²       General appearance: Extubated  Lungs: Respite distress noted. Heart: RRR  Neuro exam: She is now extubated, opens her eyes to noxious stimuli, is following simple commands. Able to move both upper extremities against gravity. Both to move both lower extremities against gravity. Intact sensation to simple touch in both upper and lower extremities. Medications and labs:   Scheduled Meds:   atorvastatin  20 mg Oral Nightly    hydrocortisone   Rectal BID    niMODipine  60 mg Oral 6 times per day    potassium bicarb-citric acid  40 mEq Oral BID    calcium gluconate  1 g Intravenous Once    pantoprazole  40 mg Intravenous BID    And    sodium chloride (PF)  10 mL Intravenous BID    vitamin B-1  100 mg Per NG tube Daily    folic acid  1 mg Per NG tube Daily    lidocaine 1 % injection  5 mL Intradermal Once    [Held by provider] sennosides-docusate sodium  2 tablet Oral Daily    sodium chloride flush  10 mL Intravenous 2 times per day    ferrous sulfate  325 mg Oral Daily with breakfast    sodium chloride flush  10 mL Intravenous 2 times per day     Continuous Infusions:    CBC:   Recent Labs     02/22/20  0505 02/24/20  0442   WBC 9.5 11.5*   HGB 5.0* 7.0*    320     BMP:    Recent Labs     02/22/20  0505 02/24/20  0442    136   K 3.7 4.4    102   CO2 24 21   BUN 6* 11   CREATININE 0.60 0.60   GLUCOSE 112* 90     Hepatic:   No results for input(s): AST, ALT, ALB, BILITOT, ALKPHOS in the last 72 hours. Troponin: No results for input(s): TROPONINI in the last 72 hours. BNP: No results for input(s): BNP in the last 72 hours.   Lipids:   No

## 2020-02-24 NOTE — PROGRESS NOTES
Physical Therapy  Facility/Department: [address]  Daily Treatment Note  NAME: Nohelia Losederek  : 1950  MRN: [id number]    Date of Service: 2020    Discharge Recommendations:  Patient would benefit from continued therapy after discharge   PT Equipment Recommendations  Equipment Needed: Yes  Walker: Rolling    Assessment   Body structures, Functions, Activity limitations: Decreased functional mobility ; Decreased balance;Decreased strength;Decreased endurance;Decreased safe awareness  Assessment: Pt grossly Maximino x 2 for all mobility. pt able to amb 20ft w/RW this date, Limited by fatigue and decreased endurance . Pt would benefit from acute PT services at this time to address deficits. Prognosis: Good  Decision Making: Medium Complexity  PT Education: Transfer Training;Functional Mobility Training;Gait Training;General Safety  REQUIRES PT FOLLOW UP: Yes  Activity Tolerance  Activity Tolerance: Patient limited by endurance; Patient limited by fatigue;Patient Tolerated treatment well     Patient Diagnosis(es): The encounter diagnosis was SAH (subarachnoid hemorrhage) ([facility] [address] has a past medical history of Acid reflux, Arthritis, Cancer ([facility] [address] Family history of breast cancer in first degree relative, Gall stones, Hard of hearing, History of cervical cancer, Hypertension, Lateral meniscus tear, Wears dentures, and Wears glasses. has a past surgical history that includes Tonsillectomy and adenoidectomy (); Cholecystectomy; Inner ear surgery; Knee arthroscopy (, ); cyst removal (); Colonoscopy (); Breast biopsy (); Foot surgery (Bilateral); other surgical history (Right, 9-4-14); Umbilical hernia repair; hernia repair; Varicose vein surgery (Bilateral); Knee arthroscopy (Left, 4/3/2019); Colonoscopy (N/A, 2019); other surgical history (2020);   picc powerpicc double (2020);  Upper gastrointestinal endoscopy (N/A, 2020); and sigmoidoscopy (N/A, Goals  Short term goals  Time Frame for Short term goals: 12 visits  Short term goal 1: Complete transfers MOD I with RW or least restrictive device for greater independence  Short term goal 2: Complete bed mobility independently from flattened position without use of bedrails for return to prior level  Short term goal 3: Amb x350ft with RW or least restrictive device for return to home and community settings  Short term goal 4: Complete x4 steps MOD I with use of either handrail for safe home entry/exit  Short term goal 5: Improve static and dynamic standing balance to good to reduce risk of falls and injury    Plan    Plan  Times per week: 5-6x/wk  Current Treatment Recommendations: Strengthening, Gait Training, Patient/Caregiver Education & Training, Equipment Evaluation, Education, & procurement, Stair training, Balance Training, Functional Mobility Training, Endurance Training, Home Exercise Program, Transfer Training, Safety Education & Training  Safety Devices  Type of devices:  All fall risk precautions in place, Call light within reach, Gait belt, Patient at risk for falls, Nurse notified, Left in chair(pt left with OT)  Restraints  Initially in place: No     Therapy Time   Individual Concurrent Group Co-treatment   Time In  [id number]         Time Out 1506         Minutes 23         Timed Code Treatment Minutes: [address] with OT for safe mobility        Marleen Ramey, PTA

## 2020-02-24 NOTE — PROCEDURES
INSTRUMENTAL SWALLOW REPORT  MODIFIED BARIUM SWALLOW    NAME: John Lou   : 1950  MRN: [id number]       Date of Eval: 2020              Referring Diagnosis(es):      Past Medical History:  has a past medical history of Acid reflux, Arthritis, Cancer ([address] Family history of breast cancer in first degree relative, Gall stones, Hard of hearing, History of cervical cancer, Hypertension, Lateral meniscus tear, Wears dentures, and Wears glasses. Past Surgical History:  has a past surgical history that includes Tonsillectomy and adenoidectomy (); Cholecystectomy; Inner ear surgery; Knee arthroscopy (, ); cyst removal (); Colonoscopy (); Breast biopsy (); Foot surgery (Bilateral); other surgical history (Right, 14); Umbilical hernia repair; hernia repair; Varicose vein surgery (Bilateral); Knee arthroscopy (Left, 4/3/2019); Colonoscopy (N/A, 2019); other surgical history (2020);   picc powerpicc double (2020); Upper gastrointestinal endoscopy (N/A, 2020); and sigmoidoscopy (N/A, 2020). Current Diet Solid Consistency: NPO  Current Diet Liquid Consistency: NPO       Type of Study: Initial MBS         Patient Complaints/Reason for Referral:  John Lou was referred for a MBS to assess the efficiency of his/her swallow function, assess for aspiration, and to make recommendations regarding safe dietary consistencies, effective compensatory strategies, and safe eating environment. Onset of problem:     The patient is a 77 y.o. female presented with a history of HTN who presents as a a transfer from [facility] [address] for [facility].   Patient was at the dentist around 1030AM when she had a sudden thunderclap headache, diaphoresis, and nausea/vomiting.  Patient's daughter received a call from the dentist around 1030AM stating the patient was drowsy and out of it. Elsi Torrez was taken to [facility] [address] ED where CT Head showed subarachnoid hemorrhage predominately in the anterior interhemispheric fissure.  Patient was complaining of severe headache and vomiting at outlying ED.  Loaded with 1g Keppra, given 2mg Morphine and Zofran, started on Labetalol infusion for blood pressure control and transferred to [facility] for further management.  On arrival to [facility] ED, patient is lethargic. She is able to state her name and that she's at the hospital.  No dysarthria or aphasia. PERRL 3mm.  EOMI.  Mild right facial droop. Moving all extremities equally. Repeat CT Head stable SAH and ventricular system.  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 Neuro endovascular planning on taking to lab.  Family reports patient's father passed of an abdominal aortic aneurysm.  Of note, April Corinne Mann patient's daughter reports she is POA and family confirms that this paperwork has been completed. Behavior/Cognition/Vision/Hearing:  Behavior/Cognition: Alert; Cooperative  Vision: Within Functional Limits  Hearing: Exceptions to [facility]  Hearing Exceptions: Right hearing aid    Impressions: Patient presents with safe swallow for Dysphagia soft and bite/sized (Dysphagia III) diet with thin liquids as evidenced by no penetration or aspiration noted with consistencies tested. +extended mastication observed with soft solid, edentulous during study. Min stasis noted with all consistencies tested. Recommend small sips and bites, only feed when alert and awake and upright at 90 degrees for all PO intake. ST to follow up for diet tolerance monitoring and safe swallow protocol. Results and recommendations reported to RN. Patient Position: A-P       Consistencies Administered: Thin straw;Nectar straw;Dysphagia Pureed (Dysphagia I); Dysphagia Soft and Bite-Sized (Dysphagia III)           Dysphagia Outcome Severity Scale: Level 5: Mild dysphagia- Distant supervision.  May need one diet consistency restricted  Penetration-Aspiration Scale (PAS): 1 - Material does not enter the

## 2020-02-24 NOTE — PROGRESS NOTES
Nutrition Assessment (Enteral Nutrition)    Type and Reason for Visit: Reassess    Nutrition Recommendations:    - Oral diet per SLP recommendation.   - If pt continues to require nutrition support, suggest modifying TF to Standard without fiber at 55 mL/hr to provide 1584 kcals, 73 gm protein per day (discussed with RN). Nutrition Assessment: Pt extubated yesterday. TF continues - pt tolerating at 40 mL/hr (goal of 45 mL/hr). Bedside swallow study today - SLP recommending MBS. Pt reports her appetite had been declining PTA (unknown duration per pt). +BMs. Malnutrition Assessment:  · Malnutrition Status: At risk for malnutrition  · Context: Acute illness or injury  · Findings of the 6 clinical characteristics of malnutrition (Minimum of 2 out of 6 clinical characteristics is required to make the diagnosis of moderate or severe Protein Calorie Malnutrition based on AND/ASPEN Guidelines):  1. Energy Intake-Greater than 75% of estimated energy requirement, Greater than or equal to 7 days    2. Weight Loss-7.5% loss or greater, (x 2 months per EHR)  3. Fat Loss-Mild subcutaneous fat loss, Orbital  4. Muscle Loss-Unable to assess,    5. Fluid Accumulation-No significant fluid accumulation, (-)  6.   Strength-Not measured    Nutrition Risk Level: High    Nutrition Needs:  · Estimated Daily Total Kcal: 6089-5507 kcals/day  · Estimated Daily Protein (g): 70 gm/day    Nutrition Diagnosis:   · Problem: Inadequate oral intake  · Etiology: related to Difficulty swallowing     Signs and symptoms:  as evidenced by NPO status due to medical condition, Nutrition support - EN    Objective Information:  · Nutrition-Focused Physical Findings:    · Wound Type: None(Redness - buttocks/coccyx noted)  · Current Nutrition Therapies:  · Oral Diet Orders: NPO   · Tube Feeding (TF) Orders:   · Feeding Route: Nasogastric  · Formula: Semi-elemental  · Rate (ml/hr):40 mL/hr    · Volume (ml/day): 960 mL/day  · Duration: Continuous  · Current TF & Flush Orders Provides: Vital AF 1.2 at 40 mL/hr = 1152 kcals, 72 gm protein per day  · Goal TF & Flush Orders Provides: Osmolite 1.2 at 55 mL/hr = 1584 kcals, 73 gm protein per day  · Anthropometric Measures:  · Ht: 5' 6\" (167.6 cm)   · Current Body Wt: 105 lb 2.6 oz (47.7 kg)  · Admission Body Wt: 108 lb (49 kg)(stated)  · Usual Body Wt: 114 lb (51.7 kg)(per EHR)  · Weight Change: 14% loss x 2 months per EHR   · Ideal Body Wt: 135 lb (61.2 kg), % Ideal Body 78%  · BMI Classification: BMI <18.5 Underweight    Nutrition Interventions:   Modify current Tube Feeding(Oral diet per SLP recommendation)  Continued Inpatient Monitoring, Education Not Indicated    Nutrition Evaluation:   · Evaluation: Progressing toward goals   · Goals: Meet % of estimated nutrient needs.    · Monitoring: Nutrition Progression, TF Intake, TF Tolerance, Pertinent Labs, Weight, Skin Integrity      Electronically signed by Lucia Mitchell, MS, RD, LD on 2/24/20 at 12:52 PM    Contact Number: [phone]

## 2020-02-24 NOTE — PROGRESS NOTES
Sukh Anderson DO        hydrocortisone (ANUSOL-HC) 2.5 % rectal cream   Rectal BID Samer Sandro Desai MD        niMODipine oral solution 60 mg  60 mg Oral 6 times per day Kyra Mendez DO   60 mg at 02/24/20 1612    potassium bicarb-citric acid (EFFER-K) effervescent tablet 40 mEq  40 mEq Oral BID Caitlyn Ortega MD   40 mEq at 02/24/20 0755    calcium gluconate 1 g in dextrose 5% 100 mL IVPB  1 g Intravenous Once Yousuf Ruiz DO        pantoprazole (PROTONIX) injection 40 mg  40 mg Intravenous BID Yousuf Ruiz DO   40 mg at 02/24/20 0317    And    sodium chloride (PF) 0.9 % injection 10 mL  10 mL Intravenous BID Yousuf Ruiz DO   10 mL at 02/24/20 0756    vitamin B-1 (THIAMINE) tablet 100 mg  100 mg Per NG tube Daily Yousuf Ruiz DO   100 mg at 39/98/12 5283    folic acid (FOLVITE) tablet 1 mg  1 mg Per NG tube Daily Yousuf Ruiz DO   1 mg at 02/24/20 0755    lidocaine 1 % injection 5 mL  5 mL Intradermal Once Yousuf Ruiz DO        heparin flush 100 UNIT/ML injection 100 Units  100 Units Intravenous PRN RAEGAN Puente CNP   100 Units at 02/19/20 1837    [Held by provider] sennosides-docusate sodium (SENOKOT-S) 8.6-50 MG tablet 2 tablet  2 tablet Oral Daily RAEGAN Rivera - CNP   2 tablet at 02/16/20 1004    [Held by provider] magnesium hydroxide (MILK OF MAGNESIA) 400 MG/5ML suspension 30 mL  30 mL Oral Daily PRN Danielle Gee APRN - CNP        sodium chloride flush 0.9 % injection 10 mL  10 mL Intravenous 2 times per day Yousuf Ruiz DO   10 mL at 02/23/20 1943    sodium chloride flush 0.9 % injection 10 mL  10 mL Intravenous PRN Yousuf Ruiz DO        ferrous sulfate EC tablet 325 mg  325 mg Oral Daily with breakfast RAEGAN Botello - CNP   325 mg at 02/24/20 0755    [Held by provider] docusate sodium (COLACE) capsule 100 mg  100 mg Oral Daily PRN Ximena Marc MD        sodium chloride flush 0.9 % injection 10 mL  10 mL Intravenous 2 times per day [facility] aneurysm. Patient underwent primary coil embolization 2/12/2020.   2.  Anemia  3. Iron deficiency  4. [employer]  5. Bilateral PNA:  Broad Spectrum Antibiotics     RECOMMENDATIONS:  1. I reviewed th albs, imaging studies diagnosis and treatment plan with patient  2. Complete course of IV iron as ordered  3. She got one dose of aranesp 2/18. I explained family that it may take 4 to 6 days to see the effect of erythropoietin stimulating agent and adding another agent such as Mircera or Procrit is not indicated  4. Reticulocyte count is improving. Hemoglobin level is also improving  5. No bleeding noted  6. Okay to start patient on pharmacological DVT prophylaxis given hemoglobin is improving and no bleeding noted  7. Monitor closely for bleeding. 8. She had a colonoscopy today, no active bleeding noted but external hemorrhoids were seen  9. We will continue to follow. Discussed with patient and Nurse. Thank you for asking us to see this patient. Iman Ruiz      This note is created with the assistance of a speech recognition program.  While intending to generate a document that actually reflects the content of the visit, the document can still have some errors including those of syntax and sound a like substitutions which may escape proof reading. It such instances, actual meaning can be extrapolated by contextual diversion.

## 2020-02-25 PROCEDURE — 97116 GAIT TRAINING THERAPY: CPT

## 2020-02-25 PROCEDURE — 6370000000 HC RX 637 (ALT 250 FOR IP): Performed by: STUDENT IN AN ORGANIZED HEALTH CARE EDUCATION/TRAINING PROGRAM

## 2020-02-25 PROCEDURE — 2580000003 HC RX 258: Performed by: NURSE PRACTITIONER

## 2020-02-25 PROCEDURE — 2060000000 HC ICU INTERMEDIATE R&B

## 2020-02-25 PROCEDURE — 2500000003 HC RX 250 WO HCPCS: Performed by: NURSE PRACTITIONER

## 2020-02-25 PROCEDURE — 6370000000 HC RX 637 (ALT 250 FOR IP): Performed by: NURSE PRACTITIONER

## 2020-02-25 PROCEDURE — 97110 THERAPEUTIC EXERCISES: CPT

## 2020-02-25 PROCEDURE — C9113 INJ PANTOPRAZOLE SODIUM, VIA: HCPCS | Performed by: STUDENT IN AN ORGANIZED HEALTH CARE EDUCATION/TRAINING PROGRAM

## 2020-02-25 PROCEDURE — 99233 SBSQ HOSP IP/OBS HIGH 50: CPT | Performed by: PSYCHIATRY & NEUROLOGY

## 2020-02-25 PROCEDURE — 6370000000 HC RX 637 (ALT 250 FOR IP): Performed by: PSYCHIATRY & NEUROLOGY

## 2020-02-25 PROCEDURE — 97535 SELF CARE MNGMENT TRAINING: CPT

## 2020-02-25 PROCEDURE — 97112 NEUROMUSCULAR REEDUCATION: CPT

## 2020-02-25 PROCEDURE — 2580000003 HC RX 258: Performed by: STUDENT IN AN ORGANIZED HEALTH CARE EDUCATION/TRAINING PROGRAM

## 2020-02-25 PROCEDURE — 97129 THER IVNTJ 1ST 15 MIN: CPT

## 2020-02-25 PROCEDURE — 99232 SBSQ HOSP IP/OBS MODERATE 35: CPT | Performed by: INTERNAL MEDICINE

## 2020-02-25 PROCEDURE — 93886 INTRACRANIAL COMPLETE STUDY: CPT

## 2020-02-25 PROCEDURE — 6360000002 HC RX W HCPCS: Performed by: STUDENT IN AN ORGANIZED HEALTH CARE EDUCATION/TRAINING PROGRAM

## 2020-02-25 PROCEDURE — 99222 1ST HOSP IP/OBS MODERATE 55: CPT | Performed by: PHYSICAL MEDICINE & REHABILITATION

## 2020-02-25 RX ORDER — PRAMIPEXOLE DIHYDROCHLORIDE 0.25 MG/1
0.12 TABLET ORAL NIGHTLY
Status: DISCONTINUED | OUTPATIENT
Start: 2020-02-25 | End: 2020-03-03 | Stop reason: HOSPADM

## 2020-02-25 RX ORDER — NIMODIPINE 30 MG/1
60 CAPSULE, LIQUID FILLED ORAL
Status: DISCONTINUED | OUTPATIENT
Start: 2020-02-25 | End: 2020-03-03 | Stop reason: HOSPADM

## 2020-02-25 RX ADMIN — Medication 60 MG: at 03:51

## 2020-02-25 RX ADMIN — ANTI-FUNGAL POWDER MICONAZOLE NITRATE TALC FREE: 1.42 POWDER TOPICAL at 21:04

## 2020-02-25 RX ADMIN — PANTOPRAZOLE SODIUM 40 MG: 40 INJECTION, POWDER, LYOPHILIZED, FOR SOLUTION INTRAVENOUS at 09:24

## 2020-02-25 RX ADMIN — Medication 100 MG: at 09:23

## 2020-02-25 RX ADMIN — PANTOPRAZOLE SODIUM 40 MG: 40 INJECTION, POWDER, LYOPHILIZED, FOR SOLUTION INTRAVENOUS at 21:45

## 2020-02-25 RX ADMIN — HYDROCORTISONE 2.5%: 25 CREAM TOPICAL at 09:24

## 2020-02-25 RX ADMIN — ENOXAPARIN SODIUM 30 MG: 30 INJECTION SUBCUTANEOUS at 09:24

## 2020-02-25 RX ADMIN — ANTI-FUNGAL POWDER MICONAZOLE NITRATE TALC FREE: 1.42 POWDER TOPICAL at 14:56

## 2020-02-25 RX ADMIN — SODIUM CHLORIDE, PRESERVATIVE FREE 10 ML: 5 INJECTION INTRAVENOUS at 11:57

## 2020-02-25 RX ADMIN — POTASSIUM BICARBONATE 40 MEQ: 782 TABLET, EFFERVESCENT ORAL at 21:00

## 2020-02-25 RX ADMIN — ATORVASTATIN CALCIUM 20 MG: 20 TABLET, FILM COATED ORAL at 21:00

## 2020-02-25 RX ADMIN — Medication 60 MG: at 09:24

## 2020-02-25 RX ADMIN — Medication 10 ML: at 09:24

## 2020-02-25 RX ADMIN — NIMODIPINE 60 MG: 30 CAPSULE ORAL at 20:59

## 2020-02-25 RX ADMIN — PRAMIPEXOLE DIHYDROCHLORIDE 0.12 MG: 0.25 TABLET ORAL at 21:00

## 2020-02-25 RX ADMIN — NIMODIPINE 60 MG: 30 CAPSULE ORAL at 23:50

## 2020-02-25 RX ADMIN — Medication 60 MG: at 00:13

## 2020-02-25 RX ADMIN — POTASSIUM BICARBONATE 40 MEQ: 782 TABLET, EFFERVESCENT ORAL at 12:22

## 2020-02-25 RX ADMIN — SODIUM CHLORIDE, PRESERVATIVE FREE 10 ML: 5 INJECTION INTRAVENOUS at 21:03

## 2020-02-25 RX ADMIN — Medication 60 MG: at 12:23

## 2020-02-25 RX ADMIN — NIMODIPINE 60 MG: 30 CAPSULE ORAL at 16:58

## 2020-02-25 RX ADMIN — HYDROCORTISONE 2.5%: 25 CREAM TOPICAL at 21:02

## 2020-02-25 RX ADMIN — FOLIC ACID 1 MG: 1 TABLET ORAL at 09:24

## 2020-02-25 RX ADMIN — Medication 10 ML: at 21:01

## 2020-02-25 RX ADMIN — FERROUS SULFATE TAB EC 325 MG (65 MG FE EQUIVALENT) 325 MG: 325 (65 FE) TABLET DELAYED RESPONSE at 09:24

## 2020-02-25 RX ADMIN — SODIUM CHLORIDE, PRESERVATIVE FREE 10 ML: 5 INJECTION INTRAVENOUS at 21:01

## 2020-02-25 ASSESSMENT — PAIN SCALES - GENERAL
PAINLEVEL_OUTOF10: 0

## 2020-02-25 ASSESSMENT — PAIN SCALES - WONG BAKER
WONGBAKER_NUMERICALRESPONSE: 0
WONGBAKER_NUMERICALRESPONSE: 0

## 2020-02-25 NOTE — PROGRESS NOTES
2/20/2020). Restrictions  Restrictions/Precautions  Restrictions/Precautions: Fall Risk, Seizure  Required Braces or Orthoses?: No  Position Activity Restriction  Other position/activity restrictions: up with assist  Subjective   General  Chart Reviewed: Yes  Family / Caregiver Present: Yes(granddaughter)  Subjective  Subjective: Pt awake and willing to get up to chair  Pain Screening  Patient Currently in Pain: Denies  Vital Signs  Patient Currently in Pain: Denies       Orientation  Orientation  Overall Orientation Status: Within Functional Limits  Cognition   Cognition  Overall Cognitive Status: Exceptions  Arousal/Alertness: Appropriate responses to stimuli  Following Commands: Follows multistep commands with increased time; Follows one step commands with increased time  Attention Span: Difficulty attending to directions  Safety Judgement: Decreased awareness of need for assistance  Problem Solving: Assistance required to generate solutions  Insights: Decreased awareness of deficits  Initiation: Requires cues for some  Sequencing: Requires cues for some  Objective   Bed mobility  Rolling to Left: Minimal assistance  Supine to Sit: Minimal assistance  Scooting: Minimal assistance  Comment: pt with decreased balance throughout bed m obility  Transfers  Sit to Stand: Minimal Assistance  Stand to sit: Minimal Assistance  Bed to Chair: Minimal assistance  Comment: poor safety awareness  Ambulation  Ambulation?: Yes  More Ambulation?: Yes  Ambulation 1  Surface: level tile  Device: Rolling Walker  Assistance: Minimal assistance  Quality of Gait: slow gait with difficulty motor planning, verbal cues for steps and increasing DIANE with limited follow through 20 ft x 2 with seated rest  Gait Deviations: Slow Jo-Ann; Shuffles  Distance: 20 ft x 2  Stairs/Curb  Stairs?: No     Balance  Posture: Fair  Sitting - Static: Fair  Sitting - Dynamic: Fair;-  Standing - Static: Fair;-  Standing - Dynamic: +  Comments: Pt required Maximino for balance at side of bed, retro lean and unable to consistently self correct with UE support       Pt easily distracted, tv turned off to improve follow through with cues, hearing deficits also limiting. Seated LE exercise program: Long Arc Quads, hip abduction/adduction, heel/toe raises, and marches. Reps: 5 with visual cues to complete, very slow reps with cues to refocus          Goals  Short term goals  Time Frame for Short term goals: 12 visits  Short term goal 1: Complete transfers MOD I with RW or least restrictive device for greater independence  Short term goal 2: Complete bed mobility independently from flattened position without use of bedrails for return to prior level  Short term goal 3: Amb x350ft with RW or least restrictive device for return to home and community settings  Short term goal 4: Complete x4 steps MOD I with use of either handrail for safe home entry/exit  Short term goal 5: Improve static and dynamic standing balance to good to reduce risk of falls and injury    Plan    Plan  Times per week: 5-6x/wk  Current Treatment Recommendations: Strengthening, Gait Training, Patient/Caregiver Education & Training, Equipment Evaluation, Education, & procurement, Stair training, Balance Training, Functional Mobility Training, Endurance Training, Home Exercise Program, Transfer Training, Safety Education & Training  Safety Devices  Type of devices:  All fall risk precautions in place, Call light within reach, Gait belt, Patient at risk for falls, Nurse notified, Left in chair, Chair alarm in place  Restraints  Initially in place: No     Therapy Time   Individual Concurrent Group Co-treatment   Time In [address] [place] [address] Drive         Time Out 0928         Minutes [address], [address] [facility]

## 2020-02-25 NOTE — PROGRESS NOTES
ENDOVASCULAR NEUROSURGERY PROGRESS NOTE  2/25/2020 2:31 PM  Subjective:   Admit Date: 2/12/2020  PCP: Osman Mccoy MD    No new acute events over the night. Objective:   Vitals: BP (!) 107/47   Pulse 87   Temp 99.3 °F (37.4 °C) (Oral)   Resp 22   Ht 5' 6\" (1.676 m)   Wt 105 lb 2.6 oz (47.7 kg)   SpO2 100%   BMI 16.97 kg/m²       General appearance: Extubated  Lungs: Respite distress noted. Heart: RRR  Neuro exam: She is now extubated, opens her eyes to noxious stimuli, is following simple commands. Able to move both upper extremities against gravity. Both to move both lower extremities against gravity. Intact sensation to simple touch in both upper and lower extremities. Medications and labs:   Scheduled Meds:   pramipexole  0.125 mg Oral Nightly    miconazole   Topical BID    enoxaparin  30 mg Subcutaneous Daily    atorvastatin  20 mg Oral Nightly    hydrocortisone   Rectal BID    niMODipine  60 mg Oral 6 times per day    potassium bicarb-citric acid  40 mEq Oral BID    calcium gluconate  1 g Intravenous Once    pantoprazole  40 mg Intravenous BID    And    sodium chloride (PF)  10 mL Intravenous BID    vitamin B-1  100 mg Per NG tube Daily    folic acid  1 mg Per NG tube Daily    lidocaine 1 % injection  5 mL Intradermal Once    [Held by provider] sennosides-docusate sodium  2 tablet Oral Daily    sodium chloride flush  10 mL Intravenous 2 times per day    ferrous sulfate  325 mg Oral Daily with breakfast    sodium chloride flush  10 mL Intravenous 2 times per day     Continuous Infusions:    CBC:   Recent Labs     02/24/20  0442   WBC 11.5*   HGB 7.0*        BMP:    Recent Labs     02/24/20  0442      K 4.4      CO2 21   BUN 11   CREATININE 0.60   GLUCOSE 90     Hepatic:   No results for input(s): AST, ALT, ALB, BILITOT, ALKPHOS in the last 72 hours. Troponin: No results for input(s): TROPONINI in the last 72 hours.   BNP: No results for input(s): BNP

## 2020-02-25 NOTE — PLAN OF CARE
Problem: Pain:  Goal: Pain level will decrease  Description  Pain level will decrease  Outcome: Met This Shift  Note:   Patient denied pain throughout shift. Problem: Safety:  Goal: Free from accidental physical injury  Description  Free from accidental physical injury  Outcome: Met This Shift  Note:   Bed alarm remains on, frequent rounds to patient bedside. Free from accidental physical injury this shift.

## 2020-02-25 NOTE — PROGRESS NOTES
surgery (Bilateral); other surgical history (Right, 9-4-14); Umbilical hernia repair; hernia repair; Varicose vein surgery (Bilateral); Knee arthroscopy (Left, 4/3/2019); Colonoscopy (N/A, 5/31/2019); other surgical history (02/12/2020);   picc powerpicc double (2/20/2020); Upper gastrointestinal endoscopy (N/A, 2/20/2020); and sigmoidoscopy (N/A, 2/20/2020). Medications:    Prior to Admission medications    Medication Sig Start Date End Date Taking?  Authorizing Provider   pramipexole (MIRAPEX) 0.125 MG tablet TAKE ONE TABLET BY MOUTH NIGHTLY 2/19/20   Marce Ozuna MD   diclofenac (VOLTAREN) 75 MG EC tablet Take 1 tablet by mouth 2 times daily (with meals) for 14 days 1/21/20 2/4/20  Rachel Galvez MD   hydrALAZINE (APRESOLINE) 50 MG tablet TAKE 1 TABLET BY MOUTH 4 TIMES A DAY 12/26/19   Marce Ozuna MD   meloxicam (MOBIC) 15 MG tablet TAKE 1 TABLET BY MOUTH ONE TIME A DAY 12/12/19   Maicol Melton MD   carvedilol (COREG) 3.125 MG tablet Take 1 tablet by mouth 2 times daily 11/5/19   Marce Ozuna MD   atorvastatin (LIPITOR) 40 MG tablet Take 1 tablet by mouth nightly 10/15/19   Marce Ozuna MD   acetaminophen (TYLENOL) 325 MG tablet Take 325 mg by mouth every 6 hours as needed for Pain    Historical Provider, MD   vitamin D (CHOLECALCIFEROL) 1000 UNIT TABS tablet Take 1,000 Units by mouth 3 times daily    Historical Provider, MD   meloxicam (MOBIC) 15 MG tablet Take 15 mg by mouth daily    Historical Provider, MD   aspirin 81 MG EC tablet Take 1 tablet by mouth daily 6/4/19   Sameer Connors MD   Multiple Vitamins-Minerals (THERAPEUTIC MULTIVITAMIN-MINERALS) tablet Take 1 tablet by mouth daily    Historical Provider, MD     Current Facility-Administered Medications   Medication Dose Route Frequency Provider Last Rate Last Dose    pramipexole (MIRAPEX) tablet 0.125 mg  0.125 mg Oral Nightly Michael Hodges MD        miconazole (MICOTIN) 2 % powder   Topical BID RAEGAN Gomez CNP Skin - normal coloration and turgor, no rashes, no suspicious skin lesions noted ,    DATA:    Labs:   CBC:   Recent Labs     02/24/20  0442   WBC 11.5*   HGB 7.0*   HCT 24.4*        BMP:   Recent Labs     02/24/20  0442      K 4.4   CO2 21   BUN 11   CREATININE 0.60   LABGLOM >60   GLUCOSE 90     PT/INR:   No results for input(s): PROTIME, INR in the last 72 hours. IMAGING DATA:  Reviewed  Ct Abdomen Pelvis W Iv Contrast 2/5/2020  1. No acute intra-abdominal or intrapelvic process. Specifically, no CT scan evidence of acute pancreatitis or complications related to pancreatitis. 2. Status post cholecystectomy. 3. Subtle ground-glass opacity in the right lower lobe appears slightly more conspicuous than on the 2007 exam.  This may simply be related to a minor scar. Minor infectious or inflammatory process could not be entirely excluded. Consider follow-up chest CT in approximately 3 months to ensure stability/resolution. 4. Normal appendix.    CT chest   Impression   1. Note: Study significantly limited by patient breathing motion related   artifact. 2. No definitive evidence of acute pulmonary embolism. 3. Bilateral lower lung lobe severe dense consolidation consistent with   severe pneumonia. 4. Adjacent moderate bilateral layering posterior pleural effusions, as   discussed above. 5. Moderate cardiomegaly. 6. Mild pericardial effusion.  Recommend clinical correlation. 7. Cholecystectomy.        Primary Problem  <principal problem not specified>    Active Hospital Problems    Diagnosis Date Noted    Iron deficiency anemia due to chronic blood loss [D50.0]     Ventilator dependence (HCC) [Z99.11]     Pneumonia of both lower lobes due to infectious organism ([address] [J18.1]     MRI-safe endovascular aneurysm coil present [Z95.828]     SAH (subarachnoid hemorrhage) ([address] [I60.9] 02/12/2020    Cerebral aneurysm rupture ([address] [I60.7] 02/12/2020    Cerebral aneurysm [I67.1] 02/12/2020  Subarachnoid hemorrhage from anterior cerebral artery aneurysm (HCC) [I60.6] 02/12/2020    Status post coil embolization of cerebral aneurysm [Z98.890] 02/12/2020       IMPRESSION:   1. Acute CVA: with ruptured right SENTHIL 2 mm aneurysm. Patient underwent primary coil embolization 2/12/2020.   2.  Anemia  3. Iron deficiency  4. Congregation  5. Bilateral PNA:  Broad Spectrum Antibiotics     RECOMMENDATIONS:  1. I reviewed th albs, imaging studies diagnosis and treatment plan with patient  2. Patient started on Lovenox for DVT prophylaxis. Tolerating it well so far  3. Patient to have repeat H&H tomorrow. 4. She got one dose of aranesp 2/18. I explained family that it may take 4 to 6 days to see the effect of erythropoietin stimulating agent and adding another agent such as Mircera or Procrit is not indicated  5. Reticulocyte count is improving. Hemoglobin level is also improving  6. Monitor closely for bleeding. 7. She had a colonoscopy today, no active bleeding noted but external hemorrhoids were seen  8. We will continue to follow. Discussed with patient and Nurse. Thank you for asking us to see this patient. Calleen Hazard      This note is created with the assistance of a speech recognition program.  While intending to generate a document that actually reflects the content of the visit, the document can still have some errors including those of syntax and sound a like substitutions which may escape proof reading. It such instances, actual meaning can be extrapolated by contextual diversion.

## 2020-02-25 NOTE — CONSULTS
Physical Medicine & Rehabilitation  Consult Note      Admitting Physician: Shin Morley MD    Primary Care Provider: Bria Waite MD     Reason for Consult:  Acute Inpatient Rehabilitation    Chief Complaint: Headache    History of Present Illness:  Referring Provider is requesting an evaluation for appropriate placement upon discharge from acute care. Ms. Sloane Palmer is a 77 y.o. right handed female who was admitted to [facility] on 2/12/2020 with Cerebrovascular Accident    28-year-old female with history of hypertension presented from [facility] for subarachnoid hemorrhage. She was at her dentist when she had a sudden headache. She was taken to [facility] ED. Found to have subarachnoid hemorrhage and anterior interhemispheric fissure. She was loaded with Keppra and given morphine and Zofran and labetalol for blood pressure. She was transferred [facility]. He was lethargic. Mild right facial droop. CT head stable sub-right hemorrhage. CTA head/neck revealed 2 mm right SENTHIL aneurysm.     Oncology-evaluation for anemia, iron deficiency, patient [facility], start IV iron Aranesp okay to start pharmacological DVT prophylaxis she had a colonoscopy 2/24-no active bleeding noted external hemorrhoids, patient also has liver disease and hepatitis C    Neuro critical on Lipitor, nimodipine, monitor sodium, keep magnesium above 2.5, possible acquired pneumonia ruptured right A2 SENTHIL treated with primary coil embolization with complete obliteration of the aneurysm 2/12/2020    Pulmonary-evaluate for bilateral pneumonia with acute respiratory failure with hypoxemia in a patient is anemic as [facility]- patient tolerated extubation, continues on 2 L oxygen pleat course of antibiotics continues to get tube feeds, EPC's for DVT prophylaxis they reviewed need for oxygen with family, now sat >90%    GI -Seen for anemia, plan EGD and flex sig EGD showed moderate to severe esophagitis no gastric ulceration-recommended IV PPI during her ICU and then switch to oral PPI twice daily ,flex sig pending      Angio 2/24  1. Ruptured right A2 SENTHIL aneurysm? measuring 1.83 x 1.86 and neck size 0.83 mm?treated with a primary coil embolization with coil herniation to the subarachnoid apace that resulted in complete obliteration of the aneurysm. Agapito score I..  2. The coils are all MRI compatible. Review of Systems:  Constitutional: negative for anorexia, chills, fatigue, fevers, sweats and weight loss  Eyes: negative for redness and visual disturbance  Ears, nose, mouth, throat, and face: negative for earaches, sore throat and tinnitus, decrease hearing  Respiratory: negative for cough and shortness of breath  Cardiovascular: negative for chest pain, dyspnea and palpitations  Gastrointestinal: negative for abdominal pain, change in bowel habits, constipation, nausea and vomiting  Genitourinary:negative for dysuria, frequency, hesitancy and urinary incontinence  Integument/breast: negative for pruritus and rash  Musculoskeletal:negative for muscle weakness and stiff joints  Neurological: negative for dizziness, headaches and weakness  Behavioral/Psych: negative for decreased appetite, depression and fatigue    Functional History:  PTA: Independent with all activities.     Current:  PT:  Restrictions/Precautions: Fall Risk, Seizure  Other position/activity restrictions: up with assist   Transfers  Sit to Stand: Minimal Assistance  Stand to sit: Minimal Assistance  Bed to Chair: Minimal assistance  Stand Pivot Transfers: Minimal Assistance, 2 Person Assistance(w/RW)  Comment: poor safety awareness  Ambulation 1  Surface: level tile  Device: Rolling Walker  Assistance: Minimal assistance  Quality of Gait: slow gait with difficulty motor planning, verbal cues for steps and increasing DIANE with limited follow through 20 ft x 2 with seated rest  Gait Deviations: Slow Jo-Ann, Shuffles  Distance: 20 ft x 2    Transfers  Sit to Stand: Minimal Assistance  Stand to sit: Minimal Assistance  Bed to Chair: Minimal assistance  Stand Pivot Transfers: Minimal Assistance, 2 Person Assistance(w/RW)  Comment: poor safety awareness  Ambulation  Ambulation?: Yes  More Ambulation?: Yes  Ambulation 1  Surface: level tile  Device: Rolling Walker  Assistance: Minimal assistance  Quality of Gait: slow gait with difficulty motor planning, verbal cues for steps and increasing DIANE with limited follow through 20 ft x 2 with seated rest  Gait Deviations: Slow Jo-Ann, Shuffles  Distance: 20 ft x 2    Surface: level tile  Ambulation 1  Surface: level tile  Device: Rolling Walker  Assistance: Minimal assistance  Quality of Gait: slow gait with difficulty motor planning, verbal cues for steps and increasing DIANE with limited follow through 20 ft x 2 with seated rest  Gait Deviations: Slow Jo-Ann, Shuffles  Distance: 20 ft x 2      OT:   ADL  Grooming: Minimal assistance;Setup; Increased time to complete(pt brushed hair with Min A to brush back of head. Independently washed face with setup and increased time d/t fatigue)  UE Bathing: Minimal assistance;Setup; Increased time to complete(Pt completed UB bathing while seated in chair with Min A to wash back only. Pt requires rest breaks d/t fatigue and increased time)  LE Bathing: Moderate assistance;Setup; Increased time to complete(Pt requires Mod A for LB bathing at this time as pt fatigues quickly and requires support for sitting balance)  UE Dressing: Minimal assistance;Setup; Increased time to complete(pt doffed dirty gown and donned clean gown)  LE Dressing: Moderate assistance;Setup; Increased time to complete(pt requires Mod A to change socks)      ST:  {  Patient presents with safe swallow for Dysphagia soft and bite/sized (Dysphagia III) diet with thin liquids as evidenced by no penetration or aspiration noted with consistencies tested.  +extended mastication observed with soft solid, edentulous during study. Min stasis noted with all consistencies tested. Recommend small sips and bites, only feed when alert and awake and upright at 90 degrees for all PO intake. ST to follow up for diet tolerance monitoring and safe swallow protocol. Results and recommendations reported to RN.     Recall:               Functional Memory, Paragraph Facts: 4/8 increased to 8/8 with repetitions and maximal verbal cues              Mental Manipulation: 0/5 increased to 5/5 with repetitions and maximal verbal and visual cues     Other: Extended response time noted during session with frequent repetitions of instructions required.       Past Medical History:        Diagnosis Date    Acid reflux     Arthritis     Cancer ([address]     cervical     Family history of breast cancer in first degree relative     sister    Gall stones     hx of    Hard of hearing     bilateral hearing aids    History of cervical cancer     Hypertension     Lateral meniscus tear     left knee    Wears dentures     Wears glasses        Past Surgical History:        Procedure Laterality Date    BREAST BIOPSY  1980    LEFT(BENIGN)    CHOLECYSTECTOMY      COLONOSCOPY  12/09    Negative    COLONOSCOPY N/A 5/31/2019    COLONOSCOPY POLYPECTOMY HOT SNARE, COLD POLYPECTOMY performed by Noe Galvan MD at [address]  [facility]    Left Wrist    FOOT SURGERY Bilateral     [address] [facility] [address] [facility] DOUBLE  2/20/2020         HERNIA REPAIR      UMBILICAL     INNER EAR SURGERY      Right     KNEE ARTHROSCOPY  2000, 2005    LEFT    KNEE ARTHROSCOPY Left 4/3/2019    KNEE [facility]NOSTIC [facility]    BAHA- bone anchored hearing aid    OTHER SURGICAL HISTORY  02/12/2020    aneurysm rt SENTHIL, with 6 coils placed    SIGMOIDOSCOPY N/A 2/20/2020    SIGMOIDOSCOPY DIAGNOSTIC FLEXIBLE performed by Joy Flores MD at [address] [facility] AND ADENOIDECTOMY  [address]    UMBILICAL HERNIA REPAIR      UPPER GASTROINTESTINAL ENDOSCOPY N/A 2/20/2020    EGD DIAGNOSTIC ONLY performed by Arlene Vogt MD at [address] Bilateral        Allergies:     Allergies   Allergen Reactions    Lisinopril Other (See Comments)    Losartan      Fatigue    Procardia [Nifedipine] Other (See Comments)        Current Medications:   Current Facility-Administered Medications: pramipexole (MIRAPEX) tablet 0.125 mg, 0.125 mg, Oral, Nightly  miconazole (MICOTIN) 2 % powder, , Topical, BID  enoxaparin (LOVENOX) injection 30 mg, 30 mg, Subcutaneous, Daily  atorvastatin (LIPITOR) tablet 20 mg, 20 mg, Oral, Nightly  fentaNYL (SUBLIMAZE) injection 50 mcg, 50 mcg, Intravenous, Q4H PRN  hydrocortisone (ANUSOL-HC) 2.5 % rectal cream, , Rectal, BID  niMODipine oral solution 60 mg, 60 mg, Oral, 6 times per day  potassium bicarb-citric acid (EFFER-K) effervescent tablet 40 mEq, 40 mEq, Oral, BID  calcium gluconate 1 g in dextrose 5% 100 mL IVPB, 1 g, Intravenous, Once  pantoprazole (PROTONIX) injection 40 mg, 40 mg, Intravenous, BID **AND** sodium chloride (PF) 0.9 % injection 10 mL, 10 mL, Intravenous, BID  vitamin B-1 (THIAMINE) tablet 100 mg, 100 mg, Per NG tube, Daily  folic acid (FOLVITE) tablet 1 mg, 1 mg, Per NG tube, Daily  lidocaine 1 % injection 5 mL, 5 mL, Intradermal, Once  heparin flush 100 UNIT/ML injection 100 Units, 100 Units, Intravenous, PRN  [Held by provider] sennosides-docusate sodium (SENOKOT-S) 8.6-50 MG tablet 2 tablet, 2 tablet, Oral, Daily  [Held by provider] magnesium hydroxide (MILK OF MAGNESIA) 400 MG/5ML suspension 30 mL, 30 mL, Oral, Daily PRN  sodium chloride flush 0.9 % injection 10 mL, 10 mL, Intravenous, 2 times per day  sodium chloride flush 0.9 % injection 10 mL, 10 mL, Intravenous, PRN  ferrous sulfate EC tablet 325 mg, 325 mg, Oral, Daily with breakfast  [Held by provider] docusate sodium (COLACE) capsule 100 mg, 100 mg,  Breast Cancer Sister         one sister with breast cancer    Colon Cancer Neg Hx     Diabetes Neg Hx     Eclampsia Neg Hx     Hypertension Neg Hx     Ovarian Cancer Neg Hx      Labor Neg Hx     Spont Abortions Neg Hx     Stroke Neg Hx     Liver Cancer Neg Hx            Physical Exam:    BP (!) 107/47   Pulse 87   Temp 99.3 °F (37.4 °C) (Oral)   Resp 22   Ht 5' 6\" (1.676 m)   Wt 105 lb 2.6 oz (47.7 kg)   SpO2 100%   BMI 16.97 kg/m²     General appearance: alert, appears stated age, cooperative, and no distress  Head: Normocephalic, without obvious abnormality, atraumatic, edentulous  Lungs: clear to auscultation bilaterally. Heart: regular rate and rhythm, no murmur. Abdomen: soft, non-tender; bowel sounds normal.  Extremities: extremities normal, atraumatic, no edema, normal tone. Mental status: Alert, orientedX3, thought content appropriate. Knew yr, pres, location, follow 2 step commands    Sensory: Intact in BUE and BLE to soft and pin sensation. Motor: Muscle tone and bulk are normal bilaterally. No pronator drift. 4/5 UE and distal LE, limied prox 3-4/5      Plantar reflex is down going bilaterally. Coordination: finger to nose normal bilaterally. Diagnostics:  CBC   Lab Results   Component Value Date    WBC 11.5 2020    RBC 2.56 2020    RBC 3.95 2011    HGB 7.0 2020    HCT 24.4 2020    MCV 95.3 2020    RDW 23.0 2020     2020     2011     BMP    Lab Results   Component Value Date     2020    K 4.4 2020     2020    CO2 21 2020    BUN 11 2020     Uric Acid  No components found for: URIC  VITAMIN B12 No components found for: B12  PT/INR  No results found for: PTINR    Radiology:     Impression: Ms. Phyllis Sanchez is a 77 y.o. right handed female with a history of <principal problem not specified>    1.  Ruptured SENTHIL subarachnoid hemorrhage status post coil embolization 2/12-nimodipine  2. Bilateral pneumonia/acute respiratory failure with hypoxemia/O2  3. Anemia/Taoist- ? aranesp  4. Moderate to severe esophagitis on EGD hemorrhoids per oncology note colonoscopy 2/24 no active bleeding, external hemorrhoids- IV Protonix ,Anusol  5. Hypertension /hyperlipidemia-Lipitor  6. Pain- IV fentanyl, Tylenol  7. Mirapex nightly    Recommendations:  1. Diagnosis: Ruptured subarachnoid hemorrhage, status post coiling, Taoist  2. Therapy: Has PT/OT and speech needs  3. Medical  Necessity: Above  4. Support: has good support, family  5. Rehab recommendation: Would benefit from acute inpatient rehabilitation when medically ready -if able to tolerate rehab considering anemia/Taoist -no transfusion  6. DVT proph: Okay to start DVT prophylaxis per oncology- consider DVT screen    Discussed with pt, family and nsg    It was my pleasure to evaluate Velvet Guzman today. Please call with questions. Elnatalya Oiler. Kulwant Flores MD          This note is created with the assistance of a speech recognition program.  While intending to generate a document that actually reflects the content of the visit, the document can still have some errors including those of syntax and sound a like substitutions which may escape proof reading.   In such instances, actual meaning can be extrapolated by contextual diversion

## 2020-02-25 NOTE — PROGRESS NOTES
Pulmonary Progress Note    CC:  Chief Complaint   Patient presents with    Cerebrovascular Accident      Subjective:  Patient was extubated has been weaned off oxygen on room air saturations greater than 93% hemodynamically stable  Off antibiotics since   No labs today   Immunization   Immunization History   Administered Date(s) Administered    Influenza Virus Vaccine 10/10/2017, 10/09/2018, 10/18/2019    Pneumococcal Conjugate 13-valent ([id number]) 2019    Pneumococcal Polysaccharide ([id number]) 2016, 2018        Pneumococcal Vaccine     [x] Up to date    [] Indicated   [] Refused  [] Contraindicated       Influenza Vaccine   [x] Up to date    [] Indicated   [] Refused  [] Contraindicated     PAST MEDICAL HISTORY:       Diagnosis Date    Acid reflux     Arthritis     Cancer ([facility] [address]     cervical     Family history of breast cancer in first degree relative     sister    Gall stones     hx of    Hard of hearing     bilateral hearing aids    History of cervical cancer     Hypertension     Lateral meniscus tear     left knee    Wears dentures     Wears glasses          Family History:       Problem Relation Age of Onset    Heart Attack Father     Cancer Mother         stomach cancer    Breast Cancer Sister         one sister with breast cancer    Colon Cancer Neg Hx     Diabetes Neg Hx     Eclampsia Neg Hx     Hypertension Neg Hx     Ovarian Cancer Neg Hx      Labor Neg Hx     Spont Abortions Neg Hx     Stroke Neg Hx     Liver Cancer Neg Hx        SURGICAL HISTORY:   Past Surgical History:   Procedure Laterality Date    BREAST BIOPSY      LEFT(BENIGN)    CHOLECYSTECTOMY      COLONOSCOPY      Negative    COLONOSCOPY N/A 2019    COLONOSCOPY POLYPECTOMY HOT SNARE, COLD POLYPECTOMY performed by Kiya Silva MD at [address]    Left Wrist    FOOT SURGERY Bilateral     FOR CALLOSIS    [facility] [place].  PICC [facility] DOUBLE  2020  HERNIA REPAIR      UMBILICAL     INNER EAR SURGERY      Right     KNEE ARTHROSCOPY  2000, 2005    LEFT    KNEE ARTHROSCOPY Left 4/3/2019    KNEE ARTHROSCOPY DIAGNOSTIC performed by Peg Jordan MD at [place] [address]. Right 9-4-14    BAHA- bone anchored hearing aid    OTHER SURGICAL HISTORY  02/12/2020    aneurysm rt SENTHIL, with 6 coils placed    SIGMOIDOSCOPY N/A 2/20/2020    SIGMOIDOSCOPY DIAGNOSTIC FLEXIBLE performed by Joy Flores MD at [facility] N/A 2/20/2020    EGD DIAGNOSTIC ONLY performed by Joy Flores MD at [address] [place] [address] Bilateral               TOBACCO:   reports that she has never smoked. She has never used smokeless tobacco.  ETOH:   reports current alcohol use. ALLERGIES:    Allergies   Allergen Reactions    Lisinopril Other (See Comments)    Losartan      Fatigue    Procardia [Nifedipine] Other (See Comments)       Home Meds:   Prior to Admission medications    Medication Sig Start Date End Date Taking?  Authorizing Provider   pramipexole (MIRAPEX) 0.125 MG tablet TAKE ONE TABLET BY MOUTH NIGHTLY 2/19/20   Lupillo Bowman MD   diclofenac (VOLTAREN) 75 MG EC tablet Take 1 tablet by mouth 2 times daily (with meals) for 14 days 1/21/20 2/4/20  Amisha Purple MD Lenny   hydrALAZINE (APRESOLINE) 50 MG tablet TAKE 1 TABLET BY MOUTH 4 TIMES A DAY 12/26/19   Lupillo Bowman MD   meloxicam (MOBIC) 15 MG tablet TAKE 1 TABLET BY MOUTH ONE TIME A DAY 12/12/19   Eli Arias MD   carvedilol (COREG) 3.125 MG tablet Take 1 tablet by mouth 2 times daily 11/5/19   Lupillo Bowman MD   atorvastatin (LIPITOR) 40 MG tablet Take 1 tablet by mouth nightly 10/15/19   Lupillo Bowman MD   acetaminophen (TYLENOL) 325 MG tablet Take 325 mg by mouth every 6 hours as needed for Pain    Historical Provider, MD   vitamin D (CHOLECALCIFEROL) 1000 UNIT TABS tablet Take 1,000 Units by mouth 3 times daily    Historical Provider, MD   meloxicam (MOBIC) 15 MG tablet Take 15 mg by mouth daily    Historical Provider, MD   aspirin 81 MG EC tablet Take 1 tablet by mouth daily 6/4/19   Bell Salcido MD   Multiple Vitamins-Minerals (THERAPEUTIC MULTIVITAMIN-MINERALS) tablet Take 1 tablet by mouth daily    Historical Provider, MD         Intake/Output Summary (Last 24 hours) at 2/25/2020 1503  Last data filed at 2/25/2020 0400  Gross per 24 hour   Intake 670 ml   Output --   Net 670 ml         Diet   DIET GENERAL; Dysphagia Soft and Bite-Sized  Dietary Nutrition Supplements: Standard High Calorie Oral Supplement    Vitals:   BP (!) 107/47   Pulse 87   Temp 99.3 °F (37.4 °C) (Oral)   Resp 22   Ht 5' 6\" (1.676 m)   Wt 105 lb 2.6 oz (47.7 kg)   SpO2 100%   BMI 16.97 kg/m²  on         I/O (24 Hours)    Patient Vitals for the past 8 hrs:   BP Pulse Resp SpO2   02/25/20 1200 -- 87 22 100 %   02/25/20 1113 (!) 107/47 91 24 100 %   02/25/20 1000 -- 89 16 100 %   02/25/20 0801 (!) 118/56 79 20 100 %       Intake/Output Summary (Last 24 hours) at 2/25/2020 1503  Last data filed at 2/25/2020 0400  Gross per 24 hour   Intake 670 ml   Output --   Net 670 ml     I/O last 3 completed shifts: In: 670 [P.O.:450; I.V.:220]  Out: -    Date 02/25/20 0000 - 02/25/20 2359   Shift 3139-0381 5789-8616 7545-4911 24 Hour Total   INTAKE   P.O.(mL/kg/hr) 250(0.7)   250   Shift Total(mL/kg) 250(5.2)   250(5.2)   OUTPUT   Shift Total(mL/kg)       Weight (kg) 47.7 47.7 47.7 47.7     No data found. PHYSICAL EXAMINATION:  Head and neck atraumatic, normocephalic    Lymph nodes-no cervical, supraclavicular lymphadenopathy    Neck-no JVP elevation    Lungs - Ventilating all lobes without any rales, rhonchi, wheezes or dullness. No bronchial breath sounds. Chest expansion equal bilaterally    CVS- S1, S2 regular. No S3 no S4, no murmurs    Abdomen-nontender, nondistended.   Bowel sounds are No results found for: PH, PCO2, PO2, HCO3, O2SAT  Lab Results   Component Value Date    MODE NOT REPORTED 02/22/2020           Assessment:   Active Problems:    SAH (subarachnoid hemorrhage) (HCC)    Cerebral aneurysm rupture ([facility] [facility]    Cerebral aneurysm    Subarachnoid hemorrhage from anterior cerebral artery aneurysm (HCC)    Status post coil embolization of cerebral aneurysm    Pneumonia of both lower lobes due to infectious organism [facility])    MRI-safe endovascular aneurysm coil present    Ventilator dependence ([facility] [address]    Iron deficiency anemia due to chronic blood loss  Resolved Problems:    * No resolved hospital problems.  *         Plan:  Patient is on room air saturating >93% has tolerated extubation well lungs are clear  We will sign off  Please call as needed  Thank you    Electronically signed by Chelsea Mcadams MD on 2/25/2020 at 3:03 PM

## 2020-02-25 NOTE — PROGRESS NOTES
intraventricular extension, developing hydrocephalus, interval worsening of diffuse cerebral edema, significantly increased hyperattenuation in bilateral medial frontal regions; increased hemorrhage vs contrast. Started on 2% hypertonic saline with goal sodium 150. Neurosurgery following for EVD watch. 2/14: More alert. TCD normal. Continues of 2%. CT Head showed decreased SAH, subtle rightward shift. 2/15: Hypoxic overnight with increasing O2 requirements. Placed on non-rebreather with continued desaturations. CT chest PE protocol showed severe dense consolidations in the bilateral lower lobes suggestive of severe pneumonia, moderate bilateral pleural effusions. Given 20mg IVP Lasix x1. Started on Vanc and Zosyn. Placed on biPAP with improvements. Transitioned to high flow O2 in the AM.  Urine culture +E. Coli. Blood culture sent. Mobility, IS, and acapella encouraged. 2/16: Hypoxic again overnight despite being on high flow nasal canula 90% FiO2, 30LPM.  She was placed on biPAP. Repeat CXR revealed multifocal airspace disease with worsening right lower lobe consolidation and pulmonary edema. Given 20mg IVP Lasix x1. Manual chest PT performed. Continue on Vancomycin and Zosyn. Mobilization, incentive spirometry, and acapella encouraged. Sodium 143, goal changed to 145. Continue 2% at 50cc/hr, will not increase rate in setting of possible pulmonary edema. Add salt tabs 1g BID as tolerated. 2/17: Hypoxic throughout day on high flow, increased dyspnea. HH 6.0 refusing blood d/t being Voodoo. Hem/Onc consulted; recommend minimum blood draws, Iron replacement. Full Code. Positive stool occult blood; GI consulted. 2/18: Art line DC d/t oozing, sodium checks daily, minimize blood draws. H&H 6.0. Started on Protonix drip per GI. Hem/Onc ordered Aranespx1 and IV Iron replacement. 2/19: H&H 5.6, C. difficile negative, NM RBC scan neg.   2/20: HH inc to 7.0.   Electively intubated for continued hypoxic respiratory failure and for endoscopy. GI performed EGD and sigmoidoscopy- mod to severe esophagitis, external hemorrhoids. Given Lasix x1.  2/21: HH 5.1, family refusing ABGs. UOP 3.4 L   2/22: HH 5.0, switch to EOD labs. Lasix 40 mg x 1 reticulocyte 5%  2/23: Extubated, 2 L O2 sat 99%. 2/24: Mild desaturation to 90% on room air while talking, remains on 2 L.  H&H 7.0, reticulocyte 9.3%. OK to restart DVT ppx. Passed videoswallow. Last 24h:   Passed videoswallow yesterday; General diet bite sized. Tolerated diet and ate well at dinner. Frequent liquid stools overnight, slowed down this morning per nursing. Bowel regimen remains on hold. Will hold further Magnesium infusions as long as TCD stable today. O2 saturations remained stable on 2L nasal canula overnight, weaned off this morning. Labs remains every other day to decrease blood draws in the setting of anemia.     CURRENT MEDICATIONS:  SCHEDULED MEDICATIONS:   enoxaparin  30 mg Subcutaneous Daily    atorvastatin  20 mg Oral Nightly    hydrocortisone   Rectal BID    niMODipine  60 mg Oral 6 times per day    potassium bicarb-citric acid  40 mEq Oral BID    calcium gluconate  1 g Intravenous Once    pantoprazole  40 mg Intravenous BID    And    sodium chloride (PF)  10 mL Intravenous BID    vitamin B-1  100 mg Per NG tube Daily    folic acid  1 mg Per NG tube Daily    lidocaine 1 % injection  5 mL Intradermal Once    [Held by provider] sennosides-docusate sodium  2 tablet Oral Daily    sodium chloride flush  10 mL Intravenous 2 times per day    ferrous sulfate  325 mg Oral Daily with breakfast    sodium chloride flush  10 mL Intravenous 2 times per day     CONTINUOUS INFUSIONS:    PRN MEDICATIONS:   fentanNYL, heparin flush, [Held by provider] magnesium hydroxide, sodium chloride flush, [Held by provider] docusate sodium, sodium chloride flush, acetaminophen, ondansetron    VITALS:  Temperature Range: Temp: 99.3 °F (37.4 °C) Temp  Av °F (37.2 °C)  Min: 98.4 °F (36.9 °C)  Max: 99.7 °F (37.6 °C)  BP Range: Systolic (01UDN), AER:200 , Min:97 , JIMMIE:612     Diastolic (97NNP), NUS:36, Min:31, Max:82    Pulse Range: Pulse  Av.9  Min: 78  Max: 103  Respiration Range: Resp  Av.8  Min: 19  Max: 28  Current Pulse Ox: SpO2: 100 %  24HR Pulse Ox Range: SpO2  Av.7 %  Min: 91 %  Max: 100 %  Patient Vitals for the past 12 hrs:   BP Temp Temp src Pulse Resp SpO2   20 0405 (!) 130/47 99.3 °F (37.4 °C) Oral 91 25 100 %   20 0310 (!) 106/33 -- -- 88 24 100 %   20 0200 (!) 112/31 -- -- 82 19 100 %   20 0105 (!) 99/33 -- -- 79 20 100 %   20 0025 (!) 119/50 -- -- 87 23 100 %   20 0005 (!) 119/50 99.7 °F (37.6 °C) Oral 86 22 100 %   20 2200 (!) 116/38 -- -- 87 20 96 %   20 2105 (!) 102/34 -- -- 78 19 97 %   20 (!) 144/53 98.6 °F (37 °C) Oral 86 21 98 %     Estimated body mass index is 16.97 kg/m² as calculated from the following:    Height as of this encounter: 5' 6\" (1.676 m).     Weight as of this encounter: 105 lb 2.6 oz (47.7 kg).  []<16 Severe malnutrition  [x]16-16.99 Moderate malnutrition  []17-18.49 Mild malnutrition  []18.5-24.9 Normal  []25-29.9 Overweight (not obese)  []30-34.9 Obese class 1 (Low Risk)  []35-39.9 Obese class 2 (Moderate Risk)  []?40 Obese class 3 (High Risk)    RECENT LABS:   Lab Results   Component Value Date    WBC 11.5 (H) 2020    HGB 7.0 (LL) 2020    HCT 24.4 (L) 2020     2020    CHOL 102 2020    TRIG 100 2020    HDL 21 (L) 2020    ALT 29 02/15/2020    AST 47 (H) 02/15/2020     2020    K 4.4 2020     2020    CREATININE 0.60 2020    BUN 11 2020    CO2 21 2020    TSH 5.94 (H) 02/15/2020    INR 1.2 2020    LABA1C 5.6 2020     24 HOUR INTAKE/OUTPUT:    Intake/Output Summary (Last 24 hours) at 2020 0737  Last data filed at 2020 weaker, but this was not appreciated on AM exam.    Sensory:    Touch:    Right Upper Extremity:  normal  Left Upper Extremity:  normal  Right Lower Extremity:  normal  Left Lower Extremity:  normal    Coordination/Dysmetria:  Heel to Shin:  Right:  normal  Left:  normal  Finger to Nose:   Right:  normal  Left:  normal        DRAINS:  [x] There are no drains for Neuro Critical Care to monitor at this time. ASSESSMENT AND PLAN:       The patient is a 75 y. o. female with a history of HTN who was admitted to the Neuro ICU for management of acute SAH in setting of right SENTHIL aneurysm.  Patient initially presented to [facility] ED with thunderclap headache, diaphoresis, and nausea/vomiting. Underwent coil embolization on 2/12/20.   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     NEUROLOGIC:  - Acute SAH in setting of ruptured right A2 SENTHIL aneurysm  - POD #13 s/p coil embolization resulting in complete obliteration  - Continue Nimodipine 60mg Q4h  - Last TCD 2/23 normal, F/U TCD this afternoon  - Neuro Endovascular following; appreciate recs  - Goal SBP<160  - Neuro checks per protocol     CARDIOVASCULAR:  - Goal SBP<160  - History of HTN  - Hold home antihypertensives for now  - Troponin 14, EKG NSR  - Echo EF 55%, mild left ventricular diastolic dysfunction  - Continue home ASA 81mg QD  - History HLD; continue home Lipitor 40mg QHS  - Continue telemetry     PULMONARY:  - Hypoxemia improved  - Intubated 2/20 for acute respiratory failure with persistent hypoxia and EGD, Extubated 2/23  - Maintaining O2 sats on 0-2L nasal canula     RENAL/FLUID/ELECTROLYTE:  - Normal renal functioning   - Adequate urine output, multiple saturated briefs  - No maintenance IVF  - Continue Potassium bicarb 40meq BID  - Replace electrolytes PRN  - BMP every other day     GI/NUTRITION:  NUTRITION:  General diet; bite sized  -

## 2020-02-25 NOTE — PROGRESS NOTES
Occupational Therapy  Facility/Department: [address]  Daily Treatment Note  NAME: Azeb Shultz  : 1950  MRN: [id number]    Date of Service: 2020    Discharge Recommendations:  Further therapy recommended at discharge. The patient should be able to tolerate at least three hours of therapy per day over 5 days or 15 hours over 7 days. Assessment   Performance deficits / Impairments: Decreased functional mobility ; Decreased high-level IADLs;Decreased cognition;Decreased ADL status; Decreased endurance;Decreased balance;Decreased strength;Decreased safe awareness  Prognosis: Good  OT Education: OT Role;Plan of Care;Transfer Training;Energy Conservation  Patient Education: purpose of OT; deep breathing; proper hand and foot placement  Barriers to Learning: pt demo P carry over  REQUIRES OT FOLLOW UP: Yes  Activity Tolerance  Activity Tolerance: Patient limited by fatigue  Safety Devices  Safety Devices in place: Yes  Type of devices: Patient at risk for falls; Left in bed;Call light within reach;Nurse notified;Gait belt         Patient Diagnosis(es): The encounter diagnosis was SAH (subarachnoid hemorrhage) ([facility] [address] has a past medical history of Acid reflux, Arthritis, Cancer ([address] Family history of breast cancer in first degree relative, Gall stones, Hard of hearing, History of cervical cancer, Hypertension, Lateral meniscus tear, Wears dentures, and Wears glasses. has a past surgical history that includes Tonsillectomy and adenoidectomy (); Cholecystectomy; Inner ear surgery; Knee arthroscopy (, ); cyst removal (); Colonoscopy (); Breast biopsy (); Foot surgery (Bilateral); other surgical history (Right, 9-4-14); Umbilical hernia repair; hernia repair; Varicose vein surgery (Bilateral); Knee arthroscopy (Left, 4/3/2019); Colonoscopy (N/A, 2019); other surgical history (2020);   picc powerpicc double (2020);  Upper gastrointestinal endoscopy (N/A, 2/20/2020); and sigmoidoscopy (N/A, 2/20/2020). Restrictions  Restrictions/Precautions  Restrictions/Precautions: Fall Risk, Seizure  Required Braces or Orthoses?: No  Position Activity Restriction  Other position/activity restrictions: up with assist  Subjective   General  Patient assessed for rehabilitation services?: Yes  Family / Caregiver Present: Yes  Diagnosis: SENTHIL aneurysm coiling, B/L SAH  Pain Assessment  Pain Assessment: 0-10  Pain Level: 0  Vital Signs  Patient Currently in Pain: No   Orientation  Orientation  Overall Orientation Status: Within Functional Limits  Objective    ADL  Feeding: Modified independent ; Increased time to complete  Grooming: Stand by assistance;Setup;Verbal cueing; Increased time to complete(face washing )  UE Bathing: Stand by assistance;Setup;Verbal cueing; Increased time to complete  LE Bathing: Moderate assistance;Setup;Verbal cueing; Increased time to complete(req assistance with temitope/post and distal BLE feet)  UE Dressing: Minimal assistance;Setup;Verbal cueing; Increased time to complete(to doff/don gown req assistance with threading)  LE Dressing: Maximum assistance;Setup; Increased time to complete;Verbal cueing(to doff/don socks and breif)  Additional Comments: pt req mod-max verbal instruction on inititation throughout session; ADLs completed in chair and supine in bed  Balance  Sitting Balance: Minimal assistance(R lateral lean noted seated in chair req COELLO assistance to correct)  Standing Balance: Moderate assistance(Mod x2)  Standing Balance  Time: pt tolerated approx 1 min  Activity: during mobility  Comment: utilizing RW req Mod x2 assist  Functional Mobility  Functional - Mobility Device: Rolling Walker  Activity: Other  Assist Level:  Moderate assistance(Mod x2)  Functional Mobility Comments: from chair to EOB req assistance with walker management and balance maintance sec to pt R lateral lean  Bed mobility  Rolling to Left: Moderate assistance  Rolling to Right: Moderate assistance  Sit to Supine: Moderate assistance;2 Person assistance  Scooting: Contact guard assistance  Comment: HOB flat  Transfers  Stand Step Transfers: Moderate assistance;2 Person assistance  Sit to stand: Moderate assistance;2 Person assistance  Stand to sit: Moderate assistance;2 Person assistance  Transfer Comments: utilizing RW req increased assistance sec to increased R lateral lean  Cognition  Overall Cognitive Status: Exceptions  Arousal/Alertness: Delayed responses to stimuli  Following Commands: Follows multistep commands with increased time; Follows multistep commands with repitition  Attention Span: Attends with cues to redirect  Safety Judgement: Decreased awareness of need for assistance;Decreased awareness of need for safety  Problem Solving: Assistance required to correct errors made;Assistance required to identify errors made  Insights: Decreased awareness of deficits  Initiation: Requires cues for some  Sequencing: Requires cues for some  Pt and RN agreeable to therapy this day. ADL tasks of dressing, grooming, feeding and bathing completed see above for LOF. Pt req increased time this day sec to decreased cognition and increased fatigue. Pt supine in bed at session end with call light in reach and RN present.   Plan   Plan  Times per week: 4x   Cont POC    Goals  Short term goals  Time Frame for Short term goals: Pt will by discharge   Short term goal 1: demo ADL UB/LB dressing/bathing activity with mod I and increased time  Short term goal 2: demo standing during func activity for 12 min+ using LRD and mod I  Short term goal 3: demo bending/reaching func activity while standing using LRD and SUP  Short term goal 4: demo (I) with all bed mob/transfers       Therapy Time   Individual Concurrent Group Co-treatment   Time In 1504         Time Out 1609         Minutes 65         Timed Code Treatment Minutes: [address] [place], COELLO/L

## 2020-02-25 NOTE — PROGRESS NOTES
TODAY:      AWAKE & FOLLOWING COMMANDS:  [] No   [x] Yes    INTUBATED:   [x] No   [] Yes    SEDATION/ANALGESIA:    [] Propofol gtt  [] Versed gtt  [] Ativan gtt   [x] No Sedation  Pain medications:      FEEDING: Able to take PO?  [] No:  [] NPO for:  [] NG/OG [] PEG  Tube Feeds:      [x] Yes:  Diet:   DVT Prophylaxis:  [x] Yes:           [] No rationale:     Stress Ulcer Prophylaxis: [x] Yes:   [] Not indicated    VASOPRESSORS:  [x] No    [] Yes  [] Levophed [] Dopamine [] Vasopressin  [] Dobutamine [] Phenylephrine [] Epinephrine    CENTRAL/ARTERIAL LINES:  [] No    [x] Yes:  Location: PICC line 2/20/20, Indication: no central access due to low hemoglobin     ELLER CATHETER: [x] No    [] Yes:  Location: , Date placed: , Indication:     DRAINS: [x] No    [] Yes:  Location: , Date placed: , Output:     Head of Bed: [x] Elevated:          [] Flat    Glucose management: [x] Not indicated, consistently less than 180 [] Sliding Scale :            [] Long Acting:    Secondary Stroke PPX: [] Antiplatelet:                [] Statin:    lipitor

## 2020-02-25 NOTE — PROGRESS NOTES
Pt working with physical therapy. Pt ambulated in room and up to chair and tolerated well. Writer stated to pt that she need to stay up in chair til after lunch to promote activity tolerance. Pt agreeable. Will cont to monitor.

## 2020-02-25 NOTE — PROGRESS NOTES
Speech Language Pathology  Speech Language Pathology  [address] [facility]    Cognitive Treatment Note    Date: 2/25/2020  Patients Name: Joanne Bravo  MRN: [id number]  Patient Active Problem List   Diagnosis Code    Essential hypertension I10    RLS (restless legs syndrome) G25.81    History of cervical cancer Z85.41    Gastroesophageal reflux disease without esophagitis K21.9    Trochanteric bursitis of left hip M70.62    Complex tear of lateral meniscus of left knee S83.272A    Weight loss R63.4    Cerebrovascular accident (CVA) ([address] I63.9    Left sided numbness R20.0    Acute left-sided weakness R53.1    Lacunar stroke ([address] I63.81    Hyperlipidemia E78.5    Chronic pancreatitis ([address] K86.1    SAH (subarachnoid hemorrhage) ([address] I60.9    Cerebral aneurysm rupture ([address] I60.7    Cerebral aneurysm I67.1    Subarachnoid hemorrhage from anterior cerebral artery aneurysm (HCC) I60.6    Status post coil embolization of cerebral aneurysm Z98.890    Pneumonia of both lower lobes due to infectious organism ([address] J18.1    MRI-safe endovascular aneurysm coil present Z95.828    Ventilator dependence ([address] Z99.11    Iron deficiency anemia due to chronic blood loss D50.0       Pain: 0/10    Cognitive Treatment    Treatment time: 10:29-10:50      Subjective: [x] Alert [x] Cooperative     [] Confused     [] Agitated    [] Lethargic      Objective/Assessment:    Recall:    Functional Memory, Paragraph Facts: 4/8 increased to 8/8 with repetitions and maximal verbal cues   Mental Manipulation: 0/5 increased to 5/5 with repetitions and maximal verbal and visual cues    Other: Extended response time noted during session with frequent repetitions of instructions required.     Plan:  [x] Continue  services    [] Discharge from :      Discharge recommendations: [] Inpatient Rehab   [] [place]   [] Outpatient Therapy  [] Follow up at trauma clinic   [x] Other: Further

## 2020-02-26 LAB
ABSOLUTE EOS #: 0.21 K/UL (ref 0–0.44)
ABSOLUTE IMMATURE GRANULOCYTE: 0.11 K/UL (ref 0–0.3)
ABSOLUTE LYMPH #: 0.96 K/UL (ref 1.1–3.7)
ABSOLUTE MONO #: 0.86 K/UL (ref 0.1–1.2)
ABSOLUTE RETIC #: 0.27 M/UL (ref 0.03–0.08)
ANION GAP SERPL CALCULATED.3IONS-SCNC: 9 MMOL/L (ref 9–17)
BASOPHILS # BLD: 0 % (ref 0–2)
BASOPHILS ABSOLUTE: 0 K/UL (ref 0–0.2)
BUN BLDV-MCNC: 15 MG/DL (ref 8–23)
BUN/CREAT BLD: ABNORMAL (ref 9–20)
CALCIUM SERPL-MCNC: 8.2 MG/DL (ref 8.6–10.4)
CHLORIDE BLD-SCNC: 108 MMOL/L (ref 98–107)
CO2: 18 MMOL/L (ref 20–31)
CREAT SERPL-MCNC: 0.56 MG/DL (ref 0.5–0.9)
DIFFERENTIAL TYPE: ABNORMAL
EOSINOPHILS RELATIVE PERCENT: 2 % (ref 1–4)
GFR AFRICAN AMERICAN: >60 ML/MIN
GFR NON-AFRICAN AMERICAN: >60 ML/MIN
GFR SERPL CREATININE-BSD FRML MDRD: ABNORMAL ML/MIN/{1.73_M2}
GFR SERPL CREATININE-BSD FRML MDRD: ABNORMAL ML/MIN/{1.73_M2}
GLUCOSE BLD-MCNC: 107 MG/DL (ref 70–99)
HCT VFR BLD CALC: 27.6 % (ref 36.3–47.1)
HEMOGLOBIN: 8.1 G/DL (ref 11.9–15.1)
IMMATURE GRANULOCYTES: 1 %
IMMATURE RETIC FRACT: 29 % (ref 2.7–18.3)
LYMPHOCYTES # BLD: 9 % (ref 24–43)
MAGNESIUM: 2.1 MG/DL (ref 1.6–2.6)
MCH RBC QN AUTO: 28.1 PG (ref 25.2–33.5)
MCHC RBC AUTO-ENTMCNC: 29.3 G/DL (ref 28.4–34.8)
MCV RBC AUTO: 95.8 FL (ref 82.6–102.9)
MONOCYTES # BLD: 8 % (ref 3–12)
MORPHOLOGY: ABNORMAL
NRBC AUTOMATED: 0 PER 100 WBC
PDW BLD-RTO: 25.7 % (ref 11.8–14.4)
PLATELET # BLD: 365 K/UL (ref 138–453)
PLATELET ESTIMATE: ABNORMAL
PMV BLD AUTO: 11.5 FL (ref 8.1–13.5)
POTASSIUM SERPL-SCNC: 4.7 MMOL/L (ref 3.7–5.3)
RBC # BLD: 2.88 M/UL (ref 3.95–5.11)
RBC # BLD: ABNORMAL 10*6/UL
RETIC %: 9.3 % (ref 0.5–1.9)
RETIC HEMOGLOBIN: 30.1 PG (ref 28.2–35.7)
SEG NEUTROPHILS: 80 % (ref 36–65)
SEGMENTED NEUTROPHILS ABSOLUTE COUNT: 8.56 K/UL (ref 1.5–8.1)
SODIUM BLD-SCNC: 135 MMOL/L (ref 135–144)
WBC # BLD: 10.7 K/UL (ref 3.5–11.3)
WBC # BLD: ABNORMAL 10*3/UL

## 2020-02-26 PROCEDURE — 99232 SBSQ HOSP IP/OBS MODERATE 35: CPT | Performed by: PHYSICAL MEDICINE & REHABILITATION

## 2020-02-26 PROCEDURE — 83735 ASSAY OF MAGNESIUM: CPT

## 2020-02-26 PROCEDURE — 97110 THERAPEUTIC EXERCISES: CPT

## 2020-02-26 PROCEDURE — 6360000002 HC RX W HCPCS: Performed by: STUDENT IN AN ORGANIZED HEALTH CARE EDUCATION/TRAINING PROGRAM

## 2020-02-26 PROCEDURE — 6370000000 HC RX 637 (ALT 250 FOR IP): Performed by: NURSE PRACTITIONER

## 2020-02-26 PROCEDURE — C9113 INJ PANTOPRAZOLE SODIUM, VIA: HCPCS | Performed by: STUDENT IN AN ORGANIZED HEALTH CARE EDUCATION/TRAINING PROGRAM

## 2020-02-26 PROCEDURE — 6370000000 HC RX 637 (ALT 250 FOR IP): Performed by: PSYCHIATRY & NEUROLOGY

## 2020-02-26 PROCEDURE — 85025 COMPLETE CBC W/AUTO DIFF WBC: CPT

## 2020-02-26 PROCEDURE — 2580000003 HC RX 258: Performed by: STUDENT IN AN ORGANIZED HEALTH CARE EDUCATION/TRAINING PROGRAM

## 2020-02-26 PROCEDURE — 97530 THERAPEUTIC ACTIVITIES: CPT

## 2020-02-26 PROCEDURE — 80048 BASIC METABOLIC PNL TOTAL CA: CPT

## 2020-02-26 PROCEDURE — 99232 SBSQ HOSP IP/OBS MODERATE 35: CPT | Performed by: INTERNAL MEDICINE

## 2020-02-26 PROCEDURE — 97535 SELF CARE MNGMENT TRAINING: CPT

## 2020-02-26 PROCEDURE — 2060000000 HC ICU INTERMEDIATE R&B

## 2020-02-26 PROCEDURE — 2580000003 HC RX 258: Performed by: NURSE PRACTITIONER

## 2020-02-26 PROCEDURE — 99233 SBSQ HOSP IP/OBS HIGH 50: CPT | Performed by: PSYCHIATRY & NEUROLOGY

## 2020-02-26 PROCEDURE — 85045 AUTOMATED RETICULOCYTE COUNT: CPT

## 2020-02-26 PROCEDURE — 6370000000 HC RX 637 (ALT 250 FOR IP): Performed by: STUDENT IN AN ORGANIZED HEALTH CARE EDUCATION/TRAINING PROGRAM

## 2020-02-26 PROCEDURE — 97130 THER IVNTJ EA ADDL 15 MIN: CPT

## 2020-02-26 PROCEDURE — 36415 COLL VENOUS BLD VENIPUNCTURE: CPT

## 2020-02-26 PROCEDURE — 97116 GAIT TRAINING THERAPY: CPT

## 2020-02-26 PROCEDURE — 97129 THER IVNTJ 1ST 15 MIN: CPT

## 2020-02-26 RX ORDER — SODIUM CHLORIDE 9 MG/ML
INJECTION, SOLUTION INTRAVENOUS CONTINUOUS
Status: DISCONTINUED | OUTPATIENT
Start: 2020-02-26 | End: 2020-02-26

## 2020-02-26 RX ADMIN — NIMODIPINE 60 MG: 30 CAPSULE ORAL at 21:19

## 2020-02-26 RX ADMIN — ATORVASTATIN CALCIUM 20 MG: 20 TABLET, FILM COATED ORAL at 21:19

## 2020-02-26 RX ADMIN — HYDROCORTISONE 2.5%: 25 CREAM TOPICAL at 21:20

## 2020-02-26 RX ADMIN — NIMODIPINE 60 MG: 30 CAPSULE ORAL at 09:38

## 2020-02-26 RX ADMIN — SODIUM CHLORIDE, PRESERVATIVE FREE 10 ML: 5 INJECTION INTRAVENOUS at 09:47

## 2020-02-26 RX ADMIN — POTASSIUM BICARBONATE 40 MEQ: 782 TABLET, EFFERVESCENT ORAL at 09:38

## 2020-02-26 RX ADMIN — Medication 10 ML: at 09:39

## 2020-02-26 RX ADMIN — HYDROCORTISONE 2.5%: 25 CREAM TOPICAL at 09:42

## 2020-02-26 RX ADMIN — PRAMIPEXOLE DIHYDROCHLORIDE 0.12 MG: 0.25 TABLET ORAL at 21:18

## 2020-02-26 RX ADMIN — FOLIC ACID 1 MG: 1 TABLET ORAL at 09:38

## 2020-02-26 RX ADMIN — Medication 10 ML: at 22:08

## 2020-02-26 RX ADMIN — PANTOPRAZOLE SODIUM 40 MG: 40 INJECTION, POWDER, LYOPHILIZED, FOR SOLUTION INTRAVENOUS at 22:08

## 2020-02-26 RX ADMIN — POTASSIUM BICARBONATE 40 MEQ: 782 TABLET, EFFERVESCENT ORAL at 21:18

## 2020-02-26 RX ADMIN — Medication 100 MG: at 09:38

## 2020-02-26 RX ADMIN — NIMODIPINE 60 MG: 30 CAPSULE ORAL at 17:36

## 2020-02-26 RX ADMIN — PANTOPRAZOLE SODIUM 40 MG: 40 INJECTION, POWDER, LYOPHILIZED, FOR SOLUTION INTRAVENOUS at 09:38

## 2020-02-26 RX ADMIN — ANTI-FUNGAL POWDER MICONAZOLE NITRATE TALC FREE: 1.42 POWDER TOPICAL at 09:43

## 2020-02-26 RX ADMIN — SODIUM CHLORIDE, PRESERVATIVE FREE 10 ML: 5 INJECTION INTRAVENOUS at 21:19

## 2020-02-26 RX ADMIN — NIMODIPINE 60 MG: 30 CAPSULE ORAL at 04:45

## 2020-02-26 RX ADMIN — NIMODIPINE 60 MG: 30 CAPSULE ORAL at 12:52

## 2020-02-26 RX ADMIN — ENOXAPARIN SODIUM 30 MG: 30 INJECTION SUBCUTANEOUS at 09:39

## 2020-02-26 RX ADMIN — FERROUS SULFATE TAB EC 325 MG (65 MG FE EQUIVALENT) 325 MG: 325 (65 FE) TABLET DELAYED RESPONSE at 09:38

## 2020-02-26 RX ADMIN — ANTI-FUNGAL POWDER MICONAZOLE NITRATE TALC FREE: 1.42 POWDER TOPICAL at 21:18

## 2020-02-26 RX ADMIN — SODIUM CHLORIDE, PRESERVATIVE FREE 10 ML: 5 INJECTION INTRAVENOUS at 21:25

## 2020-02-26 ASSESSMENT — PAIN SCALES - GENERAL
PAINLEVEL_OUTOF10: 0

## 2020-02-26 ASSESSMENT — PAIN SCALES - WONG BAKER
WONGBAKER_NUMERICALRESPONSE: 0

## 2020-02-26 NOTE — PROGRESS NOTES
yet today)  · Anthropometric Measures:  · Ht: 5' 6\" (167.6 cm)   · Current Body Wt: 105 lb 2.6 oz (47.7 kg)  · Admission Body Wt: 108 lb (49 kg)(stated)  · Usual Body Wt: 114 lb (51.7 kg)(per EHR)  · % Weight Change:  14% loss x 2 months per EHR  · Ideal Body Wt: 135 lb (61.2 kg), % Ideal Body 78%  · BMI Classification: BMI <18.5 Underweight    Nutrition Interventions:   Continue current diet, Continue current ONS  Continued Inpatient Monitoring, Education Not Indicated    Nutrition Evaluation:   · Evaluation: Progressing toward goals   · Goals: Meet % of estimated nutrient needs.     · Monitoring: Meal Intake, Supplement Intake, Diet Tolerance, Chewing/Swallowing, Pertinent Labs, Weight, Diarrhea      Electronically signed by Denis Grijalva MS, RD, LD on 2/26/20 at 1:06 PM    Contact Number: [phone]

## 2020-02-26 NOTE — PROGRESS NOTES
I meant patient sister who is in from [place] visiting her. She informed me that patient is not an official [employer] she has not been baptized. Her sister was upset that she may have not received blood when she needed it because she is actually [address] according to her. I spoke with patient who is alert and oriented x4. I asked her about this several times to clarify, and specifically that moving forward if she were to need blood if she would be okay with that. Patient said that she would want blood in an emergent situation or if her hemoglobin was too low, if it would save her life that she would want blood. Additionally I informed patient regarding what exactly medical power of  is. I informed her that it is someone who asked in the best interest of the patient and should not act based on their own intentions or wishes but are to convey their wishes and requests of the person they are medical power of  for. Patient sister informing that she believes patient is afraid of confronting her daughter April who is the M POA especially regarding blood. I informed patient that we can have ethics speak with her meet with her if needed. Lastly, I updated patient regarding her labs and that they are uptrending and hopefully she is not in need of blood going forward. To confirm, patient stated that she would want blood if necessary.     Aneudy Haider,   PGY 2  Resident Physician Emergency Medicine  02/26/20 6:20 PM

## 2020-02-26 NOTE — PROGRESS NOTES
Speech Language Pathology  Speech Language Pathology  [facility]    Cognitive Treatment Note    Date: 2/26/2020  Patients Name: Kristie Heredia  MRN: [id number]  Patient Active Problem List   Diagnosis Code    Essential hypertension I10    RLS (restless legs syndrome) G25.81    History of cervical cancer Z85.41    Gastroesophageal reflux disease without esophagitis K21.9    Trochanteric bursitis of left hip M70.62    Complex tear of lateral meniscus of left knee S83.272A    Weight loss R63.4    Cerebrovascular accident (CVA) ([address] I63.9    Left sided numbness R20.0    Acute left-sided weakness R53.1    Lacunar stroke ([address] I63.81    Hyperlipidemia E78.5    Chronic pancreatitis ([address] K86.1    SAH (subarachnoid hemorrhage) ([address] I60.9    Cerebral aneurysm rupture ([address] I60.7    Cerebral aneurysm I67.1    Subarachnoid hemorrhage from anterior cerebral artery aneurysm ([address] I60.6    Status post coil embolization of cerebral aneurysm Z98.890    Pneumonia of both lower lobes due to infectious organism ([address] J18.1    MRI-safe endovascular aneurysm coil present Z95.828    Ventilator dependence ([address] Z99.11    Iron deficiency anemia due to chronic blood loss D50.0       Pain: 0/10    Cognitive Treatment    Treatment time: 9:23-10:00      Subjective: [x] Alert [x] Cooperative     [] Confused     [] Agitated    [] Lethargic      Objective/Assessment:    Recall:    Mental Manipulation, Scrambled Sentences: 0/1 despite maximal verbal cues and repetitions. Activity interrupted by arrival of breakfast and RN.    Coding and Grouping Items for Recall, Pairing Items: 1/6 increased to 6/6 with minimal to moderate verbal cues    Problem Solving/Reasoning:   Word Deduction: 1/6 increased to 16/6 with repetitions and minimal verbal cues   Inconsistencies in Sentences: 0/2 increased to 2/2 with minimal to maximal verbal cues    Other: Prolonged periods of time to answer questions were noted throughout the session. Pt often required multiple repetitions of directions and task materials. Pt expressed to RN that she has been experiencing shortness of breath    Plan:  [x] Continue ST services    [] Discharge from ST:      Discharge recommendations: [] Inpatient Rehab   [] [place]   [] Outpatient Therapy  [] Follow up at trauma clinic   [x] Other: Further therapy recommended at discharge. Treatment completed by: Completed by: Ranjan Garcia,  Clinician    Cosigned By: Socorro Persaud S.CCC/SLP

## 2020-02-26 NOTE — PROGRESS NOTES
tile  Device: Rolling Walker  Assistance: Minimal assistance  Quality of Gait: slow gait with difficulty motor planning, verbal cues for steps and increasing DIANE with limited follow through 20 ft x 2 with seated rest  Gait Deviations: Slow Jo-Ann, Shuffles  Distance: 20 ft x 2    Transfers  Sit to Stand: Minimal Assistance  Stand to sit: Minimal Assistance  Bed to Chair: Minimal assistance  Stand Pivot Transfers: Minimal Assistance, 2 Person Assistance(w/RW)  Comment: poor safety awareness  Ambulation  Ambulation?: Yes  More Ambulation?: Yes  Ambulation 1  Surface: level tile  Device: Rolling Walker  Assistance: Minimal assistance  Quality of Gait: slow gait with difficulty motor planning, verbal cues for steps and increasing DIANE with limited follow through 20 ft x 2 with seated rest  Gait Deviations: Slow Jo-Ann, Shuffles  Distance: 20 ft x 2    Surface: level tile  Ambulation 1  Surface: level tile  Device: Rolling Walker  Assistance: Minimal assistance  Quality of Gait: slow gait with difficulty motor planning, verbal cues for steps and increasing DIANE with limited follow through 20 ft x 2 with seated rest  Gait Deviations: Slow Jo-Ann, Shuffles  Distance: 20 ft x 2    OT:  ADL  Feeding: Modified independent , Increased time to complete  Grooming: Stand by assistance, Setup, Verbal cueing, Increased time to complete(face washing )  UE Bathing: Stand by assistance, Setup, Verbal cueing, Increased time to complete  LE Bathing:  Moderate assistance, Setup, Verbal cueing, Increased time to complete(req assistance with temitope/post and distal BLE feet)  UE Dressing: Minimal assistance, Setup, Verbal cueing, Increased time to complete(to doff/don gown req assistance with threading)  LE Dressing: Maximum assistance, Setup, Increased time to complete, Verbal cueing(to doff/don socks and breif)  Toileting: Minimal assistance  Additional Comments: pt req mod-max verbal instruction on inititation throughout session; ADLs completed in chair and supine in bed         Balance  Sitting Balance: Minimal assistance(R lateral lean noted seated in chair req COELLO assistance to correct)  Standing Balance: Moderate assistance(Mod x2)   Standing Balance  Time: pt tolerated approx 1 min  Activity: during mobility  Comment: utilizing RW req Mod x2 assist  Functional Mobility  Functional - Mobility Device: Rolling Walker  Activity: Other  Assist Level: Moderate assistance(Mod x2)  Functional Mobility Comments: from chair to EOB req assistance with walker management and balance maintance sec to pt R lateral lean     Bed mobility  Rolling to Left: Moderate assistance  Rolling to Right: Moderate assistance  Supine to Sit: Minimal assistance  Sit to Supine: Moderate assistance, 2 Person assistance  Scooting: Contact guard assistance  Comment: HOB flat  Transfers  Stand Step Transfers: Moderate assistance, 2 Person assistance  Sit to stand: Moderate assistance, 2 Person assistance  Stand to sit: Moderate assistance, 2 Person assistance  Transfer Comments: utilizing RW req increased assistance sec to increased R lateral lean                     Objective:  BP (!) 131/50   Pulse 98   Temp 97.9 °F (36.6 °C) (Oral)   Resp (!) 31   Ht 5' 6\" (1.676 m)   Wt 105 lb 2.6 oz (47.7 kg)   SpO2 98%   BMI 16.97 kg/m²       GEN: well developed, well nourished, in NAD  HEENT: NCAT, PERRL, EOMI, mucous membranes pink and moist  CV: RRR, no murmurs, rubs or gallops  PULM: CTAB, no rales or rhonchi. Respirations WNL and unlabored  ABD: soft, NT, ND, BS+ and equal  NEURO: A&O x3. Sensation intact to light touch. DTRs 2+  MSK: Functional ROM all extremities . Strength 4/5 key muscles BUEs. 3/5 key muscles BLEs  EXTREMITIES: No calf tenderness to palpation bilaterally. No edema BLEs  SKIN: warm dry and intact with good turgor  PSYCH: appropriately interactive. Affect WNL.      Current Medications:   Current Facility-Administered Medications: pramipexole (MIRAPEX) tablet 0.125 mg, 0.125 mg, Oral, Nightly  miconazole (MICOTIN) 2 % powder, , Topical, BID  niMODipine (NIMOTOP) capsule 60 mg, 60 mg, Oral, 6 times per day  enoxaparin (LOVENOX) injection 30 mg, 30 mg, Subcutaneous, Daily  atorvastatin (LIPITOR) tablet 20 mg, 20 mg, Oral, Nightly  fentaNYL (SUBLIMAZE) injection 50 mcg, 50 mcg, Intravenous, Q4H PRN  hydrocortisone (ANUSOL-HC) 2.5 % rectal cream, , Rectal, BID  potassium bicarb-citric acid (EFFER-K) effervescent tablet 40 mEq, 40 mEq, Oral, BID  calcium gluconate 1 g in dextrose 5% 100 mL IVPB, 1 g, Intravenous, Once  pantoprazole (PROTONIX) injection 40 mg, 40 mg, Intravenous, BID **AND** sodium chloride (PF) 0.9 % injection 10 mL, 10 mL, Intravenous, BID  vitamin B-1 (THIAMINE) tablet 100 mg, 100 mg, Per NG tube, Daily  folic acid (FOLVITE) tablet 1 mg, 1 mg, Per NG tube, Daily  lidocaine 1 % injection 5 mL, 5 mL, Intradermal, Once  heparin flush 100 UNIT/ML injection 100 Units, 100 Units, Intravenous, PRN  [Held by provider] sennosides-docusate sodium (SENOKOT-S) 8.6-50 MG tablet 2 tablet, 2 tablet, Oral, Daily  [Held by provider] magnesium hydroxide (MILK OF MAGNESIA) 400 MG/5ML suspension 30 mL, 30 mL, Oral, Daily PRN  sodium chloride flush 0.9 % injection 10 mL, 10 mL, Intravenous, 2 times per day  sodium chloride flush 0.9 % injection 10 mL, 10 mL, Intravenous, PRN  ferrous sulfate EC tablet 325 mg, 325 mg, Oral, Daily with breakfast  [Held by provider] docusate sodium (COLACE) capsule 100 mg, 100 mg, Oral, Daily PRN  sodium chloride flush 0.9 % injection 10 mL, 10 mL, Intravenous, 2 times per day  sodium chloride flush 0.9 % injection 10 mL, 10 mL, Intravenous, PRN  acetaminophen (TYLENOL) tablet 650 mg, 650 mg, Oral, Q4H PRN  ondansetron (ZOFRAN) injection 4 mg, 4 mg, Intravenous, Q6H PRN      Diagnostics:     CBC:   Recent Labs     02/24/20  0442 02/26/20  0439   WBC 11.5* 10.7   RBC 2.56* 2.88*   HGB 7.0* 8.1*   HCT 24.4* 27.6*   MCV 95.3 95.8   RDW 23.0* precautions and home exercise routine, adaptive techniques and deficit compensation strategies, strengthening and conditioning, equipment prescription and instructions in use. 6. DVT proph: Started on Lovenox 2/24      Electronically signed by Piedad Camp MD on 2/26/2020 at 10:25 AM       This note is created with the assistance of a speech recognition program.  While intending to generate a document that actually reflects the content of the visit, the document can still have some errors including those of syntax and sound a like substitutions which may escape proof reading. In such instances, actual meaning can be extrapolated by contextual diversion.

## 2020-02-26 NOTE — PROGRESS NOTES
endoscopy (N/A, 2/20/2020); and sigmoidoscopy (N/A, 2/20/2020). Restrictions  Restrictions/Precautions  Restrictions/Precautions: Fall Risk, Seizure  Required Braces or Orthoses?: No  Position Activity Restriction  Other position/activity restrictions: up with assist  Subjective   General  Patient assessed for rehabilitation services?: Yes  Family / Caregiver Present: Yes(sister)  Diagnosis: SENTHIL aneurysm coiling, B/L SAH  Pain Assessment  Pain Assessment: 0-10  Pain Level: 0  Vital Signs  Patient Currently in Pain: No   Orientation  Orientation  Overall Orientation Status: Within Functional Limits  Objective    ADL  Grooming: Minimal assistance;Setup;Verbal cueing; Increased time to complete(oral care completed seated in chair)  LE Dressing: Stand by assistance;Setup;Verbal cueing; Increased time to complete(to doff/don socks)  Additional Comments: pt req increased time and max verbal instructions on sequencing and inititation  Balance  Sitting Balance: Contact guard assistance  Standing Balance: Minimal assistance  Standing Balance  Time: pt tolerated approx 4-5 min  Activity: during mobility and static standing  Comment: utilizing RW  Functional Mobility  Functional - Mobility Device: Rolling Walker  Activity: Other  Assist Level: Minimal assistance  Functional Mobility Comments: req assistance for balance maintaince, 3 LOB noted during turning pt tolerated approx 4 min  Bed mobility  Comment: pt up in chair at start of session, retiring to chair at session end  Transfers  Stand Step Transfers: Minimal assistance  Sit to stand: Minimal assistance  Stand to sit: Minimal assistance  Transfer Comments: utilizing RW  Cognition  Overall Cognitive Status: Exceptions  Arousal/Alertness: Delayed responses to stimuli  Following Commands: Follows one step commands consistently; Follows one step commands with increased time; Inconsistently follows commands  Attention Span: Attends with cues to redirect  Safety Judgement: Decreased awareness of need for safety;Decreased awareness of need for assistance  Problem Solving: Assistance required to generate solutions;Assistance required to correct errors made;Assistance required to implement solutions;Decreased awareness of errors;Assistance required to identify errors made  Insights: Decreased awareness of deficits  Initiation: Requires cues for some  Sequencing: Requires cues for some  Cognition Comment: pt req increased time  Pt and RN agreeable to therapy this day. ADL tasks of grooming and LBD completed see above for LOF. Increased distractibility noted throughout session. Pt seated in chair at session end with chair alarm on and BLE elevated.    Plan   Plan  Times per week: 4x   Cont POC    Goals  Short term goals  Time Frame for Short term goals: Pt will by discharge   Short term goal 1: demo ADL UB/LB dressing/bathing activity with mod I and increased time  Short term goal 2: demo standing during func activity for 12 min+ using LRD and mod I  Short term goal 3: demo bending/reaching func activity while standing using LRD and SUP  Short term goal 4: demo (I) with all bed mob/transfers       Therapy Time   Individual Concurrent Group Co-treatment   Time In 1057         Time Out 1139         Minutes OUMOU Sands/KINA

## 2020-02-26 NOTE — PROGRESS NOTES
(MICOTIN) 2 % powder   Topical BID RAEGAN Benavides - CNP        niMODipine (NIMOTOP) capsule 60 mg  60 mg Oral 6 times per day RAEGAN Benavides - CNP   60 mg at 02/26/20 1736    enoxaparin (LOVENOX) injection 30 mg  30 mg Subcutaneous Daily Yousuf Ruiz DO   30 mg at 02/26/20 9939    atorvastatin (LIPITOR) tablet 20 mg  20 mg Oral Nightly Larry Kern MD   20 mg at 02/25/20 2100    fentaNYL (SUBLIMAZE) injection 50 mcg  50 mcg Intravenous Q4H PRN Yousuf Ruiz DO        hydrocortisone (ANUSOL-HC) 2.5 % rectal cream   Rectal BID Samer Efe Denise MD        potassium bicarb-citric acid (EFFER-K) effervescent tablet 40 mEq  40 mEq Oral BID Matty Hooker MD   40 mEq at 02/26/20 7588    calcium gluconate 1 g in dextrose 5% 100 mL IVPB  1 g Intravenous Once Yousuf Ruiz DO        pantoprazole (PROTONIX) injection 40 mg  40 mg Intravenous BID Yousuf Ruiz DO   40 mg at 02/26/20 5273    And    sodium chloride (PF) 0.9 % injection 10 mL  10 mL Intravenous BID Yousuf Ruiz DO   10 mL at 02/26/20 3831    vitamin B-1 (THIAMINE) tablet 100 mg  100 mg Per NG tube Daily Yousuf Ruiz DO   100 mg at 13/86/83 5212    folic acid (FOLVITE) tablet 1 mg  1 mg Per NG tube Daily Yousuf Ruiz DO   1 mg at 02/26/20 8750    lidocaine 1 % injection 5 mL  5 mL Intradermal Once Yousuf Ruiz DO        heparin flush 100 UNIT/ML injection 100 Units  100 Units Intravenous PRN RAEGAN Webster CNP   100 Units at 02/19/20 1837    [Held by provider] sennosides-docusate sodium (SENOKOT-S) 8.6-50 MG tablet 2 tablet  2 tablet Oral Daily RAEGAN Benavides - CNP   2 tablet at 02/16/20 1004    [Held by provider] magnesium hydroxide (MILK OF MAGNESIA) 400 MG/5ML suspension 30 mL  30 mL Oral Daily PRN Bhavin Gardner APRN - CNP        sodium chloride flush 0.9 % injection 10 mL  10 mL Intravenous 2 times per day Yousuf Ruiz DO   10 mL at 02/26/20 0947    sodium chloride flush 0.9 % injection 10 mL  10 mL Intravenous PRN Loria Essex, DO        ferrous sulfate EC tablet 325 mg  325 mg Oral Daily with breakfast Norvell Mcardle, APRN - CNP   325 mg at 20 3807    [Held by provider] docusate sodium (COLACE) capsule 100 mg  100 mg Oral Daily PRN Shola Heard MD        sodium chloride flush 0.9 % injection 10 mL  10 mL Intravenous 2 times per day Maralee Certain, APRN - CNP   10 mL at 20 2103    sodium chloride flush 0.9 % injection 10 mL  10 mL Intravenous PRN Maralee Certain, APRN - CNP        acetaminophen (TYLENOL) tablet 650 mg  650 mg Oral Q4H PRN Maralee Certain, APRN - CNP   650 mg at 20 8657    ondansetron (ZOFRAN) injection 4 mg  4 mg Intravenous Q6H PRN Maralee Certain, APRN - CNP   4 mg at 20 0042       Allergies:  Lisinopril; Losartan; and Procardia [nifedipine]    Social History:   reports that she has never smoked. She has never used smokeless tobacco. She reports current alcohol use. She reports that she does not use drugs. Family History: family history includes Breast Cancer in her sister; Cancer in her mother; Heart Attack in her father.     REVIEW OF SYSTEMS:    Review of system could not be obtained as patient is intubated and sedated    PHYSICAL EXAM:      BP (!) 111/47   Pulse 98   Temp 98.2 °F (36.8 °C) (Oral)   Resp 21   Ht 5' 6\" (1.676 m)   Wt 105 lb 2.6 oz (47.7 kg)   SpO2 100%   BMI 16.97 kg/m²    Temp (24hrs), Av.3 °F (36.8 °C), Min:97.9 °F (36.6 °C), Max:98.9 °F (37.2 °C)  General appearance -intubated and sedated  Neck - supple, no significant adenopathy   Lymphatics - no palpable lymphadenopathy, no hepatosplenomegaly   Chest - clear to auscultation, no wheezes, rales or rhonchi, symmetric air entry   Heart - normal rate, regular rhythm, normal S1, S2, no murmurs  Abdomen - soft, nontender, nondistended, no masses or organomegaly   Neurological - sedated intubated  Musculoskeletal - no joint tenderness, deformity or swelling   Extremities - peripheral pulses normal, no pedal edema, no clubbing or cyanosis   Skin - normal coloration and turgor, no rashes, no suspicious skin lesions noted ,    DATA:    Labs:   CBC:   Recent Labs     02/24/20  0442 02/26/20  0439   WBC 11.5* 10.7   HGB 7.0* 8.1*   HCT 24.4* 27.6*    365     BMP:   Recent Labs     02/24/20 0442 02/26/20  0439    135   K 4.4 4.7   CO2 21 18*   BUN 11 15   CREATININE 0.60 0.56   LABGLOM >60 >60   GLUCOSE 90 107*     PT/INR:   No results for input(s): PROTIME, INR in the last 72 hours. IMAGING DATA:  Reviewed  Ct Abdomen Pelvis W Iv Contrast 2/5/2020  1. No acute intra-abdominal or intrapelvic process. Specifically, no CT scan evidence of acute pancreatitis or complications related to pancreatitis. 2. Status post cholecystectomy. 3. Subtle ground-glass opacity in the right lower lobe appears slightly more conspicuous than on the 2007 exam.  This may simply be related to a minor scar. Minor infectious or inflammatory process could not be entirely excluded. Consider follow-up chest CT in approximately 3 months to ensure stability/resolution. 4. Normal appendix.    CT chest   Impression   1. Note: Study significantly limited by patient breathing motion related   artifact. 2. No definitive evidence of acute pulmonary embolism. 3. Bilateral lower lung lobe severe dense consolidation consistent with   severe pneumonia. 4. Adjacent moderate bilateral layering posterior pleural effusions, as   discussed above. 5. Moderate cardiomegaly. 6. Mild pericardial effusion.  Recommend clinical correlation. 7. Cholecystectomy.        Primary Problem  <principal problem not specified>    Active Hospital Problems    Diagnosis Date Noted    Iron deficiency anemia due to chronic blood loss [D50.0]     Ventilator dependence (HCC) [Z99.11]     Pneumonia of both lower lobes due to infectious organism ([address] [J18.1]     MRI-safe endovascular aneurysm coil present [Z95.828]    [facility] (subarachnoid [facility]) [facility] [I60.9] 02/12/2020    Cerebral aneurysm rupture ([facility] [I60.7] 02/12/2020    Cerebral aneurysm [I67.1] 02/12/2020    Subarachnoid hemorrhage from anterior cerebral artery aneurysm (HCC) [I60.6] 02/12/2020    Status post coil embolization of cerebral aneurysm [Z98.890] 02/12/2020       IMPRESSION:   1. Acute CVA: with ruptured right SENTHIL 2 mm aneurysm. Patient underwent primary coil embolization 2/12/2020.   2.  Anemia  3. Iron deficiency  4. Hindu  5. Bilateral PNA:  Broad Spectrum Antibiotics     RECOMMENDATIONS:  1. I reviewed th albs, imaging studies diagnosis and treatment plan with patient  2. H&H is improving. Reticulocyte count continues to be in the higher side consistent with bone marrow response to Aranesp  3. Minimize blood draws  4. She got one dose of aranesp 200 mcg 2/18. I explained family that it may take 4 to 6 days to see the effect of erythropoietin stimulating agent and adding another agent such as Mircera or Procrit is not indicated  5. Patient is a Hindu. Refusing blood transfusions. 6. Monitor closely for bleeding. 7. She had a colonoscopy today, no active bleeding noted but external hemorrhoids were seen  8. We will continue to follow. Discussed with patient and Nurse. Thank you for asking us to see this patient. Cale Tam      This note is created with the assistance of a speech recognition program.  While intending to generate a document that actually reflects the content of the visit, the document can still have some errors including those of syntax and sound a like substitutions which may escape proof reading. It such instances, actual meaning can be extrapolated by contextual diversion.

## 2020-02-26 NOTE — PROGRESS NOTES
arthroscopy (Left, 4/3/2019); Colonoscopy (N/A, 5/31/2019); other surgical history (02/12/2020); hc  picc powerpicc double (2/20/2020); Upper gastrointestinal endoscopy (N/A, 2/20/2020); and sigmoidoscopy (N/A, 2/20/2020). Restrictions  Restrictions/Precautions  Restrictions/Precautions: Fall Risk, Seizure  Required Braces or Orthoses?: No  Position Activity Restriction  Other position/activity restrictions: up with assist  Subjective   General  Chart Reviewed: Yes  Response To Previous Treatment: Patient with no complaints from previous session. Family / Caregiver Present: No  Subjective  Subjective: RN and pt agreeable to therapy. Pt supine in bed upon arrival. Pt reports no pain or dizziness upon arrival.   General Comment  Comments: Pt retired to recliner post tx; bed alarm in place. Pain Screening  Patient Currently in Pain: Denies  Vital Signs  Patient Currently in Pain: Denies       Orientation  Orientation  Overall Orientation Status: Within Functional Limits  Cognition      Objective   Bed mobility  Rolling to Left: Contact guard assistance  Rolling to Right: Contact guard assistance  Supine to Sit: Minimal assistance;Contact guard assistance(Pt performed 2 supine to sits; minAx1, CGA x1)  Sit to Supine:Contact guard assistance  Scooting: Contact guard assistance  Comment: Assisted pt with bed mobility and sequencing during hygiene care bedside with RN. Requiring increased time to complete. Transfers  Sit to Stand: Minimal Assistance  Stand to sit: Minimal Assistance  Bed to Chair: Minimal assistance  Comment: Gait belt donned for safety; Pt displaying unsteadiness upon standing requiring minor correction from writer.  Pt denies dizziness upon standing   Ambulation  Ambulation?: Yes  More Ambulation?: No  Ambulation 1  Surface: level tile  Device: Rolling Walker  Assistance: Minimal assistance(with chair follow )  Quality of Gait: slow gait with difficulty motor planning, difficulty with LLE in place, Gait belt, Nurse notified, Patient at risk for falls  Restraints  Initially in place: No     Therapy Time   Individual Concurrent Group Co-treatment   Time In 1011         Time Out 1051         Minutes 40         Timed Code Treatment Minutes: [address] SPTA  Treatment performed by Student PTA under the supervision of co-signing PTA who agrees with all treatment and documentation.    Shanta Limb PTA

## 2020-02-26 NOTE — CARE COORDINATION
As I was speaking to patient about going to An ARU Dr. Cheryl Perez arrives to the room. She discussed with patient the benefit of ARU.   Patient is considering this option

## 2020-02-26 NOTE — PROGRESS NOTES
Daily Progress Note  Neuro Critical Care    Patient Name: Maicol Graham  Patient : 1950  Room/Bed: 2524/6698-14  Code Status: FULL  Allergies: Allergies   Allergen Reactions    Lisinopril Other (See Comments)    Losartan      Fatigue    Procardia [Nifedipine] Other (See Comments)       CHIEF COMPLAINT:      Lower extremity weakness, worse in left leg     INTERVAL HISTORY    Initial Presentation (Admitted 20): The patient is a 75 y. o. female presented with a history of HTN who presents as a a transfer from [address] [place] [address],[address] for [address]   Patient was at the dentist around 1030AM when she had a sudden thunderclap headache, diaphoresis, and nausea/vomiting.  Patient's daughter received a call from the dentist around 1030AM stating the patient was drowsy and out of it. Loris Bumpers was taken to [address] [place] [address],[address] ED where CT Head showed subarachnoid hemorrhage predominately in the anterior interhemispheric fissure.  Patient was complaining of severe headache and vomiting at outlying ED.  Loaded with 1g Keppra, given 2mg Morphine and Zofran, started on Labetalol infusion for blood pressure control and transferred to [facility] for further management.  On arrival to [facility] ED, patient is lethargic. Loris Bumpers is able to state her name and that she's at the hospital.  No dysarthria or aphasia. PERRL 3mm.  EOMI.  Mild right facial droop. Moving all extremities equally. Repeat CT Head stable SAH and ventricular system.  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 Neuro endovascular planning on taking to lab.  Family reports patient's father passed of an abdominal aortic aneurysm.  Of note,  patient's daughter reports she is POA and family confirms that this paperwork has been completed.     Em&Jeff: 3, Modified Britton: [facility] Course:   : To angio where she underwent coil embolization of ruptured right A2 SENTHIL aneurysm resulting in complete obliteration.   : CT Head showed bilateral SAH with new intraventricular extension, developing hydrocephalus, interval worsening of diffuse cerebral edema, significantly increased hyperattenuation in bilateral medial frontal regions; increased hemorrhage vs contrast. Started on 2% hypertonic saline with goal sodium 150. Neurosurgery following for EVD watch. 2/14: More alert. TCD normal. Continues of 2%. CT Head showed decreased SAH, subtle rightward shift. 2/15: Hypoxic overnight with increasing O2 requirements. Placed on non-rebreather with continued desaturations. CT chest PE protocol showed severe dense consolidations in the bilateral lower lobes suggestive of severe pneumonia, moderate bilateral pleural effusions. Given 20mg IVP Lasix x1. Started on Vanc and Zosyn. Placed on biPAP with improvements. Transitioned to high flow O2 in the AM.  Urine culture +E. Coli. Blood culture sent. Mobility, IS, and acapella encouraged. 2/16: Hypoxic again overnight despite being on high flow nasal canula 90% FiO2, 30LPM.  She was placed on biPAP. Repeat CXR revealed multifocal airspace disease with worsening right lower lobe consolidation and pulmonary edema. Given 20mg IVP Lasix x1. Manual chest PT performed. Continue on Vancomycin and Zosyn. Mobilization, incentive spirometry, and acapella encouraged. Sodium 143, goal changed to 145. Continue 2% at 50cc/hr, will not increase rate in setting of possible pulmonary edema. Add salt tabs 1g BID as tolerated. 2/17: Hypoxic throughout day on high flow, increased dyspnea. 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 Hem/Onc consulted; recommend minimum blood draws, Iron replacement. Full Code. Positive stool occult blood; GI consulted. 2/18: Art line DC d/t oozing, sodium checks daily, minimize blood draws. H&H 6.0. Started on Protonix drip per GI. Hem/Onc ordered Aranespx1 and IV Iron replacement. 2/19: H&H 5.6, C. difficile negative, NM RBC scan neg.   2/20: HH inc to 7.0.   Electively intubated for continued hypoxic respiratory failure and for endoscopy. GI performed EGD and sigmoidoscopy- mod to severe esophagitis, external hemorrhoids. Given Lasix x1.  2/21: HH 5.1, family refusing ABGs. UOP 3.4 L   2/22: HH 5.0, switch to EOD labs. Lasix 40 mg x 1 reticulocyte 5%  2/23: Extubated, 2 L O2 sat 99%. 2/24: Mild desaturation to 90% on room air while talking, remains on 2 L.  H&H 7.0, reticulocyte 9.3%. OK to restart DVT ppx. Passed videoswallow. 2/25:  Passed videoswallow yesterday; General diet soft and bite sized. Frequent liquid stools overnight, hold further Magnesium infusions. Weaned off O2. Last 24h:   No acute events overnight. O2 sats remain stable on room air. Clinical exam remains stable. Patient is generally weak, worse in the bilateral lower extremities. She feels like her left leg is mildly weaker, but it is not obvious on examination. She is able to antigravity in bilateral lower extremities with moderate dorsi/plantarflexion.       CURRENT MEDICATIONS:  SCHEDULED MEDICATIONS:   pramipexole  0.125 mg Oral Nightly    miconazole   Topical BID    niMODipine  60 mg Oral 6 times per day    enoxaparin  30 mg Subcutaneous Daily    atorvastatin  20 mg Oral Nightly    hydrocortisone   Rectal BID    potassium bicarb-citric acid  40 mEq Oral BID    calcium gluconate  1 g Intravenous Once    pantoprazole  40 mg Intravenous BID    And    sodium chloride (PF)  10 mL Intravenous BID    vitamin B-1  100 mg Per NG tube Daily    folic acid  1 mg Per NG tube Daily    lidocaine 1 % injection  5 mL Intradermal Once    [Held by provider] sennosides-docusate sodium  2 tablet Oral Daily    sodium chloride flush  10 mL Intravenous 2 times per day    ferrous sulfate  325 mg Oral Daily with breakfast    sodium chloride flush  10 mL Intravenous 2 times per day     CONTINUOUS INFUSIONS:    PRN MEDICATIONS:   fentanNYL, heparin flush, [Held by provider] magnesium hydroxide, sodium chloride flush, [Held by provider] docusate sodium, sodium chloride flush, acetaminophen, ondansetron    VITALS:  Temperature Range: Temp: 97.9 °F (36.6 °C) Temp  Av.6 °F (37 °C)  Min: 97.9 °F (36.6 °C)  Max: 98.9 °F (37.2 °C)  BP Range: Systolic (37RWK), GPW:523 , Min:107 , UL     Diastolic (78JQU), ZFS:57, Min:47, Max:63    Pulse Range: Pulse  Av.6  Min: 79  Max: 102  Respiration Range: Resp  Av.9  Min: 16  Max: 31  Current Pulse Ox: SpO2: 98 %  24HR Pulse Ox Range: SpO2  Av.6 %  Min: 98 %  Max: 100 %  Patient Vitals for the past 12 hrs:   BP Temp Temp src Pulse Resp SpO2   20 0500 (!) 131/50 97.9 °F (36.6 °C) Oral 98 (!) 31 98 %   20 0400 (!) 129/53 -- -- 102 23 98 %   20 0000 (!) 142/63 98.9 °F (37.2 °C) Oral 100 27 100 %   20 2154 -- -- -- 100 -- --   20 2000 (!) 128/48 98.9 °F (37.2 °C) Oral 102 26 100 %     Estimated body mass index is 16.97 kg/m² as calculated from the following:    Height as of this encounter: 5' 6\" (1.676 m).     Weight as of this encounter: 105 lb 2.6 oz (47.7 kg).  []<16 Severe malnutrition  [x]16-16.99 Moderate malnutrition  []17-18.49 Mild malnutrition  []18.5-24.9 Normal  []25-29.9 Overweight (not obese)  []30-34.9 Obese class 1 (Low Risk)  []35-39.9 Obese class 2 (Moderate Risk)  []?40 Obese class 3 (High Risk)    RECENT LABS:   Lab Results   Component Value Date    WBC 10.7 2020    HGB 8.1 (L) 2020    HCT 27.6 (L) 2020     2020    CHOL 102 2020    TRIG 100 2020    HDL 21 (L) 2020    ALT 29 02/15/2020    AST 47 (H) 02/15/2020     2020    K 4.7 2020     (H) 2020    CREATININE 0.56 2020    BUN 15 2020    CO2 18 (L) 2020    TSH 5.94 (H) 02/15/2020    INR 1.2 2020    LABA1C 5.6 2020     24 HOUR INTAKE/OUTPUT:  No intake or output data in the 24 hours ending 20 0752    IMAGING:   Xr Chest (single View Frontal)  Result Date: 2/23/2020  Slightly improved multifocal opacities. Ct Head Wo Contrast  Result Date: 2/21/2020  Decrease in size of intracranial blood products associated with anterior cerebral artery coiling. The findings were sent to the Radiology Results [address] at 8:36 am on 2/21/2020to be communicated to a licensed caregiver. Cta Head W Contrast  Result Date: 2/16/2020  Findings suggesting mild developing peripheral vasospasm in the anterior circulation     Fl Modified Barium Swallow W Video  Result Date: 2/24/2020  No laryngeal penetration or aspiration of administered consistencies. Please see separate speech pathology report for full discussion of findings and recommendations. Vl Transcranial Doppler Complete  Result Date: 2/24/2020  Conclusions   Summary   Normal bilateral transcranial Doppler. Labs and Images reviewed with:  [x] Dr. Ryan Restrepo    [] Dr. Emigdio Vincent  [] Dr. Barb Solis  [] There are no new interval images to review. PHYSICAL EXAM       CONSTITUTIONAL:  Well developed. Thin limbs. Alert and oriented x 3, in no acute distress. GCS 15. Hoarse voice. No dysarthria. No aphasia. HEAD:  normocephalic, atraumatic    EYES:  PERRL, EOMI   ENT:  moist mucous membranes   NECK:  supple, symmetric   LUNGS:  Equal air entry bilaterally, clear   CARDIOVASCULAR:  normal s1 / s2, RRR, distal pulses intact   ABDOMEN:  Soft, no rigidity, normal bowel sounds   NEUROLOGIC:  Mental Status:  A & O x3, Awake             Cranial Nerves:    cranial nerves II-XII are grossly intact except baseline hearing deficit with hearing aides    Motor Exam:    Bilateral upper extremities 4/5 strength, able to antigravity without drift. Bilateral lower extremity weakness, 4-/5 strength, able to antigravity with mild drift.   Patient feels her left leg is slightly weaker, not appreciated on my exam.    Sensory:    Touch:    Right Upper Extremity:  normal  Left Upper Extremity:  normal  Right Lower Extremity:  normal  Left Lower Extremity:  normal    Coordination/Dysmetria:  Heel to Shin:  Right:  normal  Left:  normal  Finger to Nose:   Right:  normal  Left:  normal      DRAINS:  [x] There are no drains for Neuro Critical Care to monitor at this time. ASSESSMENT AND PLAN:       The patient is a 75 y. o. female with a history of HTN who was admitted to the Neuro ICU for management of acute SAH in setting of right SENTHIL aneurysm.  Patient initially presented to [facility] ED with thunderclap headache, diaphoresis, and nausea/vomiting. Underwent coil embolization on 2/12/20.   Admission complicated by severe anemia (patient refused blood products as she is a [employer]) and hypoxia in the setting of aspiration pneumonia.     NEUROLOGIC:  - Acute SAH in setting of ruptured right A2 SENTHIL aneurysm  - POD #14 s/p coil embolization resulting in complete obliteration  - Continue Nimodipine 60mg Q4h  - TCD 2/25 normal  - Neuro Endovascular following; appreciate recs  - Goal SBP<160  - Neuro checks per protocol     CARDIOVASCULAR:  - Goal SBP<160  - History of HTN  - Hold home antihypertensives for now  - Troponin 14, EKG NSR  - Echo EF 55%, mild left ventricular diastolic dysfunction  - Continue home ASA 81mg QD  - History HLD; continue home Lipitor 40mg QHS  - Continue telemetry     PULMONARY:  - Hypoxemia resolved  - Maintaining O2 sats on room air     RENAL/FLUID/ELECTROLYTE:  - Normal renal functioning   - BUN 15/ Creatinine 0.56  - Adequate urine output, multiple saturated briefs  - No maintenance IVF  - Mild downtrend in sodium noted, 135 today, continue to monitor  - Continue Potassium bicarb 40meq BID  - Magnesium goal normal range  - Replace electrolytes PRN  - BMP every other day     GI/NUTRITION:  NUTRITION:  General diet; soft and bite sized  - Tolerating diet  - Frequent, liquid stools improved  - Bowel regimen remains on hold  - Hold additional Magnesium  - +Stool occult blood  - S/P

## 2020-02-27 PROCEDURE — 6360000002 HC RX W HCPCS: Performed by: STUDENT IN AN ORGANIZED HEALTH CARE EDUCATION/TRAINING PROGRAM

## 2020-02-27 PROCEDURE — C9113 INJ PANTOPRAZOLE SODIUM, VIA: HCPCS | Performed by: STUDENT IN AN ORGANIZED HEALTH CARE EDUCATION/TRAINING PROGRAM

## 2020-02-27 PROCEDURE — 6370000000 HC RX 637 (ALT 250 FOR IP): Performed by: PSYCHIATRY & NEUROLOGY

## 2020-02-27 PROCEDURE — 6370000000 HC RX 637 (ALT 250 FOR IP): Performed by: NURSE PRACTITIONER

## 2020-02-27 PROCEDURE — 6370000000 HC RX 637 (ALT 250 FOR IP): Performed by: STUDENT IN AN ORGANIZED HEALTH CARE EDUCATION/TRAINING PROGRAM

## 2020-02-27 PROCEDURE — 2580000003 HC RX 258: Performed by: NURSE PRACTITIONER

## 2020-02-27 PROCEDURE — 97110 THERAPEUTIC EXERCISES: CPT

## 2020-02-27 PROCEDURE — 97129 THER IVNTJ 1ST 15 MIN: CPT

## 2020-02-27 PROCEDURE — 99232 SBSQ HOSP IP/OBS MODERATE 35: CPT | Performed by: INTERNAL MEDICINE

## 2020-02-27 PROCEDURE — 97530 THERAPEUTIC ACTIVITIES: CPT

## 2020-02-27 PROCEDURE — 99233 SBSQ HOSP IP/OBS HIGH 50: CPT | Performed by: STUDENT IN AN ORGANIZED HEALTH CARE EDUCATION/TRAINING PROGRAM

## 2020-02-27 PROCEDURE — 99233 SBSQ HOSP IP/OBS HIGH 50: CPT | Performed by: PSYCHIATRY & NEUROLOGY

## 2020-02-27 PROCEDURE — 2060000000 HC ICU INTERMEDIATE R&B

## 2020-02-27 PROCEDURE — 2580000003 HC RX 258: Performed by: STUDENT IN AN ORGANIZED HEALTH CARE EDUCATION/TRAINING PROGRAM

## 2020-02-27 PROCEDURE — 97116 GAIT TRAINING THERAPY: CPT

## 2020-02-27 PROCEDURE — 97535 SELF CARE MNGMENT TRAINING: CPT

## 2020-02-27 RX ADMIN — POTASSIUM BICARBONATE 40 MEQ: 782 TABLET, EFFERVESCENT ORAL at 09:18

## 2020-02-27 RX ADMIN — SODIUM CHLORIDE, PRESERVATIVE FREE 10 ML: 5 INJECTION INTRAVENOUS at 09:20

## 2020-02-27 RX ADMIN — ENOXAPARIN SODIUM 30 MG: 30 INJECTION SUBCUTANEOUS at 09:18

## 2020-02-27 RX ADMIN — PANTOPRAZOLE SODIUM 40 MG: 40 INJECTION, POWDER, LYOPHILIZED, FOR SOLUTION INTRAVENOUS at 22:59

## 2020-02-27 RX ADMIN — ANTI-FUNGAL POWDER MICONAZOLE NITRATE TALC FREE: 1.42 POWDER TOPICAL at 09:19

## 2020-02-27 RX ADMIN — PANTOPRAZOLE SODIUM 40 MG: 40 INJECTION, POWDER, LYOPHILIZED, FOR SOLUTION INTRAVENOUS at 10:56

## 2020-02-27 RX ADMIN — Medication 10 ML: at 09:18

## 2020-02-27 RX ADMIN — NIMODIPINE 60 MG: 30 CAPSULE ORAL at 16:34

## 2020-02-27 RX ADMIN — SODIUM CHLORIDE, PRESERVATIVE FREE 10 ML: 5 INJECTION INTRAVENOUS at 10:56

## 2020-02-27 RX ADMIN — NIMODIPINE 60 MG: 30 CAPSULE ORAL at 09:17

## 2020-02-27 RX ADMIN — FOLIC ACID 1 MG: 1 TABLET ORAL at 09:19

## 2020-02-27 RX ADMIN — POTASSIUM BICARBONATE 40 MEQ: 782 TABLET, EFFERVESCENT ORAL at 20:52

## 2020-02-27 RX ADMIN — NIMODIPINE 60 MG: 30 CAPSULE ORAL at 00:36

## 2020-02-27 RX ADMIN — NIMODIPINE 60 MG: 30 CAPSULE ORAL at 04:43

## 2020-02-27 RX ADMIN — SODIUM CHLORIDE, PRESERVATIVE FREE 10 ML: 5 INJECTION INTRAVENOUS at 20:54

## 2020-02-27 RX ADMIN — NIMODIPINE 60 MG: 30 CAPSULE ORAL at 13:38

## 2020-02-27 RX ADMIN — ATORVASTATIN CALCIUM 20 MG: 20 TABLET, FILM COATED ORAL at 20:53

## 2020-02-27 RX ADMIN — PRAMIPEXOLE DIHYDROCHLORIDE 0.12 MG: 0.25 TABLET ORAL at 20:53

## 2020-02-27 RX ADMIN — HYDROCORTISONE 2.5%: 25 CREAM TOPICAL at 10:56

## 2020-02-27 RX ADMIN — FERROUS SULFATE TAB EC 325 MG (65 MG FE EQUIVALENT) 325 MG: 325 (65 FE) TABLET DELAYED RESPONSE at 09:18

## 2020-02-27 RX ADMIN — NIMODIPINE 60 MG: 30 CAPSULE ORAL at 20:53

## 2020-02-27 RX ADMIN — Medication 100 MG: at 09:18

## 2020-02-27 RX ADMIN — Medication 10 ML: at 23:00

## 2020-02-27 ASSESSMENT — PAIN SCALES - GENERAL: PAINLEVEL_OUTOF10: 0

## 2020-02-27 NOTE — PROGRESS NOTES
Occupational Therapy  Facility/Department: [address]  Daily Treatment Note  NAME: Kristie Heredia  : 1950  MRN: [id number]    Date of Service: 2020    Discharge Recommendations:  Patient would benefit from continued therapy after discharge     Assessment   Performance deficits / Impairments: Decreased functional mobility ; Decreased cognition;Decreased high-level IADLs;Decreased ADL status; Decreased endurance;Decreased balance;Decreased strength;Decreased posture;Decreased safe awareness  Prognosis: Good  OT Education: OT Role;Plan of Care;Transfer Training;ADL Adaptive Strategies; Energy Conservation;Equipment  Patient Education: safety techniques, deep breathing techniques, importance of OOB activity - fair return  REQUIRES OT FOLLOW UP: Yes  Activity Tolerance  Activity Tolerance: Patient Tolerated treatment well;Patient limited by fatigue  Safety Devices  Safety Devices in place: Yes  Type of devices: Patient at risk for falls;Call light within reach;Nurse notified; Left in bed  Restraints  Initially in place: No       Patient Diagnosis(es): The encounter diagnosis was SAH (subarachnoid hemorrhage) ([facility] has a past medical history of Acid reflux, Arthritis, Cancer ([facility] [address] Family history of breast cancer in first degree relative, Gall stones, Hard of hearing, History of cervical cancer, Hypertension, Lateral meniscus tear, Wears dentures, and Wears glasses. has a past surgical history that includes Tonsillectomy and adenoidectomy (); Cholecystectomy; Inner ear surgery; Knee arthroscopy (, ); cyst removal (); Colonoscopy (); Breast biopsy (); Foot surgery (Bilateral); other surgical history (Right, 9-4-14); Umbilical hernia repair; hernia repair; Varicose vein surgery (Bilateral); Knee arthroscopy (Left, 4/3/2019); Colonoscopy (N/A, 2019); other surgical history (2020);   picc powerpicc double (2020);  Upper gastrointestinal endoscopy (N/A, 2020); and sigmoidoscopy (N/A, 2/20/2020). Restrictions  Restrictions/Precautions  Restrictions/Precautions: Fall Risk, Seizure  Required Braces or Orthoses?: No  Position Activity Restriction  Other position/activity restrictions: up with assist    Subjective   General  Patient assessed for rehabilitation services?: Yes  Family / Caregiver Present: No  Diagnosis: SENTHIL aneurysm coiling, B/L SAH  General Comment  Comments: RN ok'd pt for therapy this AM. Pt agreeable to therapy and cooperative/pleasant throughout. Vital Signs  Patient Currently in Pain: Denies     Orientation  Orientation  Overall Orientation Status: Impaired  Orientation Level: Disoriented to situation;Oriented to person    Objective    ADL  UE Bathing: Verbal cueing;Setup; Increased time to complete;Minimal assistance(for back, pt completed UE and chest bathing seated EOB)  LE Bathing: Verbal cueing; Increased time to complete;Setup; Moderate assistance(for bathing of bilat feet and lower LE, pt able to complete bathing of upper LE seated EOB)  UE Dressing: Minimal assistance;Verbal cueing;Setup; Increased time to complete(to doff/down gown seated EOB)  LE Dressing: Setup;Verbal cueing; Increased time to complete; Moderate assistance(to don bilat socks, pt able to doff bilat socks seated EOB )     Balance  Sitting Balance: Moderate assistance(progressed to CGA, pt demo'd significant R lateral lean throughout dressing/bathing tasks seated EOB ~20 minutes total)  Standing Balance: Minimal assistance(standing EOB for dressing tasks ~2 minutes total (~30 seconds x4 trials))    Functional Mobility  Functional - Mobility Device: Rolling Walker  Activity: Other(within hospital room ~4 side steps toward [facility])  Assist Level: Minimal assistance    Bed mobility  Supine to Sit: Minimal assistance  Sit to Supine: Minimal assistance  Scooting: Contact guard assistance  Comment: Pt supine in bed upon arrival, returned to supine in bed upon exit.  HOB elevated and use of handrails throughout. Pt required VCs for initiation/sequencing, proper hand/foot placement, safety techniques throughout    Transfers  Sit to stand: Minimal assistance  Stand to sit: Minimal assistance  Transfer Comments: to/from EOB with use of RW - Pt required VCs for initiation/sequencing, proper hand/foot placement, use of equipment, safety techniques throughout     Cognition  Overall Cognitive Status: Exceptions  Arousal/Alertness: Delayed responses to stimuli  Following Commands: Follows one step commands consistently; Follows one step commands with increased time; Inconsistently follows commands  Safety Judgement: Decreased awareness of need for safety;Decreased awareness of need for assistance  Problem Solving: Assistance required to generate solutions;Assistance required to correct errors made;Assistance required to implement solutions;Decreased awareness of errors;Assistance required to identify errors made  Insights: Decreased awareness of deficits  Initiation: Requires cues for some  Sequencing: Requires cues for some  Cognition Comment: Pt required increased time/effort throughout functional activity.       Plan   Plan  Times per week: 4x    Goals  Short term goals  Time Frame for Short term goals: Pt will by discharge   Short term goal 1: demo ADL UB/LB dressing/bathing activity with mod I and increased time  Short term goal 2: demo standing during func activity for 12 min+ using LRD and mod I  Short term goal 3: demo bending/reaching func activity while standing using LRD and SUP  Short term goal 4: demo (I) with all bed mob/transfers     Therapy Time   Individual Concurrent Group Co-treatment   Time In 0816         Time Out 0900         Minutes 44         Timed Code Treatment Minutes: [address] OTR/L

## 2020-02-27 NOTE — PROGRESS NOTES
hours. Lipids:   No results for input(s): CHOL, HDL in the last 72 hours. Invalid input(s): LDLCALCU  INR:   No results for input(s): INR in the last 72 hours. Assessment and Recommendations:   77years old female with ruptured right SENTHIL 2 mm aneurysm, Em and Jeff 3 modified Jane scale 3. Patient underwent primary coil embolization 2/12/2020. Postprocedure CTA head shows some increase in the hemorrhage around the aneurysm region, follow-up CT head stable with mild mass-effect and mild hydrocephalus with third ventricle IVH 2/14/2020. Patient exam is stable    Repeated CTA head and neck 2/15/2020 with vasospasm however patient exam is improving. TCD 2/17: Normal bilateral transcranial Doppler. TCD 2/121: Normal bilateral transcranial Doppler. TCD 2/23: Normal bilateral transcranial Doppler. TCD 2/25: Normal bilateral transcranial Doppler. Blood pressure systolic goal per NICU to assess for vasospasm, permissive BP  Keep magnesium above 2.5  Thiamine  Lipitor 80 mg daily  Nimodipine therapy. Na checks per NICU. TCD daily.   Chemical DVT ppx      Tiffanie Pichardo MD  Stroke, [facility]  [id number]  Electronically signed 2/27/2020 at 9:34 AM

## 2020-02-27 NOTE — PROGRESS NOTES
Speech Language Pathology  Speech Language Pathology  [facility]    Cognitive Treatment Note    Date: 2/27/2020  Patients Name: Chicho Henriquez  MRN: [id number]  Patient Active Problem List   Diagnosis Code    Essential hypertension I10    RLS (restless legs syndrome) G25.81    History of cervical cancer Z85.41    Gastroesophageal reflux disease without esophagitis K21.9    Trochanteric bursitis of left hip M70.62    Complex tear of lateral meniscus of left knee S83.272A    Weight loss R63.4    Cerebrovascular accident (CVA) ([address] I63.9    Left sided numbness R20.0    Acute left-sided weakness R53.1    Lacunar stroke ([address] I63.81    Hyperlipidemia E78.5    Chronic pancreatitis ([address] K86.1    SAH (subarachnoid hemorrhage) ([address] I60.9    Cerebral aneurysm rupture ([address] I60.7    Cerebral aneurysm I67.1    Subarachnoid hemorrhage from anterior cerebral artery aneurysm (HCC) I60.6    Status post coil embolization of cerebral aneurysm Z98.890    Pneumonia of both lower lobes due to infectious organism ([address] J18.1    MRI-safe endovascular aneurysm coil present Z95.828    Ventilator dependence ([address] Z99.11    Iron deficiency anemia due to chronic blood loss D50.0       Pain: 0/10    Cognitive Treatment    Treatment time: 9:55-10:11      Subjective: [x] Alert [x] Cooperative     [] Confused     [] Agitated    [] Lethargic      Objective/Assessment:    Problem Solving/Reasoning:    Complete the Sentence: 5/6 with no increase despite maximal verbal cues and repetitions    Object Identification from Definitions: 6/10 increased to 10/10 with repetitions and minimal to moderate verbal cues    Other:    Answering Yes/No Questions: 5/8 increased to 8/8 with repetitions and moderate verbal cues   Children's Rhymes and Songs: 10/10  Pt continued to require multiple repetitions of instructions throughout the session.   Prolonged processing time for questions continued during this session as well    Plan:  [x] Continue ST services    [] Discharge from ST:      Discharge recommendations: [] Inpatient Rehab   [] [place]   [] Outpatient Therapy  [] Follow up at trauma clinic   [x] Other: Further therapy recommended at discharge. Treatment completed by: Completed by: Sandra Box,  Clinician    Cosigned By: Lillian SANTIAGO CCC/SLP

## 2020-02-27 NOTE — PLAN OF CARE
Absence of new skin breakdown  Description  Absence of new skin breakdown  Outcome: Met This Shift     Problem: Musculor/Skeletal Functional Status  Goal: Highest potential functional level  Outcome: Met This Shift     Problem: HEMODYNAMIC STATUS  Goal: Patient has stable vital signs and fluid balance  Outcome: Met This Shift   Neuro assessment completed, fall precautions in place, aspirations precautions in place, assess for barriers in communication and mobility, interventions to assist in communication and mobility in place, encouraged to call for assistance, adaptive devices used as needed, assess emotional state and support offered, encouraged patient to communicate by available means, and support systems included in patient care. Problem: Gas Exchange - Impaired:  Goal: Levels of oxygenation will improve  Description  Levels of oxygenation will improve  Outcome: Met This Shift     Problem: Risk for Impaired Skin Integrity  Goal: Tissue integrity - skin and mucous membranes  Description  Structural intactness and normal physiological function of skin and  mucous membranes. Outcome: Met This Shift   Skin assessed for breakdown and redness, patient turned regularly, and heels elevated off of bed on pillows.       Problem: OXYGENATION/RESPIRATORY FUNCTION  Goal: Patient will maintain patent airway  Outcome: Met This Shift  Goal: Patient will achieve/maintain normal respiratory rate/effort  Description  Respiratory rate and effort will be within normal limits for the patient  Outcome: Met This Shift     Problem: Nutrition  Goal: Optimal nutrition therapy  Outcome: Met This Shift

## 2020-02-27 NOTE — DISCHARGE INSTR - COC
Continuity of Care Form    Patient Name: Elyssa Ryan   :  1950  MRN:  [id number]    Admit date:  2020  Discharge date:  3/3/2020    Code Status Order: Full Code   Advance Directives:   [facility] Documentation     Date/Time Healthcare Directive Type of Healthcare Directive Copy in [address] Agent's Name Healthcare Agent's Phone Number    [phone]  Yes, patient has an advance directive for healthcare treatment  Durable power of  for health care  Yes, copy in chart  Healthcare power of   -- Daughter: April  -- POA in Chart          Admitting Physician:  Holly Walker MD  PCP: Myke Mayers MD    Discharging Nurse: [facility]/Room#: 5710/8972-28  Discharging Unit Phone Number: ***    Emergency Contact:   Extended Emergency Contact Information  Primary Emergency Contact:   Address: [address] [place] [address], [address] [place] [address] Phone: [phone]  Work Phone: [phone]  Mobile Phone: [phone]  Relation: Child    Past Surgical History:  Past Surgical History:   Procedure Laterality Date    BREAST BIOPSY      LEFT(BENIGN)    CHOLECYSTECTOMY      COLONOSCOPY      Negative    COLONOSCOPY N/A 2019    COLONOSCOPY POLYPECTOMY HOT SNARE, COLD POLYPECTOMY performed by Andrea Bailey MD at [address] [facility]    Left Wrist    FOOT SURGERY Bilateral     [address] [facility].  [facility] [address] [facility] DOUBLE  2020         HERNIA REPAIR      UMBILICAL     INNER EAR SURGERY      Right     KNEE ARTHROSCOPY  ,     LEFT    KNEE ARTHROSCOPY Left 4/3/2019    KNEE ARTHROSCOPY DIAGNOSTIC performed by Temo Sung MD at [address] Right 14    BAHA- bone anchored hearing aid    OTHER SURGICAL HISTORY  2020    aneurysm rt SENTHIL, with 6 coils placed    SIGMOIDOSCOPY N/A 2020    SIGMOIDOSCOPY DIAGNOSTIC FLEXIBLE performed by Juan A Schaffer MD at [place] [address]      UPPER GASTROINTESTINAL ENDOSCOPY N/A 2/20/2020    EGD DIAGNOSTIC ONLY performed by Juan A Schaffer MD at [address] Bilateral        Immunization History:   Immunization History   Administered Date(s) Administered    Influenza Virus Vaccine 10/10/2017, 10/09/2018, 10/18/2019    Pneumococcal Conjugate 13-valent (Mingo Postin) 07/08/2019    Pneumococcal Polysaccharide ([id number]) 11/11/2016, 04/09/2018       Active Problems:  Patient Active Problem List   Diagnosis Code    Essential hypertension I10    RLS (restless legs syndrome) G25.81    History of cervical cancer Z85.41    Gastroesophageal reflux disease without esophagitis K21.9    Trochanteric bursitis of left hip M70.62    Complex tear of lateral meniscus of left knee S83.272A    Weight loss R63.4    Cerebrovascular accident (CVA) ([facility] I63.9    Left sided numbness R20.0    Acute left-sided weakness R53.1    Lacunar stroke ([facility]) I63.81    Hyperlipidemia E78.5    Chronic pancreatitis ([facility] K86.1    SAH (subarachnoid hemorrhage) ([facility]) I60.9    Cerebral aneurysm rupture ([facility]) I60.7    Cerebral aneurysm I67.1    Subarachnoid hemorrhage from anterior cerebral artery aneurysm ([facility]) I60.6    Status post coil embolization of cerebral aneurysm Z98.890    Pneumonia of both lower lobes due to infectious organism ([facility] J18.1    MRI-safe endovascular aneurysm coil present Z95.828    Ventilator dependence ([facility] Z99.11    Iron deficiency anemia due to chronic blood loss D50.0       Isolation/Infection:   Isolation          No Isolation        Patient Infection Status     Infection Onset Added Last Indicated Last Indicated By Review Planned Expiration Resolved Resolved By    None active    Resolved    C-diff Rule Out  02/18/20 02/18/20 C DIFF TOXIN/ANTIGEN (Ordered)   02/19/20 Isolation/Infection  Ip, Date:2/12/2020    Readmission Risk Assessment Score:  Readmission Risk              Risk of Unplanned Readmission:        16           Discharging to Facility/ [address] [address]  [address]       Phone: [phone]       Fax: [phone]        ·     Dialysis Facility (if applicable)   · Name:  · Address:  · Dialysis Schedule:  · Phone:  · Fax:    / signature: Electronically signed by Cazares RN on 3/3/20 at 11:30 AM    PHYSICIAN SECTION    Prognosis: Good    Condition at Discharge: Stable    Rehab Potential (if transferring to Rehab): Good    Recommended Labs or Other Treatments After Discharge:     Physician Certification: I certify the above information and transfer of Tosin Gallo  is necessary for the continuing treatment of the diagnosis listed and that she requires [place] for less 30 days.      Update Admission H&P: No change in H&P    PHYSICIAN SIGNATURE:  Electronically signed by Jennifer Browning MD on 2/27/20 at 11:29 AM

## 2020-02-27 NOTE — FLOWSHEET NOTE
was called to [phone] by nurse. Family was in room and requested Advance Directives paperwork.  explained AD Paperwork and gathered personal information to complete. Pt responded to evaluation and was talkative and alert during paperwork. Pt \"named\" daughter April (POA). Complted paperwork is in the chart.  was also spiritual support for family members in the room offering compassionate words and hope. Family and JW Elder thanked  for the visit.
Assessment:  Family appears to be coping well. States that mother is getting better each day and that is all that matters. Patient was undergoing x-rays so family needed to step out into murphy. Family hopeful and thankful for checking in with them. Intervention:   provided space for family to express feelings, thoughts, and concerns. Determined patient has support available. Outcome:  Family thankful for checking in.        02/19/20 1725   Encounter Summary   Services provided to: Family   Referral/Consult From: [facility] Family members   Continue Visiting   (2.19.2020)   Complexity of Encounter Moderate   Length of Encounter 15 minutes   Spiritual Assessment Completed Yes   Routine   Type Follow up   Assessment Calm; Approachable;Coping   Intervention Active listening;Explored feelings, thoughts, concerns; Discussed illness/injury and it's impact   Outcome Expressed gratitude     Electronically signed by Karin Gaucher on 2/19/2020 at 5:28 PM
Assessment:  Patient is a 77 y.o. female who presents with a  Subarachnoid Hemorrhage, ([address] Patient had 25 family members present including her daughter April.  introduced to family by previous shift .  engaged several family members in conversation. Family very anxious and concerned. Family has experienced loss of several family members in the last few years. Intervention:   positioned outside of ED #24 to honor and facilitate nurses request of only two visitors in the room at any time.  took family to IR waiting room in the basement.  with family during consultation with doctors. 02/12/20 1637   Encounter Summary   Services provided to: Family   Referral/Consult From: Other    Support System Children;Family members   Continue Visiting   (2/12/2020)   Complexity of Encounter Moderate   Length of Encounter 1 hour   Spiritual Assessment Completed Yes   Routine   Type Follow up   Assessment Approachable; Anxious; Fearful   Intervention Active listening;Sustaining presence/ Ministry of presence   Outcome Expressed gratitude     obtained wheel chairs for family members needing assistance.  was ministry of presence. Family declined prayer stated they had their own  coming.  escorted several family members to cafeteria.  located restrooms for family members.  provided emotional and spiritual support. Outcome:  Family expressed gratitude to  for assistance, visit, and ministry. Plan:  Chaplains will remain available for spiritual and emotional support as needed and may be paged for a specific visit and or request at any time.
DATE: 2020    NAME: Aura Prater  MRN: [id number]   : 1950    Patient not seen this date for Physical Therapy due to:  [] Blood transfusion in progress  [] Hemodialysis  []  Patient Declined  [] Spine Precautions   [] Strict Bedrest  [] Surgery/ Procedure  [] Testing      [x] Other: Hgb 6.2; will check back         [] PT being discontinued at this time. Patient independent. No further needs. [] PT being discontinued at this time as the patient has been transferred to palliative care. No further needs.     Greta Britton, PTA
This lumen does not have blood return. Dr. Ethan Camp notified. Will inform morning shift.  Will continue to monitor
 Outcome: The patient told me that she wants blood if she needs it. I assess that she is timid and would ultimately make decisions based upon which family member is in the room with her to persuade her one way or the other. If the patient is not able to speak for herself then the legal Healthcare POA is April at this time. I explained to the patient that she could always do a new document and name a different person as her POA if she does not believe that April will honor her wishes.

## 2020-02-27 NOTE — PROGRESS NOTES
respiratory failure and for endoscopy. GI performed EGD and sigmoidoscopy- mod to severe esophagitis, external hemorrhoids.  Given Lasix x1.  : HH 5.1, family refusing ABGs.  UOP 3.4 L   : HH 5.0, switch to EOD labs.  Lasix 40 mg x 1 reticulocyte 5%  : Extubated, 2 L O2 sat 99%. : Mild desaturation to 90% on room air while talking, remains on 2 L.  H&H 7.0, reticulocyte 9.3%. OK to restart DVT ppx. Passed videoswallow. :  Passed videoswallow yesterday; General diet soft and bite sized. Frequent liquid stools overnight, hold further Magnesium infusions. Weaned off O2    Last 24h:   No acute events overnight. Reports improvement in her breathing.     CURRENT MEDICATIONS:  SCHEDULED MEDICATIONS:   pramipexole  0.125 mg Oral Nightly    miconazole   Topical BID    niMODipine  60 mg Oral 6 times per day    enoxaparin  30 mg Subcutaneous Daily    atorvastatin  20 mg Oral Nightly    hydrocortisone   Rectal BID    potassium bicarb-citric acid  40 mEq Oral BID    calcium gluconate  1 g Intravenous Once    pantoprazole  40 mg Intravenous BID    And    sodium chloride (PF)  10 mL Intravenous BID    vitamin B-1  100 mg Per NG tube Daily    folic acid  1 mg Per NG tube Daily    lidocaine 1 % injection  5 mL Intradermal Once    [Held by provider] sennosides-docusate sodium  2 tablet Oral Daily    sodium chloride flush  10 mL Intravenous 2 times per day    ferrous sulfate  325 mg Oral Daily with breakfast    sodium chloride flush  10 mL Intravenous 2 times per day     CONTINUOUS INFUSIONS:    PRN MEDICATIONS:   fentanNYL, heparin flush, [Held by provider] magnesium hydroxide, sodium chloride flush, [Held by provider] docusate sodium, sodium chloride flush, acetaminophen, ondansetron    VITALS:  Temperature Range: Temp: 98.4 °F (36.9 °C) Temp  Av.6 °F (37 °C)  Min: 97.9 °F (36.6 °C)  Max: 99.6 °F (37.6 °C)  BP Range: Systolic (59DBC), LFL:739 , Min:105 , MOF:579     Diastolic (66SFW), normocephalic, atraumatic    EYES:  PERRLA, EOMI.   ENT:  moist mucous membranes   NECK:  supple, symmetric   LUNGS:  Equal air entry bilaterally   CARDIOVASCULAR:  normal s1 / s2, RRR, distal pulses intact   ABDOMEN:  Soft, no rigidity   NEUROLOGIC:  Mental Status:  A & O x3,awake             Cranial Nerves:    cranial nerves II-XII are grossly intact    Motor Exam:    Drift:  absent  Tone:  normal    Motor exam is symmetrical 5 out of 5 RUE, LUE, RLE  4/5 strength LLE     Sensory:    Touch:    Right Upper Extremity:  normal  Left Upper Extremity:  normal  Right Lower Extremity:  normal  Left Lower Extremity:  normal        Plantar Response:  Right:  downgoing  Left:  downgoing    Clonus:  absent    Coordination/Dysmetria:  Heel to Shin:  Right:  normal  Left:  normal  Finger to Nose:          DRAINS:  [] There are no drains for Neuro Critical Care to monitor at this time.      ASSESSMENT AND PLAN:     NEUROLOGIC:  - Acute SAH in setting of ruptured right A2 SENTHIL aneurysm  - POD #15 s/p coil embolization resulting in complete obliteration  - Continue Nimodipine 60mg Q4h  - TCD 2/25 normal  - Neuro Endovascular following; appreciate recs  - Goal SBP<160  - Neuro checks per protocol     CARDIOVASCULAR:  - Goal SBP<160  - Troponin 14, EKG NSR  - Echo EF 55%, mild left ventricular diastolic dysfunction  - Continue ASA and lipitor   - Continue telemetry     PULMONARY:  -Saturating well on room air     RENAL/FLUID/ELECTROLYTE:  - BUN 15/ Creatinine 0.56, lab work from 2/26  - Continue Potassium bicarb 40meq BID  - Magnesium goal normal range  - Replace electrolytes PRN  - BMP every other day     GI/NUTRITION:  NUTRITION:  -Dysphagia diet  - Bowel regimen remains on hold  - Hold additional Magnesium  - +Stool occult blood  - S/P EGD/sigmoidoscopy 2/20; moderate to severe esophagitis, external hemorrhoids  - GI recommends Protonix 40mg BIDx 8 weeks, to remain IV while in ICU     ID:  - Afebrile  - No leukocytosis, WBC 10.7  - Infectious work up; Urine culture 2/15 +E. Coli, blood culture 2/15 negative, sputum culture 2/20 negative, negative c.diff and influenza  - Completed 7 day course of Vancomycin and Zosyn on 2/22 for pneumonia, UTI  - Continue to monitor for fevers  - Daily CBC     HEME:   - Anemia likely multifactorial, acute blood loss, iron deficiency  - Patient had refused blood products due to Congregation beliefs, Yarsanism  - H&H 8.1/27.6  - Platelets 569  - S/P 1 dose Aranesp on 2/18 and 5 doses of IV Venofer  - Continue Iron 325mg QD  - CBC every other day     ENDOCRINE:  - Continue to monitor blood glucose, goal <180  - No history of DM 2, hemoglobin A1C 5.6     OTHER:  - PT/OT/ST recommending continued therapy  - PM&R consulted and recommending acute rehab  - Discharge planning     PROPHYLAXIS:  Stress ulcer: N/A     DVT PROPHYLAXIS:  - SCD sleeves - Thigh High   - OK for Lovenox per Hem/Onc       DISPOSITION:  [] To remain ICU: transfer to stepdown  [] OK for out of ICU from Neuro Critical Care standpoint    We will continue to follow along. For any changes in exam or patient status please contact Neuro Critical Care. Nina Nazario MD  Neuro Critical Care  Pager [phone]  2/27/2020     1:29 PM       Neuro critical care:       Addendum: 3/25- Patient was seen on 2/27 and agree with resident assessment and plan with following attending comments. Patient is off O2 nasal cannula  Improved mobility with improved left lower extremity strength  Continue nimodipine therapy  Lovenox subcu  Tolerating oral diet  PMR team evaluating and recommends IPR, precert pending   Patient to be mobilized out of neuro ICU  She is day 15 of her subarachnoid hemorrhage. TCD studies from 2/25 reviewed and with no elevated velocities.      Benny Klein MD

## 2020-02-27 NOTE — PROGRESS NOTES
Physical Therapy  [facility]O  Daily Treatment Note  NAME: Víctor Rodrigues  : 1950  MRN: [id number]    Date of Service: 2020    Discharge Recommendations:  Patient would benefit from continued therapy after discharge   PT Equipment Recommendations  Equipment Needed: Yes  Mobility Devices: Sumit [place]: Rolling    Assessment   Body structures, Functions, Activity limitations: Decreased functional mobility ; Decreased balance;Decreased strength;Decreased endurance;Decreased safe awareness;Decreased cognition  Assessment: Pt amb ~70ft Maximino with RW. Pt experienced several LOB to right requiring minor correction by writer. Tx limited by decreased strength, decreased balance and decreased cognition. Pt is a high fall risk, needing assistance with fucntional mobility. Pt is unsafe at this time to return to prior living arrangements due to increased need for physical assistance and decreased safety awareness. Pt would continue to benefit from acute care physical therapy services to increase strength, endurance and balance. Continue to progress pt as tolerated. Prognosis: Good  Decision Making: Medium Complexity  PT Education: Transfer Training;Functional Mobility Training;Gait Training;General Safety;Home Exercise Program;Goals  REQUIRES PT FOLLOW UP: Yes  Activity Tolerance  Activity Tolerance: Patient limited by fatigue;Patient limited by endurance; Patient limited by cognitive status  Activity Tolerance: Decreased safety awareness with ambulation      Patient Diagnosis(es): The encounter diagnosis was SAH (subarachnoid hemorrhage) ([facility] [address] has a past medical history of Acid reflux, Arthritis, Cancer ([facility] Family history of breast cancer in first degree relative, Gall stones, Hard of hearing, History of cervical cancer, Hypertension, Lateral meniscus tear, Wears dentures, and Wears glasses.    has a past surgical history that includes Tonsillectomy and adenoidectomy (); denies dizziness upon standing. Ambulation  Ambulation?: Yes  More Ambulation?: No  Ambulation 1  Device: Rolling Walker  Assistance: Minimal Assistance (with chair follow) Pt requiring verbal and tactile cues from proper step through gait pattern and safety awareness. Gait Deviations: Deviated path;Decreased step length;Decreased step height;Slow Emelyn  Distance: ~70 ft. (with chair follow. )  Comments: Pt displaying increased unsteadiness throughout ambulation with several LOB to the right requiring minor correction from writer. Pt easily distracted during ambulation displaying decreased safety awareness and deviated path to the right. Requiring increased time to complete due to slow emelyn and distractions. Balance  Sitting - Static: Good  Sitting - Dynamic: Fair(pt seated EOB ~8 mins; lateral weight shifting x15. )  Standing - Static: Fair;-  Standing - Dynamic: Fair;-  Comments: Pt required Maximino for balance upon standing EOB. Pt displaying several LOB during ambulation needing correction from writer. Exercises  Comments: Upper extremity exercises: Bicep curl, shoulder flexion/extension, punches, tricep curl, shoulder abduction/adduction. x15   Seated LE exercise program: heel/toe raises, and marches. x15   Easily fatigued.  Several rb's       Goals  Short term goals  Time Frame for Short term goals: 12 visits  Short term goal 1: Complete transfers MOD I with RW or least restrictive device for greater independence  Short term goal 2: Complete bed mobility independently from flattened position without use of bedrails for return to prior level  Short term goal 3: Amb x350ft with RW or least restrictive device for return to home and community settings  Short term goal 4: Complete x4 steps MOD I with use of either handrail for safe home entry/exit  Short term goal 5: Improve static and dynamic standing balance to good to reduce risk of falls and injury    Plan    Plan  Times per week: 5-6x/wk  Current Treatment Recommendations: Strengthening, Gait Training, Patient/Caregiver Education & Training, Equipment Evaluation, Education, & procurement, Stair training, Balance Training, Functional Mobility Training, Endurance Training, Home Exercise Program, Transfer Training, Safety Education & Training  Safety Devices  Type of devices: Left in chair, Call light within reach, All fall risk precautions in place, Chair alarm in place, Gait belt, Nurse notified, Patient at risk for falls  Restraints  Initially in place: No     Therapy Time   Individual Concurrent Group Co-treatment   Time In 1418         Time Out 1500         Minutes 42         Timed Code Treatment Minutes: [address] [facility] SPTA  Treatment performed by Student PTA under the supervision of co-signing PTA who agrees with all treatment and documentation.    Alexx Potter PTA

## 2020-02-27 NOTE — PLAN OF CARE
TODAY:      AWAKE & FOLLOWING COMMANDS:  [] No   [x] Yes    INTUBATED:   [x] No   [] Yes    SEDATION/ANALGESIA:    [] Propofol gtt  [] Versed gtt  [] Ativan gtt   [x] No Sedation    FEEDING: Able to take PO?   [] Yes:  Diet: Soft and bite sized general diet with nutritional supplements TID    DVT Prophylaxis:  [x] Yes: Lovenox 30 mg QD         [] No rationale:     Stress Ulcer Prophylaxis: [x] Yes: Protonix 40 mg BID  [] Not indicated    VASOPRESSORS:  [x] No    [] Yes    CENTRAL/ARTERIAL LINES:  Right basilic PICC placed 1/38/10 for vascular access    ELLER CATHETER: [x] No   [] Yes    DRAINS: [x] No   [] Yes    Head of Bed: [x] Elevated:          [] Flat    Glucose management: [x] Not indicated, consistently less than [facility]  2/27/2020  11:29 AM

## 2020-02-28 LAB
ABSOLUTE EOS #: 0.09 K/UL (ref 0–0.4)
ABSOLUTE IMMATURE GRANULOCYTE: 0.09 K/UL (ref 0–0.3)
ABSOLUTE LYMPH #: 0.98 K/UL (ref 1–4.8)
ABSOLUTE MONO #: 0.8 K/UL (ref 0.1–0.8)
ABSOLUTE RETIC #: 0.16 M/UL (ref 0.03–0.08)
ANION GAP SERPL CALCULATED.3IONS-SCNC: 9 MMOL/L (ref 9–17)
BASOPHILS # BLD: 1 % (ref 0–2)
BASOPHILS ABSOLUTE: 0.09 K/UL (ref 0–0.2)
BUN BLDV-MCNC: 21 MG/DL (ref 8–23)
BUN/CREAT BLD: ABNORMAL (ref 9–20)
CALCIUM SERPL-MCNC: 8.4 MG/DL (ref 8.6–10.4)
CHLORIDE BLD-SCNC: 102 MMOL/L (ref 98–107)
CO2: 19 MMOL/L (ref 20–31)
CREAT SERPL-MCNC: 0.67 MG/DL (ref 0.5–0.9)
DIFFERENTIAL TYPE: ABNORMAL
EOSINOPHILS RELATIVE PERCENT: 1 % (ref 1–4)
GFR AFRICAN AMERICAN: >60 ML/MIN
GFR NON-AFRICAN AMERICAN: >60 ML/MIN
GFR SERPL CREATININE-BSD FRML MDRD: ABNORMAL ML/MIN/{1.73_M2}
GFR SERPL CREATININE-BSD FRML MDRD: ABNORMAL ML/MIN/{1.73_M2}
GLUCOSE BLD-MCNC: 110 MG/DL (ref 70–99)
HCT VFR BLD CALC: 27 % (ref 36.3–47.1)
HEMOGLOBIN: 7.5 G/DL (ref 11.9–15.1)
IMMATURE GRANULOCYTES: 1 %
IMMATURE RETIC FRACT: 23.7 % (ref 2.7–18.3)
LYMPHOCYTES # BLD: 11 % (ref 24–44)
MAGNESIUM: 2 MG/DL (ref 1.6–2.6)
MCH RBC QN AUTO: 27.3 PG (ref 25.2–33.5)
MCHC RBC AUTO-ENTMCNC: 27.8 G/DL (ref 28.4–34.8)
MCV RBC AUTO: 98.2 FL (ref 82.6–102.9)
MONOCYTES # BLD: 9 % (ref 1–7)
MORPHOLOGY: ABNORMAL
NRBC AUTOMATED: 0 PER 100 WBC
PDW BLD-RTO: 24.8 % (ref 11.8–14.4)
PLATELET # BLD: 433 K/UL (ref 138–453)
PLATELET ESTIMATE: ABNORMAL
PMV BLD AUTO: 11.1 FL (ref 8.1–13.5)
POTASSIUM SERPL-SCNC: 4.9 MMOL/L (ref 3.7–5.3)
RBC # BLD: 2.75 M/UL (ref 3.95–5.11)
RBC # BLD: ABNORMAL 10*6/UL
RETIC %: 6 % (ref 0.5–1.9)
RETIC HEMOGLOBIN: 30.4 PG (ref 28.2–35.7)
SEG NEUTROPHILS: 77 % (ref 36–66)
SEGMENTED NEUTROPHILS ABSOLUTE COUNT: 6.85 K/UL (ref 1.8–7.7)
SODIUM BLD-SCNC: 130 MMOL/L (ref 135–144)
WBC # BLD: 8.9 K/UL (ref 3.5–11.3)
WBC # BLD: ABNORMAL 10*3/UL

## 2020-02-28 PROCEDURE — 97535 SELF CARE MNGMENT TRAINING: CPT

## 2020-02-28 PROCEDURE — 2060000000 HC ICU INTERMEDIATE R&B

## 2020-02-28 PROCEDURE — 6360000002 HC RX W HCPCS: Performed by: STUDENT IN AN ORGANIZED HEALTH CARE EDUCATION/TRAINING PROGRAM

## 2020-02-28 PROCEDURE — 99233 SBSQ HOSP IP/OBS HIGH 50: CPT | Performed by: PSYCHIATRY & NEUROLOGY

## 2020-02-28 PROCEDURE — 6370000000 HC RX 637 (ALT 250 FOR IP): Performed by: NURSE PRACTITIONER

## 2020-02-28 PROCEDURE — 6370000000 HC RX 637 (ALT 250 FOR IP): Performed by: STUDENT IN AN ORGANIZED HEALTH CARE EDUCATION/TRAINING PROGRAM

## 2020-02-28 PROCEDURE — 97116 GAIT TRAINING THERAPY: CPT

## 2020-02-28 PROCEDURE — 85045 AUTOMATED RETICULOCYTE COUNT: CPT

## 2020-02-28 PROCEDURE — 6370000000 HC RX 637 (ALT 250 FOR IP): Performed by: PSYCHIATRY & NEUROLOGY

## 2020-02-28 PROCEDURE — 99232 SBSQ HOSP IP/OBS MODERATE 35: CPT | Performed by: INTERNAL MEDICINE

## 2020-02-28 PROCEDURE — 83735 ASSAY OF MAGNESIUM: CPT

## 2020-02-28 PROCEDURE — 97110 THERAPEUTIC EXERCISES: CPT

## 2020-02-28 PROCEDURE — 36415 COLL VENOUS BLD VENIPUNCTURE: CPT

## 2020-02-28 PROCEDURE — 2580000003 HC RX 258: Performed by: STUDENT IN AN ORGANIZED HEALTH CARE EDUCATION/TRAINING PROGRAM

## 2020-02-28 PROCEDURE — 85025 COMPLETE CBC W/AUTO DIFF WBC: CPT

## 2020-02-28 PROCEDURE — 80048 BASIC METABOLIC PNL TOTAL CA: CPT

## 2020-02-28 PROCEDURE — C9113 INJ PANTOPRAZOLE SODIUM, VIA: HCPCS | Performed by: STUDENT IN AN ORGANIZED HEALTH CARE EDUCATION/TRAINING PROGRAM

## 2020-02-28 RX ORDER — MAGNESIUM SULFATE 1 G/100ML
2 INJECTION INTRAVENOUS ONCE
Status: COMPLETED | OUTPATIENT
Start: 2020-02-28 | End: 2020-02-28

## 2020-02-28 RX ADMIN — PANTOPRAZOLE SODIUM 40 MG: 40 INJECTION, POWDER, LYOPHILIZED, FOR SOLUTION INTRAVENOUS at 21:06

## 2020-02-28 RX ADMIN — SODIUM CHLORIDE, PRESERVATIVE FREE 10 ML: 5 INJECTION INTRAVENOUS at 21:30

## 2020-02-28 RX ADMIN — Medication 10 ML: at 09:01

## 2020-02-28 RX ADMIN — ENOXAPARIN SODIUM 30 MG: 30 INJECTION SUBCUTANEOUS at 08:51

## 2020-02-28 RX ADMIN — NIMODIPINE 60 MG: 30 CAPSULE ORAL at 20:23

## 2020-02-28 RX ADMIN — POTASSIUM BICARBONATE 40 MEQ: 782 TABLET, EFFERVESCENT ORAL at 20:22

## 2020-02-28 RX ADMIN — HYDROCORTISONE 2.5%: 25 CREAM TOPICAL at 08:52

## 2020-02-28 RX ADMIN — HYDROCORTISONE 2.5%: 25 CREAM TOPICAL at 20:25

## 2020-02-28 RX ADMIN — POTASSIUM BICARBONATE 40 MEQ: 782 TABLET, EFFERVESCENT ORAL at 08:51

## 2020-02-28 RX ADMIN — NIMODIPINE 60 MG: 30 CAPSULE ORAL at 04:13

## 2020-02-28 RX ADMIN — PANTOPRAZOLE SODIUM 40 MG: 40 INJECTION, POWDER, LYOPHILIZED, FOR SOLUTION INTRAVENOUS at 09:00

## 2020-02-28 RX ADMIN — NIMODIPINE 60 MG: 30 CAPSULE ORAL at 16:34

## 2020-02-28 RX ADMIN — NIMODIPINE 60 MG: 30 CAPSULE ORAL at 00:06

## 2020-02-28 RX ADMIN — ATORVASTATIN CALCIUM 20 MG: 20 TABLET, FILM COATED ORAL at 20:24

## 2020-02-28 RX ADMIN — FOLIC ACID 1 MG: 1 TABLET ORAL at 08:51

## 2020-02-28 RX ADMIN — FERROUS SULFATE TAB EC 325 MG (65 MG FE EQUIVALENT) 325 MG: 325 (65 FE) TABLET DELAYED RESPONSE at 08:51

## 2020-02-28 RX ADMIN — PRAMIPEXOLE DIHYDROCHLORIDE 0.12 MG: 0.25 TABLET ORAL at 20:24

## 2020-02-28 RX ADMIN — Medication 100 MG: at 08:50

## 2020-02-28 RX ADMIN — ANTI-FUNGAL POWDER MICONAZOLE NITRATE TALC FREE: 1.42 POWDER TOPICAL at 08:52

## 2020-02-28 RX ADMIN — NIMODIPINE 60 MG: 30 CAPSULE ORAL at 12:29

## 2020-02-28 RX ADMIN — Medication 10 ML: at 21:06

## 2020-02-28 RX ADMIN — MAGNESIUM SULFATE HEPTAHYDRATE 2 G: 1 INJECTION, SOLUTION INTRAVENOUS at 08:52

## 2020-02-28 RX ADMIN — ANTI-FUNGAL POWDER MICONAZOLE NITRATE TALC FREE: 1.42 POWDER TOPICAL at 20:24

## 2020-02-28 RX ADMIN — NIMODIPINE 60 MG: 30 CAPSULE ORAL at 08:51

## 2020-02-28 ASSESSMENT — PAIN SCALES - GENERAL
PAINLEVEL_OUTOF10: 0
PAINLEVEL_OUTOF10: 0

## 2020-02-28 NOTE — PROGRESS NOTES
Yes    Patients Prior Functional  Level: Prior Function  ADL Assistance: Independent  Homemaking Assistance: Independent  Ambulation Assistance: Independent  Transfer Assistance: Independent  Additional Comments: pt's daughter is a student and is home a majority of the day, able to help if needed    History of current illness:  Ms. Pamela Messina is a 77 y.o. right handed female who was admitted to [facility] on 2/12/2020 with Cerebrovascular Accident     70-year-old female with history of hypertension presented from [facility] for subarachnoid hemorrhage. She was at her dentist when she had a sudden headache. She was taken to [facility] ED. Found to have subarachnoid hemorrhage and anterior interhemispheric fissure. She was loaded with Keppra and given morphine and Zofran and labetalol for blood pressure. She was transferred [facility]. Ismael's. He was lethargic. Mild right facial droop. CT head stable sub-right hemorrhage.   CTA head/neck revealed 2 mm right SENTHIL aneurysm.     Oncology-evaluation for anemia, iron deficiency, patient [facility], start IV iron Aranesp okay to start pharmacological DVT prophylaxis she had a colonoscopy 2/24-no active bleeding noted external hemorrhoids, patient also has liver disease and hepatitis C     Neuro critical on Lipitor, nimodipine, monitor sodium, keep magnesium above 2.5, possible acquired pneumonia ruptured right A2 SENTHIL treated with primary coil embolization with complete obliteration of the aneurysm 2/12/2020     Pulmonary-evaluate for bilateral pneumonia with acute respiratory failure with hypoxemia in a patient is anemic as [facility]- patient tolerated extubation, continues on 2 L oxygen pleat course of antibiotics continues to get tube feeds, EPC's for DVT prophylaxis they reviewed need for oxygen with family, now sat >90%     GI -Seen for anemia, plan EGD and flex sig EGD showed moderate to severe esophagitis no gastric ulceration-recommended IV functional status for expression: Complete independence    Current functional status for social interaction: Complete independence    Current functional status for problem solving: Moderate assistance    Current functional status for memory: Minimal contact assistance    Current Deficits R/T Impairment: Impaired Functional Mobility and Decreased ADLs    Required Therapy:   [x] Physical Therapy  [x] Occupational Therapy   [x] Speech Therapy    Additional Services:  [x]   [x] Recreational Therapy  [x] Nutrition  [] Dialysis  [] Other:     Rehab Justification:  Needs 3 hrs therapy per day or 15 hours per week:  Yes  Identified Rehab Nursing needs: Yes  Intense Interdisciplinary need:  Yes  Need for 24 hr physician supervision:  Yes  Measurable improved quality of life:  Yes  Willingness to participate:  Yes  Medical Necessity:  Yes  Patient able to tolerate care proposed:  Yes    Expected Discharge Destination/Functional Level:  Home with assist  Expected length of time to achieve that level of improvement: 1-2 weeks  Expected Post Discharge Treatments: Home with possible Home Care    Other information relevant to the care needs:  n/a    Acute Inpatient Rehabilitation Disclosure Statement provided to patient. Patient verbalized understanding. Copy placed on patient's light chart. I have reviewed and concur with the findings and results of the pre-admission screening assessment completed by the Inpatient Rehabilitation Admissions Coordinator.

## 2020-02-28 NOTE — PLAN OF CARE
Patient assessed for fall risk and fall precautions initiated as needed. Patient instructed about safety devices and allowed to make decisions related to safey. Environment kept free of clutter and adequate lighting provided. Bed in lowest position with brakes locked. Call light in reach. Patient ID band correct and in place. Patient transferred with appropriate methods. Patient free of falls during shift. Will continue to monitor. .. Martina Earl RN

## 2020-02-28 NOTE — PROGRESS NOTES
Daily Progress Note  Neuro Critical Care    Patient Name: Sloane Palmer  Patient : 1950  Room/Bed: 9391/3015-88  Code Status: Full Code  Allergies: Allergies   Allergen Reactions    Lisinopril Other (See Comments)    Losartan      Fatigue    Procardia [Nifedipine] Other (See Comments)       CHIEF COMPLAINT:     Headache and nausea/vomiting      INTERVAL HISTORY    Initial Presentation (Admitted ): The patient is a 75 y. o. female presented with a history of HTN who presents as a a transfer from [facility] [address] for [facility].   Patient was at the dentist around 1030AM when she had a sudden thunderclap headache, diaphoresis, and nausea/vomiting.  Patient's daughter received a call from the dentist around 1030AM stating the patient was drowsy and out of it. Di Gonzalez was taken to [address] [place], [place] [address],[address] ED where CT Head showed subarachnoid hemorrhage predominately in the anterior interhemispheric fissure.  Patient was complaining of severe headache and vomiting at outlying ED.  Loaded with 1g Keppra, given 2mg Morphine and Zofran, started on Labetalol infusion for blood pressure control and transferred to [facility] for further management.  On arrival to [facility] ED, patient is lethargic. Di Gonzalez is able to state her name and that she's at the hospital.  No dysarthria or aphasia. PERRL 3mm.  EOMI.  Mild right facial droop. Moving all extremities equally. Repeat CT Head stable SAH and ventricular system.  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 Neuro endovascular planning on taking to lab.  Family reports patient's father passed of an abdominal aortic aneurysm.  Of note,  patient's daughter reports she is POA and family confirms that this paperwork has been completed.     Em&Jeff: 3, Modified Britton: [address] [place] [facility] Course:   : Underwent coil embolization of ruptured right A2 SENTHIL aneurysm resulting in complete obliteration.   : CT Head showed bilateral SAH with new intraventricular extension, developing hydrocephalus, interval worsening of diffuse cerebral edema, significantly increased hyperattenuation in bilateral medial frontal regions; increased hemorrhage vs contrast. Started on 2% hypertonic saline with goal sodium 150. Neurosurgery following for EVD watch. 2/14: More alert. TCD normal. Continues of 2%.  CT Head showed decreased SAH, subtle rightward shift. 2/15: Hypoxic overnight with increasing O2 requirements.  Placed on non-rebreather with continued desaturations. CT chest PE protocol showed severe dense consolidations in the bilateral lower lobes suggestive of severe pneumonia, moderate bilateral pleural effusions.  Given 20mg IVP Lasix x1.  Started on Vanc and Zosyn.  Placed on biPAP with improvements.  Transitioned to high flow O2 in the AM.  Urine culture +E. Coli.  Blood culture sent.  Mobility, IS, and acapella encouraged.    2/16: Hypoxic again overnight despite being on high flow nasal canula 90% FiO2, 30LPM.  She was placed on biPAP.  Repeat CXR revealed multifocal airspace disease with worsening right lower lobe consolidation and pulmonary edema.  Given 20mg IVP Lasix x1.  Manual chest PT performed.  Continue on Vancomycin and Zosyn.  Mobilization, incentive spirometry, and acapella encouraged.  Sodium 143, goal changed to 145.  Continue 2% at 50cc/hr, will not increase rate in setting of possible pulmonary edema.  Add salt tabs 1g BID as tolerated.    2/17: Hypoxic throughout day on high flow, increased dyspnea. HH 6.0 refusing blood d/t being Cheondoism. Hem/Onc consulted; recommend minimum blood draws, Iron replacement. Full Code. Positive stool occult blood; GI consulted. 2/18: Art line DC d/t oozing, sodium checks daily, minimize blood draws.  H&H 6.0. Started on Protonix drip per GI. Hem/Onc ordered Aranespx1 and IV Iron replacement. 2/19: H&H 5.6, C. difficile negative, NM RBC scan neg.   2/20: HH inc to 7.0.   Electively intubated for continued hypoxic respiratory failure and for endoscopy. GI performed EGD and sigmoidoscopy- mod to severe esophagitis, external hemorrhoids.  Given Lasix x1.  2/21: HH 5.1, family refusing ABGs.  UOP 3.4 L   2/22: HH 5.0, switch to EOD labs.  Lasix 40 mg x 1 reticulocyte 5%  2/23: Extubated, 2 L O2 sat 99%. 2/24: Mild desaturation to 90% on room air while talking, remains on 2 L.  H&H 7.0, reticulocyte 9.3%. OK to restart DVT ppx. Passed videoswallow. 2/25:  Passed videoswallow yesterday; General diet soft and bite sized. Frequent liquid stools overnight, hold further Magnesium infusions. Weaned off O2    Last 24h:   No acute events overnight. Patient doing well with PT and OT, states leg weakness has improved and feels about the same as it other. PT OT recommending rolling walker. Patient tolerating diet without difficulties, afebrile. Hemoglobin repeat was 7.5 today with reticulocyte 6.3%.   Vital signs normal.    CURRENT MEDICATIONS:  SCHEDULED MEDICATIONS:   pramipexole  0.125 mg Oral Nightly    miconazole   Topical BID    niMODipine  60 mg Oral 6 times per day    enoxaparin  30 mg Subcutaneous Daily    atorvastatin  20 mg Oral Nightly    hydrocortisone   Rectal BID    potassium bicarb-citric acid  40 mEq Oral BID    calcium gluconate  1 g Intravenous Once    pantoprazole  40 mg Intravenous BID    And    sodium chloride (PF)  10 mL Intravenous BID    vitamin B-1  100 mg Per NG tube Daily    folic acid  1 mg Per NG tube Daily    lidocaine 1 % injection  5 mL Intradermal Once    [Held by provider] sennosides-docusate sodium  2 tablet Oral Daily    sodium chloride flush  10 mL Intravenous 2 times per day    ferrous sulfate  325 mg Oral Daily with breakfast    sodium chloride flush  10 mL Intravenous 2 times per day     CONTINUOUS INFUSIONS:    PRN MEDICATIONS:   fentanNYL, heparin flush, [Held by provider] magnesium hydroxide, sodium chloride flush, [Held by provider] docusate sodium, sodium chloride flush, acetaminophen, ondansetron    VITALS:  Temperature Range: Temp: 98.5 °F (36.9 °C) Temp  Av.7 °F (37.1 °C)  Min: 97.9 °F (36.6 °C)  Max: 99.9 °F (37.7 °C)  BP Range: Systolic (70VBH), ANN:949 , Min:97 , ISL:151     Diastolic (52CJX), DRQ:62, Min:42, Max:93    Pulse Range: Pulse  Av.9  Min: 94  Max: 110  Respiration Range: Resp  Av.3  Min: 16  Max: 27  Current Pulse Ox: SpO2: 100 %  24HR Pulse Ox Range: SpO2  Av %  Min: 100 %  Max: 100 %  Patient Vitals for the past 12 hrs:   BP Temp Temp src Pulse Resp SpO2   20 1204 (!) 107/93 98.5 °F (36.9 °C) Oral -- -- --   20 0800 -- -- -- 98 -- --   20 0744 (!) 99/42 99.9 °F (37.7 °C) Oral 94 24 100 %   20 0420 (!) 107/43 -- -- 95 22 --   20 0350 (!) 97/48 97.9 °F (36.6 °C) Oral 101 27 --     Estimated body mass index is 16.97 kg/m² as calculated from the following:    Height as of this encounter: 5' 6\" (1.676 m). Weight as of this encounter: 105 lb 2.6 oz (47.7 kg).  []<16 Severe malnutrition  []16-16.99 Moderate malnutrition  []17-18.49 Mild malnutrition  []18.5-24.9 Normal  []25-29.9 Overweight (not obese)  []30-34.9 Obese class 1 (Low Risk)  []35-39.9 Obese class 2 (Moderate Risk)  []?40 Obese class 3 (High Risk)    RECENT LABS:   Lab Results   Component Value Date    WBC 8.9 2020    HGB 7.5 (L) 2020    HCT 27.0 (L) 2020     2020    CHOL 102 2020    TRIG 100 2020    HDL 21 (L) 2020    ALT 29 02/15/2020    AST 47 (H) 02/15/2020     (L) 2020    K 4.9 2020     2020    CREATININE 0.67 2020    BUN 21 2020    CO2 19 (L) 2020    TSH 5.94 (H) 02/15/2020    INR 1.2 2020    LABA1C 5.6 2020     24 HOUR INTAKE/OUTPUT:No intake or output data in the 24 hours ending 20 1510    IMAGING:    Place summary of recent imaging here. Labs and Images reviewed with:  [x] Dr. Dank Restrepo    [] Dr. Bhavin Nicholas  [] Dr. Bhavin Rai Nieves Bhatti  [] There are no new interval images to review. PHYSICAL EXAM       CONSTITUTIONAL:  Well developed, well nourished, alert and oriented x 3, in no acute distress. GCS 15. Nontoxic. No dysarthria. No aphasia. HEAD:  normocephalic, atraumatic    EYES:  PERRLA, EOMI.   ENT:  moist mucous membranes   NECK:  supple, symmetric   LUNGS:  Equal air entry bilaterally   CARDIOVASCULAR:  normal s1 / s2, RRR, distal pulses intact   ABDOMEN:  Soft, no rigidity   NEUROLOGIC:  Mental Status:  A & O x3,awake             Cranial Nerves:    cranial nerves II-XII are grossly intact    Motor Exam:    Drift:  absent  Tone:  normal    Motor exam is symmetrical 5 out of 5 RUE, LUE, RLE  5/5 strength LLE     Sensory:    Touch:    Right Upper Extremity:  normal  Left Upper Extremity:  normal  Right Lower Extremity:  normal  Left Lower Extremity:  normal        Plantar Response:  Right:  downgoing  Left:  downgoing    Clonus:  absent    Coordination/Dysmetria:  Heel to Shin:  Right:  normal  Left:  normal  Finger to Nose:          DRAINS:  [] There are no drains for Neuro Critical Care to monitor at this time.      ASSESSMENT AND PLAN:     NEUROLOGIC:  - Acute SAH in setting of ruptured right A2 SENTHIL aneurysm  - POD #16 s/p coil embolization resulting in complete obliteration  - Continue Nimodipine 60mg Q4h  - TCD 2/25 normal  - Neuro Endovascular following; appreciate recs  - Goal SBP<160  - Neuro checks per protocol     CARDIOVASCULAR:  - Goal SBP<160  - Troponin 14, EKG NSR  - Echo EF 55%, mild left ventricular diastolic dysfunction  - Continue ASA and lipitor   - Continue telemetry     PULMONARY:  -Saturating well on room air     RENAL/FLUID/ELECTROLYTE:  -BUN/creatinine normal  - Continue Potassium bicarb 40meq BID  - Magnesium goal normal range  - Replace electrolytes PRN  - BMP every other day     GI/NUTRITION:  NUTRITION:  -Dysphagia diet  - Bowel regimen remains on hold  - Hold additional Magnesium  - +Stool occult blood  - S/P EGD/sigmoidoscopy 2/20; moderate to severe esophagitis, external hemorrhoids  - GI recommends Protonix 40mg BIDx 8 weeks, to remain IV while in ICU     ID:  - Afebrile  - No leukocytosis  - Infectious work up; Urine culture 2/15 +E. Coli, blood culture 2/15 negative, sputum culture 2/20 negative, negative c.diff and influenza  - Completed 7 day course of Vancomycin and Zosyn on 2/22 for pneumonia, UTI  - Continue to monitor for fevers  - Daily CBC     HEME:   - Anemia likely multifactorial, acute blood loss, iron deficiency  - Patient had refused blood products due to Oriental orthodox beliefs, Taoist  - H&H 7.5  Reticulocyte count 6.3%  - Platelets normal  - S/P 1 dose Aranesp on 2/18 and 5 doses of IV Venofer  - Continue Iron 325mg QD  - CBC every other day     ENDOCRINE:  - Continue to monitor blood glucose, goal <180  - No history of DM 2, hemoglobin A1C 5.6     OTHER:  - PT/OT/ST recommending continued therapy  - PM&R consulted and recommending acute rehab  - Discharge planning     PROPHYLAXIS:  Stress ulcer: N/A     DVT PROPHYLAXIS:  - SCD sleeves - Thigh High   - OK for Lovenox per Hem/Onc       DISPOSITION:  [] To remain ICU: transfer to stepdown  [] OK for out of ICU from [address] [place] [address] for discharge home versus inpatient rehab depending on case management and family decision. Patient will need to moderate pain prescription for remaining 21 days total.  We will continue to follow along. For any changes in exam or patient status please contact Neuro Critical Care.       Loria Essex, DO  Neuro Critical Care  Pager [phone]  2/28/2020     3:10 PM       Neuro critical care:       Patient is off O2 nasal cannula  Improved mobility with improved left lower extremity strength  Continue nimodipine therapy  Lovenox subcu  Tolerating oral diet  PMR team evaluating and recommends IPRtiburcio pending   Patient to be mobilized out of neuro ICU  She is day 15 of her subarachnoid hemorrhage. TCD studies from 2/25 reviewed and with no elevated velocities.      Ketan Smith MD

## 2020-02-28 NOTE — PLAN OF CARE
Patient remained free from injury. Patient verbalized understanding of need for the safety precautions. Demonstrates proper use of assistive devices. Bed remains in the lowest position. Call light remains within reach. [place] Program in use. Pain level assessment complete. Pt rated pain at 0/10. Pt educated on pain scale and control interventions. Pt instructed to call out with new onset of pain or unrelieved pain. Will continue to monitor. Neuro assessment completed, fall precautions in place, aspirations precautions in place, assess for barriers in communication and mobility, interventions to assist in communication and mobility in place, encouraged to call for assistance, adaptive devices used as needed, assess emotional state and support offered, encouraged patient to communicate by available means, and support systems included in patient care.     Problem: Pain:  Goal: Pain level will decrease  Description  Pain level will decrease  Outcome: Met This Shift  Goal: Control of acute pain  Description  Control of acute pain  Outcome: Met This Shift  Goal: Control of chronic pain  Description  Control of chronic pain  Outcome: Met This Shift  Goal: Patient's pain/discomfort is manageable  Description  Patient's pain/discomfort is manageable  Outcome: Met This Shift     Problem: Infection:  Goal: Will remain free from infection  Description  Will remain free from infection  Outcome: Met This Shift     Problem: Safety:  Goal: Free from accidental physical injury  Description  Free from accidental physical injury  Outcome: Met This Shift  Goal: Free from intentional harm  Description  Free from intentional harm  Outcome: Met This Shift     Problem: Daily Care:  Goal: Daily care needs are met  Description  Daily care needs are met  Outcome: Met This Shift     Problem: Skin Integrity:  Goal: Skin integrity will stabilize  Description  Skin integrity will stabilize  Outcome: Met This Shift     Problem: Discharge Planning:  Goal: Patients continuum of care needs are met  Description  Patients continuum of care needs are met  Outcome: Met This Shift     Problem: Falls - Risk of:  Goal: Will remain free from falls  Description  Will remain free from falls  2/28/2020 0421 by Ranjit Mott RN  Outcome: Met This Shift  2/27/2020 1618 by Guero Shipman RN  Outcome: Ongoing  Goal: Absence of physical injury  Description  Absence of physical injury  Outcome: Met This Shift     Problem: Skin Integrity:  Goal: Will show no infection signs and symptoms  Description  Will show no infection signs and symptoms  Outcome: Met This Shift  Goal: Absence of new skin breakdown  Description  Absence of new skin breakdown  Outcome: Met This Shift     Problem: Musculor/Skeletal Functional Status  Goal: Highest potential functional level  Outcome: Met This Shift     Problem: HEMODYNAMIC STATUS  Goal: Patient has stable vital signs and fluid balance  Outcome: Met This Shift     Problem: Gas Exchange - Impaired:  Goal: Levels of oxygenation will improve  Description  Levels of oxygenation will improve  Outcome: Met This Shift     Problem: Risk for Impaired Skin Integrity  Goal: Tissue integrity - skin and mucous membranes  Description  Structural intactness and normal physiological function of skin and  mucous membranes.   Outcome: Met This Shift     Problem: OXYGENATION/RESPIRATORY FUNCTION  Goal: Patient will maintain patent airway  Outcome: Met This Shift  Goal: Patient will achieve/maintain normal respiratory rate/effort  Description  Respiratory rate and effort will be within normal limits for the patient  Outcome: Met This Shift     Problem: Nutrition  Goal: Optimal nutrition therapy  Outcome: Met This Shift

## 2020-02-28 NOTE — PROGRESS NOTES
Physical Therapy  Facility/Department: [facility]  Daily Treatment Note  NAME: Kristie Heredia  : 1950  MRN: [id number]    Date of Service: 2020    Discharge Recommendations:  Patient would benefit from continued therapy after discharge   PT Equipment Recommendations  Equipment Needed: Yes  Walker: Rolling    Assessment   Body structures, Functions, Activity limitations: Decreased functional mobility ; Decreased balance;Decreased strength;Decreased endurance;Decreased safe awareness;Decreased cognition  Assessment: Pt ambulating 100ft w/RW grossly CGA, Maximino for R lateral LOB x 2 during ambulation, pt easily distracted and needs frequent cues to stay on task, pt is not safe to perform any functional mobility without physical assistance at this time, pt continuing to improve with all mobility   Prognosis: Good  Decision Making: Medium Complexity  PT Education: Transfer Training;Functional Mobility Training;Gait Training;General Safety;Goals  REQUIRES PT FOLLOW UP: Yes  Activity Tolerance  Activity Tolerance: Patient limited by fatigue;Patient limited by endurance     Patient Diagnosis(es): The encounter diagnosis was SAH (subarachnoid hemorrhage) ([facility] [address] has a past medical history of Acid reflux, Arthritis, Cancer ([facility] [address] Family history of breast cancer in first degree relative, Gall stones, Hard of hearing, History of cervical cancer, Hypertension, Lateral meniscus tear, Wears dentures, and Wears glasses. has a past surgical history that includes Tonsillectomy and adenoidectomy (); Cholecystectomy; Inner ear surgery; Knee arthroscopy (, ); cyst removal (); Colonoscopy (); Breast biopsy (); Foot surgery (Bilateral); other surgical history (Right, 9-4-14); Umbilical hernia repair; hernia repair; Varicose vein surgery (Bilateral); Knee arthroscopy (Left, 4/3/2019); Colonoscopy (N/A, 2019); other surgical history (2020);   picc powerpicc double (2020);  Upper gastrointestinal endoscopy (N/A, 2/20/2020); and sigmoidoscopy (N/A, 2/20/2020). Restrictions  Restrictions/Precautions  Restrictions/Precautions: Fall Risk, Seizure  Required Braces or Orthoses?: No  Position Activity Restriction  Other position/activity restrictions: up with assist, Hgb 7.5    Subjective   General  Response To Previous Treatment: Patient with no complaints from previous session. Family / Caregiver Present: No  Subjective  Subjective: RN and pt agreeable to PT. Pt supine in bed upon arrival. Pt reports no pain this date. Pain Screening  Patient Currently in Pain: Denies  Vital Signs  Patient Currently in Pain: Denies       Orientation  Orientation  Overall Orientation Status: Within Functional Limits  Cognition      Objective   Bed mobility  Rolling to Right: Contact guard assistance  Supine to Sit: Minimal assistance  Scooting: Contact guard assistance  Transfers  Sit to Stand: Contact guard assistance  Stand to sit: Contact guard assistance  Comment: vc's for proper hand placement  Ambulation  Ambulation?: Yes  More Ambulation?: No  Ambulation 1  Device: Rolling Walker  Assistance: Minimal assistance;Contact guard assistance  Quality of Gait: Occassional near scissoring   Gait Deviations: Deviated path;Decreased step length;Decreased step height;Slow Jo-Ann  Distance: 100ft  Comments: 2 minor R lateral LOB requiring Maximino to correct     Balance  Sitting - Static: Fair;+  Sitting - Dynamic: Fair;-  Standing - Static: Fair  Standing - Dynamic: Fair;-  Comments: pt sat EOB x 15mins close SBA, pt had 3 posterior LOB's requring Maximino to correct while donning socks  Exercises  Seated LE exercise program: Long Arc Quads, hip abduction/adduction, heel/toe raises, and marches.  Reps: x 10                   Goals  Short term goals  Time Frame for Short term goals: 12 visits  Short term goal 1: Complete transfers MOD I with RW or least restrictive device for greater independence  Short term goal 2:

## 2020-02-28 NOTE — PROGRESS NOTES
tablet 325 mg  325 mg Oral Daily with breakfast Melody MariaRAEGAN CNP   325 mg at 20 1485    [Held by provider] docusate sodium (COLACE) capsule 100 mg  100 mg Oral Daily PRN Chey Yi MD        sodium chloride flush 0.9 % injection 10 mL  10 mL Intravenous 2 times per day RAEGAN Cash CNP   10 mL at 20 1056    sodium chloride flush 0.9 % injection 10 mL  10 mL Intravenous PRN RAEGAN Cash CNP        acetaminophen (TYLENOL) tablet 650 mg  650 mg Oral Q4H PRN RAEGAN Cash CNP   650 mg at 20 2357    ondansetron (ZOFRAN) injection 4 mg  4 mg Intravenous Q6H PRN RAEGAN Cash CNP   4 mg at 20 0042       Allergies:  Lisinopril; Losartan; and Procardia [nifedipine]    Social History:   reports that she has never smoked. She has never used smokeless tobacco. She reports current alcohol use. She reports that she does not use drugs. Family History: family history includes Breast Cancer in her sister; Cancer in her mother; Heart Attack in her father.     REVIEW OF SYSTEMS:    Review of system could not be obtained as patient is intubated and sedated    PHYSICAL EXAM:      BP (!) 107/93   Pulse 98   Temp 98.5 °F (36.9 °C) (Oral)   Resp 24   Ht 5' 6\" (1.676 m)   Wt 105 lb 2.6 oz (47.7 kg)   SpO2 100%   BMI 16.97 kg/m²    Temp (24hrs), Av.7 °F (37.1 °C), Min:97.9 °F (36.6 °C), Max:99.9 °F (37.7 °C)  General appearance -intubated and sedated  Neck - supple, no significant adenopathy   Lymphatics - no palpable lymphadenopathy, no hepatosplenomegaly   Chest - clear to auscultation, no wheezes, rales or rhonchi, symmetric air entry   Heart - normal rate, regular rhythm, normal S1, S2, no murmurs  Abdomen - soft, nontender, nondistended, no masses or organomegaly   Neurological - sedated intubated  Musculoskeletal - no joint tenderness, deformity or swelling   Extremities - peripheral pulses normal, no pedal edema, no clubbing or cyanosis Skin - normal coloration and turgor, no rashes, no suspicious skin lesions noted ,    DATA:    Labs:   CBC:   Recent Labs     02/26/20  0439 02/28/20  0531   WBC 10.7 8.9   HGB 8.1* 7.5*   HCT 27.6* 27.0*    433     BMP:   Recent Labs     02/26/20  0439 02/28/20  0531    130*   K 4.7 4.9   CO2 18* 19*   BUN 15 21   CREATININE 0.56 0.67   LABGLOM >60 >60   GLUCOSE 107* 110*     PT/INR:   No results for input(s): PROTIME, INR in the last 72 hours. IMAGING DATA:  Reviewed  Ct Abdomen Pelvis W Iv Contrast 2/5/2020  1. No acute intra-abdominal or intrapelvic process. Specifically, no CT scan evidence of acute pancreatitis or complications related to pancreatitis. 2. Status post cholecystectomy. 3. Subtle ground-glass opacity in the right lower lobe appears slightly more conspicuous than on the 2007 exam.  This may simply be related to a minor scar. Minor infectious or inflammatory process could not be entirely excluded. Consider follow-up chest CT in approximately 3 months to ensure stability/resolution. 4. Normal appendix.    CT chest   Impression   1. Note: Study significantly limited by patient breathing motion related   artifact. 2. No definitive evidence of acute pulmonary embolism. 3. Bilateral lower lung lobe severe dense consolidation consistent with   severe pneumonia. 4. Adjacent moderate bilateral layering posterior pleural effusions, as   discussed above. 5. Moderate cardiomegaly. 6. Mild pericardial effusion.  Recommend clinical correlation. 7. Cholecystectomy.        Primary Problem  <principal problem not specified>    Active Hospital Problems    Diagnosis Date Noted    Iron deficiency anemia due to chronic blood loss [D50.0]     Ventilator dependence (HCC) [Z99.11]     Pneumonia of both lower lobes due to infectious organism ([address] [J18.1]     MRI-safe endovascular aneurysm coil present [Z95.828]     SAH (subarachnoid hemorrhage) ([address] [I60.9] 02/12/2020    Cerebral

## 2020-02-28 NOTE — PROGRESS NOTES
FACE TO FACE 2/28/2020. I saw this patient today and in congruence with PT/OT recommendations she is in need of a rolling walker for when she is discharged. Please see my progress note from today for further evaluation if needed.     Marilia Darden,   PGY 2  Resident Physician Emergency Medicine  02/28/20 12:44 PM

## 2020-02-28 NOTE — PROGRESS NOTES
2/20/2020). Restrictions  Restrictions/Precautions  Restrictions/Precautions: General Precautions, Fall Risk  Required Braces or Orthoses?: No  Position Activity Restriction  Other position/activity restrictions: up with assist     Subjective   General  Patient assessed for rehabilitation services?: Yes  Family / Caregiver Present: Yes(multiple family members present at beginning and end of session)  Diagnosis: SENTHIL aneurysm coiling, B/L SAH  General Comment  Comments: RN ok'd pt for therapy this PM. Pt agreeable to therapy and cooperative/pleasant throughout. Vital Signs  Patient Currently in Pain: Denies     Objective    ADL  Grooming: Minimal assistance;Setup;Verbal cueing; Increased time to complete(Min A required to wash hair while sitting sinkside; pt able to wash face independently with setup)  UE Bathing: Contact guard assistance;Setup; Increased time to complete;Verbal cueing(pt completed UB bathing while seated sinkside with CGA)  LE Bathing: Contact guard assistance;Setup;Verbal cueing; Increased time to complete(pt demo ability to wash LB and complete bottom hygiene with CGA)  UE Dressing: Minimal assistance;Verbal cueing;Setup; Increased time to complete(to doff dirty and don clean gown for line management)  LE Dressing: Setup;Verbal cueing; Increased time to complete; Moderate assistance(to doff and don socks and brief)  Toileting: Minimal assistance(Min A for toilet transfers; pt able to complete pericare with CGA)  Additional Comments: pt req increased time and max verbal instructions on sequencing and inititation; pt completed most ADLs while seated on chair at sink d/t decreased endurance. Pt completed sit <> stands for bottom hygiene.  Pt requires frequent rest breaks d/t fatigue          Balance  Sitting Balance: Supervision(seated on commode at sinkside ~45 minutes)  Standing Balance: Minimal assistance(stood for bottom hygiene X2 ~4 minutes)  Standing Balance  Time: ~4 minutes  Activity: with RW

## 2020-02-28 NOTE — PROGRESS NOTES
ENDOVASCULAR NEUROSURGERY PROGRESS NOTE  2/28/2020 4:36 PM  Subjective:   Admit Date: 2/12/2020  PCP: Julia Qureshi MD    No new acute events over the night. SHe was moved to [facility]. Objective:   Vitals: BP (!) 107/93   Pulse 98   Temp 98.5 °F (36.9 °C) (Oral)   Resp 24   Ht 5' 6\" (1.676 m)   Wt 105 lb 2.6 oz (47.7 kg)   SpO2 100%   BMI 16.97 kg/m²       General appearance: Extubated  Lungs: Respite distress noted. Heart: RRR  Neuro exam: opens her eyes spontaneously, she is following simple commands. Able to move both upper extremities against gravity. He is able to move both lower extremities against gravity. Intact sensation to simple touch in both upper and lower extremities. Medications and labs:   Scheduled Meds:   pramipexole  0.125 mg Oral Nightly    miconazole   Topical BID    niMODipine  60 mg Oral 6 times per day    enoxaparin  30 mg Subcutaneous Daily    atorvastatin  20 mg Oral Nightly    hydrocortisone   Rectal BID    potassium bicarb-citric acid  40 mEq Oral BID    calcium gluconate  1 g Intravenous Once    pantoprazole  40 mg Intravenous BID    And    sodium chloride (PF)  10 mL Intravenous BID    vitamin B-1  100 mg Per NG tube Daily    folic acid  1 mg Per NG tube Daily    lidocaine 1 % injection  5 mL Intradermal Once    [Held by provider] sennosides-docusate sodium  2 tablet Oral Daily    sodium chloride flush  10 mL Intravenous 2 times per day    ferrous sulfate  325 mg Oral Daily with breakfast    sodium chloride flush  10 mL Intravenous 2 times per day     Continuous Infusions:    CBC:   Recent Labs     02/26/20  0439 02/28/20  0531   WBC 10.7 8.9   HGB 8.1* 7.5*    433     BMP:    Recent Labs     02/26/20  0439 02/28/20  0531    130*   K 4.7 4.9   * 102   CO2 18* 19*   BUN 15 21   CREATININE 0.56 0.67   GLUCOSE 107* 110*     Hepatic:   No results for input(s): AST, ALT, ALB, BILITOT, ALKPHOS in the last 72 hours.   Troponin: No results

## 2020-02-29 PROCEDURE — 2580000003 HC RX 258: Performed by: STUDENT IN AN ORGANIZED HEALTH CARE EDUCATION/TRAINING PROGRAM

## 2020-02-29 PROCEDURE — 99232 SBSQ HOSP IP/OBS MODERATE 35: CPT | Performed by: INTERNAL MEDICINE

## 2020-02-29 PROCEDURE — 6370000000 HC RX 637 (ALT 250 FOR IP): Performed by: STUDENT IN AN ORGANIZED HEALTH CARE EDUCATION/TRAINING PROGRAM

## 2020-02-29 PROCEDURE — 6370000000 HC RX 637 (ALT 250 FOR IP): Performed by: NURSE PRACTITIONER

## 2020-02-29 PROCEDURE — 99233 SBSQ HOSP IP/OBS HIGH 50: CPT | Performed by: PSYCHIATRY & NEUROLOGY

## 2020-02-29 PROCEDURE — C9113 INJ PANTOPRAZOLE SODIUM, VIA: HCPCS | Performed by: STUDENT IN AN ORGANIZED HEALTH CARE EDUCATION/TRAINING PROGRAM

## 2020-02-29 PROCEDURE — 6360000002 HC RX W HCPCS: Performed by: STUDENT IN AN ORGANIZED HEALTH CARE EDUCATION/TRAINING PROGRAM

## 2020-02-29 PROCEDURE — 2060000000 HC ICU INTERMEDIATE R&B

## 2020-02-29 PROCEDURE — 6370000000 HC RX 637 (ALT 250 FOR IP): Performed by: PSYCHIATRY & NEUROLOGY

## 2020-02-29 PROCEDURE — 6360000002 HC RX W HCPCS: Performed by: INTERNAL MEDICINE

## 2020-02-29 PROCEDURE — 2580000003 HC RX 258: Performed by: NURSE PRACTITIONER

## 2020-02-29 PROCEDURE — 94761 N-INVAS EAR/PLS OXIMETRY MLT: CPT

## 2020-02-29 RX ADMIN — PRAMIPEXOLE DIHYDROCHLORIDE 0.12 MG: 0.25 TABLET ORAL at 21:38

## 2020-02-29 RX ADMIN — ANTI-FUNGAL POWDER MICONAZOLE NITRATE TALC FREE: 1.42 POWDER TOPICAL at 08:41

## 2020-02-29 RX ADMIN — MAGNESIUM GLUCONATE 500 MG ORAL TABLET 800 MG: 500 TABLET ORAL at 08:44

## 2020-02-29 RX ADMIN — ENOXAPARIN SODIUM 30 MG: 30 INJECTION SUBCUTANEOUS at 08:35

## 2020-02-29 RX ADMIN — SODIUM CHLORIDE, PRESERVATIVE FREE 10 ML: 5 INJECTION INTRAVENOUS at 08:42

## 2020-02-29 RX ADMIN — FERROUS SULFATE TAB EC 325 MG (65 MG FE EQUIVALENT) 325 MG: 325 (65 FE) TABLET DELAYED RESPONSE at 08:35

## 2020-02-29 RX ADMIN — HYDROCORTISONE 2.5%: 25 CREAM TOPICAL at 08:37

## 2020-02-29 RX ADMIN — Medication 10 ML: at 08:38

## 2020-02-29 RX ADMIN — ATORVASTATIN CALCIUM 20 MG: 20 TABLET, FILM COATED ORAL at 21:38

## 2020-02-29 RX ADMIN — HYDROCORTISONE 2.5%: 25 CREAM TOPICAL at 21:39

## 2020-02-29 RX ADMIN — PANTOPRAZOLE SODIUM 40 MG: 40 INJECTION, POWDER, LYOPHILIZED, FOR SOLUTION INTRAVENOUS at 21:39

## 2020-02-29 RX ADMIN — PANTOPRAZOLE SODIUM 40 MG: 40 INJECTION, POWDER, LYOPHILIZED, FOR SOLUTION INTRAVENOUS at 08:36

## 2020-02-29 RX ADMIN — NIMODIPINE 60 MG: 30 CAPSULE ORAL at 04:05

## 2020-02-29 RX ADMIN — Medication 100 MG: at 08:35

## 2020-02-29 RX ADMIN — NIMODIPINE 60 MG: 30 CAPSULE ORAL at 12:49

## 2020-02-29 RX ADMIN — POTASSIUM BICARBONATE 40 MEQ: 782 TABLET, EFFERVESCENT ORAL at 08:39

## 2020-02-29 RX ADMIN — SODIUM CHLORIDE, PRESERVATIVE FREE 10 ML: 5 INJECTION INTRAVENOUS at 21:52

## 2020-02-29 RX ADMIN — NIMODIPINE 60 MG: 30 CAPSULE ORAL at 18:20

## 2020-02-29 RX ADMIN — Medication 10 ML: at 21:46

## 2020-02-29 RX ADMIN — NIMODIPINE 60 MG: 30 CAPSULE ORAL at 21:39

## 2020-02-29 RX ADMIN — FOLIC ACID 1 MG: 1 TABLET ORAL at 08:35

## 2020-02-29 RX ADMIN — NIMODIPINE 60 MG: 30 CAPSULE ORAL at 08:35

## 2020-02-29 RX ADMIN — DARBEPOETIN ALFA 200 MCG: 200 INJECTION, SOLUTION INTRAVENOUS; SUBCUTANEOUS at 09:37

## 2020-02-29 RX ADMIN — ANTI-FUNGAL POWDER MICONAZOLE NITRATE TALC FREE: 1.42 POWDER TOPICAL at 21:39

## 2020-02-29 RX ADMIN — POTASSIUM BICARBONATE 40 MEQ: 782 TABLET, EFFERVESCENT ORAL at 21:38

## 2020-02-29 RX ADMIN — NIMODIPINE 60 MG: 30 CAPSULE ORAL at 00:22

## 2020-02-29 RX ADMIN — SODIUM CHLORIDE, PRESERVATIVE FREE 10 ML: 5 INJECTION INTRAVENOUS at 08:45

## 2020-02-29 ASSESSMENT — PAIN SCALES - GENERAL
PAINLEVEL_OUTOF10: 0

## 2020-02-29 NOTE — PROGRESS NOTES
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 Severe esophagitis and moderate size hemorrhoids. Recommend Anusol and PPI regimen. Last blood transfusion 2/17/2020  Jehova's witness        Interim History:  Patient overall doing well, no fevers, respiratory rate improved, not tachycardic, blood pressure stable. He is to have some hyponatremia decreased bicarb. Slight elevation in BUN however still within normal limits. No leukocytosis, hemoglobin slight drop from 8.1-7.5. [place] cell count decreased from 9.3-6.0. Immature reticulocyte factor also decreasing from 29-23.7. Overall though patient does feel better, no acute issues. Admits to having some appetite, but does not like the food here. Has been having 1-2 shakes per day. HISTORY OF PRESENT ILLNESS:      This is a 80-year-old female with past medical history of hypertension, was transferred from [place] for subarachnoid hemorrhage. History provided by patient's daughter who reported that patient had a recent dental procedure and was given anesthetics and subsequently her blood pressure went significantly high. Patient complains of mild facial droop and her strokelike symptoms and her CT scan showed subarachnoid hemorrhage predominantly in the anterior interhemispheric fissure. Patient is Baptism and she was noted to have low hemoglobin. Patient and family refusing further blood transfusion and therefore hematology was consulted.     Past Medical History:   has a past medical history of Acid tablet Take 1 tablet by mouth daily 6/4/19   Thi Beltrán MD   Multiple Vitamins-Minerals (THERAPEUTIC MULTIVITAMIN-MINERALS) tablet Take 1 tablet by mouth daily    Historical Provider, MD     Current Facility-Administered Medications   Medication Dose Route Frequency Provider Last Rate Last Dose    magnesium oxide (MAG-OX) tablet 800 mg  800 mg Oral Daily Yousuf Ruiz DO   800 mg at 02/29/20 0844    pramipexole (MIRAPEX) tablet 0.125 mg  0.125 mg Oral Nightly Valentino Cox MD   0.125 mg at 02/28/20 2024    miconazole (MICOTIN) 2 % powder   Topical BID Rolando Plane, APRN - CNP        niMODipine (NIMOTOP) capsule 60 mg  60 mg Oral 6 times per day RolandoRAEGAN Jane - CNP   60 mg at 02/29/20 0835    enoxaparin (LOVENOX) injection 30 mg  30 mg Subcutaneous Daily Yousuf Ruiz DO   30 mg at 02/29/20 2353    atorvastatin (LIPITOR) tablet 20 mg  20 mg Oral Nightly Jose Angel Rubi MD   20 mg at 02/28/20 2024    fentaNYL (SUBLIMAZE) injection 50 mcg  50 mcg Intravenous Q4H PRN Yousuf Ruiz DO        hydrocortisone (ANUSOL-HC) 2.5 % rectal cream   Rectal BID Ambrosior Caridad Helm MD        potassium bicarb-citric acid (EFFER-K) effervescent tablet 40 mEq  40 mEq Oral BID Jacob Hawkins MD   40 mEq at 02/29/20 7941    calcium gluconate 1 g in dextrose 5% 100 mL IVPB  1 g Intravenous Once Yousuf Ruiz DO        pantoprazole (PROTONIX) injection 40 mg  40 mg Intravenous BID Yousuf Ruiz DO   40 mg at 02/29/20 4815    And    sodium chloride (PF) 0.9 % injection 10 mL  10 mL Intravenous BID Yousuf Ruiz DO   10 mL at 02/29/20 9469    vitamin B-1 (THIAMINE) tablet 100 mg  100 mg Per NG tube Daily Yousuf Ruiz DO   100 mg at 39/94/88 6442    folic acid (FOLVITE) tablet 1 mg  1 mg Per NG tube Daily Yousuf Ruiz DO   1 mg at 02/29/20 0835    lidocaine 1 % injection 5 mL  5 mL Intradermal Once Yousuf Ruiz DO        heparin flush 100 UNIT/ML injection 100 Units  100 Units Intravenous PRN Jewel A Sonia Kirk, APRN - CNP   100 Units at 02/19/20 1837    [Held by provider] sennosides-docusate sodium (SENOKOT-S) 8.6-50 MG tablet 2 tablet  2 tablet Oral Daily Jayden Lente, APRN - CNP   2 tablet at 02/16/20 1004    [Held by provider] magnesium hydroxide (MILK OF MAGNESIA) 400 MG/5ML suspension 30 mL  30 mL Oral Daily PRN Jayden Lente, APRN - CNP        sodium chloride flush 0.9 % injection 10 mL  10 mL Intravenous 2 times per day Yousuf Ruiz, DO   10 mL at 02/29/20 0842    sodium chloride flush 0.9 % injection 10 mL  10 mL Intravenous PRN Yousuf Ruiz, DO        ferrous sulfate EC tablet 325 mg  325 mg Oral Daily with breakfast [place], APRN - CNP   325 mg at 02/29/20 2982    [Held by provider] docusate sodium (COLACE) capsule 100 mg  100 mg Oral Daily PRN Harrington Hashimoto, MD        sodium chloride flush 0.9 % injection 10 mL  10 mL Intravenous 2 times per day Jayden Lente, APRN - CNP   10 mL at 02/29/20 0845    sodium chloride flush 0.9 % injection 10 mL  10 mL Intravenous PRN Jayden Lente, APRN - CNP        acetaminophen (TYLENOL) tablet 650 mg  650 mg Oral Q4H PRN Jayden Lente, APRN - CNP   650 mg at 02/21/20 2357    ondansetron (ZOFRAN) injection 4 mg  4 mg Intravenous Q6H PRN Jayden Lente, APRN - CNP   4 mg at 02/22/20 0042       Allergies:  Lisinopril; Losartan; and Procardia [nifedipine]    Social History:   reports that she has never smoked. She has never used smokeless tobacco. She reports current alcohol use. She reports that she does not use drugs. Family History: family history includes Breast Cancer in her sister; Cancer in her mother; Heart Attack in her father. REVIEW OF SYSTEMS:      Constitutional: No fever or chills.  No night sweats, no weight loss   Eyes: No eye discharge, double vision, or eye pain   HEENT: negative for sore mouth, sore throat, hoarseness and voice change   Respiratory: negative for cough , sputum, dyspnea, wheezing, hemoptysis, chest pain Cardiovascular: negative for chest pain, dyspnea, palpitations, orthopnea, PND   Gastrointestinal: negative for nausea, vomiting, diarrhea, constipation, abdominal pain, Dysphagia, hematemesis and hematochezia   Genitourinary: negative for frequency, dysuria, nocturia, urinary incontinence, and hematuria   Integument: negative for rash, skin lesions, bruises.    Hematologic/Lymphatic: negative for easy bruising, bleeding, lymphadenopathy, or petechiae   Endocrine: negative for heat or cold intolerance,weight changes, change in bowel habits and hair loss   Musculoskeletal: negative for myalgias, arthralgias, pain, joint swelling,and bone pain   Neurological: negative for headaches, dizziness, seizures, weakness, numbness    PHYSICAL EXAM:        BP (!) 113/51   Pulse 96   Temp 99.2 °F (37.3 °C) (Oral)   Resp 20   Ht 5' 6\" (1.676 m)   Wt 105 lb 2.6 oz (47.7 kg)   SpO2 97%   BMI 16.97 kg/m²    Temp (24hrs), Av.6 °F (37 °C), Min:97.7 °F (36.5 °C), Max:99.2 °F (37.3 °C)      General appearance - well appearing, no in pain or distress   Mental status - alert and cooperative   Eyes - pupils equal and reactive, extraocular eye movements intact   Ears - bilateral TM's and external ear canals normal   Mouth - mucous membranes moist, pharynx normal without lesions   Neck - supple, no significant adenopathy   Lymphatics - no palpable lymphadenopathy, no hepatosplenomegaly   Chest - clear to auscultation, no wheezes, rales or rhonchi, symmetric air entry   Heart - normal rate, regular rhythm, normal S1, S2, no murmurs  Abdomen - soft, nontender, nondistended, no masses or organomegaly   Neurological - alert, oriented, normal speech, no focal findings or movement disorder noted   Musculoskeletal - no joint tenderness, deformity or swelling   Extremities - peripheral pulses normal, no pedal edema, no clubbing or cyanosis   Skin - normal coloration and turgor, no rashes, no suspicious skin lesions noted ,      DATA: Labs:     CBC:   Recent Labs     02/28/20  0531   WBC 8.9   HGB 7.5*   HCT 27.0*        BMP:   Recent Labs     02/28/20  0531   *   K 4.9   CO2 19*   BUN 21   CREATININE 0.67   LABGLOM >60   GLUCOSE 110*     PT/INR: No results for input(s): PROTIME, INR in the last 72 hours. APTT:No results for input(s): APTT in the last 72 hours. LIVER PROFILE:No results for input(s): AST, ALT, LABALBU in the last 72 hours. IMAGING DATA:  Transcranial doppler 2/25/20:   Conclusions        Summary        Normal transcranial Doppler study.        Signature     Barium swallow 2/24/20:  No laryngeal penetration or aspiration of administered consistencies. IMPRESSION:   1. Arachnoid hemorrhage status post embolization of cerebral aneurysm  2. Iron deficiency anemia due to chronic blood loss  3. Taoism  4. Poor oral intake, malnutrition BMI 17.5  5. Bilateral pneumonia    RECOMMENDATIONS:  I had a detailed discussion with the patient and personally went over the results of lab workup imaging studies and other relevant clinical data. 1. We will plan for 1 dose of Aranesp today 200 mcg. Patient is on ferrous sulfate 325 daily by primary team  2. Recommend continuing Lovenox for DVT prophylaxis  3. Continue to monitor hemoglobin hematocrit  4. Encourage oral intake  5. Currently on Protonix 40 mg IV twice daily per primary and GI  6. We will continue to follow  7. Will discuss case with attending. Discussed with patient and Nurse. Thank you for asking us to see this patient. Lawrence Ahumada, MD      Department of Internal Medicine  [address] [place] [address]         2/29/2020, 10:28 AM  Attending Physician Statement  The patient was seen and examined during rounds, I have discussed the care of John Lou, including pertinent history and exam findings with the resident. I have reviewed the key elements of all parts of the encounter with the resident.   I agree

## 2020-02-29 NOTE — PROGRESS NOTES
Daily Progress Note  Neuro Critical Care    Patient Name: Magali Alvarez  Patient : 1950  Room/Bed: 1210/4561-77  Code Status: Full Code  Allergies: Allergies   Allergen Reactions    Lisinopril Other (See Comments)    Losartan      Fatigue    Procardia [Nifedipine] Other (See Comments)       CHIEF COMPLAINT:     Headache and nausea/vomiting      INTERVAL HISTORY    Initial Presentation (Admitted ): The patient is a 75 y. o. female presented with a history of HTN who presents as a a transfer from [facility] [address] for [facility].   Patient was at the dentist around 1030AM when she had a sudden thunderclap headache, diaphoresis, and nausea/vomiting.  Patient's daughter received a call from the dentist around 1030AM stating the patient was drowsy and out of it. [facility] was taken to [address] [place], [place] [address],[address] ED where CT Head showed subarachnoid hemorrhage predominately in the anterior interhemispheric fissure.  Patient was complaining of severe headache and vomiting at outlying ED.  Loaded with 1g Keppra, given 2mg Morphine and Zofran, started on Labetalol infusion for blood pressure control and transferred to [facility] for further management.  On arrival to [facility] ED, patient is lethargic. [facility] is able to state her name and that she's at the hospital.  No dysarthria or aphasia. PERRL 3mm.  EOMI.  Mild right facial droop. Moving all extremities equally. Repeat CT Head stable SAH and ventricular system.  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 Neuro endovascular planning on taking to lab.  Family reports patient's father passed of an abdominal aortic aneurysm.  Of note,  patient's daughter reports she is POA and family confirms that this paperwork has been completed.     Em&Jeff: 3, Modified Britton: [facility] Course:   : Underwent coil embolization of ruptured right A2 SENTHIL aneurysm resulting in complete obliteration.   : CT Head showed bilateral SAH with new intraventricular extension, developing hydrocephalus, interval worsening of diffuse cerebral edema, significantly increased hyperattenuation in bilateral medial frontal regions; increased hemorrhage vs contrast. Started on 2% hypertonic saline with goal sodium 150. Neurosurgery following for EVD watch. 2/14: More alert. TCD normal. Continues of 2%.  CT Head showed decreased SAH, subtle rightward shift. 2/15: Hypoxic overnight with increasing O2 requirements.  Placed on non-rebreather with continued desaturations. CT chest PE protocol showed severe dense consolidations in the bilateral lower lobes suggestive of severe pneumonia, moderate bilateral pleural effusions.  Given 20mg IVP Lasix x1.  Started on Vanc and Zosyn.  Placed on biPAP with improvements.  Transitioned to high flow O2 in the AM.  Urine culture +E. Coli.  Blood culture sent.  Mobility, IS, and acapella encouraged.    2/16: Hypoxic again overnight despite being on high flow nasal canula 90% FiO2, 30LPM.  She was placed on biPAP.  Repeat CXR revealed multifocal airspace disease with worsening right lower lobe consolidation and pulmonary edema.  Given 20mg IVP Lasix x1.  Manual chest PT performed.  Continue on Vancomycin and Zosyn.  Mobilization, incentive spirometry, and acapella encouraged.  Sodium 143, goal changed to 145.  Continue 2% at 50cc/hr, will not increase rate in setting of possible pulmonary edema.  Add salt tabs 1g BID as tolerated.    2/17: Hypoxic throughout day on high flow, increased dyspnea. HH 6.0 refusing blood d/t being Sh[employer]. Hem/Onc consulted; recommend minimum blood draws, Iron replacement. Full Code. Positive stool occult blood; GI consulted. 2/18: Art line DC d/t oozing, sodium checks daily, minimize blood draws.  H&H 6.0. Started on Protonix drip per GI. Hem/Onc ordered Aranespx1 and IV Iron replacement. 2/19: H&H 5.6, C. difficile negative, NM RBC scan neg.   2/20: HH inc to 7.0.   Electively intubated for continued hypoxic respiratory failure and for endoscopy. GI performed EGD and sigmoidoscopy- mod to severe esophagitis, external hemorrhoids.  Given Lasix x1.  2/21: HH 5.1, family refusing ABGs.  UOP 3.4 L   2/22: HH 5.0, switch to EOD labs.  Lasix 40 mg x 1 reticulocyte 5%  2/23: Extubated, 2 L O2 sat 99%. 2/24: Mild desaturation to 90% on room air while talking, remains on 2 L.  H&H 7.0, reticulocyte 9.3%. OK to restart DVT ppx. Passed videoswallow. 2/25:  Passed videoswallow yesterday; General diet soft and bite sized. Frequent liquid stools overnight, hold further Magnesium infusions. Weaned off O2  2/28: equal strength BLE. Awaiting precert    Last 60L:   No acute events overnight. Patient doing well with PT and OT, denies leg weakness. Patient tolerating diet without difficulties, afebrile. Hemoglobin repeat was 7.5 yesterday with reticulocyte 6.3%. Vital signs normal. Some issues with incontinence, but no LE numbness. Some redness to groin area but no infection.     CURRENT MEDICATIONS:  SCHEDULED MEDICATIONS:   darbepoetin keenan-polysorbate  200 mcg Subcutaneous Once    pramipexole  0.125 mg Oral Nightly    miconazole   Topical BID    niMODipine  60 mg Oral 6 times per day    enoxaparin  30 mg Subcutaneous Daily    atorvastatin  20 mg Oral Nightly    hydrocortisone   Rectal BID    potassium bicarb-citric acid  40 mEq Oral BID    calcium gluconate  1 g Intravenous Once    pantoprazole  40 mg Intravenous BID    And    sodium chloride (PF)  10 mL Intravenous BID    vitamin B-1  100 mg Per NG tube Daily    folic acid  1 mg Per NG tube Daily    lidocaine 1 % injection  5 mL Intradermal Once    [Held by provider] sennosides-docusate sodium  2 tablet Oral Daily    sodium chloride flush  10 mL Intravenous 2 times per day    ferrous sulfate  325 mg Oral Daily with breakfast    sodium chloride flush  10 mL Intravenous 2 times per day     CONTINUOUS INFUSIONS:    PRN MEDICATIONS:   fentanNYL, heparin flush, cannula  Improved mobility with improved left lower extremity strength  Continue nimodipine therapy  Lovenox subcu  Tolerating oral diet  PMR team evaluating and recommends IPR, precert pending   Patient to be mobilized out of neuro ICU  She is day 15 of her subarachnoid hemorrhage. TCD studies from 2/25 reviewed and with no elevated velocities.      Maria T Haider MD

## 2020-02-29 NOTE — PROGRESS NOTES
ENDOVASCULAR NEUROSURGERY PROGRESS NOTE  2/29/2020 4:10 PM  Subjective:   Admit Date: 2/12/2020  PCP: Tara Vargas MD    Patient examined stable, hemoglobin stable patient is [employer] and being treated for iron deficiency anemia    Objective:   Vitals: BP (!) 112/52   Pulse 99   Temp 100 °F (37.8 °C) (Oral)   Resp 27   Ht 5' 6\" (1.676 m)   Wt 105 lb 2.6 oz (47.7 kg)   SpO2 96%   BMI 16.97 kg/m²       General appearance: Extubated  Lungs: Respite distress noted. Heart: RRR  Neuro exam: opens her eyes spontaneously, she is following simple commands. Able to move both upper extremities against gravity. He is able to move both lower extremities against gravity. Intact sensation to simple touch in both upper and lower extremities.     Medications and labs:   Scheduled Meds:   magnesium oxide  800 mg Oral Daily    pramipexole  0.125 mg Oral Nightly    miconazole   Topical BID    niMODipine  60 mg Oral 6 times per day    enoxaparin  30 mg Subcutaneous Daily    atorvastatin  20 mg Oral Nightly    hydrocortisone   Rectal BID    potassium bicarb-citric acid  40 mEq Oral BID    calcium gluconate  1 g Intravenous Once    pantoprazole  40 mg Intravenous BID    And    sodium chloride (PF)  10 mL Intravenous BID    vitamin B-1  100 mg Per NG tube Daily    folic acid  1 mg Per NG tube Daily    lidocaine 1 % injection  5 mL Intradermal Once    [Held by provider] sennosides-docusate sodium  2 tablet Oral Daily    sodium chloride flush  10 mL Intravenous 2 times per day    ferrous sulfate  325 mg Oral Daily with breakfast    sodium chloride flush  10 mL Intravenous 2 times per day     Continuous Infusions:    CBC:   Recent Labs     02/28/20  0531   WBC 8.9   HGB 7.5*        BMP:    Recent Labs     02/28/20  0531   *   K 4.9      CO2 19*   BUN 21   CREATININE 0.67   GLUCOSE 110*     Hepatic:   No results for input(s): AST, ALT, ALB, BILITOT, ALKPHOS in the last 72

## 2020-02-29 NOTE — PLAN OF CARE
Problem: Pain:  Goal: Pain level will decrease  Description  Pain level will decrease  2/29/2020 1432 by Laron Drummond RN  Outcome: Ongoing  2/29/2020 0516 by Anita Deng RN  Outcome: Met This Shift  Goal: Control of acute pain  Description  Control of acute pain  2/29/2020 1432 by Laron Drummond RN  Outcome: Ongoing  2/29/2020 0516 by Anita Deng RN  Outcome: Met This Shift  Goal: Control of chronic pain  Description  Control of chronic pain  2/29/2020 1432 by Laron Drummond RN  Outcome: Ongoing  2/29/2020 0516 by Anita Deng RN  Outcome: Met This Shift  Goal: Patient's pain/discomfort is manageable  Description  Patient's pain/discomfort is manageable  2/29/2020 1432 by Laron Drummond RN  Outcome: Ongoing  2/29/2020 0516 by Anita Deng RN  Outcome: Met This Shift     Problem: Infection:  Goal: Will remain free from infection  Description  Will remain free from infection  2/29/2020 1432 by Laron Drummond RN  Outcome: Ongoing  2/29/2020 0516 by Anita Deng RN  Outcome: Met This Shift     Problem: Safety:  Goal: Free from accidental physical injury  Description  Free from accidental physical injury  2/29/2020 1432 by Laron Drummond RN  Outcome: Ongoing  2/29/2020 0516 by Anita Deng RN  Outcome: Met This Shift  Goal: Free from intentional harm  Description  Free from intentional harm  2/29/2020 1432 by Laron Drummond RN  Outcome: Ongoing  2/29/2020 0516 by Anita Deng RN  Outcome: Met This Shift     Problem: Daily Care:  Goal: Daily care needs are met  Description  Daily care needs are met  2/29/2020 1432 by Laron Drummond RN  Outcome: Ongoing  2/29/2020 0516 by Anita Deng RN  Outcome: Met This Shift     Problem: Skin Integrity:  Goal: Skin integrity will stabilize  Description  Skin integrity will stabilize  2/29/2020 1432 by Laron Drummond RN  Outcome: Ongoing  2/29/2020 0516 by Anita Degn RN  Outcome: Met This Shift     Problem: Discharge Planning:  Goal: Patients continuum of care needs are met  Description  Patients

## 2020-02-29 NOTE — PLAN OF CARE
Patient remained free from injury. Patient verbalized understanding of need for the safety precautions. Demonstrates proper use of assistive devices. Bed remains in the lowest position. Call light remains within reach. [place] [facility] in use. Pain level assessment complete. Pt rated pain at 01/10. Pt educated on pain scale and control interventions. Pt instructed to call out with new onset of pain or unrelieved pain. Will continue to monitor. Neuro assessment completed, fall precautions in place, aspirations precautions in place, assess for barriers in communication and mobility, interventions to assist in communication and mobility in place, encouraged to call for assistance, adaptive devices used as needed, assess emotional state and support offered, encouraged patient to communicate by available means, and support systems included in patient care.     Problem: Pain:  Goal: Pain level will decrease  Description  Pain level will decrease  2/29/2020 0516 by Birdie Martinez RN  Outcome: Met This Shift  2/28/2020 1715 by Justus Drummond RN  Outcome: Ongoing  2/28/2020 1642 by Justus Drummond RN  Outcome: Ongoing  Goal: Control of acute pain  Description  Control of acute pain  2/29/2020 0516 by Birdie Martinez RN  Outcome: Met This Shift  2/28/2020 1715 by Justus Drummond RN  Outcome: Ongoing  2/28/2020 1642 by Justus Drummond RN  Outcome: Ongoing  Goal: Control of chronic pain  Description  Control of chronic pain  2/29/2020 0516 by Birdie Martinez RN  Outcome: Met This Shift  2/28/2020 1715 by Justus Drummond RN  Outcome: Ongoing  2/28/2020 1642 by Justus Drummond RN  Outcome: Ongoing  Goal: Patient's pain/discomfort is manageable  Description  Patient's pain/discomfort is manageable  2/29/2020 0516 by Birdie Martinez RN  Outcome: Met This Shift  2/28/2020 1715 by Justus Drummond RN  Outcome: Ongoing  2/28/2020 1642 by Justus Drummond RN  Outcome: Ongoing     Problem: Infection:  Goal: Will remain free from infection  Description  Will remain free from infection  2/29/2020 0516 by Malissa Glasgow RN  Outcome: Met This Shift  2/28/2020 1715 by Constantino Ramirez RN  Outcome: Ongoing  2/28/2020 1642 by Constantino Ramirez RN  Outcome: Ongoing     Problem: Safety:  Goal: Free from accidental physical injury  Description  Free from accidental physical injury  2/29/2020 0516 by Malissa Glasgow RN  Outcome: Met This Shift  2/28/2020 1715 by Constantino Ramirez RN  Outcome: Ongoing  2/28/2020 1642 by Constantino Ramirez RN  Outcome: Ongoing  Goal: Free from intentional harm  Description  Free from intentional harm  2/29/2020 0516 by Malissa Glasgow RN  Outcome: Met This Shift  2/28/2020 1715 by Constantino Ramirez RN  Outcome: Ongoing  2/28/2020 1642 by Constantino Ramirez RN  Outcome: Ongoing     Problem: Daily Care:  Goal: Daily care needs are met  Description  Daily care needs are met  2/29/2020 0516 by Malissa Glasgow RN  Outcome: Met This Shift  2/28/2020 1715 by Constantino Ramirez RN  Outcome: Ongoing  2/28/2020 1642 by Constantino Ramirez RN  Outcome: Ongoing     Problem: Skin Integrity:  Goal: Skin integrity will stabilize  Description  Skin integrity will stabilize  2/29/2020 0516 by Malissa Glasgow RN  Outcome: Met This Shift  2/28/2020 1715 by Constantino Ramirez RN  Outcome: Ongoing  2/28/2020 1642 by Constantino Ramirez RN  Outcome: Ongoing     Problem: Discharge Planning:  Goal: Patients continuum of care needs are met  Description  Patients continuum of care needs are met  2/29/2020 0516 by Malissa Glasgow RN  Outcome: Met This Shift  2/28/2020 1715 by Constantino Ramirez RN  Outcome: Ongoing  2/28/2020 1642 by Constantino Ramirez RN  Outcome: Ongoing     Problem: Falls - Risk of:  Goal: Will remain free from falls  Description  Will remain free from falls  2/29/2020 0516 by Malissa Glasgow RN  Outcome: Met This Shift  2/28/2020 1715 by Constantino Ramirez RN  Outcome: Ongoing  2/28/2020 1642 by Sohail Armando Lydia Tucker RN  Outcome: Ongoing  Goal: Absence of physical injury  Description  Absence of physical injury  2/29/2020 0516 by Ranjit Mott RN  Outcome: Met This Shift  2/28/2020 1715 by Guero Shipman RN  Outcome: Ongoing  2/28/2020 1642 by Guero Shipman RN  Outcome: Ongoing     Problem: Skin Integrity:  Goal: Will show no infection signs and symptoms  Description  Will show no infection signs and symptoms  2/29/2020 0516 by Ranjit Mott RN  Outcome: Met This Shift  2/28/2020 1715 by Guero Shipman RN  Outcome: Ongoing  2/28/2020 1642 by Guero Shipman RN  Outcome: Ongoing  Goal: Absence of new skin breakdown  Description  Absence of new skin breakdown  2/29/2020 0516 by Ranjit Mott RN  Outcome: Met This Shift  2/28/2020 1715 by Guero Shipman RN  Outcome: Ongoing  2/28/2020 1642 by Guero Shipman RN  Outcome: Ongoing     Problem: Musculor/Skeletal Functional Status  Goal: Highest potential functional level  2/29/2020 0516 by Ranjit Mott RN  Outcome: Met This Shift  2/28/2020 1715 by Guero Shipman RN  Outcome: Ongoing  2/28/2020 1642 by Guero Shipman RN  Outcome: Ongoing     Problem: HEMODYNAMIC STATUS  Goal: Patient has stable vital signs and fluid balance  2/29/2020 0516 by Ranjit Mott RN  Outcome: Met This Shift  2/28/2020 1715 by Guero Shipman RN  Outcome: Ongoing  2/28/2020 1642 by Guero Shipman RN  Outcome: Ongoing     Problem: Gas Exchange - Impaired:  Goal: Levels of oxygenation will improve  Description  Levels of oxygenation will improve  2/29/2020 0516 by Ranjit Mott RN  Outcome: Met This Shift  2/28/2020 1715 by Guero Shipman RN  Outcome: Ongoing  2/28/2020 1642 by Guero Shipman RN  Outcome: Ongoing     Problem: Risk for Impaired Skin Integrity  Goal: Tissue integrity - skin and mucous membranes  Description  Structural intactness and normal physiological function of skin and  mucous membranes.   2/29/2020 0516 by Ranjit Mott

## 2020-03-01 LAB
ABSOLUTE EOS #: 0.16 K/UL (ref 0–0.44)
ABSOLUTE IMMATURE GRANULOCYTE: 0.08 K/UL (ref 0–0.3)
ABSOLUTE LYMPH #: 1.07 K/UL (ref 1.1–3.7)
ABSOLUTE MONO #: 0.82 K/UL (ref 0.1–1.2)
ABSOLUTE RETIC #: 0.12 M/UL (ref 0.03–0.08)
ANION GAP SERPL CALCULATED.3IONS-SCNC: 12 MMOL/L (ref 9–17)
BASOPHILS # BLD: 0 % (ref 0–2)
BASOPHILS ABSOLUTE: 0 K/UL (ref 0–0.2)
BUN BLDV-MCNC: 29 MG/DL (ref 8–23)
BUN/CREAT BLD: ABNORMAL (ref 9–20)
CALCIUM SERPL-MCNC: 8.8 MG/DL (ref 8.6–10.4)
CHLORIDE BLD-SCNC: 100 MMOL/L (ref 98–107)
CO2: 22 MMOL/L (ref 20–31)
CREAT SERPL-MCNC: 0.7 MG/DL (ref 0.5–0.9)
DIFFERENTIAL TYPE: ABNORMAL
EOSINOPHILS RELATIVE PERCENT: 2 % (ref 1–4)
GFR AFRICAN AMERICAN: >60 ML/MIN
GFR NON-AFRICAN AMERICAN: >60 ML/MIN
GFR SERPL CREATININE-BSD FRML MDRD: ABNORMAL ML/MIN/{1.73_M2}
GFR SERPL CREATININE-BSD FRML MDRD: ABNORMAL ML/MIN/{1.73_M2}
GLUCOSE BLD-MCNC: 108 MG/DL (ref 70–99)
GLUCOSE BLD-MCNC: 81 MG/DL (ref 65–105)
HCT VFR BLD CALC: 26.4 % (ref 36.3–47.1)
HEMOGLOBIN: 8 G/DL (ref 11.9–15.1)
IMMATURE GRANULOCYTES: 1 %
IMMATURE RETIC FRACT: 24.3 % (ref 2.7–18.3)
LYMPHOCYTES # BLD: 13 % (ref 24–43)
MAGNESIUM: 2.2 MG/DL (ref 1.6–2.6)
MCH RBC QN AUTO: 28.4 PG (ref 25.2–33.5)
MCHC RBC AUTO-ENTMCNC: 30.3 G/DL (ref 28.4–34.8)
MCV RBC AUTO: 93.6 FL (ref 82.6–102.9)
MONOCYTES # BLD: 10 % (ref 3–12)
MORPHOLOGY: ABNORMAL
NRBC AUTOMATED: 0 PER 100 WBC
PDW BLD-RTO: 23.8 % (ref 11.8–14.4)
PLATELET # BLD: 508 K/UL (ref 138–453)
PLATELET ESTIMATE: ABNORMAL
PMV BLD AUTO: 10.5 FL (ref 8.1–13.5)
POTASSIUM SERPL-SCNC: 4.2 MMOL/L (ref 3.7–5.3)
POTASSIUM SERPL-SCNC: 5.6 MMOL/L (ref 3.7–5.3)
RBC # BLD: 2.82 M/UL (ref 3.95–5.11)
RBC # BLD: ABNORMAL 10*6/UL
RETIC %: 4.3 % (ref 0.5–1.9)
RETIC HEMOGLOBIN: 29.9 PG (ref 28.2–35.7)
SEG NEUTROPHILS: 74 % (ref 36–65)
SEGMENTED NEUTROPHILS ABSOLUTE COUNT: 6.07 K/UL (ref 1.5–8.1)
SODIUM BLD-SCNC: 134 MMOL/L (ref 135–144)
WBC # BLD: 8.2 K/UL (ref 3.5–11.3)
WBC # BLD: ABNORMAL 10*3/UL

## 2020-03-01 PROCEDURE — 6370000000 HC RX 637 (ALT 250 FOR IP): Performed by: NURSE PRACTITIONER

## 2020-03-01 PROCEDURE — 94761 N-INVAS EAR/PLS OXIMETRY MLT: CPT

## 2020-03-01 PROCEDURE — 84132 ASSAY OF SERUM POTASSIUM: CPT

## 2020-03-01 PROCEDURE — 36415 COLL VENOUS BLD VENIPUNCTURE: CPT

## 2020-03-01 PROCEDURE — 99233 SBSQ HOSP IP/OBS HIGH 50: CPT | Performed by: PSYCHIATRY & NEUROLOGY

## 2020-03-01 PROCEDURE — 85045 AUTOMATED RETICULOCYTE COUNT: CPT

## 2020-03-01 PROCEDURE — 80048 BASIC METABOLIC PNL TOTAL CA: CPT

## 2020-03-01 PROCEDURE — 97535 SELF CARE MNGMENT TRAINING: CPT

## 2020-03-01 PROCEDURE — 6360000002 HC RX W HCPCS: Performed by: STUDENT IN AN ORGANIZED HEALTH CARE EDUCATION/TRAINING PROGRAM

## 2020-03-01 PROCEDURE — 99232 SBSQ HOSP IP/OBS MODERATE 35: CPT | Performed by: INTERNAL MEDICINE

## 2020-03-01 PROCEDURE — C9113 INJ PANTOPRAZOLE SODIUM, VIA: HCPCS | Performed by: STUDENT IN AN ORGANIZED HEALTH CARE EDUCATION/TRAINING PROGRAM

## 2020-03-01 PROCEDURE — 82947 ASSAY GLUCOSE BLOOD QUANT: CPT

## 2020-03-01 PROCEDURE — 6370000000 HC RX 637 (ALT 250 FOR IP): Performed by: STUDENT IN AN ORGANIZED HEALTH CARE EDUCATION/TRAINING PROGRAM

## 2020-03-01 PROCEDURE — 85025 COMPLETE CBC W/AUTO DIFF WBC: CPT

## 2020-03-01 PROCEDURE — 97110 THERAPEUTIC EXERCISES: CPT

## 2020-03-01 PROCEDURE — 2580000003 HC RX 258: Performed by: STUDENT IN AN ORGANIZED HEALTH CARE EDUCATION/TRAINING PROGRAM

## 2020-03-01 PROCEDURE — 6370000000 HC RX 637 (ALT 250 FOR IP): Performed by: PSYCHIATRY & NEUROLOGY

## 2020-03-01 PROCEDURE — 97530 THERAPEUTIC ACTIVITIES: CPT

## 2020-03-01 PROCEDURE — 97116 GAIT TRAINING THERAPY: CPT

## 2020-03-01 PROCEDURE — 83735 ASSAY OF MAGNESIUM: CPT

## 2020-03-01 PROCEDURE — 2580000003 HC RX 258: Performed by: NURSE PRACTITIONER

## 2020-03-01 PROCEDURE — 2060000000 HC ICU INTERMEDIATE R&B

## 2020-03-01 RX ORDER — DEXTROSE MONOHYDRATE 25 G/50ML
25 INJECTION, SOLUTION INTRAVENOUS ONCE
Status: COMPLETED | OUTPATIENT
Start: 2020-03-01 | End: 2020-03-01

## 2020-03-01 RX ADMIN — NIMODIPINE 60 MG: 30 CAPSULE ORAL at 08:26

## 2020-03-01 RX ADMIN — ATORVASTATIN CALCIUM 20 MG: 20 TABLET, FILM COATED ORAL at 19:51

## 2020-03-01 RX ADMIN — NIMODIPINE 60 MG: 30 CAPSULE ORAL at 00:03

## 2020-03-01 RX ADMIN — PANTOPRAZOLE SODIUM 40 MG: 40 INJECTION, POWDER, LYOPHILIZED, FOR SOLUTION INTRAVENOUS at 08:26

## 2020-03-01 RX ADMIN — NIMODIPINE 60 MG: 30 CAPSULE ORAL at 17:07

## 2020-03-01 RX ADMIN — Medication 10 ML: at 08:27

## 2020-03-01 RX ADMIN — SODIUM CHLORIDE, PRESERVATIVE FREE 10 ML: 5 INJECTION INTRAVENOUS at 08:27

## 2020-03-01 RX ADMIN — PRAMIPEXOLE DIHYDROCHLORIDE 0.12 MG: 0.25 TABLET ORAL at 19:51

## 2020-03-01 RX ADMIN — ANTI-FUNGAL POWDER MICONAZOLE NITRATE TALC FREE: 1.42 POWDER TOPICAL at 08:28

## 2020-03-01 RX ADMIN — ENOXAPARIN SODIUM 30 MG: 30 INJECTION SUBCUTANEOUS at 08:27

## 2020-03-01 RX ADMIN — HYDROCORTISONE 2.5%: 25 CREAM TOPICAL at 19:48

## 2020-03-01 RX ADMIN — Medication 10 ML: at 19:49

## 2020-03-01 RX ADMIN — PANTOPRAZOLE SODIUM 40 MG: 40 INJECTION, POWDER, LYOPHILIZED, FOR SOLUTION INTRAVENOUS at 19:50

## 2020-03-01 RX ADMIN — SODIUM CHLORIDE, PRESERVATIVE FREE 10 ML: 5 INJECTION INTRAVENOUS at 08:28

## 2020-03-01 RX ADMIN — CALCIUM GLUCONATE 1 G: 98 INJECTION, SOLUTION INTRAVENOUS at 11:35

## 2020-03-01 RX ADMIN — FOLIC ACID 1 MG: 1 TABLET ORAL at 08:27

## 2020-03-01 RX ADMIN — ANTI-FUNGAL POWDER MICONAZOLE NITRATE TALC FREE: 1.42 POWDER TOPICAL at 19:51

## 2020-03-01 RX ADMIN — NIMODIPINE 60 MG: 30 CAPSULE ORAL at 19:49

## 2020-03-01 RX ADMIN — DEXTROSE MONOHYDRATE 25 G: 500 INJECTION PARENTERAL at 11:35

## 2020-03-01 RX ADMIN — FERROUS SULFATE TAB EC 325 MG (65 MG FE EQUIVALENT) 325 MG: 325 (65 FE) TABLET DELAYED RESPONSE at 08:27

## 2020-03-01 RX ADMIN — INSULIN HUMAN 10 UNITS: 100 INJECTION, SOLUTION PARENTERAL at 11:55

## 2020-03-01 RX ADMIN — NIMODIPINE 60 MG: 30 CAPSULE ORAL at 11:36

## 2020-03-01 RX ADMIN — SODIUM CHLORIDE, PRESERVATIVE FREE 10 ML: 5 INJECTION INTRAVENOUS at 19:52

## 2020-03-01 RX ADMIN — HYDROCORTISONE 2.5%: 25 CREAM TOPICAL at 08:27

## 2020-03-01 RX ADMIN — NIMODIPINE 60 MG: 30 CAPSULE ORAL at 04:18

## 2020-03-01 RX ADMIN — NIMODIPINE 60 MG: 30 CAPSULE ORAL at 23:52

## 2020-03-01 RX ADMIN — MAGNESIUM GLUCONATE 500 MG ORAL TABLET 800 MG: 500 TABLET ORAL at 08:26

## 2020-03-01 RX ADMIN — Medication 100 MG: at 08:26

## 2020-03-01 ASSESSMENT — PAIN SCALES - GENERAL
PAINLEVEL_OUTOF10: 0
PAINLEVEL_OUTOF10: 0

## 2020-03-01 ASSESSMENT — PAIN SCALES - WONG BAKER: WONGBAKER_NUMERICALRESPONSE: 0

## 2020-03-01 NOTE — PLAN OF CARE
Problem: Pain:  Goal: Pain level will decrease  Description  Pain level will decrease  3/1/2020 0412 by Bishop Keesha RN  Outcome: Ongoing  Goal: Control of acute pain  Description  Control of acute pain  3/1/2020 0412 by Bishop Keesha RN  Outcome: Ongoing  Goal: Control of chronic pain  Description  Control of chronic pain  3/1/2020 0412 by Bishop Keesha RN  Outcome: Ongoing  Goal: Patient's pain/discomfort is manageable  Description  Patient's pain/discomfort is manageable  3/1/2020 0412 by Bishop Keesha RN  Outcome: Ongoing    Problem: Infection:  Goal: Will remain free from infection  Description  Will remain free from infection  3/1/2020 0412 by Bishop Keesha RN  Outcome: Ongoing     Problem: Safety:  Goal: Free from accidental physical injury  Description  Free from accidental physical injury  3/1/2020 0412 by Bishop Keesha RN  Outcome: Ongoing  Goal: Free from intentional harm  Description  Free from intentional harm  3/1/2020 0412 by Bishop Keesha RN  Outcome: Ongoing     Problem: Daily Care:  Goal: Daily care needs are met  Description  Daily care needs are met  3/1/2020 0412 by Bishop Keesha RN  Outcome: Ongoing     Problem: Skin Integrity:  Goal: Skin integrity will stabilize  Description  Skin integrity will stabilize  3/1/2020 0412 by Bishop Keesha RN  Outcome: Ongoing     Problem: Discharge Planning:  Goal: Patients continuum of care needs are met  Description  Patients continuum of care needs are met  3/1/2020 0412 by Bishop Keesha RN  Outcome: Ongoing     Problem: Falls - Risk of:  Goal: Will remain free from falls  Description  Will remain free from falls  3/1/2020 0412 by Bishop Keesha RN  Outcome: Ongoing  Goal: Absence of physical injury  Description  Absence of physical injury  3/1/2020 0412 by Bishop Keesha RN  Outcome: Ongoing     Problem: Skin Integrity:  Goal: Will show no infection signs and symptoms  Description  Will show no infection signs and symptoms  3/1/2020 0412 by Bishop Thao RN  Outcome: Ongoing  Goal: Absence of new skin breakdown  Description  Absence of new skin breakdown  3/1/2020 0412 by Laquita Leal RN  Outcome: Ongoing     Problem: Musculor/Skeletal Functional Status  Goal: Highest potential functional level  3/1/2020 0412 by Laquita Leal RN  Outcome: Ongoing     Problem: HEMODYNAMIC STATUS  Goal: Patient has stable vital signs and fluid balance  3/1/2020 0412 by Laquita Leal RN  Outcome: Ongoing     Problem: Gas Exchange - Impaired:  Goal: Levels of oxygenation will improve  Description  Levels of oxygenation will improve  3/1/2020 0412 by Laquita Leal RN  Outcome: Ongoing     Problem: Risk for Impaired Skin Integrity  Goal: Tissue integrity - skin and mucous membranes  Description  Structural intactness and normal physiological function of skin and  mucous membranes.   3/1/2020 0412 by Laquita Leal RN  Outcome: Ongoing     Problem: Nutrition  Goal: Optimal nutrition therapy  3/1/2020 0412 by Laquita Leal RN  Outcome: Ongoing

## 2020-03-01 NOTE — PROGRESS NOTES
2/20/2020); and sigmoidoscopy (N/A, 2/20/2020). Restrictions  Restrictions/Precautions  Restrictions/Precautions: Fall Risk, General Precautions  Required Braces or Orthoses?: No  Position Activity Restriction  Other position/activity restrictions: up with assist  Subjective   General  Patient assessed for rehabilitation services?: Yes  Family / Caregiver Present: Yes(multiple family members present at beginning and end of session)  Diagnosis: SENTHIL aneurysm coiling, B/L SAH  General Comment  Comments: RN and pt agreeable to therapy this day  Pain Assessment  Pain Assessment: 0-10  Pain Level: 0  Vital Signs  Patient Currently in Pain: No  Oxygen Therapy  SpO2: 98 %   Orientation  Orientation  Overall Orientation Status: Within Functional Limits  Objective    ADL  Grooming: Stand by assistance;Setup; Increased time to complete(hair combing completed standing at mirror)  LE Dressing: Minimal assistance;Setup;Verbal cueing; Increased time to complete(to doff/don brief simulated as underwear req assistance with post pull up)  Toileting: Contact guard assistance;Setup; Increased time to complete(for personal hygeine and clothing management)   Balance  Sitting Balance: Supervision  Standing Balance: Contact guard assistance  Standing Balance  Time: pt tolerated approx 10 min  Activity: during ADLs and mobility  Comment: utilizing RW  Functional Mobility  Functional - Mobility Device: Rolling Walker  Activity: To/from bathroom; item retrieval and transport task  Assist Level: Contact guard assistance  Functional Mobility Comments: pt req verbal instruction on proper hand placement and walker management  Toilet Transfers  Toilet - Technique: Ambulating  Equipment Used: Standard toilet  Toilet Transfer: Minimal assistance  Toilet Transfers Comments: utilizing grab bars, pt req increased assistance for sit to stand  Bed mobility  Comment: pt seated in chair at start of session, retiring to chair at session

## 2020-03-01 NOTE — PROGRESS NOTES
Daily Progress Note  Neuro Critical Care    Patient Name: Azeb Shultz  Patient : 1950  Room/Bed: 9099/4914-61  Code Status: Full  Allergies: Allergies   Allergen Reactions    Lisinopril Other (See Comments)    Losartan      Fatigue    Procardia [Nifedipine] Other (See Comments)       CHIEF COMPLAINT:     Headache     INTERVAL HISTORY      Initial Presentation (Admitted ): The patient is a 75 y. o. female presented with a history of HTN who presents as a a transfer from [address] [place] [address],[address] for [facility].   Patient was at the dentist around 1030AM when she had a sudden thunderclap headache, diaphoresis, and nausea/vomiting.  Patient's daughter received a call from the dentist around 1030AM stating the patient was drowsy and out of it. Emelina Toro was taken to [address] [place] [address],[address] ED where CT Head showed subarachnoid hemorrhage predominately in the anterior interhemispheric fissure.  Patient was complaining of severe headache and vomiting at outlying ED.  Loaded with 1g Keppra, given 2mg Morphine and Zofran, started on Labetalol infusion for blood pressure control and transferred to [facility] for further management.  On arrival to [facility] ED, patient is lethargic. Emelina Toro is able to state her name and that she's at the hospital.  No dysarthria or aphasia. PERRL 3mm.  EOMI.  Mild right facial droop. Moving all extremities equally. Repeat CT Head stable SAH and ventricular system.  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 Neuro endovascular planning on taking to lab.  Family reports patient's father passed of an abdominal aortic aneurysm.  Of note,  patient's daughter reports she is POA and family confirms that this paperwork has been completed.     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   : CT Head showed bilateral SAH with new intraventricular extension, developing hydrocephalus, °F (36.8 °C)  Max: 99.1 °F (37.3 °C)  BP Range: Systolic (79MSM), OTJ:627 , Min:102 , LMM:699     Diastolic (69GMQ), ERZ:90, Min:43, Max:62    Pulse Range: Pulse  Av.5  Min: 87  Max: 101  Respiration Range: Resp  Av.5  Min: 21  Max: 25  Current Pulse Ox: SpO2: 98 %  24HR Pulse Ox Range: SpO2  Av %  Min: 98 %  Max: 100 %  Patient Vitals for the past 12 hrs:   BP Temp Temp src Pulse Resp SpO2   20 1553 -- -- -- -- -- 98 %   20 1541 (!) 119/58 98.5 °F (36.9 °C) Oral 87 23 100 %   20 1228 (!) 113/43 98.4 °F (36.9 °C) Oral 93 24 100 %   20 0809 121/62 99.1 °F (37.3 °C) Oral 99 21 98 %     Estimated body mass index is 16.97 kg/m² as calculated from the following:    Height as of this encounter: 5' 6\" (1.676 m). Weight as of this encounter: 105 lb 2.6 oz (47.7 kg).  []<16 Severe malnutrition  []16-16.99 Moderate malnutrition  []17-18.49 Mild malnutrition  []18.5-24.9 Normal  []25-29.9 Overweight (not obese)  []30-34.9 Obese class 1 (Low Risk)  []35-39.9 Obese class 2 (Moderate Risk)  []?40 Obese class 3 (High Risk)    RECENT LABS:   Lab Results   Component Value Date    WBC 8.2 2020    HGB 8.0 (L) 2020    HCT 26.4 (L) 2020     (H) 2020    CHOL 102 2020    TRIG 100 2020    HDL 21 (L) 2020    ALT 29 02/15/2020    AST 47 (H) 02/15/2020     (L) 2020    K 5.6 (H) 2020     2020    CREATININE 0.70 2020    BUN 29 (H) 2020    CO2 22 2020    TSH 5.94 (H) 02/15/2020    INR 1.2 2020    LABA1C 5.6 2020     24 HOUR INTAKE/OUTPUT:No intake or output data in the 24 hours ending 20 1703    IMAGING:     Place summary of recent imaging here. Labs and Images reviewed with:  [] Dr. Elisa Delgado. Lauro    [] Dr. Gaudencio Eugene  [] Dr. Mikal Han  [] There are no new interval images to review.      PHYSICAL EXAM       CONSTITUTIONAL:  Well developed, well nourished, alert and oriented x 3, in no acute distress. GCS 15. Nontoxic. No dysarthria. No aphasia. HEAD:  normocephalic, atraumatic    EYES:  PERRLA, EOMI.   ENT:  moist mucous membranes   NECK:  supple, symmetric   LUNGS:  Equal air entry bilaterally   CARDIOVASCULAR:  normal s1 / s2, RRR, distal pulses intact   ABDOMEN:  Soft, no rigidity   NEUROLOGIC:  Mental Status:  A & O x3,awake             Cranial Nerves:    cranial nerves II-XII are grossly intact    Motor Exam:    Drift:  absent  Tone:  normal    Motor exam is symmetrical 5 out of 5 all extremities bilaterally    Sensory:    Touch:    Right Upper Extremity:  normal  Left Upper Extremity:  normal  Right Lower Extremity:  normal  Left Lower Extremity:  normal           DRAINS:  [x] There are no drains for Neuro Critical Care to monitor at this time.      ASSESSMENT AND PLAN:       NEUROLOGIC:  -Acute subarachnoid hemorrhage in setting of right DCA aneurysm rupture, status post coil embolization on 2/12  -Continue nimodipine for 21 days  - Goal SBP less than 160,  showed no vasospasm  -Neuro endovascular following  - Neuro checks per protocol    CARDIOVASCULAR:  - Goal SBP less than 160  -EKG unremarkable  -Echo showed EF of 55%  -Continue Lipitor  - Continue telemetry    PULMONARY:  -Saturating well on room air  -Completed course of vancomycin and Zosyn for multifocal pneumonia      RENAL/FLUID/ELECTROLYTE:  - BUN 29 / Creatinine 0.70  -Voiding independently  - Replace electrolytes PRN  -Potassium 5.6, will give calcium gluconate, D5 and insulin  -Repeat potassium at 1 PM  - Daily BMP    GI/NUTRITION:  NUTRITION:  DIET GENERAL; Dysphagia Soft and Bite-Sized  Dietary Nutrition Supplements: Standard High Calorie Oral Supplement  - Bowel regimen: Magnesium oxide  - GI prophylaxis: Protonix IV  -GI consulted for bright red blood per rectum, status post EGD and sigmoidoscopy on 2/20 which was significant for esophagitis and external hemorrhoids    ID:  -Afebrile  - WBC 8.2  - Continue to monitor for fevers  - Daily CBC    HEME:   - H&H 8  - Platelets 061  - Daily CBC    ENDOCRINE:  - Continue to monitor blood glucose, goal <180  -Not required insulin    OTHER:  - PT/OT/ST recommending continued therapy upon discharge, awaiting pre-CERT for acute rehab facility  - Code Status: Full    PROPHYLAXIS:  Stress ulcer: PPI    DVT PROPHYLAXIS:  -Lovenox    DISPOSITION:  [] To remain ICU: Stepdown status  [] OK for out of ICU from Neuro Critical Care standpoint    We will continue to follow along. For any changes in exam or patient status please contact Neuro Critical Care.       Chey Yi MD  Neuro Critical Care  Pager [phone]  3/1/2020     5:03 PM

## 2020-03-01 NOTE — PLAN OF CARE
Problem: Falls - Risk of:  Goal: Will remain free from falls  Description  Will remain free from falls  3/1/2020 1609 by Gurdeep Vinson RN  Outcome: Ongoing  Note:   Patient remained free from injury. Patient verbalized understanding of need for the safety precautions. Demonstrates proper use of assistive devices. Bed remains in the lowest position. Call light remains within reach. [place] [employer] in use. Problem: HEMODYNAMIC STATUS  Goal: Patient has stable vital signs and fluid balance  3/1/2020 1609 by Gurdeep Vinson RN  Outcome: Ongoing  Note:   Neuro assessment completed, fall precautions in place, aspirations precautions in place, assess for barriers in communication and mobility, interventions to assist in communication and mobility in place, encouraged to call for assistance, adaptive devices used as needed, assess emotional state and support offered, encouraged patient to communicate by available means, and support systems included in patient care.

## 2020-03-02 PROCEDURE — 99232 SBSQ HOSP IP/OBS MODERATE 35: CPT | Performed by: PSYCHIATRY & NEUROLOGY

## 2020-03-02 PROCEDURE — 2580000003 HC RX 258: Performed by: NURSE PRACTITIONER

## 2020-03-02 PROCEDURE — 2060000000 HC ICU INTERMEDIATE R&B

## 2020-03-02 PROCEDURE — 6370000000 HC RX 637 (ALT 250 FOR IP): Performed by: NURSE PRACTITIONER

## 2020-03-02 PROCEDURE — 6370000000 HC RX 637 (ALT 250 FOR IP): Performed by: PSYCHIATRY & NEUROLOGY

## 2020-03-02 PROCEDURE — 99233 SBSQ HOSP IP/OBS HIGH 50: CPT | Performed by: PSYCHIATRY & NEUROLOGY

## 2020-03-02 PROCEDURE — 99231 SBSQ HOSP IP/OBS SF/LOW 25: CPT | Performed by: INTERNAL MEDICINE

## 2020-03-02 PROCEDURE — C9113 INJ PANTOPRAZOLE SODIUM, VIA: HCPCS | Performed by: STUDENT IN AN ORGANIZED HEALTH CARE EDUCATION/TRAINING PROGRAM

## 2020-03-02 PROCEDURE — 97130 THER IVNTJ EA ADDL 15 MIN: CPT

## 2020-03-02 PROCEDURE — 97129 THER IVNTJ 1ST 15 MIN: CPT

## 2020-03-02 PROCEDURE — 97535 SELF CARE MNGMENT TRAINING: CPT

## 2020-03-02 PROCEDURE — 2580000003 HC RX 258: Performed by: STUDENT IN AN ORGANIZED HEALTH CARE EDUCATION/TRAINING PROGRAM

## 2020-03-02 PROCEDURE — 6360000002 HC RX W HCPCS: Performed by: STUDENT IN AN ORGANIZED HEALTH CARE EDUCATION/TRAINING PROGRAM

## 2020-03-02 PROCEDURE — 6370000000 HC RX 637 (ALT 250 FOR IP): Performed by: STUDENT IN AN ORGANIZED HEALTH CARE EDUCATION/TRAINING PROGRAM

## 2020-03-02 RX ORDER — NIMODIPINE 30 MG/1
60 CAPSULE, LIQUID FILLED ORAL EVERY 4 HOURS
Qty: 168 CAPSULE | Refills: 0 | Status: SHIPPED | OUTPATIENT
Start: 2020-03-02 | End: 2020-03-03

## 2020-03-02 RX ADMIN — ANTI-FUNGAL POWDER MICONAZOLE NITRATE TALC FREE: 1.42 POWDER TOPICAL at 08:39

## 2020-03-02 RX ADMIN — Medication 100 MG: at 08:33

## 2020-03-02 RX ADMIN — SODIUM CHLORIDE, PRESERVATIVE FREE 10 ML: 5 INJECTION INTRAVENOUS at 21:13

## 2020-03-02 RX ADMIN — HYDROCORTISONE 2.5%: 25 CREAM TOPICAL at 08:34

## 2020-03-02 RX ADMIN — SODIUM CHLORIDE, PRESERVATIVE FREE 10 ML: 5 INJECTION INTRAVENOUS at 08:39

## 2020-03-02 RX ADMIN — Medication 10 ML: at 21:13

## 2020-03-02 RX ADMIN — FOLIC ACID 1 MG: 1 TABLET ORAL at 08:33

## 2020-03-02 RX ADMIN — NIMODIPINE 60 MG: 30 CAPSULE ORAL at 08:34

## 2020-03-02 RX ADMIN — MAGNESIUM GLUCONATE 500 MG ORAL TABLET 800 MG: 500 TABLET ORAL at 08:33

## 2020-03-02 RX ADMIN — PRAMIPEXOLE DIHYDROCHLORIDE 0.12 MG: 0.25 TABLET ORAL at 21:10

## 2020-03-02 RX ADMIN — NIMODIPINE 60 MG: 30 CAPSULE ORAL at 14:10

## 2020-03-02 RX ADMIN — PANTOPRAZOLE SODIUM 40 MG: 40 INJECTION, POWDER, LYOPHILIZED, FOR SOLUTION INTRAVENOUS at 08:33

## 2020-03-02 RX ADMIN — NIMODIPINE 60 MG: 30 CAPSULE ORAL at 17:23

## 2020-03-02 RX ADMIN — Medication 10 ML: at 08:39

## 2020-03-02 RX ADMIN — ENOXAPARIN SODIUM 30 MG: 30 INJECTION SUBCUTANEOUS at 08:32

## 2020-03-02 RX ADMIN — ATORVASTATIN CALCIUM 20 MG: 20 TABLET, FILM COATED ORAL at 21:10

## 2020-03-02 RX ADMIN — ANTI-FUNGAL POWDER MICONAZOLE NITRATE TALC FREE: 1.42 POWDER TOPICAL at 21:14

## 2020-03-02 RX ADMIN — HYDROCORTISONE 2.5%: 25 CREAM TOPICAL at 21:14

## 2020-03-02 RX ADMIN — FERROUS SULFATE TAB EC 325 MG (65 MG FE EQUIVALENT) 325 MG: 325 (65 FE) TABLET DELAYED RESPONSE at 08:34

## 2020-03-02 RX ADMIN — NIMODIPINE 60 MG: 30 CAPSULE ORAL at 21:10

## 2020-03-02 RX ADMIN — PANTOPRAZOLE SODIUM 40 MG: 40 INJECTION, POWDER, LYOPHILIZED, FOR SOLUTION INTRAVENOUS at 21:12

## 2020-03-02 RX ADMIN — NIMODIPINE 60 MG: 30 CAPSULE ORAL at 03:33

## 2020-03-02 ASSESSMENT — PAIN SCALES - GENERAL
PAINLEVEL_OUTOF10: 0
PAINLEVEL_OUTOF10: 0

## 2020-03-02 NOTE — PROGRESS NOTES
interval worsening of diffuse cerebral edema, significantly increased hyperattenuation in bilateral medial frontal regions; increased hemorrhage vs contrast. Started on 2% hypertonic saline with goal sodium 150. Neurosurgery following for EVD watch. 2/14: More alert. TCD normal. Continues of 2%.  CT Head showed decreased SAH, subtle rightward shift. 2/15: Hypoxic overnight with increasing O2 requirements.  Placed on non-rebreather with continued desaturations. CT chest PE protocol showed severe dense consolidations in the bilateral lower lobes suggestive of severe pneumonia, moderate bilateral pleural effusions.  Given 20mg IVP Lasix x1.  Started on Vanc and Zosyn.  Placed on biPAP with improvements.  Transitioned to high flow O2 in the AM.  Urine culture +E. Coli.  Blood culture sent.  Mobility, IS, and acapella encouraged.    2/16: Hypoxic again overnight despite being on high flow nasal canula 90% FiO2, 30LPM.  She was placed on biPAP.  Repeat CXR revealed multifocal airspace disease with worsening right lower lobe consolidation and pulmonary edema.  Given 20mg IVP Lasix x1.  Manual chest PT performed.  Continue on Vancomycin and Zosyn.  Mobilization, incentive spirometry, and acapella encouraged.  Sodium 143, goal changed to 145.  Continue 2% at 50cc/hr, will not increase rate in setting of possible pulmonary edema.  Add salt tabs 1g BID as tolerated.    2/17: Hypoxic throughout day on high flow, increased dyspnea. HH 6.0 refusing blood d/t being [employer]. Hem/Onc consulted; recommend minimum blood draws, Iron replacement.  Full Code. Positive stool occult blood; GI consulted. 2/18: Art line DC d/t oozing, sodium checks daily, minimize blood draws.  H&H 6.0.  Started on Protonix drip per GI.  Hem/Onc ordered Aranespx1 and IV Iron replacement.   2/19: H&H 5.6, C. difficile negative, NM RBC scan neg.   2/20: HH inc to 7.0.  Electively intubated for continued hypoxic respiratory failure and for endoscopy. GI performed EGD and sigmoidoscopy- mod to severe esophagitis, external hemorrhoids.  Given Lasix x1.  2/21: HH 5.1, family refusing ABGs.  UOP 3.4 L   2/22: HH 5.0, switch to EOD labs.  Lasix 40 mg x 1 reticulocyte 5%  2/23: Extubated, 2 L O2 sat 99%. 2/24: Mild desaturation to 90% on room air while talking, remains on 2 L.  H&H 7.0, reticulocyte 9.3%. OK to restart DVT ppx.  Passed videoswallow. 2/25:  Passed videoswallow yesterday; General diet soft and bite sized.  Frequent liquid stools overnight, hold further Magnesium infusions.  Weaned off O2  2/28: equal strength BLE. Awaiting precert  3/1: K+ 5.6, Ca-gluc/Insulin-dextrose,  repeat 4.2. HH 8.0. Redosed Aranesp    Last 24h:   No acute events overnight. The patient has been eating and drinking without problem. She had bowel movement yesterday. The patient reports improvement in her breathing.  Working with PT/OT well    CURRENT MEDICATIONS:  SCHEDULED MEDICATIONS:   magnesium oxide  800 mg Oral Daily    pramipexole  0.125 mg Oral Nightly    miconazole   Topical BID    niMODipine  60 mg Oral 6 times per day    enoxaparin  30 mg Subcutaneous Daily    atorvastatin  20 mg Oral Nightly    hydrocortisone   Rectal BID    calcium gluconate  1 g Intravenous Once    pantoprazole  40 mg Intravenous BID    And    sodium chloride (PF)  10 mL Intravenous BID    vitamin B-1  100 mg Per NG tube Daily    folic acid  1 mg Per NG tube Daily    lidocaine 1 % injection  5 mL Intradermal Once    [Held by provider] sennosides-docusate sodium  2 tablet Oral Daily    sodium chloride flush  10 mL Intravenous 2 times per day    ferrous sulfate  325 mg Oral Daily with breakfast    sodium chloride flush  10 mL Intravenous 2 times per day     CONTINUOUS INFUSIONS:    PRN MEDICATIONS:   fentanNYL, heparin flush, [Held by provider] magnesium hydroxide, sodium chloride flush, [Held by provider] docusate sodium, sodium chloride flush, acetaminophen, monitor for fevers    HEME:   redose Aranesp 2/29  Retic 4.3%  HH 8.0  plt 508    ENDOCRINE:  - Continue to monitor blood glucose, goal <180  -Not required insulin    OTHER:  - PT/OT/ST recommending continued therapy upon discharge, awaiting pre-CERT for acute rehab facility  - Code Status: Full    PROPHYLAXIS:  Stress ulcer: PPI    DVT PROPHYLAXIS:  -Lovenox    DISPOSITION:  [] To remain ICU: Stepdown status  [x] OK for out of ICU from Neuro Critical Care standpoint    We will continue to follow along. Awaiting precert    For any changes in exam or patient status please contact Neuro Critical Care.       Brett Gallegos DO  Neuro Critical Care  Pager [phone]  3/2/2020     6:44 AM

## 2020-03-02 NOTE — PROGRESS NOTES
Nutrition Assessment    Type and Reason for Visit: Reassess    Nutrition Recommendations: Will continue Soft and Bite Sized diet and Ensure supplements all trays. Nutrition Assessment: Pt is consuming around 50% of food provided and likes Ensure (no chocolate). Pt was very unhappy that her breakfast tray came at 11:00    Malnutrition Assessment:  · Malnutrition Status: Meets the criteria for moderate malnutrition  · Context: Acute illness or injury  · Findings of the 6 clinical characteristics of malnutrition (Minimum of 2 out of 6 clinical characteristics is required to make the diagnosis of moderate or severe Protein Calorie Malnutrition based on AND/ASPEN Guidelines):  1. Energy Intake-Greater than 75% of estimated energy requirement, Greater than or equal to 7 days    2. Weight Loss-7.5% loss or greater, (x 2 months per EHR)  3. Fat Loss-Mild subcutaneous fat loss, Orbital  4. Muscle Loss-Mild muscle mass loss, Clavicles (pectoralis and deltoids), Temples (temporalis muscle)  5. Fluid Accumulation-No significant fluid accumulation, (-)  6.  Strength-Not measured    Nutrition Risk Level:  Moderate    Nutrient Needs:  · Estimated Daily Total Kcal: 7883-2800 kcals/day  · Estimated Daily Protein (g): 70 gm/day    Nutrition Diagnosis:   · Problem: Inadequate oral intake  · Etiology: related to (Appetite, SOB, taste preferences)     Signs and symptoms:  as evidenced by (Variable PO intake, need for ONS)    Objective Information:  · Wound Type: None(Redness - buttocks/coccyx noted)  · Current Nutrition Therapies:  · Oral Diet Orders: Dysphagia  Soft and Bite-Sized (Dysphagia 3)   · Oral Diet intake: 26-50%  · Oral Nutrition Supplement (ONS) Orders: Standard High Calorie Oral Supplement  · ONS intake: %  · Anthropometric Measures:  · Ht: 5' 6\" (167.6 cm)   · Current Body Wt: 97 lb (44 kg)  · Admission Body Wt: 108 lb (49 kg)(stated)  · Usual Body Wt: 114 lb (51.7 kg)(per EHR)  · % Weight Change:  ,  14% loss x 2 months per EHR  · Ideal Body Wt: 135 lb (61.2 kg), % Ideal Body 78%  · BMI Classification: BMI <18.5 Underweight    Nutrition Interventions:   Continue current diet, Continue current ONS  Continued Inpatient Monitoring, Education Not Indicated    Nutrition Evaluation:   · Evaluation: Progressing toward goals   · Goals: Meet % of estimated nutrient needs.     · Monitoring: Meal Intake, Supplement Intake, Chewing/Swallowing, Monitor Bowel Function      Electronically signed by Emeli Chapa RD, LD on 3/2/20 at 3:02 PM    Contact Number: [phone]

## 2020-03-02 NOTE — PROGRESS NOTES
dentures, and Wears glasses. Past Surgical History:   has a past surgical history that includes Tonsillectomy and adenoidectomy (1956); Cholecystectomy; Inner ear surgery; Knee arthroscopy (2000, 2005); cyst removal (1980); Colonoscopy (12/09); Breast biopsy (1980); Foot surgery (Bilateral); other surgical history (Right, 9-4-14); Umbilical hernia repair; hernia repair; Varicose vein surgery (Bilateral); Knee arthroscopy (Left, 4/3/2019); Colonoscopy (N/A, 5/31/2019); other surgical history (02/12/2020);   picc powerpicc double (2/20/2020); Upper gastrointestinal endoscopy (N/A, 2/20/2020); and sigmoidoscopy (N/A, 2/20/2020). Medications:    Prior to Admission medications    Medication Sig Start Date End Date Taking?  Authorizing Provider   pramipexole (MIRAPEX) 0.125 MG tablet TAKE ONE TABLET BY MOUTH NIGHTLY 2/19/20   Sharon Delgado MD   diclofenac (VOLTAREN) 75 MG EC tablet Take 1 tablet by mouth 2 times daily (with meals) for 14 days 1/21/20 2/4/20  Magda Galvez MD   hydrALAZINE (APRESOLINE) 50 MG tablet TAKE 1 TABLET BY MOUTH 4 TIMES A DAY 12/26/19   Sharon Delgado MD   meloxicam (MOBIC) 15 MG tablet TAKE 1 TABLET BY MOUTH ONE TIME A DAY 12/12/19   Camilla Gee MD   carvedilol (COREG) 3.125 MG tablet Take 1 tablet by mouth 2 times daily 11/5/19   Sharon Delgado MD   atorvastatin (LIPITOR) 40 MG tablet Take 1 tablet by mouth nightly 10/15/19   Sharon Delgado MD   acetaminophen (TYLENOL) 325 MG tablet Take 325 mg by mouth every 6 hours as needed for Pain    Historical Provider, MD   vitamin D (CHOLECALCIFEROL) 1000 UNIT TABS tablet Take 1,000 Units by mouth 3 times daily    Historical Provider, MD   meloxicam (MOBIC) 15 MG tablet Take 15 mg by mouth daily    Historical Provider, MD   aspirin 81 MG EC tablet Take 1 tablet by mouth daily 6/4/19   Darryle Hawthorn, MD   Multiple Vitamins-Minerals (THERAPEUTIC MULTIVITAMIN-MINERALS) tablet Take 1 tablet by mouth daily    Historical Provider, MD Current Facility-Administered Medications   Medication Dose Route Frequency Provider Last Rate Last Dose    magnesium oxide (MAG-OX) tablet 800 mg  800 mg Oral Daily Yousuf Ruiz DO   800 mg at 03/01/20 8780    pramipexole (MIRAPEX) tablet 0.125 mg  0.125 mg Oral Nightly Mikel Benson MD   0.125 mg at 03/1950    miconazole (MICOTIN) 2 % powder   Topical BID RAEGAN Francois CNP        niMODipine (NIMOTOP) capsule 60 mg  60 mg Oral 6 times per day RAEGAN Francois CNP   60 mg at 03/01/20 1949    enoxaparin (LOVENOX) injection 30 mg  30 mg Subcutaneous Daily Yousuf Ruiz DO   30 mg at 03/01/20 0827    atorvastatin (LIPITOR) tablet 20 mg  20 mg Oral Nightly Bhavin Nicholas MD   20 mg at 03/1950    fentaNYL (SUBLIMAZE) injection 50 mcg  50 mcg Intravenous Q4H PRN Yousuf Ruiz DO        hydrocortisone (ANUSOL-HC) 2.5 % rectal cream   Rectal BID Ambrosior Dominique Rowley MD        calcium gluconate 1 g in dextrose 5% 100 mL IVPB  1 g Intravenous Once Yousuf Ruiz DO        pantoprazole (PROTONIX) injection 40 mg  40 mg Intravenous BID Yousuf Ruiz DO   40 mg at 03/01/20 1950    And    sodium chloride (PF) 0.9 % injection 10 mL  10 mL Intravenous BID Yousuf Ruiz DO   10 mL at 03/01/20 1949    vitamin B-1 (THIAMINE) tablet 100 mg  100 mg Per NG tube Daily Yousuf Ruiz DO   100 mg at 08/67/93 5026    folic acid (FOLVITE) tablet 1 mg  1 mg Per NG tube Daily Yousuf Ruiz DO   1 mg at 03/01/20 0827    lidocaine 1 % injection 5 mL  5 mL Intradermal Once Yousuf Ruiz DO        heparin flush 100 UNIT/ML injection 100 Units  100 Units Intravenous PRN RAEGAN Cristina CNP   100 Units at 02/19/20 1837    [Held by provider] sennosides-docusate sodium (SENOKOT-S) 8.6-50 MG tablet 2 tablet  2 tablet Oral Daily RAEGAN Francois CNP   2 tablet at 02/16/20 1004    [Held by provider] magnesium hydroxide (MILK OF MAGNESIA) 400 MG/5ML suspension 30 mL  30 mL Oral Daily PRN

## 2020-03-02 NOTE — PROGRESS NOTES
Today's Date: 3/2/2020  Patient Name: Julius Sandhoff  Patient's age: 77 y.o., 1950      Reason for Consult: management recommendations and anemia    CHIEF COMPLAINT:  CVA    History Obtained From:  patient, electronic medical record      Interim History:  Patient has been doing better, no acute issues with AMS. Has been tolerating medication well. States she has been working with physical therapy as well as Occupational Therapy. Fevers overnight, slight tachypnea however upon evaluation patient was breathing normally, no tachycardia, blood pressure stable 116/56 last checked. No labs from this morning however patient does not have any overt signs of GI bleed. Denies any nausea, emesis, any bloody emesis, any hematemesis, dark tarry stools. HISTORY OF PRESENT ILLNESS:      This is a 67-year-old female with past medical history of hypertension, was transferred from [place] for subarachnoid hemorrhage. History provided by patient's daughter who reported that patient had a recent dental procedure and was given anesthetics and subsequently her blood pressure went significantly high. Patient complains of mild facial droop and her strokelike symptoms and her CT scan showed subarachnoid hemorrhage predominantly in the anterior interhemispheric fissure. Patient is [employer] and she was noted to have low hemoglobin. Patient and family refusing further blood transfusion and therefore hematology was consulted. Past Medical History:   has a past medical history of Acid reflux, Arthritis, Cancer ([address] Family history of breast cancer in first degree relative, Gall stones, Hard of hearing, History of cervical cancer, Hypertension, Lateral meniscus tear, Wears dentures, and Wears glasses. Past Surgical History:   has a past surgical history that includes Tonsillectomy and adenoidectomy (1956); Cholecystectomy;  Inner ear surgery; Knee arthroscopy (2000, 2005); cyst removal (1980); Colonoscopy (12/09); Breast biopsy (1980); Foot surgery (Bilateral); other surgical history (Right, 9-4-14); Umbilical hernia repair; hernia repair; Varicose vein surgery (Bilateral); Knee arthroscopy (Left, 4/3/2019); Colonoscopy (N/A, 5/31/2019); other surgical history (02/12/2020);   picc powerpicc double (2/20/2020); Upper gastrointestinal endoscopy (N/A, 2/20/2020); and sigmoidoscopy (N/A, 2/20/2020). Medications:    Prior to Admission medications    Medication Sig Start Date End Date Taking?  Authorizing Provider   pramipexole (MIRAPEX) 0.125 MG tablet TAKE ONE TABLET BY MOUTH NIGHTLY 2/19/20   Libby Mccarty MD   diclofenac (VOLTAREN) 75 MG EC tablet Take 1 tablet by mouth 2 times daily (with meals) for 14 days 1/21/20 2/4/20  Dawson Galvez MD   hydrALAZINE (APRESOLINE) 50 MG tablet TAKE 1 TABLET BY MOUTH 4 TIMES A DAY 12/26/19   Libby Mccarty MD   meloxicam (MOBIC) 15 MG tablet TAKE 1 TABLET BY MOUTH ONE TIME A DAY 12/12/19   Perla Sotelo MD   carvedilol (COREG) 3.125 MG tablet Take 1 tablet by mouth 2 times daily 11/5/19   Libby Mccarty MD   atorvastatin (LIPITOR) 40 MG tablet Take 1 tablet by mouth nightly 10/15/19   Libby Mccarty MD   acetaminophen (TYLENOL) 325 MG tablet Take 325 mg by mouth every 6 hours as needed for Pain    Historical Provider, MD   vitamin D (CHOLECALCIFEROL) 1000 UNIT TABS tablet Take 1,000 Units by mouth 3 times daily    Historical Provider, MD   meloxicam (MOBIC) 15 MG tablet Take 15 mg by mouth daily    Historical Provider, MD   aspirin 81 MG EC tablet Take 1 tablet by mouth daily 6/4/19   Juancarlos Garnett MD   Multiple Vitamins-Minerals (THERAPEUTIC MULTIVITAMIN-MINERALS) tablet Take 1 tablet by mouth daily    Historical Provider, MD     Current Facility-Administered Medications   Medication Dose Route Frequency Provider Last Rate Last Dose    magnesium oxide (MAG-OX) tablet 800 mg  800 mg Oral Daily Yousuf Ruiz DO   800 mg at 03/02/20 1920    pramipexole

## 2020-03-02 NOTE — PROGRESS NOTES
Occupational Therapy  Facility/Department: [facility]  Daily Treatment Note  NAME: Pamela Messina  : 1950  MRN: [id number]    Date of Service: 3/2/2020    Discharge Recommendations:  Patient would benefit from continued therapy after discharge   Assessment   Performance deficits / Impairments: Decreased functional mobility ; Decreased cognition;Decreased high-level IADLs;Decreased ADL status; Decreased endurance;Decreased balance;Decreased strength;Decreased posture;Decreased safe awareness  Prognosis: Good  Patient Education: OT POC, transfer safety, importance of gait belt- fair return   Activity Tolerance  Activity Tolerance: Patient Tolerated treatment well;Patient limited by fatigue  Safety Devices  Safety Devices in place: Yes  Type of devices: Patient at risk for falls; Left in chair;Call light within reach;Gait belt         Patient Diagnosis(es): The encounter diagnosis was SAH (subarachnoid hemorrhage) ([facility] [address] has a past medical history of Acid reflux, Arthritis, Cancer ([facility] [address] Family history of breast cancer in first degree relative, Gall stones, Hard of hearing, History of cervical cancer, Hypertension, Lateral meniscus tear, Wears dentures, and Wears glasses. has a past surgical history that includes Tonsillectomy and adenoidectomy (); Cholecystectomy; Inner ear surgery; Knee arthroscopy (, ); cyst removal (); Colonoscopy (); Breast biopsy (); Foot surgery (Bilateral); other surgical history (Right, -14); Umbilical hernia repair; hernia repair; Varicose vein surgery (Bilateral); Knee arthroscopy (Left, 4/3/2019); Colonoscopy (N/A, 2019); other surgical history (2020);   pic powerpicc double (2020); Upper gastrointestinal endoscopy (N/A, 2020); and sigmoidoscopy (N/A, 2020).     Restrictions  Restrictions/Precautions  Restrictions/Precautions: Fall Risk, General Precautions  Required Braces or Orthoses?: No  Position Activity safety  Initiation: Requires cues for some  Sequencing: Requires cues for some   Plan   Plan  Times per week: 4-5x  Goals  Short term goals  Time Frame for Short term goals: Pt will by discharge   Short term goal 1: demo ADL UB/LB dressing/bathing activity with mod I and increased time  Short term goal 2: demo standing during func activity for 12 min+ using LRD and mod I  Short term goal 3: demo bending/reaching func activity while standing using LRD and SUP  Short term goal 4: demo (I) with all bed mob/transfers       Therapy Time   Individual Concurrent Group Co-treatment   Time In 9843         Time Out 1510         Minutes 25          Time coded treatment minutes: 25    Pt in chair upon arrival. Pt pleasant but req max encouragement to participate in therapy. See above for LOF. Pt retired to bed at end of session.      KULWINDER Pñea/KEI COELLO/KINA

## 2020-03-03 VITALS
BODY MASS INDEX: 15.62 KG/M2 | SYSTOLIC BLOOD PRESSURE: 110 MMHG | HEIGHT: 66 IN | WEIGHT: 97.22 LBS | DIASTOLIC BLOOD PRESSURE: 50 MMHG | TEMPERATURE: 99 F | RESPIRATION RATE: 99 BRPM | OXYGEN SATURATION: 100 % | HEART RATE: 75 BPM

## 2020-03-03 LAB
HCT VFR BLD CALC: 30.1 % (ref 36.3–47.1)
HEMOGLOBIN: 8.9 G/DL (ref 11.9–15.1)

## 2020-03-03 PROCEDURE — 6370000000 HC RX 637 (ALT 250 FOR IP): Performed by: NURSE PRACTITIONER

## 2020-03-03 PROCEDURE — 97129 THER IVNTJ 1ST 15 MIN: CPT

## 2020-03-03 PROCEDURE — 36415 COLL VENOUS BLD VENIPUNCTURE: CPT

## 2020-03-03 PROCEDURE — C9113 INJ PANTOPRAZOLE SODIUM, VIA: HCPCS | Performed by: STUDENT IN AN ORGANIZED HEALTH CARE EDUCATION/TRAINING PROGRAM

## 2020-03-03 PROCEDURE — 6360000002 HC RX W HCPCS: Performed by: STUDENT IN AN ORGANIZED HEALTH CARE EDUCATION/TRAINING PROGRAM

## 2020-03-03 PROCEDURE — 99233 SBSQ HOSP IP/OBS HIGH 50: CPT | Performed by: PSYCHIATRY & NEUROLOGY

## 2020-03-03 PROCEDURE — 2580000003 HC RX 258: Performed by: STUDENT IN AN ORGANIZED HEALTH CARE EDUCATION/TRAINING PROGRAM

## 2020-03-03 PROCEDURE — 2580000003 HC RX 258: Performed by: NURSE PRACTITIONER

## 2020-03-03 PROCEDURE — 85018 HEMOGLOBIN: CPT

## 2020-03-03 PROCEDURE — 99231 SBSQ HOSP IP/OBS SF/LOW 25: CPT | Performed by: INTERNAL MEDICINE

## 2020-03-03 PROCEDURE — 85014 HEMATOCRIT: CPT

## 2020-03-03 PROCEDURE — 99232 SBSQ HOSP IP/OBS MODERATE 35: CPT | Performed by: PSYCHIATRY & NEUROLOGY

## 2020-03-03 PROCEDURE — 6370000000 HC RX 637 (ALT 250 FOR IP): Performed by: STUDENT IN AN ORGANIZED HEALTH CARE EDUCATION/TRAINING PROGRAM

## 2020-03-03 RX ORDER — LANOLIN ALCOHOL/MO/W.PET/CERES
325 CREAM (GRAM) TOPICAL
Qty: 90 TABLET | Refills: 3 | Status: SHIPPED | OUTPATIENT
Start: 2020-03-03 | End: 2020-10-01 | Stop reason: ALTCHOICE

## 2020-03-03 RX ORDER — ATORVASTATIN CALCIUM 20 MG/1
40 TABLET, FILM COATED ORAL NIGHTLY
Qty: 30 TABLET | Refills: 3 | Status: SHIPPED | OUTPATIENT
Start: 2020-03-03 | End: 2020-03-16 | Stop reason: SDUPTHER

## 2020-03-03 RX ORDER — NIMODIPINE 30 MG/1
60 CAPSULE, LIQUID FILLED ORAL EVERY 4 HOURS
Qty: 24 CAPSULE | Refills: 0 | Status: SHIPPED | OUTPATIENT
Start: 2020-03-03 | End: 2020-05-14 | Stop reason: ALTCHOICE

## 2020-03-03 RX ADMIN — FERROUS SULFATE TAB EC 325 MG (65 MG FE EQUIVALENT) 325 MG: 325 (65 FE) TABLET DELAYED RESPONSE at 08:48

## 2020-03-03 RX ADMIN — PANTOPRAZOLE SODIUM 40 MG: 40 INJECTION, POWDER, LYOPHILIZED, FOR SOLUTION INTRAVENOUS at 08:49

## 2020-03-03 RX ADMIN — SODIUM CHLORIDE, PRESERVATIVE FREE 10 ML: 5 INJECTION INTRAVENOUS at 09:00

## 2020-03-03 RX ADMIN — NIMODIPINE 60 MG: 30 CAPSULE ORAL at 04:36

## 2020-03-03 RX ADMIN — NIMODIPINE 60 MG: 30 CAPSULE ORAL at 00:51

## 2020-03-03 RX ADMIN — Medication 10 ML: at 09:00

## 2020-03-03 RX ADMIN — HYDROCORTISONE 2.5%: 25 CREAM TOPICAL at 08:48

## 2020-03-03 RX ADMIN — MAGNESIUM GLUCONATE 500 MG ORAL TABLET 800 MG: 500 TABLET ORAL at 08:48

## 2020-03-03 RX ADMIN — ANTI-FUNGAL POWDER MICONAZOLE NITRATE TALC FREE: 1.42 POWDER TOPICAL at 08:49

## 2020-03-03 RX ADMIN — ENOXAPARIN SODIUM 30 MG: 30 INJECTION SUBCUTANEOUS at 08:48

## 2020-03-03 RX ADMIN — NIMODIPINE 60 MG: 30 CAPSULE ORAL at 13:36

## 2020-03-03 RX ADMIN — Medication 100 MG: at 08:48

## 2020-03-03 RX ADMIN — NIMODIPINE 60 MG: 30 CAPSULE ORAL at 08:48

## 2020-03-03 RX ADMIN — FOLIC ACID 1 MG: 1 TABLET ORAL at 08:48

## 2020-03-03 ASSESSMENT — PAIN SCALES - GENERAL: PAINLEVEL_OUTOF10: 0

## 2020-03-03 NOTE — DISCHARGE SUMMARY
Neuro Critical Care   Discharge Summary      PATIENT NAME: Anson Llamas  YOB: 1950  MEDICAL RECORD NO. [id number]  DATE: 3/3/2020  PRIMARY CARE PHYSICIAN: Johanna Powell MD  DISCHARGE DATE:  03/03/20    DISCHARGE DIAGNOSIS:   Patient Active Problem List   Diagnosis Code    Essential hypertension I10    RLS (restless legs syndrome) G25.81    History of cervical cancer Z85.41    Gastroesophageal reflux disease without esophagitis K21.9    Trochanteric bursitis of left hip M70.62    Complex tear of lateral meniscus of left knee S83.272A    Weight loss R63.4    Cerebrovascular accident (CVA) ([address] I63.9    Left sided numbness R20.0    Acute left-sided weakness R53.1    Lacunar stroke ([facility]) I63.81    Hyperlipidemia E78.5    Chronic pancreatitis ([facility] K86.1    SAH (subarachnoid hemorrhage) ([facility]) I60.9    Cerebral aneurysm rupture ([facility]) I60.7    Cerebral aneurysm I67.1    Subarachnoid hemorrhage from anterior cerebral artery aneurysm ([facility]) I60.6    Status post coil embolization of cerebral aneurysm Z98.890    Pneumonia of both lower lobes due to infectious organism ([facility] J18.1    MRI-safe endovascular aneurysm coil present Z95.828    Ventilator dependence ([facility] Z99.11    Iron deficiency anemia due to chronic blood loss D50.0       HOSPITAL COURSE    Initial Presentation (Admitted 2/12): The patient is a 75 y. o. female presented with a history of HTN who presents as a a transfer from [address] [place] [address],[address] for [facility].   Patient was at the dentist around 1030AM when she had a sudden thunderclap headache, diaphoresis, and nausea/vomiting.  Patient's daughter received a call from the dentist around 1030AM stating the patient was drowsy and out of it. Flo Kimbrough was taken to [address] [place] [address],[address] ED where CT Head showed subarachnoid hemorrhage predominately in the anterior interhemispheric fissure.  Patient was complaining of severe headache and vomiting at outlying ED.  Loaded with 1g Sonny Fountain Supplements: Standard High Calorie Oral Supplement, having bowel movements, and is in stable condition to be discharged to in rehab. PROCEDURES:    Aneurysm rupture embolization  Intubation  Art line  PICC line  Upper and lower endoscopy    PHYSICAL EXAMINATION        Discharge Vitals:  height is 5' 6\" (1.676 m) and weight is 97 lb 3.6 oz (44.1 kg). Her oral temperature is 99 °F (37.2 °C). Her blood pressure is 110/50 (abnormal) and her pulse is 75. Her respiration is 99 (abnormal) and oxygen saturation is 100%. CONSTITUTIONAL:  Well developed, well nourished, alert and oriented x 3, in no acute distress. GCS 15. Nontoxic. No dysarthria. No aphasia. HEAD:  normocephalic, atraumatic    EYES:  PERRLA, EOMI.   ENT:  moist mucous membranes   NECK:  supple, symmetric   LUNGS:  Equal air entry bilaterally   CARDIOVASCULAR:  normal s1 / s2, RRR, distal pulses intact   ABDOMEN:  Soft, no rigidity   NEUROLOGIC:  Mental Status:  A & O x3,awake             Cranial Nerves:    cranial nerves II-XII are grossly intact     Motor Exam:    Drift:  absent  Tone:  normal     Motor exam is symmetrical 5 out of 5 all extremities bilaterally     Sensory:    Touch:    Right Upper Extremity:  normal  Left Upper Extremity:  normal  Right Lower Extremity:  normal  Left Lower Extremity:  normal            LABS/IMAGING     Recent Labs     03/01/20  0532 03/01/20  1318 03/03/20  1028   WBC 8.2  --   --    HGB 8.0*  --  8.9*   HCT 26.4*  --  30.1*   *  --   --    *  --   --    K 5.6* 4.2  --      --   --    CO2 22  --   --    BUN 29*  --   --    CREATININE 0.70  --   --        Xr Chest (single View Frontal)    Result Date: 2/23/2020  EXAMINATION: ONE XRAY VIEW OF THE CHEST 2/23/2020 8:15 am COMPARISON: February 22, 2020 HISTORY: ORDERING SYSTEM PROVIDED HISTORY: Intubated TECHNOLOGIST PROVIDED HISTORY: Intubated Respiratory failure. FINDINGS: Endotracheal tube projects 2.5 cm from anthony.   Right sided central line in good position. Minimal improving multifocal opacities right greater than left. Trace effusions. Slightly improved multifocal opacities. Xr Chest (single View Frontal)    Result Date: 2/22/2020  EXAMINATION: ONE XRAY VIEW OF THE CHEST 2/22/2020 7:58 am COMPARISON: 02/21/2020 HISTORY: ORDERING SYSTEM PROVIDED HISTORY: Intubated TECHNOLOGIST PROVIDED HISTORY: Intubated Reason for Exam: sup FINDINGS: Diffuse airspace disease with perihilar congestion and interstitial edema. Right costophrenic sulcus excluded from field of view. No pneumothorax. Right upper extremity PICC in good position. Enteric tube projects 3.2 cm from the anthony. Cardiac leads overlie the chest.     Improving but persistent multifocal airspace disease and edema. Xr Chest (single View Frontal)    Result Date: 2/20/2020  EXAMINATION: ONE XRAY VIEW OF THE CHEST 2/19/2020 5:22 pm COMPARISON: 02/17/2020 HISTORY: ORDERING SYSTEM PROVIDED HISTORY: pna TECHNOLOGIST PROVIDED HISTORY: pna Reason for Exam: portable upright/ PNA Acuity: Acute Type of Exam: Initial FINDINGS: Given differences in technique no change multifocal bilateral asymmetric airspace disease right lung greater than left. Stable heart size and mediastinal contours. Stable multifocal bilateral consolidation. Ct Head Wo Contrast    Result Date: 2/21/2020  EXAMINATION: CT OF THE HEAD WITHOUT CONTRAST  2/21/2020 8:18 am TECHNIQUE: CT of the head was performed without the administration of intravenous contrast. Dose modulation, iterative reconstruction, and/or weight based adjustment of the mA/kV was utilized to reduce the radiation dose to as low as reasonably achievable. COMPARISON: 2/14/2020 HISTORY: ORDERING SYSTEM PROVIDED HISTORY: SAH TECHNOLOGIST PROVIDED HISTORY: SAH FINDINGS: BRAIN/VENTRICLES: Coiling of anterior cerebral arteries as before. Extensive artifact related to procedure.   Blood products associated with bifrontal lobes measures 2.8 x 2.6 cm and previously measured 3.4 x 2.4 cm. Associated encephalomalacia. Decrease in volume of blood products in the anterior interhemispheric subarachnoid spaces. Decrease in volume of blood products in the suprasellar cisterns. No new focus of hemorrhage. No interval shift of midline structures or change in size of the ventricles. ORBITS: Stable orbits and globes. No retrobulbar mass. SINUSES: No fluid levels in the sinuses to indicate acute sinus disease. SOFT TISSUES/SKULL:  No acute abnormality of the visualized skull or soft tissues. Decrease in size of intracranial blood products associated with anterior cerebral artery coiling. The findings were sent to the Radiology Results [address] at 8:36 am on 2/21/2020to be communicated to a licensed caregiver. Ct Head Wo Contrast    Result Date: 2/14/2020  EXAMINATION: CT OF THE HEAD WITHOUT CONTRAST  2/14/2020 9:03 am TECHNIQUE: CT of the head was performed without the administration of intravenous contrast. Dose modulation, iterative reconstruction, and/or weight based adjustment of the mA/kV was utilized to reduce the radiation dose to as low as reasonably achievable. COMPARISON: 13 February 2020 HISTORY: ORDERING SYSTEM PROVIDED HISTORY: re-evaluate TECHNOLOGIST PROVIDED HISTORY: re-evaluate Status post cerebral artery aneurysm coil embolization. FINDINGS: BRAIN/VENTRICLES: Artifact is present near the midline in the frontal lobe consistent with aneurysm coil embolization. Tiny dot of air persists near the procedure site. Adjacent hemorrhage is noted now measuring 2.6 x 3.0 x 3.2 cm previously 3.2 x 3.7 x 3.5 cm, diminished compared to prior examination. Subtle rightward midline shift at the level of the hemorrhage of approximately 2.4 mm is noted. No hydrocephalus is seen. Subarachnoid hemorrhage is present in the inferior interhemispheric falx, suprasellar cistern, right frontal and temporal lobes.   There has been a decrease in the amount of subarachnoid hemorrhage compared to prior exam.  No new areas of hemorrhage are demonstrated. No acute major vessel ischemia is noted. ORBITS: The visualized portion of the orbits demonstrate no acute abnormality. SINUSES: The visualized paranasal sinuses and mastoid air cells demonstrate no acute abnormality. Mastoid air cells are sclerotic consistent with chronic inflammation. SOFT TISSUES/SKULL:  No acute abnormality of the visualized skull or soft tissues. 1.  Findings consistent with prior anterior cerebral artery aneurysm coiling. 2.  No change in tiny dot of pneumocephalus near the aneurysm site. 3.  Decrease in amount of blood in the procedural site. 4.  Decrease in amount of subarachnoid hemorrhage. 5.  Subtle rightward shift is described above at the level of the hemorrhage. Ct Head Wo Contrast    Result Date: 2/13/2020  EXAMINATION: CT OF THE HEAD WITHOUT CONTRAST  2/13/2020 4:26 am TECHNIQUE: CT of the head was performed without the administration of intravenous contrast. Dose modulation, iterative reconstruction, and/or weight based adjustment of the mA/kV was utilized to reduce the radiation dose to as low as reasonably achievable. COMPARISON: 02/12/2020. HISTORY: ORDERING SYSTEM PROVIDED HISTORY: SAH with aneurysm re-evaluation TECHNOLOGIST PROVIDED HISTORY: SAH with aneurysm re-evaluation Reason for Exam: SAH with aneurysm re-evaluation FINDINGS: BRAIN/VENTRICLES: Interval postsurgical changes of anterior cerebral artery aneurysm coil embolization with associated streak artifact. Small amount pneumocephalus adjacent to the embolization coils. Compared to pre embolization CT head there is increased hyperattenuation in the bilateral medial frontal regions which could represent increased hemorrhage and/or angiographic contrast staining. Acute bilateral subarachnoid hemorrhage is centered in the basal cisterns.   New intraventricular extension of hemorrhage with acute hyperdense blood COMPARISON: Abdominal/pelvic CT, 05/16/2007. HISTORY: ORDERING SYSTEM PROVIDED HISTORY: Idiopathic chronic pancreatitis [facility]) TECHNOLOGIST PROVIDED HISTORY: Reason for Exam: IDIOPATHIC CHRONIC PANCREATITIS Acuity: Unknown Type of Exam: Unknown Relevant Medical/Surgical History: GB, HERNIA REPAIR FINDINGS: The visualized lung bases are notable for a hazy ground-glass opacity in the anterior basal segment of the right lower lobe. In retrospect, this was present back in 2007 and could represent a small scar. Mild cardiomegaly noted. The visualized cardiac and posterior mediastinal structures are otherwise unremarkable. Within the abdomen, the liver, spleen, pancreas, adrenal glands and kidneys are within normal limits. Specifically, no inflammatory changes are seen around the pancreas to suggest acute appendicitis and no pancreatic calcifications are evident. No pseudocyst formation. Cholecystectomy has been performed. The small bowel is grossly normal.  No obvious free fluid, free air or lymphadenopathy is identified. The abdominal aorta is normal in caliber with minor atherosclerotic calcifications. Within the pelvis, the urinary bladder is within normal limits. The uterus and adnexal structures are grossly unremarkable. The appendix is seen adjacent to the cecum and appears normal.     1. No acute intra-abdominal or intrapelvic process. Specifically, no CT scan evidence of acute pancreatitis or complications related to pancreatitis. 2. Status post cholecystectomy. 3. Subtle ground-glass opacity in the right lower lobe appears slightly more conspicuous than on the 2007 exam.  This may simply be related to a minor scar. Minor infectious or inflammatory process could not be entirely excluded. Consider follow-up chest CT in approximately 3 months to ensure stability/resolution. 4. Normal appendix.      Ir Angiogram Carotid C Erebral Bilateral    Result Date: 2/24/2020  Date of Service: 2/12/2020 Diagnosis: Ruptured right SENTHIL aneurysm. Procedure: 1. Transarterial primary Coil Embolization of SENTHIL Aneurysm 2. Superselective Intraprocedure right Internal Cerebral Angiograms. 3. Continuous IA nicardipine infusion throughout the case to reduce the likelihood of spasm. 4. Selective Right Common Femoral Artery Angiogram. Neurointerventionalist:  Hema Perez MD Hubbard: Loretta Yun Contrast:  85 cc Of Visipaque-270 Fluoroscopy time: 36.7  minutes Access:  Right common femoral artery sheath placement. Consent: After explaining the risk and benefit to the family, including but not limited to stroke, coma, death, vessel injury, dissection, tear, occlusion, X-ray dye allergic reaction; consent form was obtained. Indication and Clinical History:  77years old female with ruptured right SENTHIL 2 mm aneurysm, Em and Jeff 3 modified Jane scale 4.71 years old female with ruptured right SENTHIL 2 mm aneurysm, Em and Jeff 3 modified Jane scale 3. Anesthesia:  General Endotracheal Anesthesia. Description of Procedure: The patient was brought to the interventional suite by the anesthesia team and placed in a supine position on the angio table and general anesthesia is administered. The right groin region was prepped, draped and cleaned in a sterile fashion. The right common femoral artery was accessed using a micropuncture kit and a 6F x 55 cm introducer sheath was placed. Under fluoroscopic and road map guidance, a Benchmark 6F [id number] guide catheter was triaxially advanced over a select 120 cm selection catheter over a 0.035 glide wire in the introducer sheath into the Right internal carotid artery and images were obtained in standard biplane projections. Interpretation: The Right internal carotid artery injection and the intracranial internal carotid artery images demonstrated normal antegrade filling of the anterior and middle cerebral arteries. An SENTHIL A2 aneurysm 1.83 x 1.86 and neck size 0.83 mm.  The capillary and venous score I.. 2. The coils are all MRI compatible. Dr. Negra Graham dictated this invasive procedure. Dr. Dank Katz was present for all procedural and imaging components of this case. Examination was reviewed and reported findings confirmed and evaluated by Dr. Dank Katz. Final report electronically signed by Marietta Sorto M.D. on 2/24/2020 4:49 PM    Xr Chest Portable    Result Date: 2/21/2020  EXAMINATION: ONE XRAY VIEW OF THE CHEST 2/21/2020 9:26 am COMPARISON: February 20, 2020 HISTORY: ORDERING SYSTEM PROVIDED HISTORY: intubated, pna TECHNOLOGIST PROVIDED HISTORY: intubated, pna FINDINGS: ET tube projects 3.8 cm from anthony. Right upper extremity PICC in good position. Worsening multifocal opacities. No effusion or pneumothorax. Worsening multifocal airspace disease possibly pneumonia versus ARDS. Xr Chest Portable    Result Date: 2/20/2020  EXAMINATION: ONE XRAY VIEW OF THE CHEST 2/20/2020 6:01 pm COMPARISON: 02/20/2020 HISTORY: ORDERING SYSTEM PROVIDED HISTORY: verify PICC placement TECHNOLOGIST PROVIDED HISTORY: verify PICC placement Acuity: Unknown Type of Exam: Initial FINDINGS: The cardiac silhouette is stable. Aortic vascular calcification. There is redemonstration of diffuse bilateral airspace disease likely without significant interval change allowing for difference in technique. There is no pleural fluid or pneumothorax. Endotracheal tube tip projects 2.9 cm above the anthony. Enteric catheter below the lower margin of the image. Right-sided PICC line tip at the cavoatrial junction. Stable bilateral pulmonary infiltrates, most consistent with multifocal pneumonia. PICC line tip at the cavoatrial junction.      Xr Chest Portable    Result Date: 2/20/2020  EXAMINATION: ONE XRAY VIEW OF THE CHEST 2/20/2020 9:51 am COMPARISON: 02/20/2020 HISTORY: ORDERING SYSTEM PROVIDED HISTORY: intubation TECHNOLOGIST PROVIDED HISTORY: intubation FINDINGS: Endotracheal tube is in place with distal tip 2.5 cm above the anthony. Heart size is stable. There is diffuse airspace consolidation throughout a majority of the right lung. Large area of airspace consolidation is also noted in the mid left lung. These findings are not significantly changed from the previous chest radiograph. No significant pleural effusion. No evidence of pneumothorax. 1.  Interval intubation. 2.  Unchanged bilateral airspace consolidation. Xr Chest Portable    Result Date: 2/20/2020  EXAMINATION: ONE XRAY VIEW OF THE CHEST 2/20/2020 8:58 am COMPARISON: February 19, 2020, February 17, 2020 HISTORY: ORDERING SYSTEM PROVIDED HISTORY: PNA TECHNOLOGIST PROVIDED HISTORY: PNA Acuity: Unknown Type of Exam: Unknown FINDINGS: Bilateral consolidative opacities are again demonstrated without significant interval change. No pneumothorax or significant effusion. Cardiac silhouette enlargement again noted. No significant change in bilateral consolidative opacities. Xr Chest Portable    Result Date: 2/18/2020  EXAMINATION: ONE XRAY VIEW OF THE CHEST 2/17/2020 11:47 pm COMPARISON: 13 hours ago HISTORY: ORDERING SYSTEM PROVIDED HISTORY: worsening chest pain TECHNOLOGIST PROVIDED HISTORY: worsening chest pain Reason for Exam: port Upright, sob Acuity: Acute FINDINGS: Right upper and right lower lobe airspace disease. Increased interstitial markings especially the left perihilar region. Lungs are hyperaerated. Heart and mediastinum normal.  Bony thorax intact. Right greater than left perihilar airspace disease unchanged. COPD. Xr Chest Portable    Result Date: 2/17/2020  EXAMINATION: ONE XRAY VIEW OF THE CHEST 2/17/2020 10:41 am COMPARISON: 02/16/2020 HISTORY: ORDERING SYSTEM PROVIDED HISTORY: hypoxia, pna TECHNOLOGIST PROVIDED HISTORY: hypoxia, pna Reason for Exam: port upr FINDINGS: Worsening multifocal airspace disease with background edema. Cardiomegaly remains. Worsening multifocal pneumonia.      Xr Chest Portable    Result Date: 2/16/2020  EXAMINATION: ONE XRAY VIEW OF THE CHEST 2/16/2020 1:07 am COMPARISON: 02/15/2020 HISTORY: ORDERING SYSTEM PROVIDED HISTORY: hypoxia TECHNOLOGIST PROVIDED HISTORY: hypoxia Reason for Exam: hypoxia   upright port FINDINGS: Mild cardiomegaly. Dense masslike consolidative change identified right infrahilar region/right lower lobe corresponding to the abnormality on recent CT chest.  Patchy left infrahilar/left lower lobe airspace opacities. The previously noted pleural effusions appear less conspicuous. Mild edema. No pneumothorax. Multifocal airspace opacities most confluent within the right infrahilar region/right lower lobe suggestive of pneumonia. Suspect mild interstitial pulmonary edema. Pleural effusions appear less conspicuous on today's exam. Follow-up to assure resolution of the airspace opacities recommended following medical treatment. Xr Chest Portable    Result Date: 2/15/2020  EXAMINATION: ONE XRAY VIEW OF THE CHEST 2/15/2020 3:35 am COMPARISON: 02/13/2020 HISTORY: ORDERING SYSTEM PROVIDED HISTORY: desat, sob TECHNOLOGIST PROVIDED HISTORY: desat, sob Reason for Exam: desat sob    upright port FINDINGS: There is now dense left lower lobe consolidation. Pulmonary edema has also developed. The heart size is mildly enlarged. There is no large pleural effusion or definite evidence for pneumothorax. 1. Pulmonary edema. 2. Left lower lobe atelectasis versus pneumonia. Xr Chest Portable    Result Date: 2/13/2020  EXAMINATION: ONE XRAY VIEW OF THE CHEST 2/13/2020 1:44 pm COMPARISON: June 1, 2019, December 28, 2017 HISTORY: ORDERING SYSTEM PROVIDED HISTORY: chest pain TECHNOLOGIST PROVIDED HISTORY: chest pain Reason for Exam: port upr FINDINGS: Cardiac silhouette enlargement is again noted. The lungs are well expanded. Linear left retrocardiac opacities appear new. No pneumothorax, evidence for edema or effusion. No acute osseous abnormality identified.      Near opacities in the left retrocardiac region favor atelectasis. Ct Chest Pulmonary Embolism W Contrast    Result Date: 2/15/2020  EXAMINATION: CTA OF THE CHEST 2/15/2020 3:46 am TECHNIQUE: CTA of the chest was performed after the administration of intravenous contrast.  Multiplanar reformatted images are provided for review. MIP images are provided for review. Dose modulation, iterative reconstruction, and/or weight based adjustment of the mA/kV was utilized to reduce the radiation dose to as low as reasonably achievable. COMPARISON: Chest portable February 15, 2020. HISTORY: ORDERING SYSTEM PROVIDED HISTORY: hypoxia, sob, tachy TECHNOLOGIST PROVIDED HISTORY: hypoxia, sob, tachy Reason for Exam: tachy, SOB Acuity: Acute Type of Exam: Initial FINDINGS: Pulmonary Arteries: Study is significantly limited by patient breathing motion related artifact. Pulmonary arteries are adequately opacified for evaluation. No definitive evidence of intraluminal filling defect to suggest pulmonary embolism. Main pulmonary artery is normal in caliber. Mediastinum: No evidence of mediastinal lymphadenopathy. The heart is moderately enlarged. Mild pericardial effusion is noted with fluid collecting within the superior recesses. There is no acute abnormality of the thoracic aorta. Lungs/pleura: Moderate bilateral layering posterior pleural effusions are seen with greatest volume on the right measuring up to 25 mm in depth with adjacent compressive atelectasis of the right lower lung lobes. Multifocal dense consolidation is seen within the lower lung lobes consistent with pneumonia. Upper Abdomen: Gallbladder is surgically absent. Limited images of the upper abdomen are otherwise unremarkable. Soft Tissues/Bones: No acute bone or soft tissue abnormality. 1. Note: Study significantly limited by patient breathing motion related artifact. 2. No definitive evidence of acute pulmonary embolism.  3. Bilateral lower lung lobe severe dense consolidation consistent with severe pneumonia. 4. Adjacent moderate bilateral layering posterior pleural effusions, as discussed above. 5. Moderate cardiomegaly. 6. Mild pericardial effusion. Recommend clinical correlation. 7. Cholecystectomy. Xr Abdomen For Ng/og/ne Tube Placement    Result Date: 2/20/2020  EXAMINATION: ONE SUPINE XRAY VIEW(S) OF THE ABDOMEN 2/20/2020 7:46 pm COMPARISON: February 20, 2020 HISTORY: ORDERING SYSTEM PROVIDED HISTORY: NG tube placement TECHNOLOGIST PROVIDED HISTORY: NG tube placement Portable? ->Yes Acuity: Unknown Type of Exam: Subsequent/Follow-up FINDINGS: Enteric tube in the stomach. Moderate edema. Nonspecific bowel gas pattern. Osseous structures normal.     Enteric tube now in improved position in the stomach. Xr Abdomen For Ng/og/ne Tube Placement    Result Date: 2/20/2020  EXAMINATION: ONE SUPINE XRAY VIEW(S) OF THE ABDOMEN 2/20/2020 6:01 pm COMPARISON: 02/20/2020. HISTORY: ORDERING SYSTEM PROVIDED HISTORY: Confirmation of course of NG/OG/NE tube and location of tip of tube TECHNOLOGIST PROVIDED HISTORY: Confirmation of course of NG/OG/NE tube and location of tip of tube flexiflow Portable? ->Yes Acuity: Unknown Type of Exam: Subsequent/Follow-up FINDINGS: Enteric catheter remains coiled in the proximal stomach with tip near the GE junction in the gastric fundus. Visualized bowel gas pattern is nonspecific     No interval change in the position of the enteric catheter. Recommend partial retraction of the catheter, hopefully allowing the catheter to advance distally. Xr Abdomen For Ng/og/ne Tube Placement    Result Date: 2/20/2020  EXAMINATION: ONE SUPINE XRAY VIEW(S) OF THE ABDOMEN 2/20/2020 3:08 pm COMPARISON: None. HISTORY: Enteric tube placement. FINDINGS: Enteric tube is looped within the stomach with tip directed cranially at the gastroesophageal junction. Side port is not visualized.      Enteric tube tip is looped within the stomach with tip directed cranially at the gastroesophageal junction. Side port is not visualized. Suggest retracting and repositioning the tube. Cta Head W Contrast    Result Date: 2/16/2020  EXAMINATION: CTA OF THE HEAD WITH CONTRAST 2/16/2020 3:03 pm: TECHNIQUE: CTA of the head/brain was performed with the administration of intravenous contrast. Multiplanar reformatted images are provided for review. MIP images are provided for review. Dose modulation, iterative reconstruction, and/or weight based adjustment of the mA/kV was utilized to reduce the radiation dose to as low as reasonably achievable. COMPARISON: February 14 HISTORY: ORDERING SYSTEM PROVIDED HISTORY: SAH, evaluate for vasospasm TECHNOLOGIST PROVIDED HISTORY: SAH, evaluate for vasospasm Reason for Exam: SAH, evaluate for vasospasm Acuity: Unknown Type of Exam: Unknown FINDINGS: ANTERIOR CIRCULATION: Cavernous carotid segment calcifications are noted bilaterally. Minimal cavernous segment narrowing is noted, left greater than right. Ophthalmic origins are patent. Middle cerebral arteries are patent bilaterally. There are few small distal MCA branches that are minimally smaller compared to the prior. Patient is status post anterior cerebral artery aneurysm coiling. Detail in this area is limited due to artifact from the coil. There are a few distal SENTHIL branches, distal to the aneurysm coil, that are minimally smaller compared to the prior. POSTERIOR CIRCULATION: Distal vertebral arteries are patent bilaterally. The basilar artery is patent. Mild-to-moderate short-segment narrowing of the right posterior cerebral artery is noted lateral to the midbrain. Otherwise, no focal posterior cerebral artery narrowing is noted. OTHER: Dural sinuses are patent BRAIN: Parenchymal detail is limited. Midline hematoma is again noted near the coils. Multifocal subarachnoid hemorrhage is noted.   Edema surrounding the hematoma is noted with mass-effect on the frontal horns of the lateral ventricles. Again, parenchymal detail is limited. Findings suggesting mild developing peripheral vasospasm in the anterior circulation     Cta Head W Contrast    Result Date: 2/12/2020  EXAMINATION: CTA OF THE HEAD WITH CONTRAST 2/12/2020 2:14 pm: TECHNIQUE: CTA of the head/brain was performed with the administration of intravenous contrast. Multiplanar reformatted images are provided for review. MIP images are provided for review. Dose modulation, iterative reconstruction, and/or weight based adjustment of the mA/kV was utilized to reduce the radiation dose to as low as reasonably achievable. COMPARISON: CTA of the head performed 06/01/2019. HISTORY: ORDERING SYSTEM PROVIDED HISTORY: stroke TECHNOLOGIST PROVIDED HISTORY: stroke Reason for Exam: stroke alert FINDINGS: ANTERIOR CIRCULATION: No significant stenosis of the intracranial internal carotid, anterior cerebral, or middle cerebral arteries. There is a 2 mm aneurysm arising from the right anterior cerebral artery in the anterior interhemispheric fissure. POSTERIOR CIRCULATION: No significant stenosis of the vertebral, basilar, or posterior cerebral arteries. No aneurysm. OTHER: No dural venous sinus thrombosis on this non-dedicated study. BRAIN: No mass effect or midline shift. No extra-axial fluid collection. The gray-white differentiation is maintained. Demonstration of a 2 mm aneurysm arising from the right anterior cerebral artery within the anterior interhemispheric fissure region. Findings were discussed with Dr. Sherry Moreira At 2:36 pm on 2/12/2020. Fl Modified Barium Swallow W Video    Result Date: 2/24/2020  EXAMINATION: MODIFIED BARIUM SWALLOW WAS PERFORMED IN CONJUNCTION WITH [facility] TECHNIQUE: Fluoroscopic evaluation of the swallowing mechanism was performed with multiple consistency of barium product.  FLUOROSCOPY DOSE AND TYPE OR TIME AND EXPOSURES: DAP [id number] COMPARISON: None HISTORY: [address] Frey BA vasospasm >90 cm/sec -------------------------------------------- Moderate BA vasospasm >120 cm/sec ------------------------------------------ Severe BA vasospasm. Procedure Type of Study:   Cerebral: Transcranial.  Indications for Study:Subarachnoid hemorrhage. Patient Status: In Patient. Technical Quality:Adequate visualization. Limitation reason:patient movement and groaning during entire test. Comments:Hematocrit: 24.4  Conclusions   Summary   Normal transcranial Doppler study. Signature   ----------------------------------------------------------------  Electronically signed by Ulises Sykes RVT(Sonographer)  on 02/25/2020 06:53 PM  ----------------------------------------------------------------   ----------------------------------------------------------------  Electronically signed by Joannie Kub Reyes,Arthur(Interpreting  physician) on 02/26/2020 09:41 PM  ----------------------------------------------------------------  Findings:   Right Impression:                     Left Impression:  Right Lindegaard Ratio =1.54          Left Lindegaard Ratio =2.50  MEAN VELOCITIES:                      MEAN VELOCITIES:  Proximal MCA: 46.2 . Transtemporal Approach. Mid MCA: 45.8 . Proximal MCA: 68.9 . Distal MCA: 46.2 . Mid MCA: 73.2 . Proximal SENTHIL: 60.8 . Distal MCA: 69.7 . Mid SENTHIL: 68.5 . Proximal SENTHIL: 42.7 . PCA: 5.0 . Mid SENTHIL: 47.0 . T ICA: 50.0                           PCA: 36.2 . Submandibular Approach. T ICA: 48.1 . D ICA: 30.0 . Submandibular Approach. Transorbital Approach. D ICA: 29.3 . Siphon: 67.0 . Transorbital Approach. Transforamenal Approach. Siphon: 55.8 . Vertebral: 28.5 . Transforamental Approach. Basilar: 33.9                         Vertebral: 21.2 . MCA: 70.5  Mid MCA: 74.3                        Mid MCA: 79.7  Distal MCA: 69.3                     Distal MCA: 75.5  Proximal SENTHIL: 79.7                   Proximal SENTHIL: 64.3  Mid SENTHIL: 81.2                        Mid SENTHIL: 81.6  PCA: 32                              PCA: 39.7  T ICA: 56.2                          T ICA: 41.2  Submandibular Approach               Submandibular Approach  D ICA: 35.4                          D ICA: 40.4  Transorbital Approach                Transorbital Approach  Siphon: 52.4                         Siphon: 49.7  Transforamenal Approach              Transforamenal Approach  Vertebral: 36.2                      Vertebral: 42  Risk Factors History +---------+----------+-----------------------------------------------------+ ! Diagnosis! Date      ! Comments                                             ! +---------+----------+-----------------------------------------------------+ ! Other    !02/22/2020! Hematocrit 16.2                                      ! +---------+----------+-----------------------------------------------------+   - The patient's risk factor(s) include: arterial hypertension. Velocities are measured in cm/s ; Diameters are measured in cm Right Transcranial Duplex Measurements Right Transforamenal Approach +-----------+-------+-------+--------+-------+-------+--------+------------+ ! Location   ! PSV    ! EDV    ! Vmean   ! RI     ! PI     ! Depth   ! Direction   ! +-----------+-------+-------+--------+-------+-------+--------+------------+ ! Basilar    ! 84.7   !26.4   !45.83   !0.69   !1.05   !        !            ! +-----------+-------+-------+--------+-------+-------+--------+------------+ Left Transcranial Duplex Measurements Left Transforamenal Approach +-----------+-------+-------+--------+-------+-------+--------+------------+ ! Location   ! PSV    ! EDV    ! Vmean   ! RI     ! PI     ! Depth   ! Direction   !  +-----------+-------+-------+--------+-------+-------+--------+------------+ +---------+----------+-----------------------------------------------------+ ! Other    !02/17/2020! Hematocrit 20.8                                      ! +---------+----------+-----------------------------------------------------+ ! Other    !02/18/2020! Hematocrit; 19.6                                     ! +---------+----------+-----------------------------------------------------+ ! Other    ! ! Hematocrit; 18.6                                     ! +---------+----------+-----------------------------------------------------+ ! Other    !02/20/2020! Hematocrit;21.8                                      ! +---------+----------+-----------------------------------------------------+   - The patient's risk factor(s) include: arterial hypertension.     Vl Transcranial Doppler Complete    Result Date: 2/21/2020    [facility]  Vascular Transcranial Procedure   Patient Name   Wero Grossman     Date of Study           02/20/2020                 Tram Sims   Date of Birth  1950  Gender                  Female   Age            77 year(s)  Race                    Other   Room Number    0529/port   Height:                 66 inch, 167.64 cm   Corporate ID # [id number]    Weight:                 108 pounds, 49 kg   Patient Acct # [de-identified]   BSA:        1.54 m^2    BMI:      17.43 kg/m^2   MR #           [id number]     Shavonne aTylor, T   Accession #    [id number]   Interpreting Physician  Carlin Collier   Referring                  Referring Physician     Annette Kwan  Nurse  Practitioner  Additional Comments CLASSIFICATION OF VASOSPASM MEAN MCA VELOCITY ----- MCA/ICA VELOCITY RATIO ------- INTERPRETATION <120 cm/sec --------------------- less than 3 --------------------------- Normal, nonspecific elevation or distal MCA spasm >120 cm/sec ------------------------ 3 to 6 -------------------------------- Mild vasospasm of proximal MCA >160 cm/sec ------------------------ 3 to 6 Transorbital Approach. Transorbital Approach. Siphon: 58.5 . Siphon: 50.0 . Transforamenal Approach. Vertebral: 23.1 . Vertebral: 26.6 . BASILAR:52.0  Risk Factors History +---------+----------+-----------------------------------------------------+ ! Diagnosis! Date      ! Comments                                             ! +---------+----------+-----------------------------------------------------+ ! Other    !02/17/2020! Hematocrit 20.8                                      ! +---------+----------+-----------------------------------------------------+ ! Other    !02/18/2020! Hematocrit; 19.6                                     ! +---------+----------+-----------------------------------------------------+ ! Other    ! ! Hematocrit; 18.6                                     ! +---------+----------+-----------------------------------------------------+ ! Other    !02/20/2020! Hematocrit;21.8                                      ! +---------+----------+-----------------------------------------------------+   - The patient's risk factor(s) include: arterial hypertension.     Vl Transcranial Doppler Complete    Result Date: 2/21/2020    [facility]  Vascular Transcranial Procedure   Patient Name   Tim Tyler     Date of Study           02/19/2020                 Libby Hills   Date of Birth  1950  Gender                  Female   Age            77 year(s)  Race                    Other   Room Number    0529/port   Height:                 66 inch, 167.64 cm   Corporate ID # [id number]    Weight:                 108 pounds, 49 kg   Patient Acct # [de-identified]   BSA:        1.54 m^2    BMI:      17.43 kg/m^2   MR #           [id number]     Chuck Weeks, [facility]   Accession #    [id number]   Interpreting Physician  Carlin Collier   Referring                  Referring Physician  Nurse  Practitioner  Additional Comments CLASSIFICATION OF VASOSPASM MEAN MCA VELOCITY ----- MCA/ICA VELOCITY RATIO ------- INTERPRETATION <120 cm/sec --------------------- less than 3 --------------------------- Normal, nonspecific elevation or distal MCA spasm >120 cm/sec ------------------------ 3 to 6 -------------------------------- Mild vasospasm of proximal MCA >160 cm/sec ------------------------ 3 to 6 -------------------------------- Moderate vasospasm of proximal MCA >200 cm/sec -------------------- greater than 6------------------------- Severe vasospasm of proximal MCA. MEAN BASILAR ARTERY VELOCITY------- INTERPRETATION >60 cm/sec -------------------------------------------- Mild BA vasospasm >90 cm/sec -------------------------------------------- Moderate BA vasospasm >120 cm/sec ------------------------------------------ Severe BA vasospasm. Procedure Type of Study:   Cerebral: Transcranial.  Indications for Study:Vasospasm post-subarachnoid hemorrhage. Patient Status: In Patient. Conclusions   Summary   Normal bilateral transcranial Doppler. Signature   ----------------------------------------------------------------  Electronically signed by Abhilash Sanchez RVT(Sonographer) on  02/20/2020 09:16 AM  ----------------------------------------------------------------   ----------------------------------------------------------------  Electronically signed by Carlin Collier(Interpreting physician)  on 02/21/2020 03:10 PM  ----------------------------------------------------------------  Findings:   Right Impression:                     Left Impression:  Right Lindegaard Ratio: 2.07          Left Lindegaard Ratio =1.77  MEAN VELOCITIES:                      MEAN VELOCITIES:  Transtemporal Approach. Transtemporal Approach. Proximal MCA: 85.1 . Proximal MCA: 75.5 . Mid MCA: 83.5 . Mid MCA: 75.5 . Distal MCA: 102 . Distal MCA: 75.5 . Proximal SENTHIL: 83.5 . Proximal SENTHIL: 65.1 . Mid SENTHIL: 88.9 . Mid SENTHIL: 73.9 . PCA: 23.9 . PCA: 17.7 . T ICA: 36 .6                          T ICA: 32.0 . Submandibular Approach. Submandibular Approach. D ICA: 49.3 . D ICA: 42.7 . Transorbital Approach. Transorbital Approach. Siphon: 78.9 . Siphon: 50.4 . Transforamenal Approach. Vertebral: 36.2 . Vertebral: 43.1 . BASILAR: 54.7  Risk Factors History +---------+----------+-----------------------------------------------------+ ! Diagnosis! Date      ! Comments                                             ! +---------+----------+-----------------------------------------------------+ ! Other    !02/17/2020! Hematocrit 20.8                                      ! +---------+----------+-----------------------------------------------------+ ! Other    !02/18/2020! Hematocrit; 19.6                                     ! +---------+----------+-----------------------------------------------------+ ! Other    ! ! Hematocrit; 18.6                                     ! +---------+----------+-----------------------------------------------------+ ! Other    !02/20/2020! Hematocrit;21.8                                      ! +---------+----------+-----------------------------------------------------+   - The patient's risk factor(s) include: arterial hypertension.     Vl Transcranial Doppler Complete    Result Date: 2/21/2020    [facility]  Vascular Transcranial Procedure   Patient Name   Wilfredo Escamilla     Date of Study           02/18/2020                 Kellee Warner   Date of Birth  1950  Gender                  Female   Age            77 year(s)  Race                    Other   Room Number    0529/port   Height:                 66 inch, 167.64 cm   Corporate ID # [id number]    Weight:                 108 pounds, 49 kg   Patient Acct # [de-identified]   BSA:        1.54 m^2    BMI:      17.43 kg/m^2 -------------------------------------------- Mild BA vasospasm >90 cm/sec -------------------------------------------- Moderate BA vasospasm >120 cm/sec ------------------------------------------ Severe BA vasospasm. Procedure Type of Study:   Cerebral: Transcranial.  Indications for Study:Subarachnoid hemorrhage. Patient Status: In Patient. Technical Quality:Adequate visualization. Conclusions   Summary   Normal bilateral transcranial Doppler.    Signature   ----------------------------------------------------------------  Electronically signed by Melvin Atkins RVT(Sonographer) on  02/16/2020 01:41 PM  ----------------------------------------------------------------   ----------------------------------------------------------------  Electronically signed by Carlin Collier(Interpreting physician)  on 02/17/2020 08:04 PM  ----------------------------------------------------------------  Findings:   Right Impression:                     Left Impression:  Right Lindegaard Ratio = 1.68         Left Lindegaard Ratio = 1.73  MEAN VELOCITIES:                      MEAN VELOCITIES:  Transtemporal Approach                Transtemporal Approach  Proximal MCA: 57.4                    Proximal MCA: 54.3  Mid MCA: 60.8                         Mid MCA: 58.9  Distal MCA: 60.1                      Distal MCA: 63.9  Proximal SENTHIL: 56.6                    Proximal SENTHIL: 53.1  Mid SENTHIL: 58.5                         Mid SENTHIL: 56.6  PCA: 20.0                             PCA: 33.1  T ICA: 52.0                           T ICA: 32.3  Submandibular Approach                Submandibular Approach  D ICA: 36.2                           D ICA: 37.0  Transorbital Approach                 Transorbital Approach  Siphon: 48.5                          Siphon: 43.9  Transforamenal Approach               Transforamenal Approach  Vertebral: 35.8                       Vertebral: 30.0  BA: 40.0  Risk Factors History +---------+----------+-----------------------------------------------------+ ! Diagnosis! Date      ! Comments                                             ! +---------+----------+-----------------------------------------------------+ ! Other    !02/17/2020! Hematocrit 20.8                                      ! +---------+----------+-----------------------------------------------------+    Vl Transcranial Doppler Complete    Result Date: 2/17/2020    [facility]  Vascular Transcranial Procedure   Patient Name  Chana Caruso     Date of Study         02/14/2020                Eladio Grewal   Date of Birth 1950  Gender                Female   Age           77 year(s)  Race                  Other   Room Number   7427        Height:               66 inch, 167.64 cm   Corporate ID  [id number]    Weight:               108 pounds, 49 kg  #   Patient Acct  [de-identified]   BSA:       1.54 m^2   BMI:         17.43 kg/m^2  #   MR #          [id number]     Sonographer           Siva Kumari RVT, [facility]   Accession #   [id number]   Interpreting          Carlin Collier                            Physician   Referring                 Referring Physician   Jose BRANNON  Nurse  Practitioner  Additional Comments CLASSIFICATION OF VASOSPASM MEAN MCA VELOCITY ----- MCA/ICA VELOCITY RATIO ------- INTERPRETATION <120 cm/sec --------------------- less than 3 --------------------------- Normal, nonspecific elevation or distal MCA spasm >120 cm/sec ------------------------ 3 to 6 -------------------------------- Mild vasospasm of proximal MCA >160 cm/sec ------------------------ 3 to 6 -------------------------------- Moderate vasospasm of proximal MCA >200 cm/sec -------------------- greater than 6------------------------- Severe vasospasm of proximal MCA.  MEAN BASILAR ARTERY VELOCITY------- INTERPRETATION >60 cm/sec -------------------------------------------- Mild BA vasospasm >90 cm/sec -------------------------------------------- Moderate BA vasospasm >120 cm/sec ------------------------------------------ Severe BA vasospasm. Procedure Type of Study:   Cerebral: Transcranial.  Indications for Study:Subarachnoid hemorrhage. Patient Status: In Patient. Conclusions   Summary   Normal bilateral transcranial Doppler. Signature   ----------------------------------------------------------------  Electronically signed by Munir Hunt RVT, RDMS(Sonographer) on 02/14/2020 12:44 PM  ----------------------------------------------------------------   ----------------------------------------------------------------  Electronically signed by Carlin Collier(Interpreting physician)  on 02/17/2020 08:03 PM  ----------------------------------------------------------------  Findings:   Right Impression:                      Left Impression:  Right Lindegaard Ratio = 2.01          Left Lindegaard Ratio = 1.8  MEAN VELOCITIES:                       MEAN VELOCITIES:  Transtemporal Approach                 Transtemporal Approach  Proximal MCA: 63.5                     Proximal MCA: 50  Mid MCA: 62.4                          Mid MCA: 56.6  Distal MCA: 63.1                       Distal MCA: 64.3  Proximal SENTHIL: 64.3                     Proximal SENTHIL: 58.5  Mid SENTHIL: 62.4                          Mid SENTHIL: 58.5  PCA: 24.6                              PCA: 31.2  T ICA: 31.2                            T ICA: 40.4  Submandibular Approach                 Submandibular Approach  D ICA: 31.6                            D ICA: 35.8  Transorbital Approach                  Transorbital Approach  Siphon: 42.7                           Siphon: 39.7  Transforamenal Approach                Transforamenal Approach  Vertebral: 46.6                        Vertebral: 32.3  Risk Factors History +---------+----------+-----------------------------------------------------+ ! Diagnosis! Date      ! Comments                                             !

## 2020-03-03 NOTE — PROGRESS NOTES
Speech Language Pathology  Speech Language Pathology  [address] [facility]    Cognitive Treatment Note    Date: 3/3/2020  Patients Name: Velvet Guzman  MRN: [id number]  Diagnosis:   Patient Active Problem List   Diagnosis Code    Essential hypertension I10    RLS (restless legs syndrome) G25.81    History of cervical cancer Z85.41    Gastroesophageal reflux disease without esophagitis K21.9    Trochanteric bursitis of left hip M70.62    Complex tear of lateral meniscus of left knee S83.272A    Weight loss R63.4    Cerebrovascular accident (CVA) ([address] I63.9    Left sided numbness R20.0    Acute left-sided weakness R53.1    Lacunar stroke (HCC) I63.81    Hyperlipidemia E78.5    Chronic pancreatitis ([address] K86.1    SAH (subarachnoid hemorrhage) ([address] I60.9    Cerebral aneurysm rupture ([address] I60.7    Cerebral aneurysm I67.1    Subarachnoid hemorrhage from anterior cerebral artery aneurysm (HCC) I60.6    Status post coil embolization of cerebral aneurysm Z98.890    Pneumonia of both lower lobes due to infectious organism ([address] J18.1    MRI-safe endovascular aneurysm coil present Z95.828    Ventilator dependence ([address] Z99.11    Iron deficiency anemia due to chronic blood loss D50.0       Pain: 0/10    Cognitive Treatment    Treatment time: 10:52-11:11      Subjective: [x] Alert [x] Cooperative     [] Confused     [] Agitated    [] Lethargic      Objective/Assessment:    Recall:    Coding and Grouping for Recall, Pairing Items: 6/10 increased to 10/10 with repetitions and minimal verbal cues   Functional Memory, Carlos: 3/5 increased to 5/5 with minimal verbal cues    Organization:    Category Members, [place]: 7/10 increased to 10/10 with repetitions and minimal verbal cues    Problem Solving/Reasoning:    Problem Solving, Missing Equipment: 4/6 increased to 6/6 with minimal verbal cues and repetitions    Plan:  [x] Continue ST services    [] Discharge from ST:      Discharge

## 2020-03-03 NOTE — PROGRESS NOTES
Today's Date: 3/3/2020  Patient Name: Jessica Dillard  Patient's age: 77 y.o., 1950      Reason for Consult: management recommendations and anemia    CHIEF COMPLAINT:  CVA    History Obtained From:  patient, electronic medical record      Interim History:  Patient has been doing better, no acute issues with AMS. Has been tolerating medication well. Doing better. Otherwise no complaints. HISTORY OF PRESENT ILLNESS:      This is a 43-year-old female with past medical history of hypertension, was transferred from [facility] for subarachnoid hemorrhage. History provided by patient's daughter who reported that patient had a recent dental procedure and was given anesthetics and subsequently her blood pressure went significantly high. Patient complains of mild facial droop and her strokelike symptoms and her CT scan showed subarachnoid hemorrhage predominantly in the anterior interhemispheric fissure. Patient is [place] and she was noted to have low hemoglobin. Patient and family refusing further blood transfusion and therefore hematology was consulted. Past Medical History:   has a past medical history of Acid reflux, Arthritis, Cancer ([address] Family history of breast cancer in first degree relative, Gall stones, Hard of hearing, History of cervical cancer, Hypertension, Lateral meniscus tear, Wears dentures, and Wears glasses. Past Surgical History:   has a past surgical history that includes Tonsillectomy and adenoidectomy (1956); Cholecystectomy; Inner ear surgery; Knee arthroscopy (2000, 2005); cyst removal (1980); Colonoscopy (12/09); Breast biopsy (1980); Foot surgery (Bilateral); other surgical history (Right, 9-4-14); Umbilical hernia repair; hernia repair; Varicose vein surgery (Bilateral); Knee arthroscopy (Left, 4/3/2019); Colonoscopy (N/A, 5/31/2019); other surgical history (02/12/2020);   picc powerpicc double (2/20/2020);  Upper gastrointestinal endoscopy (N/A, 2/20/2020); and sigmoidoscopy (N/A, 2/20/2020). Medications:    Prior to Admission medications    Medication Sig Start Date End Date Taking? Authorizing Provider   ferrous sulfate 325 (65 Fe) MG EC tablet Take 1 tablet by mouth daily (with breakfast) 3/3/20  Yes Yousuf Ruiz DO   hydrocortisone (PROCTO-OTILIO) 1 % cream Apply topically 2 times daily as needed for hemorrhoids.  3/3/20 3/10/20 Yes Yousuf Hernandez DO   atorvastatin (LIPITOR) 20 MG tablet Take 2 tablets by mouth nightly 3/3/20  Yes Yousuf Ruiz DO   niMODipine (NIMOTOP) 30 MG capsule Take 2 capsules by mouth every 4 hours for 2 days 3/3/20 3/5/20 Yes Yousuf Hernandez DO   pramipexole (MIRAPEX) 0.125 MG tablet TAKE ONE TABLET BY MOUTH NIGHTLY 2/19/20   Johanna Powell MD   hydrALAZINE (APRESOLINE) 50 MG tablet TAKE 1 TABLET BY MOUTH 4 TIMES A DAY 12/26/19   Johanna Powell MD   carvedilol (COREG) 3.125 MG tablet Take 1 tablet by mouth 2 times daily 11/5/19   Johanna Powell MD   acetaminophen (TYLENOL) 325 MG tablet Take 325 mg by mouth every 6 hours as needed for Pain    Historical Provider, MD   vitamin D (CHOLECALCIFEROL) 1000 UNIT TABS tablet Take 1,000 Units by mouth 3 times daily    Historical Provider, MD   meloxicam (MOBIC) 15 MG tablet Take 15 mg by mouth daily    Historical Provider, MD   Multiple Vitamins-Minerals (THERAPEUTIC MULTIVITAMIN-MINERALS) tablet Take 1 tablet by mouth daily    Historical Provider, MD     Current Facility-Administered Medications   Medication Dose Route Frequency Provider Last Rate Last Dose    magnesium oxide (MAG-OX) tablet 800 mg  800 mg Oral Daily Yousuf Ruiz DO   800 mg at 03/03/20 0848    pramipexole (MIRAPEX) tablet 0.125 mg  0.125 mg Oral Nightly Chantale Barnard MD   0.125 mg at 03/02/20 2110    miconazole (MICOTIN) 2 % powder   Topical BID Bhavin Gardner APRN - CNP        niMODipine (NIMOTOP) capsule 60 mg  60 mg Oral 6 times per day Swoope Jj APRN - CNP   60 mg at 03/03/20 0848    enoxaparin (LOVENOX) injection 30 mg  30 mg Subcutaneous Daily Yousuf Ruiz DO   30 mg at 03/03/20 0848    atorvastatin (LIPITOR) tablet 20 mg  20 mg Oral Nightly Jeffy Mckeon MD   20 mg at 03/02/20 2110    fentaNYL (SUBLIMAZE) injection 50 mcg  50 mcg Intravenous Q4H PRN Yousuf Ruiz DO        hydrocortisone (ANUSOL-HC) 2.5 % rectal cream   Rectal BID Samer José Miguel Porter MD        calcium gluconate 1 g in dextrose 5% 100 mL IVPB  1 g Intravenous Once Yousuf Ruiz DO        pantoprazole (PROTONIX) injection 40 mg  40 mg Intravenous BID Yousuf Ruiz DO   40 mg at 03/03/20 0849    And    sodium chloride (PF) 0.9 % injection 10 mL  10 mL Intravenous BID Yousuf Ruiz DO   10 mL at 03/03/20 0900    vitamin B-1 (THIAMINE) tablet 100 mg  100 mg Per NG tube Daily Yousuf Ruiz DO   100 mg at 62/84/76 2637    folic acid (FOLVITE) tablet 1 mg  1 mg Per NG tube Daily Yousuf Ruiz DO   1 mg at 03/03/20 0848    lidocaine 1 % injection 5 mL  5 mL Intradermal Once Yousuf Ruiz DO        heparin flush 100 UNIT/ML injection 100 Units  100 Units Intravenous PRN RAEGAN Coates CNP   100 Units at 02/19/20 1837    [Held by provider] sennosides-docusate sodium (SENOKOT-S) 8.6-50 MG tablet 2 tablet  2 tablet Oral Daily RAEGAN Mcgee CNP   2 tablet at 02/16/20 1004    [Held by provider] magnesium hydroxide (MILK OF MAGNESIA) 400 MG/5ML suspension 30 mL  30 mL Oral Daily PRN RAEGAN Mcgee CNP        sodium chloride flush 0.9 % injection 10 mL  10 mL Intravenous 2 times per day Yousuf Ruiz DO   10 mL at 03/03/20 0900    sodium chloride flush 0.9 % injection 10 mL  10 mL Intravenous PRN Yousuf Ruiz DO        ferrous sulfate EC tablet 325 mg  325 mg Oral Daily with breakfast Lottie RisingRAEGAN CNP   325 mg at 03/03/20 0848    [Held by provider] docusate sodium (COLACE) capsule 100 mg  100 mg Oral Daily PRN Jett Man MD        sodium chloride flush 0.9 % injection 10 mL 3.6 oz (44.1 kg)   SpO2 100%   BMI 15.69 kg/m²    Temp (24hrs), Av.1 °F (37.3 °C), Min:97.7 °F (36.5 °C), Max:100.4 °F (38 °C)      General appearance - well appearing, no in pain or distress   Mental status - alert and cooperative   Eyes - pupils equal and reactive, extraocular eye movements intact   Ears - bilateral TM's and external ear canals normal   Mouth - mucous membranes moist, pharynx normal without lesions   Neck - supple, no significant adenopathy   Lymphatics - no palpable lymphadenopathy, no hepatosplenomegaly   Chest - clear to auscultation, no wheezes, rales or rhonchi, symmetric air entry   Heart - normal rate, regular rhythm, normal S1, S2, no murmurs  Abdomen - soft, nontender, nondistended, no masses or organomegaly   Neurological - alert, oriented, normal speech, no focal findings or movement disorder noted   Musculoskeletal - no joint tenderness, deformity or swelling   Extremities - peripheral pulses normal, no pedal edema, no clubbing or cyanosis   Skin - normal coloration and turgor, no rashes, no suspicious skin lesions noted ,      DATA:      Labs:     CBC:   Recent Labs     20  0532 20  1028   WBC 8.2  --    HGB 8.0* 8.9*   HCT 26.4* 30.1*   *  --      BMP:   Recent Labs     20  0532 20  1318   *  --    K 5.6* 4.2   CO2 22  --    BUN 29*  --    CREATININE 0.70  --    LABGLOM >60  --    GLUCOSE 108*  --      PT/INR: No results for input(s): PROTIME, INR in the last 72 hours. APTT:No results for input(s): APTT in the last 72 hours. LIVER PROFILE:No results for input(s): AST, ALT, LABALBU in the last 72 hours. IMAGING DATA:  Transcranial doppler 20:   Conclusions        Summary        Normal transcranial Doppler study.        Signature     Barium swallow 20:  No laryngeal penetration or aspiration of administered consistencies. IMPRESSION:   1. Arachnoid hemorrhage status post embolization of cerebral aneurysm  2.  Iron deficiency anemia due to chronic blood loss  3. Sabianism  4. Poor oral intake, malnutrition BMI 15.7  5. Bilateral pneumonia    RECOMMENDATIONS:  I had a detailed discussion with the patient and personally went over the results of lab workup imaging studies and other relevant clinical data. 1. Continue Aranesp  200 mcg weekly,Hemoglobin remains stable, improved up to 8.9 . Check hemoglobin on Thursday or Friday to evaluate for response from aranesp  2. Continue iron supplements  3. We will follow peripherally. Please feel free to contact us at anytime. 4. Will discuss with attending    Discussed with patient and Nurse. Thank you for asking us to see this patient. Bean Mcfarlane MD      Department of Internal Medicine  [facility]         3/3/2020, 12:41 PM        This note is created with the assistance of a speech recognition program.  While intending to generate a document that actually reflects the content of the visit, the document can still have some errors including those of syntax and sound a like substitutions which may escape proof reading. It such instances, actual meaning can be extrapolated by contextual diversion. Attending Physician Statement  The patient was seen and examined during rounds, I have discussed the care of Tilghman Loser, including pertinent history and exam findings with the resident. I have reviewed the key elements of all parts of the encounter with the resident. I agree with the assessment, and status of the problem list as documented.    Additional assessment/ plan        Mirela Mace MD  Hematology Oncology  [phone]  Electronically signed by Merced Marc MD on 3/3/2020 at 6:46 PM

## 2020-03-03 NOTE — PROGRESS NOTES
endoscopy. GI performed EGD and sigmoidoscopy- mod to severe esophagitis, external hemorrhoids.  Given Lasix x1.  2/21: HH 5.1, family refusing ABGs.  UOP 3.4 L   2/22: HH 5.0, switch to EOD labs.  Lasix 40 mg x 1 reticulocyte 5%  2/23: Extubated, 2 L O2 sat 99%. 2/24: Mild desaturation to 90% on room air while talking, remains on 2 L.  H&H 7.0, reticulocyte 9.3%. OK to restart DVT ppx.  Passed videoswallow. 2/25:  Passed videoswallow yesterday; General diet soft and bite sized.  Frequent liquid stools overnight, hold further Magnesium infusions.  Weaned off O2  2/28: equal strength BLE. Awaiting precert  3/1: K+ 5.6, Ca-gluc/Insulin-dextrose,  repeat 4.2. HH 8.0. Redosed Aranesp    Last 24h:   No acute events overnight. The patient has been eating and drinking without problem. She had bowel movement 2 days ago.   Hemoglobin stable today    CURRENT MEDICATIONS:  SCHEDULED MEDICATIONS:   magnesium oxide  800 mg Oral Daily    pramipexole  0.125 mg Oral Nightly    miconazole   Topical BID    niMODipine  60 mg Oral 6 times per day    enoxaparin  30 mg Subcutaneous Daily    atorvastatin  20 mg Oral Nightly    hydrocortisone   Rectal BID    calcium gluconate  1 g Intravenous Once    pantoprazole  40 mg Intravenous BID    And    sodium chloride (PF)  10 mL Intravenous BID    vitamin B-1  100 mg Per NG tube Daily    folic acid  1 mg Per NG tube Daily    lidocaine 1 % injection  5 mL Intradermal Once    [Held by provider] sennosides-docusate sodium  2 tablet Oral Daily    sodium chloride flush  10 mL Intravenous 2 times per day    ferrous sulfate  325 mg Oral Daily with breakfast    sodium chloride flush  10 mL Intravenous 2 times per day     CONTINUOUS INFUSIONS:    PRN MEDICATIONS:   fentanNYL, heparin flush, [Held by provider] magnesium hydroxide, sodium chloride flush, [Held by provider] docusate sodium, sodium chloride flush, acetaminophen, ondansetron    VITALS:  Temperature Range: Temp: 99 °F (37.2 °C) Temp  Av.1 °F (37.3 °C)  Min: 97.7 °F (36.5 °C)  Max: 100.4 °F (38 °C)  BP Range: Systolic (41RCZ), KLR:403 , Min:102 , NVD:076     Diastolic (19TPH), KXJ:92, Min:46, Max:53    Pulse Range: Pulse  Av.2  Min: 75  Max: 93  Respiration Range: Resp  Av.2  Min: 13  Max: 99  Current Pulse Ox: SpO2: 100 %  24HR Pulse Ox Range: SpO2  Av.8 %  Min: 96 %  Max: 100 %  Patient Vitals for the past 12 hrs:   BP Temp Temp src Pulse Resp SpO2   20 1151 (!) 110/50 99 °F (37.2 °C) Oral 75 (!) 99 100 %   20 0810 (!) 110/53 97.7 °F (36.5 °C) Oral 82 13 98 %   20 0349 (!) 112/51 99.9 °F (37.7 °C) Oral 93 21 96 %     Estimated body mass index is 15.69 kg/m² as calculated from the following:    Height as of this encounter: 5' 6\" (1.676 m). Weight as of this encounter: 97 lb 3.6 oz (44.1 kg).  []<16 Severe malnutrition  []16-16.99 Moderate malnutrition  []17-18.49 Mild malnutrition  []18.5-24.9 Normal  []25-29.9 Overweight (not obese)  []30-34.9 Obese class 1 (Low Risk)  []35-39.9 Obese class 2 (Moderate Risk)  []?40 Obese class 3 (High Risk)    RECENT LABS:   Lab Results   Component Value Date    WBC 8.2 2020    HGB 8.9 (L) 2020    HCT 30.1 (L) 2020     (H) 2020    CHOL 102 2020    TRIG 100 2020    HDL 21 (L) 2020    ALT 29 02/15/2020    AST 47 (H) 02/15/2020     (L) 2020    K 4.2 2020     2020    CREATININE 0.70 2020    BUN 29 (H) 2020    CO2 22 2020    TSH 5.94 (H) 02/15/2020    INR 1.2 2020    LABA1C 5.6 2020     24 HOUR INTAKE/OUTPUT:No intake or output data in the 24 hours ending 20 1337    IMAGING:     Place summary of recent imaging here. Labs and Images reviewed with:  [] Dr. Reyes Zamorano. Lauro    [x] Dr. Jose Angel Rubi  [] Dr. Deborah Martinez  [] There are no new interval images to review.      PHYSICAL EXAM       CONSTITUTIONAL:  Well developed, well nourished, alert and oriented x 3, in no acute distress. GCS 15. Nontoxic. No dysarthria. No aphasia. HEAD:  normocephalic, atraumatic    EYES:  PERRLA, EOMI.   ENT:  moist mucous membranes   NECK:  supple, symmetric   LUNGS:  Equal air entry bilaterally   CARDIOVASCULAR:  normal s1 / s2, RRR, distal pulses intact   ABDOMEN:  Soft, no rigidity   NEUROLOGIC:  Mental Status:  A & O x3,awake             Cranial Nerves:    cranial nerves II-XII are grossly intact    Motor Exam:    Drift:  absent  Tone:  normal    Motor exam is symmetrical 5 out of 5 all extremities bilaterally    Sensory:    Touch:    Right Upper Extremity:  normal  Left Upper Extremity:  normal  Right Lower Extremity:  normal  Left Lower Extremity:  normal           DRAINS:  [x] There are no drains for Neuro Critical Care to monitor at this time.      ASSESSMENT AND PLAN:       NEUROLOGIC:  -Acute subarachnoid hemorrhage in setting of right DCA aneurysm rupture, status post coil embolization on 2/12  -Continue nimodipine for 21 days until 3/4  - Goal SBP less than 160,  showed no vasospasm  -Neuro endovascular following  - Neuro checks per protocol    CARDIOVASCULAR:  - Goal SBP less than 160  -EKG unremarkable  -Echo showed EF of 55%  -Continue Lipitor  - Continue telemetry    PULMONARY:  -Saturating well on room air  -Completed course of vancomycin and Zosyn for multifocal pneumonia      RENAL/FLUID/ELECTROLYTE:  -Voiding independently  - Replace electrolytes PRN    GI/NUTRITION:  NUTRITION:  DIET GENERAL; Dysphagia Soft and Bite-Sized  Dietary Nutrition Supplements: Standard High Calorie Oral Supplement  - Bowel regimen: Magnesium oxide  - GI prophylaxis: Protonix IV  -GI consulted for bright red blood per rectum, status post EGD and sigmoidoscopy on 2/20 which was significant for esophagitis and external hemorrhoids    ID:  -Afebrile  - Continue to monitor for fevers    HEME:   redose Aranesp 2/29  hh 8.9    ENDOCRINE:  - Continue to monitor blood glucose, goal

## 2020-03-03 NOTE — PROGRESS NOTES
ENDOVASCULAR NEUROSURGERY PROGRESS NOTE  3/3/2020 10:16 AM  Subjective:   Admit Date: 2/12/2020  PCP: Ofelia Heard MD    No acute event overnight. Objective:   Vitals: BP (!) 110/53   Pulse 82   Temp 97.7 °F (36.5 °C) (Oral)   Resp 13   Ht 5' 6\" (1.676 m)   Wt 97 lb 3.6 oz (44.1 kg)   SpO2 98%   BMI 15.69 kg/m²       General appearance: Extubated  Lungs: Respite distress noted. Heart: RRR  Neuro exam: opens her eyes spontaneously, she is following simple commands. Able to move both upper extremities against gravity. He is able to move both lower extremities against gravity. Intact sensation to simple touch in both upper and lower extremities. Medications and labs:   Scheduled Meds:   magnesium oxide  800 mg Oral Daily    pramipexole  0.125 mg Oral Nightly    miconazole   Topical BID    niMODipine  60 mg Oral 6 times per day    enoxaparin  30 mg Subcutaneous Daily    atorvastatin  20 mg Oral Nightly    hydrocortisone   Rectal BID    calcium gluconate  1 g Intravenous Once    pantoprazole  40 mg Intravenous BID    And    sodium chloride (PF)  10 mL Intravenous BID    vitamin B-1  100 mg Per NG tube Daily    folic acid  1 mg Per NG tube Daily    lidocaine 1 % injection  5 mL Intradermal Once    [Held by provider] sennosides-docusate sodium  2 tablet Oral Daily    sodium chloride flush  10 mL Intravenous 2 times per day    ferrous sulfate  325 mg Oral Daily with breakfast    sodium chloride flush  10 mL Intravenous 2 times per day     Continuous Infusions:    CBC:   Recent Labs     03/01/20  0532   WBC 8.2   HGB 8.0*   *     BMP:    Recent Labs     03/01/20  0532 03/01/20  1318   *  --    K 5.6* 4.2     --    CO2 22  --    BUN 29*  --    CREATININE 0.70  --    GLUCOSE 108*  --      Hepatic:   No results for input(s): AST, ALT, ALB, BILITOT, ALKPHOS in the last 72 hours. Troponin: No results for input(s): TROPONINI in the last 72 hours.   BNP: No results for input(s): BNP in the last 72 hours. Lipids:   No results for input(s): CHOL, HDL in the last 72 hours. Invalid input(s): LDLCALCU  INR:   No results for input(s): INR in the last 72 hours. Assessment and Recommendations:   77years old female with ruptured right SENTHIL 2 mm aneurysm, Em and Jeff 3 modified Jane scale 3. Patient underwent primary coil embolization 2/12/2020. Postprocedure CTA head shows some increase in the hemorrhage around the aneurysm region, follow-up CT head stable with mild mass-effect and mild hydrocephalus with third ventricle IVH 2/14/2020. Patient exam is stable    Repeated CTA head and neck 2/15/2020 with vasospasm however patient exam is improving. TCD 2/17: Normal bilateral transcranial Doppler. TCD 2/121: Normal bilateral transcranial Doppler. TCD 2/23: Normal bilateral transcranial Doppler. TCD 2/25: Normal bilateral transcranial Doppler. Patient is being treated for iron deficiency anemia-patient is [employer]oration[employer]    Blood pressure systolic goal per NICU to assess for vasospasm  Keep magnesium above 2.5  Thiamine  Lipitor 80 mg daily  Nimodipine therapy. Na checks per NICU. TCD daily.   Chemical DVT ppx  Upon discharge follow up with Dr. Stacie Bradley in 2 weeks and Dr. Alexandra Lambert in 3 months with MRA head with contrast.      Alison Alejo MD  Stroke, [facility]  [id number] [place]  Electronically signed 3/3/2020 at 10:16 AM

## 2020-03-04 ENCOUNTER — TELEPHONE (OUTPATIENT)
Dept: FAMILY MEDICINE CLINIC | Age: 70
End: 2020-03-04

## 2020-03-04 NOTE — TELEPHONE ENCOUNTER
[facility] Transitions Initial Follow Up Call    Outreach made within 2 business days of discharge: Yes    Patient: Grisel Ta Patient : 1950   MRN: [id number]  Reason for Admission: There are no discharge diagnoses documented for the most recent discharge. Discharge Date: 3/3/20       Spoke with:     Discharge department/facility:     [facility] Interactive Patient Contact:  Was patient able to fill all prescriptions:   Was patient instructed to bring all medications to the follow-up visit:   Is patient taking all medications as directed in the discharge summary?    Does patient understand their discharge instructions:   Does patient have questions or concerns that need addressed prior to 7-14 day follow up office visit:     Scheduled appointment with PCP within 7-14 days    Follow Up  Future Appointments   Date Time Provider Cammie Barnes   3/13/2020 10:45 AM Rodriguez Jacobo MD [facility]   3/17/2020 10:30 AM Indiana Madison MD [facility]   2020 12:45 PM Judy Barboza MD [facility]   2020  3:30 PM Zara Perez MD Neuro [facility]       Sonda Precise, MA

## 2020-03-13 ENCOUNTER — OFFICE VISIT (OUTPATIENT)
Dept: NEUROLOGY | Age: 70
End: 2020-03-13
Payer: MEDICARE

## 2020-03-13 VITALS
BODY MASS INDEX: 15.01 KG/M2 | OXYGEN SATURATION: 72 % | HEART RATE: 81 BPM | DIASTOLIC BLOOD PRESSURE: 78 MMHG | SYSTOLIC BLOOD PRESSURE: 120 MMHG | WEIGHT: 93 LBS

## 2020-03-13 PROCEDURE — G8484 FLU IMMUNIZE NO ADMIN: HCPCS | Performed by: STUDENT IN AN ORGANIZED HEALTH CARE EDUCATION/TRAINING PROGRAM

## 2020-03-13 PROCEDURE — 4040F PNEUMOC VAC/ADMIN/RCVD: CPT | Performed by: STUDENT IN AN ORGANIZED HEALTH CARE EDUCATION/TRAINING PROGRAM

## 2020-03-13 PROCEDURE — 99215 OFFICE O/P EST HI 40 MIN: CPT | Performed by: STUDENT IN AN ORGANIZED HEALTH CARE EDUCATION/TRAINING PROGRAM

## 2020-03-13 PROCEDURE — 1090F PRES/ABSN URINE INCON ASSESS: CPT | Performed by: STUDENT IN AN ORGANIZED HEALTH CARE EDUCATION/TRAINING PROGRAM

## 2020-03-13 PROCEDURE — G8419 CALC BMI OUT NRM PARAM NOF/U: HCPCS | Performed by: STUDENT IN AN ORGANIZED HEALTH CARE EDUCATION/TRAINING PROGRAM

## 2020-03-13 PROCEDURE — 3017F COLORECTAL CA SCREEN DOC REV: CPT | Performed by: STUDENT IN AN ORGANIZED HEALTH CARE EDUCATION/TRAINING PROGRAM

## 2020-03-13 PROCEDURE — 1123F ACP DISCUSS/DSCN MKR DOCD: CPT | Performed by: STUDENT IN AN ORGANIZED HEALTH CARE EDUCATION/TRAINING PROGRAM

## 2020-03-13 PROCEDURE — G8427 DOCREV CUR MEDS BY ELIG CLIN: HCPCS | Performed by: STUDENT IN AN ORGANIZED HEALTH CARE EDUCATION/TRAINING PROGRAM

## 2020-03-13 PROCEDURE — G8399 PT W/DXA RESULTS DOCUMENT: HCPCS | Performed by: STUDENT IN AN ORGANIZED HEALTH CARE EDUCATION/TRAINING PROGRAM

## 2020-03-13 PROCEDURE — 1111F DSCHRG MED/CURRENT MED MERGE: CPT | Performed by: STUDENT IN AN ORGANIZED HEALTH CARE EDUCATION/TRAINING PROGRAM

## 2020-03-13 PROCEDURE — 1036F TOBACCO NON-USER: CPT | Performed by: STUDENT IN AN ORGANIZED HEALTH CARE EDUCATION/TRAINING PROGRAM

## 2020-03-13 NOTE — PROGRESS NOTES
Endovascular Neurosurgery/stroke clinic note    Pt Name: Marisol Dawn  MRN: [id number]  [place]: 1950  Date of evaluation: 3/13/2020  Primary Care Physician: Desire Lester MD      SUBJECTIVE:  History of Chief Complaint:    Marisol Dawn is a 77 y.o. female with hx of  ruptured right SENTHIL 2 mm aneurysm, Em and Jeff 3 modified Jane scale 3. Patient underwent primary coil embolization 2/12/2020. Patient is here for a follow-up post procedure. Patient denies any new symptoms since she was discharged    She denies headache, weakness numbness    Participating with physical therapy, patient still have some unsteadiness with walking however it is improving      Allergies  is allergic to lisinopril; losartan; and procardia [nifedipine]. Medications  Prior to Admission medications    Medication Sig Start Date End Date Taking?  Authorizing Provider   ferrous sulfate 325 (65 Fe) MG EC tablet Take 1 tablet by mouth daily (with breakfast) 3/3/20   Yousuf Ruiz DO   atorvastatin (LIPITOR) 20 MG tablet Take 2 tablets by mouth nightly 3/3/20   Yousuf Ruiz DO   niMODipine (NIMOTOP) 30 MG capsule Take 2 capsules by mouth every 4 hours for 2 days 3/3/20 3/5/20  Yousuf Ruiz DO   pramipexole (MIRAPEX) 0.125 MG tablet TAKE ONE TABLET BY MOUTH NIGHTLY 2/19/20   Desire Lester MD   hydrALAZINE (APRESOLINE) 50 MG tablet TAKE 1 TABLET BY MOUTH 4 TIMES A DAY 12/26/19   Desire Lester MD   carvedilol (COREG) 3.125 MG tablet Take 1 tablet by mouth 2 times daily 11/5/19   Desire Lester MD   acetaminophen (TYLENOL) 325 MG tablet Take 325 mg by mouth every 6 hours as needed for Pain    Historical Provider, MD   vitamin D (CHOLECALCIFEROL) 1000 UNIT TABS tablet Take 1,000 Units by mouth 3 times daily    Historical Provider, MD   meloxicam (MOBIC) 15 MG tablet Take 15 mg by mouth daily    Historical Provider, MD   Multiple Vitamins-Minerals (THERAPEUTIC MULTIVITAMIN-MINERALS) tablet Take 1 tablet by mouth daily    Historical Provider, MD    Scheduled Meds:  Continuous Infusions:  PRN Meds:.  Past Medical History   has a past medical history of Acid reflux, Arthritis, Cancer ([address] Family history of breast cancer in first degree relative, Gall stones, Hard of hearing, History of cervical cancer, Hypertension, Lateral meniscus tear, Wears dentures, and Wears glasses. Past Surgical History   has a past surgical history that includes Tonsillectomy and adenoidectomy (1956); Cholecystectomy; Inner ear surgery; Knee arthroscopy (2000, 2005); cyst removal (1980); Colonoscopy (12/09); Breast biopsy (1980); Foot surgery (Bilateral); other surgical history (Right, 9-4-14); Umbilical hernia repair; hernia repair; Varicose vein surgery (Bilateral); Knee arthroscopy (Left, 4/3/2019); Colonoscopy (N/A, 5/31/2019); other surgical history (02/12/2020);   picc powerpicc double (2/20/2020); Upper gastrointestinal endoscopy (N/A, 2/20/2020); and sigmoidoscopy (N/A, 2/20/2020). Social History   reports that she has never smoked. She has never used smokeless tobacco.   reports current alcohol use. reports no history of drug use. Family History  family history includes Breast Cancer in her sister; Cancer in her mother; Heart Attack in her father. Review of Systems:  CONSTITUTIONAL:  negative for fevers    EYES:  negative for double vision     HEENT:  negative for tinnitus   RESPIRATORY:  negative for cough, shortness of breath    CARDIOVASCULAR:  negative for chest pain, palpitations   GASTROINTESTINAL:  negative for nausea   GENITOURINARY:  negative for hematuria   MUSCULOSKELETAL:  negative for neck pain    NEUROLOGICAL:  See HPI   PSYCHIATRIC:  negative for anxiety      Review of systems otherwise negative. OBJECTIVE:  Vitals: There were no vitals taken for this visit. On exam today: Patient is AAO x3, following commands. CN II-XII grossly intact, pupils 2 mm reactive to light bilaterally.    Normal tone in four extremities. Normal sensory exam to LT and pain. Muscle strength is 5/5 in 4 extremities. Cerebellar exam: No nystagmus. Lungs sounds clear to auscultation bilaterally. Heart exam: normal S1,S2 sounds,RRR,no murmers. Abdomen was soft, NT,ND with positive bowel sounds. Normal bilateral radial and pedal pulses. NIH Stroke Scale Total: 0    Modified [place] Scale:   Zero: No symptoms at all: 0   1: No significant disability despite symptoms; able to carry out all usual duties and activities  2: Slight disability; unable to carry out all previous activities, but able to look after own affairs without assistance  3:Moderate disability; requiring some help, but able to walk without assistance  4: Moderately severe disability; unable to walk and attend to bodily needs without assistance  5:Severe disability; bedridden, incontinent and requiring constant nursing care and attention    LABS:  CBC:   Lab Results   Component Value Date    WBC 8.2 03/01/2020    RBC 2.82 03/01/2020    RBC 3.95 12/19/2011    HGB 8.9 03/03/2020    HCT 30.1 03/03/2020    MCV 93.6 03/01/2020    MCH 28.4 03/01/2020    MCHC 30.3 03/01/2020    RDW 23.8 03/01/2020     03/01/2020     12/19/2011    MPV 10.5 03/01/2020     BMP:    Lab Results   Component Value Date     03/01/2020    K 4.2 03/01/2020     03/01/2020    CO2 22 03/01/2020    BUN 29 03/01/2020    LABALBU 2.6 02/15/2020    CREATININE 0.70 03/01/2020    CALCIUM 8.8 03/01/2020    GFRAA >60 03/01/2020    LABGLOM >60 03/01/2020    GLUCOSE 108 03/01/2020    GLUCOSE 82 12/19/2011     I    ASSESSMENT AND PLAN  77 y.o. female with hx of  ruptured right SENTHIL 2 mm aneurysm, Em and Jeff 3 modified Jane scale 3. Patient underwent primary coil embolization 2/12/2020. Patient is here for a follow-up post procedure.      Patient denies any new symptoms since she was discharged    She denies headache, weakness numbness    Participating with physical therapy, patient still have some unsteadiness with walking however it is improving      Follow up with Dr. Mike Hale in 5/2020 with MRA head w contrast.  Return to clinic PRN.     Loretta Desai MD  Pager [phone]  Stroke, [facility]  [id number]  Electronically signed 3/13/2020 at 11:04 AM

## 2020-03-16 RX ORDER — ATORVASTATIN CALCIUM 20 MG/1
40 TABLET, FILM COATED ORAL NIGHTLY
Qty: 30 TABLET | Refills: 2 | Status: SHIPPED | OUTPATIENT
Start: 2020-03-16 | End: 2021-01-13 | Stop reason: SDUPTHER

## 2020-03-27 ENCOUNTER — TELEPHONE (OUTPATIENT)
Dept: GASTROENTEROLOGY | Age: 70
End: 2020-03-27

## 2020-03-27 RX ORDER — CARVEDILOL 3.12 MG/1
TABLET ORAL
Qty: 180 TABLET | Refills: 0 | Status: SHIPPED | OUTPATIENT
Start: 2020-03-27 | End: 2020-09-15 | Stop reason: SDUPTHER

## 2020-03-30 ENCOUNTER — VIRTUAL VISIT (OUTPATIENT)
Dept: GASTROENTEROLOGY | Age: 70
End: 2020-03-30
Payer: MEDICARE

## 2020-03-30 PROCEDURE — G2012 BRIEF CHECK IN BY MD/QHP: HCPCS | Performed by: INTERNAL MEDICINE

## 2020-03-30 NOTE — TELEPHONE ENCOUNTER
Writer contacted [place] in regards to appt on 4/7/2020, Anish Tellez a f/u needed to be scheduled. Anish is requesting a telephone visit and has declined a VV and MyChart visit.

## 2020-03-30 NOTE — PROGRESS NOTES
Azeb Shultz is a 77 y.o. female evaluated via telephone on 3/30/2020. Consent:  She and/or health care decision maker is aware that that she may receive a bill for this telephone service, depending on her insurance coverage, and has provided verbal consent to proceed: Yes      Documentation:  I communicated with the patient and/or health care decision maker about patient was admitted at [address] [place] [address] recently with subarachnoid hemorrhage and was managed successfully. During that hospitalization patient had EGD to evaluate GI bleed and was found to have mild esophagitis. Also had a sigmoidoscopy done and was found to have hemorrhoids. Her bowel movement was normal color at that time. On further discussion patient states following discharge she is doing well. She has normal color bowel movements. No melena or hematochezia. Denies abdominal pain, bloating, nausea vomiting. Heartburns better. Swallowing is good. Overall she is doing well. Her hemoglobin that was done on 3/3/2020 is about 8.9. And was found. Details of this discussion including any medical advice provided: Patient is advised to contact me if she has any signs of bleeding. Advised to have CBC done in the next couple of weeks and to contact me for the reports. We will see her in the next 2 to 3 months. In between if she has any issues to contact the office. I affirm this is a Patient Initiated Episode with an Established Patient who has not had a related appointment within my department in the past 7 days or scheduled within the next 24 hours.     Total Time: minutes: 5-10 minutes    Note: not billable if this call serves to triage the patient into an appointment for the relevant concern      Chris Fontanez

## 2020-04-07 ENCOUNTER — TELEPHONE (OUTPATIENT)
Dept: GASTROENTEROLOGY | Age: 70
End: 2020-04-07

## 2020-04-07 NOTE — TELEPHONE ENCOUNTER
suzy Sanchez to schedule 2 to 3 mos f/u appt with Dr Annette Espino after virtual visit on 03/30/20.

## 2020-05-14 ENCOUNTER — OFFICE VISIT (OUTPATIENT)
Dept: FAMILY MEDICINE CLINIC | Age: 70
End: 2020-05-14
Payer: MEDICARE

## 2020-05-14 VITALS
OXYGEN SATURATION: 95 % | TEMPERATURE: 98.6 F | HEART RATE: 116 BPM | WEIGHT: 96 LBS | SYSTOLIC BLOOD PRESSURE: 90 MMHG | DIASTOLIC BLOOD PRESSURE: 60 MMHG | BODY MASS INDEX: 16.39 KG/M2 | HEIGHT: 64 IN

## 2020-05-14 PROBLEM — R00.0 TACHYCARDIA: Status: ACTIVE | Noted: 2020-05-14

## 2020-05-14 PROBLEM — R53.83 FATIGUE: Status: ACTIVE | Noted: 2020-05-14

## 2020-05-14 PROBLEM — D64.9 ANEMIA: Status: ACTIVE | Noted: 2020-05-14

## 2020-05-14 PROBLEM — R63.0 DECREASED APPETITE: Status: ACTIVE | Noted: 2020-05-14

## 2020-05-14 PROCEDURE — 1036F TOBACCO NON-USER: CPT | Performed by: FAMILY MEDICINE

## 2020-05-14 PROCEDURE — G8427 DOCREV CUR MEDS BY ELIG CLIN: HCPCS | Performed by: FAMILY MEDICINE

## 2020-05-14 PROCEDURE — G8399 PT W/DXA RESULTS DOCUMENT: HCPCS | Performed by: FAMILY MEDICINE

## 2020-05-14 PROCEDURE — G8419 CALC BMI OUT NRM PARAM NOF/U: HCPCS | Performed by: FAMILY MEDICINE

## 2020-05-14 PROCEDURE — 99215 OFFICE O/P EST HI 40 MIN: CPT | Performed by: FAMILY MEDICINE

## 2020-05-14 PROCEDURE — 3017F COLORECTAL CA SCREEN DOC REV: CPT | Performed by: FAMILY MEDICINE

## 2020-05-14 PROCEDURE — 1090F PRES/ABSN URINE INCON ASSESS: CPT | Performed by: FAMILY MEDICINE

## 2020-05-14 PROCEDURE — 1123F ACP DISCUSS/DSCN MKR DOCD: CPT | Performed by: FAMILY MEDICINE

## 2020-05-14 PROCEDURE — 4040F PNEUMOC VAC/ADMIN/RCVD: CPT | Performed by: FAMILY MEDICINE

## 2020-05-14 RX ORDER — FERROUS SULFATE 325(65) MG
325 TABLET ORAL 2 TIMES DAILY
Qty: 180 TABLET | Refills: 1 | Status: SHIPPED | OUTPATIENT
Start: 2020-05-14 | End: 2020-10-01 | Stop reason: ALTCHOICE

## 2020-05-14 ASSESSMENT — ENCOUNTER SYMPTOMS
DIARRHEA: 0
CHEST TIGHTNESS: 0
ABDOMINAL DISTENTION: 0
VOMITING: 0
PHOTOPHOBIA: 0
SHORTNESS OF BREATH: 1
RHINORRHEA: 0
WHEEZING: 0
ABDOMINAL PAIN: 0
COLOR CHANGE: 1
CONSTIPATION: 0
BACK PAIN: 1
SINUS PRESSURE: 0
NAUSEA: 0
BLOOD IN STOOL: 0
COUGH: 0

## 2020-05-14 ASSESSMENT — PATIENT HEALTH QUESTIONNAIRE - PHQ9
1. LITTLE INTEREST OR PLEASURE IN DOING THINGS: 0
SUM OF ALL RESPONSES TO PHQ QUESTIONS 1-9: 0
SUM OF ALL RESPONSES TO PHQ QUESTIONS 1-9: 0
2. FEELING DOWN, DEPRESSED OR HOPELESS: 0
SUM OF ALL RESPONSES TO PHQ9 QUESTIONS 1 & 2: 0

## 2020-05-14 NOTE — PROGRESS NOTES
Chief Complaint   Patient presents with    Hypertension         Josias Lawrence  here today for follow up on chronic medical problems, go over labs and/or diagnostic studies, and medication refills. Hypertension      HPI: Patient not seen since last 1 year. Patient was recently in the hospital with history of subarachnoid hemorrhage, in February/20, had stroke, secondary to aneurysm. Patient had surgery done. Patient has seen by neurologist for follow-up on March 13. Patient complains of fatigue tiredness, decreased appetite, patient was seen by GI for anemia due to bleed. Patient had colonoscopy done last year may. Patient regularly follows with GI and was recently seen in March. Patient is not taking any iron supplements recent hemoglobin is 8.9. Patient was referred to rheumatologist for rash on her face, has seen them once but did not follow-up with them. Patient's blood pressure is running low 90s, is on Coreg lower dose, her heart rate is high around 112 usually she runs heart rate in 80s. She denies any chest pain shortness of breath. She did had echocardiogram done in February/20 which showed normal ejection fraction and mild diastolic dysfunction. Patient has a history of mild swelling of both lower extremities associated with varicose veins. Patient also had CT abdomen done that showed bilateral lower lobe opacities in her lungs, and was treated for pneumonia. Patient did not had repeat chest x-ray done for resolution, there was concern for mass also and had CT chest done, which showed bilateral pneumonia. BP 90/60   Pulse 116   Temp 98.6 °F (37 °C) (Temporal)   Ht 5' 4\" (1.626 m)   Wt 96 lb (43.5 kg)   SpO2 95%   BMI 16.48 kg/m²    Body mass index is 16.48 kg/m². Wt Readings from Last 3 Encounters:   05/14/20 96 lb (43.5 kg)   03/13/20 93 lb (42.2 kg)   03/02/20 97 lb 3.6 oz (44.1 kg)        [x]Negative depression screening.   PHQ Scores 5/14/2020 2/6/2019 12/13/2018 10/19/2017 3/20/2017 1/25/2016 6/2/2015   PHQ2 Score 0 0 0 0 0 0 0   PHQ9 Score 0 0 0 0 0 0 0      []1-4 = Minimal depression   []5-9 = Milddepression   []10-14 = Moderate depression   []15-19 = Moderately severe depression   []20-27 = Severe depression    Discussed testing with the patient and all questions fully answered. No results displayed because visit has over 200 results.             Most recent labs reviewed:     Lab Results   Component Value Date    WBC 8.2 03/01/2020    HGB 8.9 (L) 03/03/2020    HCT 30.1 (L) 03/03/2020    MCV 93.6 03/01/2020     (H) 03/01/2020       @BRIEFLAB(NA,K,CL,CO2,BUN,CREATININE,GLUCOSE,CALCIUM)@     Lab Results   Component Value Date    ALT 29 02/15/2020    AST 47 (H) 02/15/2020    ALKPHOS 131 (H) 02/15/2020    BILITOT 0.33 02/15/2020       Lab Results   Component Value Date    TSH 5.94 (H) 02/15/2020       Lab Results   Component Value Date    CHOL 102 02/13/2020    CHOL 180 06/03/2019    CHOL 177 05/11/2018     Lab Results   Component Value Date    TRIG 100 02/13/2020    TRIG 119 06/03/2019    TRIG 113 05/11/2018     Lab Results   Component Value Date    HDL 21 (L) 02/13/2020    HDL 33 (L) 06/03/2019    HDL 39 (L) 05/11/2018     Lab Results   Component Value Date    LDLCHOLESTEROL 61 02/13/2020    LDLCHOLESTEROL 123 06/03/2019    LDLCHOLESTEROL 115 05/11/2018     Lab Results   Component Value Date    VLDL NOT REPORTED 02/13/2020    VLDL NOT REPORTED 06/03/2019    VLDL NOT REPORTED 05/11/2018     Lab Results   Component Value Date    CHOLHDLRATIO 4.9 02/13/2020    CHOLHDLRATIO 5.5 (H) 06/03/2019    CHOLHDLRATIO 4.5 05/11/2018       Lab Results   Component Value Date    LABA1C 5.6 02/13/2020       Lab Results   Component Value Date    PYKKJYEC56 670 02/13/2020       Lab Results   Component Value Date    FOLATE 7.9 02/13/2020       Lab Results   Component Value Date    IRON 26 (L) 02/13/2020    TIBC 293 02/13/2020    FERRITIN 44 02/13/2020       Lab Lifestyle    Physical activity     Days per week: Not on file     Minutes per session: Not on file    Stress: Not on file   Relationships    Social connections     Talks on phone: Not on file     Gets together: Not on file     Attends Zoroastrian service: Not on file     Active member of club or organization: Not on file     Attends meetings of clubs or organizations: Not on file     Relationship status: Not on file    Intimate partner violence     Fear of current or ex partner: Not on file     Emotionally abused: Not on file     Physically abused: Not on file     Forced sexual activity: Not on file   Other Topics Concern    Not on file   Social History Narrative    Not on file     Counseling given: Yes        Family History   Problem Relation Age of Onset    Heart Attack Father     Cancer Mother         stomach cancer    Breast Cancer Sister         one sister with breast cancer    Colon Cancer Neg Hx     Diabetes Neg Hx     Eclampsia Neg Hx     Hypertension Neg Hx     Ovarian Cancer Neg Hx      Labor Neg Hx     Spont Abortions Neg Hx     Stroke Neg Hx     Liver Cancer Neg Hx              -rest of complaints with corresponding details per ROS    The patient's past medical, surgical, social, and family history as well as her current medications and allergies were reviewed as documented intoday's encounter. Review of Systems   Constitutional: Positive for activity change, appetite change, fatigue and unexpected weight change. HENT: Negative for congestion, ear pain, hearing loss, mouth sores, postnasal drip, rhinorrhea and sinus pressure. Eyes: Negative for photophobia and visual disturbance. Respiratory: Positive for shortness of breath. Negative for cough, chest tightness and wheezing. Cardiovascular: Positive for leg swelling. Negative for chest pain and palpitations.    Gastrointestinal: Negative for abdominal distention, abdominal pain, blood in stool, constipation, Total; Future  - Hepatitis B Surface Antibody; Future      Orders Placed This Encounter   Procedures    ALECIA Digital Screen Bilateral [[id number]]     Standing Status:   Future     Standing Expiration Date:   5/14/2021     Order Specific Question:   Reason for exam:     Answer:   screening    XR CHEST STANDARD (2 VW)     Standing Status:   Future     Standing Expiration Date:   5/14/2021     Order Specific Question:   Reason for exam:     Answer:   sob    Hepatitis A Antibody, Total     Standing Status:   Future     Standing Expiration Date:   5/14/2021    Hepatitis B Surface Antibody     Standing Status:   Future     Standing Expiration Date:   5/14/2021    Comprehensive Metabolic Panel     Fasting 8 hrs     Standing Status:   Future     Standing Expiration Date:   5/14/2021    CBC Auto Differential     Standing Status:   Future     Standing Expiration Date:   5/15/2021    TSH without Reflex     Standing Status:   Future     Standing Expiration Date:   5/14/2021    Lipid Panel     Standing Status:   Future     Standing Expiration Date:   5/14/2021     Order Specific Question:   Is Patient Fasting?/# of Hours     Answer:   yes, 8-10 hours    Microalbumin / Creatinine Urine Ratio     Standing Status:   Future     Standing Expiration Date:   5/14/2021    Vitamin B12 & Folate     Standing Status:   Future     Standing Expiration Date:   5/14/2021    Vitamin D 25 Hydroxy     Standing Status:   Future     Standing Expiration Date:   5/14/2021    EKG 12 Lead     Standing Status:   Future     Standing Expiration Date:   5/14/2021     Order Specific Question:   Reason for Exam?     Answer:    Tachycardia         Medications Discontinued During This Encounter   Medication Reason    niMODipine (NIMOTOP) 30 MG capsule Therapy completed       Lisa Anderson received counseling on the following healthy behaviors: nutrition, exercise and medication adherence  Reviewed prior labs and health maintenance  Continue current medications, diet and exercise. Discussed use, benefit, and side effects of prescribed medications. Barriers to medication compliance addressed. Patient given educational materials - see patient instructions  Was a self-tracking handout given in paper form or via [employer]? Yes    Requested Prescriptions     Signed Prescriptions Disp Refills    Tdap (ADACEL) 5-2-15.5 LF-MCG/0.5 injection 0.5 mL 0     Sig: Inject 0.5 mLs into the muscle once for 1 dose    ferrous sulfate (IRON 325) 325 (65 Fe) MG tablet 180 tablet 1     Sig: Take 1 tablet by mouth 2 times daily       All patient questions answered. Patient voiced understanding. Quality Measures    Body mass index is 16.48 kg/m². Low. Weight control planned discussed Healthy diet and regular exercise. BP: 90/60. Blood pressure is normal. Treatment plan consists of No treatment change needed. Fall Risk 5/14/2020 2/6/2019 12/13/2018 10/19/2017 3/20/2017   2 or more falls in past year? no no no no no   Fall with injury in past year? no no no no no     The patient does not have a history of falls. I did , complete a risk assessment for falls.  A plan of care for falls in-office gait and balance testing performed using The Timed Up and Go Test was negative for increased falls risk- no further intervention is currently indicated, No Treatment plan indicated    Lab Results   Component Value Date    LDLCHOLESTEROL 61 02/13/2020    (goal LDL reduction with dx if diabetes is 50% LDL reduction)    PHQ Scores 5/14/2020 2/6/2019 12/13/2018 10/19/2017 3/20/2017 1/25/2016 6/2/2015   PHQ2 Score 0 0 0 0 0 0 0   PHQ9 Score 0 0 0 0 0 0 0     Interpretation of Total Score Depression Severity: 1-4 = Minimal depression, 5-9 = Mild depression, 10-14 = Moderate depression, 15-19 = Moderately severe depression, 20-27 = Severe depression      The patient'spast medical, surgical, social, and family history as well as her   current medications and allergies were reviewed as

## 2020-05-15 ENCOUNTER — TELEPHONE (OUTPATIENT)
Dept: NEUROSURGERY | Age: 70
End: 2020-05-15

## 2020-05-29 ENCOUNTER — TELEPHONE (OUTPATIENT)
Dept: FAMILY MEDICINE CLINIC | Age: 70
End: 2020-05-29

## 2020-06-03 RX ORDER — MELOXICAM 15 MG/1
TABLET ORAL
Qty: 30 TABLET | Refills: 3 | Status: SHIPPED | OUTPATIENT
Start: 2020-06-03 | End: 2020-06-15 | Stop reason: SDUPTHER

## 2020-06-03 NOTE — TELEPHONE ENCOUNTER
Refill Brandan at [facility] outpatient pharmacy.  Last refill was 12-12-19

## 2020-06-10 ENCOUNTER — HOSPITAL ENCOUNTER (OUTPATIENT)
Age: 70
Discharge: HOME | End: 2020-06-10
Payer: MEDICARE

## 2020-06-10 ENCOUNTER — HOSPITAL ENCOUNTER (OUTPATIENT)
Dept: GENERAL RADIOLOGY | Age: 70
Discharge: HOME | End: 2020-06-12
Payer: MEDICARE

## 2020-06-10 ENCOUNTER — HOSPITAL ENCOUNTER (OUTPATIENT)
Age: 70
Discharge: HOME | End: 2020-06-12
Payer: MEDICARE

## 2020-06-10 LAB
ALBUMIN SERPL-MCNC: 2.6 G/DL
ALP BLD-CCNC: 112 U/L
ALT SERPL-CCNC: 13 U/L
ANION GAP SERPL CALCULATED.3IONS-SCNC: 8 MMOL/L
ANTIBODY: <8
AST SERPL-CCNC: 28 U/L
BASOPHILS ABSOLUTE: ABNORMAL
BASOPHILS RELATIVE PERCENT: ABNORMAL
BILIRUB SERPL-MCNC: 0.4 MG/DL (ref 0.1–1.4)
BUN BLDV-MCNC: 19 MG/DL
CALCIUM SERPL-MCNC: 9.1 MG/DL
CHLORIDE BLD-SCNC: 104 MMOL/L
CO2: 23 MMOL/L
CREAT SERPL-MCNC: 1.12 MG/DL
EKG ATRIAL RATE: 83 BPM
EKG P AXIS: 65 DEGREES
EKG P-R INTERVAL: 136 MS
EKG Q-T INTERVAL: 384 MS
EKG QRS DURATION: 84 MS
EKG QTC CALCULATION (BAZETT): 451 MS
EKG R AXIS: 81 DEGREES
EKG T AXIS: 79 DEGREES
EKG VENTRICULAR RATE: 83 BPM
EOSINOPHILS ABSOLUTE: ABNORMAL
EOSINOPHILS RELATIVE PERCENT: ABNORMAL
GFR CALCULATED: 58
GLUCOSE BLD-MCNC: 89 MG/DL
HCT VFR BLD CALC: 28.3 % (ref 36–46)
HEMOGLOBIN: 9.4 G/DL (ref 12–16)
LYMPHOCYTES ABSOLUTE: 0.8 /ΜL
LYMPHOCYTES RELATIVE PERCENT: 22.2 %
MCH RBC QN AUTO: 29 PG
MCHC RBC AUTO-ENTMCNC: 33.1 G/DL
MCV RBC AUTO: 88 FL
MONOCYTES ABSOLUTE: 0.1 /ΜL
MONOCYTES RELATIVE PERCENT: 4.1 %
NEUTROPHILS ABSOLUTE: 2.6 /ΜL
NEUTROPHILS RELATIVE PERCENT: 72.7 %
PDW BLD-RTO: 14.9 %
PLATELET # BLD: 285 K/ΜL
PMV BLD AUTO: 8.8 FL
POTASSIUM SERPL-SCNC: 3.9 MMOL/L
RBC # BLD: 3.23 10^6/ΜL
SODIUM BLD-SCNC: 135 MMOL/L
TOTAL PROTEIN: 7
WBC # BLD: 3.6 10^3/ML

## 2020-06-10 PROCEDURE — 93005 ELECTROCARDIOGRAM TRACING: CPT

## 2020-06-10 PROCEDURE — 71046 X-RAY EXAM CHEST 2 VIEWS: CPT

## 2020-06-10 PROCEDURE — 93010 ELECTROCARDIOGRAM REPORT: CPT | Performed by: INTERNAL MEDICINE

## 2020-06-15 ENCOUNTER — OFFICE VISIT (OUTPATIENT)
Dept: FAMILY MEDICINE CLINIC | Age: 70
End: 2020-06-15
Payer: MEDICARE

## 2020-06-15 VITALS
DIASTOLIC BLOOD PRESSURE: 70 MMHG | SYSTOLIC BLOOD PRESSURE: 98 MMHG | HEIGHT: 64 IN | HEART RATE: 62 BPM | BODY MASS INDEX: 16.01 KG/M2 | TEMPERATURE: 97.8 F | WEIGHT: 93.8 LBS

## 2020-06-15 PROBLEM — I50.32 CHRONIC DIASTOLIC HEART FAILURE (HCC): Status: ACTIVE | Noted: 2020-06-15

## 2020-06-15 PROCEDURE — 1090F PRES/ABSN URINE INCON ASSESS: CPT | Performed by: FAMILY MEDICINE

## 2020-06-15 PROCEDURE — 3017F COLORECTAL CA SCREEN DOC REV: CPT | Performed by: FAMILY MEDICINE

## 2020-06-15 PROCEDURE — 4040F PNEUMOC VAC/ADMIN/RCVD: CPT | Performed by: FAMILY MEDICINE

## 2020-06-15 PROCEDURE — 1123F ACP DISCUSS/DSCN MKR DOCD: CPT | Performed by: FAMILY MEDICINE

## 2020-06-15 PROCEDURE — G8427 DOCREV CUR MEDS BY ELIG CLIN: HCPCS | Performed by: FAMILY MEDICINE

## 2020-06-15 PROCEDURE — G0010 ADMIN HEPATITIS B VACCINE: HCPCS | Performed by: FAMILY MEDICINE

## 2020-06-15 PROCEDURE — G8399 PT W/DXA RESULTS DOCUMENT: HCPCS | Performed by: FAMILY MEDICINE

## 2020-06-15 PROCEDURE — G8419 CALC BMI OUT NRM PARAM NOF/U: HCPCS | Performed by: FAMILY MEDICINE

## 2020-06-15 PROCEDURE — 90746 HEPB VACCINE 3 DOSE ADULT IM: CPT | Performed by: FAMILY MEDICINE

## 2020-06-15 PROCEDURE — 99214 OFFICE O/P EST MOD 30 MIN: CPT | Performed by: FAMILY MEDICINE

## 2020-06-15 PROCEDURE — 1036F TOBACCO NON-USER: CPT | Performed by: FAMILY MEDICINE

## 2020-06-15 ASSESSMENT — ENCOUNTER SYMPTOMS
BLOOD IN STOOL: 0
FACIAL SWELLING: 0
WHEEZING: 0
SHORTNESS OF BREATH: 1
CHEST TIGHTNESS: 0
COUGH: 0
ABDOMINAL DISTENTION: 0
PHOTOPHOBIA: 0
ABDOMINAL PAIN: 0
NAUSEA: 0
BACK PAIN: 0
COLOR CHANGE: 0
DIARRHEA: 0

## 2020-06-15 NOTE — PROGRESS NOTES
Visit Information    Have you changed or started any medications since your last visit including any over-the-counter medicines, vitamins, or herbal medicines? no   Are you having any side effects from any of your medications? -  no  Have you stopped taking any of your medications? Is so, why? -  no    Have you seen any other physician or provider since your last visit? No  Have you had any other diagnostic tests since your last visit? No  Have you been seen in the emergency room and/or had an admission to a hospital since we last saw you? No  Have you had your routine dental cleaning in the past 6 months? no    Have you activated your Getting-in account? If not, what are your barriers?  Yes     Patient Care Team:  Francisca Rodriguez MD as PCP - General (Family Medicine)  Francisca Rodriguez MD as PCP - [facility]  Ziggy Mock DO as Consulting Physician (Obstetrics & Gynecology)  Shelley Norwood MD as Consulting Physician (Gastroenterology)    Medical History Review  Past Medical, Family, and Social History reviewed and does contribute to the patient presenting condition    Health Maintenance   Topic Date Due    Hepatitis A vaccine (1 of 2 - Risk 2-dose series) 04/08/1951    Hepatitis B vaccine (1 of 3 - Risk 3-dose series) 04/08/1970    DTaP/Tdap/Td vaccine (1 - Tdap) 04/08/1970    Shingles Vaccine (1 of 2) 04/08/2001    Annual Wellness Visit (AWV)  01/21/2020    Breast cancer screen  03/22/2020    Lipid screen  02/13/2021    Potassium monitoring  06/10/2021    Creatinine monitoring  06/10/2021    Colon cancer screen colonoscopy  05/31/2029    DEXA (modify frequency per FRAX score)  Completed    Flu vaccine  Completed    Pneumococcal 65+ years Vaccine  Completed    Hepatitis C screen  Completed    Hib vaccine  Aged Out    Meningococcal (ACWY) vaccine  Aged Out

## 2020-06-15 NOTE — PROGRESS NOTES
movements intact. Conjunctiva/sclera: Conjunctivae normal.      Right eye: Right conjunctiva is not injected. Left eye: Left conjunctiva is not injected. Neck:      Musculoskeletal: Full passive range of motion without pain and normal range of motion. No erythema. Thyroid: No thyroid mass or thyromegaly. Vascular: No carotid bruit. Cardiovascular:      Rate and Rhythm: Regular rhythm. Tachycardia present. Pulses: No decreased pulses. Heart sounds: S2 normal. Murmur present. Pulmonary:      Effort: No tachypnea. Breath sounds: Normal breath sounds and air entry. No decreased air movement. No decreased breath sounds or wheezing. Abdominal:      General: Abdomen is flat and protuberant. Palpations: Abdomen is soft. Musculoskeletal:      Right lower leg: She exhibits no bony tenderness. Edema present. Left lower leg: Edema present. Lymphadenopathy:      Cervical: No cervical adenopathy. Right cervical: No superficial cervical adenopathy. Skin:     General: Skin is warm. Neurological:      General: No focal deficit present. Mental Status: She is oriented to person, place, and time. Cranial Nerves: Cranial nerves are intact. Sensory: No sensory deficit. Motor: Motor function is intact. Coordination: Coordination is intact. Gait: Gait normal.   Psychiatric:         Attention and Perception: Attention and perception normal.         Mood and Affect: Mood is depressed. Affect is not angry. Speech: Speech is rapid and pressured. Behavior: Behavior is slowed. Cognition and Memory: Cognition normal.             ASSESSMENT AND PLAN      1. Iron deficiency anemia due to chronic blood loss  Anemia has improved continue iron supplements follow-up with GI.    2. Chronic diastolic heart failure (HCC)  Stable referral placed to cardiologist.  - Uzma Gonzalez MD, Cardiology, [facility]    3.  Weight loss  Weight is stable    4. Decreased appetite  Appetite has improved    5. Essential hypertension  Discussed with patient blood pressure is running low discontinue hydralazine, continue Coreg only nurse visit in 1 week for BP check    6. Need for vaccination    - Tdap (ADACEL) 5-2-15.5 LF-MCG/0.5 injection; Inject 0.5 mLs into the muscle once for 1 dose  Dispense: 0.5 mL; Refill: 0  - zoster recombinant adjuvanted vaccine (SHINGRIX) 50 MCG/0.5ML SUSR injection; Inject 0.5 mLs into the muscle See Admin Instructions 1 dose now and repeat in 2-6 months  Dispense: 0.5 mL; Refill: 0  - Hep B Vaccine Adult (ENGERIX B)      Orders Placed This Encounter   Procedures    Hep B Vaccine Adult (ENGERIX B)   Stanton Ace MD, Cardiology, [place]     Referral Priority:   Routine     Referral Type:   Eval and Treat     Referral Reason:   Specialty Services Required     Referred to Provider:   Leslie Vogt MD     Requested Specialty:   Cardiology     Number of Visits Requested:   1         Medications Discontinued During This Encounter   Medication Reason    meloxicam (MOBIC) 15 MG tablet DUPLICATE    hydrALAZINE (APRESOLINE) 50 MG tablet Alternate therapy       Regina Linker received counseling on the following healthy behaviors: nutrition, exercise, medication adherence and tobacco cessation  Reviewed prior labs and health maintenance  Continue current medications, diet and exercise. Discussed use, benefit, and side effects of prescribed medications. Barriers to medication compliance addressed. Patient given educational materials - see patient instructions  Was a self-tracking handout given in paper form or via [employer]?  Yes    Requested Prescriptions     Signed Prescriptions Disp Refills    Tdap (ADACEL) 5-2-15.5 LF-MCG/0.5 injection 0.5 mL 0     Sig: Inject 0.5 mLs into the muscle once for 1 dose    zoster recombinant adjuvanted vaccine (SHINGRIX) 50 MCG/0.5ML SUSR injection 0.5 mL 0     Sig: Inject 0.5 mLs into the muscle See Admin [facility]   7/13/2020  9:30 AM Dori Mendieta MD [facility]   7/27/2020  3:30 PM Joe Oseguera MD Neuro Endo [facility]   8/13/2020 11:30 AM Dori Mendieta MD [facility]     This note was completed by using the assistance of a speech-recognition program. However, inadvertent computerized transcription errors may be present. Althoughevery effort was made to ensure accuracy, no guarantees can be provided that every mistake has been identified and corrected by editing.   Electronically signed by Dori Mendieta MD on 6/15/2020  2:31 PM

## 2020-06-16 ENCOUNTER — HOSPITAL ENCOUNTER (OUTPATIENT)
Dept: MRI IMAGING | Age: 70
Discharge: HOME | End: 2020-06-18
Payer: MEDICARE

## 2020-06-16 PROCEDURE — 70544 MR ANGIOGRAPHY HEAD W/O DYE: CPT

## 2020-06-22 ENCOUNTER — TELEPHONE (OUTPATIENT)
Dept: FAMILY MEDICINE CLINIC | Age: 70
End: 2020-06-22

## 2020-06-22 ENCOUNTER — NURSE ONLY (OUTPATIENT)
Dept: FAMILY MEDICINE CLINIC | Age: 70
End: 2020-06-22

## 2020-06-22 VITALS — DIASTOLIC BLOOD PRESSURE: 64 MMHG | HEART RATE: 102 BPM | SYSTOLIC BLOOD PRESSURE: 102 MMHG

## 2020-07-13 ENCOUNTER — OFFICE VISIT (OUTPATIENT)
Dept: FAMILY MEDICINE CLINIC | Age: 70
End: 2020-07-13
Payer: MEDICARE

## 2020-07-13 ENCOUNTER — TELEPHONE (OUTPATIENT)
Dept: FAMILY MEDICINE CLINIC | Age: 70
End: 2020-07-13

## 2020-07-13 VITALS
WEIGHT: 89.6 LBS | HEART RATE: 86 BPM | BODY MASS INDEX: 15.3 KG/M2 | TEMPERATURE: 97.2 F | HEIGHT: 64 IN | DIASTOLIC BLOOD PRESSURE: 76 MMHG | SYSTOLIC BLOOD PRESSURE: 120 MMHG

## 2020-07-13 PROBLEM — R91.1 LUNG NODULE: Status: ACTIVE | Noted: 2020-07-13

## 2020-07-13 PROBLEM — R00.0 TACHYCARDIA: Status: RESOLVED | Noted: 2020-05-14 | Resolved: 2020-07-13

## 2020-07-13 PROBLEM — S92.901A CLOSED FRACTURE OF BONE OF RIGHT FOOT: Status: ACTIVE | Noted: 2020-07-13

## 2020-07-13 PROCEDURE — 0518F FALL PLAN OF CARE DOCD: CPT | Performed by: FAMILY MEDICINE

## 2020-07-13 PROCEDURE — 1036F TOBACCO NON-USER: CPT | Performed by: FAMILY MEDICINE

## 2020-07-13 PROCEDURE — 99215 OFFICE O/P EST HI 40 MIN: CPT | Performed by: FAMILY MEDICINE

## 2020-07-13 PROCEDURE — 3017F COLORECTAL CA SCREEN DOC REV: CPT | Performed by: FAMILY MEDICINE

## 2020-07-13 PROCEDURE — 1090F PRES/ABSN URINE INCON ASSESS: CPT | Performed by: FAMILY MEDICINE

## 2020-07-13 PROCEDURE — G8427 DOCREV CUR MEDS BY ELIG CLIN: HCPCS | Performed by: FAMILY MEDICINE

## 2020-07-13 PROCEDURE — G8419 CALC BMI OUT NRM PARAM NOF/U: HCPCS | Performed by: FAMILY MEDICINE

## 2020-07-13 PROCEDURE — 4040F PNEUMOC VAC/ADMIN/RCVD: CPT | Performed by: FAMILY MEDICINE

## 2020-07-13 PROCEDURE — G8399 PT W/DXA RESULTS DOCUMENT: HCPCS | Performed by: FAMILY MEDICINE

## 2020-07-13 PROCEDURE — 3288F FALL RISK ASSESSMENT DOCD: CPT | Performed by: FAMILY MEDICINE

## 2020-07-13 PROCEDURE — 1123F ACP DISCUSS/DSCN MKR DOCD: CPT | Performed by: FAMILY MEDICINE

## 2020-07-13 RX ORDER — LACTOSE-REDUCED FOOD
LIQUID (ML) ORAL
Qty: 30 CAN | Refills: 3 | Status: SHIPPED | OUTPATIENT
Start: 2020-07-13 | End: 2021-01-13 | Stop reason: ALTCHOICE

## 2020-07-13 RX ORDER — BLOOD PRESSURE TEST KIT
KIT MISCELLANEOUS
Qty: 1 KIT | Refills: 0 | Status: SHIPPED | OUTPATIENT
Start: 2020-07-13

## 2020-07-13 RX ORDER — TRAMADOL HYDROCHLORIDE 50 MG/1
TABLET ORAL
COMMUNITY
Start: 2020-06-26 | End: 2021-01-13 | Stop reason: ALTCHOICE

## 2020-07-13 ASSESSMENT — ENCOUNTER SYMPTOMS
BACK PAIN: 0
SHORTNESS OF BREATH: 0
FACIAL SWELLING: 0
COUGH: 0
ABDOMINAL PAIN: 0
CHEST TIGHTNESS: 0
COLOR CHANGE: 0
CONSTIPATION: 0
BLOOD IN STOOL: 0
VOMITING: 0
WHEEZING: 0
ABDOMINAL DISTENTION: 0

## 2020-07-13 NOTE — PROGRESS NOTES
Visit Information    Have you changed or started any medications since your last visit including any over-the-counter medicines, vitamins, or herbal medicines? no   Are you having any side effects from any of your medications? -  no  Have you stopped taking any of your medications? Is so, why? -  no    Have you seen any other physician or provider since your last visit? Yes - Records Obtained  Have you had any other diagnostic tests since your last visit? No  Have you been seen in the emergency room and/or had an admission to a hospital since we last saw you? Yes - Records Obtained  Have you had your routine dental cleaning in the past 6 months? no    Have you activated your [employer] account? If not, what are your barriers?  No:      Patient Care Team:  Az Herrera MD as PCP - General (Family Medicine)  Az Herrera MD as PCP [facility]  Bijan Lester DO as Consulting Physician (Obstetrics & Gynecology)  Talisha Phan MD as Consulting Physician (Gastroenterology)    Medical History Review  Past Medical, Family, and Social History reviewed and does contribute to the patient presenting condition    Health Maintenance   Topic Date Due    DTaP/Tdap/Td vaccine (1 - Tdap) 04/08/1970    Shingles Vaccine (1 of 2) 04/08/2001    Annual Wellness Visit (AWV)  01/21/2020    Breast cancer screen  03/22/2020    Hepatitis B vaccine (2 of 3 - Risk 3-dose series) 07/13/2020    Flu vaccine (1) 09/01/2020    Lipid screen  02/13/2021    Potassium monitoring  06/10/2021    Creatinine monitoring  06/10/2021    Colon cancer screen colonoscopy  05/31/2029    DEXA (modify frequency per FRAX score)  Completed    Pneumococcal 65+ years Vaccine  Completed    Hepatitis C screen  Completed    Hib vaccine  Aged Out    Meningococcal (ACWY) vaccine  Aged Out    Hepatitis A vaccine  Discontinued

## 2020-07-13 NOTE — PROGRESS NOTES
Chief Complaint   Patient presents with    Hypertension    Anemia    Other     wants COVID test          Daniel Ba  here today for follow up on chronic medical problems, go over labs and/or diagnostic studies, and medication refills. Hypertension; Anemia; and Other (wants COVID test )    Patient presented with her daughter. HPI: Patient is follow-up for anemia, weakness and weight loss. Anemia has mildly improved on recent blood work, recent hemoglobin is 9.4. Patient reports she has stopped taking iron supplements because she was feeling drooping of eyelids. Patient went to ER at [facility] on June [facility]th, with symptoms of drooping of eyelid. Patient was in the ER had multiple testing done including CT head, CT chest and also routine blood work. CT head was normal.  Patient also has CT chest done that showed lower lobe opacity and also right lung nodule which needs 6-month follow-up CT chest.    Patient was treated with 5-day course of antibiotics. Patient did not had any symptoms of cough and congestion. Hypertension blood pressure was running low, hydralazine was stopped patient is currently on metoprolol, blood pressure is running stable. Patient reports fatigue tired has history of anemia, and weight loss patient needs to follow-up with GI. She has seen them in the past and also is up-to-date on colonoscopy. Patient was hospitalized recently when due to aneurysm and daughter reports since then she is fatigue and tired. She  Has home physical therapy, does not want to go outpatient therapy. Patient does have 3 pound of weight loss since last 1 month as per scale, she does not check her weight at home. Patient has not seen GI since then, was seen by cardiologist.      /76   Pulse 86   Temp 97.2 °F (36.2 °C) (Temporal)   Ht 5' 4\" (1.626 m)   Wt 89 lb 9.6 oz (40.6 kg)   BMI 15.38 kg/m²    Body mass index is 15.38 kg/m².   Wt Readings from Last 3 Encounters:   07/13/20 89 lb 9.6 oz (40.6 kg)   06/15/20 93 lb 12.8 oz (42.5 kg)   05/14/20 96 lb (43.5 kg)        [x]Negative depression screening. PHQ Scores 5/14/2020 2/6/2019 12/13/2018 10/19/2017 3/20/2017 1/25/2016 6/2/2015   PHQ2 Score 0 0 0 0 0 0 0   PHQ9 Score 0 0 0 0 0 0 0      []1-4 = Minimal depression   []5-9 = Milddepression   []10-14 = Moderate depression   []15-19 = Moderately severe depression   []20-27 = Severe depression    Discussed testing with the patient and all questions fully answered.     Orders Only on 06/11/2020   Component Date Value Ref Range Status    Sodium 06/10/2020 135  mmol/L Final    Chloride 06/10/2020 104  mmol/L Final    Potassium 06/10/2020 3.9  mmol/L Final    BUN 06/10/2020 19  mg/dL Final    CREATININE 06/10/2020 1.12   Final    Glucose 06/10/2020 89  mg/dL Final    AST 06/10/2020 28  U/L Final    ALT 06/10/2020 13  U/L Final    Calcium 06/10/2020 9.1  mg/dL Final    Total Protein 06/10/2020 7.0   Final    CO2 06/10/2020 23  mmol/L Final    Alb 06/10/2020 2.6   Final    Alkaline Phosphatase 06/10/2020 112  U/L Final    Total Bilirubin 06/10/2020 0.4  0.1 - 1.4 mg/dL Final    Gfr Calculated 06/10/2020 58   Final    Anion Gap 06/10/2020 8  mmol/L Final    WBC 06/10/2020 3.6  10^3/mL Final    RBC 06/10/2020 3.23  10^6/µL Final    Hemoglobin 06/10/2020 9.4* 12.0 - 16.0 g/dL Final    Hematocrit 06/10/2020 28.3* 36 - 46 % Final    MCV 06/10/2020 88  fL Final    MCH 06/10/2020 29.0  pg Final    MCHC 06/10/2020 33.1  g/dL Final    Platelets 05/44/4202 285  K/µL Final    RDW 06/10/2020 14.9  % Final    MPV 06/10/2020 8.8  fL Final    Neutrophils % 06/10/2020 72.7  % Final    Lymphocytes % 06/10/2020 22.2  % Final    Monocytes % 06/10/2020 4.1  % Final    Neutrophils Absolute 06/10/2020 2.6  /µL Final    Lymphocytes Absolute 06/10/2020 0.8  /µL Final    Monocytes Absolute 06/10/2020 0.1  /µL Final    Antibody Screen 06/10/2020 <8.00   Final         Most recent labs reviewed: Lab Results   Component Value Date    WBC 3.6 06/10/2020    HGB 9.4 (A) 06/10/2020    HCT 28.3 (A) 06/10/2020    MCV 88 06/10/2020     06/10/2020       @BRIEFLAB(NA,K,CL,CO2,BUN,CREATININE,GLUCOSE,CALCIUM)@     Lab Results   Component Value Date    ALT 13 06/10/2020    AST 28 06/10/2020    ALKPHOS 112 06/10/2020    BILITOT 0.4 06/10/2020       Lab Results   Component Value Date    TSH 5.94 (H) 02/15/2020       Lab Results   Component Value Date    CHOL 102 02/13/2020    CHOL 180 06/03/2019    CHOL 177 05/11/2018     Lab Results   Component Value Date    TRIG 100 02/13/2020    TRIG 119 06/03/2019    TRIG 113 05/11/2018     Lab Results   Component Value Date    HDL 21 (L) 02/13/2020    HDL 33 (L) 06/03/2019    HDL 39 (L) 05/11/2018     Lab Results   Component Value Date    LDLCHOLESTEROL 61 02/13/2020    LDLCHOLESTEROL 123 06/03/2019    LDLCHOLESTEROL 115 05/11/2018     Lab Results   Component Value Date    VLDL NOT REPORTED 02/13/2020    VLDL NOT REPORTED 06/03/2019    VLDL NOT REPORTED 05/11/2018     Lab Results   Component Value Date    CHOLHDLRATIO 4.9 02/13/2020    CHOLHDLRATIO 5.5 (H) 06/03/2019    CHOLHDLRATIO 4.5 05/11/2018       Lab Results   Component Value Date    LABA1C 5.6 02/13/2020       Lab Results   Component Value Date    JKPQBVQQ36 670 02/13/2020       Lab Results   Component Value Date    FOLATE 7.9 02/13/2020       Lab Results   Component Value Date    IRON 26 (L) 02/13/2020    TIBC 293 02/13/2020    FERRITIN 44 02/13/2020       Lab Results   Component Value Date    VITD25 26.2 (L) 07/21/2017             Current Outpatient Medications   Medication Sig Dispense Refill    traMADol (ULTRAM) 50 MG tablet       Blood Pressure KIT Dx: HTN. Needs automatic blood pressure machine to monitor her blood pressure.  1 kit 0    Nutritional Supplements (ENSURE ACTIVE HIGH PROTEIN) LIQD Use daily for malnutrition 30 Can 3    zoster recombinant adjuvanted vaccine (SHINGRIX) 50 MCG/0.5ML SUSR injection Inject 0.5 mLs into the muscle See Admin Instructions 1 dose now and repeat in 2-6 months 0.5 mL 0    Tdap (ADACEL) 5-2-15.5 LF-MCG/0.5 injection Inject 0.5 mLs into the muscle once for 1 dose 0.5 mL 0    ferrous sulfate (IRON 325) 325 (65 Fe) MG tablet Take 1 tablet by mouth 2 times daily 180 tablet 1    carvedilol (COREG) 3.125 MG tablet TAKE 1 TABLET BY MOUTH TWO TIMES A  tablet 0    atorvastatin (LIPITOR) 20 MG tablet Take 2 tablets by mouth nightly 30 tablet 2    ferrous sulfate 325 (65 Fe) MG EC tablet Take 1 tablet by mouth daily (with breakfast) 90 tablet 3    pramipexole (MIRAPEX) 0.125 MG tablet TAKE ONE TABLET BY MOUTH NIGHTLY 90 tablet 1    acetaminophen (TYLENOL) 325 MG tablet Take 325 mg by mouth every 6 hours as needed for Pain      vitamin D (CHOLECALCIFEROL) 1000 UNIT TABS tablet Take 1,000 Units by mouth 3 times daily      meloxicam (MOBIC) 15 MG tablet Take 15 mg by mouth daily      Multiple Vitamins-Minerals (THERAPEUTIC MULTIVITAMIN-MINERALS) tablet Take 1 tablet by mouth daily       No current facility-administered medications for this visit. Social History     Socioeconomic History    Marital status:       Spouse name: Not on file    Number of children: Not on file    Years of education: Not on file    Highest education level: Not on file   Occupational History    Not on file   Social Needs    Financial resource strain: Not on file    Food insecurity     Worry: Not on file     Inability: Not on file    Transportation needs     Medical: Not on file     Non-medical: Not on file   Tobacco Use    Smoking status: Never Smoker    Smokeless tobacco: Never Used   Substance and Sexual Activity    Alcohol use: Yes     Comment: Occassional    Drug use: No    Sexual activity: Never     Birth control/protection: Post-menopausal   Lifestyle    Physical activity     Days per week: Not on file     Minutes per session: Not on file    Stress: Not on file   Relationships    Social connections     Talks on phone: Not on file     Gets together: Not on file     Attends [employer] service: Not on file     Active member of club or organization: Not on file     Attends meetings of clubs or organizations: Not on file     Relationship status: Not on file    Intimate partner violence     Fear of current or ex partner: Not on file     Emotionally abused: Not on file     Physically abused: Not on file     Forced sexual activity: Not on file   Other Topics Concern    Not on file   Social History Narrative    Not on file     Counseling given: Yes        Family History   Problem Relation Age of Onset    Heart Attack Father     Cancer Mother         stomach cancer    Breast Cancer Sister         one sister with breast cancer    Colon Cancer Neg Hx     Diabetes Neg Hx     Eclampsia Neg Hx     Hypertension Neg Hx     Ovarian Cancer Neg Hx      Labor Neg Hx     Spont Abortions Neg Hx     Stroke Neg Hx     Liver Cancer Neg Hx              -rest of complaints with corresponding details per ROS    The patient's past medical, surgical, social, and family history as well as her current medications and allergies were reviewed as documented intoday's encounter. Review of Systems   Constitutional: Positive for activity change, appetite change, fatigue and unexpected weight change. Negative for diaphoresis. HENT: Negative for congestion, ear pain, facial swelling, nosebleeds and postnasal drip. Eyes: Negative for visual disturbance. Respiratory: Negative for cough, chest tightness, shortness of breath and wheezing. Cardiovascular: Negative for chest pain, palpitations and leg swelling. Gastrointestinal: Negative for abdominal distention, abdominal pain, blood in stool, constipation and vomiting. Endocrine: Negative for polyuria. Genitourinary: Negative for difficulty urinating, flank pain, menstrual problem, urgency and vaginal pain. Musculoskeletal: Positive for arthralgias and gait problem. Negative for back pain, joint swelling, myalgias, neck pain and neck stiffness. Skin: Negative for color change and pallor. Neurological: Positive for weakness and numbness. Negative for dizziness, tremors, speech difficulty, light-headedness and headaches. Psychiatric/Behavioral: Negative for agitation, behavioral problems, decreased concentration and suicidal ideas. The patient is not nervous/anxious and is not hyperactive. Physical Exam  Vitals signs and nursing note reviewed. Constitutional:       Appearance: Normal appearance. She is underweight. She is ill-appearing. She is not toxic-appearing or diaphoretic. HENT:      Head: Normocephalic and atraumatic. Jaw: There is normal jaw occlusion. Right Ear: Tympanic membrane normal.      Left Ear: Tympanic membrane normal.   Eyes:      General: Lids are normal.      Extraocular Movements: Extraocular movements intact. Neck:      Musculoskeletal: Full passive range of motion without pain, normal range of motion and neck supple. Thyroid: No thyroid mass. Cardiovascular:      Rate and Rhythm: Normal rate and regular rhythm. No extrasystoles are present. Pulses: Normal pulses. Heart sounds: Normal heart sounds, S1 normal and S2 normal.   Pulmonary:      Effort: Pulmonary effort is normal.      Breath sounds: Normal breath sounds and air entry. No decreased breath sounds or rhonchi. Abdominal:      General: Abdomen is flat. Bowel sounds are normal.      Palpations: Abdomen is soft. Tenderness: There is no abdominal tenderness. There is no right CVA tenderness or left CVA tenderness. Musculoskeletal:      Right shoulder: She exhibits normal range of motion, no tenderness, no pain and no spasm. Right lower leg: No edema. Left lower leg: No edema. Lymphadenopathy:      Cervical:      Right cervical: No superficial cervical adenopathy.      Left mLs into the muscle once for 1 dose  Dispense: 0.5 mL; Refill: 0      Orders Placed This Encounter   Procedures    CBC Auto Differential     Standing Status:   Future     Standing Expiration Date:   7/14/2021         Medications Discontinued During This Encounter   Medication Reason    zoster recombinant adjuvanted vaccine [facility]) 50 MCG/0.5ML SUSR injection     zoster recombinant adjuvanted vaccine [facility]) 50 MCG/0.5ML SUSR injection REORDER    Tdap (ADACEL) 5-2-15.5 LF-MCG/0.5 injection REORDER       Cresenciano Deeds received counseling on the following healthy behaviors: nutrition, exercise and medication adherence  Reviewed prior labs and health maintenance  Continue current medications, diet and exercise. Discussed use, benefit, and side effects of prescribed medications. Barriers to medication compliance addressed. Patient given educational materials - see patient instructions  Was a self-tracking handout given in paper form or via [employer]? Yes    Requested Prescriptions     Signed Prescriptions Disp Refills    Blood Pressure KIT 1 kit 0     Sig: Dx: HTN. Needs automatic blood pressure machine to monitor her blood pressure.  Nutritional Supplements (ENSURE ACTIVE HIGH PROTEIN) LIQD 30 Can 3     Sig: Use daily for malnutrition    zoster recombinant adjuvanted vaccine (SHINGRIX) 50 MCG/0.5ML SUSR injection 0.5 mL 0     Sig: Inject 0.5 mLs into the muscle See Admin Instructions 1 dose now and repeat in 2-6 months    Tdap (ADACEL) 5-2-15.5 LF-MCG/0.5 injection 0.5 mL 0     Sig: Inject 0.5 mLs into the muscle once for 1 dose       All patient questions answered. Patient voiced understanding. Quality Measures    Body mass index is 15.38 kg/m². Low. Weight control planned discussed Healthy diet and regular exercise. BP: 120/76. Blood pressure is normal. Treatment plan consists of No treatment change needed.     Fall Risk 5/14/2020 2/6/2019 12/13/2018 10/19/2017 3/20/2017   2 or more falls in past year? no no no no no   Fall with injury in past year? no no no no no     The patient has a history of falls. I did , complete a risk assessment for falls. A plan of care for falls in-office gait and balance testing performed using The Timed Up and Go Test was negative for increased falls risk- no further intervention is currently indicated, home safety tips provided, Efrain Has Exercise Intervention resources provided, pt uses walker , refused physical therapy     Lab Results   Component Value Date    LDLCHOLESTEROL 61 02/13/2020    (goal LDL reduction with dx if diabetes is 50% LDL reduction)    PHQ Scores 5/14/2020 2/6/2019 12/13/2018 10/19/2017 3/20/2017 1/25/2016 6/2/2015   PHQ2 Score 0 0 0 0 0 0 0   PHQ9 Score 0 0 0 0 0 0 0     Interpretation of Total Score Depression Severity: 1-4 = Minimal depression, 5-9 = Mild depression, 10-14 = Moderate depression, 15-19 = Moderately severe depression, 20-27 = Severe depression      The patient'spast medical, surgical, social, and family history as well as her   current medications and allergies were reviewed as documented in today's encounter. Medications, labs, diagnostic studies, consultations andfollow-up as documented in this encounter. Return in about 3 months (around 10/13/2020). Patient wasseen with total face to face time [id number]. More than 50% of this visit was counseling and education. Future Appointments   Date Time Provider Cammie Barnes   7/27/2020  3:30 PM Selina Tripathi MD Neuro Endo [facility]   8/3/2020 11:30 AM [facility] DIGITAL RM [facility] MAMMO [facility]   8/13/2020 11:30 AM Otis Mandujano MD fp sc [email or web address]   10/15/2020 10:30 AM Otis Mandujano MD fp sc Bhupendra Urbina     This note was completed by using the assistance of a speech-recognition program. However, inadvertent computerized transcription errors may be present.  Althoughevery effort was made to ensure accuracy, no guarantees can be provided that every mistake has been identified and corrected by editing.   Electronically signed by Ramiro Adair MD on 7/13/2020  1:05 PM

## 2020-07-13 NOTE — TELEPHONE ENCOUNTER
[address] CALLED REGARDING THE TDAP AND SHINGLES VACCINES THEY DON'T DO THOSE THERE THEY WILL NEED TO BE SENT TO [facility] [id number]

## 2020-07-16 ENCOUNTER — TELEPHONE (OUTPATIENT)
Dept: FAMILY MEDICINE CLINIC | Age: 70
End: 2020-07-16

## 2020-07-27 ENCOUNTER — TELEPHONE (OUTPATIENT)
Dept: FAMILY MEDICINE CLINIC | Age: 70
End: 2020-07-27

## 2020-07-27 NOTE — TELEPHONE ENCOUNTER
Patient calling about lab results , she is willing to start Iron infusions .   She will call GI to make appt also   Please Advice

## 2020-07-28 RX ORDER — FERRIC CARBOXYMALTOSE INJECTION 50 MG/ML
750 INJECTION, SOLUTION INTRAVENOUS ONCE
Qty: 15 ML | Refills: 0 | Status: SHIPPED | OUTPATIENT
Start: 2020-07-28 | End: 2020-10-15 | Stop reason: ALTCHOICE

## 2020-07-30 RX ORDER — DIPHENHYDRAMINE HYDROCHLORIDE 50 MG/ML
50 INJECTION INTRAMUSCULAR; INTRAVENOUS ONCE
Status: CANCELLED | OUTPATIENT
Start: 2020-07-31

## 2020-07-30 RX ORDER — HEPARIN SODIUM (PORCINE) LOCK FLUSH IV SOLN 100 UNIT/ML 100 UNIT/ML
500 SOLUTION INTRAVENOUS PRN
Status: CANCELLED | OUTPATIENT
Start: 2020-07-31

## 2020-07-30 RX ORDER — METHYLPREDNISOLONE SODIUM SUCCINATE 125 MG/2ML
125 INJECTION, POWDER, LYOPHILIZED, FOR SOLUTION INTRAMUSCULAR; INTRAVENOUS ONCE
Status: CANCELLED | OUTPATIENT
Start: 2020-07-31

## 2020-07-30 RX ORDER — SODIUM CHLORIDE 0.9 % (FLUSH) 0.9 %
5 SYRINGE (ML) INJECTION PRN
Status: CANCELLED | OUTPATIENT
Start: 2020-07-31

## 2020-07-30 RX ORDER — SODIUM CHLORIDE 9 MG/ML
INJECTION, SOLUTION INTRAVENOUS CONTINUOUS
Status: CANCELLED | OUTPATIENT
Start: 2020-07-31

## 2020-07-30 RX ORDER — SODIUM CHLORIDE 0.9 % (FLUSH) 0.9 %
10 SYRINGE (ML) INJECTION PRN
Status: CANCELLED | OUTPATIENT
Start: 2020-07-31

## 2020-07-30 RX ORDER — EPINEPHRINE 1 MG/ML
0.3 INJECTION, SOLUTION, CONCENTRATE INTRAVENOUS PRN
Status: CANCELLED | OUTPATIENT
Start: 2020-07-31

## 2020-08-03 ENCOUNTER — HOSPITAL ENCOUNTER (OUTPATIENT)
Dept: WOMENS IMAGING | Age: 70
Discharge: HOME | End: 2020-08-05
Payer: MEDICARE

## 2020-08-03 PROCEDURE — 77063 BREAST TOMOSYNTHESIS BI: CPT

## 2020-08-03 NOTE — RESULT ENCOUNTER NOTE
Please let the patient know that her mammogram results came back with no evidence of cancer. We will repeat another mammogram next year. Thanks.

## 2020-08-03 NOTE — LETTER
[address] [place] [address]. [facility] [address]  Phone: [phone]  Fax: [phone]    MD Bessie Rodriguez, MD Justine Terrazas, MD Christ Montez, CNP        [place] [id number] [address] [address]      We were unable to reach you by phone. Please call our office at your earliest convenience. This message is regarding: results. Please call between the hours of 8:30am and 4:00pm Monday through Friday if possible. If you have any questions or concerns, please don't hesitate to call. Sincerely,      Marques [address] at [facility].

## 2020-08-05 ENCOUNTER — TELEPHONE (OUTPATIENT)
Dept: INFUSION THERAPY | Age: 70
End: 2020-08-05

## 2020-08-05 NOTE — TELEPHONE ENCOUNTER
Called pt to schedule Injectafer infusion. No answer. Left VM instructing pt to call Infusion Center to schedule appointment.

## 2020-08-13 ENCOUNTER — OFFICE VISIT (OUTPATIENT)
Dept: FAMILY MEDICINE CLINIC | Age: 70
End: 2020-08-13
Payer: MEDICARE

## 2020-08-13 VITALS
DIASTOLIC BLOOD PRESSURE: 84 MMHG | SYSTOLIC BLOOD PRESSURE: 126 MMHG | WEIGHT: 89.6 LBS | TEMPERATURE: 97 F | BODY MASS INDEX: 15.38 KG/M2 | HEART RATE: 66 BPM

## 2020-08-13 PROCEDURE — 3017F COLORECTAL CA SCREEN DOC REV: CPT | Performed by: FAMILY MEDICINE

## 2020-08-13 PROCEDURE — G0438 PPPS, INITIAL VISIT: HCPCS | Performed by: FAMILY MEDICINE

## 2020-08-13 PROCEDURE — 1123F ACP DISCUSS/DSCN MKR DOCD: CPT | Performed by: FAMILY MEDICINE

## 2020-08-13 PROCEDURE — 4040F PNEUMOC VAC/ADMIN/RCVD: CPT | Performed by: FAMILY MEDICINE

## 2020-08-13 PROCEDURE — G0010 ADMIN HEPATITIS B VACCINE: HCPCS | Performed by: FAMILY MEDICINE

## 2020-08-13 PROCEDURE — 90746 HEPB VACCINE 3 DOSE ADULT IM: CPT | Performed by: FAMILY MEDICINE

## 2020-08-13 ASSESSMENT — LIFESTYLE VARIABLES
HAVE YOU OR SOMEONE ELSE BEEN INJURED AS A RESULT OF YOUR DRINKING: 0
HOW OFTEN DURING THE LAST YEAR HAVE YOU BEEN UNABLE TO REMEMBER WHAT HAPPENED THE NIGHT BEFORE BECAUSE YOU HAD BEEN DRINKING: 0
HOW OFTEN DURING THE LAST YEAR HAVE YOU HAD A FEELING OF GUILT OR REMORSE AFTER DRINKING: 0
HOW OFTEN DO YOU HAVE A DRINK CONTAINING ALCOHOL: 1
HAS A RELATIVE, FRIEND, DOCTOR, OR ANOTHER HEALTH PROFESSIONAL EXPRESSED CONCERN ABOUT YOUR DRINKING OR SUGGESTED YOU CUT DOWN: 0
HOW OFTEN DURING THE LAST YEAR HAVE YOU FOUND THAT YOU WERE NOT ABLE TO STOP DRINKING ONCE YOU HAD STARTED: 0
HOW MANY STANDARD DRINKS CONTAINING ALCOHOL DO YOU HAVE ON A TYPICAL DAY: 0
HOW OFTEN DO YOU HAVE SIX OR MORE DRINKS ON ONE OCCASION: 0
HOW OFTEN DURING THE LAST YEAR HAVE YOU FAILED TO DO WHAT WAS NORMALLY EXPECTED FROM YOU BECAUSE OF DRINKING: 0
AUDIT-C TOTAL SCORE: 1
AUDIT TOTAL SCORE: 1
HOW OFTEN DURING THE LAST YEAR HAVE YOU NEEDED AN ALCOHOLIC DRINK FIRST THING IN THE MORNING TO GET YOURSELF GOING AFTER A NIGHT OF HEAVY DRINKING: 0

## 2020-08-13 ASSESSMENT — VISUAL ACUITY
OS_CC: 20/25
OD_CC: 20/25

## 2020-08-13 ASSESSMENT — PATIENT HEALTH QUESTIONNAIRE - PHQ9
SUM OF ALL RESPONSES TO PHQ QUESTIONS 1-9: 2
SUM OF ALL RESPONSES TO PHQ QUESTIONS 1-9: 2

## 2020-08-13 NOTE — PROGRESS NOTES
MD at [facility] Endoscopy    VARICOSE VEIN SURGERY Bilateral          Family History   Problem Relation Age of Onset    Heart Attack Father     Cancer Mother         stomach cancer    Breast Cancer Sister         one sister with breast cancer    Colon Cancer Neg Hx     Diabetes Neg Hx     Eclampsia Neg Hx     Hypertension Neg Hx     Ovarian Cancer Neg Hx      Labor Neg Hx     Spont Abortions Neg Hx     Stroke Neg Hx     Liver Cancer Neg Hx        CareTeam (Including outside providers/suppliers regularly involved in providing care):   Patient Care Team:  Marixa Gallardo MD as PCP - General (Family Medicine)  Marixa Gallardo MD as PCP - [facility] Empaneled Provider  Cassidy Corea DO as Consulting Physician (Obstetrics & Gynecology)  Sangeetha Henderson MD as Consulting Physician (Gastroenterology)    Wt Readings from Last 3 Encounters:   20 89 lb 9.6 oz (40.6 kg)   20 89 lb 9.6 oz (40.6 kg)   06/15/20 93 lb 12.8 oz (42.5 kg)     Vitals:    20 1104   BP: 126/84   Site: Right Upper Arm   Position: Sitting   Cuff Size: Child   Pulse: 66   Temp: 97 °F (36.1 °C)   Weight: 89 lb 9.6 oz (40.6 kg)     Body mass index is 15.38 kg/m². Based upon direct observation of the patient, evaluation of cognition reveals recent and remote memory intact. Patient's complete Health Risk Assessment and screening values have been reviewed and are found in Flowsheets. The following problems were reviewed today and where indicated follow up appointments were made and/or referrals ordered.     Positive Risk Factor Screenings with Interventions:     Fall Risk:  2 or more falls in past year?: no  Fall with injury in past year?: (!) yes  Fall Risk Interventions:    · Home safety tips provided  · Home exercises provided to promote strength and balance  · pt refused physical therapy     General Health:  General  In general, how would you say your health is?: Fair  In the past 7 days, have you experienced declines any further evaluation/treatment for this issue    ADL:  ADLs  In the past 7 days, did you need help from others to perform any of the following everyday activities? Eating, dressing, grooming, bathing, toileting, or walking/balance?: (!) Walking/Balance(uses walker)  In the past 7 days, did you need help from others to take care of any of the following? Laundry, housekeeping, banking/finances, shopping, telephone use, food preparation, transportation, or taking medications?: None  ADL Interventions:  · Patient declines any further evaluation/treatment for this issue    Personalized Preventive Plan   Current Health Maintenance Status  Immunization History   Administered Date(s) Administered    Hepatitis B Adult (Engerix-B) 06/15/2020, 08/13/2020    Influenza Virus Vaccine 10/10/2017, 10/09/2018, 10/18/2019    Pneumococcal Conjugate 13-valent ([id number]) 07/08/2019    Pneumococcal Polysaccharide ([id number]) 11/11/2016, 04/09/2018        Health Maintenance   Topic Date Due    DTaP/Tdap/Td vaccine (1 - Tdap) 04/08/1970    Shingles Vaccine (1 of 2) 04/08/2001    Annual Wellness Visit (AWV)  01/21/2020    Flu vaccine (1) 09/01/2020    Hepatitis B vaccine (3 of 3 - Risk 3-dose series) 12/13/2020    Lipid screen  02/13/2021    Potassium monitoring  06/10/2021    Creatinine monitoring  06/10/2021    Breast cancer screen  08/03/2021    Colon cancer screen colonoscopy  05/31/2029    DEXA (modify frequency per FRAX score)  Completed    Pneumococcal 65+ years Vaccine  Completed    Hepatitis C screen  Completed    Hib vaccine  Aged Out    Meningococcal (ACWY) vaccine  Aged Out    Hepatitis A vaccine  Discontinued     Recommendations for Preventive Services Due: see orders and patient instructions/AVS.  . Recommended screening schedule for the next 5-10 years is provided to the patient in written form: see Patient Instructions/AVS.    Corry Burks was seen today for medicare awv.     Diagnoses and all orders for this visit:    Medicare annual wellness visit, initial    Advance care planning  -     Ambulatory Referral to Pastoral Care    Routine general medical examination at a health care facility    Need for hepatitis B vaccination  -     Hep B Vaccine Adult (ENGERIX-B)

## 2020-08-24 ENCOUNTER — HOSPITAL ENCOUNTER (OUTPATIENT)
Dept: INPATIENT UNIT | Age: 70
Setting detail: THERAPIES SERIES
Discharge: HOME | End: 2020-08-24
Payer: MEDICARE

## 2020-08-24 VITALS
OXYGEN SATURATION: 98 % | DIASTOLIC BLOOD PRESSURE: 61 MMHG | SYSTOLIC BLOOD PRESSURE: 174 MMHG | TEMPERATURE: 98.7 F | RESPIRATION RATE: 16 BRPM | BODY MASS INDEX: 15.45 KG/M2 | WEIGHT: 90 LBS | HEART RATE: 69 BPM

## 2020-08-24 PROCEDURE — 96365 THER/PROPH/DIAG IV INF INIT: CPT

## 2020-08-24 PROCEDURE — 6360000002 HC RX W HCPCS: Performed by: FAMILY MEDICINE

## 2020-08-24 PROCEDURE — 2580000003 HC RX 258: Performed by: FAMILY MEDICINE

## 2020-08-24 RX ORDER — EPINEPHRINE 1 MG/ML
0.3 INJECTION, SOLUTION, CONCENTRATE INTRAVENOUS PRN
Status: CANCELLED | OUTPATIENT
Start: 2020-08-31

## 2020-08-24 RX ORDER — DIPHENHYDRAMINE HYDROCHLORIDE 50 MG/ML
50 INJECTION INTRAMUSCULAR; INTRAVENOUS ONCE
Status: CANCELLED | OUTPATIENT
Start: 2020-08-31

## 2020-08-24 RX ORDER — SODIUM CHLORIDE 0.9 % (FLUSH) 0.9 %
10 SYRINGE (ML) INJECTION PRN
Status: CANCELLED | OUTPATIENT
Start: 2020-08-31

## 2020-08-24 RX ORDER — SODIUM CHLORIDE 9 MG/ML
INJECTION, SOLUTION INTRAVENOUS CONTINUOUS
Status: ACTIVE | OUTPATIENT
Start: 2020-08-24 | End: 2020-08-24

## 2020-08-24 RX ORDER — SODIUM CHLORIDE 9 MG/ML
INJECTION, SOLUTION INTRAVENOUS CONTINUOUS
Status: CANCELLED | OUTPATIENT
Start: 2020-08-31

## 2020-08-24 RX ORDER — HEPARIN SODIUM (PORCINE) LOCK FLUSH IV SOLN 100 UNIT/ML 100 UNIT/ML
500 SOLUTION INTRAVENOUS PRN
Status: CANCELLED | OUTPATIENT
Start: 2020-08-31

## 2020-08-24 RX ORDER — SODIUM CHLORIDE 0.9 % (FLUSH) 0.9 %
5 SYRINGE (ML) INJECTION PRN
Status: CANCELLED | OUTPATIENT
Start: 2020-08-31

## 2020-08-24 RX ORDER — METHYLPREDNISOLONE SODIUM SUCCINATE 125 MG/2ML
125 INJECTION, POWDER, LYOPHILIZED, FOR SOLUTION INTRAMUSCULAR; INTRAVENOUS ONCE
Status: CANCELLED | OUTPATIENT
Start: 2020-08-31

## 2020-08-24 RX ADMIN — SODIUM CHLORIDE: 9 INJECTION, SOLUTION INTRAVENOUS at 11:40

## 2020-08-24 RX ADMIN — FERRIC CARBOXYMALTOSE INJECTION 750 MG: 50 INJECTION, SOLUTION INTRAVENOUS at 11:39

## 2020-08-24 NOTE — PROGRESS NOTES
Pt arrived for Injectafer infusion. Vitals obtained and PIV started in R forearm. Infusion started at 11:39 and ended at 12:06. Pt tolerated well. PIV removed and pt discharged home, ambulatory per self.

## 2020-08-25 ENCOUNTER — TELEPHONE (OUTPATIENT)
Dept: SPIRITUAL SERVICES | Age: 70
End: 2020-08-25

## 2020-08-31 ENCOUNTER — OFFICE VISIT (OUTPATIENT)
Dept: NEUROLOGY | Age: 70
End: 2020-08-31
Payer: MEDICARE

## 2020-08-31 VITALS
RESPIRATION RATE: 16 BRPM | SYSTOLIC BLOOD PRESSURE: 138 MMHG | DIASTOLIC BLOOD PRESSURE: 74 MMHG | HEART RATE: 79 BPM | TEMPERATURE: 97.3 F

## 2020-08-31 PROCEDURE — 4040F PNEUMOC VAC/ADMIN/RCVD: CPT | Performed by: PSYCHIATRY & NEUROLOGY

## 2020-08-31 PROCEDURE — G8428 CUR MEDS NOT DOCUMENT: HCPCS | Performed by: PSYCHIATRY & NEUROLOGY

## 2020-08-31 PROCEDURE — 99215 OFFICE O/P EST HI 40 MIN: CPT | Performed by: PSYCHIATRY & NEUROLOGY

## 2020-08-31 PROCEDURE — G8419 CALC BMI OUT NRM PARAM NOF/U: HCPCS | Performed by: PSYCHIATRY & NEUROLOGY

## 2020-08-31 PROCEDURE — 0518F FALL PLAN OF CARE DOCD: CPT | Performed by: PSYCHIATRY & NEUROLOGY

## 2020-08-31 PROCEDURE — 1036F TOBACCO NON-USER: CPT | Performed by: PSYCHIATRY & NEUROLOGY

## 2020-08-31 PROCEDURE — 3017F COLORECTAL CA SCREEN DOC REV: CPT | Performed by: PSYCHIATRY & NEUROLOGY

## 2020-08-31 PROCEDURE — 1090F PRES/ABSN URINE INCON ASSESS: CPT | Performed by: PSYCHIATRY & NEUROLOGY

## 2020-08-31 PROCEDURE — G8399 PT W/DXA RESULTS DOCUMENT: HCPCS | Performed by: PSYCHIATRY & NEUROLOGY

## 2020-08-31 PROCEDURE — 1123F ACP DISCUSS/DSCN MKR DOCD: CPT | Performed by: PSYCHIATRY & NEUROLOGY

## 2020-08-31 PROCEDURE — 3288F FALL RISK ASSESSMENT DOCD: CPT | Performed by: PSYCHIATRY & NEUROLOGY

## 2020-08-31 NOTE — PROGRESS NOTES
Neurocritical Care, Stroke & Neurointerventional Note    [address]   [address] [facility],  [address], [address] [facility]   P: [phone]    Endovascular Neurosurgery Consult    Pt Name: Abdullahi Carrera  MRN: [id number]  [place]: 1950  Date of evaluation: 8/31/2020  Primary Care Physician: Chino Harmon MD    Reason for evaluation: [facility] and pericallosal SENTHIL aneurysm     SUBJECTIVE:   History of Chief Complaint:    Abdullahi Carrera is a 72 y.o. female who presents with     [facility] with vasospasm    S/P Coiling in February 12, 2020    She is able to take care of her self care needs   Goes up and down [facility] short distance    Cleaning the house she needs help with    mRS 1-2 max      Allergies  is allergic to aspirin; lisinopril; losartan; and procardia [nifedipine]. Medications  Prior to Admission medications    Medication Sig Start Date End Date Taking? Authorizing Provider   ferric carboxymaltose (INJECTAFER) 750 MG/15ML SOLN IV solution Infuse 15 mLs intravenously once for 1 dose 7/28/20 8/13/20  Chino Harmon MD   traMADol Georgena Leach) 50 MG tablet  6/26/20   Historical Provider, MD   Blood Pressure KIT Dx: HTN. Needs automatic blood pressure machine to monitor her blood pressure.  7/13/20   Chino Harmon MD   Nutritional Supplements (ENSURE ACTIVE HIGH PROTEIN) LIQD Use daily for malnutrition 7/13/20   Chino Harmon MD   zoster recombinant adjuvanted vaccine [facility]) 50 MCG/0.5ML SUSR injection Inject 0.5 mLs into the muscle See Admin Instructions 1 dose now and repeat in 2-6 months  Patient not taking: Reported on 8/13/2020 7/13/20   Chino Harmon MD   ferrous sulfate (IRON 325) 325 (65 Fe) MG tablet Take 1 tablet by mouth 2 times daily 5/14/20   Chino Harmon MD   carvedilol (COREG) 3.125 MG tablet TAKE 1 TABLET BY MOUTH TWO TIMES A DAY 3/27/20   Mark Alves MD   atorvastatin (LIPITOR) 20 MG tablet Take 2 tablets by mouth nightly 3/16/20   Mark Alves MD ferrous sulfate 325 (65 Fe) MG EC tablet Take 1 tablet by mouth daily (with breakfast)  Patient not taking: Reported on 8/13/2020 3/3/20   Yousuf Kellogg DO   pramipexole (MIRAPEX) 0.125 MG tablet TAKE ONE TABLET BY MOUTH NIGHTLY 2/19/20   Cory Verde MD   acetaminophen (TYLENOL) 325 MG tablet Take 325 mg by mouth every 6 hours as needed for Pain    Historical Provider, MD   vitamin D (CHOLECALCIFEROL) 1000 UNIT TABS tablet Take 1,000 Units by mouth 3 times daily    Historical Provider, MD   Multiple Vitamins-Minerals (THERAPEUTIC MULTIVITAMIN-MINERALS) tablet Take 1 tablet by mouth daily    Historical Provider, MD    Scheduled Meds:  Continuous Infusions:  PRN Meds:.  Past Medical History   has a past medical history of Acid reflux, Arthritis, Brain aneurysm, Cancer ([facility] [address] Family history of breast cancer in first degree relative, Gall stones, Hard of hearing, History of cervical cancer, Hypertension, Lateral meniscus tear, Wears dentures, and Wears glasses. Past Surgical History   has a past surgical history that includes Tonsillectomy and adenoidectomy (1956); Cholecystectomy; Inner ear surgery; Knee arthroscopy (2000, 2005); cyst removal (1980); Colonoscopy (12/09); Breast biopsy (1980); Foot surgery (Bilateral); other surgical history (Right, 9-4-14); Umbilical hernia repair; hernia repair; Varicose vein surgery (Bilateral); Knee arthroscopy (Left, 4/3/2019); Colonoscopy (N/A, 5/31/2019); other surgical history (02/12/2020);   picc powerpicc double (2/20/2020); Upper gastrointestinal endoscopy (N/A, 2/20/2020); and sigmoidoscopy (N/A, 2/20/2020). Social History   reports that she has never smoked. She has never used smokeless tobacco.   reports current alcohol use. reports no history of drug use. Family History  family history includes Breast Cancer in her sister; Cancer in her mother; Heart Attack in her father.     Review of Systems:  CONSTITUTIONAL:  negative for fevers, chills, fatigue and malaise    EYES:  negative for double vision, blurred vision and photophobia     HEENT:  negative for tinnitus, epistaxis and sore throat    RESPIRATORY:  negative for cough, shortness of breath, wheezing    CARDIOVASCULAR:  negative for chest pain, palpitations, syncope, edema    GASTROINTESTINAL:  negative for nausea, vomiting    GENITOURINARY:  negative for incontinence    MUSCULOSKELETAL:  negative for neck or back pain    NEUROLOGICAL:  Negative for weakness and tingling  negative for headaches and dizziness    PSYCHIATRIC:  negative for anxiety      Review of systems otherwise negative. OBJECTIVE:   Vitals: /74 (Site: Right Upper Arm, Position: Sitting, Cuff Size: Small Adult)   Pulse 79   Temp 97.3 °F (36.3 °C)   Resp 16   General appearance: Lying in bed, NAD  HEENT: Head: Normocephalic, no lesions, without obvious abnormality. Neck: no adenopathy, no carotid bruit, no JVD, supple, symmetrical, trachea midline and thyroid not enlarged, symmetric, no tenderness/mass/nodules  Lungs: clear to auscultation bilaterally  Heart: regular rate and rhythm, S1, S2 normal, no murmur, click, rub or gallop  Abdomen: soft, non-tender; nondistended, bowel sounds normal  Extremities: extremities normal, atraumatic, no cyanosis or edema    Neurologic: Mental status: Alert, oriented, thought content appropriate, Alert and oriented x 3,   Language/speech: intact language, attention, knowledge  CN: II-XII intact  MOTOR: left leg weakness   SENSORY: LT and PP symmetrical and intact  COORDINATION: F to N and Gait intact for age      LABS:   No results for input(s): WBC, HGB, HCT, PLT, NA, K, CL, CO2, BUN, CREATININE, MG, PHOS, CALCIUM, PTT, INR, AST, ALT, BILITOT, BILIDIR, NITRU, COLORU, BACTERIA in the last 72 hours. Invalid input(s): PT, WBCU, RBCU, LEUKOCYTESUA  No results for input(s): ALKPHOS, ALT, AST, BILITOT, BILIDIR, LABALBU, AMYLASE, LIPASE in the last 72 hours.     RADIOLOGY:   Images were personally reviewed including:        IMPRESSIONS:   Rodriguez Rice is a 72 y.o. female who presents with     SAH with vasospasm    S/P Coiling in February 12, 2020    MRA showed complete occlusion June of 2020    She is able to take care of her self care needs   Goes up and down [facility] short distance    Cleaning the house she needs help with    mRS 1-2 max  1. does not have any pertinent problems on file. PLANS:   Asa 80 MG DAILY  Catheter angiogram  The risk and benefit of the cerebral angiography was explained at length to the patient and her family, including but not limited to vessel injury, X-ray dye allergic reaction, kidney dysfunction, stroke and groin hematoma. RTC in ine year w MRA       Consultation Visit Time:  40 minutes  Patient given educational materials - see patient instructions. Discussed use, benefit, and side effects of prescribed medications. Personally reviewed imaging with patients and all questions answered. Pt voiced understanding. Patient agreed with treatment plan. Follow up as directed below. Greater than 50% of the time was for counseling and providing answer to the patient question. The findings and the plan discussed with the patient and all her questions were answered. Thank you very much for your referral, please do not hesitate to contact me with any questions.     Calista Briggs MD Pager: [phone]  Stroke, [facility] Stroke [address]  Pager [phone]  Electronically signed 8/31/2020 at 3:33 PM

## 2020-09-15 RX ORDER — CARVEDILOL 3.12 MG/1
TABLET ORAL
Qty: 180 TABLET | Refills: 1 | Status: SHIPPED | OUTPATIENT
Start: 2020-09-15 | End: 2021-04-13

## 2020-09-27 ENCOUNTER — HOSPITAL ENCOUNTER (OUTPATIENT)
Dept: PREADMISSION TESTING | Age: 70
Setting detail: SPECIMEN
Discharge: HOME | End: 2020-10-01
Payer: MEDICARE

## 2020-09-27 PROCEDURE — U0003 INFECTIOUS AGENT DETECTION BY NUCLEIC ACID (DNA OR RNA); SEVERE ACUTE RESPIRATORY SYNDROME CORONAVIRUS 2 (SARS-COV-2) (CORONAVIRUS DISEASE [COVID-19]), AMPLIFIED PROBE TECHNIQUE, MAKING USE OF HIGH THROUGHPUT TECHNOLOGIES AS DESCRIBED BY CMS-2020-01-R: HCPCS

## 2020-09-28 LAB — SARS-COV-2, NAA: NOT DETECTED

## 2020-10-01 ENCOUNTER — HOSPITAL ENCOUNTER (OUTPATIENT)
Dept: INTERVENTIONAL RADIOLOGY/VASCULAR | Age: 70
Discharge: HOME | End: 2020-10-03
Payer: MEDICARE

## 2020-10-01 VITALS
RESPIRATION RATE: 18 BRPM | OXYGEN SATURATION: 97 % | HEIGHT: 64 IN | BODY MASS INDEX: 15.03 KG/M2 | HEART RATE: 65 BPM | SYSTOLIC BLOOD PRESSURE: 155 MMHG | TEMPERATURE: 98.3 F | DIASTOLIC BLOOD PRESSURE: 70 MMHG | WEIGHT: 88 LBS

## 2020-10-01 LAB
GFR NON-AFRICAN AMERICAN: >60 ML/MIN
GFR SERPL CREATININE-BSD FRML MDRD: >60 ML/MIN
GFR SERPL CREATININE-BSD FRML MDRD: NORMAL ML/MIN/{1.73_M2}
GLUCOSE BLD-MCNC: 94 MG/DL (ref 74–100)
POC CHLORIDE: 106 MMOL/L (ref 98–107)
POC CREATININE: 0.8 MG/DL (ref 0.51–1.19)
POC HEMATOCRIT: 33 % (ref 36–46)
POC HEMOGLOBIN: 11.4 G/DL (ref 12–16)
POC POTASSIUM: 4.8 MMOL/L (ref 3.5–4.5)
POC SODIUM: 144 MMOL/L (ref 138–146)

## 2020-10-01 PROCEDURE — 2709999900 HC NON-CHARGEABLE SUPPLY

## 2020-10-01 PROCEDURE — 82565 ASSAY OF CREATININE: CPT

## 2020-10-01 PROCEDURE — 7100000011 HC PHASE II RECOVERY - ADDTL 15 MIN

## 2020-10-01 PROCEDURE — 6360000004 HC RX CONTRAST MEDICATION: Performed by: PSYCHIATRY & NEUROLOGY

## 2020-10-01 PROCEDURE — C1894 INTRO/SHEATH, NON-LASER: HCPCS

## 2020-10-01 PROCEDURE — 84132 ASSAY OF SERUM POTASSIUM: CPT

## 2020-10-01 PROCEDURE — 6370000000 HC RX 637 (ALT 250 FOR IP)

## 2020-10-01 PROCEDURE — 99152 MOD SED SAME PHYS/QHP 5/>YRS: CPT | Performed by: PSYCHIATRY & NEUROLOGY

## 2020-10-01 PROCEDURE — 84295 ASSAY OF SERUM SODIUM: CPT

## 2020-10-01 PROCEDURE — 6360000002 HC RX W HCPCS: Performed by: STUDENT IN AN ORGANIZED HEALTH CARE EDUCATION/TRAINING PROGRAM

## 2020-10-01 PROCEDURE — C1769 GUIDE WIRE: HCPCS

## 2020-10-01 PROCEDURE — 82947 ASSAY GLUCOSE BLOOD QUANT: CPT

## 2020-10-01 PROCEDURE — C1887 CATHETER, GUIDING: HCPCS

## 2020-10-01 PROCEDURE — 2580000003 HC RX 258: Performed by: PSYCHIATRY & NEUROLOGY

## 2020-10-01 PROCEDURE — 36224 PLACE CATH CAROTD ART: CPT | Performed by: PSYCHIATRY & NEUROLOGY

## 2020-10-01 PROCEDURE — C1760 CLOSURE DEV, VASC: HCPCS

## 2020-10-01 PROCEDURE — 85014 HEMATOCRIT: CPT

## 2020-10-01 PROCEDURE — 7100000010 HC PHASE II RECOVERY - FIRST 15 MIN

## 2020-10-01 PROCEDURE — 99214 OFFICE O/P EST MOD 30 MIN: CPT | Performed by: PSYCHIATRY & NEUROLOGY

## 2020-10-01 PROCEDURE — 82435 ASSAY OF BLOOD CHLORIDE: CPT

## 2020-10-01 RX ORDER — PROTAMINE SULFATE 10 MG/ML
10 INJECTION, SOLUTION INTRAVENOUS ONCE
Status: COMPLETED | OUTPATIENT
Start: 2020-10-01 | End: 2020-10-01

## 2020-10-01 RX ORDER — HEPARIN SODIUM 5000 [USP'U]/ML
INJECTION, SOLUTION INTRAVENOUS; SUBCUTANEOUS
Status: COMPLETED | OUTPATIENT
Start: 2020-10-01 | End: 2020-10-01

## 2020-10-01 RX ORDER — FENTANYL CITRATE 50 UG/ML
INJECTION, SOLUTION INTRAMUSCULAR; INTRAVENOUS
Status: COMPLETED | OUTPATIENT
Start: 2020-10-01 | End: 2020-10-01

## 2020-10-01 RX ORDER — CEFAZOLIN SODIUM 1 G/50ML
INJECTION, SOLUTION INTRAVENOUS CONTINUOUS PRN
Status: COMPLETED | OUTPATIENT
Start: 2020-10-01 | End: 2020-10-01

## 2020-10-01 RX ORDER — SODIUM CHLORIDE 9 MG/ML
INJECTION, SOLUTION INTRAVENOUS CONTINUOUS
Status: DISCONTINUED | OUTPATIENT
Start: 2020-10-01 | End: 2020-10-04 | Stop reason: HOSPADM

## 2020-10-01 RX ORDER — PROMETHAZINE HYDROCHLORIDE 12.5 MG/1
12.5 TABLET ORAL EVERY 6 HOURS PRN
Status: DISCONTINUED | OUTPATIENT
Start: 2020-10-01 | End: 2020-10-04 | Stop reason: HOSPADM

## 2020-10-01 RX ORDER — ONDANSETRON 2 MG/ML
4 INJECTION INTRAMUSCULAR; INTRAVENOUS EVERY 6 HOURS PRN
Status: DISCONTINUED | OUTPATIENT
Start: 2020-10-01 | End: 2020-10-04 | Stop reason: HOSPADM

## 2020-10-01 RX ORDER — MIDAZOLAM HYDROCHLORIDE 1 MG/ML
INJECTION INTRAMUSCULAR; INTRAVENOUS
Status: COMPLETED | OUTPATIENT
Start: 2020-10-01 | End: 2020-10-01

## 2020-10-01 RX ORDER — ACETAMINOPHEN 325 MG/1
650 TABLET ORAL EVERY 4 HOURS PRN
Status: DISCONTINUED | OUTPATIENT
Start: 2020-10-01 | End: 2020-10-04 | Stop reason: HOSPADM

## 2020-10-01 RX ORDER — IODIXANOL 270 MG/ML
150 INJECTION, SOLUTION INTRAVASCULAR
Status: COMPLETED | OUTPATIENT
Start: 2020-10-01 | End: 2020-10-01

## 2020-10-01 RX ORDER — FUROSEMIDE 20 MG/1
20 TABLET ORAL DAILY
COMMUNITY
End: 2021-04-14

## 2020-10-01 RX ORDER — IBUPROFEN 200 MG
1 CAPSULE ORAL DAILY
COMMUNITY

## 2020-10-01 RX ADMIN — CEFAZOLIN SODIUM 2 G: 1 INJECTION, SOLUTION INTRAVENOUS at 11:19

## 2020-10-01 RX ADMIN — PROTAMINE SULFATE 10 MG: 10 INJECTION, SOLUTION INTRAVENOUS at 13:20

## 2020-10-01 RX ADMIN — FENTANYL CITRATE 25 MCG: 50 INJECTION INTRAMUSCULAR; INTRAVENOUS at 11:18

## 2020-10-01 RX ADMIN — HEPARIN SODIUM 2 ML: 5000 INJECTION INTRAVENOUS; SUBCUTANEOUS at 11:24

## 2020-10-01 RX ADMIN — FENTANYL CITRATE 25 MCG: 50 INJECTION INTRAMUSCULAR; INTRAVENOUS at 12:55

## 2020-10-01 RX ADMIN — IODIXANOL 81 ML: 270 INJECTION, SOLUTION INTRAVASCULAR at 12:53

## 2020-10-01 RX ADMIN — SODIUM CHLORIDE: 9 INJECTION, SOLUTION INTRAVENOUS at 09:32

## 2020-10-01 RX ADMIN — MIDAZOLAM HYDROCHLORIDE 0.5 MG: 1 INJECTION, SOLUTION INTRAMUSCULAR; INTRAVENOUS at 11:18

## 2020-10-01 RX ADMIN — FENTANYL CITRATE 25 MCG: 50 INJECTION INTRAMUSCULAR; INTRAVENOUS at 13:21

## 2020-10-01 RX ADMIN — FENTANYL CITRATE 25 MCG: 50 INJECTION INTRAMUSCULAR; INTRAVENOUS at 13:00

## 2020-10-01 RX ADMIN — FENTANYL CITRATE 25 MCG: 50 INJECTION INTRAMUSCULAR; INTRAVENOUS at 12:40

## 2020-10-01 RX ADMIN — FENTANYL CITRATE 25 MCG: 50 INJECTION INTRAMUSCULAR; INTRAVENOUS at 11:56

## 2020-10-01 ASSESSMENT — PAIN DESCRIPTION - ORIENTATION: ORIENTATION: LEFT

## 2020-10-01 ASSESSMENT — PAIN DESCRIPTION - LOCATION: LOCATION: FOOT

## 2020-10-01 ASSESSMENT — PAIN DESCRIPTION - DESCRIPTORS: DESCRIPTORS: TINGLING;SHARP

## 2020-10-01 ASSESSMENT — PAIN DESCRIPTION - FREQUENCY: FREQUENCY: CONTINUOUS

## 2020-10-01 ASSESSMENT — PAIN SCALES - GENERAL: PAINLEVEL_OUTOF10: 8

## 2020-10-01 ASSESSMENT — PAIN DESCRIPTION - PAIN TYPE: TYPE: CHRONIC PAIN

## 2020-10-01 NOTE — PROGRESS NOTES
Patient admitted, consent signed, all questions answered. Pt ready for procedure. Call light to reach with side rails up 2 of 2. Daughter at bedside with patient.

## 2020-10-01 NOTE — PROGRESS NOTES
Received post procedure to [facility] to room 3. Assessment obtained. Restrictions reviewed with patient. Post procedure pathway initiated. Right groin site soft , Thrombex then 2x2 then tegaderm then Safeguard to site  No hematoma noted. Family at side. Patient without complaints. Head of bed flat with right leg straight. Voided easily per bedpan. Daughter helping patient with lunch.

## 2020-10-01 NOTE — BRIEF OP NOTE
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 Procedure Performed:cerebral angiogram    Surgeon:   Cash Hilton MD    Fellow:  Roxana Marquis MD, Golden Orr MD, PhD     1st Assistant:  Román Pabon    PRE-PROCEDURAL EXAM:  Prestroke baseline mRS MODIFIED LUIS ENRIQUE SCORE: 2 - Slight disability:  unable to carry out all previous activities, but able to look after own affairs without assistance. Neurological exam performed and unchanged from initial H&P or consult    Anesthesia: IV Moderate Sedation  Complications: R small thigh hematoma ~3cm diameter, resolved with pressure. Intra-Operative EXAM:  Patient sedated with unchanged limited neurological exam    EBL: < less than 50       Cc            Specimens: Were not Obtained  Contrast:     Visipaque 270 low osmolar 48 Cc             Fluoro: 5.9 min    Findings:  Please see dictated Radiology note for further details  --right anterior cerebral artery status post prior coiling stable, agapito score 1    Agapito score: class I    POST-PROCEDURAL EXAM :   Stable neurological Exam  Neurological exam performed and unchanged from initial H&P or consult    Closure:  right Vascade 5   F        POST-PROCEDURAL MONITORING : see orders  Disposition: Recovery room      Recommendations:  1. Back to [facility]  2. Do not bend right leg for 6 hours. (hematoma in the R thigh, resolved with pressure)  3. Groin checks per protocol. 4. Peripheral pulse checks per protocol. 5. SBP goal <140  6.  Upon discharge follow up with Dr. Garrett Rock in 2 weeks and Dr. Cedric Mancilla in 3 months    Golden Orr MD PhD fellow    Cash Hilton MD   Pager: [phone]  Stroke, Neurocritical Care

## 2020-10-01 NOTE — OP NOTE
[facility] Stroke Center    NEUROENDOVASCULAR SERVICE: POST-OP NOTE: 10/1/2020    Pt Name: Kory Blanco  MRN: [id number]  YOB: 1950  Date of Procedure: 10/1/2020  Primary Care Physician: Rosendo Su MD    Pre-Procedural Diagnosis: Right SENTHIL aneurysm evaluation. Post-Procedural Diagnosis: Right SENTHIL aneurysm s/p treatment with primary coil embolization in February 2020. Procedure Performed:Diagnostic Cerebral Angiogram    Surgeon:   Cash Hilton MD    Fellow:  Roxana Marquis MD and Selina Baird MD, PhD     1st Assistant:  Osker Canavan    PRE-PROCEDURAL EXAM:  Prestroke baseline mRS MODIFIED LUIS ENRIQUE SCORE: 1 - No significant disability: despite symptoms, able to carry out all usual duties and activities. Neurological exam performed and unchanged from initial H&P or consult    Anesthesia: IV Moderate Sedation  Complications: none    Intra-Operative EXAM:  Neurological exam performed and unchanged from initial H&P or consult    EBL: < Minimal      Cc            Specimens: Were not Obtained  Contrast:     Visipaque 270 low osmolar 50 Cc             Fluoro: 5.9 min    Findings:  Please see dictated Radiology note for further details  1. The prior treated right SENTHIL A2 aneurysm with primary coil embolization in February 2020 with no residual noted. Agapito class at 1. Agapito score: class I    POST-PROCEDURAL EXAM :   Stable neurological Exam  Neurological exam performed and unchanged from initial H&P or consult    Closure:  right Vascade 5   F    POST-PROCEDURAL MONITORING : see orders  Disposition: Recovery room/HVI    Recommendations:    Do not bend right leg for 3 hours. Groin checks per protocol. Peripheral pulse checks per protocol.   SBP goal 100-140  Upon discharge follow up with Dr. Garrett Rock in 2 weeks and Dr. Steele [facility] in 3 months      MD Cash Banks MD   Pager: [phone]  Stroke, [facility] [address] [place]  Electronically signed 10/1/2020 at 1:24 PM

## 2020-10-01 NOTE — H&P
Endovascular Neurosurgery H & P      Reason for evaluation: f/u R SENTHIL aneurysm s/p coil    SUBJECTIVE:   History of Chief Complaint:    72yo female with pmh deafness, hld, anemia, SAH with vasospasm 2/2 R A2 SENTHIL aneurysm s/p coiling 2/2020. There is noted herniation of the coils into the subarachnoid space at that time. Pt presents for f/u eval for the aneurysm. Pt was last seen on 8/31/2020. Since this time no acute changes or events. However, pt complains of wt loss ongoing for the last 1 year, and well as new L foot drop since 3 mo ago (7/2020). 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 She is followed by neurology. Allergies  is allergic to lisinopril; losartan; and procardia [nifedipine]. Medications  Prior to Admission medications    Medication Sig Start Date End Date Taking?  Authorizing Provider   calcium citrate (CALCITRATE) 950 MG tablet Take 1 tablet by mouth daily   Yes Historical Provider, MD   carvedilol (COREG) 3.125 MG tablet TAKE 1 TABLET BY MOUTH TWO TIMES A DAY 9/15/20  Yes Lokesh Abarca MD   traMADol Mona Horn) 50 MG tablet  6/26/20  Yes Historical Provider, MD   Nutritional Supplements (ENSURE ACTIVE HIGH PROTEIN) LIQD Use daily for malnutrition 7/13/20  Yes Lokesh Abarca MD   atorvastatin (LIPITOR) 20 MG tablet Take 2 tablets by mouth nightly  Patient taking differently: Take 20 mg by mouth nightly  3/16/20  Yes Flor Mercado MD   pramipexole (MIRAPEX) 0.125 MG tablet TAKE ONE TABLET BY MOUTH NIGHTLY 2/19/20  Yes Flor Mercado MD   Multiple Vitamins-Minerals (THERAPEUTIC MULTIVITAMIN-MINERALS) tablet Take 1 tablet by mouth daily   Yes Historical Provider, MD   furosemide (LASIX) 20 MG tablet Take 20 mg by mouth daily Takes irregularily    Historical Provider, MD   ferric carboxymaltose (INJECTAFER) 750 MG/15ML SOLN IV solution Infuse 15 mLs intravenously once for 1 dose 7/28/20 8/13/20  Lokesh Abarca MD   Blood Pressure KIT Dx: HTN. Needs automatic blood pressure machine to monitor her blood pressure. 7/13/20   Dank Pham MD   zoster recombinant adjuvanted vaccine [facility]) 50 MCG/0.5ML SUSR injection Inject 0.5 mLs into the muscle See Admin Instructions 1 dose now and repeat in 2-6 months  Patient not taking: Reported on 8/13/2020 7/13/20   Dank Pham MD   acetaminophen (TYLENOL) 325 MG tablet Take 325 mg by mouth every 6 hours as needed for Pain    Historical Provider, MD   vitamin D (CHOLECALCIFEROL) 1000 UNIT TABS tablet Take 1,000 Units by mouth 3 times daily    Historical Provider, MD    Scheduled Meds:  Continuous Infusions:   sodium chloride 75 mL/hr at 10/01/20 0932    ceFAZolin       PRN Meds:. ceFAZolin, acetaminophen, promethazine **OR** ondansetron  Past Medical History   has a past medical history of Acid reflux, Arthritis, Brain aneurysm, Cancer ([facility] [address] Family history of breast cancer in first degree relative, Gall stones, Hard of hearing, History of cervical cancer, Hypertension, Lateral meniscus tear, Wears dentures, and Wears glasses. Past Surgical History   has a past surgical history that includes Tonsillectomy and adenoidectomy (1956); Cholecystectomy; Inner ear surgery (Bilateral); Knee arthroscopy (2000, 2005); cyst removal (1980); Colonoscopy (12/09); Breast biopsy (1980); Foot surgery (Bilateral); other surgical history (Right, 9-4-14); Umbilical hernia repair; hernia repair; Varicose vein surgery (Bilateral); Knee arthroscopy (Left, 4/3/2019); Colonoscopy (N/A, 5/31/2019); other surgical history (02/12/2020);   picc powerpicc double (2/20/2020); Upper gastrointestinal endoscopy (N/A, 2/20/2020); sigmoidoscopy (N/A, 2/20/2020); Endoscopy, colon, diagnostic; and vascular surgery (10/01/2020). Social History   reports that she has never smoked. She has never used smokeless tobacco.   reports current alcohol use. reports no history of drug use.   Family History  family history includes Breast Cancer in her sister; Cancer in her mother; Heart Attack in her father. Review of Systems:  CONSTITUTIONAL:  negative for fevers, chills, fatigue and malaise    EYES:  negative for double vision, blurred vision and photophobia     HEENT:  negative for tinnitus, epistaxis and sore throat    RESPIRATORY:  negative for cough, shortness of breath, wheezing    CARDIOVASCULAR:  negative for chest pain, palpitations, syncope, edema    GASTROINTESTINAL:  negative for nausea, vomiting    GENITOURINARY:  negative for incontinence    MUSCULOSKELETAL:  negative for neck or back pain    NEUROLOGICAL:  Negative for weakness and tingling  negative for headaches and dizziness    PSYCHIATRIC:  negative for anxiety      Review of systems otherwise negative. OBJECTIVE:     Vitals:    10/01/20 1201   BP: (!) 155/59   Pulse: 63   Resp: 15   Temp:    SpO2: 100%        General:  Gen: thin habitus, NAD. Came in with rolling walker  HEENT: NCAT, mucosa moist  Cvs: RRR, S1 S2 normal  Resp: symmetric unlabored breathing  Abd: s/nd/nt  Ext: no edema  Skin: no lesions seen, warm and dry    Neuro:  Gen: awake and alert, oriented x3. Lang/speech: no aphasia or dysarthria. Follows commands. CN: PERRL, EOMI, VFF, V1-3 intact, face symmetric, hearing intact, shoulder shrug symmetric, tongue midline  Motor: LLE foot drop, df 0/5. Otherwise grossly 5/5 UE and LE b/l  Sense: LT intact in all 4 ext. hyperesthesia in LE worse on the L  Coord: FTN and HTS intact b/l  DTR: 1+ in ankles b/l. 3+ throughout. Gait: defered  Special: lhermitte neg    NIH Stroke Scale:   1a  Level of consciousness: 0 - alert; keenly responsive   1b. LOC questions:  0 - answers both questions correctly   1c. LOC commands: 0 - performs both tasks correctly   2. Best Gaze: 0 - normal   3. Visual: 0 - no visual loss   4. Facial Palsy: 0 - normal symmetric movement   5a. Motor left arm: 0 - no drift, limb holds 90 (or 45) degrees for full 10 seconds   5b.   Motor right arm: 0 - no drift, limb holds 90 (or 45) degrees for full 10 seconds   6a. Motor left le - no drift; leg holds 30 degree position for full 5 seconds   6b  Motor right le - no drift; leg holds 30 degree position for full 5 seconds   7. Limb Ataxia: 0 - absent   8. Sensory: 0 - normal; no sensory loss   9. Best Language:  0 - no aphasia, normal   10. Dysarthria: 0 - normal   11. Extinction and Inattention: 0 - no abnormality         Total:   0     MRS: 0      LABS:   Reviewed. RADIOLOGY:   Images were personally reviewed including:  dsa 2020  1. Ruptured right A2 SENTHIL aneurysm? measuring 1.83 x 1.86 and neck size 0.83 mm?treated with a primary coil embolization with coil herniation to the subarachnoid apace that resulted in complete obliteration of the aneurysm. Agapito score I..    2. The coils are all MRI compatible. ASSESSMENT:   70yo female with pmh deafness, hld, anemia, SAH with vasospasm 2/ R A2 SENTHIL aneurysm s/p coiling 2020. There is noted herniation of the coils into the subarachnoid space at that time. Pt presents for f/u eval for the aneurysm. Pt has had 3mo hx of L foot drop/L peroneal nerve neuropathy and wt loss concerning for a systemic process. F/u pcp and neurology    PLAN:   --dx angio today. Case discussed with Dr. Martín Hernandez attending.     Sav Estes MD, PhD   Stroke, [facility] Stroke [facility]  Electronically signed 10/1/2020 at 12:14 PM

## 2020-10-14 NOTE — PROGRESS NOTES
Visit Information    Have you changed or started any medications since your last visit including any over-the-counter medicines, vitamins, or herbal medicines? no   Have you stopped taking any of your medications? Is so, why? -  no  Are you having any side effects from any of your medications? - no    Have you seen any other physician or provider since your last visit? yes - NEUROLOGY   Have you had any other diagnostic tests since your last visit? yes - IR ANGIOGRAM CAROTID CEREBRAL BILATERAL   Have you been seen in the emergency room and/or had an admission in a hospital since we last saw you?  no   Have you had your routine dental cleaning in the past 6 months? NO    Do you have an active MyChart account? If no, what is the barrier?   Yes    Patient Care Team:  Ivana Fragoso MD as PCP - General (Family Medicine)  Ivana Fragoso MD as PCP - [address] [place] [address] [facility]  China Chun DO as Consulting Physician (Obstetrics & Gynecology)  Juan Pablo Paiz MD as Consulting Physician (Gastroenterology)    Medical History Review  Past Medical, Family, and Social History reviewed and does contribute to the patient presenting condition    Health Maintenance   Topic Date Due    DTaP/Tdap/Td vaccine (1 - Tdap) 04/08/1970    Shingles Vaccine (1 of 2) 04/08/2001    Flu vaccine (1) 09/01/2020    Hepatitis B vaccine (3 of 3 - Risk 3-dose series) 12/13/2020    Lipid screen  02/13/2021    Breast cancer screen  08/03/2021    Annual Wellness Visit (AWV)  08/14/2021    Potassium monitoring  10/01/2021    Creatinine monitoring  10/01/2021    Colon cancer screen colonoscopy  05/31/2029    DEXA (modify frequency per FRAX score)  Completed    Pneumococcal 65+ years Vaccine  Completed    Hepatitis C screen  Completed    Hib vaccine  Aged Out    Meningococcal (ACWY) vaccine  Aged Out    Hepatitis A vaccine  Discontinued

## 2020-10-15 ENCOUNTER — OFFICE VISIT (OUTPATIENT)
Dept: FAMILY MEDICINE CLINIC | Age: 70
End: 2020-10-15
Payer: MEDICARE

## 2020-10-15 VITALS
DIASTOLIC BLOOD PRESSURE: 78 MMHG | BODY MASS INDEX: 15.6 KG/M2 | WEIGHT: 91.4 LBS | TEMPERATURE: 98.6 F | OXYGEN SATURATION: 90 % | HEART RATE: 78 BPM | HEIGHT: 64 IN | SYSTOLIC BLOOD PRESSURE: 136 MMHG

## 2020-10-15 PROBLEM — M21.372 LEFT FOOT DROP: Status: ACTIVE | Noted: 2020-10-15

## 2020-10-15 PROBLEM — R63.0 DECREASED APPETITE: Status: RESOLVED | Noted: 2020-05-14 | Resolved: 2020-10-15

## 2020-10-15 PROCEDURE — 99215 OFFICE O/P EST HI 40 MIN: CPT | Performed by: FAMILY MEDICINE

## 2020-10-15 PROCEDURE — 3288F FALL RISK ASSESSMENT DOCD: CPT | Performed by: FAMILY MEDICINE

## 2020-10-15 PROCEDURE — 1123F ACP DISCUSS/DSCN MKR DOCD: CPT | Performed by: FAMILY MEDICINE

## 2020-10-15 PROCEDURE — G8419 CALC BMI OUT NRM PARAM NOF/U: HCPCS | Performed by: FAMILY MEDICINE

## 2020-10-15 PROCEDURE — G8427 DOCREV CUR MEDS BY ELIG CLIN: HCPCS | Performed by: FAMILY MEDICINE

## 2020-10-15 PROCEDURE — 90653 IIV ADJUVANT VACCINE IM: CPT | Performed by: FAMILY MEDICINE

## 2020-10-15 PROCEDURE — 1036F TOBACCO NON-USER: CPT | Performed by: FAMILY MEDICINE

## 2020-10-15 PROCEDURE — 1090F PRES/ABSN URINE INCON ASSESS: CPT | Performed by: FAMILY MEDICINE

## 2020-10-15 PROCEDURE — G0008 ADMIN INFLUENZA VIRUS VAC: HCPCS | Performed by: FAMILY MEDICINE

## 2020-10-15 PROCEDURE — 3017F COLORECTAL CA SCREEN DOC REV: CPT | Performed by: FAMILY MEDICINE

## 2020-10-15 PROCEDURE — 4040F PNEUMOC VAC/ADMIN/RCVD: CPT | Performed by: FAMILY MEDICINE

## 2020-10-15 PROCEDURE — 0518F FALL PLAN OF CARE DOCD: CPT | Performed by: FAMILY MEDICINE

## 2020-10-15 PROCEDURE — G8482 FLU IMMUNIZE ORDER/ADMIN: HCPCS | Performed by: FAMILY MEDICINE

## 2020-10-15 PROCEDURE — G8399 PT W/DXA RESULTS DOCUMENT: HCPCS | Performed by: FAMILY MEDICINE

## 2020-10-15 RX ORDER — IBUPROFEN 800 MG/1
TABLET ORAL
COMMUNITY
Start: 2020-07-24 | End: 2020-10-15

## 2020-10-15 ASSESSMENT — ENCOUNTER SYMPTOMS
ABDOMINAL PAIN: 0
DIARRHEA: 0
BACK PAIN: 1
ABDOMINAL DISTENTION: 0
VOMITING: 0
COLOR CHANGE: 0
BLOOD IN STOOL: 0
COUGH: 0
CHEST TIGHTNESS: 0
CONSTIPATION: 0
PHOTOPHOBIA: 0
WHEEZING: 0
SHORTNESS OF BREATH: 0

## 2020-10-15 NOTE — PROGRESS NOTES
Milddepression   []10-14 = Moderate depression   []15-19 = Moderately severe depression   []20-27 = Severe depression    Discussed testing with the patient and all questions fully answered. Hospital Outpatient Visit on 10/01/2020   Component Date Value Ref Range Status    POC Hemoglobin 10/01/2020 11.4* 12.0 - 16.0 g/dL Final    POC Hematocrit 10/01/2020 33* 36 - 46 % Final    POC Creatinine 10/01/2020 0.80  0.51 - 1.19 mg/dL Final    GFR Comment 10/01/2020 >60  >60 mL/min Final    GFR Non- 10/01/2020 >60  >60 mL/min Final    GFR Comment 10/01/2020        Final    Comment: Average GFR for 61-76 years old:   80 mL/min/1.73sq m  Chronic Kidney Disease:   <60 mL/min/1.73sq m  Kidney failure:   <15 mL/min/1.73sq m              eGFR calculated using average adult body mass.  Additional eGFR calculator available at:        [employer]            POC Sodium 10/01/2020 144  138 - 146 mmol/L Final    POC Potassium 10/01/2020 4.8* 3.5 - 4.5 mmol/L Final    POC Chloride 10/01/2020 106  98 - 107 mmol/L Final    POC Glucose 10/01/2020 94  74 - 100 mg/dL Final         Most recent labs reviewed:     Lab Results   Component Value Date    WBC 5.1 07/13/2020    HGB 9.4 (L) 07/13/2020    HCT 29.0 (L) 07/13/2020    MCV 88 07/13/2020     07/13/2020       @BRIEFLAB(NA,K,CL,CO2,BUN,CREATININE,GLUCOSE,CALCIUM)@     Lab Results   Component Value Date    ALT 13 06/10/2020    AST 28 06/10/2020    ALKPHOS 112 06/10/2020    BILITOT 0.4 06/10/2020       Lab Results   Component Value Date    TSH 5.94 (H) 02/15/2020       Lab Results   Component Value Date    CHOL 102 02/13/2020    CHOL 180 06/03/2019    CHOL 177 05/11/2018     Lab Results   Component Value Date    TRIG 100 02/13/2020    TRIG 119 06/03/2019    TRIG 113 05/11/2018     Lab Results   Component Value Date    HDL 21 (L) 02/13/2020    HDL 33 (L) 06/03/2019    HDL 39 (L) 05/11/2018     Lab Results   Component Value Date    LDLCHOLESTEROL 61 02/13/2020    LDLCHOLESTEROL 123 06/03/2019    LDLCHOLESTEROL 115 05/11/2018     Lab Results   Component Value Date    VLDL NOT REPORTED 02/13/2020    VLDL NOT REPORTED 06/03/2019    VLDL NOT REPORTED 05/11/2018     Lab Results   Component Value Date    CHOLHDLRATIO 4.9 02/13/2020    CHOLHDLRATIO 5.5 (H) 06/03/2019    CHOLHDLRATIO 4.5 05/11/2018       Lab Results   Component Value Date    LABA1C 5.6 02/13/2020       Lab Results   Component Value Date    KNHGMCDX37 670 02/13/2020       Lab Results   Component Value Date    FOLATE 7.9 02/13/2020       Lab Results   Component Value Date    IRON 26 (L) 02/13/2020    TIBC 293 02/13/2020    FERRITIN 44 02/13/2020       Lab Results   Component Value Date    VITD25 26.2 (L) 07/21/2017             Current Outpatient Medications   Medication Sig Dispense Refill    furosemide (LASIX) 20 MG tablet Take 20 mg by mouth daily Takes irregularily      calcium citrate (CALCITRATE) 950 MG tablet Take 1 tablet by mouth daily      carvedilol (COREG) 3.125 MG tablet TAKE 1 TABLET BY MOUTH TWO TIMES A  tablet 1    traMADol (ULTRAM) 50 MG tablet       Blood Pressure KIT Dx: HTN. Needs automatic blood pressure machine to monitor her blood pressure.  1 kit 0    Nutritional Supplements (ENSURE ACTIVE HIGH PROTEIN) LIQD Use daily for malnutrition 30 Can 3    zoster recombinant adjuvanted vaccine (SHINGRIX) 50 MCG/0.5ML SUSR injection Inject 0.5 mLs into the muscle See Admin Instructions 1 dose now and repeat in 2-6 months 0.5 mL 0    atorvastatin (LIPITOR) 20 MG tablet Take 2 tablets by mouth nightly (Patient taking differently: Take 20 mg by mouth nightly ) 30 tablet 2    pramipexole (MIRAPEX) 0.125 MG tablet TAKE ONE TABLET BY MOUTH NIGHTLY 90 tablet 1    acetaminophen (TYLENOL) 325 MG tablet Take 325 mg by mouth every 6 hours as needed for Pain      vitamin D (CHOLECALCIFEROL) 1000 UNIT TABS tablet Take 1,000 Units by mouth 3 times daily      Multiple Vitamins-Minerals (THERAPEUTIC MULTIVITAMIN-MINERALS) tablet Take 1 tablet by mouth daily       No current facility-administered medications for this visit. Social History     Socioeconomic History    Marital status:       Spouse name: Not on file    Number of children: Not on file    Years of education: Not on file    Highest education level: Not on file   Occupational History    Not on file   Social Needs    Financial resource strain: Not on file    Food insecurity     Worry: Not on file     Inability: Not on file    Transportation needs     Medical: Not on file     Non-medical: Not on file   Tobacco Use    Smoking status: Never Smoker    Smokeless tobacco: Never Used   Substance and Sexual Activity    Alcohol use: Yes     Comment: Occassional    Drug use: No    Sexual activity: Never     Birth control/protection: Post-menopausal   Lifestyle    Physical activity     Days per week: Not on file     Minutes per session: Not on file    Stress: Not on file   Relationships    Social connections     Talks on phone: Not on file     Gets together: Not on file     Attends Holiness service: Not on file     Active member of club or organization: Not on file     Attends meetings of clubs or organizations: Not on file     Relationship status: Not on file    Intimate partner violence     Fear of current or ex partner: Not on file     Emotionally abused: Not on file     Physically abused: Not on file     Forced sexual activity: Not on file   Other Topics Concern    Not on file   Social History Narrative    Not on file     Counseling given: Not Answered        Family History   Problem Relation Age of Onset    Heart Attack Father     Cancer Mother         stomach cancer    Breast Cancer Sister         one sister with breast cancer    Colon Cancer Neg Hx     Diabetes Neg Hx     Eclampsia Neg Hx     Hypertension Neg Hx     Ovarian Cancer Neg Hx      Labor Neg Hx     Spont Abortions Neg Hx     Stroke Neg Hx     Liver Cancer Neg Hx              -rest of complaints with corresponding details per ROS    The patient's past medical, surgical, social, and family history as well as her current medications and allergies were reviewed as documented intoday's encounter. Review of Systems   Constitutional: Positive for fatigue. Negative for activity change, appetite change, diaphoresis, fever and unexpected weight change. HENT: Negative for congestion, hearing loss, mouth sores, nosebleeds and postnasal drip. Eyes: Negative for photophobia and visual disturbance. Respiratory: Negative for cough, chest tightness, shortness of breath and wheezing. Cardiovascular: Negative for chest pain, palpitations and leg swelling. Gastrointestinal: Negative for abdominal distention, abdominal pain, blood in stool, constipation, diarrhea and vomiting. Endocrine: Negative for polyuria. Genitourinary: Negative for difficulty urinating, frequency, urgency and vaginal pain. Musculoskeletal: Positive for arthralgias, back pain and gait problem. Negative for joint swelling, neck pain and neck stiffness. Skin: Negative for color change. Neurological: Positive for weakness and numbness. Negative for dizziness, speech difficulty, light-headedness and headaches. Psychiatric/Behavioral: Negative for agitation, confusion, decreased concentration, dysphoric mood, hallucinations, sleep disturbance and suicidal ideas. The patient is nervous/anxious. Physical Exam  Vitals signs reviewed. Constitutional:       Appearance: Normal appearance. HENT:      Head: Normocephalic and atraumatic. Nose: Nose normal.   Eyes:      Pupils: Pupils are equal, round, and reactive to light. Neck:      Musculoskeletal: Normal range of motion. Cardiovascular:      Rate and Rhythm: Normal rate and regular rhythm. Heart sounds: Normal heart sounds. No murmur.    Pulmonary: Effort: Pulmonary effort is normal. No respiratory distress. Breath sounds: Normal breath sounds. No wheezing or rhonchi. Abdominal:      General: Bowel sounds are normal. There is no distension. Palpations: Abdomen is soft. There is no mass. Tenderness: There is no abdominal tenderness. Hernia: No hernia is present. Musculoskeletal:      Left foot: Decreased range of motion. Deformity and foot drop present. No tenderness, bony tenderness, swelling or prominent metatarsal heads. Comments: Patient has foot brace in left foot. Feet:      Left foot:      Skin integrity: No ulcer, blister, skin breakdown or dry skin. Lymphadenopathy:      Cervical: No cervical adenopathy. Skin:     General: Skin is warm. Neurological:      Mental Status: She is alert and oriented to person, place, and time. Cranial Nerves: Cranial nerves are intact. Sensory: Sensory deficit present. Motor: Weakness present. Coordination: Coordination is intact. Gait: Gait is intact. Psychiatric:         Attention and Perception: Attention and perception normal.         Mood and Affect: Mood and affect normal.         Speech: Speech normal.             ASSESSMENT AND PLAN      1. Essential hypertension  Stable continue same medications    2. Chronic diastolic heart failure (HCC)  Stable on current treatment. Continue beta-blockers and Lasix    3. Iron deficiency anemia due to chronic blood loss  Anemia has improved continue iron supplements    4. Left foot drop  Discussed with patient we will get the EMG results, need to start physical therapy continue using foot brace. - EMG; Future    5. Closed fracture of right foot, sequela  Healed follows with podiatrist    6. Weight loss  Stable patient has gained few pounds last 3 months. 7. Lung nodule  No lung nodule seen on CT chest done February/20    8. Hyperlipidemia, unspecified hyperlipidemia type  Stable continue same medication    9.  At high risk for falls  -Home safety provided discussed with patient    10. Need for vaccination    - INFLUENZA, TRIV, INACTIVATED, SUBUNIT, ADJUVANTED, 65 YRS AND OLDER, IM, PREFILL SYR, 0.5ML (FLUAD TRIV)      Orders Placed This Encounter   Procedures    INFLUENZA, TRIV, INACTIVATED, SUBUNIT, ADJUVANTED, 65 YRS AND OLDER, IM, PREFILL SYR, 0.5ML (FLUAD TRIV)    EMG     Standing Status:   Future     Standing Expiration Date:   12/14/2020     Order Specific Question:   Which body part? Answer:   LEFT LOWER EXTREMITY         Medications Discontinued During This Encounter   Medication Reason    ferric carboxymaltose (INJECTAFER) 750 MG/15ML SOLN IV solution Therapy completed    ibuprofen (ADVIL;MOTRIN) 800 MG tablet        Laura received counseling on the following healthy behaviors: nutrition, exercise and medication adherence  Reviewed prior labs and health maintenance  Continue current medications, diet and exercise. Discussed use, benefit, and side effects of prescribed medications. Barriers to medication compliance addressed. Patient given educational materials - see patient instructions  Was a self-tracking handout given in paper form or via [employer]? Yes    Requested Prescriptions      No prescriptions requested or ordered in this encounter       All patient questions answered. Patient voiced understanding. Quality Measures    Body mass index is 15.69 kg/m². Low. Weight control planned discussed Healthy diet and regular exercise. BP: 136/78 Blood pressure is normal. Treatment plan consists of No treatment change needed.     Lab Results   Component Value Date    LDLCHOLESTEROL 61 02/13/2020    (goal LDL reduction with dx if diabetes is 50% LDL reduction)      PHQ Scores 8/13/2020 5/14/2020 2/6/2019 12/13/2018 10/19/2017 3/20/2017 1/25/2016   PHQ2 Score 2 0 0 0 0 0 0   PHQ9 Score 2 0 0 0 0 0 0     Interpretation of Total Score Depression Severity: 1-4 = Minimal depression, 5-9 = Mild depression, 10-14 = Moderate depression, 15-19 = Moderately severe depression, 20-27 = Severe depression    The patient'spast medical, surgical, social, and family history as well as her   current medications and allergies were reviewed as documented in today's encounter. Medications, labs, diagnostic studies, consultations andfollow-up as documented in this encounter. Return in about 2 months (around 12/15/2020) for 30min,always chronic conditons. Patient wasseen with total face to face time of 25 minutes. More than 50% of this visit was counseling and education. Future Appointments   Date Time Provider Cammie Barnes   11/6/2020 11:30 AM Solitario Tapia MD Neuro [facility]   12[facility]15/2020 12:00 PM [facility]   1/18/2021  1:30 PM Sandrita Hunt MD Neuro [facility]   8/16/2021 10:00 AM Iman Ross MD [facility]     This note was completed by using the assistance of a speech-recognition program. However, inadvertent computerized transcription errors may be present. Althoughevery effort was made to ensure accuracy, no guarantees can be provided that every mistake has been identified and corrected by editing. Electronically signed by Iman Ross MD on 10/15/2020  2:47 PM              On the basis of positive falls risk screening, assessment and plan is as follows: in-office gait and balance testing performed using The Timed Up and Go Test was negative for increased falls risk- no further intervention is currently indicated, home safety tips provided.

## 2020-11-06 ENCOUNTER — OFFICE VISIT (OUTPATIENT)
Dept: NEUROLOGY | Age: 70
End: 2020-11-06
Payer: MEDICARE

## 2020-11-06 VITALS
DIASTOLIC BLOOD PRESSURE: 81 MMHG | WEIGHT: 91 LBS | OXYGEN SATURATION: 86 % | TEMPERATURE: 96.9 F | SYSTOLIC BLOOD PRESSURE: 137 MMHG | HEART RATE: 51 BPM | HEIGHT: 64 IN | BODY MASS INDEX: 15.54 KG/M2

## 2020-11-06 PROCEDURE — 99214 OFFICE O/P EST MOD 30 MIN: CPT | Performed by: PSYCHIATRY & NEUROLOGY

## 2020-11-06 PROCEDURE — 4040F PNEUMOC VAC/ADMIN/RCVD: CPT | Performed by: PSYCHIATRY & NEUROLOGY

## 2020-11-06 PROCEDURE — G8419 CALC BMI OUT NRM PARAM NOF/U: HCPCS | Performed by: PSYCHIATRY & NEUROLOGY

## 2020-11-06 PROCEDURE — G8427 DOCREV CUR MEDS BY ELIG CLIN: HCPCS | Performed by: PSYCHIATRY & NEUROLOGY

## 2020-11-06 PROCEDURE — 3017F COLORECTAL CA SCREEN DOC REV: CPT | Performed by: PSYCHIATRY & NEUROLOGY

## 2020-11-06 PROCEDURE — 3288F FALL RISK ASSESSMENT DOCD: CPT | Performed by: PSYCHIATRY & NEUROLOGY

## 2020-11-06 PROCEDURE — 0518F FALL PLAN OF CARE DOCD: CPT | Performed by: PSYCHIATRY & NEUROLOGY

## 2020-11-06 PROCEDURE — 1123F ACP DISCUSS/DSCN MKR DOCD: CPT | Performed by: PSYCHIATRY & NEUROLOGY

## 2020-11-06 PROCEDURE — 1090F PRES/ABSN URINE INCON ASSESS: CPT | Performed by: PSYCHIATRY & NEUROLOGY

## 2020-11-06 PROCEDURE — G8399 PT W/DXA RESULTS DOCUMENT: HCPCS | Performed by: PSYCHIATRY & NEUROLOGY

## 2020-11-06 PROCEDURE — G8482 FLU IMMUNIZE ORDER/ADMIN: HCPCS | Performed by: PSYCHIATRY & NEUROLOGY

## 2020-11-06 PROCEDURE — 1036F TOBACCO NON-USER: CPT | Performed by: PSYCHIATRY & NEUROLOGY

## 2020-11-06 RX ORDER — ASPIRIN 81 MG/1
81 TABLET, CHEWABLE ORAL DAILY
COMMUNITY

## 2020-11-07 NOTE — PROGRESS NOTES
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 DSA 10/1/2020 showed stable and complete closure of aneurysm brenda 1. Pt has been well since DSA. No new weakness numbness speech issues, vision changes. Allergies  is allergic to aspirin; lisinopril; losartan; and procardia [nifedipine]. Medications  Prior to Admission medications    Medication Sig Start Date End Date Taking? Authorizing Provider   aspirin 81 MG chewable tablet Take 81 mg by mouth daily   Yes Historical Provider, MD   furosemide (LASIX) 20 MG tablet Take 20 mg by mouth daily Takes irregularily   Yes Historical Provider, MD   calcium citrate (CALCITRATE) 950 MG tablet Take 1 tablet by mouth daily   Yes Historical Provider, MD   carvedilol (COREG) 3.125 MG tablet TAKE 1 TABLET BY MOUTH TWO TIMES A DAY 9/15/20  Yes Peyton Wilburn MD   Blood Pressure KIT Dx: HTN. Needs automatic blood pressure machine to monitor her blood pressure.  7/13/20  Yes Peyton Wilburn MD   Nutritional Supplements (ENSURE ACTIVE HIGH PROTEIN) LIQD Use daily for malnutrition 7/13/20  Yes Peyton Wilburn MD   zoster recombinant adjuvanted vaccine [facility]) 50 MCG/0.5ML SUSR injection Inject 0.5 mLs into the muscle See Admin Instructions 1 dose now and repeat in 2-6 months 7/13/20  Yes Peyton Wilburn MD   atorvastatin (LIPITOR) 20 MG tablet Take 2 tablets by mouth nightly  Patient taking differently: Take 20 mg by mouth nightly  3/16/20  Yes Natalie Palafox MD   pramipexole (MIRAPEX) 0.125 MG tablet TAKE ONE TABLET BY MOUTH NIGHTLY 2/19/20  Yes Nataile Palafox MD   acetaminophen (TYLENOL) 325 MG tablet Take 325 mg by mouth every 6 hours as needed for Pain   Yes Historical Provider, MD vitamin D (CHOLECALCIFEROL) 1000 UNIT TABS tablet Take 1,000 Units by mouth 3 times daily   Yes Historical Provider, MD   Multiple Vitamins-Minerals (THERAPEUTIC MULTIVITAMIN-MINERALS) tablet Take 1 tablet by mouth daily   Yes Historical Provider, MD   traMADol Norval Real) 50 MG tablet  6/26/20   Historical Provider, MD    Scheduled Meds:  Continuous Infusions:  PRN Meds:.  Past Medical History   has a past medical history of Acid reflux, Arthritis, Brain aneurysm, Cancer ([address] Family history of breast cancer in first degree relative, Gall stones, Hard of hearing, History of cervical cancer, Hypertension, Lateral meniscus tear, Wears dentures, and Wears glasses. Past Surgical History   has a past surgical history that includes Tonsillectomy and adenoidectomy (1956); Cholecystectomy; Inner ear surgery (Bilateral); Knee arthroscopy (2000, 2005); cyst removal (1980); Colonoscopy (12/09); Breast biopsy (1980); Foot surgery (Bilateral); other surgical history (Right, 9-4-14); Umbilical hernia repair; hernia repair; Varicose vein surgery (Bilateral); Knee arthroscopy (Left, 4/3/2019); Colonoscopy (N/A, 5/31/2019); other surgical history (02/12/2020);   picc powerpicc double (2/20/2020); Upper gastrointestinal endoscopy (N/A, 2/20/2020); sigmoidoscopy (N/A, 2/20/2020); Endoscopy, colon, diagnostic; and vascular surgery (10/01/2020). Social History   reports that she has never smoked. She has never used smokeless tobacco.   reports current alcohol use. reports no history of drug use. Family History  family history includes Breast Cancer in her sister; Cancer in her mother; Heart Attack in her father.     Review of Systems:  CONSTITUTIONAL:  negative for fevers, chills, fatigue and malaise    EYES:  negative for double vision, blurred vision and photophobia     HEENT:  negative for tinnitus, epistaxis and sore throat    RESPIRATORY:  negative for cough, shortness of breath, wheezing    CARDIOVASCULAR:  negative for Total:   0     MRS: 1      LABS:   Reviewed. RADIOLOGY:   Images were personally reviewed including:  DSA 10/1/2020  --right A2 anterior cerebral artery aneurysm status post previous coil treatment. There is no residual filling of the aneurysm, Brenda class I.    --No other evidence of vasculitis, occlusion, aneurysm, vascular malformation, arterial dissection, stenosis, or veno-occlusive disease. 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 DSA 10/1/2020 showed stable and complete closure of aneurysm brenda 1. PLAN:   --continue asa 80  --f/u dr leavitt, scheduled 1/18/2021    Case discussed with Dr. Syl Shaikh attending.     Raya Key MD, PhD   Stroke, [facility] Stroke [facility]  Electronically signed 11/6/2020 at 7:34 PM

## 2021-01-13 ENCOUNTER — OFFICE VISIT (OUTPATIENT)
Dept: FAMILY MEDICINE CLINIC | Age: 71
End: 2021-01-13
Payer: MEDICARE

## 2021-01-13 ENCOUNTER — TELEPHONE (OUTPATIENT)
Dept: FAMILY MEDICINE CLINIC | Age: 71
End: 2021-01-13

## 2021-01-13 VITALS
BODY MASS INDEX: 17.19 KG/M2 | HEIGHT: 63 IN | HEART RATE: 79 BPM | OXYGEN SATURATION: 99 % | TEMPERATURE: 97.3 F | WEIGHT: 97 LBS | SYSTOLIC BLOOD PRESSURE: 138 MMHG | DIASTOLIC BLOOD PRESSURE: 78 MMHG

## 2021-01-13 DIAGNOSIS — E78.2 MIXED HYPERLIPIDEMIA: ICD-10-CM

## 2021-01-13 DIAGNOSIS — I67.1 CEREBRAL ANEURYSM: ICD-10-CM

## 2021-01-13 DIAGNOSIS — I63.521 CEREBROVASCULAR ACCIDENT (CVA) DUE TO STENOSIS OF RIGHT ANTERIOR CEREBRAL ARTERY (HCC): ICD-10-CM

## 2021-01-13 DIAGNOSIS — D50.0 IRON DEFICIENCY ANEMIA DUE TO CHRONIC BLOOD LOSS: ICD-10-CM

## 2021-01-13 DIAGNOSIS — R91.1 LUNG NODULE: ICD-10-CM

## 2021-01-13 DIAGNOSIS — I50.32 CHRONIC DIASTOLIC HEART FAILURE (HCC): Primary | ICD-10-CM

## 2021-01-13 DIAGNOSIS — M21.372 LEFT FOOT DROP: ICD-10-CM

## 2021-01-13 PROBLEM — I60.9 SAH (SUBARACHNOID HEMORRHAGE) (HCC): Status: RESOLVED | Noted: 2020-02-12 | Resolved: 2021-01-13

## 2021-01-13 PROBLEM — R63.4 WEIGHT LOSS: Status: RESOLVED | Noted: 2019-02-15 | Resolved: 2021-01-13

## 2021-01-13 PROCEDURE — 1090F PRES/ABSN URINE INCON ASSESS: CPT | Performed by: FAMILY MEDICINE

## 2021-01-13 PROCEDURE — 99214 OFFICE O/P EST MOD 30 MIN: CPT | Performed by: FAMILY MEDICINE

## 2021-01-13 PROCEDURE — 4040F PNEUMOC VAC/ADMIN/RCVD: CPT | Performed by: FAMILY MEDICINE

## 2021-01-13 PROCEDURE — 0518F FALL PLAN OF CARE DOCD: CPT | Performed by: FAMILY MEDICINE

## 2021-01-13 PROCEDURE — G8399 PT W/DXA RESULTS DOCUMENT: HCPCS | Performed by: FAMILY MEDICINE

## 2021-01-13 PROCEDURE — 1123F ACP DISCUSS/DSCN MKR DOCD: CPT | Performed by: FAMILY MEDICINE

## 2021-01-13 PROCEDURE — G8419 CALC BMI OUT NRM PARAM NOF/U: HCPCS | Performed by: FAMILY MEDICINE

## 2021-01-13 PROCEDURE — 1036F TOBACCO NON-USER: CPT | Performed by: FAMILY MEDICINE

## 2021-01-13 PROCEDURE — 3288F FALL RISK ASSESSMENT DOCD: CPT | Performed by: FAMILY MEDICINE

## 2021-01-13 PROCEDURE — 3017F COLORECTAL CA SCREEN DOC REV: CPT | Performed by: FAMILY MEDICINE

## 2021-01-13 PROCEDURE — G8427 DOCREV CUR MEDS BY ELIG CLIN: HCPCS | Performed by: FAMILY MEDICINE

## 2021-01-13 PROCEDURE — G8482 FLU IMMUNIZE ORDER/ADMIN: HCPCS | Performed by: FAMILY MEDICINE

## 2021-01-13 RX ORDER — ATORVASTATIN CALCIUM 20 MG/1
40 TABLET, FILM COATED ORAL NIGHTLY
Qty: 90 TABLET | Refills: 1 | Status: SHIPPED | OUTPATIENT
Start: 2021-01-13 | End: 2021-01-18 | Stop reason: SDUPTHER

## 2021-01-13 RX ORDER — WALKER
EACH MISCELLANEOUS
Qty: 1 EACH | Refills: 0 | Status: SHIPPED | OUTPATIENT
Start: 2021-01-13

## 2021-01-13 RX ORDER — GABAPENTIN 600 MG/1
600 TABLET ORAL NIGHTLY PRN
COMMUNITY
Start: 2020-11-17

## 2021-01-13 ASSESSMENT — ENCOUNTER SYMPTOMS
WHEEZING: 0
VOMITING: 0
BACK PAIN: 1
CHEST TIGHTNESS: 0
CONSTIPATION: 0
COUGH: 0
NAUSEA: 0
SORE THROAT: 0
SHORTNESS OF BREATH: 0
TROUBLE SWALLOWING: 0
ABDOMINAL PAIN: 0
COLOR CHANGE: 0
SINUS PAIN: 0
BLOOD IN STOOL: 0
ABDOMINAL DISTENTION: 0
PHOTOPHOBIA: 0
SINUS PRESSURE: 0

## 2021-01-13 ASSESSMENT — PATIENT HEALTH QUESTIONNAIRE - PHQ9
SUM OF ALL RESPONSES TO PHQ9 QUESTIONS 1 & 2: 0
1. LITTLE INTEREST OR PLEASURE IN DOING THINGS: 0

## 2021-01-13 NOTE — TELEPHONE ENCOUNTER
patient would like you to order her Cornelio Godfrey with seat. Please let daughter April know? she will  order when ready .   Thank you

## 2021-01-13 NOTE — PROGRESS NOTES
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 DSA 10/1/2020 showed stable and complete closure of aneurysm brenda 1.     PLAN:   --continue asa 80  --f/u dr leavitt, scheduled 1/18/2021         /78 (Site: Left Upper Arm, Position: Sitting, Cuff Size: Medium Adult)   Pulse 79   Temp 97.3 °F (36.3 °C) (Temporal)   Ht 5' 3\" (1.6 m)   Wt 97 lb (44 kg)   SpO2 99%   BMI 17.18 kg/m²    Body mass index is 17.18 kg/m². Wt Readings from Last 3 Encounters:   01/13/21 97 lb (44 kg)   11/06/20 91 lb (41.3 kg)   10/15/20 91 lb 6.4 oz (41.5 kg)        [x]Negative depression screening. PHQ Scores 1/13/2021 8/13/2020 5/14/2020 2/6/2019 12/13/2018 10/19/2017 3/20/2017   PHQ2 Score 0 2 0 0 0 0 0   PHQ9 Score 0 2 0 0 0 0 0      []1-4 = Minimal depression   []5-9 = Milddepression   []10-14 = Moderate depression   []15-19 = Moderately severe depression   []20-27 = Severe depression    Discussed testing with the patient and all questions fully answered. Hospital Outpatient Visit on 10/01/2020   Component Date Value Ref Range Status    POC Hemoglobin 10/01/2020 11.4* 12.0 - 16.0 g/dL Final    POC Hematocrit 10/01/2020 33* 36 - 46 % Final    POC Creatinine 10/01/2020 0.80  0.51 - 1.19 mg/dL Final    GFR Comment 10/01/2020 >60  >60 mL/min Final    GFR Non- 10/01/2020 >60  >60 mL/min Final    GFR Comment 10/01/2020        Final    Comment: Average GFR for 61-76 years old:   80 mL/min/1.73sq m  Chronic Kidney Disease:   <60 mL/min/1.73sq m  Kidney failure:   <15 mL/min/1.73sq m              eGFR calculated using average adult body mass.  Additional eGFR calculator available at:        [email or web address]            POC Sodium 10/01/2020 144  138 - 146 mmol/L Final    POC Potassium 10/01/2020 4.8* 3.5 - 4.5 mmol/L Final  POC Chloride 10/01/2020 106  98 - 107 mmol/L Final    POC Glucose 10/01/2020 94  74 - 100 mg/dL Final         Most recent labs reviewed:     Lab Results   Component Value Date    WBC 5.1 07/13/2020    HGB 9.4 (L) 07/13/2020    HCT 29.0 (L) 07/13/2020    MCV 88 07/13/2020     07/13/2020       @BRIEFLAB(NA,K,CL,CO2,BUN,CREATININE,GLUCOSE,CALCIUM)@     Lab Results   Component Value Date    ALT 13 06/10/2020    AST 28 06/10/2020    ALKPHOS 112 06/10/2020    BILITOT 0.4 06/10/2020       Lab Results   Component Value Date    TSH 5.94 (H) 02/15/2020       Lab Results   Component Value Date    CHOL 102 02/13/2020    CHOL 180 06/03/2019    CHOL 177 05/11/2018     Lab Results   Component Value Date    TRIG 100 02/13/2020    TRIG 119 06/03/2019    TRIG 113 05/11/2018     Lab Results   Component Value Date    HDL 21 (L) 02/13/2020    HDL 33 (L) 06/03/2019    HDL 39 (L) 05/11/2018     Lab Results   Component Value Date    LDLCHOLESTEROL 61 02/13/2020    LDLCHOLESTEROL 123 06/03/2019    LDLCHOLESTEROL 115 05/11/2018     Lab Results   Component Value Date    VLDL NOT REPORTED 02/13/2020    VLDL NOT REPORTED 06/03/2019    VLDL NOT REPORTED 05/11/2018     Lab Results   Component Value Date    CHOLHDLRATIO 4.9 02/13/2020    CHOLHDLRATIO 5.5 (H) 06/03/2019    CHOLHDLRATIO 4.5 05/11/2018       Lab Results   Component Value Date    LABA1C 5.6 02/13/2020       Lab Results   Component Value Date    ZLZJFNRX97 670 02/13/2020       Lab Results   Component Value Date    FOLATE 7.9 02/13/2020       Lab Results   Component Value Date    IRON 26 (L) 02/13/2020    TIBC 293 02/13/2020    FERRITIN 44 02/13/2020       Lab Results   Component Value Date    VITD25 26.2 (L) 07/21/2017             Current Outpatient Medications   Medication Sig Dispense Refill    gabapentin (NEURONTIN) 600 MG tablet       atorvastatin (LIPITOR) 20 MG tablet Take 2 tablets by mouth nightly 90 tablet 1  Misc. Devices (STEP N REST II WALKER) MISC Use daily for ADL's 1 each 0    aspirin 81 MG chewable tablet Take 81 mg by mouth daily      furosemide (LASIX) 20 MG tablet Take 20 mg by mouth daily Takes irregularily      carvedilol (COREG) 3.125 MG tablet TAKE 1 TABLET BY MOUTH TWO TIMES A  tablet 1    Blood Pressure KIT Dx: HTN. Needs automatic blood pressure machine to monitor her blood pressure. 1 kit 0    acetaminophen (TYLENOL) 325 MG tablet Take 325 mg by mouth every 6 hours as needed for Pain      vitamin D (CHOLECALCIFEROL) 1000 UNIT TABS tablet Take 1,000 Units by mouth 3 times daily      Multiple Vitamins-Minerals (THERAPEUTIC MULTIVITAMIN-MINERALS) tablet Take 1 tablet by mouth daily      calcium citrate (CALCITRATE) 950 MG tablet Take 1 tablet by mouth daily      zoster recombinant adjuvanted vaccine (SHINGRIX) 50 MCG/0.5ML SUSR injection Inject 0.5 mLs into the muscle See Admin Instructions 1 dose now and repeat in 2-6 months 0.5 mL 0     No current facility-administered medications for this visit. Social History     Socioeconomic History    Marital status:       Spouse name: Not on file    Number of children: Not on file    Years of education: Not on file    Highest education level: Not on file   Occupational History    Not on file   Social Needs    Financial resource strain: Not on file    Food insecurity     Worry: Not on file     Inability: Not on file    Transportation needs     Medical: Not on file     Non-medical: Not on file   Tobacco Use    Smoking status: Never Smoker    Smokeless tobacco: Never Used   Substance and Sexual Activity    Alcohol use: Yes     Comment: Occassional    Drug use: No    Sexual activity: Never     Birth control/protection: Post-menopausal   Lifestyle    Physical activity     Days per week: Not on file     Minutes per session: Not on file    Stress: Not on file   Relationships    Social connections Mental Status: She is alert and oriented to person, place, and time. Cranial Nerves: Cranial nerves are intact. Motor: Weakness present. Coordination: Coordination is intact. Gait: Gait abnormal.      Comments: Risk for falls   Psychiatric:         Attention and Perception: Attention and perception normal.         Mood and Affect: Mood is anxious. Speech: Speech normal.         Behavior: Behavior normal.         Thought Content: Thought content normal.             ASSESSMENT AND PLAN      1. Chronic diastolic heart failure (HCC)  Stable continue same medications. .  Follow-up with cardiologist    2. Cerebral aneurysm  -Patient still has weakness walker ordered. - Misc. Devices (STEP N REST II WALKER) MISC; Use daily for ADL's  Dispense: 1 each; Refill: 0    3. Iron deficiency anemia due to chronic blood loss  Improving recent hemoglobin is 11.2.  - Misc. Devices (STEP N REST II WALKER) MISC; Use daily for ADL's  Dispense: 1 each; Refill: 0    4. Lung nodule  Stable on recent scan    5. Left foot drop  -Continue to follow-up with neurologist  - Misc. Devices (STEP N REST II WALKER) MISC; Use daily for ADL's  Dispense: 1 each; Refill: 0    6. Mixed hyperlipidemia  Continue statins  - atorvastatin (LIPITOR) 20 MG tablet; Take 2 tablets by mouth nightly  Dispense: 90 tablet; Refill: 1    7. Cerebrovascular accident (CVA) due to stenosis of right anterior cerebral artery ([address]  Continue physical therapy. No orders of the defined types were placed in this encounter.         Medications Discontinued During This Encounter   Medication Reason    traMADol (ULTRAM) 50 MG tablet Therapy completed    pramipexole (MIRAPEX) 0.125 MG tablet Therapy completed    Nutritional Supplements (ENSURE ACTIVE HIGH PROTEIN) LIQD Therapy completed    atorvastatin (LIPITOR) 20 MG tablet REORDER [place] received counseling on the following healthy behaviors: nutrition, exercise and medication adherence  Reviewed prior labs and health maintenance  Continue current medications, diet and exercise. Discussed use, benefit, and side effects of prescribed medications. Barriers to medication compliance addressed. Patient given educational materials - see patient instructions  Was a self-tracking handout given in paper form or via [employer]? Yes    Requested Prescriptions     Signed Prescriptions Disp Refills    atorvastatin (LIPITOR) 20 MG tablet 90 tablet 1     Sig: Take 2 tablets by mouth nightly    Misc. Devices (STEP N REST II WALKER) MISC 1 each 0     Sig: Use daily for ADL's       All patient questions answered. Patient voiced understanding. Quality Measures    Body mass index is 17.18 kg/m². Low. Weight control planned discussed Healthy diet and regular exercise. BP: 138/78. Blood pressure is normal. Treatment plan consists of No treatment change needed. Fall Risk 1/13/2021 8/13/2020 5/14/2020 2/6/2019 12/13/2018 10/19/2017 3/20/2017   2 or more falls in past year? no no no no no no no   Fall with injury in past year? yes yes no no no no no     The patient has a history of falls. I did , complete a risk assessment for falls.  A plan of care for falls in-office gait and balance testing performed using The Timed Up and Go Test was positive for increased falls risk, home safety tips provided, No Treatment plan indicated    Lab Results   Component Value Date    LDLCHOLESTEROL 61 02/13/2020    (goal LDL reduction with dx if diabetes is 50% LDL reduction)    PHQ Scores 1/13/2021 8/13/2020 5/14/2020 2/6/2019 12/13/2018 10/19/2017 3/20/2017   PHQ2 Score 0 2 0 0 0 0 0   PHQ9 Score 0 2 0 0 0 0 0     Interpretation of Total Score Depression Severity: 1-4 = Minimal depression, 5-9 = Mild depression, 10-14 = Moderate depression, 15-19 = Moderately severe depression, 20-27 = Severe depression The patient'spast medical, surgical, social, and family history as well as her   current medications and allergies were reviewed as documented in today's encounter. Medications, labs, diagnostic studies, consultations andfollow-up as documented in this encounter. Return in about 6 months (around 7/13/2021). Patient wasseen with total face to face time of 35 minutes. More than 50% of this visit was counseling and education. Future Appointments   Date Time Provider Cammie Molly   1/18/2021  1:30 PM Jj Brar MD Neuro End[facility]   7/14/2021 10:00 AM [facility]   8/16/2021 10:00 AM Colin Buenrostro MD fp jacek Fung     This note was completed by using the assistance of a speech-recognition program. However, inadvertent computerized transcription errors may be present. Althoughevery effort was made to ensure accuracy, no guarantees can be provided that every mistake has been identified and corrected by editing.   Electronically signed by Colin Buenrostro MD on 1/13/2021  3:17 PM

## 2021-01-13 NOTE — PROGRESS NOTES
Visit Information    Have you changed or started any medications since your last visit including any over-the-counter medicines, vitamins, or herbal medicines? no   Have you stopped taking any of your medications? Is so, why? -  no  Are you having any side effects from any of your medications? - no    Have you seen any other physician or provider since your last visit?  no   Have you had any other diagnostic tests since your last visit?  no   Have you been seen in the emergency room and/or had an admission in a hospital since we last saw you?  no   Have you had your routine dental cleaning in the past 6 months?  no     Do you have an active MyChart account? If no, what is the barrier?   Yes    Patient Care Team:  Mao Ott MD as PCP - General (Family Medicine)  Mao Ott MD as PCP - [facility] Provider  Flaquito Hartmann DO as Consulting Physician (Obstetrics & Gynecology)  Shabnam Cote MD as Consulting Physician (Gastroenterology)    Medical History Review  Past Medical, Family, and Social History reviewed and does not contribute to the patient presenting condition    Health Maintenance   Topic Date Due    Shingles Vaccine (1 of 2) 04/08/2001    DTaP/Tdap/Td vaccine (1 - Tdap) 01/13/2022 (Originally 4/8/1970)    Lipid screen  02/13/2021    Breast cancer screen  08/03/2021    Annual Wellness Visit (AWV)  08/14/2021    Potassium monitoring  10/01/2021    Creatinine monitoring  10/01/2021    Colon cancer screen colonoscopy  05/31/2029    DEXA (modify frequency per FRAX score)  Completed    Flu vaccine  Completed    Pneumococcal 65+ years Vaccine  Completed    Hepatitis C screen  Completed    Hepatitis B vaccine  Aged Out    Hib vaccine  Aged Out    Meningococcal (ACWY) vaccine  Aged Out    Hepatitis A vaccine  Discontinued

## 2021-01-15 LAB
CHOLESTEROL/HDL RATIO: 4.9 (ref 1–5)
CHOLESTEROL: 173 MG/DL (ref 150–200)
CREATINE, URINE: 75.63 MG/DL
FOLATE: >25 NG/ML
HDLC SERPL-MCNC: 35 MG/DL
LDL CHOLESTEROL CALCULATED: 114 MG/DL
LDL/HDL RATIO: 3.3
MICROALBUMIN/CREAT 24H UR: 0.9 MG/DL (ref 0–1.9)
MICROALBUMIN/CREAT UR-RTO: 11.9 MG/G CREAT (ref 0–30)
TRIGL SERPL-MCNC: 119 MG/DL (ref 27–150)
TSH SERPL DL<=0.05 MIU/L-ACNC: 5.43 UIU/ML (ref 0.49–4.67)
VITAMIN B-12: >1500 PG/ML (ref 180–914)
VITAMIN D 25-HYDROXY: 34.7 NG/ML (ref 30–100)
VLDLC SERPL CALC-MCNC: 24 MG/DL (ref 0–30)

## 2021-01-18 ENCOUNTER — OFFICE VISIT (OUTPATIENT)
Dept: NEUROLOGY | Age: 71
End: 2021-01-18
Payer: MEDICARE

## 2021-01-18 VITALS
DIASTOLIC BLOOD PRESSURE: 78 MMHG | OXYGEN SATURATION: 100 % | HEIGHT: 63 IN | WEIGHT: 97 LBS | SYSTOLIC BLOOD PRESSURE: 161 MMHG | BODY MASS INDEX: 17.19 KG/M2 | HEART RATE: 68 BPM

## 2021-01-18 DIAGNOSIS — I67.1 CEREBRAL ANEURYSM: Primary | ICD-10-CM

## 2021-01-18 PROCEDURE — 0518F FALL PLAN OF CARE DOCD: CPT | Performed by: PSYCHIATRY & NEUROLOGY

## 2021-01-18 PROCEDURE — 99215 OFFICE O/P EST HI 40 MIN: CPT | Performed by: PSYCHIATRY & NEUROLOGY

## 2021-01-18 PROCEDURE — 4040F PNEUMOC VAC/ADMIN/RCVD: CPT | Performed by: PSYCHIATRY & NEUROLOGY

## 2021-01-18 PROCEDURE — G8399 PT W/DXA RESULTS DOCUMENT: HCPCS | Performed by: PSYCHIATRY & NEUROLOGY

## 2021-01-18 PROCEDURE — G8427 DOCREV CUR MEDS BY ELIG CLIN: HCPCS | Performed by: PSYCHIATRY & NEUROLOGY

## 2021-01-18 PROCEDURE — 1090F PRES/ABSN URINE INCON ASSESS: CPT | Performed by: PSYCHIATRY & NEUROLOGY

## 2021-01-18 PROCEDURE — 3017F COLORECTAL CA SCREEN DOC REV: CPT | Performed by: PSYCHIATRY & NEUROLOGY

## 2021-01-18 PROCEDURE — G8419 CALC BMI OUT NRM PARAM NOF/U: HCPCS | Performed by: PSYCHIATRY & NEUROLOGY

## 2021-01-18 PROCEDURE — 1036F TOBACCO NON-USER: CPT | Performed by: PSYCHIATRY & NEUROLOGY

## 2021-01-18 PROCEDURE — 3288F FALL RISK ASSESSMENT DOCD: CPT | Performed by: PSYCHIATRY & NEUROLOGY

## 2021-01-18 PROCEDURE — 1123F ACP DISCUSS/DSCN MKR DOCD: CPT | Performed by: PSYCHIATRY & NEUROLOGY

## 2021-01-18 PROCEDURE — G8482 FLU IMMUNIZE ORDER/ADMIN: HCPCS | Performed by: PSYCHIATRY & NEUROLOGY

## 2021-01-18 RX ORDER — ATORVASTATIN CALCIUM 40 MG/1
TABLET, FILM COATED ORAL
COMMUNITY
Start: 2021-01-13 | End: 2021-04-14 | Stop reason: ALTCHOICE

## 2021-01-18 NOTE — PROGRESS NOTES
Neurocritical Care, Stroke & Neurointerventional Note    [address]   [address] [facility],  [address] [address] [address] , [address] [facility]   P: [phone]    Endovascular Neurosurgery Consult    Pt Name: Ashlee Em  MRN: [id number]  [place]: 1950  Date of evaluation: 1/18/2021  Primary Care Physician: Kendrick Horn MD    Reason for evaluation: [address] and pericallosal SENTHIL aneurysm     SUBJECTIVE:   History of Chief Complaint:    Ashlee Em is a 72 y.o. female who presents with     [address] with vasospasm    S/P Coiling in February 12, 2020 of thew ruptured right SENTHIL pericallosal aneurysm     She is able to take care of her self care needs   Goes up and down [facility] short distance    Cleaning the house she needs help with    mRS 1-2 max    FU angio on 10/1/2020 showed complete occlusion of the pericallsoal artery aneurysm       Allergies  is allergic to lisinopril; losartan; and procardia [nifedipine]. Medications  Prior to Admission medications    Medication Sig Start Date End Date Taking? Authorizing Provider   atorvastatin (LIPITOR) 40 MG tablet  1/13/21  Yes Historical Provider, MD   L-Methylfolate-Algae-B12-B6 (METANX PO) Take by mouth   Yes Historical Provider, MD   gabapentin (NEURONTIN) 600 MG tablet  11/17/20  Yes Historical Provider, MD   Misc. Devices (STEP N REST II WALKER) MISC Use daily for ADL's 1/13/21  Yes Kendrick Horn MD   aspirin 81 MG chewable tablet Take 81 mg by mouth daily   Yes Historical Provider, MD   carvedilol (COREG) 3.125 MG tablet TAKE 1 TABLET BY MOUTH TWO TIMES A DAY 9/15/20  Yes Kendrick Horn MD   Blood Pressure KIT Dx: HTN. Needs automatic blood pressure machine to monitor her blood pressure.  7/13/20  Yes Kendrick Horn MD   zoster recombinant adjuvanted vaccine [facility]) 50 MCG/0.5ML SUSR injection Inject 0.5 mLs into the muscle See Admin Instructions 1 dose now and repeat in 2-6 months 7/13/20  Yes Kendrick Horn MD acetaminophen (TYLENOL) 325 MG tablet Take 325 mg by mouth every 6 hours as needed for Pain   Yes Historical Provider, MD   vitamin D (CHOLECALCIFEROL) 1000 UNIT TABS tablet Take 1,000 Units by mouth 3 times daily   Yes Historical Provider, MD   Multiple Vitamins-Minerals (THERAPEUTIC MULTIVITAMIN-MINERALS) tablet Take 1 tablet by mouth daily   Yes Historical Provider, MD   furosemide (LASIX) 20 MG tablet Take 20 mg by mouth daily Takes irregularily    Historical Provider, MD   calcium citrate (CALCITRATE) 950 MG tablet Take 1 tablet by mouth daily    Historical Provider, MD    Scheduled Meds:  Continuous Infusions:  PRN Meds:.  Past Medical History   has a past medical history of Acid reflux, Arthritis, Brain aneurysm, Cancer ([facility] [address] Family history of breast cancer in first degree relative, Gall stones, Hard of hearing, History of cervical cancer, Hypertension, Lateral meniscus tear, Wears dentures, and Wears glasses. Past Surgical History   has a past surgical history that includes Tonsillectomy and adenoidectomy (1956); Cholecystectomy; Inner ear surgery (Bilateral); Knee arthroscopy (2000, 2005); cyst removal (1980); Colonoscopy (12/09); Breast biopsy (1980); Foot surgery (Bilateral); other surgical history (Right, 9-4-14); Umbilical hernia repair; hernia repair; Varicose vein surgery (Bilateral); Knee arthroscopy (Left, 4/3/2019); Colonoscopy (N/A, 5/31/2019); other surgical history (02/12/2020);   picc powerpicc double (2/20/2020); Upper gastrointestinal endoscopy (N/A, 2/20/2020); sigmoidoscopy (N/A, 2/20/2020); Endoscopy, colon, diagnostic; and vascular surgery (10/01/2020). Social History   reports that she has never smoked. She has never used smokeless tobacco.   reports current alcohol use. reports no history of drug use. Family History  family history includes Breast Cancer in her sister; Cancer in her mother; Heart Attack in her father.     Review of Systems: CONSTITUTIONAL:  negative for fevers, chills, fatigue and malaise    EYES:  negative for double vision, blurred vision and photophobia     HEENT:  negative for tinnitus, epistaxis and sore throat    RESPIRATORY:  negative for cough, shortness of breath, wheezing    CARDIOVASCULAR:  negative for chest pain, palpitations, syncope, edema    GASTROINTESTINAL:  negative for nausea, vomiting    GENITOURINARY:  negative for incontinence    MUSCULOSKELETAL:  negative for neck or back pain    NEUROLOGICAL:  Negative for weakness and tingling  negative for headaches and dizziness    PSYCHIATRIC:  negative for anxiety      Review of systems otherwise negative. OBJECTIVE:   Vitals: BP (!) 171/86 (Site: Right Upper Arm, Position: Sitting, Cuff Size: Medium Adult)   Pulse 64   Ht 5' 3\" (1.6 m)   Wt 97 lb (44 kg)   SpO2 100%   BMI 17.18 kg/m²   General appearance: Lying in bed, NAD  HEENT: Head: Normocephalic, no lesions, without obvious abnormality. Neck: no adenopathy, no carotid bruit, no JVD, supple, symmetrical, trachea midline and thyroid not enlarged, symmetric, no tenderness/mass/nodules  Lungs: clear to auscultation bilaterally  Heart: regular rate and rhythm, S1, S2 normal, no murmur, click, rub or gallop  Abdomen: soft, non-tender; nondistended, bowel sounds normal  Extremities: extremities normal, atraumatic, no cyanosis or edema    Neurologic: Mental status: Alert, oriented, thought content appropriate, Alert and oriented x 3,   Language/speech: intact language, attention, knowledge  CN: II-XII intact  MOTOR: left leg weakness   SENSORY: LT and PP symmetrical and intact  COORDINATION: F to N and Gait intact for age      LABS:   No results for input(s): WBC, HGB, HCT, PLT, NA, K, CL, CO2, BUN, CREATININE, MG, PHOS, CALCIUM, PTT, INR, AST, ALT, BILITOT, BILIDIR, NITRU, COLORU, BACTERIA in the last 72 hours.     Invalid input(s): PT, WBCU, [place] List No results for input(s): ALKPHOS, ALT, AST, BILITOT, BILIDIR, LABALBU, AMYLASE, LIPASE in the last 72 hours. RADIOLOGY:   Images were personally reviewed including:      FU angio on 10/1/2020 showed RR I occlusion    IMPRESSIONS:   Zoe Nath is a 72 y.o. female who presents with     SAH with vasospasm    S/P Coiling in February 12, 2020 of thew ruptured right SENTHIL pericallosal aneurysm     She is able to take care of her self care needs   Goes up and down [facility] short distance    Cleaning the house she needs help with    mRS 1-2 max    FU angio on 10/1/2020 showed complete occlusion of the pericallsoal artery aneurysm     does not have any pertinent problems on file. PLANS:   Asa 81 MG DAILY  MRA yearly  RTC in in 10/2021 w MRA       Consultation Visit Time:  40 minutes  Patient given educational materials - see patient instructions. Discussed use, benefit, and side effects of prescribed medications. Personally reviewed imaging with patients and all questions answered. Pt voiced understanding. Patient agreed with treatment plan. Follow up as directed below. Greater than 50% of the time was for counseling and providing answer to the patient question. The findings and the plan discussed with the patient and all her questions were answered. Thank you very much for your referral, please do not hesitate to contact me with any questions.     Kristy Fairbanks MD Pager: [phone]  Stroke, [facility] Stroke [address]  Pager [phone]  Electronically signed 1/18/2021 at 1:38 PM

## 2021-04-13 RX ORDER — CARVEDILOL 3.12 MG/1
TABLET ORAL
Qty: 180 TABLET | Refills: 1 | Status: SHIPPED | OUTPATIENT
Start: 2021-04-13 | End: 2021-12-27 | Stop reason: SDUPTHER

## 2021-04-13 RX ORDER — SODIUM CHLORIDE, SODIUM LACTATE, POTASSIUM CHLORIDE, CALCIUM CHLORIDE 600; 310; 30; 20 MG/100ML; MG/100ML; MG/100ML; MG/100ML
1000 INJECTION, SOLUTION INTRAVENOUS CONTINUOUS
Status: CANCELLED | OUTPATIENT
Start: 2021-04-13

## 2021-04-14 ENCOUNTER — HOSPITAL ENCOUNTER (OUTPATIENT)
Dept: PREADMISSION TESTING | Age: 71
Discharge: HOME | End: 2021-04-18
Payer: MEDICARE

## 2021-04-14 VITALS
BODY MASS INDEX: 17.58 KG/M2 | RESPIRATION RATE: 16 BRPM | DIASTOLIC BLOOD PRESSURE: 84 MMHG | TEMPERATURE: 97.9 F | WEIGHT: 103 LBS | HEART RATE: 76 BPM | HEIGHT: 64 IN | OXYGEN SATURATION: 98 % | SYSTOLIC BLOOD PRESSURE: 160 MMHG

## 2021-04-14 LAB
ANION GAP SERPL CALCULATED.3IONS-SCNC: 9 MMOL/L (ref 9–17)
BUN BLDV-MCNC: 19 MG/DL (ref 8–23)
CHLORIDE BLD-SCNC: 106 MMOL/L (ref 98–107)
CO2: 25 MMOL/L (ref 20–31)
CREAT SERPL-MCNC: 0.7 MG/DL (ref 0.5–0.9)
GFR AFRICAN AMERICAN: >60 ML/MIN
GFR NON-AFRICAN AMERICAN: >60 ML/MIN
GFR SERPL CREATININE-BSD FRML MDRD: NORMAL ML/MIN/{1.73_M2}
GFR SERPL CREATININE-BSD FRML MDRD: NORMAL ML/MIN/{1.73_M2}
GLUCOSE BLD-MCNC: 66 MG/DL (ref 70–99)
HCT VFR BLD CALC: 36.5 % (ref 36.3–47.1)
HEMOGLOBIN: 11.6 G/DL (ref 11.9–15.1)
POTASSIUM SERPL-SCNC: 4 MMOL/L (ref 3.7–5.3)
SODIUM BLD-SCNC: 140 MMOL/L (ref 135–144)

## 2021-04-14 PROCEDURE — 82565 ASSAY OF CREATININE: CPT

## 2021-04-14 PROCEDURE — 82947 ASSAY GLUCOSE BLOOD QUANT: CPT

## 2021-04-14 PROCEDURE — 80051 ELECTROLYTE PANEL: CPT

## 2021-04-14 PROCEDURE — 36415 COLL VENOUS BLD VENIPUNCTURE: CPT

## 2021-04-14 PROCEDURE — 85018 HEMOGLOBIN: CPT

## 2021-04-14 PROCEDURE — 85014 HEMATOCRIT: CPT

## 2021-04-14 PROCEDURE — 84520 ASSAY OF UREA NITROGEN: CPT

## 2021-04-14 PROCEDURE — 93005 ELECTROCARDIOGRAM TRACING: CPT | Performed by: ANESTHESIOLOGY

## 2021-04-14 RX ORDER — IBUPROFEN 800 MG/1
TABLET ORAL
COMMUNITY
Start: 2020-06-20

## 2021-04-14 NOTE — H&P
History and Physical    Pt Name: Yuliya Vila  MRN: [id number]  YOB: 1950  Date of evaluation: 4/14/2021    SUBJECTIVE:   History of Chief Complaint:    Patient presents for PAT appointment. She had a BAHA placed in the past for hearing loss. Patient says that she \"does not like it\" and has been experiencing pain and \"drainage\" she states relating to this. She has been scheduled for removal.  Past Medical History    has a past medical history of Acid reflux, Arthritis, Brain aneurysm, Cancer ([address] Family history of breast cancer in first degree relative, Gall stones, Hard of hearing, History of cervical cancer, Hypertension, Lateral meniscus tear, Wears dentures, Wears glasses, Wellness examination, Wellness examination, Wellness examination, and Wellness examination. Past Surgical History   has a past surgical history that includes Tonsillectomy and adenoidectomy (1956); Cholecystectomy; Inner ear surgery (Bilateral); Knee arthroscopy (2000, 2005); cyst removal (1980); Colonoscopy (12/09); Breast biopsy (1980); Foot surgery (Bilateral); other surgical history (Right, 9-4-14); Umbilical hernia repair; hernia repair; Varicose vein surgery (Bilateral); Knee arthroscopy (Left, 4/3/2019); Colonoscopy (N/A, 5/31/2019); other surgical history (02/12/2020);   picc powerpicc double (2/20/2020); Upper gastrointestinal endoscopy (N/A, 2/20/2020); sigmoidoscopy (N/A, 2/20/2020); Endoscopy, colon, diagnostic; and vascular surgery (10/01/2020). Medications  Prior to Admission medications    Medication Sig Start Date End Date Taking?  Authorizing Provider   ibuprofen (ADVIL;MOTRIN) 800 MG tablet  6/20/20  Yes Historical Provider, MD   mupirocin (BACTROBAN) 2 % ointment  1/27/21  Yes Historical Provider, MD   carvedilol (COREG) 3.125 MG tablet TAKE 1 TABLET TWICE DAILY  Patient taking differently: Take 3.125 mg by mouth 2 times daily (with meals) TAKE 1 TABLET TWICE DAILY 4/13/21  Yes Mel Nye MD L-Methylfolate-Algae-B12-B6 (METANX PO) Take by mouth   Yes Historical Provider, MD   gabapentin (NEURONTIN) 600 MG tablet Take 600 mg by mouth nightly as needed. Pt stated that she only takes when she has pain. 11/17/20  Yes Historical Provider, MD   aspirin 81 MG chewable tablet Take 81 mg by mouth daily   Yes Historical Provider, MD   calcium citrate (CALCITRATE) 950 MG tablet Take 1 tablet by mouth daily   Yes Historical Provider, MD   acetaminophen (TYLENOL) 325 MG tablet Take 325 mg by mouth every 6 hours as needed for Pain   Yes Historical Provider, MD   vitamin D (CHOLECALCIFEROL) 1000 UNIT TABS tablet Take 1,000 Units by mouth 3 times daily   Yes Historical Provider, MD   Multiple Vitamins-Minerals (THERAPEUTIC MULTIVITAMIN-MINERALS) tablet Take 1 tablet by mouth daily   Yes Historical Provider, MD   Misc. Devices (STEP N REST II WALKER) MISC Use daily for ADL's 1/13/21   Karina Haas MD   Blood Pressure KIT Dx: HTN. Needs automatic blood pressure machine to monitor her blood pressure. 7/13/20   Karina Haas MD   zoster recombinant adjuvanted vaccine [facility]) 50 MCG/0.5ML SUSR injection Inject 0.5 mLs into the muscle See Admin Instructions 1 dose now and repeat in 2-6 months 7/13/20   Karina Haas MD     Allergies  is allergic to lisinopril; losartan; and procardia [nifedipine]. Family History  family history includes Breast Cancer in her sister; Cancer in her mother; Heart Attack in her father. Social History   reports that she has quit smoking. She has never used smokeless tobacco.   reports current alcohol use. reports no history of drug use. Marital Status   Occupation retired    OBJECTIVE:   VITALS:  height is 5' 4\" (1.626 m) and weight is 103 lb (46.7 kg). Her temperature is 97.9 °F (36.6 °C). Her blood pressure is 160/84 (abnormal) and her pulse is 76. Her respiration is 16 and oxygen saturation is 98%. CONSTITUTIONAL:alert & oriented x 3, no acute distress. Very pleasant. Hearing loss significant. SKIN:  Warm and dry, no rashes on exposed areas of skin. HEAD:  Normocephalic, atraumatic. EYES: PERRL. EOMs intact. Wearing glasses. EARS:  Hearing loss, significant. NOSE:  Nares patent. No rhinorrhea. MOUTH/THROAT:  Upper and lower full dentures  NECK:supple, no lymphadenopathy  LUNGS: Clear to auscultation bilaterally, no wheezes. CARDIOVASCULAR: Heart sounds are normal.  Regular rate and rhythm without murmur. ABDOMEN: soft, non tender, non distended. Thin abdomen. EXTREMITIES: no edema bilateral lower extremities. Testing:   EK21  Labs pending: drawn 2021     IMPRESSIONS:   1. BAHA Hearing aid present  2.  has a past medical history of Acid reflux, Arthritis, Brain aneurysm, Cancer ([facility] [address] Family history of breast cancer in first degree relative, Gall stones, Hard of hearing, History of cervical cancer, Hypertension, Lateral meniscus tear, Wears dentures, Wears glasses, Wellness examination (2021), Wellness examination (2021), Wellness examination (2021), and Wellness examination (2021). PLANS:   1.  Removal of anchored hearing aid    JUAN Roldan PA-C  Electronically signed 2021 at 3:10 PM

## 2021-04-14 NOTE — PROGRESS NOTES
Anesthesia Focused Assessment    Has patient ever tested positive for COVID? No    STOP-BANG Sleep Apnea Questionnaire    SNORE loudly (heard through closed doors)? No  TIRED, fatigued, sleepy during daytime? No  OBSERVED stopping breathing during sleep? No  High blood PRESSURE being treated? Yes    BMI over 35? No  AGE over 48? Yes  NECK circumference over 16\"? No  GENDER (male)? No             Total 2  High risk 5-8  Intermediate risk 3-4  Low risk 0-2    Obstructive Sleep Apnea: denies  If YES, machine used: no     Type 1 DM:   no  T2DM:  no    Coronary Artery Disease:  no  Hypertension:  yes    Active smoker:  no  Drinks Alcohol:  occasionally    Dentition: upper and lower full dentures    Defib / AICD / Pacemaker: no      Renal Failure/dialysis:  no    Patient was evaluated in PAT & anesthesia guidelines were applied. NPO guidelines, medication instructions and scheduled arrival time were reviewed with patient. Hx of anesthesia complications:  no  Family hx of anesthesia complications:  no                                                                                                                     Anesthesia contacted:   Yes, Dr. Makayla Wolf, regarding history of cerebral aneurysm. Medical or cardiac clearance ordered: yes, Dr. Corry Jay, permission to stop aspirin if requested by surgeon (Dr. Mariaelena Vargas).     Manuel Nix PA-C  4/14/21  1:44 PM

## 2021-04-15 LAB
EKG ATRIAL RATE: 67 BPM
EKG P AXIS: 57 DEGREES
EKG P-R INTERVAL: 150 MS
EKG Q-T INTERVAL: 434 MS
EKG QRS DURATION: 78 MS
EKG QTC CALCULATION (BAZETT): 458 MS
EKG R AXIS: 60 DEGREES
EKG T AXIS: 82 DEGREES
EKG VENTRICULAR RATE: 67 BPM

## 2021-04-15 PROCEDURE — 93010 ELECTROCARDIOGRAM REPORT: CPT | Performed by: INTERNAL MEDICINE

## 2021-04-19 ENCOUNTER — HOSPITAL ENCOUNTER (OUTPATIENT)
Dept: LAB | Age: 71
Setting detail: SPECIMEN
Discharge: HOME | End: 2021-04-19
Payer: MEDICARE

## 2021-04-19 DIAGNOSIS — Z01.818 PREOP TESTING: Primary | ICD-10-CM

## 2021-04-19 PROCEDURE — U0005 INFEC AGEN DETEC AMPLI PROBE: HCPCS

## 2021-04-19 PROCEDURE — U0003 INFECTIOUS AGENT DETECTION BY NUCLEIC ACID (DNA OR RNA); SEVERE ACUTE RESPIRATORY SYNDROME CORONAVIRUS 2 (SARS-COV-2) (CORONAVIRUS DISEASE [COVID-19]), AMPLIFIED PROBE TECHNIQUE, MAKING USE OF HIGH THROUGHPUT TECHNOLOGIES AS DESCRIBED BY CMS-2020-01-R: HCPCS

## 2021-04-20 LAB
SARS-COV-2: NORMAL
SARS-COV-2: NOT DETECTED
SOURCE: NORMAL

## 2021-04-21 ENCOUNTER — TELEPHONE (OUTPATIENT)
Dept: PRIMARY CARE CLINIC | Age: 71
End: 2021-04-21

## 2021-04-22 NOTE — PROGRESS NOTES
Ok to stop asa 81mg,if needed for or 4/23/21. ok for surgery per dr Jiménez Alexander. Notified dr Светлана Brady.

## 2021-04-23 ENCOUNTER — ANESTHESIA (OUTPATIENT)
Dept: OPERATING ROOM | Age: 71
End: 2021-04-23
Payer: MEDICARE

## 2021-04-23 ENCOUNTER — HOSPITAL ENCOUNTER (OUTPATIENT)
Age: 71
Setting detail: OUTPATIENT SURGERY
Discharge: HOME | End: 2021-04-23
Attending: OTOLARYNGOLOGY | Admitting: OTOLARYNGOLOGY
Payer: MEDICARE

## 2021-04-23 ENCOUNTER — ANESTHESIA EVENT (OUTPATIENT)
Dept: OPERATING ROOM | Age: 71
End: 2021-04-23
Payer: MEDICARE

## 2021-04-23 VITALS
RESPIRATION RATE: 17 BRPM | DIASTOLIC BLOOD PRESSURE: 65 MMHG | HEART RATE: 66 BPM | HEIGHT: 64 IN | BODY MASS INDEX: 17.16 KG/M2 | SYSTOLIC BLOOD PRESSURE: 148 MMHG | TEMPERATURE: 97.2 F | OXYGEN SATURATION: 97 % | WEIGHT: 100.5 LBS

## 2021-04-23 VITALS — DIASTOLIC BLOOD PRESSURE: 95 MMHG | TEMPERATURE: 95 F | OXYGEN SATURATION: 100 % | SYSTOLIC BLOOD PRESSURE: 147 MMHG

## 2021-04-23 LAB — POC POTASSIUM: 4.1 MMOL/L (ref 3.5–4.5)

## 2021-04-23 PROCEDURE — 3700000000 HC ANESTHESIA ATTENDED CARE: Performed by: OTOLARYNGOLOGY

## 2021-04-23 PROCEDURE — 7100000010 HC PHASE II RECOVERY - FIRST 15 MIN: Performed by: OTOLARYNGOLOGY

## 2021-04-23 PROCEDURE — 2580000003 HC RX 258: Performed by: ANESTHESIOLOGY

## 2021-04-23 PROCEDURE — 6370000000 HC RX 637 (ALT 250 FOR IP): Performed by: OTOLARYNGOLOGY

## 2021-04-23 PROCEDURE — 2580000003 HC RX 258: Performed by: OTOLARYNGOLOGY

## 2021-04-23 PROCEDURE — 6360000002 HC RX W HCPCS

## 2021-04-23 PROCEDURE — 2709999900 HC NON-CHARGEABLE SUPPLY: Performed by: OTOLARYNGOLOGY

## 2021-04-23 PROCEDURE — 6360000002 HC RX W HCPCS: Performed by: OTOLARYNGOLOGY

## 2021-04-23 PROCEDURE — 6360000002 HC RX W HCPCS: Performed by: ANESTHESIOLOGY

## 2021-04-23 PROCEDURE — 7100000000 HC PACU RECOVERY - FIRST 15 MIN: Performed by: OTOLARYNGOLOGY

## 2021-04-23 PROCEDURE — 7100000001 HC PACU RECOVERY - ADDTL 15 MIN: Performed by: OTOLARYNGOLOGY

## 2021-04-23 PROCEDURE — 84132 ASSAY OF SERUM POTASSIUM: CPT

## 2021-04-23 PROCEDURE — 3600000004 HC SURGERY LEVEL 4 BASE: Performed by: OTOLARYNGOLOGY

## 2021-04-23 PROCEDURE — 7100000011 HC PHASE II RECOVERY - ADDTL 15 MIN: Performed by: OTOLARYNGOLOGY

## 2021-04-23 PROCEDURE — 2500000003 HC RX 250 WO HCPCS

## 2021-04-23 PROCEDURE — 3700000001 HC ADD 15 MINUTES (ANESTHESIA): Performed by: OTOLARYNGOLOGY

## 2021-04-23 PROCEDURE — 3600000014 HC SURGERY LEVEL 4 ADDTL 15MIN: Performed by: OTOLARYNGOLOGY

## 2021-04-23 PROCEDURE — 6370000000 HC RX 637 (ALT 250 FOR IP): Performed by: ANESTHESIOLOGY

## 2021-04-23 RX ORDER — EPINEPHRINE 1 MG/ML(1)
AMPUL (ML) INJECTION PRN
Status: DISCONTINUED | OUTPATIENT
Start: 2021-04-23 | End: 2021-04-23 | Stop reason: ALTCHOICE

## 2021-04-23 RX ORDER — ROCURONIUM BROMIDE 10 MG/ML
INJECTION, SOLUTION INTRAVENOUS PRN
Status: DISCONTINUED | OUTPATIENT
Start: 2021-04-23 | End: 2021-04-23 | Stop reason: SDUPTHER

## 2021-04-23 RX ORDER — PROPOFOL 10 MG/ML
INJECTION, EMULSION INTRAVENOUS PRN
Status: DISCONTINUED | OUTPATIENT
Start: 2021-04-23 | End: 2021-04-23 | Stop reason: SDUPTHER

## 2021-04-23 RX ORDER — DEXAMETHASONE SODIUM PHOSPHATE 4 MG/ML
INJECTION, SOLUTION INTRA-ARTICULAR; INTRALESIONAL; INTRAMUSCULAR; INTRAVENOUS; SOFT TISSUE PRN
Status: DISCONTINUED | OUTPATIENT
Start: 2021-04-23 | End: 2021-04-23 | Stop reason: SDUPTHER

## 2021-04-23 RX ORDER — DIPHENHYDRAMINE HYDROCHLORIDE 50 MG/ML
12.5 INJECTION INTRAMUSCULAR; INTRAVENOUS
Status: DISCONTINUED | OUTPATIENT
Start: 2021-04-23 | End: 2021-04-23 | Stop reason: HOSPADM

## 2021-04-23 RX ORDER — CEFAZOLIN SODIUM 1 G/3ML
INJECTION, POWDER, FOR SOLUTION INTRAMUSCULAR; INTRAVENOUS PRN
Status: DISCONTINUED | OUTPATIENT
Start: 2021-04-23 | End: 2021-04-23 | Stop reason: SDUPTHER

## 2021-04-23 RX ORDER — HYDROCODONE BITARTRATE AND ACETAMINOPHEN 5; 325 MG/1; MG/1
1 TABLET ORAL
Status: COMPLETED | OUTPATIENT
Start: 2021-04-23 | End: 2021-04-23

## 2021-04-23 RX ORDER — MEPERIDINE HYDROCHLORIDE 50 MG/ML
12.5 INJECTION INTRAMUSCULAR; INTRAVENOUS; SUBCUTANEOUS EVERY 5 MIN PRN
Status: DISCONTINUED | OUTPATIENT
Start: 2021-04-23 | End: 2021-04-23 | Stop reason: HOSPADM

## 2021-04-23 RX ORDER — LIDOCAINE HYDROCHLORIDE 10 MG/ML
INJECTION, SOLUTION EPIDURAL; INFILTRATION; INTRACAUDAL; PERINEURAL PRN
Status: DISCONTINUED | OUTPATIENT
Start: 2021-04-23 | End: 2021-04-23 | Stop reason: SDUPTHER

## 2021-04-23 RX ORDER — GINSENG 100 MG
CAPSULE ORAL PRN
Status: DISCONTINUED | OUTPATIENT
Start: 2021-04-23 | End: 2021-04-23 | Stop reason: ALTCHOICE

## 2021-04-23 RX ORDER — SODIUM CHLORIDE, SODIUM LACTATE, POTASSIUM CHLORIDE, CALCIUM CHLORIDE 600; 310; 30; 20 MG/100ML; MG/100ML; MG/100ML; MG/100ML
1000 INJECTION, SOLUTION INTRAVENOUS CONTINUOUS
Status: DISCONTINUED | OUTPATIENT
Start: 2021-04-23 | End: 2021-04-23 | Stop reason: HOSPADM

## 2021-04-23 RX ORDER — SODIUM CHLORIDE 0.9 % (FLUSH) 0.9 %
SYRINGE (ML) INJECTION PRN
Status: DISCONTINUED | OUTPATIENT
Start: 2021-04-23 | End: 2021-04-23 | Stop reason: ALTCHOICE

## 2021-04-23 RX ORDER — FENTANYL CITRATE 50 UG/ML
INJECTION, SOLUTION INTRAMUSCULAR; INTRAVENOUS PRN
Status: DISCONTINUED | OUTPATIENT
Start: 2021-04-23 | End: 2021-04-23 | Stop reason: SDUPTHER

## 2021-04-23 RX ORDER — PROMETHAZINE HYDROCHLORIDE 25 MG/ML
6.25 INJECTION, SOLUTION INTRAMUSCULAR; INTRAVENOUS
Status: DISCONTINUED | OUTPATIENT
Start: 2021-04-23 | End: 2021-04-23 | Stop reason: HOSPADM

## 2021-04-23 RX ORDER — HYDRALAZINE HYDROCHLORIDE 20 MG/ML
5 INJECTION INTRAMUSCULAR; INTRAVENOUS EVERY 10 MIN PRN
Status: DISCONTINUED | OUTPATIENT
Start: 2021-04-23 | End: 2021-04-23 | Stop reason: HOSPADM

## 2021-04-23 RX ORDER — ONDANSETRON 2 MG/ML
INJECTION INTRAMUSCULAR; INTRAVENOUS PRN
Status: DISCONTINUED | OUTPATIENT
Start: 2021-04-23 | End: 2021-04-23 | Stop reason: SDUPTHER

## 2021-04-23 RX ORDER — METOCLOPRAMIDE HYDROCHLORIDE 5 MG/ML
10 INJECTION INTRAMUSCULAR; INTRAVENOUS
Status: DISCONTINUED | OUTPATIENT
Start: 2021-04-23 | End: 2021-04-23 | Stop reason: HOSPADM

## 2021-04-23 RX ORDER — CEPHALEXIN 500 MG/1
500 CAPSULE ORAL 2 TIMES DAILY
Qty: 14 CAPSULE | Refills: 0 | Status: SHIPPED | OUTPATIENT
Start: 2021-04-23 | End: 2021-04-30

## 2021-04-23 RX ADMIN — HYDROCODONE BITARTRATE AND ACETAMINOPHEN 1 TABLET: 5; 325 TABLET ORAL at 09:26

## 2021-04-23 RX ADMIN — HYDROMORPHONE HYDROCHLORIDE 0.5 MG: 1 INJECTION, SOLUTION INTRAMUSCULAR; INTRAVENOUS; SUBCUTANEOUS at 09:10

## 2021-04-23 RX ADMIN — PROPOFOL 200 MG: 10 INJECTION, EMULSION INTRAVENOUS at 08:07

## 2021-04-23 RX ADMIN — SUGAMMADEX 200 MG: 100 INJECTION, SOLUTION INTRAVENOUS at 08:37

## 2021-04-23 RX ADMIN — LIDOCAINE HYDROCHLORIDE 50 MG: 10 INJECTION, SOLUTION EPIDURAL; INFILTRATION; INTRACAUDAL; PERINEURAL at 08:07

## 2021-04-23 RX ADMIN — HYDROMORPHONE HYDROCHLORIDE 0.5 MG: 1 INJECTION, SOLUTION INTRAMUSCULAR; INTRAVENOUS; SUBCUTANEOUS at 09:15

## 2021-04-23 RX ADMIN — ONDANSETRON 4 MG: 2 INJECTION INTRAMUSCULAR; INTRAVENOUS at 08:26

## 2021-04-23 RX ADMIN — DEXAMETHASONE SODIUM PHOSPHATE 4 MG: 4 INJECTION, SOLUTION INTRAMUSCULAR; INTRAVENOUS at 08:20

## 2021-04-23 RX ADMIN — FENTANYL CITRATE 50 MCG: 50 INJECTION, SOLUTION INTRAMUSCULAR; INTRAVENOUS at 08:07

## 2021-04-23 RX ADMIN — CEFAZOLIN 1000 MG: 1 INJECTION, POWDER, FOR SOLUTION INTRAMUSCULAR; INTRAVENOUS at 08:19

## 2021-04-23 RX ADMIN — SODIUM CHLORIDE, POTASSIUM CHLORIDE, SODIUM LACTATE AND CALCIUM CHLORIDE: 600; 310; 30; 20 INJECTION, SOLUTION INTRAVENOUS at 07:52

## 2021-04-23 RX ADMIN — ROCURONIUM BROMIDE 30 MG: 10 INJECTION INTRAVENOUS at 08:07

## 2021-04-23 ASSESSMENT — PULMONARY FUNCTION TESTS
PIF_VALUE: 10
PIF_VALUE: 18
PIF_VALUE: 17
PIF_VALUE: 0
PIF_VALUE: 2
PIF_VALUE: 17
PIF_VALUE: 1
PIF_VALUE: 1
PIF_VALUE: 15
PIF_VALUE: 4
PIF_VALUE: 0
PIF_VALUE: 1
PIF_VALUE: 15
PIF_VALUE: 18
PIF_VALUE: 17
PIF_VALUE: 17
PIF_VALUE: 0
PIF_VALUE: 1
PIF_VALUE: 0
PIF_VALUE: 0
PIF_VALUE: 18
PIF_VALUE: 17
PIF_VALUE: 13
PIF_VALUE: 15
PIF_VALUE: 17
PIF_VALUE: 17

## 2021-04-23 ASSESSMENT — PAIN DESCRIPTION - LOCATION: LOCATION: OTHER (COMMENT)

## 2021-04-23 ASSESSMENT — PAIN DESCRIPTION - PAIN TYPE: TYPE: SURGICAL PAIN

## 2021-04-23 ASSESSMENT — PAIN SCALES - GENERAL
PAINLEVEL_OUTOF10: 8
PAINLEVEL_OUTOF10: 6

## 2021-04-23 NOTE — ANESTHESIA PRE PROCEDURE
Instructions 1 dose now and repeat in 2-6 months 7/13/20   Nelly Sierra MD       Current medications:    Current Facility-Administered Medications   Medication Dose Route Frequency Provider Last Rate Last Admin    lactated ringers infusion 1,000 mL  1,000 mL Intravenous Continuous Luciano Rizzo MD           Allergies:     Allergies   Allergen Reactions    Lisinopril Other (See Comments)    Losartan      Fatigue    Procardia [Nifedipine] Other (See Comments)       Problem List:    Patient Active Problem List   Diagnosis Code    Essential hypertension I10    RLS (restless legs syndrome) G25.81    History of cervical cancer Z85.41    Gastroesophageal reflux disease without esophagitis K21.9    Trochanteric bursitis of left hip M70.62    Complex tear of lateral meniscus of left knee S83.272A    Cerebrovascular accident (CVA) ([facility] [address] I63.9    Left sided numbness R20.0    Acute left-sided weakness R53.1    Lacunar stroke (HCC) I63.81    Hyperlipidemia E78.5    Chronic pancreatitis ([facility] [address] K86.1    Cerebral aneurysm rupture ([facility]) I60.7    Cerebral aneurysm I67.1    Subarachnoid hemorrhage from anterior cerebral artery aneurysm ([facility]) I60.6    Status post coil embolization of cerebral aneurysm Z98.890    MRI-safe endovascular aneurysm coil present Z95.828    Iron deficiency anemia due to chronic blood loss D50.0    Fatigue R53.83    Anemia D64.9    Chronic diastolic heart failure ([facility]) I50.32    Lung nodule R91.1    Closed fracture of bone of right foot S92.901A    Aneurysm ([facility]) I72.9    Left foot drop M21.372       Past Medical History:        Diagnosis Date    Acid reflux     Arthritis     Sees Dr.Goldberger    Brain aneurysm     Cancer ([facility] [address]     cervical     Family history of breast cancer in first degree relative     sister    Gall stones     hx of    Hard of hearing     bilateral hearing aids    History of cervical cancer     Hypertension     Sees PCP    Lateral meniscus tear left knee    Wears dentures     Wears glasses     Wellness examination 04/14/2021    PCP: Dr. Maribeth Crouch, [address] [place], [place] [address]jimena Cortez, last visit Jan 2021    Wellness examination 04/14/2021    Neurology: Dr. Sasha Laird, [facility], last visit Jan 2021    Wellness examination 04/14/2021    Orthopedic: Dr. Merry Adrian, last visit Feb 2021    Wellness examination 04/14/2021    Internist: Constance Cage, last visit Nov 2020       Past Surgical History:        Procedure Laterality Date    BREAST BIOPSY  1980    LEFT(BENIGN)    CHOLECYSTECTOMY      COLONOSCOPY  12/09    Negative    COLONOSCOPY N/A 5/31/2019    COLONOSCOPY POLYPECTOMY HOT SNARE, COLD POLYPECTOMY performed by Rc Kumar MD at [address] [facility]    Left Wrist    ENDOSCOPY, COLON, DIAGNOSTIC      FOOT SURGERY Bilateral     FOR [facility].  PICC [address] [facility] DOUBLE  2/20/2020         HERNIA REPAIR      UMBILICAL     INNER EAR SURGERY Bilateral     hearing aid implant    KNEE ARTHROSCOPY  2000, 2005    LEFT    KNEE ARTHROSCOPY Left 4/3/2019    KNEE ARTHROSCOPY DIAGNOSTIC performed by Prieto García MD at [facility] Right 9-4-14    BAHA- bone anchored hearing aid    OTHER SURGICAL HISTORY  02/12/2020    aneurysm rt SENTHIL, with 6 coils placed    SIGMOIDOSCOPY N/A 2/20/2020    SIGMOIDOSCOPY DIAGNOSTIC FLEXIBLE performed by Jose Manuel Lynne MD at [facility] ENDOSCOPY N/A 2/20/2020    EGD DIAGNOSTIC ONLY performed by Jose Manuel Lynne MD at [address] [facility] Bilateral     VASCULAR SURGERY  10/01/2020    cerebral angiogram       Social History:    Social History     Tobacco Use    Smoking status: Former Smoker    Smokeless tobacco: Never Used    Tobacco comment: Quit when she was 26 yrs old   Substance Use Topics    Alcohol use: Yes     Comment: Occassional Counseling given: Not Answered  Comment: Quit when she was 26 yrs old      Vital Signs (Current):   Vitals:    04/23/21 0644   BP: (!) 120/58   Pulse: 58   Resp: 14   Temp: 97.7 °F (36.5 °C)   TempSrc: Temporal   SpO2: 100%   Weight: 100 lb 8 oz (45.6 kg)   Height: 5' 4\" (1.626 m)                                              BP Readings from Last 3 Encounters:   04/23/21 (!) 120/58   04/14/21 (!) 160/84   01/18/21 (!) 161/78       NPO Status: Time of last liquid consumption: 2100                        Time of last solid consumption: 1900                        Date of last liquid consumption: 04/22/21                        Date of last solid food consumption: 04/22/21    BMI:   Wt Readings from Last 3 Encounters:   04/23/21 100 lb 8 oz (45.6 kg)   04/14/21 103 lb (46.7 kg)   01/18/21 97 lb (44 kg)     Body mass index is 17.25 kg/m². CBC:   Lab Results   Component Value Date    WBC 5.1 07/13/2020    WBC 3.6 06/10/2020    RBC 3.29 07/13/2020    HGB 11.6 04/14/2021    HCT 36.5 04/14/2021    MCV 88 07/13/2020    RDW 14.7 07/13/2020     07/13/2020     06/10/2020     12/19/2011       CMP:   Lab Results   Component Value Date     04/14/2021    K 4.0 04/14/2021     04/14/2021    CO2 25 04/14/2021    BUN 19 04/14/2021    CREATININE 0.70 04/14/2021    GFRAA >60 04/14/2021    LABGLOM >60 04/14/2021    GLUCOSE 66 04/14/2021    GLUCOSE 82 12/19/2011    PROT 6.1 02/15/2020    CALCIUM 9.1 06/10/2020    BILITOT 0.4 06/10/2020    ALKPHOS 112 06/10/2020    AST 28 06/10/2020    ALT 13 06/10/2020       POC Tests: No results for input(s): POCGLU, POCNA, POCK, POCCL, POCBUN, POCHEMO, POCHCT in the last 72 hours.     Coags:   Lab Results   Component Value Date    PROTIME 12.2 02/16/2020    INR 1.2 02/16/2020    APTT 37.2 02/15/2020       HCG (If Applicable): No results found for: PREGTESTUR, PREGSERUM, HCG, HCGQUANT     ABGs: No results found for: PHART, PO2ART, SGD9KKQ, XGU2ETP, BEART, T8ZSGVQQ Type & Screen (If Applicable):  No results found for: LABABO, LABRH    Drug/Infectious Status (If Applicable):  Lab Results   Component Value Date    HEPCAB REACTIVE 02/15/2019       COVID-19 Screening (If Applicable):   Lab Results   Component Value Date    COVID19 Not Detected 04/19/2021    COVID19 Not Detected 09/27/2020           Anesthesia Evaluation  Patient summary reviewed and Nursing notes reviewed  Airway: Mallampati: I  TM distance: >3 FB   Neck ROM: full  Mouth opening: > = 3 FB Dental:    (+) lower dentures and upper dentures      Pulmonary:normal exam                               Cardiovascular:    (+) hypertension:, CHF:,                   Neuro/Psych:   (+) CVA: residual symptoms,             GI/Hepatic/Renal:   (+) GERD:,           Endo/Other:                     Abdominal:           Vascular:                                        Anesthesia Plan      general     ASA 3       Induction: intravenous. MIPS: Postoperative opioids intended and Prophylactic antiemetics administered. Anesthetic plan and risks discussed with patient. Plan discussed with CRNA.                   Adan Oconnell MD   4/23/2021

## 2021-04-23 NOTE — ANESTHESIA POSTPROCEDURE EVALUATION
POST- ANESTHESIA EVALUATION       Pt Name: Judy Worrell  MRN: [id number]  [place]: 1950  Date of evaluation: 2021  Time:  10:32 AM      BP (!) 148/65   Pulse 66   Temp 97.2 °F (36.2 °C) (Temporal)   Resp 17   Ht 5' 4\" (1.626 m)   Wt 100 lb 8 oz (45.6 kg)   SpO2 97%   BMI 17.25 kg/m²      Consciousness Level  Awake  Cardiopulmonary Status  Stable  Pain Adequately Treated YES  Nausea / Vomiting  NO  Adequate Hydration  YES  Anesthesia Related Complications NONE      Electronically signed by Radha Guerra MD on 2021 at 10:32 AM       Department of Anesthesiology  Postprocedure Note    Patient: Judy Worrell  MRN: [id number]  [place]: 1950  Date of evaluation: 2021  Time:  10:31 AM     Procedure Summary     Date: 21 Room / Location: [address]    Anesthesia Start: 0800 Anesthesia Stop: 8117    Procedure: EAR BONE ANCHORED HEARING AID REMOVAL (Right ) Diagnosis: (INFECTED OTITIS EXTERNA, CONDUCTIVE HEARING LOSS)    Surgeons: Ata Bhatti MD Responsible Provider: Radha Guerra MD    Anesthesia Type: general ASA Status: 3          Anesthesia Type: general    Babar Phase I: Babar Score: 10    Babar Phase II: Babar Score: 10    Last vitals: Reviewed and per EMR flowsheets.        Anesthesia Post Evaluation

## 2021-04-23 NOTE — H&P
Pt Name: Alayna Fuentes  MRN: [id number]  YOB: 1950  Date of evaluation: 4/23/2021    I have reviewed the patient's history and physical examination completed in pre-admission testing on 4/14/21. Original H&P copied below. No changes to history or on examination today, unless noted below. Negative covid-19 screening on 4/19/21    BRENNAN MASTERSON APRN-CNP  4/23/21  7:02 AM     History and Physical    Pt Name: Alayna Fuentes  MRN: [id number]  YOB: 1950  Date of evaluation: 4/14/2021    SUBJECTIVE:   History of Chief Complaint:    Patient presents for PAT appointment. She had a BAHA placed in the past for hearing loss. Patient says that she \"does not like it\" and has been experiencing pain and \"drainage\" she states relating to this. She has been scheduled for removal.  Past Medical History    has a past medical history of Acid reflux, Arthritis, Brain aneurysm, Cancer ([facility]), Family history of breast cancer in first degree relative, Gall stones, Hard of hearing, History of cervical cancer, Hypertension, Lateral meniscus tear, Wears dentures, Wears glasses, Wellness examination, Wellness examination, Wellness examination, and Wellness examination. Past Surgical History   has a past surgical history that includes Tonsillectomy and adenoidectomy (1956); Cholecystectomy; Inner ear surgery (Bilateral); Knee arthroscopy (2000, 2005); cyst removal (1980); Colonoscopy (12/09); Breast biopsy (1980); Foot surgery (Bilateral); other surgical history (Right, 9-4-14); Umbilical hernia repair; hernia repair; Varicose vein surgery (Bilateral); Knee arthroscopy (Left, 4/3/2019); Colonoscopy (N/A, 5/31/2019); other surgical history (02/12/2020);   picc powerpicc double (2/20/2020); Upper gastrointestinal endoscopy (N/A, 2/20/2020); sigmoidoscopy (N/A, 2/20/2020); Endoscopy, colon, diagnostic; and vascular surgery (10/01/2020).   Medications  Prior to Admission medications    Medication Sig Start Date End Date Taking? Authorizing Provider   ibuprofen (ADVIL;MOTRIN) 800 MG tablet  6/20/20  Yes Historical Provider, MD   mupirocin (BACTROBAN) 2 % ointment  1/27/21  Yes Historical Provider, MD   carvedilol (COREG) 3.125 MG tablet TAKE 1 TABLET TWICE DAILY  Patient taking differently: Take 3.125 mg by mouth 2 times daily (with meals) TAKE 1 TABLET TWICE DAILY 4/13/21  Yes Aysha Waters MD   L-Methylfolate-Algae-B12-B6 (METANX PO) Take by mouth   Yes Historical Provider, MD   gabapentin (NEURONTIN) 600 MG tablet Take 600 mg by mouth nightly as needed. Pt stated that she only takes when she has pain. 11/17/20  Yes Historical Provider, MD   aspirin 81 MG chewable tablet Take 81 mg by mouth daily   Yes Historical Provider, MD   calcium citrate (CALCITRATE) 950 MG tablet Take 1 tablet by mouth daily   Yes Historical Provider, MD   acetaminophen (TYLENOL) 325 MG tablet Take 325 mg by mouth every 6 hours as needed for Pain   Yes Historical Provider, MD   vitamin D (CHOLECALCIFEROL) 1000 UNIT TABS tablet Take 1,000 Units by mouth 3 times daily   Yes Historical Provider, MD   Multiple Vitamins-Minerals (THERAPEUTIC MULTIVITAMIN-MINERALS) tablet Take 1 tablet by mouth daily   Yes Historical Provider, MD   Misc. Devices (STEP N REST II WALKER) MISC Use daily for ADL's 1/13/21   Aysha Waters MD   Blood Pressure KIT Dx: HTN. Needs automatic blood pressure machine to monitor her blood pressure. 7/13/20   Aysha Waters MD   zoster recombinant adjuvanted vaccine [facility]) 50 MCG/0.5ML SUSR injection Inject 0.5 mLs into the muscle See Admin Instructions 1 dose now and repeat in 2-6 months 7/13/20   Aysha Waters MD     Allergies  is allergic to lisinopril; losartan; and procardia [nifedipine]. Family History  family history includes Breast Cancer in her sister; Cancer in her mother; Heart Attack in her father. Social History   reports that she has quit smoking. She has never used smokeless tobacco.   reports current alcohol use. reports no history of drug use. Marital Status   Occupation retired    OBJECTIVE:   VITALS:  height is 5' 4\" (1.626 m) and weight is 103 lb (46.7 kg). Her temperature is 97.9 °F (36.6 °C). Her blood pressure is 160/84 (abnormal) and her pulse is 76. Her respiration is 16 and oxygen saturation is 98%. CONSTITUTIONAL:alert & oriented x 3, no acute distress. Very pleasant. Hearing loss significant. SKIN:  Warm and dry, no rashes on exposed areas of skin. HEAD:  Normocephalic, atraumatic. EYES: PERRL. EOMs intact. Wearing glasses. EARS:  Hearing loss, significant. NOSE:  Nares patent. No rhinorrhea. MOUTH/THROAT:  Upper and lower full dentures  NECK:supple, no lymphadenopathy  LUNGS: Clear to auscultation bilaterally, no wheezes. CARDIOVASCULAR: Heart sounds are normal.  Regular rate and rhythm without murmur. ABDOMEN: soft, non tender, non distended. Thin abdomen. EXTREMITIES: no edema bilateral lower extremities. Testing:   EK21  Labs pending: drawn 2021     IMPRESSIONS:   1. BAHA Hearing aid present  2.  has a past medical history of Acid reflux, Arthritis, Brain aneurysm, Cancer ([address] Family history of breast cancer in first degree relative, Gall stones, Hard of hearing, History of cervical cancer, Hypertension, Lateral meniscus tear, Wears dentures, Wears glasses, Wellness examination (2021), Wellness examination (2021), Wellness examination (2021), and Wellness examination (2021). PLANS:   1.  Removal of anchored hearing aid    JUAN Valdovinos PA-C  Electronically signed 2021 at 3:10 PM

## 2021-04-23 NOTE — OP NOTE
Operative Note      Patient: Dinesh Valencia  YOB: 1950  MRN: [id number]    Date of Procedure: 4/23/2021    Pre-Op Diagnosis: INFECTED OTITIS EXTERNA, CONDUCTIVE HEARING LOSS    Post-Op Diagnosis: Same and Nonfunctioning bone-anchored hearing aid       Procedure(s):  EAR BONE ANCHORED HEARING AID REMOVAL with complex repair of 1.5 cm x 1 cm scalp defect    Surgeon(s): Chidi May MD    Assistant:   * No surgical staff found *    Anesthesia: General    Estimated Blood Loss (mL): Minimal    Complications: None    Specimens:   * No specimens in log *    Implants:  * No implants in log *      Drains: * No LDAs found *    Findings: Bone-anchored hearing aid device with granulation tissue    Detailed Description of Procedure: The right temporal bone was identified. The area was prepped and draped in sterile fashion after the patient had been intubated with general anesthesia. 1% lidocaine with epinephrine was injected into the skin surrounding the bone-anchored hearing aid. The bone-anchored hearing aid was then removed. The titanium post was left in the bone is at this was fixed and immobile. The granulation tissue was resected around it. The skin was undermined. 3-0 Vicryl suture was used to reapproximate the deep layers of the scalp. Monocryl suture was used to reapproximate the cuticular layer. Bacitracin ointment was placed. The patient was awakened extubated returned recovery.     Electronically signed by Chidi May MD on 4/23/2021 at 8:41 AM

## 2021-04-23 NOTE — PROGRESS NOTES
Antibiotic script transferred to [facility]'s outpatient pharmacy- per daughter's request

## 2021-06-09 ENCOUNTER — HOSPITAL ENCOUNTER (OUTPATIENT)
Dept: MRI IMAGING | Age: 71
Discharge: HOME | End: 2021-06-11
Payer: MEDICARE

## 2021-06-09 DIAGNOSIS — I67.1 CEREBRAL ANEURYSM: ICD-10-CM

## 2021-06-09 LAB
CREAT SERPL-MCNC: 0.92 MG/DL (ref 0.5–0.9)
GFR AFRICAN AMERICAN: >60 ML/MIN
GFR NON-AFRICAN AMERICAN: >60 ML/MIN
GFR SERPL CREATININE-BSD FRML MDRD: ABNORMAL ML/MIN/{1.73_M2}
GFR SERPL CREATININE-BSD FRML MDRD: ABNORMAL ML/MIN/{1.73_M2}

## 2021-06-09 PROCEDURE — A9579 GAD-BASE MR CONTRAST NOS,1ML: HCPCS | Performed by: PSYCHIATRY & NEUROLOGY

## 2021-06-09 PROCEDURE — 6360000004 HC RX CONTRAST MEDICATION: Performed by: PSYCHIATRY & NEUROLOGY

## 2021-06-09 PROCEDURE — 36415 COLL VENOUS BLD VENIPUNCTURE: CPT

## 2021-06-09 PROCEDURE — 70546 MR ANGIOGRAPH HEAD W/O&W/DYE: CPT

## 2021-06-09 PROCEDURE — 82565 ASSAY OF CREATININE: CPT

## 2021-06-09 PROCEDURE — 2580000003 HC RX 258: Performed by: PSYCHIATRY & NEUROLOGY

## 2021-06-09 RX ORDER — SODIUM CHLORIDE 0.9 % (FLUSH) 0.9 %
10 SYRINGE (ML) INJECTION PRN
Status: DISCONTINUED | OUTPATIENT
Start: 2021-06-09 | End: 2021-06-12 | Stop reason: HOSPADM

## 2021-06-09 RX ADMIN — SODIUM CHLORIDE, PRESERVATIVE FREE 10 ML: 5 INJECTION INTRAVENOUS at 10:17

## 2021-06-09 RX ADMIN — GADOTERIDOL 9 ML: 279.3 INJECTION, SOLUTION INTRAVENOUS at 10:16

## 2021-07-14 ENCOUNTER — OFFICE VISIT (OUTPATIENT)
Dept: FAMILY MEDICINE CLINIC | Age: 71
End: 2021-07-14
Payer: MEDICARE

## 2021-07-14 VITALS
DIASTOLIC BLOOD PRESSURE: 80 MMHG | HEART RATE: 73 BPM | WEIGHT: 105 LBS | HEIGHT: 64 IN | SYSTOLIC BLOOD PRESSURE: 138 MMHG | TEMPERATURE: 97.3 F | OXYGEN SATURATION: 97 % | BODY MASS INDEX: 17.93 KG/M2

## 2021-07-14 DIAGNOSIS — I50.32 CHRONIC DIASTOLIC HEART FAILURE (HCC): ICD-10-CM

## 2021-07-14 DIAGNOSIS — D50.0 IRON DEFICIENCY ANEMIA DUE TO CHRONIC BLOOD LOSS: ICD-10-CM

## 2021-07-14 DIAGNOSIS — I10 ESSENTIAL HYPERTENSION: Primary | ICD-10-CM

## 2021-07-14 DIAGNOSIS — E78.2 MIXED HYPERLIPIDEMIA: ICD-10-CM

## 2021-07-14 DIAGNOSIS — Z12.31 SCREENING MAMMOGRAM FOR HIGH-RISK PATIENT: ICD-10-CM

## 2021-07-14 DIAGNOSIS — Z98.890 STATUS POST COIL EMBOLIZATION OF CEREBRAL ANEURYSM: ICD-10-CM

## 2021-07-14 PROCEDURE — 99214 OFFICE O/P EST MOD 30 MIN: CPT | Performed by: FAMILY MEDICINE

## 2021-07-14 PROCEDURE — G8419 CALC BMI OUT NRM PARAM NOF/U: HCPCS | Performed by: FAMILY MEDICINE

## 2021-07-14 PROCEDURE — 3017F COLORECTAL CA SCREEN DOC REV: CPT | Performed by: FAMILY MEDICINE

## 2021-07-14 PROCEDURE — 4040F PNEUMOC VAC/ADMIN/RCVD: CPT | Performed by: FAMILY MEDICINE

## 2021-07-14 PROCEDURE — G8427 DOCREV CUR MEDS BY ELIG CLIN: HCPCS | Performed by: FAMILY MEDICINE

## 2021-07-14 PROCEDURE — 1036F TOBACCO NON-USER: CPT | Performed by: FAMILY MEDICINE

## 2021-07-14 PROCEDURE — 1090F PRES/ABSN URINE INCON ASSESS: CPT | Performed by: FAMILY MEDICINE

## 2021-07-14 PROCEDURE — 1123F ACP DISCUSS/DSCN MKR DOCD: CPT | Performed by: FAMILY MEDICINE

## 2021-07-14 PROCEDURE — G8399 PT W/DXA RESULTS DOCUMENT: HCPCS | Performed by: FAMILY MEDICINE

## 2021-07-14 SDOH — ECONOMIC STABILITY: TRANSPORTATION INSECURITY
IN THE PAST 12 MONTHS, HAS LACK OF TRANSPORTATION KEPT YOU FROM MEETINGS, WORK, OR FROM GETTING THINGS NEEDED FOR DAILY LIVING?: NO

## 2021-07-14 SDOH — ECONOMIC STABILITY: FOOD INSECURITY: WITHIN THE PAST 12 MONTHS, THE FOOD YOU BOUGHT JUST DIDN'T LAST AND YOU DIDN'T HAVE MONEY TO GET MORE.: NEVER TRUE

## 2021-07-14 SDOH — ECONOMIC STABILITY: INCOME INSECURITY: IN THE LAST 12 MONTHS, WAS THERE A TIME WHEN YOU WERE NOT ABLE TO PAY THE MORTGAGE OR RENT ON TIME?: NO

## 2021-07-14 SDOH — ECONOMIC STABILITY: FOOD INSECURITY: WITHIN THE PAST 12 MONTHS, YOU WORRIED THAT YOUR FOOD WOULD RUN OUT BEFORE YOU GOT MONEY TO BUY MORE.: NEVER TRUE

## 2021-07-14 SDOH — ECONOMIC STABILITY: TRANSPORTATION INSECURITY
IN THE PAST 12 MONTHS, HAS THE LACK OF TRANSPORTATION KEPT YOU FROM MEDICAL APPOINTMENTS OR FROM GETTING MEDICATIONS?: NO

## 2021-07-14 SDOH — ECONOMIC STABILITY: HOUSING INSECURITY
IN THE LAST 12 MONTHS, WAS THERE A TIME WHEN YOU DID NOT HAVE A STEADY PLACE TO SLEEP OR SLEPT IN A SHELTER (INCLUDING NOW)?: NO

## 2021-07-14 ASSESSMENT — PATIENT HEALTH QUESTIONNAIRE - PHQ9
1. LITTLE INTEREST OR PLEASURE IN DOING THINGS: 0
2. FEELING DOWN, DEPRESSED OR HOPELESS: 0
SUM OF ALL RESPONSES TO PHQ QUESTIONS 1-9: 0
SUM OF ALL RESPONSES TO PHQ9 QUESTIONS 1 & 2: 0
SUM OF ALL RESPONSES TO PHQ QUESTIONS 1-9: 0
SUM OF ALL RESPONSES TO PHQ QUESTIONS 1-9: 0

## 2021-07-14 ASSESSMENT — ENCOUNTER SYMPTOMS
BLOOD IN STOOL: 0
WHEEZING: 0
NAUSEA: 0
PHOTOPHOBIA: 0
BACK PAIN: 1
CHEST TIGHTNESS: 0
SINUS PRESSURE: 0
ABDOMINAL PAIN: 0
SHORTNESS OF BREATH: 0
ABDOMINAL DISTENTION: 0
COUGH: 0
CONSTIPATION: 0
SINUS PAIN: 0

## 2021-07-14 ASSESSMENT — SOCIAL DETERMINANTS OF HEALTH (SDOH): HOW HARD IS IT FOR YOU TO PAY FOR THE VERY BASICS LIKE FOOD, HOUSING, MEDICAL CARE, AND HEATING?: NOT HARD AT ALL

## 2021-07-14 NOTE — PROGRESS NOTES
Visit Information    Have you changed or started any medications since your last visit including any over-the-counter medicines, vitamins, or herbal medicines? no   Are you having any side effects from any of your medications? -  no  Have you stopped taking any of your medications? Is so, why? -  no    Have you seen any other physician or provider since your last visit? No  Have you had any other diagnostic tests since your last visit? No  Have you been seen in the emergency room and/or had an admission to a hospital since we last saw you? No  Have you had your routine dental cleaning in the past 6 months? no    Have you activated your [employer] account? If not, what are your barriers?  Yes     Patient Care Team:  Kate Schaffer MD as PCP - General (Family Medicine)  Kate Schaffer MD as PCP - [facility]  Veena Maciel DO as Consulting Physician (Obstetrics & Gynecology)  Fran Warner MD as Consulting Physician (Gastroenterology)    Medical History Review  Past Medical, Family, and Social History reviewed and does contribute to the patient presenting condition    Health Maintenance   Topic Date Due    COVID-19 Vaccine (1) Never done    Shingles Vaccine (1 of 2) Never done    Breast cancer screen  08/03/2021    Annual Wellness Visit (AWV)  08/14/2021    DTaP/Tdap/Td vaccine (1 - Tdap) 01/13/2022 (Originally 4/8/1969)    Flu vaccine (1) 09/01/2021    Potassium monitoring  04/23/2022    Creatinine monitoring  06/09/2022    Lipid screen  01/15/2026    Colon cancer screen colonoscopy  05/31/2029    DEXA (modify frequency per FRAX score)  Completed    Pneumococcal 65+ years Vaccine  Completed    Hepatitis C screen  Completed    Hepatitis B vaccine  Aged Out    Hib vaccine  Aged Out    Meningococcal (ACWY) vaccine  Aged Out    Hepatitis A vaccine  Discontinued

## 2021-07-14 NOTE — PROGRESS NOTES
Chief Complaint   Patient presents with    Hypertension         Jeffery Bustos  here today for follow up on chronic medical problems, go over labs and/or diagnostic studies, and medication refills. Hypertension      HPI: Patient presented with her daughter for 6-month follow-up for chronic medical conditions. Hypertension controlled on current treatment denies any chest pain shortness of breath has not seen cardiologist since last 1 year. Hyperlipidemia on statins and aspirin. Iron deficiency anemia has improved on recent blood work. Still on iron supplements. CHF stable on current treatment no recent flareups or hospital admissions. Cerebral aneurysm stable. Seen by neurologist reports he recommended once in a year follow-up. /80   Pulse 73   Temp 97.3 °F (36.3 °C)   Ht 5' 4\" (1.626 m)   Wt 105 lb (47.6 kg)   SpO2 97%   BMI 18.02 kg/m²    Body mass index is 18.02 kg/m². Wt Readings from Last 3 Encounters:   07/14/21 105 lb (47.6 kg)   04/23/21 100 lb 8 oz (45.6 kg)   04/14/21 103 lb (46.7 kg)        [x]Negative depression screening. PHQ Scores 7/14/2021 1/13/2021 8/13/2020 5/14/2020 2/6/2019 12/13/2018 10/19/2017   PHQ2 Score 0 0 2 0 0 0 0   PHQ9 Score 0 0 2 0 0 0 0      []1-4 = Minimal depression   []5-9 = Milddepression   []10-14 = Moderate depression   []15-19 = Moderately severe depression   []20-27 = Severe depression    Discussed testing with the patient and all questions fully answered.     Hospital Outpatient Visit on 06/09/2021   Component Date Value Ref Range Status    CREATININE 06/09/2021 0.92* 0.50 - 0.90 mg/dL Final    GFR Non- 06/09/2021 >60  >60 mL/min Final    GFR  06/09/2021 >60  >60 mL/min Final    GFR Comment 06/09/2021        Final    Comment: Average GFR for 79or more years old:   76 mL/min/1.73sq m  Chronic Kidney Disease:   <60 mL/min/1.73sq m  Kidney failure:   <15 mL/min/1.73sq m              eGFR calculated using average adult body mass.  Additional eGFR calculator available at:        [employer]            GFR Staging 06/09/2021 NOT REPORTED   Final         Most recent labs reviewed:     Lab Results   Component Value Date    WBC 5.1 07/13/2020    HGB 11.6 (L) 04/14/2021    HCT 36.5 04/14/2021    MCV 88 07/13/2020     07/13/2020       @BRIEFLAB(NA,K,CL,CO2,BUN,CREATININE,GLUCOSE,CALCIUM)@     Lab Results   Component Value Date    ALT 13 06/10/2020    AST 28 06/10/2020    ALKPHOS 112 06/10/2020    BILITOT 0.4 06/10/2020       Lab Results   Component Value Date    TSH 5.43 (H) 01/15/2021       Lab Results   Component Value Date    CHOL 173 01/15/2021    CHOL 102 02/13/2020    CHOL 180 06/03/2019     Lab Results   Component Value Date    TRIG 119 01/15/2021    TRIG 100 02/13/2020    TRIG 119 06/03/2019     Lab Results   Component Value Date    HDL 35 (L) 01/15/2021    HDL 21 (L) 02/13/2020    HDL 33 (L) 06/03/2019     Lab Results   Component Value Date    LDLCALC 114 01/15/2021    LDLCHOLESTEROL 61 02/13/2020    LDLCHOLESTEROL 123 06/03/2019    LDLCHOLESTEROL 115 05/11/2018     Lab Results   Component Value Date    LABVLDL 24 01/15/2021    VLDL NOT REPORTED 02/13/2020    VLDL NOT REPORTED 06/03/2019    VLDL NOT REPORTED 05/11/2018     Lab Results   Component Value Date    CHOLHDLRATIO 4.9 01/15/2021    CHOLHDLRATIO 4.9 02/13/2020    CHOLHDLRATIO 5.5 (H) 06/03/2019       Lab Results   Component Value Date    LABA1C 5.6 02/13/2020       Lab Results   Component Value Date    APUXSPIE79 >1500 (H) 01/15/2021       Lab Results   Component Value Date    FOLATE >25.0 01/15/2021       Lab Results   Component Value Date    IRON 26 (L) 02/13/2020    TIBC 293 02/13/2020    FERRITIN 44 02/13/2020       Lab Results   Component Value Date    VITD25 34.7 01/15/2021             Current Outpatient Medications   Medication Sig Dispense Refill    ibuprofen (ADVIL;MOTRIN) 800 MG tablet       mupirocin (BACTROBAN) 2 % ointment       carvedilol (COREG) 3.125 MG tablet TAKE 1 TABLET TWICE DAILY (Patient taking differently: Take 3.125 mg by mouth 2 times daily (with meals) TAKE 1 TABLET TWICE DAILY) 180 tablet 1    L-Methylfolate-Algae-B12-B6 (METANX PO) Take by mouth      gabapentin (NEURONTIN) 600 MG tablet Take 600 mg by mouth nightly as needed. Pt stated that she only takes when she has pain.  Misc. Devices (STEP N REST II WALKER) MISC Use daily for ADL's 1 each 0    aspirin 81 MG chewable tablet Take 81 mg by mouth daily      calcium citrate (CALCITRATE) 950 MG tablet Take 1 tablet by mouth daily      Blood Pressure KIT Dx: HTN. Needs automatic blood pressure machine to monitor her blood pressure. 1 kit 0    zoster recombinant adjuvanted vaccine (SHINGRIX) 50 MCG/0.5ML SUSR injection Inject 0.5 mLs into the muscle See Admin Instructions 1 dose now and repeat in 2-6 months 0.5 mL 0    acetaminophen (TYLENOL) 325 MG tablet Take 325 mg by mouth every 6 hours as needed for Pain      vitamin D (CHOLECALCIFEROL) 1000 UNIT TABS tablet Take 1,000 Units by mouth 3 times daily      Multiple Vitamins-Minerals (THERAPEUTIC MULTIVITAMIN-MINERALS) tablet Take 1 tablet by mouth daily       No current facility-administered medications for this visit. Social History     Socioeconomic History    Marital status:       Spouse name: Not on file    Number of children: Not on file    Years of education: Not on file    Highest education level: Not on file   Occupational History    Not on file   Tobacco Use    Smoking status: Never Smoker    Smokeless tobacco: Never Used   Vaping Use    Vaping Use: Never used   Substance and Sexual Activity    Alcohol use: Yes     Comment: Occassional    Drug use: No    Sexual activity: Never     Birth control/protection: Post-menopausal   Other Topics Concern    Not on file   Social History Narrative    Not on file     Social Determinants of Health Respiratory: Negative for cough, chest tightness, shortness of breath and wheezing. Cardiovascular: Negative for chest pain, palpitations and leg swelling. Gastrointestinal: Negative for abdominal distention, abdominal pain, blood in stool, constipation and nausea. Endocrine: Negative for polyuria. Genitourinary: Negative for difficulty urinating, frequency, urgency and vaginal pain. Musculoskeletal: Positive for arthralgias, back pain and gait problem. Negative for joint swelling, myalgias and neck pain. Neurological: Positive for weakness, light-headedness and numbness. Negative for dizziness, syncope, speech difficulty and headaches. Psychiatric/Behavioral: Negative for agitation, behavioral problems, dysphoric mood, hallucinations and sleep disturbance. The patient is not nervous/anxious. Physical Exam  Vitals and nursing note reviewed. Constitutional:       Appearance: She is obese. HENT:      Head: Normocephalic. Nose: Nose normal.   Eyes:      Pupils: Pupils are equal, round, and reactive to light. Cardiovascular:      Rate and Rhythm: Normal rate and regular rhythm. Pulmonary:      Effort: Pulmonary effort is normal.      Breath sounds: Normal breath sounds. No wheezing or rhonchi. Abdominal:      General: Bowel sounds are normal. There is no distension. Palpations: There is no mass. Tenderness: There is no abdominal tenderness. Hernia: No hernia is present. Musculoskeletal:      Cervical back: Normal range of motion. Lumbar back: Spasms present. Decreased range of motion. Right lower leg: No tenderness. No edema. Left lower leg: Deformity present. No tenderness. No edema. Lymphadenopathy:      Cervical: No cervical adenopathy. Neurological:      Mental Status: She is alert and oriented to person, place, and time. Cranial Nerves: Cranial nerves are intact. Motor: Weakness present.       Coordination: Coordination is intact. Psychiatric:         Attention and Perception: Attention and perception normal.         Mood and Affect: Mood and affect normal. Mood is not anxious or depressed. Speech: She is communicative. Speech is not rapid and pressured. Behavior: Behavior is not agitated. Cognition and Memory: Cognition and memory normal. Cognition is not impaired. ASSESSMENT AND PLAN      1. Essential hypertension  Controlled on current treatment continue same medications    2. Mixed hyperlipidemia  Continue statins aspirin    3. Iron deficiency anemia due to chronic blood loss  Improved on recent blood work    4. Chronic diastolic heart failure (HCC)  Stable on current treatment    5. Status post coil embolization of cerebral aneurysm  Stable follows with neurosurgeon    6. Screening mammogram for high-risk patient    - [facility] DIGITAL SCREEN W OR WO CAD BILATERAL; Future      Orders Placed This Encounter   Procedures    [facility] DIGITAL SCREEN W OR WO CAD BILATERAL     Standing Status:   Future     Standing Expiration Date:   9/14/2022     Order Specific Question:   Reason for exam:     Answer:   screening mammogram         There are no discontinued medications. Elizabeth Ellison received counseling on the following healthy behaviors: nutrition, exercise and medication adherence  Reviewed prior labs and health maintenance  Continue current medications, diet and exercise. Discussed use, benefit, and side effects of prescribed medications. Barriers to medication compliance addressed. Patient given educational materials - see patient instructions  Was a self-tracking handout given in paper form or via [employer]? Yes    Requested Prescriptions      No prescriptions requested or ordered in this encounter       All patient questions answered. Patient voiced understanding. Quality Measures    Body mass index is 18.02 kg/m². Normal. Weight control planned discussed Healthy diet and regular exercise.     BP: 138/80. Blood pressure is normal. Treatment plan consists of No treatment change needed. Fall Risk 1/13/2021 8/13/2020 5/14/2020 2/6/2019 12/13/2018 10/19/2017 3/20/2017   2 or more falls in past year? no no no no no no no   Fall with injury in past year? yes yes no no no no no     The patient does not have a history of falls. I did , complete a risk assessment for falls. A plan of care for falls in-office gait and balance testing performed using The Timed Up and Go Test was negative for increased falls risk- no further intervention is currently indicated, home safety tips provided, No Treatment plan indicated    Lab Results   Component Value Date    [address] 114 01/15/2021    LDLCHOLESTEROL 61 02/13/2020    (goal LDL reduction with dx if diabetes is 50% LDL reduction)    PHQ Scores 7/14/2021 1/13/2021 8/13/2020 5/14/2020 2/6/2019 12/13/2018 10/19/2017   PHQ2 Score 0 0 2 0 0 0 0   PHQ9 Score 0 0 2 0 0 0 0     Interpretation of Total Score Depression Severity: 1-4 = Minimal depression, 5-9 = Mild depression, 10-14 = Moderate depression, 15-19 = Moderately severe depression, 20-27 = Severe depression      The patient'spast medical, surgical, social, and family history as well as her   current medications and allergies were reviewed as documented in today's encounter. Medications, labs, diagnostic studies, consultations andfollow-up as documented in this encounter. Return in about 6 months (around 1/14/2022) for chronic conditions . Patient wasseen with total face to face time of 30 minutes. More than 50% of this visit was counseling and education.        Future Appointments   Date Time Provider Cammie Barnes   8/16/2021 10:00 AM MD lemuel Noyola [facility]   10/13/2021 10:30 AM [facility]   10/18/2021  2:30 PM Jas Boateng MD Neuro Endo [facility]   1/19/2022 10:15 [facility]     This note was completed by using the assistance of a speech-recognition program. However, inadvertent computerized transcription errors may be present. Althoughevery effort was made to ensure accuracy, no guarantees can be provided that every mistake has been identified and corrected by editing.   Electronically signed by Gabe Boxer, MD on 7/14/2021  11:15 AM

## 2021-08-16 ENCOUNTER — OFFICE VISIT (OUTPATIENT)
Dept: FAMILY MEDICINE CLINIC | Age: 71
End: 2021-08-16
Payer: MEDICARE

## 2021-08-16 VITALS
TEMPERATURE: 98.1 F | HEART RATE: 63 BPM | SYSTOLIC BLOOD PRESSURE: 126 MMHG | WEIGHT: 106.8 LBS | DIASTOLIC BLOOD PRESSURE: 72 MMHG | OXYGEN SATURATION: 98 % | HEIGHT: 64 IN | BODY MASS INDEX: 18.23 KG/M2

## 2021-08-16 DIAGNOSIS — H91.93 HEARING PROBLEM OF BOTH EARS: ICD-10-CM

## 2021-08-16 DIAGNOSIS — R41.3 MEMORY PROBLEM: ICD-10-CM

## 2021-08-16 DIAGNOSIS — Z00.00 ROUTINE GENERAL MEDICAL EXAMINATION AT A HEALTH CARE FACILITY: ICD-10-CM

## 2021-08-16 DIAGNOSIS — Z00.00 MEDICARE ANNUAL WELLNESS VISIT, SUBSEQUENT: Primary | ICD-10-CM

## 2021-08-16 DIAGNOSIS — Z71.89 ADVANCE CARE PLANNING: ICD-10-CM

## 2021-08-16 PROCEDURE — 3017F COLORECTAL CA SCREEN DOC REV: CPT | Performed by: FAMILY MEDICINE

## 2021-08-16 PROCEDURE — G0439 PPPS, SUBSEQ VISIT: HCPCS | Performed by: FAMILY MEDICINE

## 2021-08-16 PROCEDURE — 1123F ACP DISCUSS/DSCN MKR DOCD: CPT | Performed by: FAMILY MEDICINE

## 2021-08-16 PROCEDURE — 4040F PNEUMOC VAC/ADMIN/RCVD: CPT | Performed by: FAMILY MEDICINE

## 2021-08-16 ASSESSMENT — LIFESTYLE VARIABLES: HOW OFTEN DO YOU HAVE A DRINK CONTAINING ALCOHOL: 0

## 2021-08-16 ASSESSMENT — PATIENT HEALTH QUESTIONNAIRE - PHQ9
1. LITTLE INTEREST OR PLEASURE IN DOING THINGS: 0
2. FEELING DOWN, DEPRESSED OR HOPELESS: 0
SUM OF ALL RESPONSES TO PHQ9 QUESTIONS 1 & 2: 0
SUM OF ALL RESPONSES TO PHQ QUESTIONS 1-9: 0

## 2021-08-16 NOTE — PROGRESS NOTES
Medicare Annual Wellness Visit  Name: Shireen Jones Date: 2021   MRN: [id number] Sex: Female   Age: 80 y.o. Ethnicity:  / [place] [employer]   : 1950 Race:  / Abdullahi Downey is here for Medicare AWV and Hypertension    Screenings for behavioral, psychosocial and functional/safety risks, and cognitive dysfunction are all negative except as indicated below. These results, as well as other patient data from the [address] form, are documented in Flowsheets linked to this Encounter. Allergies   Allergen Reactions    Lisinopril Other (See Comments)    Losartan      Fatigue    Procardia [Nifedipine] Other (See Comments)         Prior to Visit Medications    Medication Sig Taking? Authorizing Provider   ibuprofen (ADVIL;MOTRIN) 800 MG tablet  Yes Historical Provider, MD   mupirocin (BACTROBAN) 2 % ointment  Yes Historical Provider, MD   carvedilol (COREG) 3.125 MG tablet TAKE 1 TABLET TWICE DAILY  Patient taking differently: Take 3.125 mg by mouth 2 times daily (with meals) TAKE 1 TABLET TWICE DAILY Yes Iman Ross MD   L-Methylfolate-Algae-B12-B6 (METANX PO) Take by mouth Yes Historical Provider, MD   gabapentin (NEURONTIN) 600 MG tablet Take 600 mg by mouth nightly as needed. Pt stated that she only takes when she has pain. Yes Historical Provider, MD   Misc. Devices (STEP N REST II WALKER) MISC Use daily for ADL's Yes Iman Ross MD   aspirin 81 MG chewable tablet Take 81 mg by mouth daily Yes Historical Provider, MD   calcium citrate (CALCITRATE) 950 MG tablet Take 1 tablet by mouth daily Yes Historical Provider, MD   Blood Pressure KIT Dx: HTN. Needs automatic blood pressure machine to monitor her blood pressure.  Yes Iman Ross MD   acetaminophen (TYLENOL) 325 MG tablet Take 325 mg by mouth every 6 hours as needed for Pain Yes Historical Provider, MD   vitamin D (CHOLECALCIFEROL) 1000 UNIT TABS tablet Take 1,000 Units by mouth 3 times daily Yes Historical Provider, MD   Multiple Vitamins-Minerals (THERAPEUTIC MULTIVITAMIN-MINERALS) tablet Take 1 tablet by mouth daily Yes Historical Provider, MD   zoster recombinant adjuvanted vaccine (SHINGRIX) 50 MCG/0.5ML SUSR injection Inject 0.5 mLs into the muscle See Admin Instructions 1 dose now and repeat in 2-6 months  Patient not taking: Reported on 8/16/2021  Ileana Pineda MD         Past Medical History:   Diagnosis Date    Acid reflux     Arthritis     Sees Dr.Goldberger    Brain aneurysm     Cancer ([address]     cervical     Family history of breast cancer in first degree relative     sister   Suleman zelaya     hx of    Hard of hearing     bilateral hearing aids    History of cervical cancer     Hypertension     Sees PCP    Lateral meniscus tear     left knee    Wears dentures     Wears glasses     Wellness examination 04/14/2021    PCP: Dr. Ileana Pineda [facility], last visit Jan 2021    Wellness examination 04/14/2021    [facility]    Wellness examination 04/14/2021    Orthopedic: Dr. Staci Bray, last visit Feb 2021    Wellness examination 04/14/2021    Internist: Deneen Gómez, last visit Nov 2020       Past Surgical History:   Procedure Laterality Date    BREAST BIOPSY  1980    LEFT(BENIGN)    CHOLECYSTECTOMY      COLONOSCOPY  12/09    Negative    COLONOSCOPY N/A 5/31/2019    COLONOSCOPY POLYPECTOMY HOT SNARE, COLD POLYPECTOMY performed by Merly Ortega MD at [address] [facility]    Left Wrist    EAR SURGERY Right 04/23/2021     EAR BONE ANCHORED HEARING [facility] (Right )    ENDOSCOPY, COLON, DIAGNOSTIC      EXTERNAL EAR SURGERY Right 4/23/2021    EAR BONE ANCHORED HEARING AID REMOVAL performed by Nicole Perez MD at [address] Bilateral     [address]  [address]ICC [address] DOUBLE  2/20/2020         HERNIA REPAIR      UMBILICAL     INNER EAR SURGERY Bilateral     hearing aid implant  KNEE ARTHROSCOPY  ,     LEFT    KNEE ARTHROSCOPY Left 4/3/2019    KNEE ARTHROSCOPY DIAGNOSTIC performed by Edgar Butcher MD at [facility] Right 14    BAHA- bone anchored hearing aid    OTHER SURGICAL HISTORY  2020    aneurysm rt SENTHIL, with 6 coils placed    SIGMOIDOSCOPY N/A 2020    SIGMOIDOSCOPY DIAGNOSTIC FLEXIBLE performed by Cornel Mcgill MD at [place] [address]      UPPER GASTROINTESTINAL ENDOSCOPY N/A 2020    EGD DIAGNOSTIC ONLY performed by Cornel Mcgill MD at [facility] Bilateral     VASCULAR SURGERY  10/01/2020    cerebral angiogram         Family History   Problem Relation Age of Onset    Heart Attack Father     Cancer Mother         stomach cancer    Breast Cancer Sister         one sister with breast cancer    Colon Cancer Neg Hx     Diabetes Neg Hx     Eclampsia Neg Hx     Hypertension Neg Hx     Ovarian Cancer Neg Hx      Labor Neg Hx     Spont Abortions Neg Hx     Stroke Neg Hx     Liver Cancer Neg Hx        CareTeam (Including outside providers/suppliers regularly involved in providing care):   Patient Care Team:  Iman Ross MD as PCP - General (Family Medicine)  Iman Ross MD as PCP - [facility] Empaneled Provider  Ez Calderón DO as Consulting Physician (Obstetrics & Gynecology)  Rafael Oconnor MD as Consulting Physician (Gastroenterology)    Wt Readings from Last 3 Encounters:   21 106 lb 12.8 oz (48.4 kg)   21 105 lb (47.6 kg)   21 100 lb 8 oz (45.6 kg)     Vitals:    21 1011   BP: 126/72   Pulse: 63   Temp: 98.1 °F (36.7 °C)   TempSrc: Temporal   SpO2: 98%   Weight: 106 lb 12.8 oz (48.4 kg)   Height: 5' 4\" (1.626 m)     Body mass index is 18.33 kg/m². Based upon direct observation of the patient, evaluation of cognition reveals , abnormal clock drawing .     Pt denies any acute changes of memory problems, remembers yesterday's  Dinner, year , month and date. remembers birthday . Patient's complete Health Risk Assessment and screening values have been reviewed and are found in Flowsheets. The following problems were reviewed today and where indicated follow up appointments were made and/or referrals ordered. Positive Risk Factor Screenings with Interventions:      Cognitive: Words recalled: 2 Words Recalled  Clock Drawing Test (CDT) Score: (!) Abnormal  Total Score Interpretation: Positive Mini-Cog  Did the patient refuse to take the cognition test?: No  Cognitive Impairment Interventions:  · Neuropsychology referral ordered for further testing         General Health and ACP:  General  In general, how would you say your health is?: Fair  In the past 7 days, have you experienced any of the following?  New or Increased Pain, New or Increased Fatigue, Loneliness, Social Isolation, Stress or Anger?: None of These  Do you get the social and emotional support that you need?: (!) No  Do you have a Living Will?: (!) No  Advance Directives     Power of [address] [place] [address] Will ACP-Advance Directive ACP-Power of     Not on File [place] on 03/04/20 Filed [address] [place] [address] [place] Risk Interventions:  · Social isolation: pt lives with her daughter   · No Living Will: [facility] Habits/Nutrition:  Health Habits/Nutrition  Do you exercise for at least 20 minutes 2-3 times per week?: Yes  Have you lost any weight without trying in the past 3 months?: No  Do you eat only one meal per day?: (!) Yes  Have you seen the dentist within the past year?: (!) No  Body mass index: (!) 18.33  Health Habits/Nutrition Interventions:  · Nutritional issues:  pt denies any nutritional concerns   · Dental exam overdue:  patient encouraged to make appointment with his/her dentist    Hearing/Vision:  No exam data present  Hearing/Vision  Do you or your family notice any trouble with your hearing that hasn't been managed with hearing aids?: (!) Yes  Do you have difficulty driving, watching TV, or doing any of your daily activities because of your eyesight?: No  Have you had an eye exam within the past year?: Yes  Hearing/Vision Interventions:  · Hearing concerns:  audiology referral provided    Safety:  Safety  Do you have working smoke detectors?: Yes  Have all throw rugs been removed or fastened?: Yes  Do you have non-slip mats or surfaces in all bathtubs/showers?: (!) No  Do all of your stairways have a railing or banister?: (!) No  Are your doorways, halls and stairs free of clutter?: (!) No  Do you always fasten your seatbelt when you are in a car?: Yes  Safety Interventions:  · Home safety tips provided     Personalized Preventive Plan   Current Health Maintenance Status  Immunization History   Administered Date(s) Administered    COVID-19, Moderna, PF, 100mcg/0.5mL 04/20/2021, 05/18/2021    Hepatitis B Adult (Engerix-B) 06/15/2020, 08/13/2020    Influenza Virus Vaccine 10/10/2017, 10/09/2018, 10/18/2019    Influenza, Triv, inactivated, subunit, adjuvanted, IM (Fluad 65 yrs and older) 10/15/2020    Pneumococcal Conjugate 13-valent (Liza Hummer) 07/08/2019    Pneumococcal Polysaccharide (Vczzzppmw20) 11/11/2016, 04/09/2018        Health Maintenance   Topic Date Due    Shingles Vaccine (1 of 2) Never done    Breast cancer screen  08/03/2021    DTaP/Tdap/Td vaccine (1 - Tdap) 01/13/2022 (Originally 4[birth year]8/1970)    Flu vaccine (1) 09/01/2021    Annual Wellness Visit (AWV)  03/26/2022    Potassium monitoring  04/23/2022    Creatinine monitoring  06/09/2022    Lipid screen  01/15/2026    Colon cancer screen colonoscopy  05/31/2029    DEXA (modify frequency per FRAX score)  Completed    Pneumococcal 65+ years Vaccine  Completed    COVID-19 Vaccine  Completed    Hepatitis C screen  Completed    Hepatitis B vaccine  Aged Out    Hib vaccine  Aged C/ Roshan Herber 19 Meningococcal (ACWY) vaccine  Aged Out    Hepatitis A vaccine  Discontinued     Recommendations for Preventive Services Due: see orders and patient instructions/AVS.  . Recommended screening schedule for the next 5-10 years is provided to the patient in written form: see Patient Instructions/AVS.    Stanley Freire was seen today for medicare awv and hypertension.     Diagnoses and all orders for this visit:    Medicare annual wellness visit, subsequent    Memory problem  -     Joshua Sebastian, PhD, Neuropsychology, [facility] planning  -     Ambulatory Referral to [facility] Clinical Specialist    Hearing problem of both ears  -     JUAREZ - SONU Vanessa, Audiology, [place]    Routine general medical examination at a health care facility

## 2021-08-16 NOTE — PATIENT INSTRUCTIONS
Personalized Preventive Plan for Osmel Malik - 8/16/2021  Medicare offers a range of preventive health benefits. Some of the tests and screenings are paid in full while other may be subject to a deductible, co-insurance, and/or copay. Some of these benefits include a comprehensive review of your medical history including lifestyle, illnesses that may run in your family, and various assessments and screenings as appropriate. After reviewing your medical record and screening and assessments performed today your provider may have ordered immunizations, labs, imaging, and/or referrals for you. A list of these orders (if applicable) as well as your Preventive Care list are included within your After Visit Summary for your review. Other Preventive Recommendations:    · A preventive eye exam performed by an eye specialist is recommended every 1-2 years to screen for glaucoma; cataracts, macular degeneration, and other eye disorders. · A preventive dental visit is recommended every 6 months. · Try to get at least 150 minutes of exercise per week or 10,000 steps per day on a pedometer . · Order or download the FREE \"Exercise & Physical Activity: Your Everyday Guide\" from [employer]. Call 9-235.529.7707 or search [employer] online. · You need 1010-5074 mg of calcium and 4583-4473 IU of vitamin D per day. It is possible to meet your calcium requirement with diet alone, but a vitamin D supplement is usually necessary to meet this goal.  · When exposed to the sun, use a sunscreen that protects against both UVA and UVB radiation with an SPF of 30 or greater. Reapply every 2 to 3 hours or after sweating, drying off with a towel, or swimming. · Always wear a seat belt when traveling in a car. Always wear a helmet when riding a bicycle or motorcycle.

## 2021-08-16 NOTE — PROGRESS NOTES
Mini Cog    Instructions Script   3 Item Recall Test Tell the patient you are going to give them 3 words to remember. Pause after stating each word. \"Ms. Gong am going to tell you 3 words that I want you to remember. Apple (pause)  Watch (pause) Byron Paget (pause)      Make sure the patient heard the words correctly by asking them to repeat the words back to you Ms. Ester Miranda, what words did I just tell you?     Allow patient to repeat words back to you. If they did not hear them or understand correctly, repeat the words again and have them repeat the words back to you. When the patient has repeated the words Apple, Watch, Christy back to you, move on to the clock drawing test    The Clock Drawing Test Hand the patient a piece of paper on which you have drawn a medium size Huslia. Tell the patient you would like them to draw a picture of a clock using the exact words in the script. Ms. Ester Miranda, I would like you to draw a picture of a clock. Inside the Huslia, please draw the number hours on a clock as they normally appear    When the patient is done filling in the clock to the best of their ability, tell the patient to draw the hands of the clock at 11:10 using the exact words in the script. Ms. Gong want you to draw the hands of the clock at ten minutes after eleven oclock. 3 Item Recall Ask the patient to recall the 3 words you asked them to remember. No prompting is allowed. Ms. Gong asked you to remember 3 words. What are the 3 words I asked you to remember?      Recall  The patient receives 1 point for each word remembered. Score a 0 for each word missed.     Apple- Yes  Watch- No  Christy- Yes    Patient refused = No  Recall Score= 2  Clock Draw = abnormal  MiniCog outcome:  negative    ________________________________________________________________________________________________  Scoring Instructions  Clock Draw Scoring Normal Abnormal   Numbers All present Number(s) missing   Number sequence & position Correct sequences & position Out of sequence; spacing abnormal   Hands Display correct time: 11:10 Missing, wrong time     Patient refuses   Score of 3 = Negative Screen  Score of 1 or 2 with normal clock draw = Negative Screen  Score of 1 or 2 with abnormal clock draw = Positive Screen  Score of 0 = Positive Screen regardless of Clock Drawing Score  Patient refuses = Positive Screen  Remember to have nursing scan in the Clock Draw page into the chart. Coding  ICD-9 Code Diagnosis Code To be used for Follow up   V70.5 Health exam of defined subpopulation: >58 y/o adults   SCREENING for cognitive function Screening yearly   V71 Observation and evaluation  for suspected conditions NOT found NEGATIVE SCREENING Repeat screening in 1 year   799.59 Other signs and symptoms involving cognition POSITIVE SCREENING   Refer to  recommended follow-up  process     Visit Information    Have you changed or started any medications since your last visit including any over-the-counter medicines, vitamins, or herbal medicines? no   Are you having any side effects from any of your medications? -  no  Have you stopped taking any of your medications? Is so, why? -  no    Have you seen any other physician or provider since your last visit? No  Have you had any other diagnostic tests since your last visit? No  Have you been seen in the emergency room and/or had an admission to a hospital since we last saw you? No  Have you had your routine dental cleaning in the past 6 months? no    Have you activated your [employer] account? If not, what are your barriers?  No:      Patient Care Team:  Vladislav Bauer MD as PCP - General (Family Medicine)  Vladislav Bauer MD as PCP - [facility] Empaneled Provider  Aakash Hobson DO as Consulting Physician (Obstetrics & Gynecology)  Flavio hPelps MD as Consulting Physician (Gastroenterology)    Medical History Review  Past Medical, Family, and Social History reviewed and does contribute to the patient presenting condition    Health Maintenance   Topic Date Due    COVID-19 Vaccine (1) Never done    Shingles Vaccine (1 of 2) Never done    Breast cancer screen  08/03/2021    DTaP/Tdap/Td vaccine (1 - Tdap) 01/13/2022 (Originally 4/8/1970)    Flu vaccine (1) 09/01/2021    Annual Wellness Visit (AWV)  03/26/2022    Potassium monitoring  04/23/2022    Creatinine monitoring  06/09/2022    Lipid screen  01/15/2026    Colon cancer screen colonoscopy  05/31/2029    DEXA (modify frequency per FRAX score)  Completed    Pneumococcal 65+ years Vaccine  Completed    Hepatitis C screen  Completed    Hepatitis B vaccine  Aged Out    Hib vaccine  Aged Out    Meningococcal (ACWY) vaccine  Aged Out    Hepatitis A vaccine  Discontinued

## 2021-08-17 ENCOUNTER — CLINICAL DOCUMENTATION (OUTPATIENT)
Dept: SPIRITUAL SERVICES | Age: 71
End: 2021-08-17

## 2021-08-17 NOTE — ACP (ADVANCE CARE PLANNING)
Advance Care Planning   Ambulatory ACP Specialist Patient Outreach    Date:  8/17/2021  ACP Specialist:  Silver Ervin    Outreach call to patient in follow-up to ACP Specialist referral from: Amaya Cote MD    [x] PCP  [] Provider   [] Ambulatory Care Management [] Other for Reason:    [x] Advance Directive Assistance  [] Code Status Discussion  [] Complete Portable DNR Order  [x] Discuss Goals of Care  [] Complete POST/MOST  [] Early ACP Decision-Making  [] Other    Date Referral Received:08/16/2021    Today's Outreach:  [x] First   [] Second  [] Third                               Third outreach made by [x]  phone  [] email []   [employer]     Intervention:  [] Spoke with Patient  [x] Left VM requesting return call      Outcome: Left vm for patient to call us back. Asked patient to help in understanding the reason for the referral, as the patient has current ACP documents in her chart. Next Step:   [] ACP scheduled conversation  [x] Outreach again in one week               [] Email / Mail ACP Info Sheets  [] Email / Mail Advance Directive            [] Close Referral. Routing closure to referring provider/staff and to ACP Specialist .      Thank you for this referral.

## 2021-09-13 ENCOUNTER — CLINICAL DOCUMENTATION (OUTPATIENT)
Dept: SPIRITUAL SERVICES | Age: 71
End: 2021-09-13

## 2021-09-13 NOTE — ACP (ADVANCE CARE PLANNING)
Advance Care Planning   Ambulatory ACP Specialist Patient Outreach    Date:  9/13/2021  ACP Specialist:  Emma Mari    Outreach call to patient in follow-up to ACP Specialist referral from: Fernandez Lozano MD    [] PCP  [x] Provider   [] Ambulatory Care Management [] Other for Reason:    [x] Advance Directive Assistance  [] Code Status Discussion  [] Complete Portable DNR Order  [] Discuss Goals of Care  [] Complete POST/MOST  [] Early ACP Decision-Making  [] Other    Date Referral Received:8/17/2021    Today's Outreach:  [] First   [x] Second  [] Third                               Third outreach made by []  phone  [] email []   [employer]     Intervention:  [x] Spoke with Patient  [] Left VM requesting return call      Outcome: Patient explained she has documents on file and does not want to set appointment. Next Step:   [] ACP scheduled conversation  [] Outreach again in one week               [] Email / Mail ACP Info Sheets  [] Email / Mail Advance Directive            [x] Close Referral. Routing closure to referring provider/staff and to ACP Specialist .      Thank you for this referral.

## 2021-12-27 RX ORDER — CARVEDILOL 3.12 MG/1
3.12 TABLET ORAL 2 TIMES DAILY WITH MEALS
Qty: 180 TABLET | Refills: 3 | Status: SHIPPED | OUTPATIENT
Start: 2021-12-27

## 2022-01-03 ENCOUNTER — OFFICE VISIT (OUTPATIENT)
Dept: NEUROLOGY | Age: 72
End: 2022-01-03
Payer: MEDICARE

## 2022-01-03 VITALS
DIASTOLIC BLOOD PRESSURE: 77 MMHG | HEART RATE: 74 BPM | RESPIRATION RATE: 14 BRPM | WEIGHT: 106 LBS | BODY MASS INDEX: 18.1 KG/M2 | OXYGEN SATURATION: 99 % | SYSTOLIC BLOOD PRESSURE: 145 MMHG | HEIGHT: 64 IN

## 2022-01-03 DIAGNOSIS — I67.1 CEREBRAL ANEURYSM: Primary | ICD-10-CM

## 2022-01-03 DIAGNOSIS — Z98.890 S/P COIL EMBOLIZATION OF CEREBRAL ANEURYSM: ICD-10-CM

## 2022-01-03 DIAGNOSIS — Z95.828 MRI-SAFE ENDOVASCULAR ANEURYSM COIL PRESENT: ICD-10-CM

## 2022-01-03 PROCEDURE — G8399 PT W/DXA RESULTS DOCUMENT: HCPCS | Performed by: PSYCHIATRY & NEUROLOGY

## 2022-01-03 PROCEDURE — 3017F COLORECTAL CA SCREEN DOC REV: CPT | Performed by: PSYCHIATRY & NEUROLOGY

## 2022-01-03 PROCEDURE — G8484 FLU IMMUNIZE NO ADMIN: HCPCS | Performed by: PSYCHIATRY & NEUROLOGY

## 2022-01-03 PROCEDURE — 4040F PNEUMOC VAC/ADMIN/RCVD: CPT | Performed by: PSYCHIATRY & NEUROLOGY

## 2022-01-03 PROCEDURE — 1123F ACP DISCUSS/DSCN MKR DOCD: CPT | Performed by: PSYCHIATRY & NEUROLOGY

## 2022-01-03 PROCEDURE — G8427 DOCREV CUR MEDS BY ELIG CLIN: HCPCS | Performed by: PSYCHIATRY & NEUROLOGY

## 2022-01-03 PROCEDURE — 1090F PRES/ABSN URINE INCON ASSESS: CPT | Performed by: PSYCHIATRY & NEUROLOGY

## 2022-01-03 PROCEDURE — 99215 OFFICE O/P EST HI 40 MIN: CPT | Performed by: PSYCHIATRY & NEUROLOGY

## 2022-01-03 PROCEDURE — G8419 CALC BMI OUT NRM PARAM NOF/U: HCPCS | Performed by: PSYCHIATRY & NEUROLOGY

## 2022-01-03 PROCEDURE — 1036F TOBACCO NON-USER: CPT | Performed by: PSYCHIATRY & NEUROLOGY

## 2022-01-03 NOTE — PROGRESS NOTES
Neurocritical Care, Stroke & Neurointerventional Note    [address]   [address] [place] [address], [address], [address] [facility]   P: [phone]    Endovascular Neurosurgery Consult    Pt Name: Maia Bergman  MRN: [id number]  [place]: 1950  Date of evaluation: 1/3/2022  Primary Care Physician: Jinny Ruffin MD    Reason for evaluation: [facility] and pericallosal SENTHIL aneurysm     SUBJECTIVE:   History of Chief Complaint:    Maia Bergman is a 80 y.o. 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 Medications  Prior to Admission medications    Medication Sig Start Date End Date Taking? Authorizing Provider   carvedilol (COREG) 3.125 MG tablet Take 1 tablet by mouth 2 times daily (with meals) TAKE 1 TABLET TWICE DAILY 12/27/21  Yes Jinny Ruffin MD   ibuprofen (ADVIL;MOTRIN) 800 MG tablet  6/20/20  Yes Historical Provider, MD   mupirocin (BACTROBAN) 2 % ointment  1/27/21  Yes Historical Provider, MD   L-Methylfolate-Algae-B12-B6 (METANX PO) Take by mouth   Yes Historical Provider, MD   gabapentin (NEURONTIN) 600 MG tablet Take 600 mg by mouth nightly as needed. Pt stated that she only takes when she has pain. 11/17/20  Yes Historical Provider, MD   Misc.  Devices (STEP N REST II WALKER) MISC Use daily for ADL's 1/13/21  Yes Jinny Ruffin MD   aspirin 81 MG chewable tablet Take 81 mg by mouth daily   Yes Historical Provider, MD   calcium citrate (CALCITRATE) 950 MG tablet Take 1 tablet by mouth daily   Yes Historical Provider, MD   Blood Pressure KIT Dx: HTN. Needs automatic blood pressure machine to monitor her blood pressure. 7/13/20  Yes Karina Zhu MD   zoster recombinant adjuvanted vaccine [facility]) 50 MCG/0.5ML SUSR injection Inject 0.5 mLs into the muscle See Admin Instructions 1 dose now and repeat in 2-6 months 7/13/20  Yes Karina Zhu MD   acetaminophen (TYLENOL) 325 MG tablet Take 325 mg by mouth every 6 hours as needed for Pain   Yes Historical Provider, MD   vitamin D (CHOLECALCIFEROL) 1000 UNIT TABS tablet Take 1,000 Units by mouth 3 times daily   Yes Historical Provider, MD   Multiple Vitamins-Minerals (THERAPEUTIC MULTIVITAMIN-MINERALS) tablet Take 1 tablet by mouth daily   Yes Historical Provider, MD    Scheduled Meds:  Continuous Infusions:  PRN Meds:.  Past Medical History   has a past medical history of Acid reflux, Arthritis, Brain aneurysm, Cancer ([facility] [place] [address] Family history of breast cancer in first degree relative, Gall stones, Hard of hearing, History of cervical cancer, Hypertension, Lateral meniscus tear, Wears dentures, Wears glasses, Wellness examination, Wellness examination, Wellness examination, and Wellness examination. Past Surgical History   has a past surgical history that includes Tonsillectomy and adenoidectomy (1956); Cholecystectomy; Inner ear surgery (Bilateral); Knee arthroscopy (2000, 2005); cyst removal (1980); Colonoscopy (12/09); Breast biopsy (1980); Foot surgery (Bilateral); other surgical history (Right, 9-4-14); Umbilical hernia repair; hernia repair; Varicose vein surgery (Bilateral); Knee arthroscopy (Left, 4/3/2019); Colonoscopy (N/A, 5/31/2019); other surgical history (02/12/2020);   picc powerpicc double (2/20/2020); Upper gastrointestinal endoscopy (N/A, 2/20/2020); sigmoidoscopy (N/A, 2/20/2020); Endoscopy, colon, diagnostic; vascular surgery (10/01/2020); Ear surgery (Right, 04/23/2021); and External ear surgery (Right, 4/23/2021). Social History   reports that she has never smoked.  She has never used smokeless tobacco.   reports current alcohol use. reports no history of drug use. Family History  family history includes Breast Cancer in her sister; Cancer in her mother; Heart Attack in her father. Review of Systems:  CONSTITUTIONAL:  negative for fevers, chills, fatigue and malaise    EYES:  negative for double vision, blurred vision and photophobia     HEENT:  negative for tinnitus, epistaxis and sore throat    RESPIRATORY:  negative for cough, shortness of breath, wheezing    CARDIOVASCULAR:  negative for chest pain, palpitations, syncope, edema    GASTROINTESTINAL:  negative for nausea, vomiting    GENITOURINARY:  negative for incontinence    MUSCULOSKELETAL:  negative for neck or back pain    NEUROLOGICAL:  Negative for weakness and tingling  negative for headaches and dizziness    PSYCHIATRIC:  negative for anxiety      Review of systems otherwise negative. OBJECTIVE:   Vitals: BP (!) 145/77   Pulse 74   Resp 14   Ht 5' 4\" (1.626 m)   Wt 106 lb (48.1 kg)   SpO2 99%   BMI 18.19 kg/m²   General appearance: Lying in bed, NAD  HEENT: Head: Normocephalic, no lesions, without obvious abnormality.   Neck: no adenopathy, no carotid bruit, no JVD, supple, symmetrical, trachea midline and thyroid not enlarged, symmetric, no tenderness/mass/nodules  Lungs: clear to auscultation bilaterally  Heart: regular rate and rhythm, S1, S2 normal, no murmur, click, rub or gallop  Abdomen: soft, non-tender; nondistended, bowel sounds normal  Extremities: extremities normal, atraumatic, no cyanosis or edema    Neurologic: Mental status: Alert, oriented, thought content appropriate, Alert and oriented x 3,   Language/speech: intact language, attention, knowledge  CN: II-XII intact  MOTOR: left leg weakness   SENSORY: LT and PP symmetrical and intact  COORDINATION: F to N and Gait intact for age      LABS:   No results for input(s): WBC, HGB, HCT, PLT, NA, K, CL, CO2, BUN, CREATININE, MG, PHOS, CALCIUM, PTT, INR, AST, ALT, BILITOT, BILIDIR, NITRU, COLORU, BACTERIA in the last 72 hours. Invalid input(s): PT, WBCU, RBCU, LEUKOCYTESUA  No results for input(s): ALKPHOS, ALT, AST, BILITOT, BILIDIR, LABALBU, AMYLASE, LIPASE in the last 72 hours. RADIOLOGY:   Images were personally reviewed including:      FU angio on 10/1/2020 showed RR I occlusion    IMPRESSIONS:   Juana Ruiz is a 80 y.o. 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 PLANS:   Asa 81 MG DAILY  MRA yearly  RTC in one year      Consultation Visit Time:  40 minutes  Patient given educational materials - see patient instructions. Discussed use, benefit, and side effects of prescribed medications. Personally reviewed imaging with patients and all questions answered. Pt voiced understanding. Patient agreed with treatment plan. Follow up as directed below. Greater than 50% of the time was for counseling and providing answer to the patient question. The findings and the plan discussed with the patient and all her questions were answered. Thank you very much for your referral, please do not hesitate to contact me with any questions.     Keerthi Cortes MD Pager: [phone]  Stroke, [facility] Stroke [address]MD MD  Pager [phone]  Electronically signed 1/3/2022 at 1:44 PM

## 2023-02-16 RX ORDER — CARVEDILOL 3.12 MG/1
3.12 TABLET ORAL 2 TIMES DAILY WITH MEALS
Qty: 180 TABLET | Refills: 3 | Status: SHIPPED | OUTPATIENT
Start: 2023-02-16

## 2023-02-16 NOTE — TELEPHONE ENCOUNTER
Please Approve or Refuse.   Send to Pharmacy per Pt's Request:      Next Visit Date:  5/11/2023   Last Visit Date: 8/16/2021    Hemoglobin A1C (%)   Date Value   02/13/2020 5.6   06/02/2019 5.2             ( goal A1C is < 7)   BP Readings from Last 3 Encounters:   01/03/22 (!) 145/77   08/16/21 126/72   07/14/21 138/80          (goal 120/80)  BUN   Date Value Ref Range Status   04/14/2021 19 8 - 23 mg/dL Final     Creatinine   Date Value Ref Range Status   06/09/2021 0.92 (H) 0.50 - 0.90 mg/dL Final     Potassium   Date Value Ref Range Status   04/14/2021 4.0 3.7 - 5.3 mmol/L Final

## 2023-03-20 DIAGNOSIS — I67.1 CEREBRAL ANEURYSM: Primary | ICD-10-CM

## 2023-03-27 NOTE — PROGRESS NOTES
Daily Progress Note  Neuro Critical Care    Patient Name: José Manuel Chavez  Patient : 1950  Room/Bed: 8870/0006-04  Code Status: FULL  Allergies: Allergies   Allergen Reactions    Lisinopril Other (See Comments)    Losartan      Fatigue    Procardia [Nifedipine] Other (See Comments)       CHIEF COMPLAINT:      Shortness of breath     [address] Course:   : To angio where she underwent coil embolization of ruptured right A2 SENTHIL aneurysm resulting in complete obliteration. : CT Head showed bilateral SAH with new intraventricular extension, developing hydrocephalus, interval worsening of diffuse cerebral edema, significantly increased hyperattenuation in bilateral medial frontal regions; increased hemorrhage vs contrast. Started on 2% hypertonic saline with goal sodium 150. Neurosurgery following for EVD watch. : More alert. TCD normal. Continues of 2%. CT Head showed decreased SAH, subtle rightward shift. 2/15: Hypoxic overnight with increasing O2 requirements. Placed on non-rebreather with continued desaturations. CT chest PE protocol showed severe dense consolidations in the bilateral lower lobes suggestive of severe pneumonia, moderate bilateral pleural effusions. Given 20mg IVP Lasix x1. Started on Vanc and Zosyn. Placed on biPAP with improvements. Transitioned to high flow O2 in the AM.  Urine culture +E. Coli. Blood culture sent. Mobility, IS, and acapella encouraged. : hypoxic again overnight, put on high flow, cont on vanc and zosyn. On 2% hypertonic with near goal Na, goal 145. [place] Echo showed EF of 58%. : pt hypoxic throughout day on high flow, Inc. WOB. HH 6.0 refusing Blood d/t being Taoist. H/O consulted, blood draws minimum, fluids, iron. Full Code. Stool Occult Positive. : Art line DC d/t oozing, sodium checks daily, minimize blood draws.   H&H 6.0, started on Protonix drip per GI, given first dose of Aranesp, begin iron Stressors continue but I feel is functioning well    Continue current regimen IMAGING:   XR CHEST (SINGLE VIEW FRONTAL)   Final Result   Slightly improved multifocal opacities. VL TRANSCRANIAL DOPPLER COMPLETE   Final Result      XR CHEST (SINGLE VIEW FRONTAL)   Final Result   Improving but persistent multifocal airspace disease and edema. XR CHEST PORTABLE   Final Result   Worsening multifocal airspace disease possibly pneumonia versus ARDS. VL TRANSCRANIAL DOPPLER COMPLETE   Final Result      CT HEAD WO CONTRAST   Final Result   Decrease in size of intracranial blood products associated with anterior   cerebral artery coiling. The findings were sent to the Radiology Results [address] at 8:36   am on 2/21/2020to be communicated to a licensed caregiver. XR ABDOMEN FOR NG/OG/NE TUBE PLACEMENT   Final Result   Enteric tube now in improved position in the stomach. XR CHEST PORTABLE   Final Result   Stable bilateral pulmonary infiltrates, most consistent with multifocal   pneumonia. PICC line tip at the cavoatrial junction. XR ABDOMEN FOR NG/OG/NE TUBE PLACEMENT   Final Result   No interval change in the position of the enteric catheter. Recommend   partial retraction of the catheter, hopefully allowing the catheter to   advance distally. XR ABDOMEN FOR NG/OG/NE TUBE PLACEMENT   Final Result   Enteric tube tip is looped within the stomach with tip directed cranially at   the gastroesophageal junction. Side port is not visualized. Suggest   retracting and repositioning the tube.  TRANSCRANIAL DOPPLER COMPLETE   Final Result      XR CHEST PORTABLE   Final Result   1. Interval intubation. 2.  Unchanged bilateral airspace consolidation. XR CHEST PORTABLE   Final Result   No significant change in bilateral consolidative opacities. NM GI BLOOD LOSS   Final Result   No evidence of active GI bleeding during acquisition.       RECOMMENDATIONS:   If the patient shows hemodynamic signs of an active bleed in the next 20   hours, additional images can be acquired. XR CHEST (SINGLE VIEW FRONTAL)   Final Result   Stable multifocal bilateral consolidation. VL TRANSCRANIAL DOPPLER COMPLETE   Final Result      VASCULAR REPORT   Final Result      VL TRANSCRANIAL DOPPLER COMPLETE   Final Result      XR CHEST PORTABLE   Final Result   Right greater than left perihilar airspace disease unchanged. COPD.         VL TRANSCRANIAL DOPPLER COMPLETE   Final Result      XR CHEST PORTABLE   Final Result   Worsening multifocal pneumonia. CTA HEAD W CONTRAST   Final Result   Findings suggesting mild developing peripheral vasospasm in the anterior   circulation         VL TRANSCRANIAL DOPPLER COMPLETE   Final Result      XR CHEST PORTABLE   Final Result   Multifocal airspace opacities most confluent within the right infrahilar   region/right lower lobe suggestive of pneumonia. Suspect mild interstitial pulmonary edema. Pleural effusions appear less conspicuous on today's exam.      Follow-up to assure resolution of the airspace opacities recommended   following medical treatment. CT Chest Pulmonary Embolism W Contrast   Final Result   1. Note: Study significantly limited by patient breathing motion related   artifact. 2. No definitive evidence of acute pulmonary embolism. 3. Bilateral lower lung lobe severe dense consolidation consistent with   severe pneumonia. 4. Adjacent moderate bilateral layering posterior pleural effusions, as   discussed above. 5. Moderate cardiomegaly. 6. Mild pericardial effusion. Recommend clinical correlation. 7. Cholecystectomy. XR CHEST PORTABLE   Final Result   1. Pulmonary edema. 2. Left lower lobe atelectasis versus pneumonia.  TRANSCRANIAL DOPPLER COMPLETE   Final Result      CT HEAD WO CONTRAST   Final Result   1. Findings consistent with prior anterior cerebral artery aneurysm coiling.       2.  No change in tiny Dr. Isidro Shaniqua At 2:30 pm on 2/12/2020. VL TRANSCRANIAL DOPPLER COMPLETE    (Results Pending)         Labs and Images reviewed with:  [] Dr. Jenna Restrepo    [x] Dr. Brandin Brooks  [] Dr. Daxa Avelar  [] There are no new interval images to review. PHYSICAL EXAM         CONSTITUTIONAL:   Intubated, NAD, eyes open spontaneously, following commands, nodding head yes and no to questions appropriately. HEAD:  normocephalic, atraumatic, able to lift head off of bed   EYES:  PERRL, EOMI,   ENT:  moist mucous membranes   NECK:  supple, symmetric   LUNGS:  Equal air entry bilaterally-mild crackles bilateral bases, improved   CARDIOVASCULAR:  normal s1 / s2, RRR, distal pulses intact   ABDOMEN:  Soft, no rigidity, no tenderness, groin site appears well, no bleeding   NEUROLOGIC:  Mental Status: Intubated, following full commands, nodding head yes and no to questions appropriately, eyes open spontaneously    Cranial Nerves:    PERRL, EOMI, tracking appropriately  cough and gag present      Motor Exam:    Tone:  normal    Patient moving all extremities spontaneously. 5/5 MS in BUE  3-/5 to bilateral lower extremities, equal  5/5 bilateral dorsi and plantar flexion    Sensory:    Touch:    Sensation intact to extremities     DRAINS:  [x] There are no drains for Neuro Critical Care to monitor at this time. ASSESSMENT AND PLAN:       The patient is a 77 y.o. female with a history of HTN who was admitted to the Neuro ICU for management of acute SAH in setting of right SENTHIL aneurysm. Patient initially presented to [facility] ED with thunderclap headache, diaphoresis, and nausea/vomiting.   Underwent coil embolization on 2/12/20.     NEUROLOGIC:  - Acute SAH in setting of ruptured right A2 SENTHIL aneurysm  - POD #12 s/p coil embolization resulting in complete obliteration  - Nimodipine 60 mg every 4 hours  - Follow daily TCDs to monitor for vasospasm  - Neuro Endovascular  - Goal SBP<160  - Neuro checks per protocol  - thiamine, folate QD    CARDIOVASCULAR:  - Goal SBP<160  - History of HTN  - Hold Hydralazine 25mg Q6h (half home dosing), hold home Coreg  - Troponin 14, EKG NSR  - Echo: EF 58%  - History HLD; continue home Lipitor 40mg QHS     PULMONARY:  - RSV and resp culture negative   - DC vancomycin and Zosyn 2/22, 8 days total  - CXR: Edema, improved overall  - Extubated 2/23  -SPO2 99% on 2L NC     RENAL/FLUID/ELECTROLYTE:  - Salt tabs 1g BID to prevent further pulmonary edema with 2%  - Na goal > 140  - Goal Mag >2.5, replaced  - UOP: 2650  - lasix 20mg 2/21-2/23 with good response  - Repeat labs 2/24    GI/NUTRITION:  NUTRITION:  - TF at goal  - Hold bowel regimen  - Thiamine and folate PO QD  - GI SCOPE 2/20: severe esophagitis, External Hemorrhoids  - Anusol 2.5% QHS  - GI prophylaxis: Protonix IV BID  - DC megace  - Benefiber 2/22  - SLP swallow study tomorrow.       ID:  - Afebrile  - WBC normal  - C diff negative   - Bilateral lobe pneumonia  - UTI, culture + E.coli  - CXR improving  - Continue Vancomycin and Zosyn for now  - Continue to monitor for fevers     HEME:   - Anemia likely multifactorial, iron deficiency  - H&H 6.1>5.6>7.0>5.5>5.0  - patient refusing blood transfusion due to being Pentecostalism  -Heme/Onc:    -IV Iron 200 mg x 4 doses, and 2/22   -Aranesp x1 on 2/18   -r/o hemolysis/GIB   -Peripheral blood smear  - Stool Occult Positive.    -NM scan neg  - +gross blood in stool 2/19  -B12 and folate normal  - Continue Iron 325mg QD  - EOD labs (next 2/24)  -Hold heparin until hemoglobin stable  - PICC line placed 2/20  - Consider Nimbex paralytic to Dec O2 demand  -Switch to every other day labs    ENDOCRINE:  - Continue to monitor blood glucose, goal <180  - No history of DM 2, hemoglobin A1C 5.6     OTHER:  - PT/OT/ST     PROPHYLAXIS:  Stress ulcer: Protonix gtt     DVT PROPHYLAXIS:  - SCD sleeves - Thigh High   -Hold heparin until hemoglobin stable    DISPOSITION:  I had a lengthy discussion with

## 2023-04-04 ENCOUNTER — HOSPITAL ENCOUNTER (OUTPATIENT)
Dept: MRI IMAGING | Age: 73
Discharge: HOME | End: 2023-04-06
Payer: MEDICARE

## 2023-04-04 DIAGNOSIS — I67.1 CEREBRAL ANEURYSM: ICD-10-CM

## 2023-04-04 PROCEDURE — 70544 MR ANGIOGRAPHY HEAD W/O DYE: CPT

## 2023-05-11 ENCOUNTER — OFFICE VISIT (OUTPATIENT)
Dept: FAMILY MEDICINE CLINIC | Age: 73
End: 2023-05-11

## 2023-05-11 VITALS
OXYGEN SATURATION: 99 % | BODY MASS INDEX: 19.94 KG/M2 | HEIGHT: 64 IN | DIASTOLIC BLOOD PRESSURE: 70 MMHG | WEIGHT: 116.8 LBS | SYSTOLIC BLOOD PRESSURE: 138 MMHG | TEMPERATURE: 98.3 F | HEART RATE: 83 BPM

## 2023-05-11 DIAGNOSIS — Z13.6 SCREENING FOR CARDIOVASCULAR CONDITION: ICD-10-CM

## 2023-05-11 DIAGNOSIS — I10 ESSENTIAL HYPERTENSION: ICD-10-CM

## 2023-05-11 DIAGNOSIS — E78.2 MIXED HYPERLIPIDEMIA: ICD-10-CM

## 2023-05-11 DIAGNOSIS — E55.9 VITAMIN D DEFICIENCY: ICD-10-CM

## 2023-05-11 DIAGNOSIS — Z00.00 MEDICARE ANNUAL WELLNESS VISIT, SUBSEQUENT: Primary | ICD-10-CM

## 2023-05-11 DIAGNOSIS — Z12.31 SCREENING MAMMOGRAM FOR HIGH-RISK PATIENT: ICD-10-CM

## 2023-05-11 DIAGNOSIS — I50.32 CHRONIC DIASTOLIC HEART FAILURE (HCC): ICD-10-CM

## 2023-05-11 DIAGNOSIS — L98.9 SKIN LESION: ICD-10-CM

## 2023-05-11 DIAGNOSIS — Z71.89 ACP (ADVANCE CARE PLANNING): ICD-10-CM

## 2023-05-11 PROBLEM — R53.83 FATIGUE: Status: RESOLVED | Noted: 2020-05-14 | Resolved: 2023-05-11

## 2023-05-11 PROBLEM — I67.1 CEREBRAL ANEURYSM: Status: RESOLVED | Noted: 2020-02-12 | Resolved: 2023-05-11

## 2023-05-11 SDOH — ECONOMIC STABILITY: FOOD INSECURITY: WITHIN THE PAST 12 MONTHS, THE FOOD YOU BOUGHT JUST DIDN'T LAST AND YOU DIDN'T HAVE MONEY TO GET MORE.: NEVER TRUE

## 2023-05-11 SDOH — ECONOMIC STABILITY: FOOD INSECURITY: WITHIN THE PAST 12 MONTHS, YOU WORRIED THAT YOUR FOOD WOULD RUN OUT BEFORE YOU GOT MONEY TO BUY MORE.: NEVER TRUE

## 2023-05-11 SDOH — ECONOMIC STABILITY: INCOME INSECURITY: HOW HARD IS IT FOR YOU TO PAY FOR THE VERY BASICS LIKE FOOD, HOUSING, MEDICAL CARE, AND HEATING?: NOT HARD AT ALL

## 2023-05-11 ASSESSMENT — VISUAL ACUITY
OS_CC: 20/15
OD_CC: 20/25

## 2023-05-11 ASSESSMENT — ENCOUNTER SYMPTOMS
SHORTNESS OF BREATH: 0
SINUS PRESSURE: 0
VOMITING: 0
STRIDOR: 0
CHEST TIGHTNESS: 0
RHINORRHEA: 0
DIARRHEA: 0
RECTAL PAIN: 0
ROS SKIN COMMENTS: RASH ON FACE
NAUSEA: 0
ABDOMINAL PAIN: 0
TROUBLE SWALLOWING: 0
EYE REDNESS: 0
SORE THROAT: 0
BACK PAIN: 0
ABDOMINAL DISTENTION: 0
COLOR CHANGE: 1
BLOOD IN STOOL: 0
WHEEZING: 0
CONSTIPATION: 0
COUGH: 0

## 2023-05-11 ASSESSMENT — PATIENT HEALTH QUESTIONNAIRE - PHQ9
2. FEELING DOWN, DEPRESSED OR HOPELESS: 0
SUM OF ALL RESPONSES TO PHQ QUESTIONS 1-9: 0
1. LITTLE INTEREST OR PLEASURE IN DOING THINGS: 0
SUM OF ALL RESPONSES TO PHQ9 QUESTIONS 1 & 2: 0
SUM OF ALL RESPONSES TO PHQ QUESTIONS 1-9: 0

## 2023-05-11 ASSESSMENT — LIFESTYLE VARIABLES
HOW MANY STANDARD DRINKS CONTAINING ALCOHOL DO YOU HAVE ON A TYPICAL DAY: PATIENT DOES NOT DRINK
HOW OFTEN DO YOU HAVE A DRINK CONTAINING ALCOHOL: NEVER

## 2023-05-11 NOTE — PROGRESS NOTES
Medicare Annual Wellness Visit    Lenore Florez is here for Medicare AWV    Assessment & Plan   Medicare annual wellness visit, subsequent  -     CBC; Future  -     Comprehensive Metabolic Panel; Future  ACP (advance care planning)  -     AR Advanced Care Planning (16-30 minutes) [79522]  Essential hypertension  Chronic diastolic heart failure (HCC)  -     Magnesium; Future  -     Vitamin B12 & Folate; Future  -     Uric Acid; Future  -     TSH; Future  -     Urinalysis with Reflex to Culture; Future  Screening for cardiovascular condition  -     AR Intensive Behavior Counseling for Cardiovascular Diseases, 8-15 minutes []  Mixed hyperlipidemia  -     Lipid Panel; Future  Vitamin D deficiency  -     Vitamin D 25 Hydroxy; Future  Screening mammogram for high-risk patient  -     ALECIA ADAN DIGITAL SCREEN BILATERAL; Future  Skin lesion  -     AFL - Ed Warren MD, Dermatology, [facility]    Recommendations for Preventive Services Due: see orders and patient instructions/AVS.  Recommended screening schedule for the next 5-10 years is provided to the patient in written form: see Patient Instructions/AVS.     Return in about 6 months (around 11/11/2023) for lab work. Subjective   The following acute and/or chronic problems were also addressed today:    Patient is scheduled for follow-up on Medicare wellness check and also on chronic medical problems not seen since last 1 year. Hypertension, controlled, patient reports she is compliant with medications. She denies any side effects. She is not up-to-date on blood work. Congestive heart failure, stable follows with cardiologist denies any recent leg swelling or dyspnea on exertion. Patient is currently on Coreg. Hyperlipidemia she has stopped taking statins and also reports side effects from aspirin. No recent blood work.     Patient is complaining of multiple skin lesions on the face and a new lesion on upper lip, patient reports she was referred to

## 2023-05-11 NOTE — PATIENT INSTRUCTIONS
you think you may have a problem with alcohol or drug use, talk to your doctor. Medicines    Take your medicines exactly as prescribed. Call your doctor if you think you are having a problem with your medicine.     If your doctor recommends aspirin, take the amount directed each day. Make sure you take aspirin and not another kind of pain reliever, such as acetaminophen (Tylenol). When should you call for help? Call 911 if you have symptoms of a heart attack. These may include:    Chest pain or pressure, or a strange feeling in the chest.     Sweating.     Shortness of breath.     Pain, pressure, or a strange feeling in the back, neck, jaw, or upper belly or in one or both shoulders or arms.     Lightheadedness or sudden weakness.     A fast or irregular heartbeat. After you call 911, the  may tell you to chew 1 adult-strength or 2 to 4 low-dose aspirin. Wait for an ambulance. Do not try to drive yourself. Watch closely for changes in your health, and be sure to contact your doctor if you have any problems. Where can you learn more? Go to http://RevoDeals.pablo.com/ and enter F075 to learn more about \"A Healthy Heart: Care Instructions. \"  Current as of: September 7, 2022               Content Version: 13.6  © 2006-2023 Healthwise, [employer]. Care instructions adapted under license by [facility] ([facility]). If you have questions about a medical condition or this instruction, always ask your healthcare professional. [place] [address] any warranty or liability for your use of this information. Personalized Preventive Plan for Elva Abbott - 5/11/2023  Medicare offers a range of preventive health benefits. Some of the tests and screenings are paid in full while other may be subject to a deductible, co-insurance, and/or copay.     Some of these benefits include a comprehensive review of your medical history including lifestyle, illnesses that may run in your family, and

## 2023-05-11 NOTE — PROGRESS NOTES
Visit Information    Have you changed or started any medications since your last visit including any over-the-counter medicines, vitamins, or herbal medicines? no   Are you having any side effects from any of your medications? -  no  Have you stopped taking any of your medications? Is so, why? -  no    Have you seen any other physician or provider since your last visit? No  Have you had any other diagnostic tests since your last visit? No  Have you been seen in the emergency room and/or had an admission to a hospital since we last saw you? No  Have you had your routine dental cleaning in the past 6 months? yes -     Have you activated your [employer] account? If not, what are your barriers?  Yes     Patient Care Team:  Yuliana Tucker MD as PCP - General (Family Medicine)  [facility]  Eugene Larry DO as Consulting Physician (Obstetrics & Gynecology)  Kay Perry MD as Consulting Physician (Gastroenterology)    Medical History Review  Past Medical, Family, and Social History reviewed and does contribute to the patient presenting condition    Health Maintenance   Topic Date Due    DTaP/Tdap/Td vaccine (1 - Tdap) Never done    COVID-19 Vaccine (3 - Booster for Janus Katie series) 07/13/2021    Breast cancer screen  08/03/2021    Depression Screen  08/16/2022    Annual Wellness Visit (AWV)  08/17/2022    Shingles vaccine (2 of 2) 11/29/2022    Lipids  01/15/2026    Colorectal Cancer Screen  05/31/2029    DEXA (modify frequency per FRAX score)  Completed    Flu vaccine  Completed    Pneumococcal 65+ years Vaccine  Completed    Hepatitis C screen  Completed    Hib vaccine  Aged Out    Meningococcal (ACWY) vaccine  Aged Out    Hepatitis A vaccine  Discontinued    Hepatitis B vaccine  Discontinued

## 2023-09-21 ENCOUNTER — HOSPITAL ENCOUNTER (OUTPATIENT)
Dept: WOMENS IMAGING | Age: 73
Discharge: HOME | End: 2023-09-23
Payer: MEDICARE

## 2023-09-21 DIAGNOSIS — Z12.31 SCREENING MAMMOGRAM FOR HIGH-RISK PATIENT: ICD-10-CM

## 2023-09-21 PROCEDURE — 77063 BREAST TOMOSYNTHESIS BI: CPT

## 2023-09-22 DIAGNOSIS — R92.8 ABNORMAL MAMMOGRAM: Primary | ICD-10-CM

## 2023-10-13 ENCOUNTER — HOSPITAL ENCOUNTER (OUTPATIENT)
Dept: WOMENS IMAGING | Age: 73
End: 2023-10-13
Payer: MEDICARE

## 2023-10-13 DIAGNOSIS — R92.8 ABNORMAL MAMMOGRAM: ICD-10-CM

## 2023-10-13 PROCEDURE — G0279 TOMOSYNTHESIS, MAMMO: HCPCS

## 2023-11-10 PROBLEM — I73.00 RAYNAUD'S DISEASE WITHOUT GANGRENE: Status: ACTIVE | Noted: 2023-09-25

## 2023-11-10 PROBLEM — M35.00 SECONDARY SJOGREN'S SYNDROME (HCC): Status: ACTIVE | Noted: 2023-09-25

## 2023-11-10 PROBLEM — M34.9 LIMITED SYSTEMIC SCLEROSIS (HCC): Status: ACTIVE | Noted: 2023-09-25

## 2023-11-10 PROBLEM — M19.90 ARTHRITIS: Status: ACTIVE | Noted: 2023-11-10

## 2023-11-10 PROBLEM — R76.8 POSITIVE ANA (ANTINUCLEAR ANTIBODY): Status: ACTIVE | Noted: 2023-09-25

## 2023-11-13 ENCOUNTER — OFFICE VISIT (OUTPATIENT)
Dept: FAMILY MEDICINE CLINIC | Age: 73
End: 2023-11-13

## 2023-11-13 VITALS
BODY MASS INDEX: 18.83 KG/M2 | WEIGHT: 113 LBS | HEIGHT: 65 IN | DIASTOLIC BLOOD PRESSURE: 80 MMHG | SYSTOLIC BLOOD PRESSURE: 130 MMHG

## 2023-11-13 DIAGNOSIS — E55.9 VITAMIN D DEFICIENCY: ICD-10-CM

## 2023-11-13 DIAGNOSIS — M34.9 LIMITED SYSTEMIC SCLEROSIS (HCC): ICD-10-CM

## 2023-11-13 DIAGNOSIS — R92.8 ABNORMAL MAMMOGRAM: ICD-10-CM

## 2023-11-13 DIAGNOSIS — M35.00 SECONDARY SJOGREN'S SYNDROME (HCC): ICD-10-CM

## 2023-11-13 DIAGNOSIS — I10 ESSENTIAL HYPERTENSION: Primary | ICD-10-CM

## 2023-11-13 DIAGNOSIS — D64.9 ANEMIA, UNSPECIFIED TYPE: ICD-10-CM

## 2023-11-13 DIAGNOSIS — I50.32 CHRONIC DIASTOLIC HEART FAILURE (HCC): ICD-10-CM

## 2023-11-13 DIAGNOSIS — Z23 ENCOUNTER FOR IMMUNIZATION: ICD-10-CM

## 2023-11-13 DIAGNOSIS — D50.0 IRON DEFICIENCY ANEMIA DUE TO CHRONIC BLOOD LOSS: ICD-10-CM

## 2023-11-13 DIAGNOSIS — E78.2 MIXED HYPERLIPIDEMIA: ICD-10-CM

## 2023-11-13 DIAGNOSIS — K86.1 CHRONIC PANCREATITIS, UNSPECIFIED PANCREATITIS TYPE (HCC): ICD-10-CM

## 2023-11-13 PROBLEM — L98.9 SKIN LESION: Status: RESOLVED | Noted: 2023-05-11 | Resolved: 2023-11-13

## 2023-11-13 RX ORDER — ATORVASTATIN CALCIUM 20 MG/1
20 TABLET, FILM COATED ORAL DAILY
Qty: 90 TABLET | Refills: 0 | Status: SHIPPED | OUTPATIENT
Start: 2023-11-13

## 2023-11-13 SDOH — ECONOMIC STABILITY: FOOD INSECURITY: WITHIN THE PAST 12 MONTHS, THE FOOD YOU BOUGHT JUST DIDN'T LAST AND YOU DIDN'T HAVE MONEY TO GET MORE.: NEVER TRUE

## 2023-11-13 SDOH — ECONOMIC STABILITY: FOOD INSECURITY: WITHIN THE PAST 12 MONTHS, YOU WORRIED THAT YOUR FOOD WOULD RUN OUT BEFORE YOU GOT MONEY TO BUY MORE.: NEVER TRUE

## 2023-11-13 SDOH — ECONOMIC STABILITY: INCOME INSECURITY: HOW HARD IS IT FOR YOU TO PAY FOR THE VERY BASICS LIKE FOOD, HOUSING, MEDICAL CARE, AND HEATING?: NOT HARD AT ALL

## 2023-11-13 ASSESSMENT — ENCOUNTER SYMPTOMS
COUGH: 0
CHEST TIGHTNESS: 0
STRIDOR: 0
RHINORRHEA: 0
TROUBLE SWALLOWING: 0
RECTAL PAIN: 0
ABDOMINAL DISTENTION: 0
VOMITING: 0
NAUSEA: 0
BACK PAIN: 1
COLOR CHANGE: 0
ABDOMINAL PAIN: 0
BLOOD IN STOOL: 0
CONSTIPATION: 0
SHORTNESS OF BREATH: 0
WHEEZING: 0
SINUS PRESSURE: 0
EYE REDNESS: 0
DIARRHEA: 0
SORE THROAT: 0

## 2023-11-13 ASSESSMENT — PATIENT HEALTH QUESTIONNAIRE - PHQ9
SUM OF ALL RESPONSES TO PHQ QUESTIONS 1-9: 0
1. LITTLE INTEREST OR PLEASURE IN DOING THINGS: 0
SUM OF ALL RESPONSES TO PHQ QUESTIONS 1-9: 0
2. FEELING DOWN, DEPRESSED OR HOPELESS: 0
SUM OF ALL RESPONSES TO PHQ QUESTIONS 1-9: 0
SUM OF ALL RESPONSES TO PHQ9 QUESTIONS 1 & 2: 0
SUM OF ALL RESPONSES TO PHQ QUESTIONS 1-9: 0

## 2023-11-14 LAB
ABSOLUTE BASO #: 0.01 K/UL (ref 0–0.2)
ABSOLUTE EOS #: 0.13 K/UL (ref 0–0.5)
ABSOLUTE LYMPH #: 1.13 K/UL (ref 1–4)
ABSOLUTE MONO #: 0.42 K/UL (ref 0.2–1)
ABSOLUTE NEUT #: 3.52 K/UL (ref 1.5–7.5)
ALBUMIN SERPL-MCNC: 4.1 G/DL (ref 3.5–5.2)
ALK PHOSPHATASE: 70 U/L (ref 40–142)
ALT SERPL-CCNC: 14 U/L (ref 5–40)
ANION GAP SERPL CALCULATED.3IONS-SCNC: 12 MEQ/L (ref 7–16)
AST SERPL-CCNC: 28 U/L (ref 9–40)
BASOPHILS RELATIVE PERCENT: 0.2 %
BILIRUB SERPL-MCNC: 0.3 MG/DL
BUN BLDV-MCNC: 16 MG/DL (ref 8–23)
CALCIUM SERPL-MCNC: 9.8 MG/DL (ref 8.5–10.5)
CHLORIDE BLD-SCNC: 106 MEQ/L (ref 95–107)
CO2: 22 MEQ/L (ref 19–31)
CREAT SERPL-MCNC: 0.96 MG/DL (ref 0.6–1.3)
EGFR IF NONAFRICAN AMERICAN: 63 ML/MIN/1.73
EOSINOPHILS RELATIVE PERCENT: 2.5 %
FERRITIN: 279 NG/ML (ref 13–200)
FOLATE: 13.5 UG/L
GLUCOSE: 85 MG/DL (ref 70–99)
HCT VFR BLD CALC: 35.3 % (ref 34–45)
HEMOGLOBIN: 11.8 G/DL (ref 11.5–15.5)
IRON SATURATION: 21 % (ref 20–50)
IRON, SERUM: 68 UG/DL (ref 37–145)
LIPASE: 75 U/L (ref 13–60)
LYMPHOCYTE %: 21.6 %
MAGNESIUM: 1.8 MG/DL (ref 1.6–2.6)
MCH RBC QN AUTO: 29.6 PG (ref 25–33)
MCHC RBC AUTO-ENTMCNC: 33.4 G/DL (ref 31–36)
MCV RBC AUTO: 88.7 FL (ref 80–99)
MONOCYTES # BLD: 8 %
NEUTROPHILS RELATIVE PERCENT: 67.5 %
PATHOLOGIST REVIEW: NORMAL
PDW BLD-RTO: 12.8 % (ref 11.5–15)
PLATELETS: 202 K/UL (ref 130–400)
PMV BLD AUTO: 11.1 FL (ref 9.3–13)
POTASSIUM SERPL-SCNC: 4.6 MEQ/L (ref 3.5–5.4)
RBC: 3.98 M/UL (ref 3.8–5.4)
SODIUM BLD-SCNC: 140 MEQ/L (ref 133–146)
TOTAL IRON BINDING CAPACITY: 321 UG/DL (ref 250–450)
TOTAL PROTEIN: 7.6 G/DL (ref 6.1–8.3)
TSH SERPL DL<=0.05 MIU/L-ACNC: 5.93 UIU/ML (ref 0.4–4.1)
UNSATURATED IRON BINDING CAPACITY: 253 UG/DL (ref 112–347)
URIC ACID: 4.3 MG/DL (ref 2.7–6.1)
VITAMIN B-12: 902 PG/ML (ref 200–950)
WBC: 5.2 K/UL (ref 3.5–11)

## 2023-11-15 LAB
ALBUMIN SERPL-MCNC: 4.1 G/DL
ALP BLD-CCNC: 70 U/L
ALT SERPL-CCNC: 14 U/L
ANION GAP SERPL CALCULATED.3IONS-SCNC: 12 MMOL/L
AST SERPL-CCNC: 28 U/L
BILIRUB SERPL-MCNC: 0.3 MG/DL (ref 0.1–1.4)
BUN BLDV-MCNC: 16 MG/DL
CALCIUM SERPL-MCNC: 9.8 MG/DL
CHLORIDE BLD-SCNC: 106 MMOL/L
CO2: 22 MMOL/L
CREAT SERPL-MCNC: 0.96 MG/DL
EGFR: 63
FOLATE: 13.5
GLUCOSE BLD-MCNC: 85 MG/DL
HAPTOGLOBIN: 87 MG/DL (ref 32–197)
HEMOGLOBIN A2 QUANTITATION: 2.6 % (ref 2–3.5)
HEMOGLOBIN F QUANTITATION: 0.3 % (ref 0–2.1)
HGB FRACT BLD ELPH-IMP: NORMAL
IRON: 68
Lab: NORMAL
POTASSIUM SERPL-SCNC: 4.6 MMOL/L
SODIUM BLD-SCNC: 140 MMOL/L
TOTAL IRON BINDING CAPACITY: 321
TOTAL PROTEIN: 7.6
TSH SERPL DL<=0.05 MIU/L-ACNC: 5.93 UIU/ML
URIC ACID: 4.3
VITAMIN B-12: 902

## 2023-11-16 DIAGNOSIS — Z00.00 MEDICARE ANNUAL WELLNESS VISIT, SUBSEQUENT: ICD-10-CM

## 2023-11-16 DIAGNOSIS — K86.1 CHRONIC PANCREATITIS, UNSPECIFIED PANCREATITIS TYPE (HCC): ICD-10-CM

## 2023-11-16 DIAGNOSIS — I50.32 CHRONIC DIASTOLIC HEART FAILURE (HCC): ICD-10-CM

## 2023-11-16 DIAGNOSIS — D64.9 ANEMIA, UNSPECIFIED TYPE: ICD-10-CM

## 2023-11-16 LAB — MAGNESIUM: 1.8 MG/DL

## 2024-04-26 RX ORDER — CARVEDILOL 3.12 MG/1
3.12 TABLET ORAL 2 TIMES DAILY WITH MEALS
Qty: 180 TABLET | Refills: 3 | Status: SHIPPED | OUTPATIENT
Start: 2024-04-26

## 2024-04-26 NOTE — TELEPHONE ENCOUNTER
Please Approve or Refuse.  Send to Pharmacy per Pt's Request: [facility]      Next Visit Date:  5/13/2024   Last Visit Date: 11/13/2023    Hemoglobin A1C (%)   Date Value   02/13/2020 5.6   06/02/2019 5.2             ( goal A1C is < 7)   BP Readings from Last 3 Encounters:   11/13/23 130/80   05/11/23 138/70   04/11/23 (!) 157/80          (goal 120/80)  BUN   Date Value Ref Range Status   11/13/2023 16 8 - 23 mg/dL Final     Creatinine   Date Value Ref Range Status   11/13/2023 0.96 0.60 - 1.30 mg/dL Final     Potassium   Date Value Ref Range Status   11/13/2023 4.6 3.5 - 5.4 meq/L Final

## 2024-04-26 NOTE — TELEPHONE ENCOUNTER
----- Message from Reshma White sent at 4/26/2024 11:55 AM EDT -----  Subject: Refill Request    QUESTIONS  Name of Medication? carvedilol (COREG) 3.125 MG tablet  Patient-reported dosage and instructions? Take 1 tablet by mouth 2 times   daily (with meals) TAKE 1 TABLET TWICE DAILY 3.125 mg  How many days do you have left? 3  Preferred Pharmacy? [facility]  Pharmacy phone number (if available)? [phone]  ---------------------------------------------------------------------------  --------------  CALL BACK INFO  What is the best way for the office to contact you? OK to leave message on   voicemail  Preferred Call Back Phone Number? [phone]  ---------------------------------------------------------------------------  --------------  SCRIPT ANSWERS  Relationship to Patient? Self

## 2024-04-29 DIAGNOSIS — E78.2 MIXED HYPERLIPIDEMIA: ICD-10-CM

## 2024-04-29 RX ORDER — ATORVASTATIN CALCIUM 20 MG/1
20 TABLET, FILM COATED ORAL DAILY
Qty: 90 TABLET | Refills: 3 | Status: SHIPPED | OUTPATIENT
Start: 2024-04-29

## 2024-05-13 ENCOUNTER — OFFICE VISIT (OUTPATIENT)
Dept: FAMILY MEDICINE CLINIC | Age: 74
End: 2024-05-13
Payer: MEDICARE

## 2024-05-13 VITALS
HEART RATE: 78 BPM | SYSTOLIC BLOOD PRESSURE: 118 MMHG | HEIGHT: 65 IN | BODY MASS INDEX: 17.4 KG/M2 | WEIGHT: 104.4 LBS | DIASTOLIC BLOOD PRESSURE: 70 MMHG | OXYGEN SATURATION: 98 %

## 2024-05-13 DIAGNOSIS — E55.9 VITAMIN D DEFICIENCY: ICD-10-CM

## 2024-05-13 DIAGNOSIS — R92.8 ABNORMAL MAMMOGRAM: ICD-10-CM

## 2024-05-13 DIAGNOSIS — M34.9 LIMITED SYSTEMIC SCLEROSIS (HCC): ICD-10-CM

## 2024-05-13 DIAGNOSIS — Z12.31 SCREENING MAMMOGRAM FOR HIGH-RISK PATIENT: ICD-10-CM

## 2024-05-13 DIAGNOSIS — I10 ESSENTIAL HYPERTENSION: Primary | ICD-10-CM

## 2024-05-13 DIAGNOSIS — I50.32 CHRONIC DIASTOLIC HEART FAILURE (HCC): ICD-10-CM

## 2024-05-13 DIAGNOSIS — D50.0 IRON DEFICIENCY ANEMIA DUE TO CHRONIC BLOOD LOSS: ICD-10-CM

## 2024-05-13 DIAGNOSIS — E78.2 MIXED HYPERLIPIDEMIA: ICD-10-CM

## 2024-05-13 DIAGNOSIS — K86.1 CHRONIC PANCREATITIS, UNSPECIFIED PANCREATITIS TYPE (HCC): ICD-10-CM

## 2024-05-13 DIAGNOSIS — H91.93 HEARING PROBLEM OF BOTH EARS: ICD-10-CM

## 2024-05-13 PROBLEM — R91.1 LUNG NODULE: Status: RESOLVED | Noted: 2020-07-13 | Resolved: 2024-05-13

## 2024-05-13 PROCEDURE — 3078F DIAST BP <80 MM HG: CPT | Performed by: FAMILY MEDICINE

## 2024-05-13 PROCEDURE — 3074F SYST BP LT 130 MM HG: CPT | Performed by: FAMILY MEDICINE

## 2024-05-13 PROCEDURE — G8399 PT W/DXA RESULTS DOCUMENT: HCPCS | Performed by: FAMILY MEDICINE

## 2024-05-13 PROCEDURE — 1036F TOBACCO NON-USER: CPT | Performed by: FAMILY MEDICINE

## 2024-05-13 PROCEDURE — 99214 OFFICE O/P EST MOD 30 MIN: CPT | Performed by: FAMILY MEDICINE

## 2024-05-13 PROCEDURE — 3017F COLORECTAL CA SCREEN DOC REV: CPT | Performed by: FAMILY MEDICINE

## 2024-05-13 PROCEDURE — 1123F ACP DISCUSS/DSCN MKR DOCD: CPT | Performed by: FAMILY MEDICINE

## 2024-05-13 PROCEDURE — G8427 DOCREV CUR MEDS BY ELIG CLIN: HCPCS | Performed by: FAMILY MEDICINE

## 2024-05-13 PROCEDURE — 1090F PRES/ABSN URINE INCON ASSESS: CPT | Performed by: FAMILY MEDICINE

## 2024-05-13 PROCEDURE — G8419 CALC BMI OUT NRM PARAM NOF/U: HCPCS | Performed by: FAMILY MEDICINE

## 2024-05-13 RX ORDER — AMLODIPINE BESYLATE 5 MG/1
5 TABLET ORAL DAILY
COMMUNITY

## 2024-05-13 SDOH — ECONOMIC STABILITY: INCOME INSECURITY: HOW HARD IS IT FOR YOU TO PAY FOR THE VERY BASICS LIKE FOOD, HOUSING, MEDICAL CARE, AND HEATING?: NOT HARD AT ALL

## 2024-05-13 SDOH — ECONOMIC STABILITY: FOOD INSECURITY: WITHIN THE PAST 12 MONTHS, YOU WORRIED THAT YOUR FOOD WOULD RUN OUT BEFORE YOU GOT MONEY TO BUY MORE.: NEVER TRUE

## 2024-05-13 SDOH — ECONOMIC STABILITY: FOOD INSECURITY: WITHIN THE PAST 12 MONTHS, THE FOOD YOU BOUGHT JUST DIDN'T LAST AND YOU DIDN'T HAVE MONEY TO GET MORE.: NEVER TRUE

## 2024-05-13 ASSESSMENT — ENCOUNTER SYMPTOMS
CHEST TIGHTNESS: 0
SORE THROAT: 0
CONSTIPATION: 0
RECTAL PAIN: 0
DIARRHEA: 0
ABDOMINAL DISTENTION: 0
SINUS PRESSURE: 0
BACK PAIN: 1
RHINORRHEA: 0
TROUBLE SWALLOWING: 0
NAUSEA: 0
COUGH: 0
VOMITING: 0
ABDOMINAL PAIN: 0
STRIDOR: 0
BLOOD IN STOOL: 0
COLOR CHANGE: 0

## 2024-05-13 ASSESSMENT — PATIENT HEALTH QUESTIONNAIRE - PHQ9
SUM OF ALL RESPONSES TO PHQ QUESTIONS 1-9: 0
1. LITTLE INTEREST OR PLEASURE IN DOING THINGS: NOT AT ALL
2. FEELING DOWN, DEPRESSED OR HOPELESS: NOT AT ALL
SUM OF ALL RESPONSES TO PHQ9 QUESTIONS 1 & 2: 0
SUM OF ALL RESPONSES TO PHQ QUESTIONS 1-9: 0

## 2024-05-13 NOTE — PROGRESS NOTES
Visit Information    Have you changed or started any medications since your last visit including any over-the-counter medicines, vitamins, or herbal medicines? no   Are you having any side effects from any of your medications? -  no  Have you stopped taking any of your medications? Is so, why? -  no    Have you seen any other physician or provider since your last visit? No  Have you had any other diagnostic tests since your last visit? No  Have you been seen in the emergency room and/or had an admission to a hospital since we last saw you? No  Have you had your routine dental cleaning in the past 6 months? yes    Have you activated your [employer] account? If not, what are your barriers? Yes     Patient Care Team:  Shahnaz Valdes MD as PCP - General (Family Medicine)  Shahnaz Valdes MD as PCP - Empaneled Provider  Deidra Alvarado DO as Consulting Physician (Obstetrics & Gynecology)  Rajesh Orozco MD as Consulting Physician (Gastroenterology)    Medical History Review  Past Medical, Family, and Social History reviewed and does contribute to the patient presenting condition    Health Maintenance   Topic Date Due    DTaP/Tdap/Td vaccine (1 - Tdap) Never done    Respiratory Syncytial Virus (RSV) Pregnant or age 60 yrs+ (1 - 1-dose 60+ series) Never done    Hepatitis B vaccine (3 of 3 - 19+ 3-dose series) 12/15/2020    Lipids  01/15/2022    Shingles vaccine (2 of 2) 11/29/2022    COVID-19 Vaccine (3 - 2023-24 season) 09/01/2023    Annual Wellness Visit (Medicare Advantage)  01/01/2024    Breast cancer screen  10/13/2024    Depression Screen  11/13/2024    Colorectal Cancer Screen  05/31/2029    DEXA (modify frequency per FRAX score)  Completed    Flu vaccine  Completed    Pneumococcal 65+ years Vaccine  Completed    Hepatitis C screen  Completed    Hib vaccine  Aged Out    Polio vaccine  Aged Out    Meningococcal (ACWY) vaccine  Aged Out    Hepatitis A vaccine  Discontinued     
depression    The patient'spast medical, surgical, social, and family history as well as her   current medications and allergies were reviewed as documented in today's encounter.      Medications, labs, diagnostic studies, consultations andfollow-up as documented in this encounter.    Return in about 6 months (around 11/13/2024) for MEDICARE WELLNESS VISIT order mammo .    Patient wasseen with total face to face time of 30 minutes. More than 50% of this visit was counseling and education.       Future Appointments   Date Time Provider Department Center   11/13/2024 10:30 AM [facility]     This note was completed by using the assistance of a speech-recognition program. However, inadvertent computerized transcription errors may be present. Althoughevery effort was made to ensure accuracy, no guarantees can be provided that every mistake has been identified and corrected by editing.  Electronically signed by Shahnaz Valdes MD on 5/13/2024  11:19 AM

## 2024-05-13 NOTE — PATIENT INSTRUCTIONS
Dear valued patient,    We hope this message finds you in good health. At [], we are committed to providing you with the best possible healthcare experience. To further enhance your convenience and streamline your access to our services, we would like to introduce you to the benefits of utilizing direct scheduling through the [employer] [employer] [facility] Portal.    Direct scheduling is a feature within the [employer] Patient Portal that allows you to schedule appointments with your healthcare provider directly, without the need to make a phone call or wait for a callback. We understand that your time is simón, and we want to ensure that you have quick and easy access to our services whenever you need them.  Here are some reasons why you should consider utilizing direct scheduling through the [employer] Patient Portal:    1. Convenience: With direct scheduling, you can book appointments at any time that suits you best, 24 hours a day, 7 days a week. No more waiting on hold or playing phone tag with our office staff. It puts you in control of managing your healthcare appointments effortlessly.    2. Accessibility: The [employer] Patient Portal is accessible through your computer, smartphone, or tablet, allowing you to schedule appointments from the comfort of your home, office, or on the go. You can access your medical records, review test results, and communicate with your healthcare provider all in one secure and user-friendly platform.    3. Timesaving: By utilizing direct scheduling, you can avoid unnecessary delays and save simón time. You can easily browse the available appointment slots and select the one that works best for you, eliminating the back-and-forth communication typically required when scheduling via phone.    4. Reminders and notifications: [facility] [facility] sends automatic reminders for upcoming appointments, ensuring that you never miss an important visit. You can also receive notifications about test

## 2024-08-05 PROBLEM — R76.8 OTHER SPECIFIED ABNORMAL IMMUNOLOGICAL FINDINGS IN SERUM: Status: ACTIVE | Noted: 2023-09-25

## 2024-08-05 PROBLEM — M34.9 SYSTEMIC SCLEROSIS (HCC): Status: ACTIVE | Noted: 2023-09-25

## 2024-08-05 PROBLEM — I27.20 PULMONARY HYPERTENSION, UNSPECIFIED (HCC): Status: ACTIVE | Noted: 2023-09-25

## 2024-08-06 ENCOUNTER — OFFICE VISIT (OUTPATIENT)
Dept: FAMILY MEDICINE CLINIC | Age: 74
End: 2024-08-06
Payer: MEDICARE

## 2024-08-06 VITALS
BODY MASS INDEX: 17.28 KG/M2 | WEIGHT: 101.2 LBS | DIASTOLIC BLOOD PRESSURE: 84 MMHG | OXYGEN SATURATION: 99 % | TEMPERATURE: 97.6 F | SYSTOLIC BLOOD PRESSURE: 136 MMHG | HEART RATE: 72 BPM | HEIGHT: 64 IN

## 2024-08-06 DIAGNOSIS — R92.8 ABNORMAL MAMMOGRAM: ICD-10-CM

## 2024-08-06 DIAGNOSIS — E78.2 MIXED HYPERLIPIDEMIA: ICD-10-CM

## 2024-08-06 DIAGNOSIS — M25.552 CHRONIC LEFT HIP PAIN: Primary | ICD-10-CM

## 2024-08-06 DIAGNOSIS — G89.29 CHRONIC LEFT HIP PAIN: Primary | ICD-10-CM

## 2024-08-06 DIAGNOSIS — Z23 NEED FOR ZOSTER VACCINATION: ICD-10-CM

## 2024-08-06 DIAGNOSIS — G89.29 CHRONIC MIDLINE LOW BACK PAIN WITH LEFT-SIDED SCIATICA: ICD-10-CM

## 2024-08-06 DIAGNOSIS — M54.42 CHRONIC MIDLINE LOW BACK PAIN WITH LEFT-SIDED SCIATICA: ICD-10-CM

## 2024-08-06 DIAGNOSIS — Z23 NEED FOR PROPHYLACTIC VACCINATION WITH TETANUS-DIPHTHERIA (TD): ICD-10-CM

## 2024-08-06 PROBLEM — R06.00 DYSPNEA: Status: ACTIVE | Noted: 2023-12-18

## 2024-08-06 PROBLEM — R06.00 DYSPNEA: Status: RESOLVED | Noted: 2023-12-18 | Resolved: 2024-08-06

## 2024-08-06 PROCEDURE — 1036F TOBACCO NON-USER: CPT | Performed by: FAMILY MEDICINE

## 2024-08-06 PROCEDURE — 1123F ACP DISCUSS/DSCN MKR DOCD: CPT | Performed by: FAMILY MEDICINE

## 2024-08-06 PROCEDURE — G8427 DOCREV CUR MEDS BY ELIG CLIN: HCPCS | Performed by: FAMILY MEDICINE

## 2024-08-06 PROCEDURE — 3017F COLORECTAL CA SCREEN DOC REV: CPT | Performed by: FAMILY MEDICINE

## 2024-08-06 PROCEDURE — 1090F PRES/ABSN URINE INCON ASSESS: CPT | Performed by: FAMILY MEDICINE

## 2024-08-06 PROCEDURE — G8399 PT W/DXA RESULTS DOCUMENT: HCPCS | Performed by: FAMILY MEDICINE

## 2024-08-06 PROCEDURE — 99214 OFFICE O/P EST MOD 30 MIN: CPT | Performed by: FAMILY MEDICINE

## 2024-08-06 PROCEDURE — G8419 CALC BMI OUT NRM PARAM NOF/U: HCPCS | Performed by: FAMILY MEDICINE

## 2024-08-06 PROCEDURE — 3075F SYST BP GE 130 - 139MM HG: CPT | Performed by: FAMILY MEDICINE

## 2024-08-06 PROCEDURE — 3079F DIAST BP 80-89 MM HG: CPT | Performed by: FAMILY MEDICINE

## 2024-08-06 RX ORDER — LIDOCAINE 40 MG/G
CREAM TOPICAL
Qty: 45 G | Refills: 3 | Status: SHIPPED | OUTPATIENT
Start: 2024-08-06

## 2024-08-06 RX ORDER — IBUPROFEN 400 MG/1
400 TABLET ORAL EVERY 8 HOURS PRN
Qty: 90 TABLET | Refills: 0 | Status: SHIPPED | OUTPATIENT
Start: 2024-08-06

## 2024-08-06 RX ORDER — ATORVASTATIN CALCIUM 20 MG/1
20 TABLET, FILM COATED ORAL DAILY
Qty: 90 TABLET | Refills: 3 | Status: SHIPPED | OUTPATIENT
Start: 2024-08-06

## 2024-08-06 RX ORDER — TIZANIDINE 2 MG/1
2 TABLET ORAL NIGHTLY
Qty: 30 TABLET | Refills: 0 | Status: SHIPPED | OUTPATIENT
Start: 2024-08-06

## 2024-08-06 RX ORDER — METHYLPREDNISOLONE 4 MG/1
TABLET ORAL
Qty: 1 KIT | Refills: 0 | Status: SHIPPED | OUTPATIENT
Start: 2024-08-06 | End: 2024-08-12

## 2024-08-06 RX ORDER — TETANUS AND DIPHTHERIA TOXOIDS ADSORBED 2; 2 [LF]/.5ML; [LF]/.5ML
0.5 INJECTION INTRAMUSCULAR ONCE
Qty: 0.5 ML | Refills: 0 | Status: CANCELLED | OUTPATIENT
Start: 2024-08-06 | End: 2024-08-06

## 2024-08-06 ASSESSMENT — ENCOUNTER SYMPTOMS
WHEEZING: 0
VOMITING: 0
CONSTIPATION: 0
EYE REDNESS: 0
RHINORRHEA: 0
SHORTNESS OF BREATH: 0
BACK PAIN: 1
TROUBLE SWALLOWING: 0
SORE THROAT: 0
STRIDOR: 0
COLOR CHANGE: 0
SINUS PRESSURE: 0
RECTAL PAIN: 0
CHEST TIGHTNESS: 0
COUGH: 0
NAUSEA: 0
BLOOD IN STOOL: 0
ABDOMINAL PAIN: 0
DIARRHEA: 0
ABDOMINAL DISTENTION: 0

## 2024-08-06 NOTE — PROGRESS NOTES
Visit Information    Have you changed or started any medications since your last visit including any over-the-counter medicines, vitamins, or herbal medicines? YES  Have you stopped taking any of your medications? Is so, why? -  yes -   Are you having any side effects from any of your medications? - no    Have you seen any other physician or provider since your last visit?  yes -    Have you had any other diagnostic tests since your last visit?  no   Have you been seen in the emergency room and/or had an admission in a hospital since we last saw you?  no   Have you had your routine dental cleaning in the past 6 months?  no     Do you have an active MyChart account? If no, what is the barrier?  Yes    Patient Care Team:  Shahnaz Valdes MD as PCP - General (Family Medicine)  Shahnaz Valdes MD as PCP - Empaneled Provider  Deidra Alvarado DO as Consulting Physician (Obstetrics & Gynecology)  Rajesh Orozco MD as Consulting Physician (Gastroenterology)    Medical History Review  Past Medical, Family, and Social History reviewed and does contribute to the patient presenting condition    Health Maintenance   Topic Date Due    DTaP/Tdap/Td vaccine (1 - Tdap) Never done    Respiratory Syncytial Virus (RSV) Pregnant or age 60 yrs+ (1 - 1-dose 60+ series) Never done    Lipids  01/15/2022    Shingles vaccine (2 of 2) 11/29/2022    COVID-19 Vaccine (3 - 2023-24 season) 09/01/2023    Annual Wellness Visit (Medicare Advantage)  01/01/2024    Flu vaccine (1) 08/01/2024    Breast cancer screen  10/13/2024    Depression Screen  05/13/2025    Colorectal Cancer Screen  05/31/2029    DEXA (modify frequency per FRAX score)  Completed    Pneumococcal 65+ years Vaccine  Completed    Hepatitis C screen  Completed    Hib vaccine  Aged Out    Polio vaccine  Aged Out    Meningococcal (ACWY) vaccine  Aged Out    Hepatitis A vaccine  Discontinued    Hepatitis B vaccine  Discontinued

## 2024-08-06 NOTE — PROGRESS NOTES
Chief Complaint   Patient presents with    Back Pain     LOWER BACK PAINFUL THE LAST MONTH/PAIN SCORE: /10         Laura James  here today for follow up on chronic medical problems, go over labs and/or diagnostic studies, and medication refills. Back Pain (LOWER BACK PAINFUL THE LAST MONTH/PAIN SCORE: /10)      HPI: Patient is scheduled for sick call complaining of pain in the lower back and left hip.  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  After few days she has noticed the pain in the lower back which radiates to left hip.  Patient reports she is not able to walk with the pain, the pain she rates about 8-9 on scale.  Gets worse by standing bending and lifting.  She did try the Tylenol that did not help with the pain.  Patient never had any x-rays done in the back or hip.  Patient reports she starts limping when she walks.  She does complain of numbness in the left lower extremity.  She denies any weakness.    Patient had a walker ordered on previous appointment reports she does have a walker but she is not using that on a persistent basis.  She denies any recent fall.    Hyperlipidemia patient needs medication refill.    Did had abnormal mammogram had diagnostic mammogram done that was showing benign findings.          /84   Pulse 72   Temp 97.6 °F (36.4 °C)   Ht 1.626 m (5' 4\")   Wt 45.9 kg (101 lb 3.2 oz)   SpO2 99%   BMI 17.37 kg/m²    Body mass index is 17.37 kg/m².  Wt Readings from Last 3 Encounters:   08/06/24 45.9 kg (101 lb 3.2 oz)   05/13/24 47.4 kg (104 lb 6.4 oz)   11/13/23 51.3 kg (113 lb)        [x]Negative depression screening.      5/13/2024    10:46 AM 11/13/2023    10:37 AM 5/11/2023    10:40 AM 8/16/2021    10:14 AM 7/14/2021    10:10 AM 1/13/2021    11:38 AM 8/13/2020    11:09 AM   PHQ Scores   PHQ2 Score 0 0 0 0 0 0 2   PHQ9 Score 0 0 0 0 0 0 2      []1-4 = Minimal depression   []5-9 = Milddepression   []10-14 = Moderate depression

## 2024-08-08 ENCOUNTER — HOSPITAL ENCOUNTER (OUTPATIENT)
Dept: GENERAL RADIOLOGY | Age: 74
Discharge: HOME | End: 2024-08-10
Payer: MEDICARE

## 2024-08-08 ENCOUNTER — HOSPITAL ENCOUNTER (OUTPATIENT)
Age: 74
Discharge: HOME | End: 2024-08-08
Payer: MEDICARE

## 2024-08-08 DIAGNOSIS — M25.552 CHRONIC LEFT HIP PAIN: ICD-10-CM

## 2024-08-08 DIAGNOSIS — G89.29 CHRONIC LEFT HIP PAIN: ICD-10-CM

## 2024-08-08 DIAGNOSIS — G89.29 CHRONIC MIDLINE LOW BACK PAIN WITH LEFT-SIDED SCIATICA: ICD-10-CM

## 2024-08-08 DIAGNOSIS — M54.42 CHRONIC MIDLINE LOW BACK PAIN WITH LEFT-SIDED SCIATICA: ICD-10-CM

## 2024-08-08 PROCEDURE — 73502 X-RAY EXAM HIP UNI 2-3 VIEWS: CPT

## 2024-08-08 PROCEDURE — 72100 X-RAY EXAM L-S SPINE 2/3 VWS: CPT

## 2024-08-15 DIAGNOSIS — M54.42 CHRONIC MIDLINE LOW BACK PAIN WITH LEFT-SIDED SCIATICA: Primary | ICD-10-CM

## 2024-08-15 DIAGNOSIS — G89.29 CHRONIC MIDLINE LOW BACK PAIN WITH LEFT-SIDED SCIATICA: Primary | ICD-10-CM

## 2024-11-13 ENCOUNTER — OFFICE VISIT (OUTPATIENT)
Dept: FAMILY MEDICINE CLINIC | Age: 74
End: 2024-11-13

## 2024-11-13 VITALS
WEIGHT: 97 LBS | DIASTOLIC BLOOD PRESSURE: 80 MMHG | HEART RATE: 83 BPM | HEIGHT: 64 IN | SYSTOLIC BLOOD PRESSURE: 138 MMHG | BODY MASS INDEX: 16.56 KG/M2 | OXYGEN SATURATION: 100 %

## 2024-11-13 DIAGNOSIS — M25.552 CHRONIC LEFT HIP PAIN: ICD-10-CM

## 2024-11-13 DIAGNOSIS — Z12.31 ENCOUNTER FOR SCREENING MAMMOGRAM FOR HIGH-RISK PATIENT: ICD-10-CM

## 2024-11-13 DIAGNOSIS — Z23 ENCOUNTER FOR IMMUNIZATION: ICD-10-CM

## 2024-11-13 DIAGNOSIS — E55.9 VITAMIN D DEFICIENCY: ICD-10-CM

## 2024-11-13 DIAGNOSIS — M51.362 DEGENERATION OF INTERVERTEBRAL DISC OF LUMBAR REGION WITH DISCOGENIC BACK PAIN AND LOWER EXTREMITY PAIN: ICD-10-CM

## 2024-11-13 DIAGNOSIS — G89.29 CHRONIC LEFT HIP PAIN: ICD-10-CM

## 2024-11-13 DIAGNOSIS — Z00.00 MEDICARE ANNUAL WELLNESS VISIT, SUBSEQUENT: Primary | ICD-10-CM

## 2024-11-13 DIAGNOSIS — Z71.89 ACP (ADVANCE CARE PLANNING): ICD-10-CM

## 2024-11-13 DIAGNOSIS — E78.2 MIXED HYPERLIPIDEMIA: ICD-10-CM

## 2024-11-13 DIAGNOSIS — I10 ESSENTIAL HYPERTENSION: ICD-10-CM

## 2024-11-13 PROBLEM — D64.9 ANEMIA: Status: RESOLVED | Noted: 2020-05-14 | Resolved: 2024-11-13

## 2024-11-13 PROBLEM — R92.8 ABNORMAL MAMMOGRAM: Status: RESOLVED | Noted: 2023-09-22 | Resolved: 2024-11-13

## 2024-11-13 PROBLEM — M70.62 TROCHANTERIC BURSITIS OF LEFT HIP: Status: RESOLVED | Noted: 2017-11-27 | Resolved: 2024-11-13

## 2024-11-13 PROBLEM — I60.6 SUBARACHNOID HEMORRHAGE FROM ANTERIOR CEREBRAL ARTERY ANEURYSM (HCC): Status: RESOLVED | Noted: 2020-02-12 | Resolved: 2024-11-13

## 2024-11-13 RX ORDER — CARVEDILOL 3.12 MG/1
3.12 TABLET ORAL 2 TIMES DAILY WITH MEALS
Qty: 180 TABLET | Refills: 3 | Status: SHIPPED | OUTPATIENT
Start: 2024-11-13

## 2024-11-13 RX ORDER — IBUPROFEN 400 MG/1
400 TABLET, FILM COATED ORAL EVERY 8 HOURS PRN
Qty: 90 TABLET | Refills: 3 | Status: SHIPPED | OUTPATIENT
Start: 2024-11-13

## 2024-11-13 ASSESSMENT — VISUAL ACUITY
OS_CC: 20/20
OD_CC: 20/25

## 2024-11-13 ASSESSMENT — PATIENT HEALTH QUESTIONNAIRE - PHQ9
SUM OF ALL RESPONSES TO PHQ QUESTIONS 1-9: 0
SUM OF ALL RESPONSES TO PHQ9 QUESTIONS 1 & 2: 0
1. LITTLE INTEREST OR PLEASURE IN DOING THINGS: NOT AT ALL
SUM OF ALL RESPONSES TO PHQ QUESTIONS 1-9: 0
2. FEELING DOWN, DEPRESSED OR HOPELESS: NOT AT ALL

## 2024-11-13 ASSESSMENT — ENCOUNTER SYMPTOMS
SINUS PRESSURE: 0
NAUSEA: 0
TROUBLE SWALLOWING: 0
RHINORRHEA: 0
CONSTIPATION: 0
WHEEZING: 0
RECTAL PAIN: 0
SORE THROAT: 0
SHORTNESS OF BREATH: 0
ABDOMINAL DISTENTION: 0
BLOOD IN STOOL: 0
DIARRHEA: 0
COLOR CHANGE: 0
STRIDOR: 0
ABDOMINAL PAIN: 0
CHEST TIGHTNESS: 0
BACK PAIN: 1
EYE REDNESS: 0
VOMITING: 0
COUGH: 0

## 2024-11-13 NOTE — PROGRESS NOTES
Visit Information    Have you changed or started any medications since your last visit including any over-the-counter medicines, vitamins, or herbal medicines? no   Are you having any side effects from any of your medications? -  no  Have you stopped taking any of your medications? Is so, why? -  no    Have you seen any other physician or provider since your last visit? No  Have you had any other diagnostic tests since your last visit? No  Have you been seen in the emergency room and/or had an admission to a hospital since we last saw you? No  Have you had your routine dental cleaning in the past 6 months? no    Have you activated your [employer] account? If not, what are your barriers? Yes     Patient Care Team:  Shahnaz Valdes MD as PCP - General (Family Medicine)  Shahnaz Valdes MD as PCP - Empaneled Provider  Deidra Alvarado DO as Consulting Physician (Obstetrics & Gynecology)  Rajesh Orozco MD as Consulting Physician (Gastroenterology)    Medical History Review  Past Medical, Family, and Social History reviewed and does contribute to the patient presenting condition    Health Maintenance   Topic Date Due    DTaP/Tdap/Td vaccine (1 - Tdap) Never done    Respiratory Syncytial Virus (RSV) Pregnant or age 60 yrs+ (1 - 1-dose 60+ series) Never done    Lipids  01/15/2022    Shingles vaccine (2 of 2) 11/29/2022    Annual Wellness Visit (Medicare Advantage)  01/01/2024    Flu vaccine (1) 08/01/2024    COVID-19 Vaccine (3 - 2023-24 season) 09/01/2024    Breast cancer screen  10/13/2024    Depression Screen  05/13/2025    Colorectal Cancer Screen  05/31/2029    DEXA (modify frequency per FRAX score)  Completed    Pneumococcal 65+ years Vaccine  Completed    Hepatitis C screen  Completed    Hib vaccine  Aged Out    Polio vaccine  Aged Out    Meningococcal (ACWY) vaccine  Aged Out    Hepatitis A vaccine  Discontinued    Hepatitis B vaccine  Discontinued     
Take 1 tablet by mouth every 6 hours as needed for Pain Yes ProviderElsy MD   Misc. Devices (STEP N REST II WALKER) MISC Use daily for ADL's  Patient not taking: Reported on 8/6/2024  Shahnaz Valdes MD   vitamin D (CHOLECALCIFEROL) 1000 UNIT TABS tablet Take 1 tablet by mouth 3 times daily  Patient not taking: Reported on 8/6/2024  ProviderElsy MD       CareTeam (Including outside providers/suppliers regularly involved in providing care):   Patient Care Team:  Shahnaz Valdes MD as PCP - General (Family Medicine)  Shahnaz Valdes MD as PCP - Empaneled Provider  Deidra Alvarado DO as Consulting Physician (Obstetrics & Gynecology)  Rajesh Orozco MD as Consulting Physician (Gastroenterology)      Reviewed and updated this visit:  Tobacco  Allergies  Meds  Problems  Med Hx  Surg Hx  Soc Hx  Fam Hx

## 2024-12-13 LAB
ALBUMIN: 3.8 G/DL
ALP BLD-CCNC: 70 U/L
ALT SERPL-CCNC: 11 U/L
ANION GAP SERPL CALCULATED.3IONS-SCNC: NORMAL MMOL/L
AST SERPL-CCNC: 18 U/L
BASOPHILS ABSOLUTE: 0 /ΜL
BASOPHILS RELATIVE PERCENT: 0 %
BILIRUB SERPL-MCNC: 0.3 MG/DL (ref 0.1–1.4)
BUN BLDV-MCNC: 22 MG/DL
CALCIUM SERPL-MCNC: 9.1 MG/DL
CHLORIDE BLD-SCNC: 110 MMOL/L
CHOLESTEROL, TOTAL: 122 MG/DL
CHOLESTEROL/HDL RATIO: NORMAL
CO2: 23 MMOL/L
CREAT SERPL-MCNC: 0.99 MG/DL
EOSINOPHILS ABSOLUTE: 0.1 /ΜL
EOSINOPHILS RELATIVE PERCENT: 2 %
FOLATE: 4.2
GFR, ESTIMATED: 60
GLUCOSE BLD-MCNC: 89 MG/DL
HCT VFR BLD CALC: 35.1 % (ref 36–46)
HDLC SERPL-MCNC: 43 MG/DL (ref 35–70)
HEMOGLOBIN: 11 G/DL (ref 12–16)
LDL CHOLESTEROL: 64
LYMPHOCYTES ABSOLUTE: 1 /ΜL
LYMPHOCYTES RELATIVE PERCENT: 16 %
MAGNESIUM: 1.8 MG/DL
MCH RBC QN AUTO: 29.6 PG
MCHC RBC AUTO-ENTMCNC: 31.3 G/DL
MCV RBC AUTO: 94 FL
MONOCYTES ABSOLUTE: 0.6 /ΜL
MONOCYTES RELATIVE PERCENT: 9 %
NEUTROPHILS ABSOLUTE: 4.6 /ΜL
NEUTROPHILS RELATIVE PERCENT: 73 %
NONHDLC SERPL-MCNC: NORMAL MG/DL
PDW BLD-RTO: 12.6 %
PLATELET # BLD: 127 K/ΜL
PMV BLD AUTO: ABNORMAL FL
POTASSIUM SERPL-SCNC: 4.6 MMOL/L
RBC # BLD: 3.72 10^6/ΜL
SODIUM BLD-SCNC: 143 MMOL/L
TOTAL PROTEIN: 6.6 G/DL (ref 6.4–8.2)
TRIGL SERPL-MCNC: 76 MG/DL
TSH SERPL DL<=0.05 MIU/L-ACNC: 6.14 UIU/ML
URIC ACID: 4.9
VITAMIN B-12: 469
VITAMIN D 25-HYDROXY: 19.6
VITAMIN D2, 25 HYDROXY: NORMAL
VITAMIN D3,25 HYDROXY: NORMAL
VLDLC SERPL CALC-MCNC: 15 MG/DL
WBC # BLD: 6.2 10^3/ML

## 2024-12-24 NOTE — PROGRESS NOTES
Nutrition Assessment    Type and Reason for Visit: Reassess, Consult(TF ordering/management)    Nutrition Recommendations: Suggest semi-elemental formula with a goal of 45 mL/hr to provide 1296 kcals, 81 gm protein. Advance TF slowly as tolerated. Monitor labs (K+, Phos, Mag) for possible signs of refeeding. Nutrition Assessment: Pt now intubated. Consult received for TF. Malnutrition Assessment:  · Malnutrition Status: Meets the criteria for moderate malnutrition  · Context: Acute illness or injury  · Findings of the 6 clinical characteristics of malnutrition (Minimum of 2 out of 6 clinical characteristics is required to make the diagnosis of moderate or severe Protein Calorie Malnutrition based on AND/ASPEN Guidelines):  1. Energy Intake-(Eating well prior to intubation per pt's daughter), Greater than or equal to 7 days    2. Weight Loss-7.5% loss or greater, (x 2 months per EHR)  3. Fat Loss-(Mild to moderate fat loss), Orbital  4. Muscle Loss-Unable to assess,    5. Fluid Accumulation-Mild fluid accumulation, Extremities  6.   Strength-Not measured    Nutrition Risk Level: High    Nutrient Needs:  · Estimated Daily Total Kcal: 0065-7101 kcals/day  · Estimated Daily Protein (g): 80 gm/day    Nutrition Diagnosis:   · Problem: Inadequate oral intake  · Etiology: related to Impaired respiratory function-inability to consume food     Signs and symptoms:  as evidenced by NPO status due to medical condition    Objective Information:  · Wound Type: None  · Current Nutrition Therapies:  · Oral Diet Orders: NPO   · Anthropometric Measures:  · Ht: 5' 6\" (167.6 cm)   · Current Body Wt: 105 lb 2.6 oz (47.7 kg)  · Admission Body Wt: 108 lb (49 kg)(stated)  · Usual Body Wt: 114 lb (51.7 kg)(per EHR)  · % Weight Change:  14% loss x 2 months per EHR  · Ideal Body Wt: 135 lb (61.2 kg), % Ideal Body 78%  · BMI Classification: BMI <18.5 Underweight    Nutrition Interventions:   Continue NPO, Start Tube 76 yo M presented with b/l heel eschars   - Pt seen and evaluated.  - Afebrile, WBC 12.14, ESR pending, CRP 5.4  - L heel eschar with wound to subQ, with bogginess, severe malodor, scant liquefactive drainage, R posterior heel loosely adhered eschar bilateral heel well adhered eschar, wound to subQ, no acute signs of infection  - After obtaining verbal consent, using 10 blade L heel escharectomy to to the level of subQ not beyond, severe malodor, necrotic wound base with probing to bone, no purulence noted, R heel eschartomy to level of subQ and not beyond, no purulence, no probing, no acute signs of infection  - b/l foot X-ray: n Gas, poss L calc OM (resident's read)  - Left foot wound culture obtained and sent  - recommended Santyl to be applied to wounds followed by 4x4 gauze and srinath  - Pt on Vancomycin via PICC line since Friday 12/20   - Pt is non ambulatory not candidate for MR  - recommended close monitor of wound at the Rehab center and return immediately to ED if wound worsens  - recommended adjust IV antibiotics according to culture obtained today  - Discussed with Attending

## 2025-01-14 DIAGNOSIS — E55.9 VITAMIN D DEFICIENCY: ICD-10-CM

## 2025-01-14 DIAGNOSIS — I10 ESSENTIAL HYPERTENSION: ICD-10-CM

## 2025-01-14 DIAGNOSIS — E78.2 MIXED HYPERLIPIDEMIA: ICD-10-CM

## 2025-02-11 ENCOUNTER — HOSPITAL ENCOUNTER (OUTPATIENT)
Dept: WOMENS IMAGING | Age: 75
Discharge: HOME | End: 2025-02-13
Payer: MEDICARE

## 2025-02-11 DIAGNOSIS — Z12.31 ENCOUNTER FOR SCREENING MAMMOGRAM FOR HIGH-RISK PATIENT: ICD-10-CM

## 2025-02-11 PROCEDURE — 77063 BREAST TOMOSYNTHESIS BI: CPT

## 2025-02-20 DIAGNOSIS — E78.2 MIXED HYPERLIPIDEMIA: ICD-10-CM

## 2025-02-21 RX ORDER — ATORVASTATIN CALCIUM 20 MG/1
20 TABLET, FILM COATED ORAL DAILY
Qty: 90 TABLET | Refills: 0 | Status: SHIPPED | OUTPATIENT
Start: 2025-02-21

## 2025-03-13 ENCOUNTER — OFFICE VISIT (OUTPATIENT)
Dept: FAMILY MEDICINE CLINIC | Age: 75
End: 2025-03-13
Payer: MEDICARE

## 2025-03-13 VITALS
BODY MASS INDEX: 16.15 KG/M2 | HEART RATE: 71 BPM | WEIGHT: 94.6 LBS | SYSTOLIC BLOOD PRESSURE: 138 MMHG | TEMPERATURE: 97.6 F | HEIGHT: 64 IN | DIASTOLIC BLOOD PRESSURE: 80 MMHG | OXYGEN SATURATION: 98 %

## 2025-03-13 DIAGNOSIS — I50.32 CHRONIC DIASTOLIC HEART FAILURE (HCC): ICD-10-CM

## 2025-03-13 DIAGNOSIS — E55.9 VITAMIN D DEFICIENCY: ICD-10-CM

## 2025-03-13 DIAGNOSIS — R63.4 ABNORMAL WEIGHT LOSS: ICD-10-CM

## 2025-03-13 DIAGNOSIS — K86.1 CHRONIC PANCREATITIS, UNSPECIFIED PANCREATITIS TYPE (HCC): ICD-10-CM

## 2025-03-13 DIAGNOSIS — R41.3 MEMORY PROBLEM: ICD-10-CM

## 2025-03-13 DIAGNOSIS — Z01.818 PRE-OPERATIVE CLEARANCE: Primary | ICD-10-CM

## 2025-03-13 DIAGNOSIS — Z78.0 POST-MENOPAUSAL: ICD-10-CM

## 2025-03-13 DIAGNOSIS — M34.9 LIMITED SYSTEMIC SCLEROSIS (HCC): ICD-10-CM

## 2025-03-13 PROBLEM — S92.901A CLOSED FRACTURE OF BONE OF RIGHT FOOT: Status: RESOLVED | Noted: 2020-07-13 | Resolved: 2025-03-13

## 2025-03-13 PROBLEM — I60.7 CEREBRAL ANEURYSM RUPTURE (HCC): Status: RESOLVED | Noted: 2020-02-12 | Resolved: 2025-03-13

## 2025-03-13 PROBLEM — S83.272A COMPLEX TEAR OF LATERAL MENISCUS OF LEFT KNEE: Status: RESOLVED | Noted: 2019-01-31 | Resolved: 2025-03-13

## 2025-03-13 PROBLEM — R76.8 OTHER SPECIFIED ABNORMAL IMMUNOLOGICAL FINDINGS IN SERUM: Status: RESOLVED | Noted: 2023-09-25 | Resolved: 2025-03-13

## 2025-03-13 PROCEDURE — 1123F ACP DISCUSS/DSCN MKR DOCD: CPT | Performed by: FAMILY MEDICINE

## 2025-03-13 PROCEDURE — 1159F MED LIST DOCD IN RCRD: CPT | Performed by: FAMILY MEDICINE

## 2025-03-13 PROCEDURE — G8418 CALC BMI BLW LOW PARAM F/U: HCPCS | Performed by: FAMILY MEDICINE

## 2025-03-13 PROCEDURE — 99214 OFFICE O/P EST MOD 30 MIN: CPT | Performed by: FAMILY MEDICINE

## 2025-03-13 PROCEDURE — G8399 PT W/DXA RESULTS DOCUMENT: HCPCS | Performed by: FAMILY MEDICINE

## 2025-03-13 PROCEDURE — 1036F TOBACCO NON-USER: CPT | Performed by: FAMILY MEDICINE

## 2025-03-13 PROCEDURE — 1090F PRES/ABSN URINE INCON ASSESS: CPT | Performed by: FAMILY MEDICINE

## 2025-03-13 PROCEDURE — 3079F DIAST BP 80-89 MM HG: CPT | Performed by: FAMILY MEDICINE

## 2025-03-13 PROCEDURE — G8427 DOCREV CUR MEDS BY ELIG CLIN: HCPCS | Performed by: FAMILY MEDICINE

## 2025-03-13 PROCEDURE — 3017F COLORECTAL CA SCREEN DOC REV: CPT | Performed by: FAMILY MEDICINE

## 2025-03-13 PROCEDURE — 3075F SYST BP GE 130 - 139MM HG: CPT | Performed by: FAMILY MEDICINE

## 2025-03-13 PROCEDURE — 1126F AMNT PAIN NOTED NONE PRSNT: CPT | Performed by: FAMILY MEDICINE

## 2025-03-13 PROCEDURE — 1160F RVW MEDS BY RX/DR IN RCRD: CPT | Performed by: FAMILY MEDICINE

## 2025-03-13 RX ORDER — ERGOCALCIFEROL 1.25 MG/1
50000 CAPSULE, LIQUID FILLED ORAL WEEKLY
Qty: 12 CAPSULE | Refills: 1 | Status: SHIPPED | OUTPATIENT
Start: 2025-03-13

## 2025-03-13 SDOH — ECONOMIC STABILITY: FOOD INSECURITY: WITHIN THE PAST 12 MONTHS, THE FOOD YOU BOUGHT JUST DIDN'T LAST AND YOU DIDN'T HAVE MONEY TO GET MORE.: NEVER TRUE

## 2025-03-13 SDOH — ECONOMIC STABILITY: FOOD INSECURITY: WITHIN THE PAST 12 MONTHS, YOU WORRIED THAT YOUR FOOD WOULD RUN OUT BEFORE YOU GOT MONEY TO BUY MORE.: NEVER TRUE

## 2025-03-13 ASSESSMENT — ENCOUNTER SYMPTOMS
CHEST TIGHTNESS: 0
ABDOMINAL DISTENTION: 0
BACK PAIN: 1
SORE THROAT: 0
COUGH: 0
DIARRHEA: 0
SHORTNESS OF BREATH: 0
RHINORRHEA: 0
SINUS PRESSURE: 0
TROUBLE SWALLOWING: 0
RECTAL PAIN: 0
VOMITING: 0
STRIDOR: 0
CONSTIPATION: 0
ABDOMINAL PAIN: 0
EYE REDNESS: 0
WHEEZING: 0
NAUSEA: 0
COLOR CHANGE: 0
BLOOD IN STOOL: 0

## 2025-03-13 ASSESSMENT — PATIENT HEALTH QUESTIONNAIRE - PHQ9
SUM OF ALL RESPONSES TO PHQ QUESTIONS 1-9: 0
2. FEELING DOWN, DEPRESSED OR HOPELESS: NOT AT ALL
1. LITTLE INTEREST OR PLEASURE IN DOING THINGS: NOT AT ALL
SUM OF ALL RESPONSES TO PHQ QUESTIONS 1-9: 0

## 2025-03-19 ENCOUNTER — RESULTS FOLLOW-UP (OUTPATIENT)
Dept: WOMENS IMAGING | Age: 75
End: 2025-03-19

## 2025-03-19 ENCOUNTER — HOSPITAL ENCOUNTER (OUTPATIENT)
Dept: WOMENS IMAGING | Age: 75
Discharge: HOME | End: 2025-03-21
Payer: MEDICARE

## 2025-03-19 DIAGNOSIS — Z78.0 POST-MENOPAUSAL: ICD-10-CM

## 2025-03-19 DIAGNOSIS — M81.0 AGE-RELATED OSTEOPOROSIS WITHOUT CURRENT PATHOLOGICAL FRACTURE: Primary | ICD-10-CM

## 2025-03-19 PROCEDURE — 77080 DXA BONE DENSITY AXIAL: CPT

## 2025-03-19 RX ORDER — IBANDRONATE SODIUM 150 MG/1
150 TABLET, FILM COATED ORAL
Qty: 3 TABLET | Refills: 3 | Status: SHIPPED | OUTPATIENT
Start: 2025-03-19

## 2025-03-24 DIAGNOSIS — Z01.818 PRE-OPERATIVE CLEARANCE: ICD-10-CM

## 2025-03-25 ENCOUNTER — RESULTS FOLLOW-UP (OUTPATIENT)
Dept: FAMILY MEDICINE CLINIC | Age: 75
End: 2025-03-25

## 2025-03-25 DIAGNOSIS — R94.31 ABNORMAL EKG: Primary | ICD-10-CM

## 2025-03-25 NOTE — RESULT ENCOUNTER NOTE
Please notify patient: This EKG was read as abnormal she will need to see a cardiologist, has she ever seen a cardiologist?  If not, let me know, I will place referral to [place] cardiologist group, please let me know  I believe she needs clearance for surgery    Future Appointments  4/23/2025  11:00 AM   SCHEDULE, STCZ DIET & NUT* STCZ DIET           [facility]  6/13/2025  11:45 AM   Shahnaz Valdes MD         [facility] DEP

## 2025-03-25 NOTE — RESULT ENCOUNTER NOTE
Please notify patient: I placed order for echo 2D, needs to be scheduled please give her contact information and referral to cardiologist please give her contact information to schedule appointment       Diagnosis Orders  1. Abnormal EKG  AFL (Epic) - Dillan Soler MD, Cardiology, [place]   Echo (TTE) complete (PRN contrast/bubble/strain/3D)          Future Appointments  4/23/2025  11:00 AM   SCHEDULE, STCZ DIET & NUT* STCZ DIET           [facility]  6/13/2025  11:45 AM   Shahnaz Valdes MD         [facility]               [facility] DEP

## 2025-03-25 NOTE — PROGRESS NOTES
Diagnosis Orders   1. Abnormal EKG  AFL (Epic) - Dillan Soler MD, Cardiology, [place]    Echo (TTE) complete (PRN contrast/bubble/strain/3D)           Future Appointments   Date Time Provider Department Center   4/23/2025 11:00 AM SCHEDULE, BERRY DIET & NUTRITION  DIET [facility]   6/13/2025 11:45 AM Shahnaz Valdes MD [facility] DEP

## 2025-03-26 NOTE — TELEPHONE ENCOUNTER
Incoming call came from [facility] they needed clarification on PATIENT Needing additional testing due to abnormal EKG, I did give information word for word per  request.     She also asked to send over notes , requesting these request.

## 2025-03-26 NOTE — TELEPHONE ENCOUNTER
Also called out to pt and lvm to return phone call so we can give her cardiology referral information .

## 2025-04-01 ENCOUNTER — HOSPITAL ENCOUNTER (OUTPATIENT)
Age: 75
Discharge: HOME | End: 2025-04-03
Attending: FAMILY MEDICINE
Payer: MEDICARE

## 2025-04-01 VITALS
HEIGHT: 64 IN | BODY MASS INDEX: 16.05 KG/M2 | DIASTOLIC BLOOD PRESSURE: 68 MMHG | WEIGHT: 94 LBS | SYSTOLIC BLOOD PRESSURE: 176 MMHG | HEART RATE: 70 BPM

## 2025-04-01 DIAGNOSIS — R94.31 ABNORMAL EKG: ICD-10-CM

## 2025-04-01 LAB
ECHO AO ROOT DIAM: 2.8 CM
ECHO AO ROOT INDEX: 1.97 CM/M2
ECHO AV AREA PEAK VELOCITY: 2.2 CM2
ECHO AV AREA VTI: 2.2 CM2
ECHO AV AREA/BSA PEAK VELOCITY: 1.5 CM2/M2
ECHO AV AREA/BSA VTI: 1.5 CM2/M2
ECHO AV MEAN GRADIENT: 2 MMHG
ECHO AV MEAN VELOCITY: 0.6 M/S
ECHO AV PEAK GRADIENT: 3 MMHG
ECHO AV PEAK VELOCITY: 0.9 M/S
ECHO AV VELOCITY RATIO: 0.67
ECHO AV VTI: 20.4 CM
ECHO BSA: 1.39 M2
ECHO EST RA PRESSURE: 3 MMHG
ECHO LA AREA 2C: 21.5 CM2
ECHO LA AREA 4C: 13 CM2
ECHO LA DIAMETER INDEX: 2.82 CM/M2
ECHO LA DIAMETER: 4 CM
ECHO LA MAJOR AXIS: 4.7 CM
ECHO LA MINOR AXIS: 5.5 CM
ECHO LA TO AORTIC ROOT RATIO: 1.43
ECHO LA VOL BP: 48 ML (ref 22–52)
ECHO LA VOL MOD A2C: 69 ML (ref 22–52)
ECHO LA VOL MOD A4C: 28 ML (ref 22–52)
ECHO LA VOL/BSA BIPLANE: 34 ML/M2 (ref 16–34)
ECHO LA VOLUME INDEX MOD A2C: 49 ML/M2 (ref 16–34)
ECHO LA VOLUME INDEX MOD A4C: 20 ML/M2 (ref 16–34)
ECHO LV E' LATERAL VELOCITY: 8.7 CM/S
ECHO LV E' SEPTAL VELOCITY: 5.44 CM/S
ECHO LV EDV A2C: 102 ML
ECHO LV EDV A4C: 92 ML
ECHO LV EDV INDEX A4C: 65 ML/M2
ECHO LV EDV NDEX A2C: 72 ML/M2
ECHO LV EF PHYSICIAN: 55 %
ECHO LV EJECTION FRACTION A2C: 57 %
ECHO LV EJECTION FRACTION A4C: 53 %
ECHO LV EJECTION FRACTION BIPLANE: 55 % (ref 55–100)
ECHO LV ESV A2C: 44 ML
ECHO LV ESV A4C: 43 ML
ECHO LV ESV INDEX A2C: 31 ML/M2
ECHO LV ESV INDEX A4C: 30 ML/M2
ECHO LV FRACTIONAL SHORTENING: 24 % (ref 28–44)
ECHO LV INTERNAL DIMENSION DIASTOLE INDEX: 3.52 CM/M2
ECHO LV INTERNAL DIMENSION DIASTOLIC: 5 CM (ref 3.9–5.3)
ECHO LV INTERNAL DIMENSION SYSTOLIC INDEX: 2.68 CM/M2
ECHO LV INTERNAL DIMENSION SYSTOLIC: 3.8 CM
ECHO LV IVSD: 1 CM (ref 0.6–0.9)
ECHO LV MASS 2D: 182 G (ref 67–162)
ECHO LV MASS INDEX 2D: 128.2 G/M2 (ref 43–95)
ECHO LV POSTERIOR WALL DIASTOLIC: 1 CM (ref 0.6–0.9)
ECHO LV RELATIVE WALL THICKNESS RATIO: 0.4
ECHO LVOT AREA: 3.1 CM2
ECHO LVOT AV VTI INDEX: 0.71
ECHO LVOT DIAM: 2 CM
ECHO LVOT MEAN GRADIENT: 1 MMHG
ECHO LVOT PEAK GRADIENT: 1 MMHG
ECHO LVOT PEAK VELOCITY: 0.6 M/S
ECHO LVOT STROKE VOLUME INDEX: 31.8 ML/M2
ECHO LVOT SV: 45.2 ML
ECHO LVOT VTI: 14.4 CM
ECHO MV A VELOCITY: 0.6 M/S
ECHO MV AREA VTI: 1.7 CM2
ECHO MV E DECELERATION TIME (DT): 176 MS
ECHO MV E VELOCITY: 0.77 M/S
ECHO MV E/A RATIO: 1.28
ECHO MV E/E' LATERAL: 8.85
ECHO MV E/E' RATIO (AVERAGED): 11.5
ECHO MV E/E' SEPTAL: 14.15
ECHO MV LVOT VTI INDEX: 1.85
ECHO MV MAX VELOCITY: 0.9 M/S
ECHO MV MEAN GRADIENT: 1 MMHG
ECHO MV MEAN VELOCITY: 0.5 M/S
ECHO MV PEAK GRADIENT: 3 MMHG
ECHO MV VTI: 26.6 CM
ECHO RA AREA 4C: 13.9 CM2
ECHO RA END SYSTOLIC VOLUME APICAL 4 CHAMBER INDEX BSA: 26 ML/M2
ECHO RA VOLUME: 37 ML
ECHO RIGHT VENTRICULAR SYSTOLIC PRESSURE (RVSP): 21 MMHG
ECHO RV BASAL DIMENSION: 3.8 CM
ECHO RV TAPSE: 1.9 CM (ref 1.7–?)
ECHO TV REGURGITANT MAX VELOCITY: 2.14 M/S
ECHO TV REGURGITANT PEAK GRADIENT: 18 MMHG

## 2025-04-01 PROCEDURE — 93306 TTE W/DOPPLER COMPLETE: CPT

## 2025-04-02 ENCOUNTER — RESULTS FOLLOW-UP (OUTPATIENT)
Dept: FAMILY MEDICINE CLINIC | Age: 75
End: 2025-04-02

## 2025-04-02 NOTE — RESULT ENCOUNTER NOTE
Please notify patient: Echo of the heart shows some mild chronic changes, mildly increased thickness of the heart consistent with high blood pressure  Mild mitral regurgitation, otherwise normal follow-up with cardiologist as scheduled    Future Appointments  4/23/2025  11:00 AM   SCHEDULE, STCZ DIET & NUT* STCZ DIET           [facility]  4/28/2025  2:15 PM    Dillan Soler MD         AFL [facility] PBUR        AFL [facility]ERAS C  6/13/2025  11:45 AM   Shahnaz Valdes MD         [facility] DEP

## 2025-04-23 ENCOUNTER — HOSPITAL ENCOUNTER (OUTPATIENT)
Dept: NUTRITION | Age: 75
Discharge: HOME | End: 2025-04-23
Payer: MEDICARE

## 2025-04-23 VITALS — HEIGHT: 64 IN | BODY MASS INDEX: 16.14 KG/M2

## 2025-04-23 PROCEDURE — 97802 MEDICAL NUTRITION INDIV IN: CPT

## 2025-04-23 NOTE — PROGRESS NOTES
OUTPATIENT NUTRITION COUNSELING       Laura James is a 75 y.o.  female     Reason for Counseling: Abnormal weight loss    NUTRITION RECOMMENDATIONS / MONITORING / EVALUATION  1.  Eat at least 3-4 times per day.  2.  Aim for three 8 oz servings of milk and/or oral nutrition supplements per day.  3.  Include high Calorie foods daily such as peanut butter, avocados, trail mix, vegetable oil in cooking.   4.  Name and Office phone number given for reference.    Subjective/Current Data:  Pt states that she lost 30-35 lb after her   about 9 years ago. Wt stabilized, and then she began losing again in the last couple of years. States she does not have much of an appetite. Usually eats twice per day with additional snack on occasion. Pt's son and girlfriend live with her so she often prepares dinner. Pt normally works 3 days per week on 2nd shift. Pt mostly drinks water and coffee. Pt states she knows that she needs to eat more.     Objective Data:    Past Medical History:  Past Medical History:   Diagnosis Date    Acid reflux     Arthritis     Sees Dr.Goldberger    Brain aneurysm     Cancer (HCC)     cervical     Family history of breast cancer in first degree relative     sister    Gall stones     hx of    Hard of hearing     bilateral hearing aids    History of cervical cancer     Hypertension     Sees PCP    Lateral meniscus tear     left knee    Wears dentures     Wears glasses     Wellness examination 2021    PCP: Eddie Elizondoarrmary [facility], last visit 2021    Wellness examination 2021    Neurology: Dr. Four[facility], last visit 2021    Wellness examination 2021    Orthopedic: Dr. Cherry [place], last visit 2021    Wellness examination 2021    Internist: Dr.E.Goldberger, [facility], last visit 2020     Past Surgical History:   Procedure Laterality Date    BREAST BIOPSY      LEFT(BENIGN)    CHOLECYSTECTOMY      COLONOSCOPY      Negative

## 2025-05-05 DIAGNOSIS — E78.2 MIXED HYPERLIPIDEMIA: ICD-10-CM

## 2025-05-05 RX ORDER — ATORVASTATIN CALCIUM 20 MG/1
20 TABLET, FILM COATED ORAL DAILY
Qty: 90 TABLET | Refills: 3 | Status: SHIPPED | OUTPATIENT
Start: 2025-05-05

## 2025-05-05 NOTE — TELEPHONE ENCOUNTER
Please Approve or Refuse.  Send to Pharmacy per Pt's Request:      Next Visit Date:  6/13/2025   Last Visit Date: 3/13/2025    Hemoglobin A1C (%)   Date Value   02/13/2020 5.6   06/02/2019 5.2             ( goal A1C is < 7)   BP Readings from Last 3 Encounters:   04/28/25 (!) 180/84   04/01/25 (!) 176/68   03/13/25 138/80          (goal 120/80)  BUN   Date Value Ref Range Status   12/13/2024 22 mg/dL Final     Creatinine   Date Value Ref Range Status   12/13/2024 0.99 mg/dL Final     Potassium   Date Value Ref Range Status   12/13/2024 4.6 mmol/L Final

## 2025-06-13 ENCOUNTER — OFFICE VISIT (OUTPATIENT)
Dept: FAMILY MEDICINE CLINIC | Age: 75
End: 2025-06-13

## 2025-06-13 VITALS
DIASTOLIC BLOOD PRESSURE: 70 MMHG | WEIGHT: 99.4 LBS | SYSTOLIC BLOOD PRESSURE: 130 MMHG | HEIGHT: 64 IN | BODY MASS INDEX: 16.97 KG/M2 | TEMPERATURE: 97.9 F

## 2025-06-13 DIAGNOSIS — E78.2 MIXED HYPERLIPIDEMIA: ICD-10-CM

## 2025-06-13 DIAGNOSIS — M81.0 AGE-RELATED OSTEOPOROSIS WITHOUT CURRENT PATHOLOGICAL FRACTURE: ICD-10-CM

## 2025-06-13 DIAGNOSIS — Z71.89 ACP (ADVANCE CARE PLANNING): ICD-10-CM

## 2025-06-13 DIAGNOSIS — E55.9 VITAMIN D DEFICIENCY: ICD-10-CM

## 2025-06-13 DIAGNOSIS — I10 ESSENTIAL HYPERTENSION: ICD-10-CM

## 2025-06-13 DIAGNOSIS — K21.9 GASTROESOPHAGEAL REFLUX DISEASE WITHOUT ESOPHAGITIS: ICD-10-CM

## 2025-06-13 DIAGNOSIS — Z00.00 MEDICARE ANNUAL WELLNESS VISIT, SUBSEQUENT: Primary | ICD-10-CM

## 2025-06-13 PROBLEM — R94.31 ABNORMAL EKG: Status: RESOLVED | Noted: 2025-03-25 | Resolved: 2025-06-13

## 2025-06-13 PROBLEM — R63.4 ABNORMAL WEIGHT LOSS: Status: RESOLVED | Noted: 2019-02-15 | Resolved: 2025-06-13

## 2025-06-13 SDOH — ECONOMIC STABILITY: FOOD INSECURITY: WITHIN THE PAST 12 MONTHS, THE FOOD YOU BOUGHT JUST DIDN'T LAST AND YOU DIDN'T HAVE MONEY TO GET MORE.: NEVER TRUE

## 2025-06-13 SDOH — ECONOMIC STABILITY: FOOD INSECURITY: WITHIN THE PAST 12 MONTHS, YOU WORRIED THAT YOUR FOOD WOULD RUN OUT BEFORE YOU GOT MONEY TO BUY MORE.: NEVER TRUE

## 2025-06-13 ASSESSMENT — ENCOUNTER SYMPTOMS
SORE THROAT: 0
DIARRHEA: 0
SINUS PRESSURE: 0
STRIDOR: 0
RHINORRHEA: 0
BLOOD IN STOOL: 0
SHORTNESS OF BREATH: 0
NAUSEA: 0
COLOR CHANGE: 0
WHEEZING: 0
VOMITING: 0
CONSTIPATION: 0
EYE REDNESS: 0
TROUBLE SWALLOWING: 0
BACK PAIN: 1
CHEST TIGHTNESS: 0
RECTAL PAIN: 0
ABDOMINAL PAIN: 0
COUGH: 0
ABDOMINAL DISTENTION: 0

## 2025-06-13 ASSESSMENT — PATIENT HEALTH QUESTIONNAIRE - PHQ9
SUM OF ALL RESPONSES TO PHQ QUESTIONS 1-9: 0
2. FEELING DOWN, DEPRESSED OR HOPELESS: NOT AT ALL
1. LITTLE INTEREST OR PLEASURE IN DOING THINGS: NOT AT ALL

## 2025-06-13 ASSESSMENT — LIFESTYLE VARIABLES
HOW MANY STANDARD DRINKS CONTAINING ALCOHOL DO YOU HAVE ON A TYPICAL DAY: 1 OR 2
HOW OFTEN DO YOU HAVE A DRINK CONTAINING ALCOHOL: MONTHLY OR LESS

## 2025-06-13 ASSESSMENT — VISUAL ACUITY
OS_CC: 20/20
OD_CC: 20/30

## 2025-06-13 NOTE — PATIENT INSTRUCTIONS
your loved ones and doctor what to do if you can't say what you want.  There are two main types of advance directives. You can change them any time your wishes change.  Living will. This form tells your loved ones and doctor your wishes about life support and other treatment. The form is also called a declaration.  Medical power of . This form lets you name a person to make treatment decisions for you when you can't speak for yourself. This person is called a health care agent (health care proxy, health care surrogate). The form is also called a durable power of  for health care.  If you do not have an advance directive, decisions about your medical care may be made by a family member or doctor who doesn't know you or by a .  It may help to think of an advance directive as a gift to the people who care for you. If you have one, they won't have to make tough decisions by themselves.  For more information, including forms for your state, see the [employer] website (www.caringinfo.org/planning/advance-directives/).  Follow-up care is a key part of your treatment and safety. Be sure to make and go to all appointments, and call your doctor if you are having problems. It's also a good idea to know your test results and keep a list of the medicines you take.  What should you include in an advance directive?  Many states have a unique advance directive form. (It may ask you to address specific issues.) Or you might use a universal form that's approved by many states.  If your form doesn't tell you what to address, it may be hard to know what to include in your advance directive. Use the questions below to help you get started.  Who do you want to make decisions about your medical care if you are not able to?  What life-support measures do you want if you have a serious illness that gets worse over time or can't be cured?  What are you most afraid of that might happen? (Maybe you're afraid of having

## 2025-06-13 NOTE — PROGRESS NOTES
Visit Information    Have you changed or started any medications since your last visit including any over-the-counter medicines, vitamins, or herbal medicines? no   Are you having any side effects from any of your medications? -  no  Have you stopped taking any of your medications? Is so, why? -  no    Have you seen any other physician or provider since your last visit? No  Have you had any other diagnostic tests since your last visit? No  Have you been seen in the emergency room and/or had an admission to a hospital since we last saw you? No  Have you had your routine dental cleaning in the past 6 months? no    Have you activated your [facility] [employer] account? If not, what are your barriers? Yes     Patient Care Team:  Shahnaz Valdes MD as PCP - General (Family Medicine)  Shahnaz Valdes MD as PCP - Empaneled Provider  Deidra Alvarado DO as Consulting Physician (Obstetrics & Gynecology)  Rajesh Orozco MD as Consulting Physician (Gastroenterology)    Medical History Review  Past Medical, Family, and Social History reviewed and does contribute to the patient presenting condition    Health Maintenance   Topic Date Due    DTaP/Tdap/Td vaccine (1 - Tdap) Never done    Respiratory Syncytial Virus (RSV) Pregnant or age 60 yrs+ (1 - Risk 60-74 years 1-dose series) Never done    COVID-19 Vaccine (3 - 2024-25 season) 09/01/2024    Annual Wellness Visit (Medicare Advantage)  01/01/2025    Lipids  12/13/2025    Breast cancer screen  02/11/2026    Depression Screen  03/13/2026    Colorectal Cancer Screen  05/31/2029    DEXA (modify frequency per FRAX score)  Completed    Flu vaccine  Completed    Shingles vaccine  Completed    Pneumococcal 50+ years Vaccine  Completed    Hepatitis C screen  Completed    Hib vaccine  Aged Out    Polio vaccine  Aged Out    Meningococcal (ACWY) vaccine  Aged Out    Meningococcal B vaccine  Aged Out    Hepatitis A vaccine  Discontinued    Hepatitis B vaccine  Discontinued     
Services Due: see orders and patient instructions/AVS.  Recommended screening schedule for the next 5-10 years is provided to the patient in written form: see Patient Instructions/AVS.     Reviewed and updated this visit:  Allergies  Meds  Problems  Sexual Hx      Sexual History: Patient declined to answer.           The patient (or guardian, if applicable) and other individuals in attendance with the patient were advised that Artificial Intelligence will be utilized during this visit to record and process the conversation to generate a clinical note. The patient (or guardian, if applicable) and other individuals in attendance at the appointment consented to the use of AI, including the recording.

## (undated) DEVICE — SOLUTION SCRB 4OZ 10% POVIDONE IOD ANTIMIC BTL

## (undated) DEVICE — DEFENDO AIR WATER SUCTION AND BIOPSY VALVE KIT FOR  OLYMPUS: Brand: DEFENDO AIR/WATER/SUCTION AND BIOPSY VALVE

## (undated) DEVICE — GLOVE ORANGE PI 7   MSG9070

## (undated) DEVICE — GOWN,POLY REINFORCED,LG: Brand: MEDLINE

## (undated) DEVICE — INTEGRA® MILTEX® DISPOSABLE BIOPSY PUNCH 4.0MM: Brand: INTEGRA® MILTEX®

## (undated) DEVICE — 90-S, SUCTION PROBE, NON-BENDABLE, MAX CUT LEVEL 11: Brand: SERFAS ENERGY

## (undated) DEVICE — STOCKINETTE,IMPERVIOUS,12X48,STERILE: Brand: MEDLINE

## (undated) DEVICE — FORCEPS BX L240CM WRK CHN 2.8MM STD CAP W/ NDL MIC MESH

## (undated) DEVICE — GOWN,SIRUS,NONRNF,SETINSLV,XL,20/CS: Brand: MEDLINE

## (undated) DEVICE — JELLY,LUBE,STERILE,FLIP TOP,TUBE,2-OZ: Brand: MEDLINE

## (undated) DEVICE — GLOVE ORANGE PI 7 1/2   MSG9075

## (undated) DEVICE — [TOMCAT CUTTER, ARTHROSCOPIC SHAVER BLADE,  DO NOT RESTERILIZE,  DO NOT USE IF PACKAGE IS DAMAGED,  KEEP DRY,  KEEP AWAY FROM SUNLIGHT]: Brand: FORMULA

## (undated) DEVICE — [ARTHROSCOPY PUMP,  DO NOT USE IF PACKAGE IS DAMAGED,  KEEP DRY,  KEEP AWAY FROM SUNLIGHT,  PROTECT FROM HEAT AND RADIOACTIVE SOURCES.]: Brand: FLOSTEADY

## (undated) DEVICE — SOLUTION IV IRRIG LACTATED RINGERS 3000ML 2B7487

## (undated) DEVICE — DRAPE,U/ SHT,SPLIT,PLAS,STERIL: Brand: MEDLINE

## (undated) DEVICE — SUTURE VCRL SZ 3-0 L27IN ABSRB UD L26MM SH 1/2 CIR J416H

## (undated) DEVICE — TUBING SUCT 12FR MAL ALUM SHFT FN CAP VENT UNIV CONN W/ OBT

## (undated) DEVICE — SYRINGE MED 50ML LUERSLIP TIP

## (undated) DEVICE — PADDING UNDERCAST W4INXL4YD COT FBR LO LINTING WYTEX

## (undated) DEVICE — SUTURE PROL SZ 3-0 L18IN NONABSORBABLE BLU L30MM FS-1 3/8 8663G

## (undated) DEVICE — POLYP TRAP: Brand: TRAPEASE®

## (undated) DEVICE — ZIMMER® STERILE DISPOSABLE TOURNIQUET CUFF WITH PLC, SINGLE PORT, SINGLE BLADDER, 24 IN. (61 CM)

## (undated) DEVICE — SOLUTION PREP POVIDONE IOD FOR SKIN MUCOUS MEM PRIOR TO

## (undated) DEVICE — BANDAGE,SELF ADHRNT,COFLEX,4"X5YD,STRL: Brand: COLABEL

## (undated) DEVICE — NEEDLE HYPO 25GA L1.5IN BLU POLYPR HUB S STL REG BVL STR

## (undated) DEVICE — POUCH INSTR W6.75XL11.5IN FRST 2 PKT ADH FOR ORTH AND

## (undated) DEVICE — POSITIONER,HEAD,MULTIRING,36CS: Brand: MEDLINE

## (undated) DEVICE — DISC ABSRB YEL FLR POLYETH MGMT SUCT QUICKSUITE

## (undated) DEVICE — SYRINGE MED 10ML TRNSLUC BRL PLUNG BLK MRK POLYPR CTRL

## (undated) DEVICE — 4-PORT MANIFOLD: Brand: NEPTUNE 2

## (undated) DEVICE — ELECTRODE ELECSURG NDL 2.8 INX7.2 CM COAT INSUL EDGE

## (undated) DEVICE — POSITIONER HD W8XH4XL8.5IN RASPBERRY FOAM SLT

## (undated) DEVICE — Device

## (undated) DEVICE — INTENDED FOR TISSUE SEPARATION, AND OTHER PROCEDURES THAT REQUIRE A SHARP SURGICAL BLADE TO PUNCTURE OR CUT.: Brand: BARD-PARKER ® CARBON RIB-BACK BLADES

## (undated) DEVICE — SYRINGE,EAR/ULCER, 3 OZ, STERILE: Brand: MEDLINE

## (undated) DEVICE — DRESSING TRNSPAR W5XL4.5IN FLM SHT SEMIPERMEABLE WIND

## (undated) DEVICE — BLADE CLIPPER GEN PURP NS

## (undated) DEVICE — BANDAGE,ELASTIC,ESMARK,STERILE,6"X9',LF: Brand: MEDLINE

## (undated) DEVICE — Z DISCONTINUED BY MEDLINE USE 2711682 TRAY SKIN PREP DRY W/ PREM GLV

## (undated) DEVICE — CORD ES L12FT BPLR FRCP

## (undated) DEVICE — MITT SURG PREP L ADH DISPOSABLE

## (undated) DEVICE — 3M™ STERI-DRAPE™ U-DRAPE 1015: Brand: STERI-DRAPE™

## (undated) DEVICE — PROTECTOR ULN NRV PUR FOAM HK LOOP STRP ANATOMICALLY

## (undated) DEVICE — #1020 STERI DRAPE 41MM X 41MM SMALL: Brand: STERI-DRAPE™

## (undated) DEVICE — DRESSING FOAM ALL 2INX2IN 66007643

## (undated) DEVICE — SVMMC HD AND NK PK

## (undated) DEVICE — TUBING, SUCTION, 3/16" X 10', STRAIGHT: Brand: MEDLINE

## (undated) DEVICE — 35 ML SYRINGE LUER-LOCK TIP: Brand: MONOJECT

## (undated) DEVICE — Z DISCONTINUED USE 2272114 DRAPE SURG UTIL 26X15 IN W/ TAPE N INVASIVE MULTLYR DISP

## (undated) DEVICE — SNARE ENDOSCP L240CM LOOP W13MM DIA2.4MM SHT THROW SM OVL

## (undated) DEVICE — NEEDLE SPNL 18GA L3.5IN W/ QNCKE SHARPER BVL DURA CLICK

## (undated) DEVICE — ERBE NESSY® OMEGA PLATE USA (85+23)CM² , WITH CABLE 3 M: Brand: ERBE

## (undated) DEVICE — GARMENT,MEDLINE,DVT,INT,CALF,MED, GEN2: Brand: MEDLINE

## (undated) DEVICE — GAUZE,SPONGE,FLUFF,6"X6.75",STRL,5/TRAY: Brand: MEDLINE

## (undated) DEVICE — DRESSING,GAUZE,XEROFORM,CURAD,1"X8",ST: Brand: CURAD